# Patient Record
Sex: FEMALE | Race: WHITE | NOT HISPANIC OR LATINO | Employment: OTHER | URBAN - NONMETROPOLITAN AREA
[De-identification: names, ages, dates, MRNs, and addresses within clinical notes are randomized per-mention and may not be internally consistent; named-entity substitution may affect disease eponyms.]

---

## 2017-01-03 RX ORDER — OXYBUTYNIN CHLORIDE 5 MG/1
5 TABLET ORAL DAILY
Qty: 30 TABLET | Refills: 3 | Status: SHIPPED | OUTPATIENT
Start: 2017-01-03 | End: 2017-05-03 | Stop reason: SDUPTHER

## 2017-01-19 ENCOUNTER — TELEPHONE (OUTPATIENT)
Dept: CARDIOLOGY | Facility: CLINIC | Age: 77
End: 2017-01-19

## 2017-01-19 NOTE — TELEPHONE ENCOUNTER
"----- Message from Jennifer Montano MD sent at 1/19/2017  9:27 AM EST -----   That's okay  ----- Message -----     From: Sanam Brady MA     Sent: 1/19/2017   9:02 AM       To: Jennifer Montano MD    Pt c/o cough  Tightness in chest  Cant cough any thing up  Feels like she \"has been run over by a truck\"  Chilling  No fever  Runny nose   Wants to know if she can come in    "

## 2017-01-20 ENCOUNTER — OFFICE VISIT (OUTPATIENT)
Dept: CARDIOLOGY | Facility: CLINIC | Age: 77
End: 2017-01-20

## 2017-01-20 VITALS
DIASTOLIC BLOOD PRESSURE: 68 MMHG | WEIGHT: 186.6 LBS | SYSTOLIC BLOOD PRESSURE: 124 MMHG | OXYGEN SATURATION: 93 % | HEIGHT: 59 IN | HEART RATE: 57 BPM | BODY MASS INDEX: 37.62 KG/M2

## 2017-01-20 DIAGNOSIS — J06.9 UPPER RESPIRATORY TRACT INFECTION, UNSPECIFIED TYPE: Primary | ICD-10-CM

## 2017-01-20 DIAGNOSIS — R05.9 COUGH: ICD-10-CM

## 2017-01-20 DIAGNOSIS — I10 ESSENTIAL HYPERTENSION: ICD-10-CM

## 2017-01-20 DIAGNOSIS — E78.2 MIXED HYPERLIPIDEMIA: ICD-10-CM

## 2017-01-20 LAB
FLUAV AG NPH QL: NEGATIVE
FLUBV AG NPH QL IA: NEGATIVE

## 2017-01-20 PROCEDURE — 99214 OFFICE O/P EST MOD 30 MIN: CPT | Performed by: INTERNAL MEDICINE

## 2017-01-20 PROCEDURE — 87804 INFLUENZA ASSAY W/OPTIC: CPT | Performed by: INTERNAL MEDICINE

## 2017-01-20 PROCEDURE — 96372 THER/PROPH/DIAG INJ SC/IM: CPT | Performed by: INTERNAL MEDICINE

## 2017-01-20 RX ORDER — CEFTRIAXONE 500 MG/1
500 INJECTION, POWDER, FOR SOLUTION INTRAMUSCULAR; INTRAVENOUS ONCE
Status: COMPLETED | OUTPATIENT
Start: 2017-01-20 | End: 2017-01-20

## 2017-01-20 RX ORDER — AZITHROMYCIN 1 G
1 PACKET (EA) ORAL ONCE
Qty: 1 PACKET | Refills: 0 | Status: CANCELLED | OUTPATIENT
Start: 2017-01-20 | End: 2017-01-20

## 2017-01-20 RX ORDER — TRIAMCINOLONE ACETONIDE 40 MG/ML
40 INJECTION, SUSPENSION INTRA-ARTICULAR; INTRAMUSCULAR ONCE
Status: COMPLETED | OUTPATIENT
Start: 2017-01-20 | End: 2017-01-20

## 2017-01-20 RX ORDER — LOSARTAN POTASSIUM 100 MG/1
100 TABLET ORAL DAILY
Qty: 90 TABLET | Refills: 0 | Status: SHIPPED | OUTPATIENT
Start: 2017-01-20 | End: 2017-04-18 | Stop reason: SDUPTHER

## 2017-01-20 RX ORDER — AZITHROMYCIN 250 MG/1
TABLET, FILM COATED ORAL
Qty: 6 TABLET | Refills: 0 | Status: SHIPPED | OUTPATIENT
Start: 2017-01-20 | End: 2017-02-15

## 2017-01-20 RX ADMIN — CEFTRIAXONE 500 MG: 500 INJECTION, POWDER, FOR SOLUTION INTRAMUSCULAR; INTRAVENOUS at 11:52

## 2017-01-20 RX ADMIN — TRIAMCINOLONE ACETONIDE 40 MG: 40 INJECTION, SUSPENSION INTRA-ARTICULAR; INTRAMUSCULAR at 11:56

## 2017-01-20 NOTE — PROGRESS NOTES
subjective     Chief Complaint   Patient presents with   • URI     History of Present Illness  Upper respiratory tract infection  Patient states that she has been coughing now for almost 6 weeks.  She has taken multiple antibiotics and is not getting any better.  Throat is very raw she is coughing all night cannot rest.  There is no expectoration is dry hacking cough.  She does admit to having nasal congestion and sinus drainage.  There is no fever or chills.    HYPERTENSION  Emma Ryan has long-standing history of essential hypertension.  she is taking medications regularly.  There are no medication side effects.  Blood pressure is very well controlled.  There has been no headache, nausea or chest pain.  There has been no syncopal or presyncopal episode.  she denies episodes of hypo-tension or accelerated hypertension.  GERD  Emma Ryan has long-standing history of gastroesophageal reflux disorder however she significantly better on medications.  There is no nausea or vomiting.  No hematemesis or melena.  No abdominal pain.  Mild epigastric heartburns are noted.  Appetite is good bowel movements are normal.  Patient Active Problem List   Diagnosis   • Complete tear of right rotator cuff   • Chronic kidney disease, stage I   • Cough   • Gastroesophageal reflux disease   • Mixed hyperlipidemia   • Shoulder pain   • Hypertension   • Acromioclavicular joint arthritis   • Impingement syndrome, shoulder   • Complete tear of left rotator cuff   • Urge incontinence   • Atopic rhinitis   • Upper respiratory tract infection       Social History   Substance Use Topics   • Smoking status: Former Smoker     Packs/day: 2.00     Years: 20.00     Types: Cigarettes     Quit date: 1976   • Smokeless tobacco: Never Used   • Alcohol use No      Comment: s/p 1976       Allergies   Allergen Reactions   • Codeine Other (See Comments)     hyperventilate   • Sulfa Antibiotics Rash         Current Outpatient Prescriptions:   •   aspirin 81 MG tablet, Take 81 mg by mouth daily., Disp: , Rfl:   •  atorvastatin (LIPITOR) 20 MG tablet, Take 1 tablet by mouth daily., Disp: 90 tablet, Rfl: 2  •  Cholecalciferol (VITAMIN D PO), Take 1,000 Units by mouth daily., Disp: , Rfl:   •  diltiazem XR (DILACOR XR) 120 MG 24 hr capsule, Take 1 capsule by mouth Daily., Disp: 30 capsule, Rfl: 11  •  Docusate Calcium (STOOL SOFTENER PO), Take  by mouth daily., Disp: , Rfl:   •  Fexofenadine HCl (ALLEGRA PO), Take 180 mg by mouth daily as needed., Disp: , Rfl:   •  gabapentin (NEURONTIN) 100 MG capsule, Take 1 capsule by mouth 2 (Two) Times a Day., Disp: 180 capsule, Rfl: 0  •  HYDROcodone-acetaminophen (NORCO) 7.5-325 MG per tablet, Take 1 tablet by mouth every 6 (six) hours as needed for moderate pain (4-6) for up to 30 doses., Disp: 30 tablet, Rfl: 0  •  omeprazole OTC (PriLOSEC OTC) 20 MG EC tablet, Take 20 mg by mouth daily., Disp: , Rfl:   •  oxybutynin (DITROPAN) 5 MG tablet, Take 1 tablet by mouth Daily., Disp: 30 tablet, Rfl: 3  •  Probiotic Product (PROBIOTIC COLON SUPPORT PO), Take  by mouth daily., Disp: , Rfl:   •  azithromycin (ZITHROMAX) 250 MG tablet, Take 2 tablets the first day, then 1 tablet daily for 4 days., Disp: 6 tablet, Rfl: 0  •  HYDROcod Polst-CPM Polst ER (TUSSIONEX PENNKINETIC) 10-8 MG/5ML ER suspension, Take 5 mL by mouth Every 12 (Twelve) Hours As Needed for cough., Disp: 70 mL, Rfl: 0  •  losartan (COZAAR) 100 MG tablet, Take 1 tablet by mouth Daily., Disp: 90 tablet, Rfl: 0  No current facility-administered medications for this visit.       The following portions of the patient's history were reviewed and updated as appropriate: allergies, current medications, past family history, past medical history, past social history, past surgical history and problem list.    Review of Systems   Constitution: Positive for weakness.   HENT: Positive for congestion and sore throat.    Eyes: Negative.    Cardiovascular: Negative.   "  Respiratory: Positive for cough.    Hematologic/Lymphatic: Negative.    Musculoskeletal: Negative.    Gastrointestinal: Negative.           Objective:     Visit Vitals   • /68 (BP Location: Left arm, Patient Position: Sitting)   • Pulse 57   • Ht 59\" (149.9 cm)   • Wt 186 lb 9.6 oz (84.6 kg)   • SpO2 93%   • BMI 37.69 kg/m2     Physical Exam   Constitutional: She appears well-developed and well-nourished.   HENT:   Head: Normocephalic and atraumatic.   Right Ear: External ear normal.   Left Ear: External ear normal.   Mouth/Throat: Oropharyngeal exudate present.   Throat is red and injected with postnasal drip.  No lymphadenopathy   Eyes: Conjunctivae and EOM are normal. Pupils are equal, round, and reactive to light. No scleral icterus.   Neck: Normal range of motion. Neck supple. No JVD present. No tracheal deviation present. No thyromegaly present.   Cardiovascular: Normal rate, regular rhythm, normal heart sounds and intact distal pulses.  Exam reveals no friction rub.    No murmur heard.  Pulmonary/Chest: Effort normal and breath sounds normal. No respiratory distress. She has no wheezes. She has no rales. She exhibits no tenderness.   Abdominal: Soft. Bowel sounds are normal. She exhibits no distension and no mass. There is no tenderness. There is no rebound and no guarding.   Musculoskeletal: Normal range of motion. She exhibits no edema, tenderness or deformity.   Lymphadenopathy:     She has no cervical adenopathy.   Neurological: She is alert. She has normal reflexes. No cranial nerve deficit. She exhibits normal muscle tone. Coordination normal.   Skin: Skin is warm and dry.   Psychiatric: She has a normal mood and affect. Her behavior is normal. Judgment and thought content normal.         Lab Review  Lab Results   Component Value Date     11/17/2016    K 5.0 11/17/2016     11/17/2016    BUN 23 (H) 11/17/2016    CREATININE 1.19 11/17/2016    GLUCOSE 105 11/17/2016    CALCIUM 10.4 (H) " 11/17/2016    ALT 18 11/17/2016    ALKPHOS 94 11/17/2016    LABIL2 1.7 11/17/2016     Lab Results   Component Value Date    CKTOTAL 127 11/17/2016     Lab Results   Component Value Date    WBC 6.81 11/17/2016    HGB 13.5 11/17/2016    HCT 43.6 11/17/2016     11/17/2016       Lab Results   Component Value Date    CHLPL 133 06/08/2016     Lab Results   Component Value Date    CHOL 145 11/17/2016    TRIG 159 (H) 11/17/2016    HDL 44 (L) 11/17/2016    LDLCALC 69 11/17/2016    VLDL 31.8 11/17/2016    LDLHDL 1.57 11/17/2016         Procedures     I personally viewed and interpreted the patient's LAB data         Assessment:     1. Upper respiratory tract infection, unspecified type    2. Essential hypertension    3. Mixed hyperlipidemia    4. Cough          Plan:      Patient has upper respiratory tract infection.  She was given Rocephin.  Followed by Karina.    She also has severe hacking cough Zestril was discontinued and was switched to Cozaar.  Patient was advised to check blood pressure closely because blood pressure might be at a controlled with Cozaar.    Because of cough patient was given Kenalog and cough syrup.  Patient will continue rest of medications.  Flu antigen was also obtained.    No Follow-up on file.

## 2017-01-20 NOTE — MR AVS SNAPSHOT
Emma Ryan   1/20/2017 10:15 AM   Office Visit    Dept Phone:  [phone]   Encounter #:  [id number]    Provider:  Jennifer Montano MD   Department:  [facility] CARDIOLOGY                Your Full Care Plan              Today's Medication Changes          These changes are accurate as of: 1/20/17 10:12 AM.  If you have any questions, ask your nurse or doctor.               Stop taking medication(s)listed here:     amoxicillin-clavulanate 875-125 MG per tablet   Commonly known as:  AUGMENTIN   Stopped by:  Jennifer Montano MD           lisinopril 40 MG tablet   Commonly known as:  PRINIVIL,ZESTRIL   Stopped by:  Jennifer Montano MD                      Your Updated Medication List          This list is accurate as of: 1/20/17 10:12 AM.  Always use your most recent med list.                ALLEGRA PO       aspirin 81 MG tablet       atorvastatin 20 MG tablet   Commonly known as:  LIPITOR   Take 1 tablet by mouth daily.       diltiazem  MG 24 hr capsule   Commonly known as:  DILACOR XR   Take 1 capsule by mouth Daily.       gabapentin 100 MG capsule   Commonly known as:  NEURONTIN   Take 1 capsule by mouth 2 (Two) Times a Day.       HYDROcodone-acetaminophen 7.5-325 MG per tablet   Commonly known as:  NORCO   Take 1 tablet by mouth every 6 (six) hours as needed for moderate pain (4-6) for up to 30 doses.       omeprazole OTC 20 MG EC tablet   Commonly known as:  PriLOSEC OTC       oxybutynin 5 MG tablet   Commonly known as:  DITROPAN   Take 1 tablet by mouth Daily.       PROBIOTIC COLON SUPPORT PO       STOOL SOFTENER PO       VITAMIN D PO               You Were Diagnosed With        Codes Comments    Upper respiratory tract infection, unspecified type    -  Primary ICD-10-CM: J06.9  ICD-9-CM: 465.9       Instructions     None    Patient Instructions History      Upcoming Appointments     Visit Type Date Time Department    SAME DAY 1/20/2017  "10:15 AM [facility]ARDIOLOGY [place]    FOLLOW UP 2/15/2017  9:00 AM [facility] CARDIOLOGY [place]      MyChart Signup     Our records indicate that you have declined [facility] sign[facility]. If you would like to sign up for MyChart, please email [email or web address] or call [phone] to obtain an activation code.             Other Info from Your Visit           Your Appointments     Jan 20, 2017 10:15 AM EST   Same Day with Jennifer Montano MD   [facility] CARDIOLOGY (--)    [address] [place] [address]   [phone]            Feb 15, 2017  9:00 AM EST   Follow Up with Jennifer Montano MD   [facility] CARDIOLOGY (--)    [address] [place] [address]   [phone]           Arrive 15 minutes prior to appointment.              Allergies     Codeine Allergy Other (See Comments)    hyperventilate    Sulfa Antibiotics Allergy Rash      Reason for Visit     URI           Vital Signs     Blood Pressure Pulse Height Weight Oxygen Saturation Body Mass Index    124/68 (BP Location: Left arm, Patient Position: Sitting) 57 59\" (149.9 cm) 186 lb 9.6 oz (84.6 kg) 93% 37.69 kg/m2    Smoking Status                   Former Smoker           Problems and Diagnoses Noted     Upper respiratory infection    -  Primary        "

## 2017-02-15 ENCOUNTER — OFFICE VISIT (OUTPATIENT)
Dept: CARDIOLOGY | Facility: CLINIC | Age: 77
End: 2017-02-15

## 2017-02-15 VITALS
HEART RATE: 59 BPM | DIASTOLIC BLOOD PRESSURE: 72 MMHG | SYSTOLIC BLOOD PRESSURE: 122 MMHG | BODY MASS INDEX: 37.74 KG/M2 | WEIGHT: 187.2 LBS | OXYGEN SATURATION: 97 % | HEIGHT: 59 IN

## 2017-02-15 DIAGNOSIS — E78.2 MIXED HYPERLIPIDEMIA: Primary | ICD-10-CM

## 2017-02-15 DIAGNOSIS — Z12.31 VISIT FOR SCREENING MAMMOGRAM: ICD-10-CM

## 2017-02-15 DIAGNOSIS — N18.1 CHRONIC KIDNEY DISEASE, STAGE I: ICD-10-CM

## 2017-02-15 DIAGNOSIS — K21.9 GASTROESOPHAGEAL REFLUX DISEASE WITHOUT ESOPHAGITIS: ICD-10-CM

## 2017-02-15 DIAGNOSIS — I10 ESSENTIAL HYPERTENSION: ICD-10-CM

## 2017-02-15 PROCEDURE — 99213 OFFICE O/P EST LOW 20 MIN: CPT | Performed by: INTERNAL MEDICINE

## 2017-02-15 NOTE — PROGRESS NOTES
subjective     Chief Complaint   Patient presents with   • Hypertension   • Hyperlipidemia   • Chronic Kidney Disease     History of Present Illness     HYPERTENSION  Emma Ryan has long-standing history of essential hypertension. she is taking medications regularly. There are no medication side effects. Blood pressure is very well controlled. There has been no headache, nausea or chest pain. There has been no syncopal or presyncopal episode. she denies episodes of hypo-tension or accelerated hypertension.    Hyperlipidemia  Emma Ryan has long-standing history of hyperlipidemia.  Has been trying to lose weight and trying to follow diet and activity recommandations.  Patient is tolerating medications very well.  There has been no side effects.  Latest lipid levels have been fluctuating.    GERD  Emma Ryan has long-standing history of gastroesophageal reflux disorder however she significantly better on medications. There is no nausea or vomiting. No hematemesis or melena. No abdominal pain. Mild epigastric heartburns are noted. Appetite is good bowel movements are normal.    Patient Active Problem List   Diagnosis   • Complete tear of right rotator cuff   • Chronic kidney disease, stage I   • Cough   • Gastroesophageal reflux disease   • Mixed hyperlipidemia   • Shoulder pain   • Hypertension   • Acromioclavicular joint arthritis   • Impingement syndrome, shoulder   • Complete tear of left rotator cuff   • Urge incontinence   • Atopic rhinitis   • Upper respiratory tract infection       Social History   Substance Use Topics   • Smoking status: Former Smoker     Packs/day: 2.00     Years: 20.00     Types: Cigarettes     Quit date: 1976   • Smokeless tobacco: Never Used   • Alcohol use No      Comment: s/p 1976       Allergies   Allergen Reactions   • Codeine Other (See Comments)     hyperventilate   • Sulfa Antibiotics Rash         Current Outpatient Prescriptions:   •  aspirin 81 MG tablet, Take 81  "mg by mouth daily., Disp: , Rfl:   •  atorvastatin (LIPITOR) 20 MG tablet, Take 1 tablet by mouth daily., Disp: 90 tablet, Rfl: 2  •  Cholecalciferol (VITAMIN D PO), Take 1,000 Units by mouth daily., Disp: , Rfl:   •  diltiazem XR (DILACOR XR) 120 MG 24 hr capsule, Take 1 capsule by mouth Daily., Disp: 30 capsule, Rfl: 11  •  Docusate Calcium (STOOL SOFTENER PO), Take  by mouth daily., Disp: , Rfl:   •  Fexofenadine HCl (ALLEGRA PO), Take 180 mg by mouth daily as needed., Disp: , Rfl:   •  gabapentin (NEURONTIN) 100 MG capsule, Take 1 capsule by mouth 2 (Two) Times a Day., Disp: 180 capsule, Rfl: 0  •  HYDROcodone-acetaminophen (NORCO) 7.5-325 MG per tablet, Take 1 tablet by mouth every 6 (six) hours as needed for moderate pain (4-6) for up to 30 doses., Disp: 30 tablet, Rfl: 0  •  losartan (COZAAR) 100 MG tablet, Take 1 tablet by mouth Daily., Disp: 90 tablet, Rfl: 0  •  omeprazole OTC (PriLOSEC OTC) 20 MG EC tablet, Take 20 mg by mouth daily., Disp: , Rfl:   •  oxybutynin (DITROPAN) 5 MG tablet, Take 1 tablet by mouth Daily., Disp: 30 tablet, Rfl: 3  •  Probiotic Product (PROBIOTIC COLON SUPPORT PO), Take  by mouth daily., Disp: , Rfl:       The following portions of the patient's history were reviewed and updated as appropriate: allergies, current medications, past family history, past medical history, past social history, past surgical history and problem list.    Review of Systems   Constitution: Negative.   HENT: Negative.    Eyes: Negative.    Cardiovascular: Negative.    Respiratory: Negative.    Hematologic/Lymphatic: Negative.    Musculoskeletal: Positive for arthritis and joint pain.   Gastrointestinal: Negative.    Neurological: Negative.           Objective:     Visit Vitals   • /72 (BP Location: Left arm, Patient Position: Sitting)   • Pulse 59   • Ht 59\" (149.9 cm)   • Wt 187 lb 3.2 oz (84.9 kg)   • SpO2 97%   • BMI 37.81 kg/m2     Physical Exam   Constitutional: She appears well-developed and " well-nourished.   HENT:   Head: Normocephalic and atraumatic.   Mouth/Throat: Oropharynx is clear and moist.   Eyes: Conjunctivae and EOM are normal. Pupils are equal, round, and reactive to light. No scleral icterus.   Neck: Normal range of motion. Neck supple. No JVD present. No tracheal deviation present. No thyromegaly present.   Cardiovascular: Normal rate, regular rhythm, normal heart sounds and intact distal pulses.  Exam reveals no friction rub.    No murmur heard.  Pulmonary/Chest: Effort normal and breath sounds normal. No respiratory distress. She has no wheezes. She has no rales. She exhibits no tenderness.   Abdominal: Soft. Bowel sounds are normal. She exhibits no distension and no mass. There is no tenderness. There is no rebound and no guarding.   Musculoskeletal: Normal range of motion. She exhibits no edema, tenderness or deformity.   Dropped foot wearing the brace  Painful shoulder   Lymphadenopathy:     She has no cervical adenopathy.   Neurological: She is alert. She has normal reflexes. No cranial nerve deficit. She exhibits normal muscle tone. Coordination normal.   Skin: Skin is warm and dry.   Psychiatric: She has a normal mood and affect. Her behavior is normal. Judgment and thought content normal.         Lab Review  Lab Results   Component Value Date     11/17/2016    K 5.0 11/17/2016     11/17/2016    BUN 23 (H) 11/17/2016    CREATININE 1.19 11/17/2016    GLUCOSE 105 11/17/2016    CALCIUM 10.4 (H) 11/17/2016    ALT 18 11/17/2016    ALKPHOS 94 11/17/2016    LABIL2 1.7 11/17/2016     Lab Results   Component Value Date    CKTOTAL 127 11/17/2016     Lab Results   Component Value Date    WBC 6.81 11/17/2016    HGB 13.5 11/17/2016    HCT 43.6 11/17/2016     11/17/2016     Lab Results   Component Value Date    INR 1.97 12/11/2014    INR 2.02 12/08/2014    INR 2.46 12/04/2014     No results found for: MG  Lab Results   Component Value Date    TSH 1.182 06/08/2016     No  results found for: BNP  Lab Results   Component Value Date    CHLPL 133 06/08/2016     Lab Results   Component Value Date    CHOL 145 11/17/2016    TRIG 159 (H) 11/17/2016    HDL 44 (L) 11/17/2016    LDLCALC 69 11/17/2016    VLDL 31.8 11/17/2016    LDLHDL 1.57 11/17/2016         Procedures     I personally viewed and interpreted the patient's LAB data         Assessment:     1. Mixed hyperlipidemia    2. Essential hypertension    3. Gastroesophageal reflux disease without esophagitis    4. Visit for screening mammogram    5. Chronic kidney disease, stage I          Plan:      Patient is doing better she will continue current medications  Lab work scheduled for next visit  Mammogram also scheduled.  Refills of medications given  Risk factor modification discussed.        Return in about 3 months (around 5/15/2017).

## 2017-02-22 ENCOUNTER — HOSPITAL ENCOUNTER (OUTPATIENT)
Dept: MAMMOGRAPHY | Facility: HOSPITAL | Age: 77
Discharge: HOME | End: 2017-02-22
Attending: INTERNAL MEDICINE | Admitting: INTERNAL MEDICINE

## 2017-02-22 DIAGNOSIS — Z12.31 VISIT FOR SCREENING MAMMOGRAM: ICD-10-CM

## 2017-02-22 PROCEDURE — 77063 BREAST TOMOSYNTHESIS BI: CPT | Performed by: RADIOLOGY

## 2017-02-22 PROCEDURE — G0202 SCR MAMMO BI INCL CAD: HCPCS | Performed by: RADIOLOGY

## 2017-02-22 PROCEDURE — 77063 BREAST TOMOSYNTHESIS BI: CPT

## 2017-02-22 PROCEDURE — G0202 SCR MAMMO BI INCL CAD: HCPCS

## 2017-03-03 RX ORDER — GABAPENTIN 100 MG/1
100 CAPSULE ORAL 2 TIMES DAILY
Qty: 180 CAPSULE | Refills: 0 | Status: SHIPPED | OUTPATIENT
Start: 2017-03-03 | End: 2017-05-31 | Stop reason: SDUPTHER

## 2017-03-14 RX ORDER — ATORVASTATIN CALCIUM 20 MG/1
20 TABLET, FILM COATED ORAL DAILY
Qty: 90 TABLET | Refills: 2 | Status: SHIPPED | OUTPATIENT
Start: 2017-03-14 | End: 2017-12-11 | Stop reason: SDUPTHER

## 2017-04-03 ENCOUNTER — TRANSCRIBE ORDERS (OUTPATIENT)
Dept: GENERAL RADIOLOGY | Facility: HOSPITAL | Age: 77
End: 2017-04-03

## 2017-04-03 ENCOUNTER — LAB (OUTPATIENT)
Dept: LAB | Facility: HOSPITAL | Age: 77
End: 2017-04-03
Attending: INTERNAL MEDICINE

## 2017-04-03 DIAGNOSIS — E83.52 HYPERCALCEMIA: ICD-10-CM

## 2017-04-03 DIAGNOSIS — N18.30 CHRONIC KIDNEY DISEASE, STAGE III (MODERATE) (HCC): Primary | ICD-10-CM

## 2017-04-03 DIAGNOSIS — N18.30 CHRONIC KIDNEY DISEASE, STAGE III (MODERATE) (HCC): ICD-10-CM

## 2017-04-03 LAB
25(OH)D3 SERPL-MCNC: 36 NG/ML
ALBUMIN SERPL-MCNC: 4.5 G/DL (ref 3.4–4.8)
ALBUMIN UR-MCNC: 11.5 MG/L
ALBUMIN/GLOB SERPL: 1.7 G/DL (ref 1.5–2.5)
ALP SERPL-CCNC: 98 U/L (ref 35–104)
ALT SERPL W P-5'-P-CCNC: 18 U/L (ref 10–36)
ANION GAP SERPL CALCULATED.3IONS-SCNC: 3.1 MMOL/L (ref 3.6–11.2)
AST SERPL-CCNC: 22 U/L (ref 10–30)
BILIRUB SERPL-MCNC: 0.4 MG/DL (ref 0.2–1.8)
BUN BLD-MCNC: 18 MG/DL (ref 7–21)
BUN/CREAT SERPL: 19.4 (ref 7–25)
CA-I BLD-MCNC: 5 MG/DL (ref 4.6–5.3)
CALCIUM SPEC-SCNC: 9.4 MG/DL (ref 7.7–10)
CHLORIDE SERPL-SCNC: 109 MMOL/L (ref 99–112)
CO2 SERPL-SCNC: 29.9 MMOL/L (ref 24.3–31.9)
CREAT BLD-MCNC: 0.93 MG/DL (ref 0.43–1.29)
CREAT UR-MCNC: 86.9 MG/DL
GFR SERPL CREATININE-BSD FRML MDRD: 59 ML/MIN/1.73
GLOBULIN UR ELPH-MCNC: 2.7 GM/DL
GLUCOSE BLD-MCNC: 102 MG/DL (ref 70–110)
MICROALBUMIN/CREAT UR: 13.2 MG/G
OSMOLALITY SERPL CALC.SUM OF ELEC: 285.2 MOSM/KG (ref 273–305)
POTASSIUM BLD-SCNC: 3.9 MMOL/L (ref 3.5–5.3)
PROT SERPL-MCNC: 7.2 G/DL (ref 6–8)
SODIUM BLD-SCNC: 142 MMOL/L (ref 135–153)

## 2017-04-03 PROCEDURE — 80053 COMPREHEN METABOLIC PANEL: CPT | Performed by: INTERNAL MEDICINE

## 2017-04-03 PROCEDURE — 82043 UR ALBUMIN QUANTITATIVE: CPT | Performed by: INTERNAL MEDICINE

## 2017-04-03 PROCEDURE — 82306 VITAMIN D 25 HYDROXY: CPT | Performed by: INTERNAL MEDICINE

## 2017-04-03 PROCEDURE — 36415 COLL VENOUS BLD VENIPUNCTURE: CPT

## 2017-04-03 PROCEDURE — 82330 ASSAY OF CALCIUM: CPT | Performed by: INTERNAL MEDICINE

## 2017-04-03 PROCEDURE — 82570 ASSAY OF URINE CREATININE: CPT | Performed by: INTERNAL MEDICINE

## 2017-04-18 RX ORDER — LOSARTAN POTASSIUM 100 MG/1
100 TABLET ORAL DAILY
Qty: 90 TABLET | Refills: 0 | Status: SHIPPED | OUTPATIENT
Start: 2017-04-18 | End: 2017-07-17 | Stop reason: SDUPTHER

## 2017-05-02 ENCOUNTER — LAB (OUTPATIENT)
Dept: CARDIOLOGY | Facility: CLINIC | Age: 77
End: 2017-05-02

## 2017-05-02 DIAGNOSIS — E78.2 MIXED HYPERLIPIDEMIA: ICD-10-CM

## 2017-05-02 LAB
ALBUMIN SERPL-MCNC: 4.6 G/DL (ref 3.4–4.8)
ALBUMIN/GLOB SERPL: 1.5 G/DL (ref 1.5–2.5)
ALP SERPL-CCNC: 99 U/L (ref 35–104)
ALT SERPL W P-5'-P-CCNC: 15 U/L (ref 10–36)
ANION GAP SERPL CALCULATED.3IONS-SCNC: 2.1 MMOL/L (ref 3.6–11.2)
AST SERPL-CCNC: 19 U/L (ref 10–30)
BASOPHILS # BLD AUTO: 0.02 10*3/MM3 (ref 0–0.3)
BASOPHILS NFR BLD AUTO: 0.2 % (ref 0–2)
BILIRUB SERPL-MCNC: 0.4 MG/DL (ref 0.2–1.8)
BUN BLD-MCNC: 21 MG/DL (ref 7–21)
BUN/CREAT SERPL: 20 (ref 7–25)
CALCIUM SPEC-SCNC: 10.4 MG/DL (ref 7.7–10)
CHLORIDE SERPL-SCNC: 110 MMOL/L (ref 99–112)
CHOLEST SERPL-MCNC: 142 MG/DL (ref 0–200)
CK SERPL-CCNC: 63 U/L (ref 24–173)
CO2 SERPL-SCNC: 32.9 MMOL/L (ref 24.3–31.9)
CREAT BLD-MCNC: 1.05 MG/DL (ref 0.43–1.29)
DEPRECATED RDW RBC AUTO: 48.4 FL (ref 37–54)
EOSINOPHIL # BLD AUTO: 0.11 10*3/MM3 (ref 0–0.7)
EOSINOPHIL NFR BLD AUTO: 1.3 % (ref 0–7)
ERYTHROCYTE [DISTWIDTH] IN BLOOD BY AUTOMATED COUNT: 14.5 % (ref 11.5–14.5)
GFR SERPL CREATININE-BSD FRML MDRD: 51 ML/MIN/1.73
GLOBULIN UR ELPH-MCNC: 3 GM/DL
GLUCOSE BLD-MCNC: 108 MG/DL (ref 70–110)
HCT VFR BLD AUTO: 42.5 % (ref 37–47)
HDLC SERPL-MCNC: 46 MG/DL (ref 60–100)
HGB BLD-MCNC: 13.2 G/DL (ref 12–16)
IMM GRANULOCYTES # BLD: 0.01 10*3/MM3 (ref 0–0.03)
IMM GRANULOCYTES NFR BLD: 0.1 % (ref 0–0.5)
LDLC SERPL CALC-MCNC: 68 MG/DL (ref 0–100)
LDLC/HDLC SERPL: 1.48 {RATIO}
LYMPHOCYTES # BLD AUTO: 2.33 10*3/MM3 (ref 1–3)
LYMPHOCYTES NFR BLD AUTO: 27 % (ref 16–46)
MCH RBC QN AUTO: 29.7 PG (ref 27–33)
MCHC RBC AUTO-ENTMCNC: 31.1 G/DL (ref 33–37)
MCV RBC AUTO: 95.5 FL (ref 80–94)
MONOCYTES # BLD AUTO: 0.76 10*3/MM3 (ref 0.1–0.9)
MONOCYTES NFR BLD AUTO: 8.8 % (ref 0–12)
NEUTROPHILS # BLD AUTO: 5.39 10*3/MM3 (ref 1.4–6.5)
NEUTROPHILS NFR BLD AUTO: 62.6 % (ref 40–75)
OSMOLALITY SERPL CALC.SUM OF ELEC: 292.2 MOSM/KG (ref 273–305)
PLATELET # BLD AUTO: 250 10*3/MM3 (ref 130–400)
PMV BLD AUTO: 11.2 FL (ref 6–10)
POTASSIUM BLD-SCNC: 4.8 MMOL/L (ref 3.5–5.3)
PROT SERPL-MCNC: 7.6 G/DL (ref 6–8)
RBC # BLD AUTO: 4.45 10*6/MM3 (ref 4.2–5.4)
SODIUM BLD-SCNC: 145 MMOL/L (ref 135–153)
TRIGL SERPL-MCNC: 140 MG/DL (ref 0–150)
VLDLC SERPL-MCNC: 28 MG/DL
WBC NRBC COR # BLD: 8.62 10*3/MM3 (ref 4.5–12.5)

## 2017-05-02 PROCEDURE — 82550 ASSAY OF CK (CPK): CPT | Performed by: INTERNAL MEDICINE

## 2017-05-02 PROCEDURE — 80053 COMPREHEN METABOLIC PANEL: CPT | Performed by: INTERNAL MEDICINE

## 2017-05-02 PROCEDURE — 80061 LIPID PANEL: CPT | Performed by: INTERNAL MEDICINE

## 2017-05-02 PROCEDURE — 85025 COMPLETE CBC W/AUTO DIFF WBC: CPT | Performed by: INTERNAL MEDICINE

## 2017-05-03 ENCOUNTER — OFFICE VISIT (OUTPATIENT)
Dept: CARDIOLOGY | Facility: CLINIC | Age: 77
End: 2017-05-03

## 2017-05-03 VITALS
SYSTOLIC BLOOD PRESSURE: 121 MMHG | OXYGEN SATURATION: 92 % | WEIGHT: 188.2 LBS | HEIGHT: 59 IN | DIASTOLIC BLOOD PRESSURE: 68 MMHG | BODY MASS INDEX: 37.94 KG/M2 | HEART RATE: 76 BPM

## 2017-05-03 DIAGNOSIS — K21.9 GASTROESOPHAGEAL REFLUX DISEASE WITHOUT ESOPHAGITIS: ICD-10-CM

## 2017-05-03 DIAGNOSIS — E78.2 MIXED HYPERLIPIDEMIA: ICD-10-CM

## 2017-05-03 DIAGNOSIS — I10 ESSENTIAL HYPERTENSION: Primary | ICD-10-CM

## 2017-05-03 DIAGNOSIS — M75.42 IMPINGEMENT SYNDROME, SHOULDER, LEFT: ICD-10-CM

## 2017-05-03 PROCEDURE — 99214 OFFICE O/P EST MOD 30 MIN: CPT | Performed by: INTERNAL MEDICINE

## 2017-05-03 RX ORDER — OXYBUTYNIN CHLORIDE 5 MG/1
5 TABLET ORAL DAILY
Qty: 30 TABLET | Refills: 3 | Status: SHIPPED | OUTPATIENT
Start: 2017-05-03 | End: 2018-02-13 | Stop reason: SDUPTHER

## 2017-05-31 RX ORDER — GABAPENTIN 100 MG/1
100 CAPSULE ORAL 2 TIMES DAILY
Qty: 60 CAPSULE | Refills: 0 | OUTPATIENT
Start: 2017-05-31 | End: 2017-06-30 | Stop reason: SDUPTHER

## 2017-06-30 RX ORDER — GABAPENTIN 100 MG/1
100 CAPSULE ORAL 2 TIMES DAILY
Qty: 60 CAPSULE | Refills: 0 | OUTPATIENT
Start: 2017-06-30 | End: 2017-07-31 | Stop reason: SDUPTHER

## 2017-07-17 RX ORDER — LOSARTAN POTASSIUM 100 MG/1
100 TABLET ORAL DAILY
Qty: 90 TABLET | Refills: 0 | Status: SHIPPED | OUTPATIENT
Start: 2017-07-17 | End: 2017-10-12 | Stop reason: SDUPTHER

## 2017-07-25 ENCOUNTER — OFFICE VISIT (OUTPATIENT)
Dept: CARDIOLOGY | Facility: CLINIC | Age: 77
End: 2017-07-25

## 2017-07-25 VITALS
WEIGHT: 191 LBS | RESPIRATION RATE: 18 BRPM | HEART RATE: 71 BPM | OXYGEN SATURATION: 97 % | BODY MASS INDEX: 38.51 KG/M2 | DIASTOLIC BLOOD PRESSURE: 80 MMHG | HEIGHT: 59 IN | SYSTOLIC BLOOD PRESSURE: 128 MMHG

## 2017-07-25 DIAGNOSIS — R60.0 BILATERAL EDEMA OF LOWER EXTREMITY: ICD-10-CM

## 2017-07-25 DIAGNOSIS — N39.41 URGE INCONTINENCE: ICD-10-CM

## 2017-07-25 DIAGNOSIS — I10 ESSENTIAL HYPERTENSION: Primary | ICD-10-CM

## 2017-07-25 DIAGNOSIS — E78.2 MIXED HYPERLIPIDEMIA: ICD-10-CM

## 2017-07-25 PROCEDURE — 99214 OFFICE O/P EST MOD 30 MIN: CPT | Performed by: INTERNAL MEDICINE

## 2017-07-25 RX ORDER — DILTIAZEM HYDROCHLORIDE 180 MG/1
CAPSULE, EXTENDED RELEASE ORAL
COMMUNITY
Start: 2017-07-14 | End: 2017-07-25

## 2017-07-25 RX ORDER — PREDNISOLONE ACETATE 10 MG/ML
SUSPENSION/ DROPS OPHTHALMIC
COMMUNITY
Start: 2017-04-19 | End: 2017-07-25

## 2017-07-25 RX ORDER — FUROSEMIDE 20 MG/1
20 TABLET ORAL DAILY
Qty: 90 TABLET | Refills: 3 | Status: SHIPPED | OUTPATIENT
Start: 2017-07-25 | End: 2018-01-23

## 2017-07-25 RX ORDER — DILTIAZEM HYDROCHLORIDE 120 MG/1
120 CAPSULE, EXTENDED RELEASE ORAL DAILY
Qty: 90 CAPSULE | Refills: 3 | Status: SHIPPED | OUTPATIENT
Start: 2017-07-25 | End: 2018-09-18 | Stop reason: ALTCHOICE

## 2017-07-25 NOTE — PROGRESS NOTES
subjective     Chief Complaint   Patient presents with   • Hyperlipidemia   • Hypertension   • Heartburn     History of Present Illness  Patient is 76 years old white female who is here for checkup and follow-up of multiple chronic medical problems.    Patient states that she bruises quite easily patient is taking aspirin daily and that probably is contributing.    Patient complains of swelling both lower extremities with some discoloration which appears to be stasis dermatitis and bilateral lower extremity edema.  There is no leg pain edema has been going on for a few months.  Patient is taking Dilacor xr 180 which probably is causing or at least contributing ankle edema.    HYPERTENSION  Emma Ryan has long-standing history of essential hypertension. she is taking medications regularly. There are no medication side effects. Blood pressure is very well controlled. There has been no headache, nausea or chest pain. There has been no syncopal or presyncopal episode. she denies episodes of hypo-tension or accelerated hypertension.      Hyperlipidemia  Emma Ryan has long-standing history of hyperlipidemia. Has been trying to lose weight and trying to follow diet and activity recommandations. Patient is tolerating medications very well. There has been no side effects. Latest lipid levels have been fluctuating.      GERD  Emma Ryan has long-standing history of gastroesophageal reflux disorder however she significantly better on medications. There is no nausea or vomiting. No hematemesis or melena. No abdominal pain. Mild epigastric heartburns are noted. Appetite is good bowel movements are normal.      Past Surgical History:   Procedure Laterality Date   • APPENDECTOMY     • BUNIONECTOMY Right    • CARDIOVASCULAR STRESS TEST  10/2012   • CATARACT EXTRACTION  2017   • CHOLECYSTECTOMY     • COLONOSCOPY  2011   • ECHO - CONVERTED  10/2012   • JOINT REPLACEMENT      bilateral knees    • KNEE ARTHROPLASTY  Left    • KNEE ARTHROSCOPY     • SHOULDER ARTHROSCOPY Left 7/28/2016    Procedure: LEFT SHOULDER ACROMIOPLASTY,MINI OPEN ROTATOR CUFF REPAIR;  Surgeon: Carlos Eduardo Smith MD;  Location: [facility];  Service:    • SHOULDER SURGERY  05/31/2016    OPEN ACROMICPLASTY RIGHT SHOULDER     Family History   Problem Relation Age of Onset   • Heart disease Other    • Stroke Sister    • Diabetes Mother    • Heart failure Mother    • Heart disease Mother    • Heart attack Mother    • Hypertension Father    • Emphysema Father    • Heart disease Father    • No Known Problems Brother    • Cancer Sister    • Kidney disease Sister    • Heart failure Sister    • Diabetes Brother    • Diabetes Brother    • Diabetes Brother    • Breast cancer Neg Hx      Past Medical History:   Diagnosis Date   • GERD (gastroesophageal reflux disease)    • Hyperlipidemia    • Hypertension    • Left shoulder pain    • Obesity    • Osteoarthritis of knees, bilateral    • Overactive bladder    • Right shoulder pain      Patient Active Problem List   Diagnosis   • Complete tear of right rotator cuff   • Chronic kidney disease, stage I   • Cough   • Gastroesophageal reflux disease   • Mixed hyperlipidemia   • Shoulder pain   • Hypertension   • Acromioclavicular joint arthritis   • Impingement syndrome, shoulder   • Complete tear of left rotator cuff   • Urge incontinence   • Atopic rhinitis   • Upper respiratory tract infection   • Leg edema       Social History   Substance Use Topics   • Smoking status: Former Smoker     Packs/day: 2.00     Years: 20.00     Types: Cigarettes     Quit date: 1976   • Smokeless tobacco: Never Used   • Alcohol use No      Comment: s/p 1976       Allergies   Allergen Reactions   • Codeine Other (See Comments)     hyperventilate   • Sulfa Antibiotics Rash       Current Outpatient Prescriptions on File Prior to Visit   Medication Sig   • aspirin 81 MG tablet Take 81 mg by mouth daily.   • atorvastatin (LIPITOR) 20 MG tablet  "Take 1 tablet by mouth Daily.   • Cholecalciferol (VITAMIN D PO) Take 1,000 Units by mouth daily.   • Docusate Calcium (STOOL SOFTENER PO) Take  by mouth daily.   • Fexofenadine HCl (ALLEGRA PO) Take 180 mg by mouth daily as needed.   • gabapentin (NEURONTIN) 100 MG capsule Take 1 capsule by mouth 2 (Two) Times a Day.   • HYDROcodone-acetaminophen (NORCO) 7.5-325 MG per tablet Take 1 tablet by mouth every 6 (six) hours as needed for moderate pain (4-6) for up to 30 doses.   • losartan (COZAAR) 100 MG tablet Take 1 tablet by mouth Daily.   • omeprazole OTC (PriLOSEC OTC) 20 MG EC tablet Take 20 mg by mouth daily.   • oxybutynin (DITROPAN) 5 MG tablet Take 1 tablet by mouth Daily.   • Probiotic Product (PROBIOTIC COLON SUPPORT PO) Take  by mouth daily.     No current facility-administered medications on file prior to visit.          The following portions of the patient's history were reviewed and updated as appropriate: allergies, current medications, past family history, past medical history, past social history, past surgical history and problem list.    Review of Systems   Constitution: Negative.   HENT: Positive for congestion. Negative for headaches.    Eyes: Negative.    Cardiovascular: Positive for leg swelling. Negative for chest pain, cyanosis, dyspnea on exertion, irregular heartbeat, near-syncope, orthopnea, palpitations, paroxysmal nocturnal dyspnea and syncope.   Respiratory: Negative.  Negative for shortness of breath.    Endocrine: Negative.    Hematologic/Lymphatic: Negative.    Skin: Positive for color change.        Easy bruising   Musculoskeletal: Positive for arthritis and joint pain.   Gastrointestinal: Positive for heartburn.   Genitourinary: Positive for urgency.   Neurological: Positive for numbness and paresthesias.   Psychiatric/Behavioral: Negative.           Objective:     /80 (BP Location: Left arm, Patient Position: Sitting, Cuff Size: Adult)  Pulse 71  Resp 18  Ht 59\" (149.9 " cm)  Wt 191 lb (86.6 kg)  SpO2 97%  BMI 38.58 kg/m2  Physical Exam   Constitutional: She appears well-developed and well-nourished.   HENT:   Head: Normocephalic and atraumatic.   Mouth/Throat: Oropharynx is clear and moist.   Eyes: Conjunctivae and EOM are normal. Pupils are equal, round, and reactive to light. No scleral icterus.   Neck: Normal range of motion. Neck supple. No JVD present. No tracheal deviation present. No thyromegaly present.   Cardiovascular: Normal rate, regular rhythm, normal heart sounds and intact distal pulses.  Exam reveals no friction rub.    No murmur heard.  Pulmonary/Chest: Effort normal and breath sounds normal. No respiratory distress. She has no wheezes. She has no rales. She exhibits no tenderness.   Abdominal: Soft. Bowel sounds are normal. She exhibits no distension and no mass. There is no tenderness. There is no rebound and no guarding.   Musculoskeletal: Normal range of motion. She exhibits edema. She exhibits no tenderness or deformity.   Lymphadenopathy:     She has no cervical adenopathy.   Neurological: She is alert. She has normal reflexes. No cranial nerve deficit. She exhibits normal muscle tone. Coordination normal.   Skin: Skin is warm and dry.   Bruising on both upper extremities noted.   Psychiatric: She has a normal mood and affect. Her behavior is normal. Judgment and thought content normal.         Lab Review  Lab Results   Component Value Date     05/02/2017    K 4.8 05/02/2017     05/02/2017    BUN 21 05/02/2017    CREATININE 1.05 05/02/2017    GLUCOSE 108 05/02/2017    CALCIUM 10.4 (H) 05/02/2017    ALT 15 05/02/2017    ALKPHOS 99 05/02/2017    LABIL2 1.5 05/02/2017     Lab Results   Component Value Date    CKTOTAL 63 05/02/2017     Lab Results   Component Value Date    WBC 8.62 05/02/2017    HGB 13.2 05/02/2017    HCT 42.5 05/02/2017     05/02/2017     Lab Results   Component Value Date    INR 1.97 12/11/2014    INR 2.02 12/08/2014     INR 2.46 12/04/2014     No results found for: MG  Lab Results   Component Value Date    TSH 1.182 06/08/2016     No results found for: BNP  Lab Results   Component Value Date    CHOL 142 05/02/2017    CHLPL 133 06/08/2016    TRIG 140 05/02/2017    HDL 46 (L) 05/02/2017    LDLCALC 68 05/02/2017    VLDL 28 05/02/2017    LDLHDL 1.48 05/02/2017         Procedures       I personally viewed and interpreted the patient's LAB data         Assessment:     1. Essential hypertension    2. Mixed hyperlipidemia    3. Urge incontinence    4. Bilateral edema of lower extremity          Plan:      Ankle edema probably is related Dilacor therapy.  Clinically there is no evidence of DVT or heart failure.  Dilacor dose was decreased from 180 down to 120.  Lasix 20 mg daily was also added.    Easy bruisability is related to aspirin patient was reassured.    Hyperlipidemia is very well controlled.  His cholesterol was 142 2 months ago Lipitor was continued.  Blood pressure is very well controlled with Cozaar.  Urinary urgency controlled with Ditropan.  GI symptoms are better with the omeprazole.  Refill of medications were given.  Follow-up scheduled        No Follow-up on file.

## 2017-07-26 PROBLEM — R60.0 LEG EDEMA: Status: ACTIVE | Noted: 2017-07-26

## 2017-07-31 RX ORDER — GABAPENTIN 100 MG/1
100 CAPSULE ORAL 2 TIMES DAILY
Qty: 60 CAPSULE | Refills: 0 | OUTPATIENT
Start: 2017-07-31 | End: 2017-08-31 | Stop reason: SDUPTHER

## 2017-08-31 RX ORDER — GABAPENTIN 100 MG/1
CAPSULE ORAL
Qty: 60 CAPSULE | Refills: 0 | OUTPATIENT
Start: 2017-08-31

## 2017-08-31 RX ORDER — GABAPENTIN 100 MG/1
100 CAPSULE ORAL 2 TIMES DAILY
Qty: 60 CAPSULE | Refills: 0 | OUTPATIENT
Start: 2017-08-31 | End: 2017-09-29 | Stop reason: SDUPTHER

## 2017-09-29 RX ORDER — GABAPENTIN 100 MG/1
100 CAPSULE ORAL 2 TIMES DAILY
Qty: 60 CAPSULE | Refills: 0 | OUTPATIENT
Start: 2017-09-29 | End: 2017-10-31 | Stop reason: SDUPTHER

## 2017-10-12 RX ORDER — LOSARTAN POTASSIUM 100 MG/1
100 TABLET ORAL DAILY
Qty: 90 TABLET | Refills: 0 | Status: SHIPPED | OUTPATIENT
Start: 2017-10-12 | End: 2018-01-09 | Stop reason: SDUPTHER

## 2017-10-19 ENCOUNTER — LAB (OUTPATIENT)
Dept: LAB | Facility: HOSPITAL | Age: 77
End: 2017-10-19
Attending: INTERNAL MEDICINE

## 2017-10-19 DIAGNOSIS — E78.2 MIXED HYPERLIPIDEMIA: ICD-10-CM

## 2017-10-19 LAB
ALBUMIN SERPL-MCNC: 4.8 G/DL (ref 3.4–4.8)
ALBUMIN/GLOB SERPL: 1.7 G/DL (ref 1.5–2.5)
ALP SERPL-CCNC: 95 U/L (ref 35–104)
ALT SERPL W P-5'-P-CCNC: 20 U/L (ref 10–36)
ANION GAP SERPL CALCULATED.3IONS-SCNC: 8.6 MMOL/L (ref 3.6–11.2)
AST SERPL-CCNC: 22 U/L (ref 10–30)
BASOPHILS # BLD AUTO: 0.03 10*3/MM3 (ref 0–0.3)
BASOPHILS NFR BLD AUTO: 0.4 % (ref 0–2)
BILIRUB SERPL-MCNC: 0.5 MG/DL (ref 0.2–1.8)
BUN BLD-MCNC: 17 MG/DL (ref 7–21)
BUN/CREAT SERPL: 17.3 (ref 7–25)
CALCIUM SPEC-SCNC: 10.3 MG/DL (ref 7.7–10)
CHLORIDE SERPL-SCNC: 106 MMOL/L (ref 99–112)
CHOLEST SERPL-MCNC: 147 MG/DL (ref 0–200)
CK SERPL-CCNC: 133 U/L (ref 24–173)
CO2 SERPL-SCNC: 28.4 MMOL/L (ref 24.3–31.9)
CREAT BLD-MCNC: 0.98 MG/DL (ref 0.43–1.29)
DEPRECATED RDW RBC AUTO: 49.9 FL (ref 37–54)
EOSINOPHIL # BLD AUTO: 0.08 10*3/MM3 (ref 0–0.7)
EOSINOPHIL NFR BLD AUTO: 1.1 % (ref 0–7)
ERYTHROCYTE [DISTWIDTH] IN BLOOD BY AUTOMATED COUNT: 15.4 % (ref 11.5–14.5)
GFR SERPL CREATININE-BSD FRML MDRD: 55 ML/MIN/1.73
GLOBULIN UR ELPH-MCNC: 2.9 GM/DL
GLUCOSE BLD-MCNC: 100 MG/DL (ref 70–110)
HCT VFR BLD AUTO: 42.6 % (ref 37–47)
HDLC SERPL-MCNC: 42 MG/DL (ref 60–100)
HGB BLD-MCNC: 13.4 G/DL (ref 12–16)
IMM GRANULOCYTES # BLD: 0.01 10*3/MM3 (ref 0–0.03)
IMM GRANULOCYTES NFR BLD: 0.1 % (ref 0–0.5)
LDLC SERPL CALC-MCNC: 70 MG/DL (ref 0–100)
LDLC/HDLC SERPL: 1.66 {RATIO}
LYMPHOCYTES # BLD AUTO: 2 10*3/MM3 (ref 1–3)
LYMPHOCYTES NFR BLD AUTO: 28.4 % (ref 16–46)
MCH RBC QN AUTO: 28.7 PG (ref 27–33)
MCHC RBC AUTO-ENTMCNC: 31.5 G/DL (ref 33–37)
MCV RBC AUTO: 91.2 FL (ref 80–94)
MONOCYTES # BLD AUTO: 0.61 10*3/MM3 (ref 0.1–0.9)
MONOCYTES NFR BLD AUTO: 8.7 % (ref 0–12)
NEUTROPHILS # BLD AUTO: 4.3 10*3/MM3 (ref 1.4–6.5)
NEUTROPHILS NFR BLD AUTO: 61.3 % (ref 40–75)
OSMOLALITY SERPL CALC.SUM OF ELEC: 286.6 MOSM/KG (ref 273–305)
PLATELET # BLD AUTO: 253 10*3/MM3 (ref 130–400)
PMV BLD AUTO: 11.5 FL (ref 6–10)
POTASSIUM BLD-SCNC: 3.9 MMOL/L (ref 3.5–5.3)
PROT SERPL-MCNC: 7.7 G/DL (ref 6–8)
RBC # BLD AUTO: 4.67 10*6/MM3 (ref 4.2–5.4)
SODIUM BLD-SCNC: 143 MMOL/L (ref 135–153)
TRIGL SERPL-MCNC: 176 MG/DL (ref 0–150)
VLDLC SERPL-MCNC: 35.2 MG/DL
WBC NRBC COR # BLD: 7.03 10*3/MM3 (ref 4.5–12.5)

## 2017-10-19 PROCEDURE — 80061 LIPID PANEL: CPT | Performed by: INTERNAL MEDICINE

## 2017-10-19 PROCEDURE — 82550 ASSAY OF CK (CPK): CPT | Performed by: INTERNAL MEDICINE

## 2017-10-19 PROCEDURE — 85025 COMPLETE CBC W/AUTO DIFF WBC: CPT | Performed by: INTERNAL MEDICINE

## 2017-10-19 PROCEDURE — 80053 COMPREHEN METABOLIC PANEL: CPT | Performed by: INTERNAL MEDICINE

## 2017-10-24 ENCOUNTER — OFFICE VISIT (OUTPATIENT)
Dept: CARDIOLOGY | Facility: CLINIC | Age: 77
End: 2017-10-24

## 2017-10-24 VITALS
HEIGHT: 59 IN | SYSTOLIC BLOOD PRESSURE: 140 MMHG | HEART RATE: 66 BPM | DIASTOLIC BLOOD PRESSURE: 70 MMHG | WEIGHT: 191.6 LBS | OXYGEN SATURATION: 95 % | BODY MASS INDEX: 38.63 KG/M2

## 2017-10-24 DIAGNOSIS — R60.0 LEG EDEMA: ICD-10-CM

## 2017-10-24 DIAGNOSIS — K21.9 GASTROESOPHAGEAL REFLUX DISEASE WITHOUT ESOPHAGITIS: ICD-10-CM

## 2017-10-24 DIAGNOSIS — I10 ESSENTIAL HYPERTENSION: ICD-10-CM

## 2017-10-24 DIAGNOSIS — E78.2 MIXED HYPERLIPIDEMIA: Primary | ICD-10-CM

## 2017-10-24 PROCEDURE — 99214 OFFICE O/P EST MOD 30 MIN: CPT | Performed by: INTERNAL MEDICINE

## 2017-10-24 NOTE — PROGRESS NOTES
subjective     Chief Complaint   Patient presents with   • Follow-up     LAB RESULTS   • Hypertension   • Hyperlipidemia   • Edema     History of Present Illness  Patient recently had lab work.  She is also here for follow-up of chronic medical problems.  She states that her ankle edema is significantly better.  Patient has hypertension which is very well controlled with current medications.  She denies any symptoms no drug side effects.  Hyperlipidemia is also controlled with medications.  She is taking Lipitor 20 mg daily and trying to diet.    She complains of urinary urgency but she is taking Ditropan and is feeling better.  GI symptoms are also better there is no burning in the epigastric area.  She is taking Prilosec over-the-counter.  Patient also has a shoulder pain.  Patient had complete tear of rotator cuff.  She still has lot of pain but is feeling better . she is seeing Dr. blackwell     Past Surgical History:   Procedure Laterality Date   • APPENDECTOMY     • BUNIONECTOMY Right    • CARDIOVASCULAR STRESS TEST  10/2012   • CATARACT EXTRACTION  2017   • CHOLECYSTECTOMY     • COLONOSCOPY  2011   • ECHO - CONVERTED  10/2012   • JOINT REPLACEMENT      bilateral knees    • KNEE ARTHROPLASTY Left    • KNEE ARTHROSCOPY     • SHOULDER ARTHROSCOPY Left 7/28/2016    Procedure: LEFT SHOULDER ACROMIOPLASTY,MINI OPEN ROTATOR CUFF REPAIR;  Surgeon: Carlos Eduardo Blackwell MD;  Location: [facility];  Service:    • SHOULDER SURGERY  05/31/2016    OPEN ACROMICPLASTY RIGHT SHOULDER     Family History   Problem Relation Age of Onset   • Heart disease Other    • Stroke Sister    • Diabetes Mother    • Heart failure Mother    • Heart disease Mother    • Heart attack Mother    • Hypertension Father    • Emphysema Father    • Heart disease Father    • No Known Problems Brother    • Cancer Sister    • Kidney disease Sister    • Heart failure Sister    • Diabetes Brother    • Diabetes Brother    • Diabetes Brother    • Breast  cancer Neg Hx      Past Medical History:   Diagnosis Date   • GERD (gastroesophageal reflux disease)    • Hyperlipidemia    • Hypertension    • Left shoulder pain    • Obesity    • Osteoarthritis of knees, bilateral    • Overactive bladder    • Right shoulder pain      Patient Active Problem List   Diagnosis   • Complete tear of right rotator cuff   • Chronic kidney disease, stage I   • Cough   • Gastroesophageal reflux disease   • Mixed hyperlipidemia   • Shoulder pain   • Hypertension   • Acromioclavicular joint arthritis   • Impingement syndrome, shoulder   • Complete tear of left rotator cuff   • Urge incontinence   • Atopic rhinitis   • Upper respiratory tract infection   • Leg edema       Social History   Substance Use Topics   • Smoking status: Former Smoker     Packs/day: 2.00     Years: 20.00     Types: Cigarettes     Quit date: 1976   • Smokeless tobacco: Never Used   • Alcohol use No      Comment: s/p 1976       Allergies   Allergen Reactions   • Codeine Other (See Comments)     hyperventilate   • Sulfa Antibiotics Rash       Current Outpatient Prescriptions on File Prior to Visit   Medication Sig   • aspirin 81 MG tablet Take 81 mg by mouth daily.   • atorvastatin (LIPITOR) 20 MG tablet Take 1 tablet by mouth Daily.   • Cholecalciferol (VITAMIN D PO) Take 1,000 Units by mouth daily.   • diltiazem XR (DILACOR XR) 120 MG 24 hr capsule Take 1 capsule by mouth Daily.   • Docusate Calcium (STOOL SOFTENER PO) Take  by mouth daily.   • Fexofenadine HCl (ALLEGRA PO) Take 180 mg by mouth daily as needed.   • gabapentin (NEURONTIN) 100 MG capsule Take 1 capsule by mouth 2 (Two) Times a Day.   • HYDROcodone-acetaminophen (NORCO) 7.5-325 MG per tablet Take 1 tablet by mouth every 6 (six) hours as needed for moderate pain (4-6) for up to 30 doses.   • losartan (COZAAR) 100 MG tablet Take 1 tablet by mouth Daily.   • omeprazole OTC (PriLOSEC OTC) 20 MG EC tablet Take 20 mg by mouth daily.   • oxybutynin (DITROPAN) 5  "MG tablet Take 1 tablet by mouth Daily.   • Probiotic Product (PROBIOTIC COLON SUPPORT PO) Take  by mouth daily.   • furosemide (LASIX) 20 MG tablet Take 1 tablet by mouth Daily.     No current facility-administered medications on file prior to visit.          The following portions of the patient's history were reviewed and updated as appropriate: allergies, current medications, past family history, past medical history, past social history, past surgical history and problem list.    Review of Systems   Constitution: Negative.   HENT: Negative.  Negative for congestion.    Eyes: Negative.    Cardiovascular: Negative.  Negative for chest pain, cyanosis, dyspnea on exertion, irregular heartbeat, leg swelling, near-syncope, orthopnea, palpitations, paroxysmal nocturnal dyspnea and syncope.   Respiratory: Negative.  Negative for shortness of breath.    Hematologic/Lymphatic: Negative.    Musculoskeletal: Positive for arthritis, joint pain and stiffness.   Gastrointestinal: Negative.    Neurological: Negative.  Negative for headaches.          Objective:     /70  Pulse 66  Ht 59\" (149.9 cm)  Wt 191 lb 9.6 oz (86.9 kg)  SpO2 95%  BMI 38.7 kg/m2  Physical Exam   Constitutional: She appears well-developed and well-nourished. No distress.   HENT:   Head: Normocephalic and atraumatic.   Mouth/Throat: Oropharynx is clear and moist. No oropharyngeal exudate.   Eyes: Conjunctivae and EOM are normal. Pupils are equal, round, and reactive to light. No scleral icterus.   Neck: Normal range of motion. Neck supple. No JVD present. No tracheal deviation present. No thyromegaly present.   Cardiovascular: Normal rate, regular rhythm, normal heart sounds and intact distal pulses.  PMI is not displaced.  Exam reveals no gallop, no friction rub and no decreased pulses.    No murmur heard.  Pulses:       Carotid pulses are 3+ on the right side, and 3+ on the left side.       Radial pulses are 3+ on the right side, and 3+ on the " left side.   Pulmonary/Chest: Effort normal and breath sounds normal. No respiratory distress. She has no wheezes. She has no rales. She exhibits no tenderness.   Abdominal: Soft. Bowel sounds are normal. She exhibits no distension, no abdominal bruit and no mass. There is no splenomegaly or hepatomegaly. There is no tenderness. There is no rebound and no guarding.   Musculoskeletal: She exhibits no edema, tenderness or deformity.   Left shoulder pain   Lymphadenopathy:     She has no cervical adenopathy.   Neurological: She is alert. She has normal reflexes. No cranial nerve deficit. She exhibits normal muscle tone. Coordination normal.   Skin: Skin is warm and dry. No rash noted. She is not diaphoretic. No erythema.   Psychiatric: She has a normal mood and affect. Her behavior is normal. Judgment and thought content normal.         Lab Review  Lab Results   Component Value Date     10/19/2017    K 3.9 10/19/2017     10/19/2017    BUN 17 10/19/2017    CREATININE 0.98 10/19/2017    GLUCOSE 100 10/19/2017    CALCIUM 10.3 (H) 10/19/2017    ALT 20 10/19/2017    ALKPHOS 95 10/19/2017    LABIL2 1.7 10/19/2017     Lab Results   Component Value Date    CKTOTAL 133 10/19/2017     Lab Results   Component Value Date    WBC 7.03 10/19/2017    HGB 13.4 10/19/2017    HCT 42.6 10/19/2017     10/19/2017     Lab Results   Component Value Date    INR 1.97 12/11/2014    INR 2.02 12/08/2014    INR 2.46 12/04/2014     No results found for: MG  Lab Results   Component Value Date    TSH 1.182 06/08/2016     No results found for: BNP  Lab Results   Component Value Date    CHLPL 133 06/08/2016    CHOL 147 10/19/2017    TRIG 176 (H) 10/19/2017    HDL 42 (L) 10/19/2017    LDLCALC 70 10/19/2017    VLDL 35.2 10/19/2017    LDLHDL 1.66 10/19/2017         Procedures       I personally viewed and interpreted the patient's LAB data         Assessment:     1. Mixed hyperlipidemia    2. Essential hypertension    3. Gastroesophageal  reflux disease without esophagitis    4. Leg edema          Plan:      Lab work reviewed and discussed with the patient patient has mild hypertriglyceridemia otherwise lab work is good.  Lipids are relatively well controlled.  Cholesterol is 147.  Patient was advised to continue taking Lipitor  Blood pressure is also well controlled patient is taking losartan 100 mg and that Cardizem cd 120 daily patient will continue both medications doing very well.  Renal functions are back to normal.  Bladder control is better she will continue taking Ditropan.  GI symptoms are better over-the-counter omeprazole will be continued.  Ankle edema is completely gone.  She will take Lasix 20 mg daily on when necessary basis only.  Because of renal function abnormality she will try to avoid NSAIDs and diuretic therapy if possible.        No Follow-up on file.

## 2017-10-31 RX ORDER — GABAPENTIN 100 MG/1
CAPSULE ORAL
Qty: 60 CAPSULE | Refills: 2 | OUTPATIENT
Start: 2017-10-31 | End: 2018-01-30 | Stop reason: SDUPTHER

## 2017-12-11 RX ORDER — ATORVASTATIN CALCIUM 20 MG/1
TABLET, FILM COATED ORAL
Qty: 90 TABLET | Refills: 2 | Status: SHIPPED | OUTPATIENT
Start: 2017-12-11 | End: 2019-03-08 | Stop reason: SDUPTHER

## 2018-01-09 RX ORDER — LOSARTAN POTASSIUM 100 MG/1
100 TABLET ORAL DAILY
Qty: 90 TABLET | Refills: 0 | Status: SHIPPED | OUTPATIENT
Start: 2018-01-09 | End: 2018-04-10 | Stop reason: SDUPTHER

## 2018-01-23 ENCOUNTER — OFFICE VISIT (OUTPATIENT)
Dept: CARDIOLOGY | Facility: CLINIC | Age: 78
End: 2018-01-23

## 2018-01-23 VITALS
HEIGHT: 59 IN | DIASTOLIC BLOOD PRESSURE: 62 MMHG | SYSTOLIC BLOOD PRESSURE: 124 MMHG | HEART RATE: 76 BPM | TEMPERATURE: 98.4 F | WEIGHT: 185 LBS | BODY MASS INDEX: 37.29 KG/M2 | OXYGEN SATURATION: 94 %

## 2018-01-23 DIAGNOSIS — M25.561 RIGHT KNEE PAIN, UNSPECIFIED CHRONICITY: ICD-10-CM

## 2018-01-23 DIAGNOSIS — N39.41 URGE INCONTINENCE: ICD-10-CM

## 2018-01-23 DIAGNOSIS — I10 ESSENTIAL HYPERTENSION: Primary | ICD-10-CM

## 2018-01-23 DIAGNOSIS — E78.2 MIXED HYPERLIPIDEMIA: ICD-10-CM

## 2018-01-23 PROBLEM — E83.52 HYPERCALCEMIA: Status: ACTIVE | Noted: 2018-01-23

## 2018-01-23 PROCEDURE — 99214 OFFICE O/P EST MOD 30 MIN: CPT | Performed by: INTERNAL MEDICINE

## 2018-01-23 NOTE — PROGRESS NOTES
subjective     Chief Complaint   Patient presents with   • Follow-up   • Hyperlipidemia   • Hypertension     History of Present Illness  Patient is 77 years old white female who is here for follow-up however she has new complaints.  Patient stated that she has lot of pain in her left knee.  She had surgery done around 5 years ago.  Lately she is getting lot worse and is hardly able to walk.  She has a knee brace and is using cane.  Patient requests to be seen by Dr. blackwell. Will make appointment.    Patient also has essential hypertension.  She is taking Cardizem cd Lasix and Cozaar.  With that her blood pressure is very well controlled.  There are no drug side effects.    Hyperlipidemia is controlled with Lipitor 20 mg daily she is also trying to diet.  She cannot exercise because of her severe arthritis.    Patient also has mild renal failure and urinary incontinence.  She used to take Ditropan but she stopped it also she stopped Lasix at that time because of renal dysfunction.  She was advised to restart Ditropan but stay off Lasix for now.    Gastroesophageal reflux symptoms are much better with Prilosec over-the-counter.      Past Surgical History:   Procedure Laterality Date   • APPENDECTOMY     • BUNIONECTOMY Right    • CARDIOVASCULAR STRESS TEST  10/2012   • CATARACT EXTRACTION  2017   • CHOLECYSTECTOMY     • COLONOSCOPY  2011   • ECHO - CONVERTED  10/2012   • JOINT REPLACEMENT      bilateral knees    • KNEE ARTHROPLASTY Left    • KNEE ARTHROSCOPY     • SHOULDER ARTHROSCOPY Left 7/28/2016    Procedure: LEFT SHOULDER ACROMIOPLASTY,MINI OPEN ROTATOR CUFF REPAIR;  Surgeon: Carlos Eduardo Blackwell MD;  Location: [facility];  Service:    • SHOULDER SURGERY  05/31/2016    OPEN ACROMICPLASTY RIGHT SHOULDER     Family History   Problem Relation Age of Onset   • Heart disease Other    • Stroke Sister    • Diabetes Mother    • Heart failure Mother    • Heart disease Mother    • Heart attack Mother    • Hypertension  Father    • Emphysema Father    • Heart disease Father    • No Known Problems Brother    • Cancer Sister    • Kidney disease Sister    • Heart failure Sister    • Diabetes Brother    • Diabetes Brother    • Diabetes Brother    • Breast cancer Neg Hx      Past Medical History:   Diagnosis Date   • GERD (gastroesophageal reflux disease)    • Hyperlipidemia    • Hypertension    • Left shoulder pain    • Obesity    • Osteoarthritis of knees, bilateral    • Overactive bladder    • Right shoulder pain      Patient Active Problem List   Diagnosis   • Complete tear of right rotator cuff   • Chronic kidney disease, stage I   • Cough   • Gastroesophageal reflux disease   • Mixed hyperlipidemia   • Shoulder pain   • Hypertension   • Acromioclavicular joint arthritis   • Impingement syndrome, shoulder   • Complete tear of left rotator cuff   • Urge incontinence   • Atopic rhinitis   • Upper respiratory tract infection   • Leg edema   • Hypercalcemia   • Right knee pain       Social History   Substance Use Topics   • Smoking status: Former Smoker     Packs/day: 2.00     Years: 20.00     Types: Cigarettes     Quit date: 1976   • Smokeless tobacco: Never Used   • Alcohol use No      Comment: s/p 1976       Allergies   Allergen Reactions   • Codeine Other (See Comments)     hyperventilate   • Sulfa Antibiotics Rash       Current Outpatient Prescriptions on File Prior to Visit   Medication Sig   • aspirin 81 MG tablet Take 81 mg by mouth daily.   • atorvastatin (LIPITOR) 20 MG tablet TAKE ONE TABLET BY MOUTH ONCE DAILY   • Cholecalciferol (VITAMIN D PO) Take 1,000 Units by mouth daily.   • diltiazem XR (DILACOR XR) 120 MG 24 hr capsule Take 1 capsule by mouth Daily.   • Docusate Calcium (STOOL SOFTENER PO) Take  by mouth daily.   • Fexofenadine HCl (ALLEGRA PO) Take 180 mg by mouth daily as needed.   • gabapentin (NEURONTIN) 100 MG capsule TAKE ONE CAPSULE BY MOUTH TWICE DAILY   • losartan (COZAAR) 100 MG tablet Take 1 tablet by  "mouth Daily.   • omeprazole OTC (PriLOSEC OTC) 20 MG EC tablet Take 20 mg by mouth daily.   • oxybutynin (DITROPAN) 5 MG tablet Take 1 tablet by mouth Daily.   • Probiotic Product (PROBIOTIC COLON SUPPORT PO) Take  by mouth daily.   • [DISCONTINUED] furosemide (LASIX) 20 MG tablet Take 1 tablet by mouth Daily.   • [DISCONTINUED] HYDROcodone-acetaminophen (NORCO) 7.5-325 MG per tablet Take 1 tablet by mouth every 6 (six) hours as needed for moderate pain (4-6) for up to 30 doses.     No current facility-administered medications on file prior to visit.          The following portions of the patient's history were reviewed and updated as appropriate: allergies, current medications, past family history, past medical history, past social history, past surgical history and problem list.    Review of Systems   Constitution: Negative.   HENT: Negative.  Negative for congestion.    Eyes: Negative.    Cardiovascular: Negative.  Negative for chest pain, cyanosis, dyspnea on exertion, irregular heartbeat, leg swelling, near-syncope, orthopnea, palpitations, paroxysmal nocturnal dyspnea and syncope.   Respiratory: Negative.  Negative for shortness of breath.    Endocrine: Negative.    Hematologic/Lymphatic: Negative.    Musculoskeletal: Positive for arthritis and joint pain.   Gastrointestinal: Positive for heartburn.   Genitourinary: Positive for bladder incontinence and urgency.   Neurological: Negative.  Negative for headaches.   Psychiatric/Behavioral: Negative.           Objective:     /62  Pulse 76  Temp 98.4 °F (36.9 °C) (Tympanic)   Ht 149.9 cm (59\")  Wt 83.9 kg (185 lb)  SpO2 94%  BMI 37.37 kg/m2  Physical Exam   Constitutional: She appears well-developed and well-nourished. No distress.   HENT:   Head: Normocephalic and atraumatic.   Mouth/Throat: Oropharynx is clear and moist. No oropharyngeal exudate.   Eyes: Conjunctivae and EOM are normal. Pupils are equal, round, and reactive to light. No scleral " icterus.   Neck: Normal range of motion. Neck supple. No JVD present. No tracheal deviation present. No thyromegaly present.   Cardiovascular: Normal rate, regular rhythm, normal heart sounds and intact distal pulses.  PMI is not displaced.  Exam reveals no gallop, no friction rub and no decreased pulses.    No murmur heard.  Pulses:       Carotid pulses are 3+ on the right side, and 3+ on the left side.       Radial pulses are 3+ on the right side, and 3+ on the left side.   Pulmonary/Chest: Effort normal and breath sounds normal. No respiratory distress. She has no wheezes. She has no rales. She exhibits no tenderness.   Abdominal: Soft. Bowel sounds are normal. She exhibits no distension, no abdominal bruit and no mass. There is no splenomegaly or hepatomegaly. There is no tenderness. There is no rebound and no guarding.   Musculoskeletal: Normal range of motion. She exhibits no edema, tenderness or deformity.   Lymphadenopathy:     She has no cervical adenopathy.   Neurological: She is alert. She has normal reflexes. No cranial nerve deficit. She exhibits normal muscle tone. Coordination normal.   Skin: Skin is warm and dry. No rash noted. She is not diaphoretic. No erythema.   Psychiatric: She has a normal mood and affect. Her behavior is normal. Judgment and thought content normal.         Lab Review  Lab Results   Component Value Date     10/19/2017    K 3.9 10/19/2017     10/19/2017    BUN 17 10/19/2017    CREATININE 0.98 10/19/2017    GLUCOSE 100 10/19/2017    CALCIUM 10.3 (H) 10/19/2017    ALT 20 10/19/2017    ALKPHOS 95 10/19/2017    LABIL2 1.7 10/19/2017     Lab Results   Component Value Date    CKTOTAL 133 10/19/2017     Lab Results   Component Value Date    WBC 7.03 10/19/2017    HGB 13.4 10/19/2017    HCT 42.6 10/19/2017     10/19/2017     Lab Results   Component Value Date    INR 1.97 12/11/2014    INR 2.02 12/08/2014    INR 2.46 12/04/2014     No results found for: MG  Lab Results    Component Value Date    TSH 1.182 06/08/2016     No results found for: BNP  Lab Results   Component Value Date    CHLPL 133 06/08/2016    CHOL 147 10/19/2017    TRIG 176 (H) 10/19/2017    HDL 42 (L) 10/19/2017    LDLCALC 70 10/19/2017    VLDL 35.2 10/19/2017    LDLHDL 1.66 10/19/2017         Procedures       I personally viewed and interpreted the patient's LAB data         Assessment:     1. Essential hypertension    2. Mixed hyperlipidemia    3. Urge incontinence    4. Right knee pain, unspecified chronicity          Plan:      Blood pressure is very well controlled.  Patient was advised to continue diltiazem and the losartan.    Lipids have been very well controlled.  Cholesterol and LDL is good.  LDL is 70.  Triglyceride is elevated.  Healthy lifestyle was again discussed.    For stress incontinence patient was advised to restart Ditropan.  She will stay off Lasix.    Health maintenance was discussed.  Patient refuses mammogram and colonoscopy.    Orthopedic consult with Dr. blackwell was made.  Follow-up scheduled        Return in about 3 months (around 4/23/2018).

## 2018-01-30 RX ORDER — GABAPENTIN 100 MG/1
100 CAPSULE ORAL 2 TIMES DAILY
Qty: 60 CAPSULE | Refills: 2 | OUTPATIENT
Start: 2018-01-30 | End: 2018-04-24 | Stop reason: ALTCHOICE

## 2018-02-06 DIAGNOSIS — M25.561 CHRONIC PAIN OF RIGHT KNEE: Primary | ICD-10-CM

## 2018-02-06 DIAGNOSIS — G89.29 CHRONIC PAIN OF RIGHT KNEE: Primary | ICD-10-CM

## 2018-02-07 ENCOUNTER — OFFICE VISIT (OUTPATIENT)
Dept: ORTHOPEDIC SURGERY | Facility: CLINIC | Age: 78
End: 2018-02-07

## 2018-02-07 ENCOUNTER — HOSPITAL ENCOUNTER (OUTPATIENT)
Dept: GENERAL RADIOLOGY | Facility: HOSPITAL | Age: 78
Discharge: HOME | End: 2018-02-07
Attending: ORTHOPAEDIC SURGERY | Admitting: ORTHOPAEDIC SURGERY

## 2018-02-07 VITALS
WEIGHT: 185 LBS | SYSTOLIC BLOOD PRESSURE: 134 MMHG | HEART RATE: 88 BPM | HEIGHT: 59 IN | BODY MASS INDEX: 37.29 KG/M2 | DIASTOLIC BLOOD PRESSURE: 78 MMHG

## 2018-02-07 DIAGNOSIS — M25.561 CHRONIC PAIN OF RIGHT KNEE: ICD-10-CM

## 2018-02-07 DIAGNOSIS — M25.561 ACUTE PAIN OF RIGHT KNEE: Primary | ICD-10-CM

## 2018-02-07 DIAGNOSIS — G89.29 CHRONIC PAIN OF RIGHT KNEE: ICD-10-CM

## 2018-02-07 PROCEDURE — 73560 X-RAY EXAM OF KNEE 1 OR 2: CPT

## 2018-02-07 PROCEDURE — 99213 OFFICE O/P EST LOW 20 MIN: CPT | Performed by: ORTHOPAEDIC SURGERY

## 2018-02-07 PROCEDURE — 73560 X-RAY EXAM OF KNEE 1 OR 2: CPT | Performed by: RADIOLOGY

## 2018-02-07 RX ORDER — METHYLPREDNISOLONE 4 MG/1
TABLET ORAL
Qty: 21 TABLET | Refills: 0 | Status: SHIPPED | OUTPATIENT
Start: 2018-02-07 | End: 2018-04-24 | Stop reason: ALTCHOICE

## 2018-02-07 NOTE — PROGRESS NOTES
"Emma Ryan   :1940    Date of encounter:2018        HPI:  Emma Ryan is a 77 y.o. Center today with complaints of right knee pain.  She is about 5 years status post right total knee arthroplasty.  She states she's done well up until 2 weeks ago.  She states she woke up one morning with pain and difficulty raising her leg.  She states when she tries to raise her leg such as getting in and out of a car earlier stepping up onto a step she has pain in her knee that radiates down her leg.  She states she also has pain with range of motion of her knee.  She denies any injury to the knee.  She states that initially this was some swelling and now she will have some intermittently but is not consistently.  She also complains of some mild warmth to the knee but denies any erythema, fever, chills or night sweats.    PMH:   Patient Active Problem List   Diagnosis   • Complete tear of right rotator cuff   • Chronic kidney disease, stage I   • Cough   • Gastroesophageal reflux disease   • Mixed hyperlipidemia   • Shoulder pain   • Hypertension   • Acromioclavicular joint arthritis   • Impingement syndrome, shoulder   • Complete tear of left rotator cuff   • Urge incontinence   • Atopic rhinitis   • Upper respiratory tract infection   • Leg edema   • Hypercalcemia   • Right knee pain       Exam:  General Appearance:    77 y.o. female  cooperative, in no acute distress.  Alert and oriented x 3,                   Vitals:    18 0926   BP: 134/78   Pulse: 88   Weight: 83.9 kg (185 lb)   Height: 149.9 cm (59\")          Body mass index is 37.37 kg/(m^2).   Examination of the right knee reveals no effusion.  There is no erythema to the knee.  There is mild warmth to the touch.  She has generalized tenderness along the anterior knee and into the patellar tendon and ligament.  She has full range of motion without any instability with varus or valgus stressing.  Her neurovascular status is " intact.    Radiology:   AP and lateral views of the right knee were reviewed revealing prosthesis in good alignment and position.  There is no obvious evidence of loosening of the hardware.    Assessment    ICD-10-CM ICD-9-CM   1. Acute pain of right knee M25.561 719.46       Plan:   A 77-year-old female 5 years status post right total knee arthroplasty with acute pain to the right knee without any new injury.  X-rays today were reviewed revealing prosthesis in good alignment and position.  She has no obvious signs of infection or loosening of the prosthesis.  It seems to be more inflammatory especially given the generalized tenderness.  We would like to start her on Voltaren gel to the right knee as she is unable to take oral NSAIDs secondary to her kidney function.  If she gets no improvement within a week of the Voltaren gel we have of given her prescription for Medrol Dosepak.  We will reevaluate her in 3 weeks.    Written by, Ramila BEARD, acting as a scribe for Dr. Smith              Patient's BMI is above normal parameters. Follow-up plan includes:  educational material.

## 2018-02-13 RX ORDER — OXYBUTYNIN CHLORIDE 5 MG/1
5 TABLET ORAL DAILY
Qty: 90 TABLET | Refills: 0 | Status: SHIPPED | OUTPATIENT
Start: 2018-02-13 | End: 2018-11-13 | Stop reason: SDUPTHER

## 2018-02-28 ENCOUNTER — OFFICE VISIT (OUTPATIENT)
Dept: ORTHOPEDIC SURGERY | Facility: CLINIC | Age: 78
End: 2018-02-28

## 2018-02-28 VITALS — WEIGHT: 185 LBS | HEIGHT: 59 IN | BODY MASS INDEX: 37.29 KG/M2

## 2018-02-28 DIAGNOSIS — M25.561 CHRONIC PAIN OF RIGHT KNEE: Primary | ICD-10-CM

## 2018-02-28 DIAGNOSIS — G89.29 CHRONIC PAIN OF RIGHT KNEE: Primary | ICD-10-CM

## 2018-02-28 PROCEDURE — 99212 OFFICE O/P EST SF 10 MIN: CPT | Performed by: ORTHOPAEDIC SURGERY

## 2018-04-09 ENCOUNTER — TRANSCRIBE ORDERS (OUTPATIENT)
Dept: LAB | Facility: HOSPITAL | Age: 78
End: 2018-04-09

## 2018-04-09 ENCOUNTER — LAB (OUTPATIENT)
Dept: LAB | Facility: HOSPITAL | Age: 78
End: 2018-04-09
Attending: INTERNAL MEDICINE

## 2018-04-09 ENCOUNTER — TELEPHONE (OUTPATIENT)
Dept: CARDIOLOGY | Facility: CLINIC | Age: 78
End: 2018-04-09

## 2018-04-09 DIAGNOSIS — N18.30 CHRONIC KIDNEY DISEASE, STAGE III (MODERATE) (HCC): Primary | ICD-10-CM

## 2018-04-09 DIAGNOSIS — E78.2 MIXED HYPERLIPIDEMIA: ICD-10-CM

## 2018-04-09 DIAGNOSIS — N18.30 CHRONIC KIDNEY DISEASE, STAGE III (MODERATE) (HCC): ICD-10-CM

## 2018-04-09 DIAGNOSIS — I10 ESSENTIAL HYPERTENSION: ICD-10-CM

## 2018-04-09 LAB
ALBUMIN SERPL-MCNC: 4.8 G/DL (ref 3.4–4.8)
ALBUMIN UR-MCNC: 9 MG/L
ALBUMIN/GLOB SERPL: 1.6 G/DL (ref 1.5–2.5)
ALP SERPL-CCNC: 90 U/L (ref 35–104)
ALT SERPL W P-5'-P-CCNC: 22 U/L (ref 10–36)
ANION GAP SERPL CALCULATED.3IONS-SCNC: 7.9 MMOL/L (ref 3.6–11.2)
AST SERPL-CCNC: 22 U/L (ref 10–30)
BASOPHILS # BLD AUTO: 0.03 10*3/MM3 (ref 0–0.3)
BASOPHILS NFR BLD AUTO: 0.3 % (ref 0–2)
BILIRUB SERPL-MCNC: 0.6 MG/DL (ref 0.2–1.8)
BUN BLD-MCNC: 20 MG/DL (ref 7–21)
BUN/CREAT SERPL: 18.5 (ref 7–25)
CALCIUM SPEC-SCNC: 10.2 MG/DL (ref 7.7–10)
CHLORIDE SERPL-SCNC: 107 MMOL/L (ref 99–112)
CHOLEST SERPL-MCNC: 149 MG/DL (ref 0–200)
CK SERPL-CCNC: 225 U/L (ref 24–173)
CO2 SERPL-SCNC: 29.1 MMOL/L (ref 24.3–31.9)
CREAT BLD-MCNC: 1.08 MG/DL (ref 0.43–1.29)
DEPRECATED RDW RBC AUTO: 48.9 FL (ref 37–54)
EOSINOPHIL # BLD AUTO: 0.12 10*3/MM3 (ref 0–0.7)
EOSINOPHIL NFR BLD AUTO: 1.4 % (ref 0–7)
ERYTHROCYTE [DISTWIDTH] IN BLOOD BY AUTOMATED COUNT: 15 % (ref 11.5–14.5)
GFR SERPL CREATININE-BSD FRML MDRD: 49 ML/MIN/1.73
GLOBULIN UR ELPH-MCNC: 3 GM/DL
GLUCOSE BLD-MCNC: 95 MG/DL (ref 70–110)
HCT VFR BLD AUTO: 44 % (ref 37–47)
HDLC SERPL-MCNC: 46 MG/DL (ref 60–100)
HGB BLD-MCNC: 14 G/DL (ref 12–16)
IMM GRANULOCYTES # BLD: 0.01 10*3/MM3 (ref 0–0.03)
IMM GRANULOCYTES NFR BLD: 0.1 % (ref 0–0.5)
LDLC SERPL CALC-MCNC: 74 MG/DL (ref 0–100)
LDLC/HDLC SERPL: 1.6 {RATIO}
LYMPHOCYTES # BLD AUTO: 2.92 10*3/MM3 (ref 1–3)
LYMPHOCYTES NFR BLD AUTO: 33.8 % (ref 16–46)
MCH RBC QN AUTO: 29.5 PG (ref 27–33)
MCHC RBC AUTO-ENTMCNC: 31.8 G/DL (ref 33–37)
MCV RBC AUTO: 92.6 FL (ref 80–94)
MONOCYTES # BLD AUTO: 0.68 10*3/MM3 (ref 0.1–0.9)
MONOCYTES NFR BLD AUTO: 7.9 % (ref 0–12)
NEUTROPHILS # BLD AUTO: 4.87 10*3/MM3 (ref 1.4–6.5)
NEUTROPHILS NFR BLD AUTO: 56.5 % (ref 40–75)
OSMOLALITY SERPL CALC.SUM OF ELEC: 289.3 MOSM/KG (ref 273–305)
PLATELET # BLD AUTO: 256 10*3/MM3 (ref 130–400)
PMV BLD AUTO: 11.6 FL (ref 6–10)
POTASSIUM BLD-SCNC: 4.7 MMOL/L (ref 3.5–5.3)
PROT SERPL-MCNC: 7.8 G/DL (ref 6–8)
PTH-INTACT SERPL-MCNC: 133.1 PG/ML (ref 14–72)
RBC # BLD AUTO: 4.75 10*6/MM3 (ref 4.2–5.4)
SODIUM BLD-SCNC: 144 MMOL/L (ref 135–153)
TRIGL SERPL-MCNC: 147 MG/DL (ref 0–150)
VLDLC SERPL-MCNC: 29.4 MG/DL
WBC NRBC COR # BLD: 8.63 10*3/MM3 (ref 4.5–12.5)

## 2018-04-09 PROCEDURE — 36415 COLL VENOUS BLD VENIPUNCTURE: CPT

## 2018-04-09 PROCEDURE — 82550 ASSAY OF CK (CPK): CPT

## 2018-04-09 PROCEDURE — 85025 COMPLETE CBC W/AUTO DIFF WBC: CPT

## 2018-04-09 PROCEDURE — 82043 UR ALBUMIN QUANTITATIVE: CPT

## 2018-04-09 PROCEDURE — 80061 LIPID PANEL: CPT

## 2018-04-09 PROCEDURE — 80053 COMPREHEN METABOLIC PANEL: CPT

## 2018-04-09 PROCEDURE — 83970 ASSAY OF PARATHORMONE: CPT

## 2018-04-09 NOTE — TELEPHONE ENCOUNTER
Pt has apt with Dr Barkley on 04/18/18    MD Beth Tanner MA             Appointment Dr. barkley hyperparathyroidism

## 2018-04-10 RX ORDER — LOSARTAN POTASSIUM 100 MG/1
100 TABLET ORAL DAILY
Qty: 90 TABLET | Refills: 0 | Status: SHIPPED | OUTPATIENT
Start: 2018-04-10 | End: 2018-07-12 | Stop reason: SDUPTHER

## 2018-04-20 ENCOUNTER — TRANSCRIBE ORDERS (OUTPATIENT)
Dept: ADMINISTRATIVE | Facility: HOSPITAL | Age: 78
End: 2018-04-20

## 2018-04-20 ENCOUNTER — LAB (OUTPATIENT)
Dept: LAB | Facility: HOSPITAL | Age: 78
End: 2018-04-20
Attending: INTERNAL MEDICINE

## 2018-04-20 DIAGNOSIS — E21.3 HYPERPARATHYROIDISM (HCC): Primary | ICD-10-CM

## 2018-04-20 DIAGNOSIS — E21.3 HYPERPARATHYROIDISM (HCC): ICD-10-CM

## 2018-04-20 LAB
25(OH)D3 SERPL-MCNC: 34 NG/ML
CA-I BLD-MCNC: 4.95 MG/DL (ref 4.6–5.3)
PHOSPHATE SERPL-MCNC: 3.3 MG/DL (ref 2.7–4.5)

## 2018-04-20 PROCEDURE — 82330 ASSAY OF CALCIUM: CPT

## 2018-04-20 PROCEDURE — 36415 COLL VENOUS BLD VENIPUNCTURE: CPT

## 2018-04-20 PROCEDURE — 82306 VITAMIN D 25 HYDROXY: CPT

## 2018-04-20 PROCEDURE — 84100 ASSAY OF PHOSPHORUS: CPT

## 2018-04-24 ENCOUNTER — OFFICE VISIT (OUTPATIENT)
Dept: CARDIOLOGY | Facility: CLINIC | Age: 78
End: 2018-04-24

## 2018-04-24 VITALS
OXYGEN SATURATION: 95 % | HEIGHT: 59 IN | WEIGHT: 198 LBS | SYSTOLIC BLOOD PRESSURE: 132 MMHG | HEART RATE: 79 BPM | DIASTOLIC BLOOD PRESSURE: 76 MMHG | BODY MASS INDEX: 39.92 KG/M2

## 2018-04-24 DIAGNOSIS — I10 ESSENTIAL HYPERTENSION: Primary | ICD-10-CM

## 2018-04-24 DIAGNOSIS — E78.2 MIXED HYPERLIPIDEMIA: ICD-10-CM

## 2018-04-24 DIAGNOSIS — N39.41 URGE INCONTINENCE: ICD-10-CM

## 2018-04-24 DIAGNOSIS — N39.3 STRESS INCONTINENCE: ICD-10-CM

## 2018-04-24 PROCEDURE — 99213 OFFICE O/P EST LOW 20 MIN: CPT | Performed by: INTERNAL MEDICINE

## 2018-04-24 NOTE — PROGRESS NOTES
subjective     Chief Complaint   Patient presents with   • Hyperlipidemia   • Hypertension   • Follow-up   • Results     History of Present Illness    Patient is 77 years old white female who is here for follow-up.  Patient has history of hypertension.  She is taking losartan 100 mg daily and that Dilacor  daily.  With that her blood pressure is fairly well controlled and heart rate is 79.  Last blood pressure was 130/76.  Patient denies any headaches chest pain or shortness of breath.    Patient also has hyperlipidemia.  She is taking Lipitor 20 mg daily.  Lab work was done which will be reviewed today.    Patient complains of bladder incontinence with urge incontinence.  She is taking Ditropan with that she should be doing very well.    She also has been following with orthopedic and nephrology service.  Overall she seems to be doing much better.  Past Surgical History:   Procedure Laterality Date   • APPENDECTOMY     • BUNIONECTOMY Right    • CARDIOVASCULAR STRESS TEST  10/2012   • CATARACT EXTRACTION  2017   • CHOLECYSTECTOMY     • COLONOSCOPY  2011   • ECHO - CONVERTED  10/2012   • JOINT REPLACEMENT      bilateral knees    • KNEE ARTHROPLASTY Left    • KNEE ARTHROSCOPY     • SHOULDER ARTHROSCOPY Left 7/28/2016    Procedure: LEFT SHOULDER ACROMIOPLASTY,MINI OPEN ROTATOR CUFF REPAIR;  Surgeon: Carlos Eduardo Smith MD;  Location: [facility];  Service:    • SHOULDER SURGERY  05/31/2016    OPEN ACROMICPLASTY RIGHT SHOULDER     Family History   Problem Relation Age of Onset   • Heart disease Other    • Stroke Sister    • Diabetes Mother    • Heart failure Mother    • Heart disease Mother    • Heart attack Mother    • Hypertension Father    • Emphysema Father    • Heart disease Father    • No Known Problems Brother    • Cancer Sister    • Kidney disease Sister    • Heart failure Sister    • Diabetes Brother    • Diabetes Brother    • Diabetes Brother    • Breast cancer Neg Hx      Past Medical History:    Diagnosis Date   • GERD (gastroesophageal reflux disease)    • Hyperlipidemia    • Hypertension    • Left shoulder pain    • Obesity    • Osteoarthritis of knees, bilateral    • Overactive bladder    • Right shoulder pain      Patient Active Problem List   Diagnosis   • Complete tear of right rotator cuff   • Chronic kidney disease, stage I   • Cough   • Gastroesophageal reflux disease   • Mixed hyperlipidemia   • Shoulder pain   • Hypertension   • Acromioclavicular joint arthritis   • Impingement syndrome, shoulder   • Complete tear of left rotator cuff   • Urge incontinence   • Atopic rhinitis   • Upper respiratory tract infection   • Leg edema   • Hypercalcemia   • Right knee pain       Social History   Substance Use Topics   • Smoking status: Former Smoker     Packs/day: 2.00     Years: 20.00     Types: Cigarettes     Quit date: 1976   • Smokeless tobacco: Never Used   • Alcohol use No      Comment: s/p 1976       Allergies   Allergen Reactions   • Codeine Other (See Comments)     hyperventilate   • Sulfa Antibiotics Rash       Current Outpatient Prescriptions on File Prior to Visit   Medication Sig   • aspirin 81 MG tablet Take 81 mg by mouth daily.   • atorvastatin (LIPITOR) 20 MG tablet TAKE ONE TABLET BY MOUTH ONCE DAILY   • Cholecalciferol (VITAMIN D PO) Take 1,000 Units by mouth daily.   • diclofenac (VOLTAREN) 1 % gel gel Apply 4 g topically 4 (Four) Times a Day As Needed (for pain).   • diltiazem XR (DILACOR XR) 120 MG 24 hr capsule Take 1 capsule by mouth Daily.   • Docusate Calcium (STOOL SOFTENER PO) Take  by mouth daily.   • Fexofenadine HCl (ALLEGRA PO) Take 180 mg by mouth daily as needed.   • losartan (COZAAR) 100 MG tablet Take 1 tablet by mouth Daily.   • omeprazole OTC (PriLOSEC OTC) 20 MG EC tablet Take 20 mg by mouth daily.   • oxybutynin (DITROPAN) 5 MG tablet Take 1 tablet by mouth Daily.   • Probiotic Product (PROBIOTIC COLON SUPPORT PO) Take  by mouth daily.     No current  "facility-administered medications on file prior to visit.          The following portions of the patient's history were reviewed and updated as appropriate: allergies, current medications, past family history, past medical history, past social history, past surgical history and problem list.    Review of Systems   Constitution: Negative.   HENT: Negative.  Negative for congestion.    Eyes: Negative.    Cardiovascular: Negative.  Negative for chest pain, cyanosis, dyspnea on exertion, irregular heartbeat, leg swelling, near-syncope, orthopnea, palpitations, paroxysmal nocturnal dyspnea and syncope.   Respiratory: Negative.  Negative for shortness of breath.    Hematologic/Lymphatic: Negative.    Musculoskeletal: Negative.    Gastrointestinal: Negative.    Neurological: Negative.  Negative for headaches.          Objective:     /76 (BP Location: Left arm, Patient Position: Sitting)   Pulse 79   Ht 149.9 cm (59\")   Wt 89.8 kg (198 lb)   SpO2 95%   BMI 39.99 kg/m²   Physical Exam   Constitutional: She appears well-developed and well-nourished. No distress.   HENT:   Head: Normocephalic and atraumatic.   Mouth/Throat: Oropharynx is clear and moist. No oropharyngeal exudate.   Eyes: Conjunctivae and EOM are normal. Pupils are equal, round, and reactive to light. No scleral icterus.   Neck: Normal range of motion. Neck supple. No JVD present. No tracheal deviation present. No thyromegaly present.   Cardiovascular: Normal rate, regular rhythm, normal heart sounds and intact distal pulses.  PMI is not displaced.  Exam reveals no gallop, no friction rub and no decreased pulses.    No murmur heard.  Pulses:       Carotid pulses are 3+ on the right side, and 3+ on the left side.       Radial pulses are 3+ on the right side, and 3+ on the left side.   Pulmonary/Chest: Effort normal and breath sounds normal. No respiratory distress. She has no wheezes. She has no rales. She exhibits no tenderness.   Abdominal: Soft. " Bowel sounds are normal. She exhibits no distension, no abdominal bruit and no mass. There is no splenomegaly or hepatomegaly. There is no tenderness. There is no rebound and no guarding.   Musculoskeletal: Normal range of motion. She exhibits no edema, tenderness or deformity.   Lymphadenopathy:     She has no cervical adenopathy.   Neurological: She is alert. She has normal reflexes. No cranial nerve deficit. She exhibits normal muscle tone. Coordination normal.   Skin: Skin is warm and dry. No rash noted. She is not diaphoretic. No erythema.   Psychiatric: She has a normal mood and affect. Her behavior is normal. Judgment and thought content normal.         Lab Review  Lab Results   Component Value Date     04/09/2018    K 4.7 04/09/2018     04/09/2018    BUN 20 04/09/2018    CREATININE 1.08 04/09/2018    GLUCOSE 95 04/09/2018    CALCIUM 10.2 (H) 04/09/2018    ALT 22 04/09/2018    ALKPHOS 90 04/09/2018    LABIL2 1.6 04/09/2018     Lab Results   Component Value Date    CKTOTAL 225 (H) 04/09/2018     Lab Results   Component Value Date    WBC 8.63 04/09/2018    HGB 14.0 04/09/2018    HCT 44.0 04/09/2018     04/09/2018     Lab Results   Component Value Date    INR 1.97 12/11/2014    INR 2.02 12/08/2014    INR 2.46 12/04/2014     No results found for: MG  Lab Results   Component Value Date    TSH 1.182 06/08/2016     No results found for: BNP  Lab Results   Component Value Date    CHLPL 133 06/08/2016    CHOL 149 04/09/2018    TRIG 147 04/09/2018    HDL 46 (L) 04/09/2018    VLDL 29.4 04/09/2018    LDLHDL 1.60 04/09/2018     Lab Results   Component Value Date    LDL 74 04/09/2018    LDL 70 10/19/2017       Procedures       I personally viewed and interpreted the patient's LAB data         Assessment:     1. Essential hypertension    2. Mixed hyperlipidemia    3. Urge incontinence          Plan:      Blood pressure is very well controlled.  Patient saw Dr. barkley seems to be doing very well she will  continue current medication.  No change in therapy.    Lab work reviewed and discussed.  CBC CMP and lipid panel is also normal.  Ditropan will also be continued.  Ditropan is not helping will get a urological consultation   Follow-up scheduled        Return in about 3 months (around 7/24/2018).

## 2018-04-26 ENCOUNTER — HOSPITAL ENCOUNTER (OUTPATIENT)
Dept: BONE DENSITY | Facility: HOSPITAL | Age: 78
Discharge: HOME | End: 2018-04-26
Admitting: INTERNAL MEDICINE

## 2018-04-26 DIAGNOSIS — M81.0 SENILE OSTEOPOROSIS: ICD-10-CM

## 2018-04-26 PROCEDURE — 77080 DXA BONE DENSITY AXIAL: CPT

## 2018-04-26 PROCEDURE — 77080 DXA BONE DENSITY AXIAL: CPT | Performed by: RADIOLOGY

## 2018-05-04 RX ORDER — GABAPENTIN 100 MG/1
100 CAPSULE ORAL 2 TIMES DAILY PRN
Qty: 160 CAPSULE | Refills: 0 | OUTPATIENT
Start: 2018-05-04 | End: 2018-07-24 | Stop reason: SDUPTHER

## 2018-05-04 NOTE — TELEPHONE ENCOUNTER
Pt was seen on 04/24/18, however she did not get a script for Neurontin 100 mg BID. She still had some left so she did not think to ask for some. Her next appointment is on 07/24/18. I told her to make sure the next time she came in to ask for it.  Pt is requesting RX to be called in. Hansel is in the computer. Is it ok?

## 2018-05-17 ENCOUNTER — OFFICE VISIT (OUTPATIENT)
Dept: UROLOGY | Facility: CLINIC | Age: 78
End: 2018-05-17

## 2018-05-17 VITALS — BODY MASS INDEX: 39.91 KG/M2 | HEIGHT: 59 IN | WEIGHT: 197.97 LBS

## 2018-05-17 DIAGNOSIS — N39.41 URGE INCONTINENCE: Primary | ICD-10-CM

## 2018-05-17 PROCEDURE — 99204 OFFICE O/P NEW MOD 45 MIN: CPT | Performed by: UROLOGY

## 2018-05-17 NOTE — PROGRESS NOTES
Chief Complaint:          Chief Complaint   Patient presents with   • STRESS INCONTIENCE       HPI:   77 y.o. female.  77-year-old white female referred for evaluation of urinary incontinence.  She uses a walker.  She uses 2 pads per day.  She went to the [facility] clinic and was placed on oxybutynin which helped the frequency but not the leakage she voids 15-20 times per day she does not get up at night but she wets into a pad.  She is  4 para 4.  She has no burning, fevers or chills, hematuria etc.    Past Medical History:        Past Medical History:   Diagnosis Date   • GERD (gastroesophageal reflux disease)    • Hyperlipidemia    • Hypertension    • Left shoulder pain    • Obesity    • Osteoarthritis of knees, bilateral    • Overactive bladder    • Right shoulder pain          Current Meds:     Current Outpatient Prescriptions   Medication Sig Dispense Refill   • aspirin 81 MG tablet Take 81 mg by mouth daily.     • atorvastatin (LIPITOR) 20 MG tablet TAKE ONE TABLET BY MOUTH ONCE DAILY 90 tablet 2   • Cholecalciferol (VITAMIN D PO) Take 1,000 Units by mouth daily.     • diclofenac (VOLTAREN) 1 % gel gel Apply 4 g topically 4 (Four) Times a Day As Needed (for pain). 1 tube 2   • diltiazem XR (DILACOR XR) 120 MG 24 hr capsule Take 1 capsule by mouth Daily. 90 capsule 3   • Docusate Calcium (STOOL SOFTENER PO) Take  by mouth daily.     • Fexofenadine HCl (ALLEGRA PO) Take 180 mg by mouth daily as needed.     • gabapentin (NEURONTIN) 100 MG capsule Take 1 capsule by mouth 2 (Two) Times a Day As Needed (PRN). 160 capsule 0   • losartan (COZAAR) 100 MG tablet Take 1 tablet by mouth Daily. 90 tablet 0   • omeprazole OTC (PriLOSEC OTC) 20 MG EC tablet Take 20 mg by mouth daily.     • oxybutynin (DITROPAN) 5 MG tablet Take 1 tablet by mouth Daily. 90 tablet 0   • Probiotic Product (PROBIOTIC COLON SUPPORT PO) Take  by mouth daily.       No current facility-administered medications for this visit.          Allergies:      Allergies   Allergen Reactions   • Codeine Other (See Comments)     hyperventilate   • Sulfa Antibiotics Rash        Past Surgical History:     Past Surgical History:   Procedure Laterality Date   • APPENDECTOMY     • BUNIONECTOMY Right    • CARDIOVASCULAR STRESS TEST  10/2012   • CATARACT EXTRACTION  2017   • CHOLECYSTECTOMY     • COLONOSCOPY  2011   • ECHO - CONVERTED  10/2012   • JOINT REPLACEMENT      bilateral knees    • KNEE ARTHROPLASTY Left    • KNEE ARTHROSCOPY     • SHOULDER ARTHROSCOPY Left 7/28/2016    Procedure: LEFT SHOULDER ACROMIOPLASTY,MINI OPEN ROTATOR CUFF REPAIR;  Surgeon: Carlos Eduardo Smith MD;  Location: [facility];  Service:    • SHOULDER SURGERY  05/31/2016    OPEN ACROMICPLASTY RIGHT SHOULDER         Social History:     Social History     Social History   • Marital status:      Spouse name: N/A   • Number of children: N/A   • Years of education: N/A     Occupational History   • Not on file.     Social History Main Topics   • Smoking status: Former Smoker     Packs/day: 2.00     Years: 20.00     Types: Cigarettes     Quit date: 1976   • Smokeless tobacco: Never Used   • Alcohol use No      Comment: s/p 1976   • Drug use: No   • Sexual activity: Defer     Other Topics Concern   • Not on file     Social History Narrative   • No narrative on file       Family History:     Family History   Problem Relation Age of Onset   • Heart disease Other    • Stroke Sister    • Diabetes Mother    • Heart failure Mother    • Heart disease Mother    • Heart attack Mother    • Hypertension Father    • Emphysema Father    • Heart disease Father    • No Known Problems Brother    • Cancer Sister    • Kidney disease Sister    • Heart failure Sister    • Diabetes Brother    • Diabetes Brother    • Diabetes Brother    • Breast cancer Neg Hx        Review of Systems:     Review of Systems   Constitutional: Negative.  Negative for activity change, appetite change, chills, diaphoresis,  fatigue and unexpected weight change.   HENT: Negative for congestion, dental problem, drooling, ear discharge, ear pain, facial swelling, hearing loss, mouth sores, nosebleeds, postnasal drip, rhinorrhea, sinus pressure, sneezing, sore throat, tinnitus, trouble swallowing and voice change.    Eyes: Negative.  Negative for photophobia, pain, discharge, redness, itching and visual disturbance.   Respiratory: Negative.  Negative for apnea, cough, choking, chest tightness, shortness of breath, wheezing and stridor.    Cardiovascular: Negative.  Negative for chest pain, palpitations and leg swelling.   Gastrointestinal: Negative.  Negative for abdominal distention, abdominal pain, anal bleeding, blood in stool, constipation, diarrhea, nausea, rectal pain and vomiting.   Endocrine: Negative.  Negative for cold intolerance, heat intolerance, polydipsia, polyphagia and polyuria.   Genitourinary: Positive for flank pain, frequency and urgency.   Musculoskeletal: Negative for arthralgias, back pain, gait problem, joint swelling, myalgias, neck pain and neck stiffness.   Skin: Negative.  Negative for color change, pallor, rash and wound.   Allergic/Immunologic: Negative.  Negative for environmental allergies, food allergies and immunocompromised state.   Neurological: Negative.  Negative for dizziness, tremors, seizures, syncope, facial asymmetry, speech difficulty, weakness, light-headedness, numbness and headaches.   Hematological: Negative.  Negative for adenopathy. Does not bruise/bleed easily.   Psychiatric/Behavioral: Negative for agitation, behavioral problems, confusion, decreased concentration, dysphoric mood, hallucinations, self-injury, sleep disturbance and suicidal ideas. The patient is not nervous/anxious and is not hyperactive.    All other systems reviewed and are negative.      Physical Exam:     Physical Exam   Constitutional: She appears well-developed and well-nourished.   HENT:   Head: Normocephalic and  atraumatic.   Right Ear: External ear normal.   Left Ear: External ear normal.   Mouth/Throat: Oropharynx is clear and moist.   Eyes: Conjunctivae are normal. Pupils are equal, round, and reactive to light.   Cardiovascular: Normal rate, regular rhythm, normal heart sounds and intact distal pulses.    Pulmonary/Chest: Effort normal and breath sounds normal.   Abdominal: Soft. Bowel sounds are normal. She exhibits no distension and no mass. There is no tenderness. There is no rebound and no guarding.   Genitourinary: No vaginal discharge found.   Genitourinary Comments: Soft, thin abdomen has no significant prolapse no significant stress leakage with Valsalva atrophic urethritis   Musculoskeletal: Normal range of motion.   Neurological: She is alert. She has normal reflexes.   Skin: Skin is warm and dry.   Psychiatric: She has a normal mood and affect. Her behavior is normal. Judgment and thought content normal.       I have reviewed the following portions of the patient's history: allergies, current medications, past family history, past medical history, past social history, past surgical history, problem list and ROS and confirm it's accurate.      Procedure:       Assessment/Plan:   Urinary incontinence:  Patient was diagnosed with urinary incontinence.  We discussed treatable and non-treatable causes of both stress and urge urinary incontinence.  With regards to stress urinary incontinence we discussed its relationship to childbirth and pelvic health.  We discussed the grading of stress incontinence with trying to quantitate the number of pads used.  We talked about leaking urine with laughing, lifting, coughing, and sexual intercourse.  Talked about the urge component and the concept of mixed incontinence where upon the stress treatable at the urge may exist and that 50% of the time the urge will resolve with treatment of the stress incontinence.  We talked with the diagnostic workup including a postvoid  residual urine, urine and even a simple cystometrogram.  I discussed the findings that may be neurologically related including commonly seen with multiple sclerosis, Parkinson's disease, and stroke.  I talked about the various therapeutic options including anticholinergics, beta 3 agonists, and alpha blockade if there is a component of obstruction.  I discussed the side effects of anti-cholinergic including dry mouth, double vision etc. this is likely stress incontinence I'm going to initiate an upper and lower tract investigation    Patient's Body mass index is 39.96 kg/m². BMI is above normal parameters. Recommendations include: no follow-up required.          This document has been electronically signed by AUTUMN JACOBS MD May 17, 2018 10:38 AM

## 2018-05-31 ENCOUNTER — TELEPHONE (OUTPATIENT)
Dept: UROLOGY | Facility: CLINIC | Age: 78
End: 2018-05-31

## 2018-05-31 ENCOUNTER — PROCEDURE VISIT (OUTPATIENT)
Dept: UROLOGY | Facility: CLINIC | Age: 78
End: 2018-05-31

## 2018-05-31 VITALS — WEIGHT: 197.97 LBS | HEIGHT: 59 IN | BODY MASS INDEX: 39.91 KG/M2

## 2018-05-31 DIAGNOSIS — N39.3 SUI (STRESS URINARY INCONTINENCE, FEMALE): ICD-10-CM

## 2018-05-31 DIAGNOSIS — N39.3 STRESS INCONTINENCE: Primary | ICD-10-CM

## 2018-05-31 PROCEDURE — 52000 CYSTOURETHROSCOPY: CPT | Performed by: UROLOGY

## 2018-05-31 PROCEDURE — 96372 THER/PROPH/DIAG INJ SC/IM: CPT | Performed by: UROLOGY

## 2018-05-31 PROCEDURE — 99213 OFFICE O/P EST LOW 20 MIN: CPT | Performed by: UROLOGY

## 2018-05-31 PROCEDURE — 51725 SIMPLE CYSTOMETROGRAM: CPT | Performed by: UROLOGY

## 2018-05-31 RX ORDER — GENTAMICIN SULFATE 40 MG/ML
80 INJECTION, SOLUTION INTRAMUSCULAR; INTRAVENOUS ONCE
Status: COMPLETED | OUTPATIENT
Start: 2018-05-31 | End: 2018-05-31

## 2018-05-31 RX ADMIN — GENTAMICIN SULFATE 80 MG: 40 INJECTION, SOLUTION INTRAMUSCULAR; INTRAVENOUS at 09:09

## 2018-06-04 PROBLEM — N39.3 SUI (STRESS URINARY INCONTINENCE, FEMALE): Status: ACTIVE | Noted: 2018-06-04

## 2018-06-05 ENCOUNTER — TELEPHONE (OUTPATIENT)
Dept: UROLOGY | Facility: CLINIC | Age: 78
End: 2018-06-05

## 2018-06-05 DIAGNOSIS — N39.498 OTHER URINARY INCONTINENCE: Primary | ICD-10-CM

## 2018-06-05 NOTE — TELEPHONE ENCOUNTER
The patient called and left a message several times and I was busy in clinic and didn't get to return her call. But I called her back when we had a break in patients and she said that she decided to go ahead with the procedure and referring her to a doctor in [place] to take care of her stage III urinary incontinence.  She said her only request is that it be within the [facility].      I talked with Bobbi to see if she knew any suggestions and we did some research and found Dr. Yimi Lewis.  So I entered the referrral for the patient to go see him.

## 2018-06-19 ENCOUNTER — TELEPHONE (OUTPATIENT)
Dept: UROLOGY | Facility: CLINIC | Age: 78
End: 2018-06-19

## 2018-06-19 NOTE — TELEPHONE ENCOUNTER
The patient called and asked about her referral.  I told her that it was entered and that sometimes it takes a while to reach the other clinic and we have to send office notes and what not to them as well. I told the patient that I would send a message to Ramila just to check the status of it.

## 2018-06-20 NOTE — TELEPHONE ENCOUNTER
This patients clinicals have been faxed to [facility] for Dr. Reyes to review and I am waiting on a return call with an appointment.

## 2018-07-10 ENCOUNTER — LAB (OUTPATIENT)
Dept: LAB | Facility: HOSPITAL | Age: 78
End: 2018-07-10
Attending: INTERNAL MEDICINE

## 2018-07-10 ENCOUNTER — TRANSCRIBE ORDERS (OUTPATIENT)
Dept: GENERAL RADIOLOGY | Facility: HOSPITAL | Age: 78
End: 2018-07-10

## 2018-07-10 DIAGNOSIS — N25.81 HYPERPARATHYROIDISM, SECONDARY RENAL (HCC): ICD-10-CM

## 2018-07-10 DIAGNOSIS — N18.30 CHRONIC KIDNEY DISEASE, STAGE III (MODERATE) (HCC): ICD-10-CM

## 2018-07-10 DIAGNOSIS — N18.30 CHRONIC KIDNEY DISEASE, STAGE III (MODERATE) (HCC): Primary | ICD-10-CM

## 2018-07-10 LAB
ALBUMIN UR-MCNC: 14.7 MG/L
ANION GAP SERPL CALCULATED.3IONS-SCNC: 11 MMOL/L (ref 3.6–11.2)
BUN BLD-MCNC: 20 MG/DL (ref 7–21)
BUN/CREAT SERPL: 19.2 (ref 7–25)
CALCIUM SPEC-SCNC: 9.8 MG/DL (ref 7.7–10)
CHLORIDE SERPL-SCNC: 109 MMOL/L (ref 99–112)
CO2 SERPL-SCNC: 24 MMOL/L (ref 24.3–31.9)
CREAT BLD-MCNC: 1.04 MG/DL (ref 0.43–1.29)
CREAT UR-MCNC: 75.3 MG/DL
GFR SERPL CREATININE-BSD FRML MDRD: 52 ML/MIN/1.73
GLUCOSE BLD-MCNC: 107 MG/DL (ref 70–110)
MICROALBUMIN/CREAT UR: 19.5 MG/G
OSMOLALITY SERPL CALC.SUM OF ELEC: 289.9 MOSM/KG (ref 273–305)
PHOSPHATE SERPL-MCNC: 3.2 MG/DL (ref 2.7–4.5)
POTASSIUM BLD-SCNC: 4.2 MMOL/L (ref 3.5–5.3)
PTH-INTACT SERPL-MCNC: 100 PG/ML (ref 14–72)
SODIUM BLD-SCNC: 144 MMOL/L (ref 135–153)

## 2018-07-10 PROCEDURE — 82043 UR ALBUMIN QUANTITATIVE: CPT

## 2018-07-10 PROCEDURE — 83970 ASSAY OF PARATHORMONE: CPT

## 2018-07-10 PROCEDURE — 80048 BASIC METABOLIC PNL TOTAL CA: CPT

## 2018-07-10 PROCEDURE — 84100 ASSAY OF PHOSPHORUS: CPT

## 2018-07-10 PROCEDURE — 82570 ASSAY OF URINE CREATININE: CPT

## 2018-07-10 PROCEDURE — 36415 COLL VENOUS BLD VENIPUNCTURE: CPT

## 2018-07-12 RX ORDER — LOSARTAN POTASSIUM 100 MG/1
100 TABLET ORAL DAILY
Qty: 90 TABLET | Refills: 3 | Status: SHIPPED | OUTPATIENT
Start: 2018-07-12 | End: 2019-07-15 | Stop reason: SDUPTHER

## 2018-07-24 ENCOUNTER — OFFICE VISIT (OUTPATIENT)
Dept: CARDIOLOGY | Facility: CLINIC | Age: 78
End: 2018-07-24

## 2018-07-24 VITALS
BODY MASS INDEX: 39.72 KG/M2 | DIASTOLIC BLOOD PRESSURE: 68 MMHG | RESPIRATION RATE: 18 BRPM | OXYGEN SATURATION: 92 % | WEIGHT: 197 LBS | SYSTOLIC BLOOD PRESSURE: 126 MMHG | HEART RATE: 64 BPM | HEIGHT: 59 IN

## 2018-07-24 DIAGNOSIS — I10 ESSENTIAL HYPERTENSION: ICD-10-CM

## 2018-07-24 DIAGNOSIS — E78.2 MIXED HYPERLIPIDEMIA: Primary | ICD-10-CM

## 2018-07-24 DIAGNOSIS — N39.41 URGE INCONTINENCE: ICD-10-CM

## 2018-07-24 DIAGNOSIS — R53.82 CHRONIC FATIGUE: ICD-10-CM

## 2018-07-24 DIAGNOSIS — K21.9 GASTROESOPHAGEAL REFLUX DISEASE WITHOUT ESOPHAGITIS: ICD-10-CM

## 2018-07-24 PROCEDURE — 99213 OFFICE O/P EST LOW 20 MIN: CPT | Performed by: INTERNAL MEDICINE

## 2018-07-24 RX ORDER — GABAPENTIN 100 MG/1
100 CAPSULE ORAL 2 TIMES DAILY PRN
Qty: 160 CAPSULE | Refills: 0 | Status: SHIPPED | OUTPATIENT
Start: 2018-07-24 | End: 2019-03-28 | Stop reason: SDUPTHER

## 2018-07-24 NOTE — PROGRESS NOTES
subjective     Chief Complaint   Patient presents with   • Essential hypertension   • Mixed hyperlipidemia   • Med Management     Provided list     History of Present Illness  Patient is 77 years old white female who is here for follow-up for multiple chronic medical problems.  Patient has essential hypertension which seems to be fairly well controlled.  Patient is taking Dilacor  and losartan 100 mg daily.  Patient stated that she saw Dr. barkley and he told her that after she is finished with Dilacor 240 she should increase to 300.  Patient has not done that yet.  No drug side effects noted.    Patient also has hyperlipidemia she is taking Lipitor 20 mg daily no drug side effects noted.  She is also trying to diet.    Patient has a urinary incontinence.  She was given Ditropan.  Ditropan did not help.  She was sent to urology service and then was referred to  urology.  Patient apparently was given Myrbetriq.  Patient could not afford it.  She has not even tried that.  She stated that she has been calling back again and again but no success.    Patient also has gastroesophageal reflux disorder she is taking over-the-counter omeprazole that seems to be helping.  She also complains of peripheral neuritis and the having difficulty sleeping and muscle spasm.  She is taking Neurontin that seems to be helping and needs refills.    She also is seeing nephrology service she saw Dr. barkley as mentioned to increase Dilacor.  She is seeing orthopedic service also because of shoulder issue with rotator cuff tear.    Past Surgical History:   Procedure Laterality Date   • APPENDECTOMY     • BUNIONECTOMY Right    • CARDIOVASCULAR STRESS TEST  10/2012   • CATARACT EXTRACTION  2017   • CHOLECYSTECTOMY     • COLONOSCOPY  2011   • ECHO - CONVERTED  10/2012   • JOINT REPLACEMENT      bilateral knees    • KNEE ARTHROPLASTY Left    • KNEE ARTHROSCOPY     • SHOULDER ARTHROSCOPY Left 7/28/2016    Procedure: LEFT SHOULDER  ACROMIOPLASTY,MINI OPEN ROTATOR CUFF REPAIR;  Surgeon: Carlos Eduardo Smith MD;  Location: [facility];  Service:    • SHOULDER SURGERY  05/31/2016    OPEN ACROMICPLASTY RIGHT SHOULDER     Family History   Problem Relation Age of Onset   • Heart disease Other    • Stroke Sister    • Diabetes Mother    • Heart failure Mother    • Heart disease Mother    • Heart attack Mother    • Hypertension Father    • Emphysema Father    • Heart disease Father    • No Known Problems Brother    • Cancer Sister    • Kidney disease Sister    • Heart failure Sister    • Diabetes Brother    • Diabetes Brother    • Diabetes Brother    • Breast cancer Neg Hx      Past Medical History:   Diagnosis Date   • GERD (gastroesophageal reflux disease)    • Hyperlipidemia    • Hypertension    • Left shoulder pain    • Obesity    • Osteoarthritis of knees, bilateral    • Overactive bladder    • Right shoulder pain      Patient Active Problem List   Diagnosis   • Complete tear of right rotator cuff   • Chronic kidney disease, stage I   • Cough   • Gastroesophageal reflux disease   • Mixed hyperlipidemia   • Shoulder pain   • Hypertension   • Acromioclavicular joint arthritis   • Impingement syndrome, shoulder   • Complete tear of left rotator cuff   • Urge incontinence   • Atopic rhinitis   • Upper respiratory tract infection   • Leg edema   • Hypercalcemia   • Right knee pain   • DADA (stress urinary incontinence, female)       Social History   Substance Use Topics   • Smoking status: Former Smoker     Packs/day: 2.00     Years: 20.00     Types: Cigarettes     Quit date: 1976   • Smokeless tobacco: Never Used   • Alcohol use No      Comment: s/p 1976       Allergies   Allergen Reactions   • Codeine Other (See Comments)     hyperventilate   • Sulfa Antibiotics Rash       Current Outpatient Prescriptions on File Prior to Visit   Medication Sig   • aspirin 81 MG tablet Take 81 mg by mouth daily.   • atorvastatin (LIPITOR) 20 MG tablet TAKE ONE TABLET  BY MOUTH ONCE DAILY   • Cholecalciferol (VITAMIN D PO) Take 1,000 Units by mouth daily.   • diclofenac (VOLTAREN) 1 % gel gel Apply 4 g topically 4 (Four) Times a Day As Needed (for pain).   • diltiazem XR (DILACOR XR) 120 MG 24 hr capsule Take 1 capsule by mouth Daily.   • Docusate Calcium (STOOL SOFTENER PO) Take  by mouth daily.   • Fexofenadine HCl (ALLEGRA PO) Take 180 mg by mouth daily as needed.   • losartan (COZAAR) 100 MG tablet Take 1 tablet by mouth Daily.   • omeprazole OTC (PriLOSEC OTC) 20 MG EC tablet Take 20 mg by mouth daily.   • oxybutynin (DITROPAN) 5 MG tablet Take 1 tablet by mouth Daily.   • Probiotic Product (PROBIOTIC COLON SUPPORT PO) Take  by mouth daily.   • [DISCONTINUED] gabapentin (NEURONTIN) 100 MG capsule Take 1 capsule by mouth 2 (Two) Times a Day As Needed (PRN).     No current facility-administered medications on file prior to visit.          The following portions of the patient's history were reviewed and updated as appropriate: allergies, current medications, past family history, past medical history, past social history, past surgical history and problem list.    Review of Systems   Constitution: Positive for malaise/fatigue.   HENT: Negative.  Negative for congestion.    Eyes: Negative.    Cardiovascular: Negative.  Negative for chest pain, cyanosis, dyspnea on exertion, irregular heartbeat, leg swelling, near-syncope, orthopnea, palpitations, paroxysmal nocturnal dyspnea and syncope.   Respiratory: Negative.  Negative for shortness of breath.    Endocrine: Negative.    Hematologic/Lymphatic: Negative.    Skin: Negative.    Musculoskeletal: Positive for arthritis, joint pain, myalgias and stiffness.   Gastrointestinal: Positive for heartburn.   Genitourinary: Positive for bladder incontinence.   Neurological: Positive for paresthesias. Negative for headaches.   Psychiatric/Behavioral: Negative.           Objective:     /68 (BP Location: Left arm, Patient Position:  "Sitting, Cuff Size: Adult)   Pulse 64   Resp 18   Ht 149.9 cm (59\")   Wt 89.4 kg (197 lb)   SpO2 92%   BMI 39.79 kg/m²   Physical Exam   Constitutional: She appears well-developed and well-nourished. No distress.   HENT:   Head: Normocephalic and atraumatic.   Mouth/Throat: Oropharynx is clear and moist. No oropharyngeal exudate.   Eyes: Pupils are equal, round, and reactive to light. Conjunctivae and EOM are normal. No scleral icterus.   Neck: Normal range of motion. Neck supple. No JVD present. No tracheal deviation present. No thyromegaly present.   Cardiovascular: Normal rate, regular rhythm, normal heart sounds and intact distal pulses.  PMI is not displaced.  Exam reveals no gallop, no friction rub and no decreased pulses.    No murmur heard.  Pulses:       Carotid pulses are 3+ on the right side, and 3+ on the left side.       Radial pulses are 3+ on the right side, and 3+ on the left side.   Pulmonary/Chest: Effort normal and breath sounds normal. No respiratory distress. She has no wheezes. She has no rales. She exhibits no tenderness.   Abdominal: Soft. Bowel sounds are normal. She exhibits no distension, no abdominal bruit and no mass. There is no splenomegaly or hepatomegaly. There is no tenderness. There is no rebound and no guarding.   Musculoskeletal: Normal range of motion. She exhibits no edema, tenderness or deformity.   Lymphadenopathy:     She has no cervical adenopathy.   Neurological: She is alert. She has normal reflexes. No cranial nerve deficit. She exhibits normal muscle tone. Coordination normal.   Skin: Skin is warm and dry. No rash noted. She is not diaphoretic. No erythema.   Psychiatric: She has a normal mood and affect. Her behavior is normal. Judgment and thought content normal.         Lab Review  Lab Results   Component Value Date     07/10/2018    K 4.2 07/10/2018     07/10/2018    BUN 20 07/10/2018    CREATININE 1.04 07/10/2018    GLUCOSE 107 07/10/2018    " CALCIUM 9.8 07/10/2018    ALT 22 04/09/2018    ALKPHOS 90 04/09/2018    LABIL2 1.6 06/08/2016     Lab Results   Component Value Date    CKTOTAL 225 (H) 04/09/2018     Lab Results   Component Value Date    WBC 8.63 04/09/2018    HGB 14.0 04/09/2018    HCT 44.0 04/09/2018     04/09/2018     Lab Results   Component Value Date    INR 1.97 12/11/2014    INR 2.02 12/08/2014    INR 2.46 12/04/2014     No results found for: MG  Lab Results   Component Value Date    TSH 1.182 06/08/2016     No results found for: BNP  Lab Results   Component Value Date    CHLPL 133 06/08/2016    CHOL 149 04/09/2018    TRIG 147 04/09/2018    HDL 46 (L) 04/09/2018    VLDL 29.4 04/09/2018    LDLHDL 1.60 04/09/2018     Lab Results   Component Value Date    LDL 74 04/09/2018    LDL 70 10/19/2017       Procedures       I personally viewed and interpreted the patient's LAB data         Assessment:     1. Mixed hyperlipidemia    2. Essential hypertension    3. Chronic fatigue    4. Gastroesophageal reflux disease without esophagitis    5. Urge incontinence          Plan:      Patient has hyperlipidemia and has been very well controlled with the Lipitor.  There has been no drug side effects.  Lab work scheduled for next visit she will continue Lipitor at this time.    Blood pressure is very well controlled.  Apparently Dr. barkley has increased Cardizem to 300.  She has not started that yet.    She is seeing [facility] for incontinence apparently medication was very expensive other option is surgery.  She will discuss that with [facility].    Is still having some problem with her shoulder but is much better.    Neurontin prescription was given  Lab work scheduled for next visit          No Follow-up on file.

## 2018-09-10 ENCOUNTER — TELEPHONE (OUTPATIENT)
Dept: CARDIOLOGY | Facility: CLINIC | Age: 78
End: 2018-09-10

## 2018-09-10 DIAGNOSIS — R07.9 CHEST PAIN, UNSPECIFIED TYPE: Primary | ICD-10-CM

## 2018-09-10 NOTE — TELEPHONE ENCOUNTER
Schedule echocardiogram and BNP.  I will see her after these 2 tests are done to help the swelling is probably coming from Dilacor

## 2018-09-10 NOTE — TELEPHONE ENCOUNTER
PATIENT CALLED AND  COMPLAINING OF HER LEGS AND FEET SWELLING FROM KNEES DOWN FOR THE PAST SEVERAL WEEKS. REQUESTED APPOINTMENT, NO OPENINGS FOR SEVERAL DAYS/WEEKS

## 2018-09-10 NOTE — TELEPHONE ENCOUNTER
Patient called stated that her legs and feet are swelling very back from her knees down. She stated that she has no shortness of breath with this. Wants to know if we can do something for her.

## 2018-09-11 NOTE — TELEPHONE ENCOUNTER
Called patient to let her know Dr. Montano is ordering an Echo and a lab test..  She V/U.    Orders are pending.

## 2018-09-14 ENCOUNTER — LAB (OUTPATIENT)
Dept: LAB | Facility: HOSPITAL | Age: 78
End: 2018-09-14
Attending: INTERNAL MEDICINE

## 2018-09-14 DIAGNOSIS — E78.2 MIXED HYPERLIPIDEMIA: ICD-10-CM

## 2018-09-14 DIAGNOSIS — R53.82 CHRONIC FATIGUE: ICD-10-CM

## 2018-09-14 LAB
ALBUMIN SERPL-MCNC: 5 G/DL (ref 3.4–4.8)
ALBUMIN/GLOB SERPL: 1.9 G/DL (ref 1.5–2.5)
ALP SERPL-CCNC: 98 U/L (ref 35–104)
ALT SERPL W P-5'-P-CCNC: 15 U/L (ref 10–36)
ANION GAP SERPL CALCULATED.3IONS-SCNC: 10.1 MMOL/L (ref 3.6–11.2)
AST SERPL-CCNC: 21 U/L (ref 10–30)
BASOPHILS # BLD AUTO: 0.02 10*3/MM3 (ref 0–0.3)
BASOPHILS NFR BLD AUTO: 0.3 % (ref 0–2)
BILIRUB SERPL-MCNC: 0.5 MG/DL (ref 0.2–1.8)
BUN BLD-MCNC: 14 MG/DL (ref 7–21)
BUN/CREAT SERPL: 11.8 (ref 7–25)
CALCIUM SPEC-SCNC: 10.1 MG/DL (ref 7.7–10)
CHLORIDE SERPL-SCNC: 105 MMOL/L (ref 99–112)
CHOLEST SERPL-MCNC: 143 MG/DL (ref 0–200)
CK SERPL-CCNC: 72 U/L (ref 24–173)
CO2 SERPL-SCNC: 28.9 MMOL/L (ref 24.3–31.9)
CREAT BLD-MCNC: 1.19 MG/DL (ref 0.43–1.29)
DEPRECATED RDW RBC AUTO: 49.3 FL (ref 37–54)
EOSINOPHIL # BLD AUTO: 0.17 10*3/MM3 (ref 0–0.7)
EOSINOPHIL NFR BLD AUTO: 2.2 % (ref 0–7)
ERYTHROCYTE [DISTWIDTH] IN BLOOD BY AUTOMATED COUNT: 15 % (ref 11.5–14.5)
GFR SERPL CREATININE-BSD FRML MDRD: 44 ML/MIN/1.73
GLOBULIN UR ELPH-MCNC: 2.6 GM/DL
GLUCOSE BLD-MCNC: 99 MG/DL (ref 70–110)
HCT VFR BLD AUTO: 44.1 % (ref 37–47)
HDLC SERPL-MCNC: 43 MG/DL (ref 60–100)
HGB BLD-MCNC: 14 G/DL (ref 12–16)
IMM GRANULOCYTES # BLD: 0.01 10*3/MM3 (ref 0–0.03)
IMM GRANULOCYTES NFR BLD: 0.1 % (ref 0–0.5)
LDLC SERPL CALC-MCNC: 68 MG/DL (ref 0–100)
LDLC/HDLC SERPL: 1.58 {RATIO}
LYMPHOCYTES # BLD AUTO: 2.58 10*3/MM3 (ref 1–3)
LYMPHOCYTES NFR BLD AUTO: 33 % (ref 16–46)
MCH RBC QN AUTO: 29.5 PG (ref 27–33)
MCHC RBC AUTO-ENTMCNC: 31.7 G/DL (ref 33–37)
MCV RBC AUTO: 92.8 FL (ref 80–94)
MONOCYTES # BLD AUTO: 0.72 10*3/MM3 (ref 0.1–0.9)
MONOCYTES NFR BLD AUTO: 9.2 % (ref 0–12)
NEUTROPHILS # BLD AUTO: 4.33 10*3/MM3 (ref 1.4–6.5)
NEUTROPHILS NFR BLD AUTO: 55.2 % (ref 40–75)
OSMOLALITY SERPL CALC.SUM OF ELEC: 287.3 MOSM/KG (ref 273–305)
PLATELET # BLD AUTO: 275 10*3/MM3 (ref 130–400)
PMV BLD AUTO: 11.3 FL (ref 6–10)
POTASSIUM BLD-SCNC: 4.3 MMOL/L (ref 3.5–5.3)
PROT SERPL-MCNC: 7.6 G/DL (ref 6–8)
RBC # BLD AUTO: 4.75 10*6/MM3 (ref 4.2–5.4)
SODIUM BLD-SCNC: 144 MMOL/L (ref 135–153)
T4 FREE SERPL-MCNC: 1.16 NG/DL (ref 0.89–1.76)
TRIGL SERPL-MCNC: 161 MG/DL (ref 0–150)
TSH SERPL DL<=0.05 MIU/L-ACNC: 1.95 MIU/ML (ref 0.55–4.78)
VLDLC SERPL-MCNC: 32.2 MG/DL
WBC NRBC COR # BLD: 7.83 10*3/MM3 (ref 4.5–12.5)

## 2018-09-14 PROCEDURE — 80061 LIPID PANEL: CPT

## 2018-09-14 PROCEDURE — 85025 COMPLETE CBC W/AUTO DIFF WBC: CPT

## 2018-09-14 PROCEDURE — 84443 ASSAY THYROID STIM HORMONE: CPT

## 2018-09-14 PROCEDURE — 84439 ASSAY OF FREE THYROXINE: CPT

## 2018-09-14 PROCEDURE — 82550 ASSAY OF CK (CPK): CPT

## 2018-09-14 PROCEDURE — 80053 COMPREHEN METABOLIC PANEL: CPT

## 2018-09-17 ENCOUNTER — HOSPITAL ENCOUNTER (OUTPATIENT)
Dept: CARDIOLOGY | Facility: HOSPITAL | Age: 78
Discharge: HOME | End: 2018-09-17
Attending: INTERNAL MEDICINE | Admitting: INTERNAL MEDICINE

## 2018-09-17 DIAGNOSIS — R07.9 CHEST PAIN, UNSPECIFIED TYPE: ICD-10-CM

## 2018-09-17 PROCEDURE — 93306 TTE W/DOPPLER COMPLETE: CPT | Performed by: INTERNAL MEDICINE

## 2018-09-17 PROCEDURE — 93306 TTE W/DOPPLER COMPLETE: CPT

## 2018-09-18 ENCOUNTER — TELEPHONE (OUTPATIENT)
Dept: CARDIOLOGY | Facility: CLINIC | Age: 78
End: 2018-09-18

## 2018-09-18 LAB
BH CV ECHO MEAS - ACS: 1.3 CM
BH CV ECHO MEAS - AO MAX PG (FULL): 2.8 MMHG
BH CV ECHO MEAS - AO MAX PG: 6 MMHG
BH CV ECHO MEAS - AO MEAN PG (FULL): 1.7 MMHG
BH CV ECHO MEAS - AO MEAN PG: 3.4 MMHG
BH CV ECHO MEAS - AO ROOT AREA (BSA CORRECTED): 1.5
BH CV ECHO MEAS - AO ROOT AREA: 6.1 CM^2
BH CV ECHO MEAS - AO ROOT DIAM: 2.8 CM
BH CV ECHO MEAS - AO V2 MAX: 122.2 CM/SEC
BH CV ECHO MEAS - AO V2 MEAN: 88.7 CM/SEC
BH CV ECHO MEAS - AO V2 VTI: 27.5 CM
BH CV ECHO MEAS - BSA(HAYCOCK): 2 M^2
BH CV ECHO MEAS - BSA: 1.8 M^2
BH CV ECHO MEAS - BZI_BMI: 39.8 KILOGRAMS/M^2
BH CV ECHO MEAS - BZI_METRIC_HEIGHT: 149.9 CM
BH CV ECHO MEAS - BZI_METRIC_WEIGHT: 89.4 KG
BH CV ECHO MEAS - EDV(CUBED): 108 ML
BH CV ECHO MEAS - EDV(MOD-SP2): 42 ML
BH CV ECHO MEAS - EDV(MOD-SP4): 39 ML
BH CV ECHO MEAS - EDV(TEICH): 105.6 ML
BH CV ECHO MEAS - EF(CUBED): 63.6 %
BH CV ECHO MEAS - EF(MOD-SP2): 59.5 %
BH CV ECHO MEAS - EF(MOD-SP4): 59 %
BH CV ECHO MEAS - EF(TEICH): 55 %
BH CV ECHO MEAS - ESV(CUBED): 39.3 ML
BH CV ECHO MEAS - ESV(MOD-SP2): 17 ML
BH CV ECHO MEAS - ESV(MOD-SP4): 16 ML
BH CV ECHO MEAS - ESV(TEICH): 47.5 ML
BH CV ECHO MEAS - FS: 28.6 %
BH CV ECHO MEAS - IVS/LVPW: 1
BH CV ECHO MEAS - IVSD: 0.82 CM
BH CV ECHO MEAS - LA DIMENSION: 4.1 CM
BH CV ECHO MEAS - LA/AO: 1.5
BH CV ECHO MEAS - LV DIASTOLIC VOL/BSA (35-75): 21.3 ML/M^2
BH CV ECHO MEAS - LV IVRT: 0.1 SEC
BH CV ECHO MEAS - LV MASS(C)D: 129.5 GRAMS
BH CV ECHO MEAS - LV MASS(C)DI: 70.7 GRAMS/M^2
BH CV ECHO MEAS - LV MAX PG: 3.2 MMHG
BH CV ECHO MEAS - LV MEAN PG: 1.6 MMHG
BH CV ECHO MEAS - LV SYSTOLIC VOL/BSA (12-30): 8.7 ML/M^2
BH CV ECHO MEAS - LV V1 MAX: 89.4 CM/SEC
BH CV ECHO MEAS - LV V1 MEAN: 59.6 CM/SEC
BH CV ECHO MEAS - LV V1 VTI: 19.7 CM
BH CV ECHO MEAS - LVIDD: 4.8 CM
BH CV ECHO MEAS - LVIDS: 3.4 CM
BH CV ECHO MEAS - LVLD AP2: 5.8 CM
BH CV ECHO MEAS - LVLD AP4: 5.3 CM
BH CV ECHO MEAS - LVLS AP2: 5.2 CM
BH CV ECHO MEAS - LVLS AP4: 4.4 CM
BH CV ECHO MEAS - LVPWD: 0.82 CM
BH CV ECHO MEAS - MR MAX PG: 54.5 MMHG
BH CV ECHO MEAS - MR MAX VEL: 369.2 CM/SEC
BH CV ECHO MEAS - MV A MAX VEL: 89.6 CM/SEC
BH CV ECHO MEAS - MV E MAX VEL: 54.5 CM/SEC
BH CV ECHO MEAS - MV E/A: 0.61
BH CV ECHO MEAS - PA ACC SLOPE: 494.3 CM/SEC^2
BH CV ECHO MEAS - PA ACC TIME: 0.13 SEC
BH CV ECHO MEAS - PA MAX PG: 5.7 MMHG
BH CV ECHO MEAS - PA MEAN PG: 2.7 MMHG
BH CV ECHO MEAS - PA PR(ACCEL): 18.8 MMHG
BH CV ECHO MEAS - PA V2 MAX: 119.4 CM/SEC
BH CV ECHO MEAS - PA V2 MEAN: 75.3 CM/SEC
BH CV ECHO MEAS - PA V2 VTI: 22.8 CM
BH CV ECHO MEAS - RAP SYSTOLE: 10 MMHG
BH CV ECHO MEAS - RVDD: 2.7 CM
BH CV ECHO MEAS - RVSP: 33.6 MMHG
BH CV ECHO MEAS - SI(AO): 91 ML/M^2
BH CV ECHO MEAS - SI(CUBED): 37.5 ML/M^2
BH CV ECHO MEAS - SI(MOD-SP2): 13.6 ML/M^2
BH CV ECHO MEAS - SI(MOD-SP4): 12.6 ML/M^2
BH CV ECHO MEAS - SI(TEICH): 31.7 ML/M^2
BH CV ECHO MEAS - SV(AO): 166.6 ML
BH CV ECHO MEAS - SV(CUBED): 68.7 ML
BH CV ECHO MEAS - SV(MOD-SP2): 25 ML
BH CV ECHO MEAS - SV(MOD-SP4): 23 ML
BH CV ECHO MEAS - SV(TEICH): 58.1 ML
BH CV ECHO MEAS - TR MAX VEL: 243 CM/SEC
MAXIMAL PREDICTED HEART RATE: 144 BPM
STRESS TARGET HR: 122 BPM

## 2018-09-18 RX ORDER — DILTIAZEM HYDROCHLORIDE 300 MG/1
CAPSULE, EXTENDED RELEASE ORAL
COMMUNITY
Start: 2018-07-18 | End: 2019-02-12 | Stop reason: ALTCHOICE

## 2018-09-18 NOTE — TELEPHONE ENCOUNTER
----- Message from Jennifer Montano MD sent at 9/18/2018 12:30 PM EDT -----  Echocardiogram is good.  Swelling is probably coming from Dilacor.  Keep appointment with me ,She need to see Dr. barkley to see if Dilacor could be changed or if she can be given water pill

## 2018-10-25 ENCOUNTER — CLINICAL SUPPORT (OUTPATIENT)
Dept: CARDIOLOGY | Facility: CLINIC | Age: 78
End: 2018-10-25

## 2018-10-25 DIAGNOSIS — Z23 NEED FOR IMMUNIZATION AGAINST INFLUENZA: ICD-10-CM

## 2018-10-25 PROCEDURE — G0008 ADMIN INFLUENZA VIRUS VAC: HCPCS | Performed by: INTERNAL MEDICINE

## 2018-10-25 PROCEDURE — 90662 IIV NO PRSV INCREASED AG IM: CPT | Performed by: INTERNAL MEDICINE

## 2018-11-13 ENCOUNTER — OFFICE VISIT (OUTPATIENT)
Dept: CARDIOLOGY | Facility: CLINIC | Age: 78
End: 2018-11-13

## 2018-11-13 VITALS
HEIGHT: 59 IN | HEART RATE: 72 BPM | OXYGEN SATURATION: 94 % | SYSTOLIC BLOOD PRESSURE: 122 MMHG | WEIGHT: 195 LBS | BODY MASS INDEX: 39.31 KG/M2 | DIASTOLIC BLOOD PRESSURE: 70 MMHG

## 2018-11-13 DIAGNOSIS — N39.41 URGE INCONTINENCE: ICD-10-CM

## 2018-11-13 DIAGNOSIS — I10 ESSENTIAL HYPERTENSION: ICD-10-CM

## 2018-11-13 DIAGNOSIS — E78.2 MIXED HYPERLIPIDEMIA: Primary | ICD-10-CM

## 2018-11-13 DIAGNOSIS — R60.0 LEG EDEMA: ICD-10-CM

## 2018-11-13 PROCEDURE — 99213 OFFICE O/P EST LOW 20 MIN: CPT | Performed by: INTERNAL MEDICINE

## 2018-11-13 RX ORDER — GABAPENTIN 100 MG/1
100 CAPSULE ORAL 2 TIMES DAILY PRN
Qty: 160 CAPSULE | Refills: 0 | Status: CANCELLED | OUTPATIENT
Start: 2018-11-13

## 2018-11-13 RX ORDER — FUROSEMIDE 20 MG/1
20 TABLET ORAL DAILY
Qty: 90 TABLET | Refills: 0 | Status: SHIPPED | OUTPATIENT
Start: 2018-11-13 | End: 2019-02-12 | Stop reason: ALTCHOICE

## 2018-11-13 RX ORDER — OXYBUTYNIN CHLORIDE 10 MG/1
10 TABLET, EXTENDED RELEASE ORAL DAILY
COMMUNITY
End: 2019-11-01 | Stop reason: SDUPTHER

## 2018-11-13 NOTE — PROGRESS NOTES
subjective     Chief Complaint   Patient presents with   • Hypertension   • Hyperlipidemia   • Results     Labs and ECHO   • Follow-up     History of Present Illness  Patient is 77 years old white female who has history of 4 hypertension which has been difficult to control.  Currently she is taking losartan 100 mg daily and diltiazem  mg daily.  Blood pressure is very well controlled but she complains of significant bilateral lower extremity edema.  She is quite uncomfortable.    This is probably related to Cardizem cd.    She also has hyperlipidemia.  She is taking Lipitor 20 mg daily.  There are no drug side effects.    Is simply her Ditropan was increased to 10 mg daily by different physicians      Past Surgical History:   Procedure Laterality Date   • APPENDECTOMY     • BUNIONECTOMY Right    • CARDIOVASCULAR STRESS TEST  10/2012   • CATARACT EXTRACTION  2017   • CHOLECYSTECTOMY     • COLONOSCOPY  2011   • ECHO - CONVERTED  10/2012   • JOINT REPLACEMENT      bilateral knees    • KNEE ARTHROPLASTY Left    • KNEE ARTHROSCOPY     • SHOULDER SURGERY  05/31/2016    OPEN ACROMICPLASTY RIGHT SHOULDER     Family History   Problem Relation Age of Onset   • Heart disease Other    • Stroke Sister    • Diabetes Mother    • Heart failure Mother    • Heart disease Mother    • Heart attack Mother    • Hypertension Father    • Emphysema Father    • Heart disease Father    • No Known Problems Brother    • Cancer Sister    • Kidney disease Sister    • Heart failure Sister    • Diabetes Brother    • Diabetes Brother    • Diabetes Brother      Past Medical History:   Diagnosis Date   • GERD (gastroesophageal reflux disease)    • Hyperlipidemia    • Hypertension    • Left shoulder pain    • Obesity    • Osteoarthritis of knees, bilateral    • Overactive bladder    • Right shoulder pain      Patient Active Problem List   Diagnosis   • Complete tear of right rotator cuff   • Chronic kidney disease, stage I   • Cough   •  Gastroesophageal reflux disease   • Mixed hyperlipidemia   • Shoulder pain   • Hypertension   • Acromioclavicular joint arthritis   • Impingement syndrome, shoulder   • Complete tear of left rotator cuff   • Urge incontinence   • Atopic rhinitis   • Upper respiratory tract infection   • Leg edema   • Hypercalcemia   • Right knee pain   • DADA (stress urinary incontinence, female)       Social History     Tobacco Use   • Smoking status: Former Smoker     Packs/day: 2.00     Years: 20.00     Pack years: 40.00     Types: Cigarettes     Last attempt to quit:      Years since quittin.8   • Smokeless tobacco: Never Used   Substance Use Topics   • Alcohol use: No     Comment: s/p    • Drug use: No       Allergies   Allergen Reactions   • Codeine Other (See Comments)     hyperventilate   • Sulfa Antibiotics Rash       Current Outpatient Medications on File Prior to Visit   Medication Sig   • aspirin 81 MG tablet Take 81 mg by mouth daily.   • atorvastatin (LIPITOR) 20 MG tablet TAKE ONE TABLET BY MOUTH ONCE DAILY   • Cholecalciferol (VITAMIN D PO) Take 1,000 Units by mouth daily.   • diclofenac (VOLTAREN) 1 % gel gel Apply 4 g topically 4 (Four) Times a Day As Needed (for pain).   • diltiaZEM (TIAZAC) 300 MG 24 hr capsule    • Docusate Calcium (STOOL SOFTENER PO) Take  by mouth daily.   • Fexofenadine HCl (ALLEGRA PO) Take 180 mg by mouth daily as needed.   • gabapentin (NEURONTIN) 100 MG capsule Take 1 capsule by mouth 2 (Two) Times a Day As Needed (PRN).   • losartan (COZAAR) 100 MG tablet Take 1 tablet by mouth Daily.   • omeprazole OTC (PriLOSEC OTC) 20 MG EC tablet Take 20 mg by mouth daily.   • oxybutynin (DITROPAN) 5 MG tablet Take 1 tablet by mouth Daily.   • Probiotic Product (PROBIOTIC COLON SUPPORT PO) Take  by mouth daily.     No current facility-administered medications on file prior to visit.          The following portions of the patient's history were reviewed and updated as appropriate:  "allergies, current medications, past family history, past medical history, past social history, past surgical history and problem list.    Review of Systems   Constitution: Negative.   HENT: Negative.  Negative for congestion.    Eyes: Negative.    Cardiovascular: Positive for leg swelling. Negative for chest pain, cyanosis, dyspnea on exertion, irregular heartbeat, near-syncope, orthopnea, palpitations, paroxysmal nocturnal dyspnea and syncope.   Respiratory: Negative.  Negative for shortness of breath.    Hematologic/Lymphatic: Negative.    Musculoskeletal: Negative.    Gastrointestinal: Negative.    Neurological: Negative.  Negative for headaches.          Objective:     /70 (BP Location: Left arm, Patient Position: Sitting)   Pulse 72   Ht 149.9 cm (59\")   Wt 88.5 kg (195 lb)   SpO2 94%   BMI 39.39 kg/m²   Physical Exam   Constitutional: She appears well-developed and well-nourished. No distress.   HENT:   Head: Normocephalic and atraumatic.   Mouth/Throat: Oropharynx is clear and moist. No oropharyngeal exudate.   Eyes: Conjunctivae and EOM are normal. Pupils are equal, round, and reactive to light. No scleral icterus.   Neck: Normal range of motion. Neck supple. No JVD present. No tracheal deviation present. No thyromegaly present.   Cardiovascular: Normal rate, regular rhythm, normal heart sounds and intact distal pulses. PMI is not displaced. Exam reveals no gallop, no friction rub and no decreased pulses.   No murmur heard.  Pulses:       Carotid pulses are 3+ on the right side, and 3+ on the left side.       Radial pulses are 3+ on the right side, and 3+ on the left side.   Pulmonary/Chest: Effort normal and breath sounds normal. No respiratory distress. She has no wheezes. She has no rales. She exhibits no tenderness.   Abdominal: Soft. Bowel sounds are normal. She exhibits no distension, no abdominal bruit and no mass. There is no splenomegaly or hepatomegaly. There is no tenderness. There is " no rebound and no guarding.   Musculoskeletal: Normal range of motion. She exhibits edema. She exhibits no tenderness or deformity.   Lymphadenopathy:     She has no cervical adenopathy.   Neurological: She is alert. She has normal reflexes. No cranial nerve deficit. She exhibits normal muscle tone. Coordination normal.   Skin: Skin is warm and dry. No rash noted. She is not diaphoretic. No erythema.   Psychiatric: She has a normal mood and affect. Her behavior is normal. Judgment and thought content normal.         Lab Review  Lab Results   Component Value Date     09/14/2018    K 4.3 09/14/2018     09/14/2018    BUN 14 09/14/2018    CREATININE 1.19 09/14/2018    GLUCOSE 99 09/14/2018    CALCIUM 10.1 (H) 09/14/2018    ALT 15 09/14/2018    ALKPHOS 98 09/14/2018    LABIL2 1.6 06/08/2016     Lab Results   Component Value Date    CKTOTAL 72 09/14/2018     Lab Results   Component Value Date    WBC 7.83 09/14/2018    HGB 14.0 09/14/2018    HCT 44.1 09/14/2018     09/14/2018     Lab Results   Component Value Date    INR 1.97 12/11/2014    INR 2.02 12/08/2014    INR 2.46 12/04/2014     No results found for: MG  Lab Results   Component Value Date    TSH 1.950 09/14/2018     No results found for: BNP  Lab Results   Component Value Date    CHLPL 133 06/08/2016    CHOL 143 09/14/2018    TRIG 161 (H) 09/14/2018    HDL 43 (L) 09/14/2018    VLDL 32.2 09/14/2018    LDLHDL 1.58 09/14/2018     Lab Results   Component Value Date    LDL 68 09/14/2018    LDL 74 04/09/2018       Procedures       I personally viewed and interpreted the patient's LAB data         Assessment:     1. Mixed hyperlipidemia    2. Essential hypertension    3. Urge incontinence    4. Leg edema          Plan:      Labs reviewed and discussed with the patient renal functions are normal.  Patient has bilateral lower extremity edema which may be related to Cardizem cd.  Patient was advised to take Lasix 20 mg daily however she will discuss with  Dr. barkley before starting the medication.    Lipids are also normal she will continue Lipitor 20 mg daily  Recently her Ditropan was increased to 10 mg daily        Return in about 3 months (around 2/13/2019).

## 2019-01-07 ENCOUNTER — TRANSCRIBE ORDERS (OUTPATIENT)
Dept: ADMINISTRATIVE | Facility: HOSPITAL | Age: 79
End: 2019-01-07

## 2019-01-07 ENCOUNTER — LAB (OUTPATIENT)
Dept: LAB | Facility: HOSPITAL | Age: 79
End: 2019-01-07
Attending: INTERNAL MEDICINE

## 2019-01-07 DIAGNOSIS — N25.81 SECONDARY HYPERPARATHYROIDISM OF RENAL ORIGIN (HCC): ICD-10-CM

## 2019-01-07 DIAGNOSIS — N18.30 CHRONIC KIDNEY DISEASE, STAGE III (MODERATE) (HCC): Primary | ICD-10-CM

## 2019-01-07 DIAGNOSIS — N18.30 CHRONIC KIDNEY DISEASE, STAGE III (MODERATE) (HCC): ICD-10-CM

## 2019-01-07 LAB
ALBUMIN UR-MCNC: 14.3 MG/L
ANION GAP SERPL CALCULATED.3IONS-SCNC: 8.3 MMOL/L (ref 3.6–11.2)
BUN BLD-MCNC: 18 MG/DL (ref 7–21)
BUN/CREAT SERPL: 15.4 (ref 7–25)
CALCIUM SPEC-SCNC: 10 MG/DL (ref 7.7–10)
CHLORIDE SERPL-SCNC: 108 MMOL/L (ref 99–112)
CO2 SERPL-SCNC: 27.7 MMOL/L (ref 24.3–31.9)
CREAT BLD-MCNC: 1.17 MG/DL (ref 0.43–1.29)
CREAT UR-MCNC: 81.1 MG/DL
GFR SERPL CREATININE-BSD FRML MDRD: 45 ML/MIN/1.73
GLUCOSE BLD-MCNC: 100 MG/DL (ref 70–110)
MICROALBUMIN/CREAT UR: 17.6 MG/G
OSMOLALITY SERPL CALC.SUM OF ELEC: 288.8 MOSM/KG (ref 273–305)
PHOSPHATE SERPL-MCNC: 3.5 MG/DL (ref 2.7–4.5)
POTASSIUM BLD-SCNC: 4.2 MMOL/L (ref 3.5–5.3)
PTH-INTACT SERPL-MCNC: 114.8 PG/ML (ref 14–72)
SODIUM BLD-SCNC: 144 MMOL/L (ref 135–153)

## 2019-01-07 PROCEDURE — 84100 ASSAY OF PHOSPHORUS: CPT

## 2019-01-07 PROCEDURE — 82570 ASSAY OF URINE CREATININE: CPT

## 2019-01-07 PROCEDURE — 83970 ASSAY OF PARATHORMONE: CPT

## 2019-01-07 PROCEDURE — 82043 UR ALBUMIN QUANTITATIVE: CPT

## 2019-01-07 PROCEDURE — 36415 COLL VENOUS BLD VENIPUNCTURE: CPT

## 2019-01-07 PROCEDURE — 80048 BASIC METABOLIC PNL TOTAL CA: CPT

## 2019-02-12 ENCOUNTER — OFFICE VISIT (OUTPATIENT)
Dept: CARDIOLOGY | Facility: CLINIC | Age: 79
End: 2019-02-12

## 2019-02-12 VITALS
HEIGHT: 59 IN | DIASTOLIC BLOOD PRESSURE: 74 MMHG | SYSTOLIC BLOOD PRESSURE: 128 MMHG | BODY MASS INDEX: 39.72 KG/M2 | HEART RATE: 65 BPM | WEIGHT: 197 LBS | OXYGEN SATURATION: 98 %

## 2019-02-12 DIAGNOSIS — I10 ESSENTIAL HYPERTENSION: ICD-10-CM

## 2019-02-12 DIAGNOSIS — N39.41 URGE INCONTINENCE: ICD-10-CM

## 2019-02-12 DIAGNOSIS — K21.9 GASTROESOPHAGEAL REFLUX DISEASE WITHOUT ESOPHAGITIS: ICD-10-CM

## 2019-02-12 DIAGNOSIS — R60.0 LEG EDEMA: ICD-10-CM

## 2019-02-12 DIAGNOSIS — E78.2 MIXED HYPERLIPIDEMIA: Primary | ICD-10-CM

## 2019-02-12 PROCEDURE — 99213 OFFICE O/P EST LOW 20 MIN: CPT | Performed by: INTERNAL MEDICINE

## 2019-02-12 RX ORDER — SPIRONOLACTONE AND HYDROCHLOROTHIAZIDE 25; 25 MG/1; MG/1
TABLET ORAL
COMMUNITY
Start: 2019-01-16 | End: 2020-05-01 | Stop reason: ALTCHOICE

## 2019-02-12 RX ORDER — CARVEDILOL 25 MG/1
25 TABLET ORAL 2 TIMES DAILY WITH MEALS
COMMUNITY
Start: 2019-01-25

## 2019-02-12 NOTE — PROGRESS NOTES
subjective     Chief Complaint   Patient presents with   • Hypertension   • Hyperlipidemia   • Follow-up     History of Present Illness  Patient is 78 years old white female who has history of hypertension, hyperlipidemia.  She states that she is doing very well on current medications.  Blood pressure is very well controlled  Shoulder is better she is getting Voltaren gel from Dr. blackwell    Past Surgical History:   Procedure Laterality Date   • APPENDECTOMY     • BUNIONECTOMY Right    • CARDIOVASCULAR STRESS TEST  10/2012   • CATARACT EXTRACTION  2017   • CHOLECYSTECTOMY     • COLONOSCOPY  2011   • ECHO - CONVERTED  10/2012   • JOINT REPLACEMENT      bilateral knees    • KNEE ARTHROPLASTY Left    • KNEE ARTHROSCOPY     • SHOULDER ARTHROSCOPY Left 7/28/2016    Procedure: LEFT SHOULDER ACROMIOPLASTY,MINI OPEN ROTATOR CUFF REPAIR;  Surgeon: Carlos Eduardo Blackwell MD;  Location: [facility];  Service:    • SHOULDER SURGERY  05/31/2016    OPEN ACROMICPLASTY RIGHT SHOULDER     Family History   Problem Relation Age of Onset   • Heart disease Other    • Stroke Sister    • Diabetes Mother    • Heart failure Mother    • Heart disease Mother    • Heart attack Mother    • Hypertension Father    • Emphysema Father    • Heart disease Father    • No Known Problems Brother    • Cancer Sister    • Kidney disease Sister    • Heart failure Sister    • Diabetes Brother    • Diabetes Brother    • Diabetes Brother      Past Medical History:   Diagnosis Date   • GERD (gastroesophageal reflux disease)    • Hyperlipidemia    • Hypertension    • Left shoulder pain    • Obesity    • Osteoarthritis of knees, bilateral    • Overactive bladder    • Right shoulder pain      Patient Active Problem List   Diagnosis   • Complete tear of right rotator cuff   • Chronic kidney disease, stage I   • Cough   • Gastroesophageal reflux disease   • Mixed hyperlipidemia   • Shoulder pain   • Hypertension   • Acromioclavicular joint arthritis   • Impingement  syndrome, shoulder   • Complete tear of left rotator cuff   • Urge incontinence   • Atopic rhinitis   • Upper respiratory tract infection   • Leg edema   • Hypercalcemia   • Right knee pain   • DADA (stress urinary incontinence, female)       Social History     Tobacco Use   • Smoking status: Former Smoker     Packs/day: 2.00     Years: 20.00     Pack years: 40.00     Types: Cigarettes     Last attempt to quit:      Years since quittin.1   • Smokeless tobacco: Never Used   Substance Use Topics   • Alcohol use: No     Comment: s/p    • Drug use: No       Allergies   Allergen Reactions   • Codeine Other (See Comments)     hyperventilate   • Sulfa Antibiotics Rash       Current Outpatient Medications on File Prior to Visit   Medication Sig   • aspirin 81 MG tablet Take 81 mg by mouth daily.   • atorvastatin (LIPITOR) 20 MG tablet TAKE ONE TABLET BY MOUTH ONCE DAILY   • carvedilol (COREG) 25 MG tablet    • Cholecalciferol (VITAMIN D PO) Take 1,000 Units by mouth daily.   • diclofenac (VOLTAREN) 1 % gel gel Apply 4 g topically 4 (Four) Times a Day As Needed (for pain).   • Docusate Calcium (STOOL SOFTENER PO) Take  by mouth daily.   • Fexofenadine HCl (ALLEGRA PO) Take 180 mg by mouth daily as needed.   • gabapentin (NEURONTIN) 100 MG capsule Take 1 capsule by mouth 2 (Two) Times a Day As Needed (PRN).   • losartan (COZAAR) 100 MG tablet Take 1 tablet by mouth Daily.   • omeprazole OTC (PriLOSEC OTC) 20 MG EC tablet Take 20 mg by mouth daily.   • oxybutynin XL (DITROPAN-XL) 10 MG 24 hr tablet Take 10 mg by mouth Daily.   • Probiotic Product (PROBIOTIC COLON SUPPORT PO) Take  by mouth daily.   • spironolactone-hydrochlorothiazide (ALDACTAZIDE) 25-25 MG tablet      No current facility-administered medications on file prior to visit.          The following portions of the patient's history were reviewed and updated as appropriate: allergies, current medications, past family history, past medical history, past  "social history, past surgical history and problem list.    Review of Systems   Constitution: Negative.   HENT: Negative.  Negative for congestion.    Eyes: Negative.    Cardiovascular: Negative.  Negative for chest pain, cyanosis, dyspnea on exertion, irregular heartbeat, leg swelling, near-syncope, orthopnea, palpitations, paroxysmal nocturnal dyspnea and syncope.   Respiratory: Negative.  Negative for shortness of breath.    Hematologic/Lymphatic: Negative.    Musculoskeletal: Negative.    Gastrointestinal: Negative.    Neurological: Negative.  Negative for headaches.          Objective:     /74 (BP Location: Left arm, Patient Position: Sitting)   Pulse 65   Ht 149.9 cm (59\")   Wt 89.4 kg (197 lb)   SpO2 98%   BMI 39.79 kg/m²   Physical Exam   Constitutional: She appears well-developed and well-nourished. No distress.   HENT:   Head: Normocephalic and atraumatic.   Mouth/Throat: Oropharynx is clear and moist. No oropharyngeal exudate.   Eyes: Conjunctivae and EOM are normal. Pupils are equal, round, and reactive to light. No scleral icterus.   Neck: Normal range of motion. Neck supple. No JVD present. No tracheal deviation present. No thyromegaly present.   Cardiovascular: Normal rate, regular rhythm, normal heart sounds and intact distal pulses. PMI is not displaced. Exam reveals no gallop, no friction rub and no decreased pulses.   No murmur heard.  Pulses:       Carotid pulses are 3+ on the right side, and 3+ on the left side.       Radial pulses are 3+ on the right side, and 3+ on the left side.   Pulmonary/Chest: Effort normal and breath sounds normal. No respiratory distress. She has no wheezes. She has no rales. She exhibits no tenderness.   Abdominal: Soft. Bowel sounds are normal. She exhibits no distension, no abdominal bruit and no mass. There is no splenomegaly or hepatomegaly. There is no tenderness. There is no rebound and no guarding.   Musculoskeletal: Normal range of motion. She " exhibits no edema, tenderness or deformity.   Lymphadenopathy:     She has no cervical adenopathy.   Neurological: She is alert. She has normal reflexes. No cranial nerve deficit. She exhibits normal muscle tone. Coordination normal.   Skin: Skin is warm and dry. No rash noted. She is not diaphoretic. No erythema.   Psychiatric: She has a normal mood and affect. Her behavior is normal. Judgment and thought content normal.         Lab Review  Lab Results   Component Value Date     01/07/2019    K 4.2 01/07/2019     01/07/2019    BUN 18 01/07/2019    CREATININE 1.17 01/07/2019    GLUCOSE 100 01/07/2019    CALCIUM 10.0 01/07/2019    ALT 15 09/14/2018    ALKPHOS 98 09/14/2018    LABIL2 1.6 06/08/2016     Lab Results   Component Value Date    CKTOTAL 72 09/14/2018     Lab Results   Component Value Date    WBC 7.83 09/14/2018    HGB 14.0 09/14/2018    HCT 44.1 09/14/2018     09/14/2018     Lab Results   Component Value Date    INR 1.97 12/11/2014    INR 2.02 12/08/2014    INR 2.46 12/04/2014     No results found for: MG  Lab Results   Component Value Date    TSH 1.950 09/14/2018     No results found for: BNP  Lab Results   Component Value Date    CHLPL 133 06/08/2016    CHOL 143 09/14/2018    TRIG 161 (H) 09/14/2018    HDL 43 (L) 09/14/2018    VLDL 32.2 09/14/2018    LDLHDL 1.58 09/14/2018     Lab Results   Component Value Date    LDL 68 09/14/2018    LDL 74 04/09/2018       Procedures       I personally viewed and interpreted the patient's LAB data         Assessment:     1. Mixed hyperlipidemia    2. Essential hypertension    3. Leg edema    4. Urge incontinence    5. Gastroesophageal reflux disease without esophagitis          Plan:      Labs reviewed and discussed with the patient  She needs to have a lipid panel done which was arranged.  Blood pressure is very well controlled no change in therapy.  Bladder control is better  Shoulder pain is better on Voltaren gel  No change in therapy was made  follow-up scheduled refills given        No Follow-up on file.

## 2019-02-18 ENCOUNTER — LAB (OUTPATIENT)
Dept: LAB | Facility: HOSPITAL | Age: 79
End: 2019-02-18

## 2019-02-18 ENCOUNTER — TRANSCRIBE ORDERS (OUTPATIENT)
Dept: GENERAL RADIOLOGY | Facility: HOSPITAL | Age: 79
End: 2019-02-18

## 2019-02-18 DIAGNOSIS — R60.0 EDEMA EXTREMITIES: ICD-10-CM

## 2019-02-18 DIAGNOSIS — R60.0 EDEMA EXTREMITIES: Primary | ICD-10-CM

## 2019-02-18 LAB
ANION GAP SERPL CALCULATED.3IONS-SCNC: 8 MMOL/L (ref 3.6–11.2)
BUN BLD-MCNC: 23 MG/DL (ref 7–21)
BUN/CREAT SERPL: 19 (ref 7–25)
CALCIUM SPEC-SCNC: 9.9 MG/DL (ref 7.7–10)
CHLORIDE SERPL-SCNC: 108 MMOL/L (ref 99–112)
CO2 SERPL-SCNC: 28 MMOL/L (ref 24.3–31.9)
CREAT BLD-MCNC: 1.21 MG/DL (ref 0.43–1.29)
GFR SERPL CREATININE-BSD FRML MDRD: 43 ML/MIN/1.73
GLUCOSE BLD-MCNC: 102 MG/DL (ref 70–110)
OSMOLALITY SERPL CALC.SUM OF ELEC: 290.7 MOSM/KG (ref 273–305)
POTASSIUM BLD-SCNC: 4.3 MMOL/L (ref 3.5–5.3)
SODIUM BLD-SCNC: 144 MMOL/L (ref 135–153)

## 2019-02-18 PROCEDURE — 36415 COLL VENOUS BLD VENIPUNCTURE: CPT

## 2019-02-18 PROCEDURE — 80048 BASIC METABOLIC PNL TOTAL CA: CPT

## 2019-03-08 RX ORDER — ATORVASTATIN CALCIUM 20 MG/1
TABLET, FILM COATED ORAL
Qty: 90 TABLET | Refills: 3 | Status: SHIPPED | OUTPATIENT
Start: 2019-03-08 | End: 2020-03-16

## 2019-03-27 RX ORDER — GABAPENTIN 100 MG/1
CAPSULE ORAL
Qty: 160 CAPSULE | Refills: 2 | OUTPATIENT
Start: 2019-03-27

## 2019-03-28 ENCOUNTER — OFFICE VISIT (OUTPATIENT)
Dept: CARDIOLOGY | Facility: CLINIC | Age: 79
End: 2019-03-28

## 2019-03-28 VITALS
WEIGHT: 197 LBS | DIASTOLIC BLOOD PRESSURE: 68 MMHG | SYSTOLIC BLOOD PRESSURE: 122 MMHG | HEART RATE: 56 BPM | BODY MASS INDEX: 39.72 KG/M2 | HEIGHT: 59 IN | OXYGEN SATURATION: 94 %

## 2019-03-28 DIAGNOSIS — E78.2 MIXED HYPERLIPIDEMIA: ICD-10-CM

## 2019-03-28 DIAGNOSIS — M75.42 IMPINGEMENT SYNDROME OF LEFT SHOULDER: ICD-10-CM

## 2019-03-28 DIAGNOSIS — M25.561 RIGHT KNEE PAIN, UNSPECIFIED CHRONICITY: ICD-10-CM

## 2019-03-28 DIAGNOSIS — I10 ESSENTIAL HYPERTENSION: Primary | ICD-10-CM

## 2019-03-28 PROCEDURE — 99213 OFFICE O/P EST LOW 20 MIN: CPT | Performed by: INTERNAL MEDICINE

## 2019-03-28 RX ORDER — GABAPENTIN 100 MG/1
100 CAPSULE ORAL 2 TIMES DAILY PRN
Qty: 86 CAPSULE | Refills: 0 | Status: SHIPPED | OUTPATIENT
Start: 2019-03-28 | End: 2019-05-10 | Stop reason: SDUPTHER

## 2019-03-28 NOTE — PROGRESS NOTES
subjective     Chief Complaint   Patient presents with   • Hyperlipidemia   • Follow-up     History of Present Illness  Patient is 78 years old white female who has arthritis shoulder impingement syndrome and myalgias.  She has been taking Neurontin and needs refills.    She also has hyperlipidemia on Lipitor 20 mg daily without any drug side effects.  Blood pressure is very well controlled she is taking losartan 100 mg daily and Coreg.  Overall she seems to be doing fairly well without any new symptoms.    Past Surgical History:   Procedure Laterality Date   • APPENDECTOMY     • BUNIONECTOMY Right    • CARDIOVASCULAR STRESS TEST  10/2012   • CATARACT EXTRACTION  2017   • CHOLECYSTECTOMY     • COLONOSCOPY  2011   • ECHO - CONVERTED  10/2012   • JOINT REPLACEMENT      bilateral knees    • KNEE ARTHROPLASTY Left    • KNEE ARTHROSCOPY     • SHOULDER ARTHROSCOPY Left 7/28/2016    Procedure: LEFT SHOULDER ACROMIOPLASTY,MINI OPEN ROTATOR CUFF REPAIR;  Surgeon: Carlos Eduardo Smith MD;  Location: [facility];  Service:    • SHOULDER SURGERY  05/31/2016    OPEN ACROMICPLASTY RIGHT SHOULDER     Family History   Problem Relation Age of Onset   • Heart disease Other    • Stroke Sister    • Diabetes Mother    • Heart failure Mother    • Heart disease Mother    • Heart attack Mother    • Hypertension Father    • Emphysema Father    • Heart disease Father    • No Known Problems Brother    • Cancer Sister    • Kidney disease Sister    • Heart failure Sister    • Diabetes Brother    • Diabetes Brother    • Diabetes Brother      Past Medical History:   Diagnosis Date   • GERD (gastroesophageal reflux disease)    • Hyperlipidemia    • Hypertension    • Left shoulder pain    • Obesity    • Osteoarthritis of knees, bilateral    • Overactive bladder    • Right shoulder pain      Patient Active Problem List   Diagnosis   • Complete tear of right rotator cuff   • Chronic kidney disease, stage I   • Cough   • Gastroesophageal reflux  disease   • Mixed hyperlipidemia   • Shoulder pain   • Hypertension   • Acromioclavicular joint arthritis   • Impingement syndrome, shoulder   • Complete tear of left rotator cuff   • Urge incontinence   • Atopic rhinitis   • Upper respiratory tract infection   • Leg edema   • Hypercalcemia   • Right knee pain   • DADA (stress urinary incontinence, female)       Social History     Tobacco Use   • Smoking status: Former Smoker     Packs/day: 2.00     Years: 20.00     Pack years: 40.00     Types: Cigarettes     Last attempt to quit:      Years since quittin.2   • Smokeless tobacco: Never Used   Substance Use Topics   • Alcohol use: No     Comment: s/p    • Drug use: No       Allergies   Allergen Reactions   • Codeine Other (See Comments)     hyperventilate   • Sulfa Antibiotics Rash       Current Outpatient Medications on File Prior to Visit   Medication Sig   • aspirin 81 MG tablet Take 81 mg by mouth daily.   • atorvastatin (LIPITOR) 20 MG tablet TAKE ONE TABLET BY MOUTH ONCE DAILY   • carvedilol (COREG) 25 MG tablet    • Cholecalciferol (VITAMIN D PO) Take 1,000 Units by mouth daily.   • diclofenac (VOLTAREN) 1 % gel gel Apply 4 g topically 4 (Four) Times a Day As Needed (for pain).   • Docusate Calcium (STOOL SOFTENER PO) Take  by mouth daily.   • Fexofenadine HCl (ALLEGRA PO) Take 180 mg by mouth daily as needed.   • losartan (COZAAR) 100 MG tablet Take 1 tablet by mouth Daily.   • omeprazole OTC (PriLOSEC OTC) 20 MG EC tablet Take 20 mg by mouth daily.   • oxybutynin XL (DITROPAN-XL) 10 MG 24 hr tablet Take 10 mg by mouth Daily.   • Probiotic Product (PROBIOTIC COLON SUPPORT PO) Take  by mouth daily.   • spironolactone-hydrochlorothiazide (ALDACTAZIDE) 25-25 MG tablet    • [DISCONTINUED] gabapentin (NEURONTIN) 100 MG capsule Take 1 capsule by mouth 2 (Two) Times a Day As Needed (PRN).     No current facility-administered medications on file prior to visit.          The following portions of the  "patient's history were reviewed and updated as appropriate: allergies, current medications, past family history, past medical history, past social history, past surgical history and problem list.    Review of Systems   Constitution: Negative.   HENT: Negative.  Negative for congestion.    Eyes: Negative.    Cardiovascular: Negative.  Negative for chest pain, cyanosis, dyspnea on exertion, irregular heartbeat, leg swelling, near-syncope, orthopnea, palpitations, paroxysmal nocturnal dyspnea and syncope.   Respiratory: Negative.  Negative for shortness of breath.    Hematologic/Lymphatic: Negative.    Musculoskeletal: Positive for arthritis, myalgias and stiffness.   Gastrointestinal: Negative.    Neurological: Negative.  Negative for headaches.          Objective:     /68 (BP Location: Left arm, Patient Position: Sitting)   Pulse 56   Ht 149.9 cm (59\")   Wt 89.4 kg (197 lb)   SpO2 94%   BMI 39.79 kg/m²   Physical Exam   Constitutional: She appears well-developed and well-nourished. No distress.   HENT:   Head: Normocephalic and atraumatic.   Mouth/Throat: Oropharynx is clear and moist. No oropharyngeal exudate.   Eyes: Conjunctivae and EOM are normal. Pupils are equal, round, and reactive to light. No scleral icterus.   Neck: Normal range of motion. Neck supple. No JVD present. No tracheal deviation present. No thyromegaly present.   Cardiovascular: Normal rate, regular rhythm, normal heart sounds and intact distal pulses. PMI is not displaced. Exam reveals no gallop, no friction rub and no decreased pulses.   No murmur heard.  Pulses:       Carotid pulses are 3+ on the right side, and 3+ on the left side.       Radial pulses are 3+ on the right side, and 3+ on the left side.   Pulmonary/Chest: Effort normal and breath sounds normal. No respiratory distress. She has no wheezes. She has no rales. She exhibits no tenderness.   Abdominal: Soft. Bowel sounds are normal. She exhibits no distension, no abdominal " bruit and no mass. There is no splenomegaly or hepatomegaly. There is no tenderness. There is no rebound and no guarding.   Musculoskeletal: Normal range of motion. She exhibits no edema, tenderness or deformity.   Lymphadenopathy:     She has no cervical adenopathy.   Neurological: She is alert. She has normal reflexes. No cranial nerve deficit. She exhibits normal muscle tone. Coordination normal.   Skin: Skin is warm and dry. No rash noted. She is not diaphoretic. No erythema.   Psychiatric: She has a normal mood and affect. Her behavior is normal. Judgment and thought content normal.         Lab Review  Lab Results   Component Value Date     02/18/2019    K 4.3 02/18/2019     02/18/2019    BUN 23 (H) 02/18/2019    CREATININE 1.21 02/18/2019    GLUCOSE 102 02/18/2019    CALCIUM 9.9 02/18/2019    ALT 15 09/14/2018    ALKPHOS 98 09/14/2018    LABIL2 1.6 06/08/2016     Lab Results   Component Value Date    CKTOTAL 72 09/14/2018     Lab Results   Component Value Date    WBC 7.83 09/14/2018    HGB 14.0 09/14/2018    HCT 44.1 09/14/2018     09/14/2018     Lab Results   Component Value Date    INR 1.97 12/11/2014    INR 2.02 12/08/2014    INR 2.46 12/04/2014     No results found for: MG  Lab Results   Component Value Date    TSH 1.950 09/14/2018     No results found for: BNP  Lab Results   Component Value Date    CHLPL 133 06/08/2016    CHOL 143 09/14/2018    TRIG 161 (H) 09/14/2018    HDL 43 (L) 09/14/2018    VLDL 32.2 09/14/2018    LDLHDL 1.58 09/14/2018     Lab Results   Component Value Date    LDL 68 09/14/2018    LDL 74 04/09/2018       Procedures       I personally viewed and interpreted the patient's LAB data         Assessment:     1. Essential hypertension    2. Mixed hyperlipidemia    3. Impingement syndrome of left shoulder    4. Right knee pain, unspecified chronicity          Plan:     Patient will continue current medications.  She has lab work scheduled for next visit.  Refills of  medication were given.  Healthy lifestyle emphasized.  No change in therapy this visit.        No Follow-up on file.

## 2019-04-16 ENCOUNTER — OFFICE VISIT (OUTPATIENT)
Dept: CARDIOLOGY | Facility: CLINIC | Age: 79
End: 2019-04-16

## 2019-04-16 VITALS
WEIGHT: 200 LBS | BODY MASS INDEX: 40.32 KG/M2 | SYSTOLIC BLOOD PRESSURE: 134 MMHG | DIASTOLIC BLOOD PRESSURE: 76 MMHG | OXYGEN SATURATION: 96 % | HEART RATE: 63 BPM | HEIGHT: 59 IN

## 2019-04-16 DIAGNOSIS — Z12.11 COLON CANCER SCREENING: Primary | ICD-10-CM

## 2019-04-16 DIAGNOSIS — N39.3 SUI (STRESS URINARY INCONTINENCE, FEMALE): ICD-10-CM

## 2019-04-16 DIAGNOSIS — E78.2 MIXED HYPERLIPIDEMIA: ICD-10-CM

## 2019-04-16 DIAGNOSIS — K21.9 GASTROESOPHAGEAL REFLUX DISEASE WITHOUT ESOPHAGITIS: ICD-10-CM

## 2019-04-16 DIAGNOSIS — I10 ESSENTIAL HYPERTENSION: ICD-10-CM

## 2019-04-16 DIAGNOSIS — Z00.00 ROUTINE GENERAL MEDICAL EXAMINATION AT HEALTH CARE FACILITY: ICD-10-CM

## 2019-04-16 PROCEDURE — G0439 PPPS, SUBSEQ VISIT: HCPCS | Performed by: INTERNAL MEDICINE

## 2019-04-16 NOTE — PATIENT INSTRUCTIONS
Breast Self-Awareness  Breast self-awareness means being familiar with how your breasts look and feel. It involves checking your breasts regularly and reporting any changes to your health care provider.  Practicing breast self-awareness is important. A change in your breasts can be a sign of a serious medical problem. Being familiar with how your breasts look and feel allows you to find any problems early, when treatment is more likely to be successful. All women should practice breast self-awareness, including women who have had breast implants.  How to do a breast self-exam  One way to learn what is normal for your breasts and whether your breasts are changing is to do a breast self-exam. To do a breast self-exam:  Look for Changes    1. Remove all the clothing above your waist.  2.  front of a mirror in a room with good lighting.  3. Put your hands on your hips.  4. Push your hands firmly downward.  5. Compare your breasts in the mirror. Look for differences between them (asymmetry), such as:  ? Differences in shape.  ? Differences in size.  ? Puckers, dips, and bumps in one breast and not the other.  6. Look at each breast for changes in your skin, such as:  ? Redness.  ? Scaly areas.  7. Look for changes in your nipples, such as:  ? Discharge.  ? Bleeding.  ? Dimpling.  ? Redness.  ? A change in position.  Feel for Changes    Carefully feel your breasts for lumps and changes. It is best to do this while lying on your back on the floor and again while sitting or standing in the shower or tub with soapy water on your skin. Feel each breast in the following way:  · Place the arm on the side of the breast you are examining above your head.  · Feel your breast with the other hand.  · Start in the nipple area and make ¾ inch (2 cm) overlapping circles to feel your breast. Use the pads of your three middle fingers to do this. Apply light pressure, then medium pressure, then firm pressure. The light  pressure will allow you to feel the tissue closest to the skin. The medium pressure will allow you to feel the tissue that is a little deeper. The firm pressure will allow you to feel the tissue close to the ribs.  · Continue the overlapping circles, moving downward over the breast until you feel your ribs below your breast.  · Move one finger-width toward the center of the body. Continue to use the ¾ inch (2 cm) overlapping circles to feel your breast as you move slowly up toward your collarbone.  · Continue the up and down exam using all three pressures until you reach your armpit.    Write Down What You Find    Write down what is normal for each breast and any changes that you find. Keep a written record with breast changes or normal findings for each breast. By writing this information down, you do not need to depend only on memory for size, tenderness, or location. Write down where you are in your menstrual cycle, if you are still menstruating.  If you are having trouble noticing differences in your breasts, do not get discouraged. With time you will become more familiar with the variations in your breasts and more comfortable with the exam.  How often should I examine my breasts?  Examine your breasts every month. If you are breastfeeding, the best time to examine your breasts is after a feeding or after using a breast pump. If you menstruate, the best time to examine your breasts is 5-7 days after your period is over. During your period, your breasts are lumpier, and it may be more difficult to notice changes.  When should I see my health care provider?  See your health care provider if you notice:  · A change in shape or size of your breasts or nipples.  · A change in the skin of your breast or nipples, such as a reddened or scaly area.  · Unusual discharge from your nipples.  · A lump or thick area that was not there before.  · Pain in your breasts.  · Anything that concerns you.    This information is not  intended to replace advice given to you by your health care provider. Make sure you discuss any questions you have with your health care provider.  Document Released: 12/18/2006 Document Revised: 05/25/2017 Document Reviewed: 11/06/2016  [employer] Interactive Patient Education © 2019 [employer]      Exercising to Lose Weight  Exercising can help you to lose weight. In order to lose weight through exercise, you need to do vigorous-intensity exercise. You can tell that you are exercising with vigorous intensity if you are breathing very hard and fast and cannot hold a conversation while exercising.  Moderate-intensity exercise helps to maintain your current weight. You can tell that you are exercising at a moderate level if you have a higher heart rate and faster breathing, but you are still able to hold a conversation.  How often should I exercise?  Choose an activity that you enjoy and set realistic goals. Your health care provider can help you to make an activity plan that works for you. Exercise regularly as directed by your health care provider. This may include:  · Doing resistance training twice each week, such as:  ? Push-ups.  ? Sit-ups.  ? Lifting weights.  ? Using resistance bands.  · Doing a given intensity of exercise for a given amount of time. Choose from these options:  ? 150 minutes of moderate-intensity exercise every week.  ? 75 minutes of vigorous-intensity exercise every week.  ? A mix of moderate-intensity and vigorous-intensity exercise every week.    Children, pregnant women, people who are out of shape, people who are overweight, and older adults may need to consult a health care provider for individual recommendations. If you have any sort of medical condition, be sure to consult your health care provider before starting a new exercise program.  What are some activities that can help me to lose weight?  · Walking at a rate of at least 4.5 miles an hour.  · Jogging or running at a rate of 5  miles per hour.  · Biking at a rate of at least 10 miles per hour.  · Lap swimming.  · Roller-skating or in-line skating.  · Cross-country skiing.  · Vigorous competitive sports, such as football, basketball, and soccer.  · Jumping rope.  · Aerobic dancing.  How can I be more active in my day-to-day activities?  · Use the stairs instead of the elevator.  · Take a walk during your lunch break.  · If you drive, park your car farther away from work or school.  · If you take public transportation, get off one stop early and walk the rest of the way.  · Make all of your phone calls while standing up and walking around.  · Get up, stretch, and walk around every 30 minutes throughout the day.  What guidelines should I follow while exercising?  · Do not exercise so much that you hurt yourself, feel dizzy, or get very short of breath.  · Consult your health care provider prior to starting a new exercise program.  · Wear comfortable clothes and shoes with good support.  · Drink plenty of water while you exercise to prevent dehydration or heat stroke. Body water is lost during exercise and must be replaced.  · Work out until you breathe faster and your heart beats faster.  This information is not intended to replace advice given to you by your health care provider. Make sure you discuss any questions you have with your health care provider.  Document Released: 01/20/2012 Document Revised: 05/25/2017 Document Reviewed: 05/21/2015  [employer] Interactive Patient Education © 2019 [employer]      Fall Prevention in the Home, Adult  Falls can cause injuries and can affect people from all age groups. There are many simple things that you can do to make your home safe and to help prevent falls. Ask for help when making these changes, if needed.  What actions can I take to prevent falls?  General instructions  · Use good lighting in all rooms. Replace any light bulbs that burn out.  · Turn on lights if it is dark. Use  night-lights.  · Place frequently used items in easy-to-reach places. Lower the shelves around your home if necessary.  · Set up furniture so that there are clear paths around it. Avoid moving your furniture around.  · Remove throw rugs and other tripping hazards from the floor.  · Avoid walking on wet floors.  · Fix any uneven floor surfaces.  · Add color or contrast paint or tape to grab bars and handrails in your home. Place contrasting color strips on the first and last steps of stairways.  · When you use a stepladder, make sure that it is completely opened and that the sides are firmly locked. Have someone hold the ladder while you are using it. Do not climb a closed stepladder.  · Be aware of any and all pets.  What can I do in the bathroom?  · Keep the floor dry. Immediately clean up any water that spills onto the floor.  · Remove soap buildup in the tub or shower on a regular basis.  · Use non-skid mats or decals on the floor of the tub or shower.  · Attach bath mats securely with double-sided, non-slip rug tape.  · If you need to sit down while you are in the shower, use a plastic, non-slip stool.  · Install grab bars by the toilet and in the tub and shower. Do not use towel bars as grab bars.  What can I do in the bedroom?  · Make sure that a bedside light is easy to reach.  · Do not use oversized bedding that drapes onto the floor.  · Have a firm chair that has side arms to use for getting dressed.  What can I do in the kitchen?  · Clean up any spills right away.  · If you need to reach for something above you, use a sturdy step stool that has a grab bar.  · Keep electrical cables out of the way.  · Do not use floor polish or wax that makes floors slippery. If you must use wax, make sure that it is non-skid floor wax.  What can I do in the stairways?  · Do not leave any items on the stairs.  · Make sure that you have a light switch at the top of the stairs and the bottom of the stairs. Have them installed  if you do not have them.  · Make sure that there are handrails on both sides of the stairs. Fix handrails that are broken or loose. Make sure that handrails are as long as the stairways.  · Install non-slip stair treads on all stairs in your home.  · Avoid having throw rugs at the top or bottom of stairways, or secure the rugs with carpet tape to prevent them from moving.  · Choose a carpet design that does not hide the edge of steps on the stairway.  · Check any carpeting to make sure that it is firmly attached to the stairs. Fix any carpet that is loose or worn.  What can I do on the outside of my home?  · Use bright outdoor lighting.  · Regularly repair the edges of walkways and driveways and fix any cracks.  · Remove high doorway thresholds.  · Trim any shrubbery on the main path into your home.  · Regularly check that handrails are securely fastened and in good repair. Both sides of any steps should have handrails.  · Install guardrails along the edges of any raised decks or porches.  · Clear walkways of debris and clutter, including tools and rocks.  · Have leaves, snow, and ice cleared regularly.  · Use sand or salt on walkways during winter months.  · In the garage, clean up any spills right away, including grease or oil spills.  What other actions can I take?  · Wear closed-toe shoes that fit well and support your feet. Wear shoes that have rubber soles or low heels.  · Use mobility aids as needed, such as canes, walkers, scooters, and crutches.  · Review your medicines with your health care provider. Some medicines can cause dizziness or changes in blood pressure, which increase your risk of falling.  Talk with your health care provider about other ways that you can decrease your risk of falls. This may include working with a physical therapist or  to improve your strength, balance, and endurance.  Where to find more information  · Centers for Disease Control and PreventionRAFAT:  https://www.cdc.gov  · National [place] [employer] Aging: https://bg9xuum.flory.nih.gov  Contact a health care provider if:  · You are afraid of falling at home.  · You feel weak, drowsy, or dizzy at home.  · You fall at home.  Summary  · There are many simple things that you can do to make your home safe and to help prevent falls.  · Ways to make your home safe include removing tripping hazards and installing grab bars in the bathroom.  · Ask for help when making these changes in your home.  This information is not intended to replace advice given to you by your health care provider. Make sure you discuss any questions you have with your health care provider.  Document Released: 12/08/2003 Document Revised: 08/02/2018 Document Reviewed: 08/02/2018  [employer] Patient Education © 2019 [employer]    Heart Disease Prevention  Heart disease is a leading cause of death. There are many things you can do to help prevent heart disease.  Be physically active  Physical activity is good for your heart. It helps control your blood pressure, cholesterol levels, and weight. Try to be physically active every day. Ask your health care provider what activities are best for you.  Be a healthy weight  Extra weight can strain your heart and affect your blood pressure and cholesterol levels. Lose weight with diet and exercise if recommended by your health care provider.  Eat heart-healthy foods  Follow a healthy eating plan as recommended by your health care provider or dietitian. Heart-healthy foods include:  · High-fiber foods. These include oat bran, oatmeal, and whole-grain breads and cereals.  · Fruits and vegetables.    Avoid:  · Alcohol.  · Fried foods.  · Foods high in saturated fat. These include meats, butter, whole dairy products, shortening, and coconut or palm oil.  · Salty foods. These include canned food, luncheon meat, salty snacks, and fast food.    Keep your cholesterol levels under control  Cholesterol is a  substance that is used for many important functions. When your cholesterol levels are high, cholesterol can stick to the insides of your blood vessels, making them narrow or clog. This can lead to chest pain (angina) and a heart attack.  Keep your cholesterol levels under control as recommended by your health care provider. Have your cholesterol checked at least once a year. Target cholesterol levels (in mg/dL) for most people are:  · Total cholesterol below 200.  · LDL cholesterol below 100.  · HDL cholesterol above 40 in men and above 50 in women.  · Triglycerides below 150.    Keep your blood pressure under control  Having high blood pressure (hypertension) puts you at risk for stroke and other forms of heart disease. Keep your blood pressure under control as recommended by your health care provider. Ask your health care provider if you need treatment to lower your blood pressure. If you are 18-39 years of age, have your blood pressure checked every 3-5 years. If you are 40 years of age or older, have your blood pressure checked every year.  Do not use tobacco products  Tobacco smoke can damage your heart and blood vessels. Do not use any tobacco products including cigarettes, chewing tobacco, or electronic cigarettes. If you need help quitting, ask your health care provider.  Take medicines as directed  Take medicines only as directed by your health care provider. Ask your health care provider whether you should take an aspirin every day. Taking aspirin can help reduce your risk of heart disease and stroke.  Where to find more information:  To find out more about heart disease, visit the American Heart Association's website at www.americanheart.org  This information is not intended to replace advice given to you by your health care provider. Make sure you discuss any questions you have with your health care provider.  Document Released: 08/01/2005 Document Revised: 05/17/2017 Document Reviewed:  02/11/2015  [employer] Interactive Patient Education © 2019 [employer]

## 2019-04-16 NOTE — PROGRESS NOTES
Subsequent Medicare Wellness Visit   The ABC's of the Annual Wellness Visit    Chief Complaint   Patient presents with   • Medicare Wellness-Initial Visit       HPI:  Emma Ryan, -1940, is a 78 y.o. female who presents for a Subsequent Medicare Wellness Visit.    Recent Hospitalizations:  No hospitalization(s) within the last year..    Current Medical Providers:  Patient Care Team:  Jennifer Montano MD as PCP - General  Jennifer Montano MD as PCP - Family Medicine  Jennifer Montano MD as PCP - Claims Attributed    Health Habits and Functional and Cognitive Screening and Depression Screening:  Functional & Cognitive Status 2019   Do you have difficulty preparing food and eating? No   Do you have difficulty bathing yourself, getting dressed or grooming yourself? No   Do you have difficulty using the toilet? No   Do you have difficulty moving around from place to place? No   Do you have trouble with steps or getting out of a bed or a chair? Yes   In the past year have you fallen or experienced a near fall? Yes   Current Diet Well Balanced Diet   Dental Exam Not up to date   Eye Exam Up to date   Exercise (times per week) 0 times per week   Current Exercise Activities Include None   Do you need help using the phone?  No   Are you deaf or do you have serious difficulty hearing?  No   Do you need help with transportation? No   Do you need help shopping? No   Do you need help preparing meals?  No   Do you need help with housework?  No   Do you need help with laundry? No   Do you need help taking your medications? No   Do you need help managing money? No   Do you ever drive or ride in a car without wearing a seat belt? No   Have you felt unusual stress, anger or loneliness in the last month? No   Who do you live with? Spouse   If you need help, do you have trouble finding someone available to you? No   Have you been bothered in the last four weeks by sexual problems? No   Do you have  difficulty concentrating, remembering or making decisions? No       Compared to one year ago, the patient feels her physical health is the same and her mental health is the same.    Depression Screen:  PHQ-2/PHQ-9 Depression Screening 4/16/2019   Little interest or pleasure in doing things 0   Feeling down, depressed, or hopeless 0   Trouble falling or staying asleep, or sleeping too much 0   Feeling tired or having little energy 0   Poor appetite or overeating 0   Feeling bad about yourself - or that you are a failure or have let yourself or your family down 0   Trouble concentrating on things, such as reading the newspaper or watching television 0   Moving or speaking so slowly that other people could have noticed. Or the opposite - being so fidgety or restless that you have been moving around a lot more than usual 0   Thoughts that you would be better off dead, or of hurting yourself in some way 0   Total Score 0   If you checked off any problems, how difficult have these problems made it for you to do your work, take care of things at home, or get along with other people? Not difficult at all         Past Medical/Family/Social History:  The following portions of the patient's history were reviewed and updated as appropriate: past family history, past medical history, past social history, past surgical history and problem list.    Allergies   Allergen Reactions   • Codeine Other (See Comments)     hyperventilate   • Sulfa Antibiotics Rash         Current Outpatient Medications:   •  aspirin 81 MG tablet, Take 81 mg by mouth daily., Disp: , Rfl:   •  atorvastatin (LIPITOR) 20 MG tablet, TAKE ONE TABLET BY MOUTH ONCE DAILY, Disp: 90 tablet, Rfl: 3  •  carvedilol (COREG) 25 MG tablet, , Disp: , Rfl:   •  Cholecalciferol (VITAMIN D PO), Take 1,000 Units by mouth daily., Disp: , Rfl:   •  diclofenac (VOLTAREN) 1 % gel gel, Apply 4 g topically 4 (Four) Times a Day As Needed (for pain)., Disp: 1 tube, Rfl: 2  •   Docusate Calcium (STOOL SOFTENER PO), Take  by mouth daily., Disp: , Rfl:   •  Fexofenadine HCl (ALLEGRA PO), Take 180 mg by mouth daily as needed., Disp: , Rfl:   •  gabapentin (NEURONTIN) 100 MG capsule, Take 1 capsule by mouth 2 (Two) Times a Day As Needed (PRN). This is a 43 day supply., Disp: 86 capsule, Rfl: 0  •  losartan (COZAAR) 100 MG tablet, Take 1 tablet by mouth Daily., Disp: 90 tablet, Rfl: 3  •  omeprazole OTC (PriLOSEC OTC) 20 MG EC tablet, Take 20 mg by mouth daily., Disp: , Rfl:   •  oxybutynin XL (DITROPAN-XL) 10 MG 24 hr tablet, Take 10 mg by mouth Daily., Disp: , Rfl:   •  Probiotic Product (PROBIOTIC COLON SUPPORT PO), Take  by mouth daily., Disp: , Rfl:   •  spironolactone-hydrochlorothiazide (ALDACTAZIDE) 25-25 MG tablet, , Disp: , Rfl:     Aspirin use counseling: Taking ASA appropriately as indicated    Current medication list contains no high risk medications.  No harmful drug interactions have been identified.     Family History   Problem Relation Age of Onset   • Heart disease Other    • Stroke Sister    • Diabetes Mother    • Heart failure Mother    • Heart disease Mother    • Heart attack Mother    • Hypertension Father    • Emphysema Father    • Heart disease Father    • No Known Problems Brother    • Cancer Sister    • Kidney disease Sister    • Heart failure Sister    • Diabetes Brother    • Diabetes Brother    • Diabetes Brother        Social History     Tobacco Use   • Smoking status: Former Smoker     Packs/day: 2.00     Years: 20.00     Pack years: 40.00     Types: Cigarettes     Last attempt to quit:      Years since quittin.3   • Smokeless tobacco: Never Used   Substance Use Topics   • Alcohol use: No     Comment: s/p        Past Surgical History:   Procedure Laterality Date   • APPENDECTOMY     • BUNIONECTOMY Right    • CARDIOVASCULAR STRESS TEST  10/2012   • CATARACT EXTRACTION     • CHOLECYSTECTOMY     • COLONOSCOPY     • ECHO - CONVERTED  10/2012   •  "JOINT REPLACEMENT      bilateral knees    • KNEE ARTHROPLASTY Left    • KNEE ARTHROSCOPY     • SHOULDER ARTHROSCOPY Left 7/28/2016    Procedure: LEFT SHOULDER ACROMIOPLASTY,MINI OPEN ROTATOR CUFF REPAIR;  Surgeon: Carlos Eduardo Smith MD;  Location: [facility];  Service:    • SHOULDER SURGERY  05/31/2016    OPEN ACROMICPLASTY RIGHT SHOULDER       Patient Active Problem List   Diagnosis   • Complete tear of right rotator cuff   • Chronic kidney disease, stage I   • Cough   • Gastroesophageal reflux disease   • Mixed hyperlipidemia   • Shoulder pain   • Hypertension   • Acromioclavicular joint arthritis   • Impingement syndrome, shoulder   • Complete tear of left rotator cuff   • Urge incontinence   • Atopic rhinitis   • Upper respiratory tract infection   • Leg edema   • Hypercalcemia   • Right knee pain   • DADA (stress urinary incontinence, female)       Review of Systems   Constitutional: Negative.  Negative for activity change, appetite change, chills, diaphoresis, fatigue, fever and unexpected weight change.   HENT: Negative.    Eyes: Negative.    Respiratory: Negative.    Cardiovascular: Negative.    Gastrointestinal: Negative.    Genitourinary: Positive for urgency.   Musculoskeletal: Positive for arthralgias and back pain.   Skin: Negative.    Allergic/Immunologic: Negative.    Neurological: Negative.    Hematological: Negative.    Psychiatric/Behavioral: Negative.        Objective     Vitals:    04/16/19 0941   BP: 134/76   BP Location: Left arm   Patient Position: Sitting   Pulse: 63   SpO2: 96%   Weight: 90.7 kg (200 lb)   Height: 149.9 cm (59\")       Patient's Body mass index is 40.4 kg/m². BMI is above normal parameters. Recommendations include: educational material and exercise counseling.      No exam data present    The patient has no evidence of cognitve impairment.     Physical Exam   Constitutional: She is oriented to person, place, and time. She appears well-developed and well-nourished. No " distress.   HENT:   Head: Normocephalic and atraumatic.   Right Ear: External ear normal.   Left Ear: External ear normal.   Nose: Nose normal.   Mouth/Throat: Oropharynx is clear and moist.   Eyes: Conjunctivae and EOM are normal. Pupils are equal, round, and reactive to light. Right eye exhibits no discharge. Left eye exhibits no discharge. No scleral icterus.   Neck: Normal range of motion. Neck supple. No JVD present. No tracheal deviation present. No thyromegaly present.   Cardiovascular: Normal rate, regular rhythm, normal heart sounds and intact distal pulses. Exam reveals no gallop and no friction rub.   No murmur heard.  Pulmonary/Chest: Effort normal and breath sounds normal. No stridor. No respiratory distress. She has no wheezes. She has no rales. She exhibits no tenderness.   Abdominal: Soft. Bowel sounds are normal. She exhibits no distension and no mass. There is no tenderness. There is no rebound and no guarding. No hernia.   Musculoskeletal: Normal range of motion. She exhibits no edema, tenderness or deformity.   Lymphadenopathy:     She has no cervical adenopathy.   Neurological: She is alert and oriented to person, place, and time. She displays normal reflexes. No cranial nerve deficit or sensory deficit. She exhibits normal muscle tone. Coordination normal.   Skin: Skin is warm and dry. Capillary refill takes less than 2 seconds. No rash noted. She is not diaphoretic. No erythema. No pallor.   Psychiatric: She has a normal mood and affect. Her behavior is normal. Judgment and thought content normal.       Recent Lab Results:     Lab Results   Component Value Date    CHOL 143 09/14/2018    TRIG 161 (H) 09/14/2018    HDL 43 (L) 09/14/2018    VLDL 32.2 09/14/2018    LDLHDL 1.58 09/14/2018       Assessment/Plan   Age-appropriate Screening Schedule:  Refer to the list below for future screening recommendations based on patient's age, sex and/or medical conditions.      Health Maintenance   Topic Date  Due   • PNEUMOCOCCAL VACCINES (65+ LOW/MEDIUM RISK) (2 of 2 - PPSV23) 10/06/2016   • COLONOSCOPY  04/18/2017   • INFLUENZA VACCINE  08/01/2019   • LIPID PANEL  09/14/2019   • TDAP/TD VACCINES  Discontinued   • ZOSTER VACCINE  Discontinued       Medicare Risks and Personalized Health Plan:  Osteoprorosis risk identified;  Use Calcium and Vitamin D as directed, avoid caffeine, participate in weight bearing exercises for better bone health and Bone Densiometry ordered  Cardiovascular risk;  Patient advised to start Aspirin 81 mg daily and Patient advised to follow heart healthy diet to help decrease cardiovascular risk   Colon Cancer screening is due;  Cologuard Ordered  Glaucoma screening or patient reported vision limitations;   Referral to Opthamology ordered  Obesity/Overweight condition;  Diet information included in the after visit summary (AVS) and Exercise prescription included in the after visit summary (AVS)  Inactivity/Poor Exercise Habits-patient reported; Inactivity; Follow Exercise Information (additional patient information in the After Visit Summary)      CMS-Preventive Services Quick Reference  Medicare Preventive Services Addressed:  Annual Wellness Visit (AWV)  Bone Density Measurements  Colorectal Cancer Screening, Cologuard Test   Glaucoma screening (for individuals with diabetes mellitus, family history of glaucoma, -Americans (> or =) age 50, -Americans (> or =) age 65)    Advance Care Planning:  Patient does not have an advance directive - information provided to the patient today   Patient did not wish to have tetanus shot R immunization against zoster or hepatitis.  Risks were explained.  Information was provided for weight loss, exercise program and cardiac risk reduction.    Diagnoses and all orders for this visit:    1. Colon cancer screening (Primary)  -     Cologuard - Stool, Per Rectum; Future    2. Routine general medical examination at health care facility    3. Mixed  hyperlipidemia    4. Essential hypertension    5. DADA (stress urinary incontinence, female)    6. Gastroesophageal reflux disease without esophagitis    Other orders  -     Cancel: Cologuard - Stool, Per Rectum; Future        An After Visit Summary and PPPS with all of these plans were given to the patient.      Follow Up:  Return in about 1 year (around 4/16/2020).

## 2019-04-30 ENCOUNTER — LAB (OUTPATIENT)
Dept: LAB | Facility: HOSPITAL | Age: 79
End: 2019-04-30

## 2019-04-30 DIAGNOSIS — E78.2 MIXED HYPERLIPIDEMIA: ICD-10-CM

## 2019-04-30 LAB
ALBUMIN SERPL-MCNC: 4.5 G/DL (ref 3.5–5.2)
ALBUMIN/GLOB SERPL: 1.5 G/DL
ALP SERPL-CCNC: 89 U/L (ref 39–117)
ALT SERPL W P-5'-P-CCNC: 12 U/L (ref 1–33)
ANION GAP SERPL CALCULATED.3IONS-SCNC: 14.6 MMOL/L
AST SERPL-CCNC: 14 U/L (ref 1–32)
BASOPHILS # BLD AUTO: 0.04 10*3/MM3 (ref 0–0.2)
BASOPHILS NFR BLD AUTO: 0.6 % (ref 0–1.5)
BILIRUB SERPL-MCNC: 0.5 MG/DL (ref 0.2–1.2)
BUN BLD-MCNC: 21 MG/DL (ref 8–23)
BUN/CREAT SERPL: 17.5 (ref 7–25)
CALCIUM SPEC-SCNC: 10.8 MG/DL (ref 8.6–10.5)
CHLORIDE SERPL-SCNC: 101 MMOL/L (ref 98–107)
CHOLEST SERPL-MCNC: 130 MG/DL (ref 0–200)
CK SERPL-CCNC: 100 U/L (ref 20–180)
CO2 SERPL-SCNC: 25.4 MMOL/L (ref 22–29)
CREAT BLD-MCNC: 1.2 MG/DL (ref 0.57–1)
DEPRECATED RDW RBC AUTO: 51.6 FL (ref 37–54)
EOSINOPHIL # BLD AUTO: 0.12 10*3/MM3 (ref 0–0.4)
EOSINOPHIL NFR BLD AUTO: 1.8 % (ref 0.3–6.2)
ERYTHROCYTE [DISTWIDTH] IN BLOOD BY AUTOMATED COUNT: 14.6 % (ref 12.3–15.4)
GFR SERPL CREATININE-BSD FRML MDRD: 44 ML/MIN/1.73
GLOBULIN UR ELPH-MCNC: 3 GM/DL
GLUCOSE BLD-MCNC: 86 MG/DL (ref 65–99)
HCT VFR BLD AUTO: 43.6 % (ref 34–46.6)
HDLC SERPL-MCNC: 37 MG/DL (ref 40–60)
HGB BLD-MCNC: 13.2 G/DL (ref 12–15.9)
IMM GRANULOCYTES # BLD AUTO: 0.02 10*3/MM3 (ref 0–0.05)
IMM GRANULOCYTES NFR BLD AUTO: 0.3 % (ref 0–0.5)
LDLC SERPL CALC-MCNC: 58 MG/DL (ref 0–100)
LDLC/HDLC SERPL: 1.56 {RATIO}
LYMPHOCYTES # BLD AUTO: 2.12 10*3/MM3 (ref 0.7–3.1)
LYMPHOCYTES NFR BLD AUTO: 31.9 % (ref 19.6–45.3)
MCH RBC QN AUTO: 29.2 PG (ref 26.6–33)
MCHC RBC AUTO-ENTMCNC: 30.3 G/DL (ref 31.5–35.7)
MCV RBC AUTO: 96.5 FL (ref 79–97)
MONOCYTES # BLD AUTO: 0.66 10*3/MM3 (ref 0.1–0.9)
MONOCYTES NFR BLD AUTO: 9.9 % (ref 5–12)
NEUTROPHILS # BLD AUTO: 3.68 10*3/MM3 (ref 1.7–7)
NEUTROPHILS NFR BLD AUTO: 55.5 % (ref 42.7–76)
NRBC BLD AUTO-RTO: 0 /100 WBC (ref 0–0.2)
PLATELET # BLD AUTO: 224 10*3/MM3 (ref 140–450)
PMV BLD AUTO: 12.7 FL (ref 6–12)
POTASSIUM BLD-SCNC: 4.1 MMOL/L (ref 3.5–5.2)
PROT SERPL-MCNC: 7.5 G/DL (ref 6–8.5)
RBC # BLD AUTO: 4.52 10*6/MM3 (ref 3.77–5.28)
SODIUM BLD-SCNC: 141 MMOL/L (ref 136–145)
TRIGL SERPL-MCNC: 177 MG/DL (ref 0–150)
VLDLC SERPL-MCNC: 35.4 MG/DL (ref 5–40)
WBC NRBC COR # BLD: 6.64 10*3/MM3 (ref 3.4–10.8)

## 2019-04-30 PROCEDURE — 80053 COMPREHEN METABOLIC PANEL: CPT

## 2019-04-30 PROCEDURE — 82550 ASSAY OF CK (CPK): CPT

## 2019-04-30 PROCEDURE — 36415 COLL VENOUS BLD VENIPUNCTURE: CPT

## 2019-04-30 PROCEDURE — 80061 LIPID PANEL: CPT

## 2019-04-30 PROCEDURE — 85025 COMPLETE CBC W/AUTO DIFF WBC: CPT

## 2019-05-10 ENCOUNTER — OFFICE VISIT (OUTPATIENT)
Dept: CARDIOLOGY | Facility: CLINIC | Age: 79
End: 2019-05-10

## 2019-05-10 VITALS
HEART RATE: 68 BPM | BODY MASS INDEX: 40.32 KG/M2 | WEIGHT: 200 LBS | SYSTOLIC BLOOD PRESSURE: 122 MMHG | OXYGEN SATURATION: 98 % | DIASTOLIC BLOOD PRESSURE: 64 MMHG | HEIGHT: 59 IN

## 2019-05-10 DIAGNOSIS — I10 ESSENTIAL HYPERTENSION: ICD-10-CM

## 2019-05-10 DIAGNOSIS — N18.1 CHRONIC KIDNEY DISEASE, STAGE I: ICD-10-CM

## 2019-05-10 DIAGNOSIS — E78.2 MIXED HYPERLIPIDEMIA: Primary | ICD-10-CM

## 2019-05-10 PROCEDURE — 99213 OFFICE O/P EST LOW 20 MIN: CPT | Performed by: INTERNAL MEDICINE

## 2019-05-10 RX ORDER — GABAPENTIN 100 MG/1
100 CAPSULE ORAL 2 TIMES DAILY PRN
Qty: 60 CAPSULE | Refills: 0 | Status: SHIPPED | OUTPATIENT
Start: 2019-05-10 | End: 2019-08-07 | Stop reason: SDUPTHER

## 2019-05-10 NOTE — PROGRESS NOTES
subjective     Chief Complaint   Patient presents with   • Hypertension   • Hyperlipidemia   • Follow-up     History of Present Illness    Patient is 78 years old white female who is here for follow-up of multiple chronic medical problems.  Patient has essential hypertension.  She is taking losartan, Aldactazide and Coreg.  Blood pressure seems very well controlled without any drug side effects.    Patient also has hyperlipidemia and is taking Lipitor 20 mg daily.  No drug side effects.  Patient has mild renal failure renal functions have been stable.  Past Surgical History:   Procedure Laterality Date   • APPENDECTOMY     • BUNIONECTOMY Right    • CARDIOVASCULAR STRESS TEST  10/2012   • CATARACT EXTRACTION  2017   • CHOLECYSTECTOMY     • COLONOSCOPY  2011   • ECHO - CONVERTED  10/2012   • JOINT REPLACEMENT      bilateral knees    • KNEE ARTHROPLASTY Left    • KNEE ARTHROSCOPY     • SHOULDER ARTHROSCOPY Left 7/28/2016    Procedure: LEFT SHOULDER ACROMIOPLASTY,MINI OPEN ROTATOR CUFF REPAIR;  Surgeon: Carlos Eduardo Smith MD;  Location: [facility];  Service:    • SHOULDER SURGERY  05/31/2016    OPEN ACROMICPLASTY RIGHT SHOULDER     Family History   Problem Relation Age of Onset   • Heart disease Other    • Stroke Sister    • Diabetes Mother    • Heart failure Mother    • Heart disease Mother    • Heart attack Mother    • Hypertension Father    • Emphysema Father    • Heart disease Father    • No Known Problems Brother    • Cancer Sister    • Kidney disease Sister    • Heart failure Sister    • Diabetes Brother    • Diabetes Brother    • Diabetes Brother      Past Medical History:   Diagnosis Date   • GERD (gastroesophageal reflux disease)    • Hyperlipidemia    • Hypertension    • Left shoulder pain    • Obesity    • Osteoarthritis of knees, bilateral    • Overactive bladder    • Right shoulder pain      Patient Active Problem List   Diagnosis   • Complete tear of right rotator cuff   • Chronic kidney disease, stage  I   • Cough   • Gastroesophageal reflux disease   • Mixed hyperlipidemia   • Shoulder pain   • Hypertension   • Acromioclavicular joint arthritis   • Impingement syndrome, shoulder   • Complete tear of left rotator cuff   • Urge incontinence   • Atopic rhinitis   • Upper respiratory tract infection   • Leg edema   • Hypercalcemia   • Right knee pain   • DADA (stress urinary incontinence, female)       Social History     Tobacco Use   • Smoking status: Former Smoker     Packs/day: 2.00     Years: 20.00     Pack years: 40.00     Types: Cigarettes     Last attempt to quit:      Years since quittin.3   • Smokeless tobacco: Never Used   Substance Use Topics   • Alcohol use: No     Comment: s/p    • Drug use: No       Allergies   Allergen Reactions   • Codeine Other (See Comments)     hyperventilate   • Sulfa Antibiotics Rash       Current Outpatient Medications on File Prior to Visit   Medication Sig   • aspirin 81 MG tablet Take 81 mg by mouth daily.   • atorvastatin (LIPITOR) 20 MG tablet TAKE ONE TABLET BY MOUTH ONCE DAILY   • carvedilol (COREG) 25 MG tablet    • Cholecalciferol (VITAMIN D PO) Take 1,000 Units by mouth daily.   • diclofenac (VOLTAREN) 1 % gel gel Apply 4 g topically 4 (Four) Times a Day As Needed (for pain).   • Docusate Calcium (STOOL SOFTENER PO) Take  by mouth daily.   • Fexofenadine HCl (ALLEGRA PO) Take 180 mg by mouth daily as needed.   • losartan (COZAAR) 100 MG tablet Take 1 tablet by mouth Daily.   • omeprazole OTC (PriLOSEC OTC) 20 MG EC tablet Take 20 mg by mouth daily.   • oxybutynin XL (DITROPAN-XL) 10 MG 24 hr tablet Take 10 mg by mouth Daily.   • Probiotic Product (PROBIOTIC COLON SUPPORT PO) Take  by mouth daily.   • spironolactone-hydrochlorothiazide (ALDACTAZIDE) 25-25 MG tablet    • [DISCONTINUED] gabapentin (NEURONTIN) 100 MG capsule Take 1 capsule by mouth 2 (Two) Times a Day As Needed (PRN). This is a 43 day supply.     No current facility-administered medications on  "file prior to visit.          The following portions of the patient's history were reviewed and updated as appropriate: allergies, current medications, past family history, past medical history, past social history, past surgical history and problem list.    Review of Systems   Constitution: Negative.   HENT: Negative.  Negative for congestion.    Eyes: Negative.    Cardiovascular: Negative.  Negative for chest pain, cyanosis, dyspnea on exertion, irregular heartbeat, leg swelling, near-syncope, orthopnea, palpitations, paroxysmal nocturnal dyspnea and syncope.   Respiratory: Negative.  Negative for shortness of breath.    Hematologic/Lymphatic: Negative.    Musculoskeletal: Negative.    Gastrointestinal: Negative.    Neurological: Negative.  Negative for headaches.          Objective:     /64 (BP Location: Left arm, Patient Position: Sitting)   Pulse 68   Ht 149.9 cm (59\")   Wt 90.7 kg (200 lb)   SpO2 98%   BMI 40.40 kg/m²   Physical Exam   Constitutional: She appears well-developed and well-nourished. No distress.   HENT:   Head: Normocephalic and atraumatic.   Mouth/Throat: Oropharynx is clear and moist. No oropharyngeal exudate.   Eyes: Conjunctivae and EOM are normal. Pupils are equal, round, and reactive to light. No scleral icterus.   Neck: Normal range of motion. Neck supple. No JVD present. No tracheal deviation present. No thyromegaly present.   Cardiovascular: Normal rate, regular rhythm, normal heart sounds and intact distal pulses. PMI is not displaced. Exam reveals no gallop, no friction rub and no decreased pulses.   No murmur heard.  Pulses:       Carotid pulses are 3+ on the right side, and 3+ on the left side.       Radial pulses are 3+ on the right side, and 3+ on the left side.   Pulmonary/Chest: Effort normal and breath sounds normal. No respiratory distress. She has no wheezes. She has no rales. She exhibits no tenderness.   Abdominal: Soft. Bowel sounds are normal. She exhibits no " distension, no abdominal bruit and no mass. There is no splenomegaly or hepatomegaly. There is no tenderness. There is no rebound and no guarding.   Musculoskeletal: Normal range of motion. She exhibits no edema, tenderness or deformity.   Lymphadenopathy:     She has no cervical adenopathy.   Neurological: She is alert. She has normal reflexes. No cranial nerve deficit. She exhibits normal muscle tone. Coordination normal.   Skin: Skin is warm and dry. No rash noted. She is not diaphoretic. No erythema.   Psychiatric: She has a normal mood and affect. Her behavior is normal. Judgment and thought content normal.         Lab Review  Lab Results   Component Value Date     04/30/2019    K 4.1 04/30/2019     04/30/2019    BUN 21 04/30/2019    CREATININE 1.20 (H) 04/30/2019    GLUCOSE 86 04/30/2019    CALCIUM 10.8 (H) 04/30/2019    ALT 12 04/30/2019    ALKPHOS 89 04/30/2019    LABIL2 1.6 06/08/2016     Lab Results   Component Value Date    CKTOTAL 100 04/30/2019     Lab Results   Component Value Date    WBC 6.64 04/30/2019    HGB 13.2 04/30/2019    HCT 43.6 04/30/2019     04/30/2019     Lab Results   Component Value Date    INR 1.97 12/11/2014    INR 2.02 12/08/2014    INR 2.46 12/04/2014     No results found for: MG  Lab Results   Component Value Date    TSH 1.950 09/14/2018     No results found for: BNP  Lab Results   Component Value Date    CHLPL 133 06/08/2016    CHOL 130 04/30/2019    TRIG 177 (H) 04/30/2019    HDL 37 (L) 04/30/2019    VLDL 35.4 04/30/2019    LDLHDL 1.56 04/30/2019     Lab Results   Component Value Date    LDL 58 04/30/2019    LDL 68 09/14/2018       Procedures       I personally viewed and interpreted the patient's LAB data         Assessment:     1. Mixed hyperlipidemia    2. Essential hypertension    3. Chronic kidney disease, stage I          Plan:     Lab tests were reviewed and discussed with the patient  Cholesterol is 130 with LDL of 58..  Patient was advised to continue  Lipitor.  Patient has essential hypertension no change in therapy.  Renal functions are stable.  Patient was advised to continue current medication.  Follow-up scheduled        No Follow-up on file.

## 2019-07-09 ENCOUNTER — TRANSCRIBE ORDERS (OUTPATIENT)
Dept: LAB | Facility: HOSPITAL | Age: 79
End: 2019-07-09

## 2019-07-09 ENCOUNTER — LAB (OUTPATIENT)
Dept: LAB | Facility: HOSPITAL | Age: 79
End: 2019-07-09

## 2019-07-09 DIAGNOSIS — N18.30 CHRONIC KIDNEY DISEASE, STAGE III (MODERATE) (HCC): Primary | ICD-10-CM

## 2019-07-09 DIAGNOSIS — N18.30 CHRONIC KIDNEY DISEASE, STAGE III (MODERATE) (HCC): ICD-10-CM

## 2019-07-09 LAB
ALBUMIN UR-MCNC: <1.2 MG/L
ANION GAP SERPL CALCULATED.3IONS-SCNC: 14.9 MMOL/L (ref 5–15)
BUN BLD-MCNC: 16 MG/DL (ref 8–23)
BUN/CREAT SERPL: 14.5 (ref 7–25)
CALCIUM SPEC-SCNC: 9.8 MG/DL (ref 8.6–10.5)
CHLORIDE SERPL-SCNC: 106 MMOL/L (ref 98–107)
CO2 SERPL-SCNC: 25.1 MMOL/L (ref 22–29)
CREAT BLD-MCNC: 1.1 MG/DL (ref 0.57–1)
CREAT UR-MCNC: 99.4 MG/DL
GFR SERPL CREATININE-BSD FRML MDRD: 48 ML/MIN/1.73
GLUCOSE BLD-MCNC: 98 MG/DL (ref 65–99)
MICROALBUMIN/CREAT UR: NORMAL MG/G
POTASSIUM BLD-SCNC: 4.3 MMOL/L (ref 3.5–5.2)
SODIUM BLD-SCNC: 146 MMOL/L (ref 136–145)

## 2019-07-09 PROCEDURE — 82570 ASSAY OF URINE CREATININE: CPT

## 2019-07-09 PROCEDURE — 80048 BASIC METABOLIC PNL TOTAL CA: CPT

## 2019-07-09 PROCEDURE — 82043 UR ALBUMIN QUANTITATIVE: CPT

## 2019-07-09 PROCEDURE — 36415 COLL VENOUS BLD VENIPUNCTURE: CPT

## 2019-07-15 RX ORDER — LOSARTAN POTASSIUM 100 MG/1
TABLET ORAL
Qty: 90 TABLET | Refills: 3 | Status: SHIPPED | OUTPATIENT
Start: 2019-07-15 | End: 2019-12-04

## 2019-08-07 ENCOUNTER — OFFICE VISIT (OUTPATIENT)
Dept: CARDIOLOGY | Facility: CLINIC | Age: 79
End: 2019-08-07

## 2019-08-07 VITALS
WEIGHT: 197 LBS | SYSTOLIC BLOOD PRESSURE: 132 MMHG | DIASTOLIC BLOOD PRESSURE: 74 MMHG | BODY MASS INDEX: 39.72 KG/M2 | OXYGEN SATURATION: 94 % | HEIGHT: 59 IN | HEART RATE: 60 BPM

## 2019-08-07 DIAGNOSIS — E66.9 OBESITY (BMI 35.0-39.9 WITHOUT COMORBIDITY): ICD-10-CM

## 2019-08-07 DIAGNOSIS — E78.2 MIXED HYPERLIPIDEMIA: ICD-10-CM

## 2019-08-07 DIAGNOSIS — I10 ESSENTIAL HYPERTENSION: Primary | ICD-10-CM

## 2019-08-07 PROCEDURE — 99213 OFFICE O/P EST LOW 20 MIN: CPT | Performed by: INTERNAL MEDICINE

## 2019-08-07 RX ORDER — GABAPENTIN 100 MG/1
100 CAPSULE ORAL 2 TIMES DAILY PRN
Qty: 60 CAPSULE | Refills: 0 | Status: SHIPPED | OUTPATIENT
Start: 2019-08-07 | End: 2019-09-06

## 2019-08-07 NOTE — PROGRESS NOTES
subjective     Chief Complaint   Patient presents with   • Hyperlipidemia   • Follow-up     History of Present Illness  Patient is 78 years old white female who is here for follow-up of multiple chronic medical problems  Blood pressure is very well controlled with current medications  Patient also has hyperlipidemia and is taking Lipitor 20 mg daily with no drug side effects.  Patient also has peripheral neuropathy and is taking Neurontin which will be continued.  Patient is overweight and is trying to lose weight.    Past Surgical History:   Procedure Laterality Date   • APPENDECTOMY     • BUNIONECTOMY Right    • CARDIOVASCULAR STRESS TEST  10/2012   • CATARACT EXTRACTION  2017   • CHOLECYSTECTOMY     • COLONOSCOPY  2011   • ECHO - CONVERTED  10/2012   • JOINT REPLACEMENT      bilateral knees    • KNEE ARTHROPLASTY Left    • KNEE ARTHROSCOPY     • SHOULDER ARTHROSCOPY Left 7/28/2016    Procedure: LEFT SHOULDER ACROMIOPLASTY,MINI OPEN ROTATOR CUFF REPAIR;  Surgeon: Carlos Eduardo Smith MD;  Location: [facility];  Service:    • SHOULDER SURGERY  05/31/2016    OPEN ACROMICPLASTY RIGHT SHOULDER     Family History   Problem Relation Age of Onset   • Heart disease Other    • Stroke Sister    • Diabetes Mother    • Heart failure Mother    • Heart disease Mother    • Heart attack Mother    • Hypertension Father    • Emphysema Father    • Heart disease Father    • No Known Problems Brother    • Cancer Sister    • Kidney disease Sister    • Heart failure Sister    • Diabetes Brother    • Diabetes Brother    • Diabetes Brother      Past Medical History:   Diagnosis Date   • GERD (gastroesophageal reflux disease)    • Hyperlipidemia    • Hypertension    • Left shoulder pain    • Obesity    • Osteoarthritis of knees, bilateral    • Overactive bladder    • Right shoulder pain      Patient Active Problem List   Diagnosis   • Complete tear of right rotator cuff   • Chronic kidney disease, stage I   • Cough   • Gastroesophageal  reflux disease   • Mixed hyperlipidemia   • Shoulder pain   • Hypertension   • Acromioclavicular joint arthritis   • Impingement syndrome, shoulder   • Complete tear of left rotator cuff   • Urge incontinence   • Atopic rhinitis   • Upper respiratory tract infection   • Leg edema   • Hypercalcemia   • Right knee pain   • DADA (stress urinary incontinence, female)   • Obesity (BMI 35.0-39.9 without comorbidity)       Social History     Tobacco Use   • Smoking status: Former Smoker     Packs/day: 2.00     Years: 20.00     Pack years: 40.00     Types: Cigarettes     Last attempt to quit:      Years since quittin.6   • Smokeless tobacco: Never Used   Substance Use Topics   • Alcohol use: No     Comment: s/p    • Drug use: No       Allergies   Allergen Reactions   • Codeine Other (See Comments)     hyperventilate   • Sulfa Antibiotics Rash       Current Outpatient Medications on File Prior to Visit   Medication Sig   • aspirin 81 MG tablet Take 81 mg by mouth daily.   • atorvastatin (LIPITOR) 20 MG tablet TAKE ONE TABLET BY MOUTH ONCE DAILY   • carvedilol (COREG) 25 MG tablet    • Cholecalciferol (VITAMIN D PO) Take 1,000 Units by mouth daily.   • diclofenac (VOLTAREN) 1 % gel gel Apply 4 g topically 4 (Four) Times a Day As Needed (for pain).   • Docusate Calcium (STOOL SOFTENER PO) Take  by mouth daily.   • Fexofenadine HCl (ALLEGRA PO) Take 180 mg by mouth daily as needed.   • losartan (COZAAR) 100 MG tablet TAKE 1 TABLET BY MOUTH ONCE DAILY   • omeprazole OTC (PriLOSEC OTC) 20 MG EC tablet Take 20 mg by mouth daily.   • oxybutynin XL (DITROPAN-XL) 10 MG 24 hr tablet Take 10 mg by mouth Daily.   • Probiotic Product (PROBIOTIC COLON SUPPORT PO) Take  by mouth daily.   • spironolactone-hydrochlorothiazide (ALDACTAZIDE) 25-25 MG tablet    • [DISCONTINUED] gabapentin (NEURONTIN) 100 MG capsule Take 1 capsule by mouth 2 (Two) Times a Day As Needed (PRN). This is a 43 day supply.     No current  "facility-administered medications on file prior to visit.          The following portions of the patient's history were reviewed and updated as appropriate: allergies, current medications, past family history, past medical history, past social history, past surgical history and problem list.    Review of Systems   Constitution: Negative.   HENT: Negative.  Negative for congestion.    Eyes: Negative.    Cardiovascular: Negative.  Negative for chest pain, cyanosis, dyspnea on exertion, irregular heartbeat, leg swelling, near-syncope, orthopnea, palpitations, paroxysmal nocturnal dyspnea and syncope.   Respiratory: Negative.  Negative for shortness of breath.    Hematologic/Lymphatic: Negative.    Gastrointestinal: Negative.    Neurological: Positive for numbness and paresthesias. Negative for headaches.          Objective:     /74 (BP Location: Left arm, Patient Position: Sitting)   Pulse 60   Ht 149.9 cm (59\")   Wt 89.4 kg (197 lb)   SpO2 94%   BMI 39.79 kg/m²   Physical Exam   Constitutional: She appears well-developed and well-nourished. No distress.   HENT:   Head: Normocephalic and atraumatic.   Mouth/Throat: Oropharynx is clear and moist. No oropharyngeal exudate.   Eyes: Conjunctivae and EOM are normal. Pupils are equal, round, and reactive to light. No scleral icterus.   Neck: Normal range of motion. Neck supple. No JVD present. No tracheal deviation present. No thyromegaly present.   Cardiovascular: Normal rate, regular rhythm, normal heart sounds and intact distal pulses. PMI is not displaced. Exam reveals no gallop, no friction rub and no decreased pulses.   No murmur heard.  Pulses:       Carotid pulses are 3+ on the right side, and 3+ on the left side.       Radial pulses are 3+ on the right side, and 3+ on the left side.   Pulmonary/Chest: Effort normal and breath sounds normal. No respiratory distress. She has no wheezes. She has no rales. She exhibits no tenderness.   Abdominal: Soft. Bowel " sounds are normal. She exhibits no distension, no abdominal bruit and no mass. There is no splenomegaly or hepatomegaly. There is no tenderness. There is no rebound and no guarding.   Musculoskeletal: Normal range of motion. She exhibits no edema, tenderness or deformity.   Lymphadenopathy:     She has no cervical adenopathy.   Neurological: She is alert. She has normal reflexes. No cranial nerve deficit. She exhibits normal muscle tone. Coordination normal.   Skin: Skin is warm and dry. No rash noted. She is not diaphoretic. No erythema.   Psychiatric: She has a normal mood and affect. Her behavior is normal. Judgment and thought content normal.         Lab Review  Lab Results   Component Value Date     (H) 07/09/2019    K 4.3 07/09/2019     07/09/2019    BUN 16 07/09/2019    CREATININE 1.10 (H) 07/09/2019    GLUCOSE 98 07/09/2019    CALCIUM 9.8 07/09/2019    ALT 12 04/30/2019    ALKPHOS 89 04/30/2019    LABIL2 1.6 06/08/2016     Lab Results   Component Value Date    CKTOTAL 100 04/30/2019     Lab Results   Component Value Date    WBC 6.64 04/30/2019    HGB 13.2 04/30/2019    HCT 43.6 04/30/2019     04/30/2019     Lab Results   Component Value Date    INR 1.97 12/11/2014    INR 2.02 12/08/2014    INR 2.46 12/04/2014     No results found for: MG  Lab Results   Component Value Date    TSH 1.950 09/14/2018     No results found for: BNP  Lab Results   Component Value Date    CHLPL 133 06/08/2016    CHOL 130 04/30/2019    TRIG 177 (H) 04/30/2019    HDL 37 (L) 04/30/2019    VLDL 35.4 04/30/2019    LDLHDL 1.56 04/30/2019     Lab Results   Component Value Date    LDL 58 04/30/2019    LDL 68 09/14/2018       Procedures       I personally viewed and interpreted the patient's LAB data         Assessment:     1. Essential hypertension    2. Mixed hyperlipidemia    3. Obesity (BMI 35.0-39.9 without comorbidity)          Plan:     Refills of medications were given.  Healthy lifestyle and weight loss was  emphasized.    Blood pressure is very well controlled she will continue current medications.  Lipitor was also continued.  Lab work scheduled with follow-up in 3 months        No Follow-up on file.

## 2019-08-26 ENCOUNTER — TELEPHONE (OUTPATIENT)
Dept: CARDIOLOGY | Facility: CLINIC | Age: 79
End: 2019-08-26

## 2019-08-26 ENCOUNTER — OFFICE VISIT (OUTPATIENT)
Dept: CARDIOLOGY | Facility: CLINIC | Age: 79
End: 2019-08-26

## 2019-08-26 VITALS
WEIGHT: 192 LBS | HEIGHT: 59 IN | HEART RATE: 67 BPM | DIASTOLIC BLOOD PRESSURE: 74 MMHG | RESPIRATION RATE: 16 BRPM | OXYGEN SATURATION: 97 % | BODY MASS INDEX: 38.71 KG/M2 | SYSTOLIC BLOOD PRESSURE: 126 MMHG

## 2019-08-26 DIAGNOSIS — S89.92XA INJURY OF LEFT KNEE, INITIAL ENCOUNTER: Primary | ICD-10-CM

## 2019-08-26 PROCEDURE — 99213 OFFICE O/P EST LOW 20 MIN: CPT | Performed by: INTERNAL MEDICINE

## 2019-08-26 NOTE — PROGRESS NOTES
subjective     Chief Complaint   Patient presents with   • Fall     History of Present Illness  Patient is 78 years old white female who states that she fell over the weekend at [employer].  She hit her left knee to the ground otherwise no injury.  Patient had no problems that day but next day just below the knee that he has started to bruise and painful.  The bruising went all the way down to the ankle a little bit.  She is walking around without any problem but just below the knee the bone hurts.  No calf tenderness  No pain in the ankle or the knee itself.  Yesterday patient went and bought a walker with a seat from [employer].    Past Surgical History:   Procedure Laterality Date   • APPENDECTOMY     • BUNIONECTOMY Right    • CARDIOVASCULAR STRESS TEST  10/2012   • CATARACT EXTRACTION  2017   • CHOLECYSTECTOMY     • COLONOSCOPY  2011   • ECHO - CONVERTED  10/2012   • JOINT REPLACEMENT      bilateral knees    • KNEE ARTHROPLASTY Left    • KNEE ARTHROSCOPY     • SHOULDER ARTHROSCOPY Left 7/28/2016    Procedure: LEFT SHOULDER ACROMIOPLASTY,MINI OPEN ROTATOR CUFF REPAIR;  Surgeon: Carlos Eduardo Smith MD;  Location: [facility];  Service:    • SHOULDER SURGERY  05/31/2016    OPEN ACROMICPLASTY RIGHT SHOULDER     Family History   Problem Relation Age of Onset   • Heart disease Other    • Stroke Sister    • Diabetes Mother    • Heart failure Mother    • Heart disease Mother    • Heart attack Mother    • Hypertension Father    • Emphysema Father    • Heart disease Father    • No Known Problems Brother    • Cancer Sister    • Kidney disease Sister    • Heart failure Sister    • Diabetes Brother    • Diabetes Brother    • Diabetes Brother      Past Medical History:   Diagnosis Date   • GERD (gastroesophageal reflux disease)    • Hyperlipidemia    • Hypertension    • Left shoulder pain    • Obesity    • Osteoarthritis of knees, bilateral    • Overactive bladder    • Right shoulder pain      Patient Active Problem List    Diagnosis   • Complete tear of right rotator cuff   • Chronic kidney disease, stage I   • Cough   • Gastroesophageal reflux disease   • Mixed hyperlipidemia   • Shoulder pain   • Hypertension   • Acromioclavicular joint arthritis   • Impingement syndrome, shoulder   • Complete tear of left rotator cuff   • Urge incontinence   • Atopic rhinitis   • Upper respiratory tract infection   • Leg edema   • Hypercalcemia   • Right knee pain   • DADA (stress urinary incontinence, female)   • Obesity (BMI 35.0-39.9 without comorbidity)   • Left knee injury       Social History     Tobacco Use   • Smoking status: Former Smoker     Packs/day: 2.00     Years: 20.00     Pack years: 40.00     Types: Cigarettes     Last attempt to quit:      Years since quittin.6   • Smokeless tobacco: Never Used   Substance Use Topics   • Alcohol use: No     Comment: s/p    • Drug use: No       Allergies   Allergen Reactions   • Codeine Other (See Comments)     hyperventilate   • Sulfa Antibiotics Rash       Current Outpatient Medications on File Prior to Visit   Medication Sig   • aspirin 81 MG tablet Take 81 mg by mouth daily.   • atorvastatin (LIPITOR) 20 MG tablet TAKE ONE TABLET BY MOUTH ONCE DAILY   • carvedilol (COREG) 25 MG tablet    • Cholecalciferol (VITAMIN D PO) Take 1,000 Units by mouth daily.   • diclofenac (VOLTAREN) 1 % gel gel Apply 4 g topically 4 (Four) Times a Day As Needed (for pain).   • Docusate Calcium (STOOL SOFTENER PO) Take  by mouth daily.   • Fexofenadine HCl (ALLEGRA PO) Take 180 mg by mouth daily as needed.   • gabapentin (NEURONTIN) 100 MG capsule Take 1 capsule by mouth 2 (Two) Times a Day As Needed (PRN) for up to 30 days.   • losartan (COZAAR) 100 MG tablet TAKE 1 TABLET BY MOUTH ONCE DAILY   • omeprazole OTC (PriLOSEC OTC) 20 MG EC tablet Take 20 mg by mouth daily.   • oxybutynin XL (DITROPAN-XL) 10 MG 24 hr tablet Take 10 mg by mouth Daily.   • Probiotic Product (PROBIOTIC COLON SUPPORT PO) Take   "by mouth daily.   • spironolactone-hydrochlorothiazide (ALDACTAZIDE) 25-25 MG tablet      No current facility-administered medications on file prior to visit.          The following portions of the patient's history were reviewed and updated as appropriate: allergies, current medications, past family history, past medical history, past social history, past surgical history and problem list.    Review of Systems   Constitution: Negative.   HENT: Negative.  Negative for congestion.    Eyes: Negative.    Cardiovascular: Negative.  Negative for chest pain, cyanosis, dyspnea on exertion, irregular heartbeat, leg swelling, near-syncope, orthopnea, palpitations, paroxysmal nocturnal dyspnea and syncope.   Respiratory: Negative.  Negative for shortness of breath.    Hematologic/Lymphatic: Negative.    Musculoskeletal: Negative.         Bruising and pain below the left knee after a fall.   Gastrointestinal: Negative.    Neurological: Negative.  Negative for headaches.          Objective:     /74 (BP Location: Left arm)   Pulse 67   Resp 16   Ht 149.9 cm (59.02\")   Wt 87.1 kg (192 lb)   SpO2 97%   BMI 38.76 kg/m²   Physical Exam   Constitutional: She appears well-developed and well-nourished. No distress.   HENT:   Head: Normocephalic and atraumatic.   Mouth/Throat: Oropharynx is clear and moist. No oropharyngeal exudate.   Eyes: Conjunctivae and EOM are normal. Pupils are equal, round, and reactive to light. No scleral icterus.   Neck: Normal range of motion. Neck supple. No JVD present. No tracheal deviation present. No thyromegaly present.   Cardiovascular: Normal rate, regular rhythm, normal heart sounds and intact distal pulses. PMI is not displaced. Exam reveals no gallop, no friction rub and no decreased pulses.   No murmur heard.  Pulses:       Carotid pulses are 3+ on the right side, and 3+ on the left side.       Radial pulses are 3+ on the right side, and 3+ on the left side.   Pulmonary/Chest: Effort " normal and breath sounds normal. No respiratory distress. She has no wheezes. She has no rales. She exhibits no tenderness.   Abdominal: Soft. Bowel sounds are normal. She exhibits no distension, no abdominal bruit and no mass. There is no splenomegaly or hepatomegaly. There is no tenderness. There is no rebound and no guarding.   Musculoskeletal: Normal range of motion. She exhibits no edema, tenderness or deformity.   Patient has tenderness and bruising just below the knee at the proximal tibia.  There is also bruising front of the leg.  With small bruising around the ankle.  No calf tenderness.  Homans sign is negative  Knee joint and ankle movement are normal and pain-free.  Skin is intact.  No sign of infection or blood clot.   Lymphadenopathy:     She has no cervical adenopathy.   Neurological: She is alert. She has normal reflexes. No cranial nerve deficit. She exhibits normal muscle tone. Coordination normal.   Skin: Skin is warm and dry. No rash noted. She is not diaphoretic. No erythema.   Psychiatric: She has a normal mood and affect. Her behavior is normal. Judgment and thought content normal.         Lab Review  Lab Results   Component Value Date     (H) 07/09/2019    K 4.3 07/09/2019     07/09/2019    BUN 16 07/09/2019    CREATININE 1.10 (H) 07/09/2019    GLUCOSE 98 07/09/2019    CALCIUM 9.8 07/09/2019    ALT 12 04/30/2019    ALKPHOS 89 04/30/2019    LABIL2 1.6 06/08/2016     Lab Results   Component Value Date    CKTOTAL 100 04/30/2019     Lab Results   Component Value Date    WBC 6.64 04/30/2019    HGB 13.2 04/30/2019    HCT 43.6 04/30/2019     04/30/2019     Lab Results   Component Value Date    INR 1.97 12/11/2014    INR 2.02 12/08/2014    INR 2.46 12/04/2014     No results found for: MG  Lab Results   Component Value Date    TSH 1.950 09/14/2018     No results found for: BNP  Lab Results   Component Value Date    CHLPL 133 06/08/2016    CHOL 130 04/30/2019    TRIG 177 (H)  04/30/2019    HDL 37 (L) 04/30/2019    VLDL 35.4 04/30/2019    LDLHDL 1.56 04/30/2019     Lab Results   Component Value Date    LDL 58 04/30/2019    LDL 68 09/14/2018       Procedures       I personally viewed and interpreted the patient's LAB data         Assessment:     1. Injury of left knee, initial encounter          Plan:     Patient fell and has bruised her left knee.  There is no sign of fracture.  Patient did not go to the emergency room and did not get any x-rays done.  She is walking without assistance and can put full weight on the leg.  No signs of infection, no sign of DVT.  Patient was advised to use ice on the bone below the knee and proximal tibia.  No new therapy was given.  If patient gets worse will return to the office.        No Follow-up on file.

## 2019-08-26 NOTE — TELEPHONE ENCOUNTER
Pt fell at [employer] about a week ago, she is now swollen and black and blue on her left leg wants to know if she should come here or go see Dr Smith

## 2019-10-04 ENCOUNTER — CLINICAL SUPPORT (OUTPATIENT)
Dept: CARDIOLOGY | Facility: CLINIC | Age: 79
End: 2019-10-04

## 2019-10-04 DIAGNOSIS — Z23 NEED FOR IMMUNIZATION AGAINST INFLUENZA: ICD-10-CM

## 2019-10-04 PROCEDURE — 90653 IIV ADJUVANT VACCINE IM: CPT | Performed by: INTERNAL MEDICINE

## 2019-10-04 PROCEDURE — G0008 ADMIN INFLUENZA VIRUS VAC: HCPCS | Performed by: INTERNAL MEDICINE

## 2019-10-29 ENCOUNTER — LAB (OUTPATIENT)
Dept: LAB | Facility: HOSPITAL | Age: 79
End: 2019-10-29

## 2019-10-29 DIAGNOSIS — E78.2 MIXED HYPERLIPIDEMIA: ICD-10-CM

## 2019-10-29 LAB
ALBUMIN SERPL-MCNC: 4.4 G/DL (ref 3.5–5.2)
ALBUMIN/GLOB SERPL: 1.6 G/DL
ALP SERPL-CCNC: 83 U/L (ref 39–117)
ALT SERPL W P-5'-P-CCNC: 14 U/L (ref 1–33)
ANION GAP SERPL CALCULATED.3IONS-SCNC: 12.9 MMOL/L (ref 5–15)
AST SERPL-CCNC: 19 U/L (ref 1–32)
BASOPHILS # BLD AUTO: 0.05 10*3/MM3 (ref 0–0.2)
BASOPHILS NFR BLD AUTO: 0.6 % (ref 0–1.5)
BILIRUB SERPL-MCNC: 0.4 MG/DL (ref 0.2–1.2)
BUN BLD-MCNC: 17 MG/DL (ref 8–23)
BUN/CREAT SERPL: 16.5 (ref 7–25)
CALCIUM SPEC-SCNC: 9.4 MG/DL (ref 8.6–10.5)
CHLORIDE SERPL-SCNC: 104 MMOL/L (ref 98–107)
CHOLEST SERPL-MCNC: 131 MG/DL (ref 0–200)
CK SERPL-CCNC: 79 U/L (ref 20–180)
CO2 SERPL-SCNC: 27.1 MMOL/L (ref 22–29)
CREAT BLD-MCNC: 1.03 MG/DL (ref 0.57–1)
DEPRECATED RDW RBC AUTO: 44.3 FL (ref 37–54)
EOSINOPHIL # BLD AUTO: 0.14 10*3/MM3 (ref 0–0.4)
EOSINOPHIL NFR BLD AUTO: 1.7 % (ref 0.3–6.2)
ERYTHROCYTE [DISTWIDTH] IN BLOOD BY AUTOMATED COUNT: 14.1 % (ref 12.3–15.4)
GFR SERPL CREATININE-BSD FRML MDRD: 52 ML/MIN/1.73
GLOBULIN UR ELPH-MCNC: 2.8 GM/DL
GLUCOSE BLD-MCNC: 91 MG/DL (ref 65–99)
HCT VFR BLD AUTO: 40 % (ref 34–46.6)
HDLC SERPL-MCNC: 37 MG/DL (ref 40–60)
HGB BLD-MCNC: 13 G/DL (ref 12–15.9)
IMM GRANULOCYTES # BLD AUTO: 0.02 10*3/MM3 (ref 0–0.05)
IMM GRANULOCYTES NFR BLD AUTO: 0.2 % (ref 0–0.5)
LDLC SERPL CALC-MCNC: 65 MG/DL (ref 0–100)
LDLC/HDLC SERPL: 1.77 {RATIO}
LYMPHOCYTES # BLD AUTO: 2.78 10*3/MM3 (ref 0.7–3.1)
LYMPHOCYTES NFR BLD AUTO: 34.3 % (ref 19.6–45.3)
MCH RBC QN AUTO: 28.4 PG (ref 26.6–33)
MCHC RBC AUTO-ENTMCNC: 32.5 G/DL (ref 31.5–35.7)
MCV RBC AUTO: 87.5 FL (ref 79–97)
MONOCYTES # BLD AUTO: 0.66 10*3/MM3 (ref 0.1–0.9)
MONOCYTES NFR BLD AUTO: 8.1 % (ref 5–12)
NEUTROPHILS # BLD AUTO: 4.45 10*3/MM3 (ref 1.7–7)
NEUTROPHILS NFR BLD AUTO: 55.1 % (ref 42.7–76)
NRBC BLD AUTO-RTO: 0 /100 WBC (ref 0–0.2)
PLATELET # BLD AUTO: 263 10*3/MM3 (ref 140–450)
PMV BLD AUTO: 11.4 FL (ref 6–12)
POTASSIUM BLD-SCNC: 3.8 MMOL/L (ref 3.5–5.2)
PROT SERPL-MCNC: 7.2 G/DL (ref 6–8.5)
RBC # BLD AUTO: 4.57 10*6/MM3 (ref 3.77–5.28)
SODIUM BLD-SCNC: 144 MMOL/L (ref 136–145)
TRIGL SERPL-MCNC: 143 MG/DL (ref 0–150)
VLDLC SERPL-MCNC: 28.6 MG/DL (ref 5–40)
WBC NRBC COR # BLD: 8.1 10*3/MM3 (ref 3.4–10.8)

## 2019-10-29 PROCEDURE — 80061 LIPID PANEL: CPT

## 2019-10-29 PROCEDURE — 82550 ASSAY OF CK (CPK): CPT

## 2019-10-29 PROCEDURE — 36415 COLL VENOUS BLD VENIPUNCTURE: CPT

## 2019-10-29 PROCEDURE — 80053 COMPREHEN METABOLIC PANEL: CPT

## 2019-10-29 PROCEDURE — 85025 COMPLETE CBC W/AUTO DIFF WBC: CPT

## 2019-11-01 ENCOUNTER — OFFICE VISIT (OUTPATIENT)
Dept: CARDIOLOGY | Facility: CLINIC | Age: 79
End: 2019-11-01

## 2019-11-01 VITALS
HEIGHT: 59 IN | DIASTOLIC BLOOD PRESSURE: 76 MMHG | HEART RATE: 51 BPM | WEIGHT: 197 LBS | OXYGEN SATURATION: 94 % | SYSTOLIC BLOOD PRESSURE: 132 MMHG | BODY MASS INDEX: 39.72 KG/M2

## 2019-11-01 DIAGNOSIS — E78.2 MIXED HYPERLIPIDEMIA: ICD-10-CM

## 2019-11-01 DIAGNOSIS — N39.41 URGE INCONTINENCE: ICD-10-CM

## 2019-11-01 DIAGNOSIS — I10 ESSENTIAL HYPERTENSION: Primary | ICD-10-CM

## 2019-11-01 PROCEDURE — 99213 OFFICE O/P EST LOW 20 MIN: CPT | Performed by: INTERNAL MEDICINE

## 2019-11-01 RX ORDER — GABAPENTIN 100 MG/1
100 CAPSULE ORAL 2 TIMES DAILY
Qty: 60 CAPSULE | Refills: 2 | Status: SHIPPED | OUTPATIENT
Start: 2019-11-01 | End: 2020-02-03 | Stop reason: SDUPTHER

## 2019-11-01 RX ORDER — OXYBUTYNIN CHLORIDE 10 MG/1
10 TABLET, EXTENDED RELEASE ORAL DAILY
Qty: 90 TABLET | Refills: 1 | Status: SHIPPED | OUTPATIENT
Start: 2019-11-01

## 2019-11-01 NOTE — PROGRESS NOTES
subjective     Chief Complaint   Patient presents with   • Knee Pain   • Hypertension     History of Present Illness  Patient is 78 years old white female who has history of hypertension.  She is taking losartan 100 mg daily and Coreg 25 twice daily.  She was supposed to be taking Aldactazide but she is not sure if she is still taking it.  She has seen Dr. barkley few months ago and has appointment this month.    Hyperlipidemia on Lipitor therapy  Patient also complains of urinary urgency and stress incontinence which is much better with Ditropan  She is also taking Neurontin for myalgia does not seem to be working good with it.    Past Surgical History:   Procedure Laterality Date   • APPENDECTOMY     • BUNIONECTOMY Right    • CARDIOVASCULAR STRESS TEST  10/2012   • CATARACT EXTRACTION  2017   • CHOLECYSTECTOMY     • COLONOSCOPY  2011   • ECHO - CONVERTED  10/2012   • JOINT REPLACEMENT      bilateral knees    • KNEE ARTHROPLASTY Left    • KNEE ARTHROSCOPY     • SHOULDER ARTHROSCOPY Left 7/28/2016    Procedure: LEFT SHOULDER ACROMIOPLASTY,MINI OPEN ROTATOR CUFF REPAIR;  Surgeon: Carlos Eduardo Smith MD;  Location: [facility];  Service:    • SHOULDER SURGERY  05/31/2016    OPEN ACROMICPLASTY RIGHT SHOULDER     Family History   Problem Relation Age of Onset   • Heart disease Other    • Stroke Sister    • Diabetes Mother    • Heart failure Mother    • Heart disease Mother    • Heart attack Mother    • Hypertension Father    • Emphysema Father    • Heart disease Father    • No Known Problems Brother    • Cancer Sister    • Kidney disease Sister    • Heart failure Sister    • Diabetes Brother    • Diabetes Brother    • Diabetes Brother      Past Medical History:   Diagnosis Date   • GERD (gastroesophageal reflux disease)    • Hyperlipidemia    • Hypertension    • Left shoulder pain    • Obesity    • Osteoarthritis of knees, bilateral    • Overactive bladder    • Right shoulder pain      Patient Active Problem List    Diagnosis   • Complete tear of right rotator cuff   • Chronic kidney disease, stage I   • Cough   • Gastroesophageal reflux disease   • Mixed hyperlipidemia   • Shoulder pain   • Hypertension   • Acromioclavicular joint arthritis   • Impingement syndrome, shoulder   • Complete tear of left rotator cuff   • Urge incontinence   • Atopic rhinitis   • Upper respiratory tract infection   • Leg edema   • Hypercalcemia   • Right knee pain   • DADA (stress urinary incontinence, female)   • Obesity (BMI 35.0-39.9 without comorbidity)   • Left knee injury       Social History     Tobacco Use   • Smoking status: Former Smoker     Packs/day: 2.00     Years: 20.00     Pack years: 40.00     Types: Cigarettes     Last attempt to quit:      Years since quittin.8   • Smokeless tobacco: Never Used   Substance Use Topics   • Alcohol use: No     Comment: s/p    • Drug use: No       Allergies   Allergen Reactions   • Codeine Other (See Comments)     hyperventilate   • Sulfa Antibiotics Rash       Current Outpatient Medications on File Prior to Visit   Medication Sig   • aspirin 81 MG tablet Take 81 mg by mouth daily.   • atorvastatin (LIPITOR) 20 MG tablet TAKE ONE TABLET BY MOUTH ONCE DAILY   • carvedilol (COREG) 25 MG tablet    • Cholecalciferol (VITAMIN D PO) Take 1,000 Units by mouth daily.   • diclofenac (VOLTAREN) 1 % gel gel Apply 4 g topically 4 (Four) Times a Day As Needed (for pain).   • Docusate Calcium (STOOL SOFTENER PO) Take  by mouth daily.   • Fexofenadine HCl (ALLEGRA PO) Take 180 mg by mouth daily as needed.   • losartan (COZAAR) 100 MG tablet TAKE 1 TABLET BY MOUTH ONCE DAILY   • omeprazole OTC (PriLOSEC OTC) 20 MG EC tablet Take 20 mg by mouth daily.   • Probiotic Product (PROBIOTIC COLON SUPPORT PO) Take  by mouth daily.   • spironolactone-hydrochlorothiazide (ALDACTAZIDE) 25-25 MG tablet      No current facility-administered medications on file prior to visit.          The following portions of the  "patient's history were reviewed and updated as appropriate: allergies, current medications, past family history, past medical history, past social history, past surgical history and problem list.    Review of Systems   Constitution: Negative.   HENT: Negative.  Negative for congestion.    Eyes: Negative.    Cardiovascular: Negative.  Negative for chest pain, cyanosis, dyspnea on exertion, irregular heartbeat, leg swelling, near-syncope, orthopnea, palpitations, paroxysmal nocturnal dyspnea and syncope.   Respiratory: Negative.  Negative for shortness of breath.    Hematologic/Lymphatic: Negative.    Musculoskeletal: Negative.    Gastrointestinal: Negative.    Neurological: Negative.  Negative for headaches.          Objective:     /76 (BP Location: Left arm, Patient Position: Sitting, Cuff Size: Adult)   Pulse 51   Ht 149.9 cm (59\")   Wt 89.4 kg (197 lb)   SpO2 94%   BMI 39.79 kg/m²   Physical Exam   Constitutional: She appears well-developed and well-nourished. No distress.   HENT:   Head: Normocephalic and atraumatic.   Mouth/Throat: Oropharynx is clear and moist. No oropharyngeal exudate.   Eyes: Conjunctivae and EOM are normal. Pupils are equal, round, and reactive to light. No scleral icterus.   Neck: Normal range of motion. Neck supple. No JVD present. No tracheal deviation present. No thyromegaly present.   Cardiovascular: Normal rate, regular rhythm, normal heart sounds and intact distal pulses. PMI is not displaced. Exam reveals no gallop, no friction rub and no decreased pulses.   No murmur heard.  Pulses:       Carotid pulses are 3+ on the right side, and 3+ on the left side.       Radial pulses are 3+ on the right side, and 3+ on the left side.   Pulmonary/Chest: Effort normal and breath sounds normal. No respiratory distress. She has no wheezes. She has no rales. She exhibits no tenderness.   Abdominal: Soft. Bowel sounds are normal. She exhibits no distension, no abdominal bruit and no mass. " There is no splenomegaly or hepatomegaly. There is no tenderness. There is no rebound and no guarding.   Musculoskeletal: Normal range of motion. She exhibits no edema, tenderness or deformity.   Lymphadenopathy:     She has no cervical adenopathy.   Neurological: She is alert. She has normal reflexes. No cranial nerve deficit. She exhibits normal muscle tone. Coordination normal.   Skin: Skin is warm and dry. No rash noted. She is not diaphoretic. No erythema.   Psychiatric: She has a normal mood and affect. Her behavior is normal. Judgment and thought content normal.         Lab Review  Lab Results   Component Value Date     10/29/2019    K 3.8 10/29/2019     10/29/2019    BUN 17 10/29/2019    CREATININE 1.03 (H) 10/29/2019    GLUCOSE 91 10/29/2019    CALCIUM 9.4 10/29/2019    ALT 14 10/29/2019    ALKPHOS 83 10/29/2019    LABIL2 1.6 06/08/2016     Lab Results   Component Value Date    CKTOTAL 79 10/29/2019     Lab Results   Component Value Date    WBC 8.10 10/29/2019    HGB 13.0 10/29/2019    HCT 40.0 10/29/2019     10/29/2019     No results found for: BNP  Lab Results   Component Value Date    CHLPL 133 06/08/2016    CHOL 131 10/29/2019    TRIG 143 10/29/2019    HDL 37 (L) 10/29/2019    VLDL 28.6 10/29/2019    LDLHDL 1.77 10/29/2019     Lab Results   Component Value Date    LDL 65 10/29/2019    LDL 58 04/30/2019       Procedures       I personally viewed and interpreted the patient's LAB data         Assessment:     1. Essential hypertension    2. Mixed hyperlipidemia    3. Urge incontinence          Plan:     Lab work reviewed and discussed with the patient  Lipid panel is normal patient will continue Lipitor therapy  Blood pressure is mildly elevated however patient's blood pressure has been good according to her will continue current therapy.  She is not sure if she is taking Aldactazide, patient will check at home and also will discuss with Dr. barkley next visit.  Ditropan was renewed  .  Neurontin was also renewed  and rest of the medications were continued  Follow-up in 3 months        No Follow-up on file.

## 2019-12-04 ENCOUNTER — OFFICE VISIT (OUTPATIENT)
Dept: CARDIOLOGY | Facility: CLINIC | Age: 79
End: 2019-12-04

## 2019-12-04 VITALS
HEART RATE: 62 BPM | DIASTOLIC BLOOD PRESSURE: 78 MMHG | SYSTOLIC BLOOD PRESSURE: 154 MMHG | BODY MASS INDEX: 38.51 KG/M2 | OXYGEN SATURATION: 98 % | HEIGHT: 59 IN | WEIGHT: 191 LBS

## 2019-12-04 DIAGNOSIS — E78.2 MIXED HYPERLIPIDEMIA: ICD-10-CM

## 2019-12-04 DIAGNOSIS — J06.9 ACUTE URI: Primary | ICD-10-CM

## 2019-12-04 DIAGNOSIS — J06.9 UPPER RESPIRATORY TRACT INFECTION, UNSPECIFIED TYPE: ICD-10-CM

## 2019-12-04 DIAGNOSIS — I10 ESSENTIAL HYPERTENSION: ICD-10-CM

## 2019-12-04 PROCEDURE — 99214 OFFICE O/P EST MOD 30 MIN: CPT | Performed by: INTERNAL MEDICINE

## 2019-12-04 PROCEDURE — 96372 THER/PROPH/DIAG INJ SC/IM: CPT | Performed by: INTERNAL MEDICINE

## 2019-12-04 RX ORDER — TRIAMCINOLONE ACETONIDE 40 MG/ML
40 INJECTION, SUSPENSION INTRA-ARTICULAR; INTRAMUSCULAR ONCE
Status: COMPLETED | OUTPATIENT
Start: 2019-12-04 | End: 2019-12-04

## 2019-12-04 RX ORDER — BENZONATATE 100 MG/1
100 CAPSULE ORAL 3 TIMES DAILY PRN
Qty: 90 CAPSULE | Refills: 0 | Status: SHIPPED | OUTPATIENT
Start: 2019-12-04 | End: 2020-01-31

## 2019-12-04 RX ORDER — CEFTRIAXONE 500 MG/1
500 INJECTION, POWDER, FOR SOLUTION INTRAMUSCULAR; INTRAVENOUS EVERY 24 HOURS
Status: COMPLETED | OUTPATIENT
Start: 2019-12-04 | End: 2019-12-04

## 2019-12-04 RX ORDER — VALSARTAN 160 MG/1
160 TABLET ORAL 2 TIMES DAILY
Qty: 180 TABLET | Refills: 3 | Status: SHIPPED | OUTPATIENT
Start: 2019-12-04 | End: 2020-01-31

## 2019-12-04 RX ORDER — CEFDINIR 300 MG/1
300 CAPSULE ORAL 2 TIMES DAILY
Qty: 14 CAPSULE | Refills: 0 | Status: SHIPPED | OUTPATIENT
Start: 2019-12-04 | End: 2020-01-31

## 2019-12-04 RX ADMIN — CEFTRIAXONE 500 MG: 500 INJECTION, POWDER, FOR SOLUTION INTRAMUSCULAR; INTRAVENOUS at 12:12

## 2019-12-04 RX ADMIN — TRIAMCINOLONE ACETONIDE 40 MG: 40 INJECTION, SUSPENSION INTRA-ARTICULAR; INTRAMUSCULAR at 12:13

## 2019-12-04 NOTE — PROGRESS NOTES
subjective     Chief Complaint   Patient presents with   • Cough     productive, worse at night, fever on & off x 3 weeks   • Fatigue     History of Present Illness  Patient is 78 years old white female who complains of cough productive of yellowish sputum for last few weeks.  She states that she has been having some fever also but no chills no body aches.  She is tired and fatigued.  Blood pressure is running slightly high.  She is taking losartan 100 mg daily along with Coreg.    She also has hyperlipidemia on Lipitor 20 mg daily      Past Surgical History:   Procedure Laterality Date   • APPENDECTOMY     • BUNIONECTOMY Right    • CARDIOVASCULAR STRESS TEST  10/2012   • CATARACT EXTRACTION  2017   • CHOLECYSTECTOMY     • COLONOSCOPY  2011   • ECHO - CONVERTED  10/2012   • JOINT REPLACEMENT      bilateral knees    • KNEE ARTHROPLASTY Left    • KNEE ARTHROSCOPY     • SHOULDER ARTHROSCOPY Left 7/28/2016    Procedure: LEFT SHOULDER ACROMIOPLASTY,MINI OPEN ROTATOR CUFF REPAIR;  Surgeon: Carlos Eduardo Smith MD;  Location: [facility];  Service:    • SHOULDER SURGERY  05/31/2016    OPEN ACROMICPLASTY RIGHT SHOULDER     Family History   Problem Relation Age of Onset   • Heart disease Other    • Stroke Sister    • Diabetes Mother    • Heart failure Mother    • Heart disease Mother    • Heart attack Mother    • Hypertension Father    • Emphysema Father    • Heart disease Father    • No Known Problems Brother    • Cancer Sister    • Kidney disease Sister    • Heart failure Sister    • Diabetes Brother    • Diabetes Brother    • Diabetes Brother      Past Medical History:   Diagnosis Date   • GERD (gastroesophageal reflux disease)    • Hyperlipidemia    • Hypertension    • Left shoulder pain    • Obesity    • Osteoarthritis of knees, bilateral    • Overactive bladder    • Right shoulder pain      Patient Active Problem List   Diagnosis   • Complete tear of right rotator cuff   • Chronic kidney disease, stage I   • Cough   •  Gastroesophageal reflux disease   • Mixed hyperlipidemia   • Shoulder pain   • Hypertension   • Acromioclavicular joint arthritis   • Impingement syndrome, shoulder   • Complete tear of left rotator cuff   • Urge incontinence   • Atopic rhinitis   • Upper respiratory tract infection   • Leg edema   • Hypercalcemia   • Right knee pain   • DADA (stress urinary incontinence, female)   • Obesity (BMI 35.0-39.9 without comorbidity)   • Left knee injury       Social History     Tobacco Use   • Smoking status: Former Smoker     Packs/day: 2.00     Years: 20.00     Pack years: 40.00     Types: Cigarettes     Last attempt to quit:      Years since quittin.9   • Smokeless tobacco: Never Used   Substance Use Topics   • Alcohol use: No     Comment: s/p    • Drug use: No       Allergies   Allergen Reactions   • Codeine Other (See Comments)     hyperventilate   • Sulfa Antibiotics Rash       Current Outpatient Medications on File Prior to Visit   Medication Sig   • aspirin 81 MG tablet Take 81 mg by mouth daily.   • atorvastatin (LIPITOR) 20 MG tablet TAKE ONE TABLET BY MOUTH ONCE DAILY   • carvedilol (COREG) 25 MG tablet    • Cholecalciferol (VITAMIN D PO) Take 1,000 Units by mouth daily.   • diclofenac (VOLTAREN) 1 % gel gel Apply 4 g topically 4 (Four) Times a Day As Needed (for pain).   • Docusate Calcium (STOOL SOFTENER PO) Take  by mouth daily.   • Fexofenadine HCl (ALLEGRA PO) Take 180 mg by mouth daily as needed.   • gabapentin (NEURONTIN) 100 MG capsule Take 1 capsule by mouth 2 (Two) Times a Day.   • omeprazole OTC (PriLOSEC OTC) 20 MG EC tablet Take 20 mg by mouth daily.   • oxybutynin XL (DITROPAN-XL) 10 MG 24 hr tablet Take 1 tablet by mouth Daily.   • Probiotic Product (PROBIOTIC COLON SUPPORT PO) Take  by mouth daily.   • spironolactone-hydrochlorothiazide (ALDACTAZIDE) 25-25 MG tablet    • [DISCONTINUED] losartan (COZAAR) 100 MG tablet TAKE 1 TABLET BY MOUTH ONCE DAILY     No current  "facility-administered medications on file prior to visit.          The following portions of the patient's history were reviewed and updated as appropriate: allergies, current medications, past family history, past medical history, past social history, past surgical history and problem list.    Review of Systems   Constitution: Positive for fever.   HENT: Negative.  Negative for congestion.    Eyes: Negative.    Cardiovascular: Negative.  Negative for chest pain, cyanosis, dyspnea on exertion, irregular heartbeat, leg swelling, near-syncope, orthopnea, palpitations, paroxysmal nocturnal dyspnea and syncope.   Respiratory: Positive for cough and sputum production. Negative for shortness of breath.    Hematologic/Lymphatic: Negative.    Musculoskeletal: Negative.    Gastrointestinal: Negative.    Neurological: Negative.  Negative for headaches.          Objective:     /78 (BP Location: Left arm, Patient Position: Sitting, Cuff Size: Adult)   Pulse 62   Ht 149.9 cm (59\")   Wt 86.6 kg (191 lb)   SpO2 98%   BMI 38.58 kg/m²   Physical Exam   Constitutional: She appears well-developed and well-nourished. No distress.   HENT:   Head: Normocephalic and atraumatic.   Mouth/Throat: Oropharynx is clear and moist. No oropharyngeal exudate.   Eyes: Conjunctivae and EOM are normal. Pupils are equal, round, and reactive to light. No scleral icterus.   Neck: Normal range of motion. Neck supple. No JVD present. No tracheal deviation present. No thyromegaly present.   Cardiovascular: Normal rate, regular rhythm, normal heart sounds and intact distal pulses. PMI is not displaced. Exam reveals no gallop, no friction rub and no decreased pulses.   No murmur heard.  Pulses:       Carotid pulses are 3+ on the right side, and 3+ on the left side.       Radial pulses are 3+ on the right side, and 3+ on the left side.   Pulmonary/Chest: Effort normal and breath sounds normal. No respiratory distress. She has no wheezes. She has no " rales. She exhibits no tenderness.   Abdominal: Soft. Bowel sounds are normal. She exhibits no distension, no abdominal bruit and no mass. There is no splenomegaly or hepatomegaly. There is no tenderness. There is no rebound and no guarding.   Musculoskeletal: Normal range of motion. She exhibits no edema, tenderness or deformity.   Lymphadenopathy:     She has no cervical adenopathy.   Neurological: She is alert. She has normal reflexes. No cranial nerve deficit. She exhibits normal muscle tone. Coordination normal.   Skin: Skin is warm and dry. No rash noted. She is not diaphoretic. No erythema.   Psychiatric: She has a normal mood and affect. Her behavior is normal. Judgment and thought content normal.         Lab Review  Lab Results   Component Value Date     10/29/2019    K 3.8 10/29/2019     10/29/2019    BUN 17 10/29/2019    CREATININE 1.03 (H) 10/29/2019    GLUCOSE 91 10/29/2019    CALCIUM 9.4 10/29/2019    ALT 14 10/29/2019    ALKPHOS 83 10/29/2019    LABIL2 1.6 06/08/2016     Lab Results   Component Value Date    CKTOTAL 79 10/29/2019     Lab Results   Component Value Date    WBC 8.10 10/29/2019    HGB 13.0 10/29/2019    HCT 40.0 10/29/2019     10/29/2019     Lab Results   Component Value Date    INR 1.97 12/11/2014    INR 2.02 12/08/2014    INR 2.46 12/04/2014     No results found for: MG  Lab Results   Component Value Date    TSH 1.950 09/14/2018     No results found for: BNP  Lab Results   Component Value Date    CHLPL 133 06/08/2016    CHOL 131 10/29/2019    TRIG 143 10/29/2019    HDL 37 (L) 10/29/2019    VLDL 28.6 10/29/2019    LDLHDL 1.77 10/29/2019     Lab Results   Component Value Date    LDL 65 10/29/2019    LDL 58 04/30/2019       Procedures       I personally viewed and interpreted the patient's LAB data         Assessment:     1. Acute URI    2. Essential hypertension    3. Mixed hyperlipidemia    4. Upper respiratory tract infection, unspecified type          Plan:   Patient  was given Rocephin injection along with Kenalog.  She was started on Omnicef 300 twice daily.  Tessalon Perles were given for cough.  Blood pressure is elevated she was advised to discontinue losartan and take valsartan 160 twice daily  LDL is 65.  She will continue Lipitor 20 mg daily.  Healthy lifestyle weight loss emphasized follow-up scheduled           No Follow-up on file.

## 2019-12-10 ENCOUNTER — TRANSCRIBE ORDERS (OUTPATIENT)
Dept: ADMINISTRATIVE | Facility: HOSPITAL | Age: 79
End: 2019-12-10

## 2019-12-10 ENCOUNTER — LAB (OUTPATIENT)
Dept: LAB | Facility: HOSPITAL | Age: 79
End: 2019-12-10

## 2019-12-10 DIAGNOSIS — N18.30 CHRONIC KIDNEY DISEASE, STAGE III (MODERATE) (HCC): ICD-10-CM

## 2019-12-10 DIAGNOSIS — N18.30 CHRONIC KIDNEY DISEASE, STAGE III (MODERATE) (HCC): Primary | ICD-10-CM

## 2019-12-10 LAB
ALBUMIN UR-MCNC: <1.2 MG/DL
ANION GAP SERPL CALCULATED.3IONS-SCNC: 11.8 MMOL/L (ref 5–15)
BUN BLD-MCNC: 16 MG/DL (ref 8–23)
BUN/CREAT SERPL: 16.8 (ref 7–25)
CALCIUM SPEC-SCNC: 10.1 MG/DL (ref 8.6–10.5)
CHLORIDE SERPL-SCNC: 104 MMOL/L (ref 98–107)
CO2 SERPL-SCNC: 28.2 MMOL/L (ref 22–29)
CREAT BLD-MCNC: 0.95 MG/DL (ref 0.57–1)
CREAT UR-MCNC: 74 MG/DL
GFR SERPL CREATININE-BSD FRML MDRD: 57 ML/MIN/1.73
GLUCOSE BLD-MCNC: 96 MG/DL (ref 65–99)
MICROALBUMIN/CREAT UR: NORMAL MG/G{CREAT}
POTASSIUM BLD-SCNC: 4 MMOL/L (ref 3.5–5.2)
SODIUM BLD-SCNC: 144 MMOL/L (ref 136–145)

## 2019-12-10 PROCEDURE — 82043 UR ALBUMIN QUANTITATIVE: CPT

## 2019-12-10 PROCEDURE — 80048 BASIC METABOLIC PNL TOTAL CA: CPT

## 2019-12-10 PROCEDURE — 82570 ASSAY OF URINE CREATININE: CPT

## 2019-12-10 PROCEDURE — 36415 COLL VENOUS BLD VENIPUNCTURE: CPT

## 2020-01-31 ENCOUNTER — OFFICE VISIT (OUTPATIENT)
Dept: CARDIOLOGY | Facility: CLINIC | Age: 80
End: 2020-01-31

## 2020-01-31 VITALS
HEIGHT: 59 IN | SYSTOLIC BLOOD PRESSURE: 136 MMHG | DIASTOLIC BLOOD PRESSURE: 71 MMHG | BODY MASS INDEX: 37.9 KG/M2 | OXYGEN SATURATION: 96 % | HEART RATE: 61 BPM | WEIGHT: 188 LBS

## 2020-01-31 DIAGNOSIS — E78.2 MIXED HYPERLIPIDEMIA: Primary | ICD-10-CM

## 2020-01-31 DIAGNOSIS — E66.9 OBESITY (BMI 35.0-39.9 WITHOUT COMORBIDITY): ICD-10-CM

## 2020-01-31 DIAGNOSIS — K21.9 GASTROESOPHAGEAL REFLUX DISEASE WITHOUT ESOPHAGITIS: ICD-10-CM

## 2020-01-31 DIAGNOSIS — I10 ESSENTIAL HYPERTENSION: ICD-10-CM

## 2020-01-31 DIAGNOSIS — N39.41 URGE INCONTINENCE: ICD-10-CM

## 2020-01-31 PROCEDURE — 99214 OFFICE O/P EST MOD 30 MIN: CPT | Performed by: INTERNAL MEDICINE

## 2020-01-31 RX ORDER — VALSARTAN 160 MG/1
160 TABLET ORAL DAILY
Qty: 180 TABLET | Refills: 3 | Status: SHIPPED | OUTPATIENT
Start: 2020-01-31 | End: 2020-05-01 | Stop reason: ALTCHOICE

## 2020-02-03 RX ORDER — GABAPENTIN 100 MG/1
100 CAPSULE ORAL 2 TIMES DAILY
Qty: 60 CAPSULE | Refills: 2 | OUTPATIENT
Start: 2020-02-03 | End: 2020-05-01 | Stop reason: SDUPTHER

## 2020-03-02 ENCOUNTER — LAB (OUTPATIENT)
Dept: LAB | Facility: HOSPITAL | Age: 80
End: 2020-03-02

## 2020-03-02 ENCOUNTER — TRANSCRIBE ORDERS (OUTPATIENT)
Dept: ADMINISTRATIVE | Facility: HOSPITAL | Age: 80
End: 2020-03-02

## 2020-03-02 DIAGNOSIS — N18.30 CHRONIC KIDNEY DISEASE, STAGE III (MODERATE) (HCC): ICD-10-CM

## 2020-03-02 DIAGNOSIS — N18.30 CHRONIC KIDNEY DISEASE, STAGE III (MODERATE) (HCC): Primary | ICD-10-CM

## 2020-03-02 LAB
ALBUMIN UR-MCNC: <1.2 MG/DL
ANION GAP SERPL CALCULATED.3IONS-SCNC: 13.2 MMOL/L (ref 5–15)
BUN BLD-MCNC: 21 MG/DL (ref 8–23)
BUN/CREAT SERPL: 18.9 (ref 7–25)
CALCIUM SPEC-SCNC: 9.6 MG/DL (ref 8.6–10.5)
CHLORIDE SERPL-SCNC: 105 MMOL/L (ref 98–107)
CO2 SERPL-SCNC: 25.8 MMOL/L (ref 22–29)
CREAT BLD-MCNC: 1.11 MG/DL (ref 0.57–1)
CREAT UR-MCNC: 148.7 MG/DL
GFR SERPL CREATININE-BSD FRML MDRD: 48 ML/MIN/1.73
GLUCOSE BLD-MCNC: 93 MG/DL (ref 65–99)
MICROALBUMIN/CREAT UR: NORMAL MG/G{CREAT}
POTASSIUM BLD-SCNC: 3.9 MMOL/L (ref 3.5–5.2)
SODIUM BLD-SCNC: 144 MMOL/L (ref 136–145)

## 2020-03-02 PROCEDURE — 82570 ASSAY OF URINE CREATININE: CPT

## 2020-03-02 PROCEDURE — 82043 UR ALBUMIN QUANTITATIVE: CPT

## 2020-03-02 PROCEDURE — 80048 BASIC METABOLIC PNL TOTAL CA: CPT

## 2020-03-02 PROCEDURE — 36415 COLL VENOUS BLD VENIPUNCTURE: CPT

## 2020-03-12 ENCOUNTER — TELEPHONE (OUTPATIENT)
Dept: CARDIOLOGY | Facility: CLINIC | Age: 80
End: 2020-03-12

## 2020-03-12 ENCOUNTER — LAB (OUTPATIENT)
Dept: LAB | Facility: HOSPITAL | Age: 80
End: 2020-03-12

## 2020-03-12 DIAGNOSIS — J06.9 ACUTE URI: ICD-10-CM

## 2020-03-12 DIAGNOSIS — J06.9 ACUTE URI: Primary | ICD-10-CM

## 2020-03-12 DIAGNOSIS — R52 BODY ACHES: ICD-10-CM

## 2020-03-12 DIAGNOSIS — R50.81 FEVER IN OTHER DISEASES: ICD-10-CM

## 2020-03-12 LAB
FLUAV AG NPH QL: POSITIVE
FLUBV AG NPH QL IA: NEGATIVE

## 2020-03-12 PROCEDURE — 87804 INFLUENZA ASSAY W/OPTIC: CPT

## 2020-03-12 RX ORDER — OSELTAMIVIR PHOSPHATE 30 MG/1
30 CAPSULE ORAL EVERY 12 HOURS SCHEDULED
Qty: 10 CAPSULE | Refills: 0 | Status: SHIPPED | OUTPATIENT
Start: 2020-03-12 | End: 2020-03-17

## 2020-03-12 NOTE — TELEPHONE ENCOUNTER
Patient c/o dry hacky cough, no temp, sneezing, runny nose, body aches and feels bad all over, thinks it is a cold or the flu, wants to come in or have something called in, or should we send her to a walk in clinic where she can be tested for the flu.

## 2020-03-16 RX ORDER — ATORVASTATIN CALCIUM 20 MG/1
TABLET, FILM COATED ORAL
Qty: 90 TABLET | Refills: 0 | Status: SHIPPED | OUTPATIENT
Start: 2020-03-16 | End: 2020-06-08

## 2020-05-01 ENCOUNTER — OFFICE VISIT (OUTPATIENT)
Dept: CARDIOLOGY | Facility: CLINIC | Age: 80
End: 2020-05-01

## 2020-05-01 VITALS
BODY MASS INDEX: 36.97 KG/M2 | SYSTOLIC BLOOD PRESSURE: 135 MMHG | HEART RATE: 74 BPM | HEIGHT: 59 IN | WEIGHT: 183.4 LBS | DIASTOLIC BLOOD PRESSURE: 81 MMHG

## 2020-05-01 DIAGNOSIS — I10 ESSENTIAL HYPERTENSION: ICD-10-CM

## 2020-05-01 DIAGNOSIS — E66.9 OBESITY (BMI 35.0-39.9 WITHOUT COMORBIDITY): ICD-10-CM

## 2020-05-01 DIAGNOSIS — E78.2 MIXED HYPERLIPIDEMIA: Primary | ICD-10-CM

## 2020-05-01 DIAGNOSIS — S89.92XA INJURY OF LEFT KNEE, INITIAL ENCOUNTER: ICD-10-CM

## 2020-05-01 PROCEDURE — 99442 PR PHYS/QHP TELEPHONE EVALUATION 11-20 MIN: CPT | Performed by: INTERNAL MEDICINE

## 2020-05-01 RX ORDER — GABAPENTIN 100 MG/1
100 CAPSULE ORAL 2 TIMES DAILY
Qty: 60 CAPSULE | Refills: 2 | Status: SHIPPED | OUTPATIENT
Start: 2020-05-01 | End: 2020-08-03 | Stop reason: SDUPTHER

## 2020-05-01 RX ORDER — LOSARTAN POTASSIUM 100 MG/1
100 TABLET ORAL DAILY
COMMUNITY
Start: 2020-03-11 | End: 2022-10-18 | Stop reason: ALTCHOICE

## 2020-05-01 NOTE — PROGRESS NOTES
subjective     Chief Complaint   Patient presents with   • Knee Pain     Follow up   • Hypertension     Follow up   • Med Refill     pending     History of Present Illness  You have chosen to receive care through a telephone visit. Do you consent to use a telephone visit for your medical care today? Yes    Patient is 79 years old white female who is being evaluated via telephone visit.  Patient states that blood pressure is doing very well she is checking it at home is around 135/81 with a heart rate about 74.  She is taking her medications regularly, she is taking Coreg and losartan.  No drug side effects.  Hyperlipidemia is being treated with Lipitor.  Lab work will be scheduled.  Patient has history of arthritis and muscle spasms.  She is taking Neurontin which seems to be helping.  GI symptoms are better with the Prilosec.  Overall she seems to be doing very well and needs Neurontin refill.  She is trying to lose weight also.    Past Surgical History:   Procedure Laterality Date   • APPENDECTOMY     • BUNIONECTOMY Right    • CARDIOVASCULAR STRESS TEST  10/2012   • CATARACT EXTRACTION  2017   • CHOLECYSTECTOMY     • COLONOSCOPY  2011   • ECHO - CONVERTED  10/2012   • JOINT REPLACEMENT      bilateral knees    • KNEE ARTHROPLASTY Left    • KNEE ARTHROSCOPY     • SHOULDER ARTHROSCOPY Left 7/28/2016    Procedure: LEFT SHOULDER ACROMIOPLASTY,MINI OPEN ROTATOR CUFF REPAIR;  Surgeon: Carlos Eduardo Smith MD;  Location: [facility];  Service:    • SHOULDER SURGERY  05/31/2016    OPEN ACROMICPLASTY RIGHT SHOULDER     Family History   Problem Relation Age of Onset   • Heart disease Other    • Stroke Sister    • Diabetes Mother    • Heart failure Mother    • Heart disease Mother    • Heart attack Mother    • Hypertension Father    • Emphysema Father    • Heart disease Father    • No Known Problems Brother    • Cancer Sister    • Kidney disease Sister    • Heart failure Sister    • Diabetes Brother    • Diabetes Brother    •  Diabetes Brother      Past Medical History:   Diagnosis Date   • GERD (gastroesophageal reflux disease)    • Hyperlipidemia    • Hypertension    • Left shoulder pain    • Obesity    • Osteoarthritis of knees, bilateral    • Overactive bladder    • Right shoulder pain      Patient Active Problem List   Diagnosis   • Complete tear of right rotator cuff   • Chronic kidney disease, stage I   • Cough   • Gastroesophageal reflux disease   • Mixed hyperlipidemia   • Shoulder pain   • Hypertension   • Acromioclavicular joint arthritis   • Impingement syndrome, shoulder   • Complete tear of left rotator cuff   • Urge incontinence   • Atopic rhinitis   • Upper respiratory tract infection   • Leg edema   • Hypercalcemia   • Right knee pain   • DADA (stress urinary incontinence, female)   • Obesity (BMI 35.0-39.9 without comorbidity)   • Left knee injury       Social History     Tobacco Use   • Smoking status: Former Smoker     Packs/day: 2.00     Years: 20.00     Pack years: 40.00     Types: Cigarettes     Last attempt to quit:      Years since quittin.3   • Smokeless tobacco: Never Used   Substance Use Topics   • Alcohol use: No     Comment: s/p    • Drug use: No       Allergies   Allergen Reactions   • Codeine Other (See Comments)     hyperventilate   • Sulfa Antibiotics Rash       Current Outpatient Medications on File Prior to Visit   Medication Sig   • atorvastatin (LIPITOR) 20 MG tablet Take 1 tablet by mouth once daily   • carvedilol (COREG) 25 MG tablet Take 25 mg by mouth 2 (Two) Times a Day With Meals.   • Cholecalciferol (VITAMIN D PO) Take 1,000 Units by mouth daily.   • Docusate Calcium (STOOL SOFTENER PO) Take  by mouth daily.   • Fexofenadine HCl (ALLEGRA PO) Take 180 mg by mouth daily as needed.   • losartan (COZAAR) 100 MG tablet Take 100 mg by mouth Daily.   • omeprazole OTC (PriLOSEC OTC) 20 MG EC tablet Take 20 mg by mouth daily.   • oxybutynin XL (DITROPAN-XL) 10 MG 24 hr tablet Take 1 tablet  "by mouth Daily.   • Probiotic Product (PROBIOTIC COLON SUPPORT PO) Take  by mouth daily.   • [DISCONTINUED] gabapentin (NEURONTIN) 100 MG capsule Take 1 capsule by mouth 2 (Two) Times a Day.   • [DISCONTINUED] spironolactone-hydrochlorothiazide (ALDACTAZIDE) 25-25 MG tablet    • [DISCONTINUED] valsartan (DIOVAN) 160 MG tablet Take 1 tablet by mouth Daily.     No current facility-administered medications on file prior to visit.          The following portions of the patient's history were reviewed and updated as appropriate: allergies, current medications, past family history, past medical history, past social history, past surgical history and problem list.    Review of Systems   Constitution: Negative.   HENT: Negative.  Negative for congestion.    Eyes: Negative.    Cardiovascular: Negative.  Negative for chest pain, cyanosis, dyspnea on exertion, irregular heartbeat, leg swelling, near-syncope, orthopnea, palpitations, paroxysmal nocturnal dyspnea and syncope.   Respiratory: Negative.  Negative for shortness of breath.    Hematologic/Lymphatic: Negative.    Musculoskeletal: Positive for arthritis and joint pain.   Gastrointestinal: Negative.    Neurological: Negative.  Negative for headaches.          Objective:     /81 Comment: verbal per patient  Pulse 74 Comment: verbal per patient  Ht 149.9 cm (59\")   Wt 83.2 kg (183 lb 6.4 oz) Comment: verbal per patient  BMI 37.04 kg/m²   Physical Exam   Constitutional:   Not done         Lab Review  Lab Results   Component Value Date     03/02/2020    K 3.9 03/02/2020     03/02/2020    BUN 21 03/02/2020    CREATININE 1.11 (H) 03/02/2020    GLUCOSE 93 03/02/2020    CALCIUM 9.6 03/02/2020    ALT 14 10/29/2019    ALKPHOS 83 10/29/2019    LABIL2 1.6 06/08/2016     Lab Results   Component Value Date    CKTOTAL 79 10/29/2019     Lab Results   Component Value Date    WBC 8.10 10/29/2019    HGB 13.0 10/29/2019    HCT 40.0 10/29/2019     10/29/2019 "     Lab Results   Component Value Date    INR 1.97 12/11/2014    INR 2.02 12/08/2014    INR 2.46 12/04/2014     No results found for: MG  Lab Results   Component Value Date    TSH 1.950 09/14/2018     No results found for: BNP  Lab Results   Component Value Date    CHLPL 133 06/08/2016    CHOL 131 10/29/2019    TRIG 143 10/29/2019    HDL 37 (L) 10/29/2019    VLDL 28.6 10/29/2019    LDLHDL 1.77 10/29/2019     Lab Results   Component Value Date    LDL 65 10/29/2019    LDL 58 04/30/2019       Procedures       I personally viewed and interpreted the patient's LAB data         Assessment:     1. Mixed hyperlipidemia    2. Essential hypertension    3. Obesity (BMI 35.0-39.9 without comorbidity)    4. Injury of left knee, initial encounter          Plan:        Patient was advised to continue current medications, blood pressure is very well controlled Coreg and losartan was continued.  Lipitor was also continued last LDL was 65.  There are no drug side effects.  Lab work scheduled for next visit.  Weight loss also specified.  Neurontin refill was given.  Coronavirus precautions discussed.  This visit has been rescheduled as a phone visit to comply with patient safety concerns in accordance with CDC recommendations. Total time of discussion was 15 minutes.        No follow-ups on file.

## 2020-06-08 RX ORDER — ATORVASTATIN CALCIUM 20 MG/1
TABLET, FILM COATED ORAL
Qty: 90 TABLET | Refills: 1 | Status: SHIPPED | OUTPATIENT
Start: 2020-06-08 | End: 2020-12-14

## 2020-06-10 ENCOUNTER — LAB (OUTPATIENT)
Dept: LAB | Facility: HOSPITAL | Age: 80
End: 2020-06-10

## 2020-06-10 ENCOUNTER — TRANSCRIBE ORDERS (OUTPATIENT)
Dept: ADMINISTRATIVE | Facility: HOSPITAL | Age: 80
End: 2020-06-10

## 2020-06-10 DIAGNOSIS — N18.30 CHRONIC KIDNEY DISEASE, STAGE III (MODERATE) (HCC): Primary | ICD-10-CM

## 2020-06-10 DIAGNOSIS — N18.30 CHRONIC KIDNEY DISEASE, STAGE III (MODERATE) (HCC): ICD-10-CM

## 2020-06-10 LAB
ALBUMIN UR-MCNC: 1.2 MG/DL
ANION GAP SERPL CALCULATED.3IONS-SCNC: 10.5 MMOL/L (ref 5–15)
BUN BLD-MCNC: 12 MG/DL (ref 8–23)
BUN/CREAT SERPL: 13.3 (ref 7–25)
CALCIUM SPEC-SCNC: 9.6 MG/DL (ref 8.6–10.5)
CHLORIDE SERPL-SCNC: 105 MMOL/L (ref 98–107)
CO2 SERPL-SCNC: 26.5 MMOL/L (ref 22–29)
CREAT BLD-MCNC: 0.9 MG/DL (ref 0.57–1)
CREAT UR-MCNC: 122.8 MG/DL
GFR SERPL CREATININE-BSD FRML MDRD: 61 ML/MIN/1.73
GLUCOSE BLD-MCNC: 92 MG/DL (ref 65–99)
MICROALBUMIN/CREAT UR: 9.8 MG/G
POTASSIUM BLD-SCNC: 3.8 MMOL/L (ref 3.5–5.2)
SODIUM BLD-SCNC: 142 MMOL/L (ref 136–145)

## 2020-06-10 PROCEDURE — 82570 ASSAY OF URINE CREATININE: CPT

## 2020-06-10 PROCEDURE — 82043 UR ALBUMIN QUANTITATIVE: CPT

## 2020-06-10 PROCEDURE — 80048 BASIC METABOLIC PNL TOTAL CA: CPT

## 2020-06-10 PROCEDURE — 36415 COLL VENOUS BLD VENIPUNCTURE: CPT

## 2020-08-03 ENCOUNTER — OFFICE VISIT (OUTPATIENT)
Dept: CARDIOLOGY | Facility: CLINIC | Age: 80
End: 2020-08-03

## 2020-08-03 VITALS
HEIGHT: 59 IN | TEMPERATURE: 98.1 F | WEIGHT: 184 LBS | HEART RATE: 71 BPM | SYSTOLIC BLOOD PRESSURE: 118 MMHG | OXYGEN SATURATION: 98 % | BODY MASS INDEX: 37.09 KG/M2 | DIASTOLIC BLOOD PRESSURE: 68 MMHG

## 2020-08-03 DIAGNOSIS — I10 ESSENTIAL HYPERTENSION: ICD-10-CM

## 2020-08-03 DIAGNOSIS — E78.2 MIXED HYPERLIPIDEMIA: Primary | ICD-10-CM

## 2020-08-03 DIAGNOSIS — N39.41 URGE INCONTINENCE: ICD-10-CM

## 2020-08-03 DIAGNOSIS — E66.9 OBESITY (BMI 35.0-39.9 WITHOUT COMORBIDITY): ICD-10-CM

## 2020-08-03 PROCEDURE — G0009 ADMIN PNEUMOCOCCAL VACCINE: HCPCS | Performed by: INTERNAL MEDICINE

## 2020-08-03 PROCEDURE — 99214 OFFICE O/P EST MOD 30 MIN: CPT | Performed by: INTERNAL MEDICINE

## 2020-08-03 PROCEDURE — 90732 PPSV23 VACC 2 YRS+ SUBQ/IM: CPT | Performed by: INTERNAL MEDICINE

## 2020-08-03 RX ORDER — GABAPENTIN 100 MG/1
100 CAPSULE ORAL 2 TIMES DAILY
Qty: 60 CAPSULE | Refills: 2 | Status: SHIPPED | OUTPATIENT
Start: 2020-08-03 | End: 2020-11-05

## 2020-08-03 RX ORDER — AMLODIPINE BESYLATE 10 MG/1
10 TABLET ORAL DAILY
COMMUNITY
End: 2020-12-01 | Stop reason: SINTOL

## 2020-08-03 NOTE — PROGRESS NOTES
subjective     Chief Complaint   Patient presents with   • Edema     bilateral legs, since March   • Hypertension     Follow up   • Med Refill     pending     History of Present Illness  Patient is 79 years old white female who is here for follow-up of multiple chronic medical problems.  Patient has bilateral lower extremity edema which is getting worse.  She used to take amlodipine 5 mg daily and now she is taking amlodipine 10.  Ankle edema has gotten slightly worse.  Blood pressure is well controlled, is around 118/68.  Patient also has hyperlipidemia and has been taking Lipitor 20 mg daily.,  No drug side effects.  Stress incontinence is better with Ditropan.  She needs refills.  Patient also needs Prevnar 23 shot.    Past Surgical History:   Procedure Laterality Date   • APPENDECTOMY     • BUNIONECTOMY Right    • CARDIOVASCULAR STRESS TEST  10/2012   • CATARACT EXTRACTION  2017   • CHOLECYSTECTOMY     • COLONOSCOPY  2011   • ECHO - CONVERTED  10/2012   • JOINT REPLACEMENT      bilateral knees    • KNEE ARTHROPLASTY Left    • KNEE ARTHROSCOPY     • SHOULDER ARTHROSCOPY Left 7/28/2016    Procedure: LEFT SHOULDER ACROMIOPLASTY,MINI OPEN ROTATOR CUFF REPAIR;  Surgeon: Carlos Eduardo Smith MD;  Location: [facility];  Service:    • SHOULDER SURGERY  05/31/2016    OPEN ACROMICPLASTY RIGHT SHOULDER     Family History   Problem Relation Age of Onset   • Heart disease Other    • Stroke Sister    • Diabetes Mother    • Heart failure Mother    • Heart disease Mother    • Heart attack Mother    • Hypertension Father    • Emphysema Father    • Heart disease Father    • No Known Problems Brother    • Cancer Sister    • Kidney disease Sister    • Heart failure Sister    • Diabetes Brother    • Diabetes Brother    • Diabetes Brother      Past Medical History:   Diagnosis Date   • GERD (gastroesophageal reflux disease)    • Hyperlipidemia    • Hypertension    • Left shoulder pain    • Obesity    • Osteoarthritis of knees,  bilateral    • Overactive bladder    • Right shoulder pain      Patient Active Problem List   Diagnosis   • Complete tear of right rotator cuff   • Chronic kidney disease, stage I   • Cough   • Gastroesophageal reflux disease   • Mixed hyperlipidemia   • Shoulder pain   • Hypertension   • Acromioclavicular joint arthritis   • Impingement syndrome, shoulder   • Complete tear of left rotator cuff   • Urge incontinence   • Atopic rhinitis   • Upper respiratory tract infection   • Leg edema   • Hypercalcemia   • Right knee pain   • DADA (stress urinary incontinence, female)   • Obesity (BMI 35.0-39.9 without comorbidity)   • Left knee injury       Social History     Tobacco Use   • Smoking status: Former Smoker     Packs/day: 2.00     Years: 20.00     Pack years: 40.00     Types: Cigarettes     Last attempt to quit:      Years since quittin.6   • Smokeless tobacco: Never Used   Substance Use Topics   • Alcohol use: No     Comment: s/p    • Drug use: No       Allergies   Allergen Reactions   • Codeine Other (See Comments)     hyperventilate   • Sulfa Antibiotics Rash       Current Outpatient Medications on File Prior to Visit   Medication Sig   • amLODIPine (NORVASC) 10 MG tablet Take 10 mg by mouth Daily.   • atorvastatin (LIPITOR) 20 MG tablet Take 1 tablet by mouth once daily   • carvedilol (COREG) 25 MG tablet Take 25 mg by mouth 2 (Two) Times a Day With Meals.   • Cholecalciferol (VITAMIN D PO) Take 1,000 Units by mouth daily.   • Docusate Calcium (STOOL SOFTENER PO) Take  by mouth daily.   • Fexofenadine HCl (ALLEGRA PO) Take 180 mg by mouth daily as needed.   • losartan (COZAAR) 100 MG tablet Take 100 mg by mouth Daily.   • omeprazole OTC (PriLOSEC OTC) 20 MG EC tablet Take 20 mg by mouth daily.   • oxybutynin XL (DITROPAN-XL) 10 MG 24 hr tablet Take 1 tablet by mouth Daily.   • Probiotic Product (PROBIOTIC COLON SUPPORT PO) Take  by mouth daily.   • [DISCONTINUED] gabapentin (NEURONTIN) 100 MG capsule  "Take 1 capsule by mouth 2 (Two) Times a Day.     No current facility-administered medications on file prior to visit.          The following portions of the patient's history were reviewed and updated as appropriate: allergies, current medications, past family history, past medical history, past social history, past surgical history and problem list.    Review of Systems   Constitution: Negative.   HENT: Negative.  Negative for congestion.    Eyes: Negative.    Cardiovascular: Positive for leg swelling. Negative for chest pain, cyanosis, dyspnea on exertion, irregular heartbeat, near-syncope, orthopnea, palpitations, paroxysmal nocturnal dyspnea and syncope.   Respiratory: Negative.  Negative for shortness of breath.    Hematologic/Lymphatic: Negative.    Musculoskeletal: Negative.    Gastrointestinal: Negative.    Neurological: Negative.  Negative for headaches.          Objective:     /68 (BP Location: Left arm, Patient Position: Sitting, Cuff Size: Adult)   Pulse 71   Temp 98.1 °F (36.7 °C)   Ht 149.9 cm (59\")   Wt 83.5 kg (184 lb)   SpO2 98%   BMI 37.16 kg/m²   Physical Exam   Constitutional: She appears well-developed and well-nourished. No distress.   HENT:   Head: Normocephalic and atraumatic.   Mouth/Throat: Oropharynx is clear and moist. No oropharyngeal exudate.   Eyes: Pupils are equal, round, and reactive to light. Conjunctivae and EOM are normal. No scleral icterus.   Neck: Normal range of motion. Neck supple. No JVD present. No tracheal deviation present. No thyromegaly present.   Cardiovascular: Normal rate, regular rhythm, normal heart sounds and intact distal pulses. PMI is not displaced. Exam reveals no gallop, no friction rub and no decreased pulses.   No murmur heard.  Pulses:       Carotid pulses are 3+ on the right side, and 3+ on the left side.       Radial pulses are 3+ on the right side, and 3+ on the left side.   Pulmonary/Chest: Effort normal and breath sounds normal. No " respiratory distress. She has no wheezes. She has no rales. She exhibits no tenderness.   Abdominal: Soft. Bowel sounds are normal. She exhibits no distension, no abdominal bruit and no mass. There is no splenomegaly or hepatomegaly. There is no tenderness. There is no rebound and no guarding.   Musculoskeletal: Normal range of motion. She exhibits edema. She exhibits no tenderness or deformity.   Lymphadenopathy:     She has no cervical adenopathy.   Neurological: She is alert. She has normal reflexes. No cranial nerve deficit. She exhibits normal muscle tone. Coordination normal.   Skin: Skin is warm and dry. No rash noted. She is not diaphoretic. No erythema.   Psychiatric: She has a normal mood and affect. Her behavior is normal. Judgment and thought content normal.         Lab Review  Lab Results   Component Value Date     06/10/2020    K 3.8 06/10/2020     06/10/2020    BUN 12 06/10/2020    CREATININE 0.90 06/10/2020    GLUCOSE 92 06/10/2020    CALCIUM 9.6 06/10/2020    ALT 14 10/29/2019    ALKPHOS 83 10/29/2019    LABIL2 1.6 06/08/2016     Lab Results   Component Value Date    CKTOTAL 79 10/29/2019     Lab Results   Component Value Date    WBC 8.10 10/29/2019    HGB 13.0 10/29/2019    HCT 40.0 10/29/2019     10/29/2019     Lab Results   Component Value Date    INR 1.97 12/11/2014    INR 2.02 12/08/2014    INR 2.46 12/04/2014     No results found for: MG  Lab Results   Component Value Date    TSH 1.950 09/14/2018     No results found for: BNP  Lab Results   Component Value Date    CHLPL 133 06/08/2016    CHOL 131 10/29/2019    TRIG 143 10/29/2019    HDL 37 (L) 10/29/2019    VLDL 28.6 10/29/2019    LDLHDL 1.77 10/29/2019     Lab Results   Component Value Date    LDL 65 10/29/2019    LDL 58 04/30/2019       Procedures       I personally viewed and interpreted the patient's LAB data         Assessment:     1. Mixed hyperlipidemia    2. Essential hypertension    3. Urge incontinence    4.  Obesity (BMI 35.0-39.9 without comorbidity)          Plan:     Patient has hyperlipidemia she was advised to continue Lipitor lab work ordered for next visit.  Last LDL was 65.  Blood pressure is very well controlled however increasing dose of Norvasc has caused more ankle edema.  Renal functions are better  Options were discussed either adding low-dose Lasix are decreasing Norvasc.  Patient was discussed that with Dr. barkley on her next visit  Ditropan was renewed.  Prevnar 23 injection was given.  Follow-up scheduled with lab work        No follow-ups on file.

## 2020-09-22 ENCOUNTER — CLINICAL SUPPORT (OUTPATIENT)
Dept: CARDIOLOGY | Facility: CLINIC | Age: 80
End: 2020-09-22

## 2020-09-22 DIAGNOSIS — Z23 NEED FOR IMMUNIZATION AGAINST INFLUENZA: Primary | ICD-10-CM

## 2020-09-22 PROCEDURE — 90694 VACC AIIV4 NO PRSRV 0.5ML IM: CPT | Performed by: INTERNAL MEDICINE

## 2020-09-22 PROCEDURE — G0008 ADMIN INFLUENZA VIRUS VAC: HCPCS | Performed by: INTERNAL MEDICINE

## 2020-10-09 ENCOUNTER — LAB (OUTPATIENT)
Dept: LAB | Facility: HOSPITAL | Age: 80
End: 2020-10-09

## 2020-10-09 ENCOUNTER — TRANSCRIBE ORDERS (OUTPATIENT)
Dept: ADMINISTRATIVE | Facility: HOSPITAL | Age: 80
End: 2020-10-09

## 2020-10-09 DIAGNOSIS — I13.10 MALIGNANT HYPERTENSIVE HEART AND CHRONIC KIDNEY DISEASE STAGE III (HCC): Primary | ICD-10-CM

## 2020-10-09 DIAGNOSIS — I13.10 MALIGNANT HYPERTENSIVE HEART AND CHRONIC KIDNEY DISEASE STAGE III (HCC): ICD-10-CM

## 2020-10-09 DIAGNOSIS — N18.30 MALIGNANT HYPERTENSIVE HEART AND CHRONIC KIDNEY DISEASE STAGE III (HCC): ICD-10-CM

## 2020-10-09 DIAGNOSIS — N18.30 MALIGNANT HYPERTENSIVE HEART AND CHRONIC KIDNEY DISEASE STAGE III (HCC): Primary | ICD-10-CM

## 2020-10-09 LAB
ALBUMIN UR-MCNC: 1.3 MG/DL
ANION GAP SERPL CALCULATED.3IONS-SCNC: 9.8 MMOL/L (ref 5–15)
BUN SERPL-MCNC: 16 MG/DL (ref 8–23)
BUN/CREAT SERPL: 15.5 (ref 7–25)
CALCIUM SPEC-SCNC: 9.8 MG/DL (ref 8.6–10.5)
CHLORIDE SERPL-SCNC: 106 MMOL/L (ref 98–107)
CO2 SERPL-SCNC: 28.2 MMOL/L (ref 22–29)
CREAT SERPL-MCNC: 1.03 MG/DL (ref 0.57–1)
CREAT UR-MCNC: 99.5 MG/DL
GFR SERPL CREATININE-BSD FRML MDRD: 52 ML/MIN/1.73
GLUCOSE SERPL-MCNC: 98 MG/DL (ref 65–99)
MICROALBUMIN/CREAT UR: 13.1 MG/G
POTASSIUM SERPL-SCNC: 3.9 MMOL/L (ref 3.5–5.2)
SODIUM SERPL-SCNC: 144 MMOL/L (ref 136–145)

## 2020-10-09 PROCEDURE — 36415 COLL VENOUS BLD VENIPUNCTURE: CPT

## 2020-10-09 PROCEDURE — 82570 ASSAY OF URINE CREATININE: CPT

## 2020-10-09 PROCEDURE — 80048 BASIC METABOLIC PNL TOTAL CA: CPT

## 2020-10-09 PROCEDURE — 82043 UR ALBUMIN QUANTITATIVE: CPT

## 2020-11-05 DIAGNOSIS — E78.2 MIXED HYPERLIPIDEMIA: ICD-10-CM

## 2020-11-05 RX ORDER — GABAPENTIN 100 MG/1
CAPSULE ORAL
Qty: 60 CAPSULE | Refills: 0 | Status: SHIPPED | OUTPATIENT
Start: 2020-11-05 | End: 2020-12-07

## 2020-11-24 ENCOUNTER — LAB (OUTPATIENT)
Dept: LAB | Facility: HOSPITAL | Age: 80
End: 2020-11-24

## 2020-11-24 DIAGNOSIS — E78.2 MIXED HYPERLIPIDEMIA: ICD-10-CM

## 2020-11-24 LAB
ALBUMIN SERPL-MCNC: 4.3 G/DL (ref 3.5–5.2)
ALBUMIN/GLOB SERPL: 1.7 G/DL
ALP SERPL-CCNC: 72 U/L (ref 39–117)
ALT SERPL W P-5'-P-CCNC: 13 U/L (ref 1–33)
ANION GAP SERPL CALCULATED.3IONS-SCNC: 12.7 MMOL/L (ref 5–15)
AST SERPL-CCNC: 20 U/L (ref 1–32)
BASOPHILS # BLD AUTO: 0.04 10*3/MM3 (ref 0–0.2)
BASOPHILS NFR BLD AUTO: 0.6 % (ref 0–1.5)
BILIRUB SERPL-MCNC: 0.3 MG/DL (ref 0–1.2)
BUN SERPL-MCNC: 16 MG/DL (ref 8–23)
BUN/CREAT SERPL: 16.3 (ref 7–25)
CALCIUM SPEC-SCNC: 9.9 MG/DL (ref 8.6–10.5)
CHLORIDE SERPL-SCNC: 106 MMOL/L (ref 98–107)
CHOLEST SERPL-MCNC: 136 MG/DL (ref 0–200)
CK SERPL-CCNC: 299 U/L (ref 20–180)
CO2 SERPL-SCNC: 25.3 MMOL/L (ref 22–29)
CREAT SERPL-MCNC: 0.98 MG/DL (ref 0.57–1)
DEPRECATED RDW RBC AUTO: 45.3 FL (ref 37–54)
EOSINOPHIL # BLD AUTO: 0.12 10*3/MM3 (ref 0–0.4)
EOSINOPHIL NFR BLD AUTO: 1.7 % (ref 0.3–6.2)
ERYTHROCYTE [DISTWIDTH] IN BLOOD BY AUTOMATED COUNT: 13.3 % (ref 12.3–15.4)
GFR SERPL CREATININE-BSD FRML MDRD: 55 ML/MIN/1.73
GLOBULIN UR ELPH-MCNC: 2.5 GM/DL
GLUCOSE SERPL-MCNC: 98 MG/DL (ref 65–99)
HCT VFR BLD AUTO: 40.3 % (ref 34–46.6)
HDLC SERPL-MCNC: 40 MG/DL (ref 40–60)
HGB BLD-MCNC: 13.2 G/DL (ref 12–15.9)
IMM GRANULOCYTES # BLD AUTO: 0.03 10*3/MM3 (ref 0–0.05)
IMM GRANULOCYTES NFR BLD AUTO: 0.4 % (ref 0–0.5)
LDLC SERPL CALC-MCNC: 65 MG/DL (ref 0–100)
LDLC/HDLC SERPL: 1.49 {RATIO}
LYMPHOCYTES # BLD AUTO: 1.92 10*3/MM3 (ref 0.7–3.1)
LYMPHOCYTES NFR BLD AUTO: 27.6 % (ref 19.6–45.3)
MCH RBC QN AUTO: 30 PG (ref 26.6–33)
MCHC RBC AUTO-ENTMCNC: 32.8 G/DL (ref 31.5–35.7)
MCV RBC AUTO: 91.6 FL (ref 79–97)
MONOCYTES # BLD AUTO: 0.67 10*3/MM3 (ref 0.1–0.9)
MONOCYTES NFR BLD AUTO: 9.6 % (ref 5–12)
NEUTROPHILS NFR BLD AUTO: 4.17 10*3/MM3 (ref 1.7–7)
NEUTROPHILS NFR BLD AUTO: 60.1 % (ref 42.7–76)
NRBC BLD AUTO-RTO: 0.1 /100 WBC (ref 0–0.2)
PLATELET # BLD AUTO: 217 10*3/MM3 (ref 140–450)
PMV BLD AUTO: 11.2 FL (ref 6–12)
POTASSIUM SERPL-SCNC: 4 MMOL/L (ref 3.5–5.2)
PROT SERPL-MCNC: 6.8 G/DL (ref 6–8.5)
RBC # BLD AUTO: 4.4 10*6/MM3 (ref 3.77–5.28)
SODIUM SERPL-SCNC: 144 MMOL/L (ref 136–145)
TRIGL SERPL-MCNC: 183 MG/DL (ref 0–150)
VLDLC SERPL-MCNC: 31 MG/DL (ref 5–40)
WBC # BLD AUTO: 6.95 10*3/MM3 (ref 3.4–10.8)

## 2020-11-24 PROCEDURE — 85025 COMPLETE CBC W/AUTO DIFF WBC: CPT

## 2020-11-24 PROCEDURE — 36415 COLL VENOUS BLD VENIPUNCTURE: CPT

## 2020-11-24 PROCEDURE — 80053 COMPREHEN METABOLIC PANEL: CPT

## 2020-11-24 PROCEDURE — 82550 ASSAY OF CK (CPK): CPT

## 2020-11-24 PROCEDURE — 80061 LIPID PANEL: CPT

## 2020-12-01 ENCOUNTER — OFFICE VISIT (OUTPATIENT)
Dept: CARDIOLOGY | Facility: CLINIC | Age: 80
End: 2020-12-01

## 2020-12-01 VITALS
DIASTOLIC BLOOD PRESSURE: 70 MMHG | SYSTOLIC BLOOD PRESSURE: 138 MMHG | HEART RATE: 67 BPM | HEIGHT: 59 IN | TEMPERATURE: 96.9 F | WEIGHT: 186 LBS | OXYGEN SATURATION: 95 % | BODY MASS INDEX: 37.5 KG/M2

## 2020-12-01 DIAGNOSIS — I10 ESSENTIAL HYPERTENSION: ICD-10-CM

## 2020-12-01 DIAGNOSIS — N39.41 URGE INCONTINENCE: ICD-10-CM

## 2020-12-01 DIAGNOSIS — E78.2 MIXED HYPERLIPIDEMIA: Primary | ICD-10-CM

## 2020-12-01 DIAGNOSIS — K21.9 GASTROESOPHAGEAL REFLUX DISEASE WITHOUT ESOPHAGITIS: ICD-10-CM

## 2020-12-01 PROCEDURE — 99214 OFFICE O/P EST MOD 30 MIN: CPT | Performed by: INTERNAL MEDICINE

## 2020-12-01 RX ORDER — HYDRALAZINE HYDROCHLORIDE 25 MG/1
25 TABLET, FILM COATED ORAL 2 TIMES DAILY
Status: ON HOLD | COMMUNITY
Start: 2020-11-18 | End: 2021-01-21 | Stop reason: SDUPTHER

## 2020-12-01 NOTE — PROGRESS NOTES
subjective     Chief Complaint   Patient presents with   • Edema     Follow up, resolved since stopping the amlodipine since last visit   • Hyperlipidemia     Follow up   • Results     labs   • Hypertension     Follow up     History of Present Illness  Patient is 79 years old white female who is here for follow-up of multiple chronic medical problems.  She was taking Norvasc and has marked bilateral lower extremity edema.  She states that her edema is completely gone after stopping Norvasc.  Blood pressure is very well controlled with current medications.  She is taking Coreg, losartan and hydralazine.  No drug side effects.  Patient also has hyperlipidemia and has been taking Lipitor 20 mg daily.  She recently had lab work done which will be reviewed.  She also has urge incontinence which is doing very well with Ditropan which she is taking regularly.  Peripheral neuritis is better with low-dose Neurontin.    Past Surgical History:   Procedure Laterality Date   • APPENDECTOMY     • BUNIONECTOMY Right    • CARDIOVASCULAR STRESS TEST  10/2012   • CATARACT EXTRACTION  2017   • CHOLECYSTECTOMY     • COLONOSCOPY  2011   • ECHO - CONVERTED  10/2012   • JOINT REPLACEMENT      bilateral knees    • KNEE ARTHROPLASTY Left    • KNEE ARTHROSCOPY     • SHOULDER ARTHROSCOPY Left 7/28/2016    Procedure: LEFT SHOULDER ACROMIOPLASTY,MINI OPEN ROTATOR CUFF REPAIR;  Surgeon: Carlos Eduardo Smith MD;  Location: [facility];  Service:    • SHOULDER SURGERY  05/31/2016    OPEN ACROMICPLASTY RIGHT SHOULDER     Family History   Problem Relation Age of Onset   • Heart disease Other    • Stroke Sister    • Diabetes Mother    • Heart failure Mother    • Heart disease Mother    • Heart attack Mother    • Hypertension Father    • Emphysema Father    • Heart disease Father    • No Known Problems Brother    • Cancer Sister    • Kidney disease Sister    • Heart failure Sister    • Diabetes Brother    • Diabetes Brother    • Diabetes Brother       Past Medical History:   Diagnosis Date   • GERD (gastroesophageal reflux disease)    • Hyperlipidemia    • Hypertension    • Left shoulder pain    • Obesity    • Osteoarthritis of knees, bilateral    • Overactive bladder    • Right shoulder pain      Patient Active Problem List   Diagnosis   • Complete tear of right rotator cuff   • Chronic kidney disease, stage I   • Cough   • Gastroesophageal reflux disease   • Mixed hyperlipidemia   • Shoulder pain   • Hypertension   • Acromioclavicular joint arthritis   • Impingement syndrome, shoulder   • Complete tear of left rotator cuff   • Urge incontinence   • Atopic rhinitis   • Upper respiratory tract infection   • Leg edema   • Hypercalcemia   • Right knee pain   • DADA (stress urinary incontinence, female)   • Obesity (BMI 35.0-39.9 without comorbidity)   • Left knee injury       Social History     Tobacco Use   • Smoking status: Former Smoker     Packs/day: 2.00     Years: 20.00     Pack years: 40.00     Types: Cigarettes     Quit date:      Years since quittin.9   • Smokeless tobacco: Never Used   Substance Use Topics   • Alcohol use: No     Comment: s/p    • Drug use: No       Allergies   Allergen Reactions   • Codeine Other (See Comments)     hyperventilate   • Sulfa Antibiotics Rash       Current Outpatient Medications on File Prior to Visit   Medication Sig   • atorvastatin (LIPITOR) 20 MG tablet Take 1 tablet by mouth once daily   • carvedilol (COREG) 25 MG tablet Take 25 mg by mouth 2 (Two) Times a Day With Meals.   • Cholecalciferol (VITAMIN D PO) Take 1,000 Units by mouth daily.   • Docusate Calcium (STOOL SOFTENER PO) Take  by mouth daily.   • Fexofenadine HCl (ALLEGRA PO) Take 180 mg by mouth daily as needed.   • gabapentin (NEURONTIN) 100 MG capsule Take 1 capsule by mouth twice daily   • hydrALAZINE (APRESOLINE) 25 MG tablet Take 25 mg by mouth 2 (two) times a day.   • losartan (COZAAR) 100 MG tablet Take 100 mg by mouth Daily.   •  "omeprazole OTC (PriLOSEC OTC) 20 MG EC tablet Take 20 mg by mouth daily.   • oxybutynin XL (DITROPAN-XL) 10 MG 24 hr tablet Take 1 tablet by mouth Daily.   • Probiotic Product (PROBIOTIC COLON SUPPORT PO) Take  by mouth daily.   • [DISCONTINUED] amLODIPine (NORVASC) 10 MG tablet Take 10 mg by mouth Daily.     No current facility-administered medications on file prior to visit.          The following portions of the patient's history were reviewed and updated as appropriate: allergies, current medications, past family history, past medical history, past social history, past surgical history and problem list.    Review of Systems   Constitution: Negative.   HENT: Negative.  Negative for congestion.    Eyes: Negative.    Cardiovascular: Negative.  Negative for chest pain, cyanosis, dyspnea on exertion, irregular heartbeat, leg swelling, near-syncope, orthopnea, palpitations, paroxysmal nocturnal dyspnea and syncope.   Respiratory: Negative.  Negative for shortness of breath.    Hematologic/Lymphatic: Negative.    Musculoskeletal: Negative.    Gastrointestinal: Negative.    Neurological: Negative.  Negative for headaches.          Objective:     /70 (BP Location: Left arm, Patient Position: Sitting, Cuff Size: Adult)   Pulse 67   Temp 96.9 °F (36.1 °C)   Ht 149.9 cm (59\")   Wt 84.4 kg (186 lb)   SpO2 95%   BMI 37.57 kg/m²   Vitals signs and nursing note reviewed.   Constitutional:       General: Not in acute distress.     Appearance: Healthy appearance. Well-developed and not in distress. Not diaphoretic.   Eyes:      General: No scleral icterus.     Conjunctiva/sclera: Conjunctivae normal.      Pupils: Pupils are equal, round, and reactive to light.   HENT:      Head: Normocephalic and atraumatic.   Neck:      Musculoskeletal: Normal range of motion and neck supple.      Thyroid: Thyroid normal. No thyromegaly.      Vascular: No JVD. JVD normal.      Trachea: No tracheal deviation.      Lymphadenopathy: No " cervical adenopathy.   Pulmonary:      Effort: Pulmonary effort is normal. No respiratory distress.      Breath sounds: Normal breath sounds and air entry.   Chest:      Chest wall: Not tender to palpatation.   Cardiovascular:      PMI at left midclavicular line. Normal rate. Regular rhythm.      No gallop.   Pulses:     Intact distal pulses. No decreased pulses.   Abdominal:      General: Bowel sounds are normal. There is no distension or abdominal bruit.      Palpations: Abdomen is soft. There is no hepatomegaly, splenomegaly or abdominal mass.      Tenderness: There is no abdominal tenderness. There is no guarding or rebound.   Skin:     General: Skin is warm and dry. There is no cyanosis.      Coloration: Skin is not jaundiced or pale.      Findings: No erythema or rash.   Neurological:      Mental Status: Alert, oriented to person, place, and time and oriented to person, place and time.   Psychiatric:         Attention and Perception: Attention normal.         Mood and Affect: Mood and affect normal.         Speech: Speech normal.         Behavior: Behavior is cooperative.           Lab Review  Lab Results   Component Value Date     11/24/2020    K 4.0 11/24/2020     11/24/2020    BUN 16 11/24/2020    CREATININE 0.98 11/24/2020    GLUCOSE 98 11/24/2020    CALCIUM 9.9 11/24/2020    ALT 13 11/24/2020    ALKPHOS 72 11/24/2020    LABIL2 1.6 06/08/2016     Lab Results   Component Value Date    CKTOTAL 299 (H) 11/24/2020     Lab Results   Component Value Date    WBC 6.95 11/24/2020    HGB 13.2 11/24/2020    HCT 40.3 11/24/2020     11/24/2020     Lab Results   Component Value Date    INR 1.97 12/11/2014    INR 2.02 12/08/2014    INR 2.46 12/04/2014     No results found for: MG  Lab Results   Component Value Date    TSH 1.950 09/14/2018     No results found for: BNP  Lab Results   Component Value Date    CHLPL 133 06/08/2016    CHOL 136 11/24/2020    TRIG 183 (H) 11/24/2020    HDL 40 11/24/2020     VLDL 31 11/24/2020    LDLHDL 1.49 11/24/2020     Lab Results   Component Value Date    LDL 65 11/24/2020    LDL 65 10/29/2019       Procedures       I personally viewed and interpreted the patient's LAB data         Assessment:     1. Mixed hyperlipidemia    2. Essential hypertension    3. Gastroesophageal reflux disease without esophagitis    4. Urge incontinence          Plan:     Patient is doing very well, ankle edema is completely gone after stopping Norvasc.  Blood pressure is very well controlled she will continue losartan Coreg and hydralazine.  Lipid panel is normal with LDL of 65.  She will continue Lipitor  CPK is mildly elevated at 299 which will be monitored.  GERD symptoms are better with omeprazole which was be continued  Ditropan was also continued.  Overall she is doing very well.  COVID-19 precautions discussed.  Follow-up scheduled        No follow-ups on file.

## 2020-12-07 DIAGNOSIS — E78.2 MIXED HYPERLIPIDEMIA: ICD-10-CM

## 2020-12-07 RX ORDER — GABAPENTIN 100 MG/1
CAPSULE ORAL
Qty: 60 CAPSULE | Refills: 0 | Status: SHIPPED | OUTPATIENT
Start: 2020-12-07 | End: 2021-01-06

## 2020-12-14 RX ORDER — ATORVASTATIN CALCIUM 20 MG/1
TABLET, FILM COATED ORAL
Qty: 90 TABLET | Refills: 0 | Status: SHIPPED | OUTPATIENT
Start: 2020-12-14 | End: 2021-03-18

## 2020-12-30 DIAGNOSIS — Z20.822 EXPOSURE TO COVID-19 VIRUS: Primary | ICD-10-CM

## 2020-12-31 ENCOUNTER — APPOINTMENT (OUTPATIENT)
Dept: GENERAL RADIOLOGY | Facility: HOSPITAL | Age: 80
End: 2020-12-31

## 2020-12-31 ENCOUNTER — HOSPITAL ENCOUNTER (EMERGENCY)
Facility: HOSPITAL | Age: 80
Discharge: HOME | End: 2020-12-31
Attending: EMERGENCY MEDICINE | Admitting: EMERGENCY MEDICINE

## 2020-12-31 VITALS
DIASTOLIC BLOOD PRESSURE: 96 MMHG | HEART RATE: 62 BPM | BODY MASS INDEX: 37.29 KG/M2 | WEIGHT: 185 LBS | HEIGHT: 59 IN | TEMPERATURE: 97.4 F | OXYGEN SATURATION: 93 % | SYSTOLIC BLOOD PRESSURE: 123 MMHG | RESPIRATION RATE: 18 BRPM

## 2020-12-31 DIAGNOSIS — U07.1 COVID-19 VIRUS INFECTION: Primary | ICD-10-CM

## 2020-12-31 LAB
ALBUMIN SERPL-MCNC: 4.43 G/DL (ref 3.5–5.2)
ALBUMIN/GLOB SERPL: 1.9 G/DL
ALP SERPL-CCNC: 88 U/L (ref 39–117)
ALT SERPL W P-5'-P-CCNC: 18 U/L (ref 1–33)
ANION GAP SERPL CALCULATED.3IONS-SCNC: 12 MMOL/L (ref 5–15)
AST SERPL-CCNC: 24 U/L (ref 1–32)
BASOPHILS # BLD AUTO: 0.02 10*3/MM3 (ref 0–0.2)
BASOPHILS NFR BLD AUTO: 0.4 % (ref 0–1.5)
BILIRUB SERPL-MCNC: 0.4 MG/DL (ref 0–1.2)
BUN SERPL-MCNC: 18 MG/DL (ref 8–23)
BUN/CREAT SERPL: 15.3 (ref 7–25)
CALCIUM SPEC-SCNC: 9.4 MG/DL (ref 8.6–10.5)
CHLORIDE SERPL-SCNC: 104 MMOL/L (ref 98–107)
CO2 SERPL-SCNC: 25 MMOL/L (ref 22–29)
CREAT SERPL-MCNC: 1.18 MG/DL (ref 0.57–1)
CRP SERPL-MCNC: 0.57 MG/DL (ref 0–0.5)
DEPRECATED RDW RBC AUTO: 49.2 FL (ref 37–54)
EOSINOPHIL # BLD AUTO: 0.02 10*3/MM3 (ref 0–0.4)
EOSINOPHIL NFR BLD AUTO: 0.4 % (ref 0.3–6.2)
ERYTHROCYTE [DISTWIDTH] IN BLOOD BY AUTOMATED COUNT: 14.4 % (ref 12.3–15.4)
FERRITIN SERPL-MCNC: 85.71 NG/ML (ref 13–150)
FLUAV RNA RESP QL NAA+PROBE: NOT DETECTED
FLUBV RNA RESP QL NAA+PROBE: NOT DETECTED
GFR SERPL CREATININE-BSD FRML MDRD: 44 ML/MIN/1.73
GLOBULIN UR ELPH-MCNC: 2.4 GM/DL
GLUCOSE SERPL-MCNC: 91 MG/DL (ref 65–99)
HCT VFR BLD AUTO: 41.4 % (ref 34–46.6)
HGB BLD-MCNC: 13.1 G/DL (ref 12–15.9)
HOLD SPECIMEN: NORMAL
HOLD SPECIMEN: NORMAL
IMM GRANULOCYTES # BLD AUTO: 0.03 10*3/MM3 (ref 0–0.05)
IMM GRANULOCYTES NFR BLD AUTO: 0.6 % (ref 0–0.5)
LDH SERPL-CCNC: 199 U/L (ref 135–214)
LYMPHOCYTES # BLD AUTO: 1.71 10*3/MM3 (ref 0.7–3.1)
LYMPHOCYTES NFR BLD AUTO: 33 % (ref 19.6–45.3)
MAGNESIUM SERPL-MCNC: 1.8 MG/DL (ref 1.6–2.4)
MCH RBC QN AUTO: 29.4 PG (ref 26.6–33)
MCHC RBC AUTO-ENTMCNC: 31.6 G/DL (ref 31.5–35.7)
MCV RBC AUTO: 93 FL (ref 79–97)
MONOCYTES # BLD AUTO: 0.94 10*3/MM3 (ref 0.1–0.9)
MONOCYTES NFR BLD AUTO: 18.1 % (ref 5–12)
NEUTROPHILS NFR BLD AUTO: 2.46 10*3/MM3 (ref 1.7–7)
NEUTROPHILS NFR BLD AUTO: 47.5 % (ref 42.7–76)
NRBC BLD AUTO-RTO: 0 /100 WBC (ref 0–0.2)
PLATELET # BLD AUTO: 194 10*3/MM3 (ref 140–450)
PMV BLD AUTO: 11.1 FL (ref 6–12)
POTASSIUM SERPL-SCNC: 3.7 MMOL/L (ref 3.5–5.2)
PROCALCITONIN SERPL-MCNC: 0.09 NG/ML (ref 0–0.25)
PROT SERPL-MCNC: 6.8 G/DL (ref 6–8.5)
RBC # BLD AUTO: 4.45 10*6/MM3 (ref 3.77–5.28)
SARS-COV-2 RNA RESP QL NAA+PROBE: DETECTED
SODIUM SERPL-SCNC: 141 MMOL/L (ref 136–145)
WBC # BLD AUTO: 5.18 10*3/MM3 (ref 3.4–10.8)
WHOLE BLOOD HOLD SPECIMEN: NORMAL
WHOLE BLOOD HOLD SPECIMEN: NORMAL

## 2020-12-31 PROCEDURE — 71045 X-RAY EXAM CHEST 1 VIEW: CPT | Performed by: RADIOLOGY

## 2020-12-31 PROCEDURE — 85025 COMPLETE CBC W/AUTO DIFF WBC: CPT | Performed by: PHYSICIAN ASSISTANT

## 2020-12-31 PROCEDURE — 80053 COMPREHEN METABOLIC PANEL: CPT | Performed by: PHYSICIAN ASSISTANT

## 2020-12-31 PROCEDURE — 87636 SARSCOV2 & INF A&B AMP PRB: CPT | Performed by: FAMILY MEDICINE

## 2020-12-31 PROCEDURE — 83615 LACTATE (LD) (LDH) ENZYME: CPT | Performed by: PHYSICIAN ASSISTANT

## 2020-12-31 PROCEDURE — 83735 ASSAY OF MAGNESIUM: CPT | Performed by: PHYSICIAN ASSISTANT

## 2020-12-31 PROCEDURE — 86140 C-REACTIVE PROTEIN: CPT | Performed by: PHYSICIAN ASSISTANT

## 2020-12-31 PROCEDURE — 82728 ASSAY OF FERRITIN: CPT | Performed by: PHYSICIAN ASSISTANT

## 2020-12-31 PROCEDURE — 71045 X-RAY EXAM CHEST 1 VIEW: CPT

## 2020-12-31 PROCEDURE — 99284 EMERGENCY DEPT VISIT MOD MDM: CPT

## 2020-12-31 PROCEDURE — 84145 PROCALCITONIN (PCT): CPT | Performed by: PHYSICIAN ASSISTANT

## 2020-12-31 RX ORDER — SODIUM CHLORIDE 0.9 % (FLUSH) 0.9 %
10 SYRINGE (ML) INJECTION AS NEEDED
Status: DISCONTINUED | OUTPATIENT
Start: 2020-12-31 | End: 2020-12-31 | Stop reason: HOSPADM

## 2021-01-06 DIAGNOSIS — E78.2 MIXED HYPERLIPIDEMIA: ICD-10-CM

## 2021-01-06 DIAGNOSIS — R11.0 NAUSEA: Primary | ICD-10-CM

## 2021-01-06 RX ORDER — ONDANSETRON 4 MG/1
4 TABLET, ORALLY DISINTEGRATING ORAL EVERY 8 HOURS PRN
Status: CANCELLED | OUTPATIENT
Start: 2021-01-06

## 2021-01-06 RX ORDER — ONDANSETRON 4 MG/1
4 TABLET, FILM COATED ORAL EVERY 8 HOURS PRN
Qty: 30 TABLET | Refills: 0 | Status: CANCELLED | OUTPATIENT
Start: 2021-01-06

## 2021-01-06 RX ORDER — ONDANSETRON 4 MG/1
4 TABLET, ORALLY DISINTEGRATING ORAL EVERY 8 HOURS PRN
Qty: 30 TABLET | Refills: 0 | Status: ON HOLD | OUTPATIENT
Start: 2021-01-06 | End: 2021-01-09

## 2021-01-06 RX ORDER — ONDANSETRON 4 MG/1
4 TABLET, ORALLY DISINTEGRATING ORAL EVERY 6 HOURS PRN
Status: CANCELLED | OUTPATIENT
Start: 2021-01-06

## 2021-01-06 RX ORDER — GABAPENTIN 100 MG/1
CAPSULE ORAL
Qty: 60 CAPSULE | Refills: 0 | Status: SHIPPED | OUTPATIENT
Start: 2021-01-06 | End: 2021-03-01 | Stop reason: SDUPTHER

## 2021-01-06 NOTE — TELEPHONE ENCOUNTER
Patient called and states that she and her  has been sick to her stomach and has been having dry heaves and even water makes her sick.  Advised her to have her neighberget some propel a drink with electrolytes, or gatorade, or flat gingerale and to nibble on some saltine crackers and try these things till I talk to Dr Montano.  Please advise if we can get her any medication for this problem?  [phone]

## 2021-01-08 ENCOUNTER — HOSPITAL ENCOUNTER (INPATIENT)
Facility: HOSPITAL | Age: 81
LOS: 13 days | Discharge: HOME | End: 2021-01-21
Attending: FAMILY MEDICINE | Admitting: HOSPITALIST

## 2021-01-08 ENCOUNTER — APPOINTMENT (OUTPATIENT)
Dept: GENERAL RADIOLOGY | Facility: HOSPITAL | Age: 81
End: 2021-01-08

## 2021-01-08 ENCOUNTER — APPOINTMENT (OUTPATIENT)
Dept: CT IMAGING | Facility: HOSPITAL | Age: 81
End: 2021-01-08

## 2021-01-08 DIAGNOSIS — N39.0 ACUTE UTI: Primary | ICD-10-CM

## 2021-01-08 DIAGNOSIS — Z86.16 HISTORY OF COVID-19: ICD-10-CM

## 2021-01-08 LAB
A-A DO2: 38.8 MMHG (ref 0–300)
ALBUMIN SERPL-MCNC: 3.52 G/DL (ref 3.5–5.2)
ALBUMIN/GLOB SERPL: 1.1 G/DL
ALP SERPL-CCNC: 78 U/L (ref 39–117)
ALT SERPL W P-5'-P-CCNC: 25 U/L (ref 1–33)
ANION GAP SERPL CALCULATED.3IONS-SCNC: 11 MMOL/L (ref 5–15)
ARTERIAL PATENCY WRIST A: ABNORMAL
AST SERPL-CCNC: 41 U/L (ref 1–32)
ATMOSPHERIC PRESS: 727 MMHG
BACTERIA UR QL AUTO: ABNORMAL /HPF
BASE EXCESS BLDA CALC-SCNC: 1.9 MMOL/L (ref 0–2)
BASOPHILS # BLD AUTO: 0.01 10*3/MM3 (ref 0–0.2)
BASOPHILS NFR BLD AUTO: 0.1 % (ref 0–1.5)
BDY SITE: ABNORMAL
BILIRUB SERPL-MCNC: 0.4 MG/DL (ref 0–1.2)
BILIRUB UR QL STRIP: NEGATIVE
BODY TEMPERATURE: 0 C
BUN SERPL-MCNC: 17 MG/DL (ref 8–23)
BUN/CREAT SERPL: 15.6 (ref 7–25)
CALCIUM SPEC-SCNC: 8.7 MG/DL (ref 8.6–10.5)
CHLORIDE SERPL-SCNC: 103 MMOL/L (ref 98–107)
CLARITY UR: ABNORMAL
CO2 BLDA-SCNC: 26.7 MMOL/L (ref 22–33)
CO2 SERPL-SCNC: 24 MMOL/L (ref 22–29)
COHGB MFR BLD: 1 % (ref 0–5)
COLOR UR: YELLOW
CREAT SERPL-MCNC: 1.09 MG/DL (ref 0.57–1)
CRP SERPL-MCNC: 6.26 MG/DL (ref 0–0.5)
D DIMER PPP FEU-MCNC: 0.49 MCGFEU/ML (ref 0–0.5)
D-LACTATE SERPL-SCNC: 1.1 MMOL/L (ref 0.5–2)
DEPRECATED RDW RBC AUTO: 48.1 FL (ref 37–54)
EOSINOPHIL # BLD AUTO: 0 10*3/MM3 (ref 0–0.4)
EOSINOPHIL NFR BLD AUTO: 0 % (ref 0.3–6.2)
ERYTHROCYTE [DISTWIDTH] IN BLOOD BY AUTOMATED COUNT: 14.1 % (ref 12.3–15.4)
FERRITIN SERPL-MCNC: 374.8 NG/ML (ref 13–150)
GFR SERPL CREATININE-BSD FRML MDRD: 48 ML/MIN/1.73
GLOBULIN UR ELPH-MCNC: 3.3 GM/DL
GLUCOSE SERPL-MCNC: 90 MG/DL (ref 65–99)
GLUCOSE UR STRIP-MCNC: NEGATIVE MG/DL
HCO3 BLDA-SCNC: 25.6 MMOL/L (ref 20–26)
HCT VFR BLD AUTO: 40.9 % (ref 34–46.6)
HCT VFR BLD CALC: 39.2 % (ref 38–51)
HGB BLD-MCNC: 12.9 G/DL (ref 12–15.9)
HGB BLDA-MCNC: 12.8 G/DL (ref 13.5–17.5)
HGB UR QL STRIP.AUTO: NEGATIVE
HYALINE CASTS UR QL AUTO: ABNORMAL /LPF
IMM GRANULOCYTES # BLD AUTO: 0.03 10*3/MM3 (ref 0–0.05)
IMM GRANULOCYTES NFR BLD AUTO: 0.4 % (ref 0–0.5)
INHALED O2 CONCENTRATION: 21 %
KETONES UR QL STRIP: ABNORMAL
LDH SERPL-CCNC: 274 U/L (ref 135–214)
LEUKOCYTE ESTERASE UR QL STRIP.AUTO: ABNORMAL
LYMPHOCYTES # BLD AUTO: 1.2 10*3/MM3 (ref 0.7–3.1)
LYMPHOCYTES NFR BLD AUTO: 16.3 % (ref 19.6–45.3)
Lab: ABNORMAL
MCH RBC QN AUTO: 29.5 PG (ref 26.6–33)
MCHC RBC AUTO-ENTMCNC: 31.5 G/DL (ref 31.5–35.7)
MCV RBC AUTO: 93.6 FL (ref 79–97)
METHGB BLD QL: 0.2 % (ref 0–3)
MODALITY: ABNORMAL
MONOCYTES # BLD AUTO: 0.5 10*3/MM3 (ref 0.1–0.9)
MONOCYTES NFR BLD AUTO: 6.8 % (ref 5–12)
NEUTROPHILS NFR BLD AUTO: 5.62 10*3/MM3 (ref 1.7–7)
NEUTROPHILS NFR BLD AUTO: 76.4 % (ref 42.7–76)
NITRITE UR QL STRIP: POSITIVE
NOTE: ABNORMAL
NRBC BLD AUTO-RTO: 0 /100 WBC (ref 0–0.2)
OXYHGB MFR BLDV: 92.7 % (ref 94–99)
PCO2 BLDA: 36 MM HG (ref 35–45)
PCO2 TEMP ADJ BLD: ABNORMAL MM[HG]
PH BLDA: 7.46 PH UNITS (ref 7.35–7.45)
PH UR STRIP.AUTO: <=5 [PH] (ref 5–8)
PH, TEMP CORRECTED: ABNORMAL
PLATELET # BLD AUTO: 202 10*3/MM3 (ref 140–450)
PMV BLD AUTO: 10.3 FL (ref 6–12)
PO2 BLDA: 63.4 MM HG (ref 83–108)
PO2 TEMP ADJ BLD: ABNORMAL MM[HG]
POTASSIUM SERPL-SCNC: 4.1 MMOL/L (ref 3.5–5.2)
PROT SERPL-MCNC: 6.8 G/DL (ref 6–8.5)
PROT UR QL STRIP: ABNORMAL
RBC # BLD AUTO: 4.37 10*6/MM3 (ref 3.77–5.28)
RBC # UR: ABNORMAL /HPF
REF LAB TEST METHOD: ABNORMAL
SAO2 % BLDCOA: 93.8 % (ref 94–99)
SODIUM SERPL-SCNC: 138 MMOL/L (ref 136–145)
SP GR UR STRIP: 1.02 (ref 1–1.03)
SQUAMOUS #/AREA URNS HPF: ABNORMAL /HPF
UROBILINOGEN UR QL STRIP: ABNORMAL
VENTILATOR MODE: ABNORMAL
WBC # BLD AUTO: 7.36 10*3/MM3 (ref 3.4–10.8)
WBC UR QL AUTO: ABNORMAL /HPF

## 2021-01-08 PROCEDURE — 83615 LACTATE (LD) (LDH) ENZYME: CPT | Performed by: PHYSICIAN ASSISTANT

## 2021-01-08 PROCEDURE — 25010000002 ONDANSETRON PER 1 MG: Performed by: FAMILY MEDICINE

## 2021-01-08 PROCEDURE — 82728 ASSAY OF FERRITIN: CPT | Performed by: PHYSICIAN ASSISTANT

## 2021-01-08 PROCEDURE — 25010000002 KETOROLAC TROMETHAMINE PER 15 MG: Performed by: PHYSICIAN ASSISTANT

## 2021-01-08 PROCEDURE — 85379 FIBRIN DEGRADATION QUANT: CPT | Performed by: PHYSICIAN ASSISTANT

## 2021-01-08 PROCEDURE — 87186 SC STD MICRODIL/AGAR DIL: CPT | Performed by: PHYSICIAN ASSISTANT

## 2021-01-08 PROCEDURE — 87088 URINE BACTERIA CULTURE: CPT | Performed by: PHYSICIAN ASSISTANT

## 2021-01-08 PROCEDURE — 83605 ASSAY OF LACTIC ACID: CPT | Performed by: PHYSICIAN ASSISTANT

## 2021-01-08 PROCEDURE — 93010 ELECTROCARDIOGRAM REPORT: CPT | Performed by: INTERNAL MEDICINE

## 2021-01-08 PROCEDURE — 83050 HGB METHEMOGLOBIN QUAN: CPT

## 2021-01-08 PROCEDURE — 3E0333Z INTRODUCTION OF ANTI-INFLAMMATORY INTO PERIPHERAL VEIN, PERCUTANEOUS APPROACH: ICD-10-PCS | Performed by: HOSPITALIST

## 2021-01-08 PROCEDURE — 71045 X-RAY EXAM CHEST 1 VIEW: CPT

## 2021-01-08 PROCEDURE — 87040 BLOOD CULTURE FOR BACTERIA: CPT | Performed by: PHYSICIAN ASSISTANT

## 2021-01-08 PROCEDURE — 71045 X-RAY EXAM CHEST 1 VIEW: CPT | Performed by: RADIOLOGY

## 2021-01-08 PROCEDURE — 86140 C-REACTIVE PROTEIN: CPT | Performed by: PHYSICIAN ASSISTANT

## 2021-01-08 PROCEDURE — 25010000002 CEFTRIAXONE PER 250 MG: Performed by: PHYSICIAN ASSISTANT

## 2021-01-08 PROCEDURE — 36600 WITHDRAWAL OF ARTERIAL BLOOD: CPT

## 2021-01-08 PROCEDURE — 93005 ELECTROCARDIOGRAM TRACING: CPT | Performed by: PHYSICIAN ASSISTANT

## 2021-01-08 PROCEDURE — 99285 EMERGENCY DEPT VISIT HI MDM: CPT

## 2021-01-08 PROCEDURE — 82375 ASSAY CARBOXYHB QUANT: CPT

## 2021-01-08 PROCEDURE — 81001 URINALYSIS AUTO W/SCOPE: CPT | Performed by: PHYSICIAN ASSISTANT

## 2021-01-08 PROCEDURE — 80053 COMPREHEN METABOLIC PANEL: CPT | Performed by: PHYSICIAN ASSISTANT

## 2021-01-08 PROCEDURE — 85025 COMPLETE CBC W/AUTO DIFF WBC: CPT | Performed by: PHYSICIAN ASSISTANT

## 2021-01-08 PROCEDURE — 71250 CT THORAX DX C-: CPT

## 2021-01-08 PROCEDURE — 82805 BLOOD GASES W/O2 SATURATION: CPT

## 2021-01-08 PROCEDURE — 87086 URINE CULTURE/COLONY COUNT: CPT | Performed by: PHYSICIAN ASSISTANT

## 2021-01-08 RX ORDER — ACETAMINOPHEN 500 MG
1000 TABLET ORAL ONCE
Status: COMPLETED | OUTPATIENT
Start: 2021-01-08 | End: 2021-01-08

## 2021-01-08 RX ORDER — DEXAMETHASONE 6 MG/1
6 TABLET ORAL
Qty: 7 TABLET | Refills: 0 | Status: SHIPPED | OUTPATIENT
Start: 2021-01-08 | End: 2021-01-21 | Stop reason: HOSPADM

## 2021-01-08 RX ORDER — CEFDINIR 300 MG/1
300 CAPSULE ORAL 2 TIMES DAILY
Qty: 20 CAPSULE | Refills: 0 | Status: SHIPPED | OUTPATIENT
Start: 2021-01-08 | End: 2021-01-21 | Stop reason: HOSPADM

## 2021-01-08 RX ORDER — ONDANSETRON 2 MG/ML
4 INJECTION INTRAMUSCULAR; INTRAVENOUS ONCE
Status: COMPLETED | OUTPATIENT
Start: 2021-01-08 | End: 2021-01-08

## 2021-01-08 RX ORDER — DEXAMETHASONE SODIUM PHOSPHATE 10 MG/ML
10 INJECTION INTRAMUSCULAR; INTRAVENOUS ONCE
Status: COMPLETED | OUTPATIENT
Start: 2021-01-08 | End: 2021-01-09

## 2021-01-08 RX ORDER — KETOROLAC TROMETHAMINE 30 MG/ML
10 INJECTION, SOLUTION INTRAMUSCULAR; INTRAVENOUS ONCE
Status: COMPLETED | OUTPATIENT
Start: 2021-01-08 | End: 2021-01-08

## 2021-01-08 RX ADMIN — CEFTRIAXONE 2 G: 2 INJECTION, POWDER, FOR SOLUTION INTRAMUSCULAR; INTRAVENOUS at 20:32

## 2021-01-08 RX ADMIN — SODIUM CHLORIDE 1000 ML: 9 INJECTION, SOLUTION INTRAVENOUS at 18:12

## 2021-01-08 RX ADMIN — ACETAMINOPHEN 1000 MG: 500 TABLET ORAL at 18:59

## 2021-01-08 RX ADMIN — KETOROLAC TROMETHAMINE 10 MG: 30 INJECTION, SOLUTION INTRAMUSCULAR; INTRAVENOUS at 21:17

## 2021-01-08 RX ADMIN — ONDANSETRON 4 MG: 2 INJECTION INTRAMUSCULAR; INTRAVENOUS at 18:12

## 2021-01-09 ENCOUNTER — APPOINTMENT (OUTPATIENT)
Dept: CT IMAGING | Facility: HOSPITAL | Age: 81
End: 2021-01-09

## 2021-01-09 LAB
ALBUMIN SERPL-MCNC: 3.3 G/DL (ref 3.5–5.2)
ALBUMIN SERPL-MCNC: 3.42 G/DL (ref 3.5–5.2)
ALBUMIN/GLOB SERPL: 1.1 G/DL
ALP SERPL-CCNC: 75 U/L (ref 39–117)
ALP SERPL-CCNC: 76 U/L (ref 39–117)
ALT SERPL W P-5'-P-CCNC: 23 U/L (ref 1–33)
ALT SERPL W P-5'-P-CCNC: 23 U/L (ref 1–33)
ANION GAP SERPL CALCULATED.3IONS-SCNC: 13.1 MMOL/L (ref 5–15)
AST SERPL-CCNC: 34 U/L (ref 1–32)
AST SERPL-CCNC: 37 U/L (ref 1–32)
BASOPHILS # BLD AUTO: 0.01 10*3/MM3 (ref 0–0.2)
BASOPHILS NFR BLD AUTO: 0.1 % (ref 0–1.5)
BILIRUB CONJ SERPL-MCNC: <0.2 MG/DL (ref 0–0.3)
BILIRUB INDIRECT SERPL-MCNC: ABNORMAL MG/DL
BILIRUB SERPL-MCNC: 0.3 MG/DL (ref 0–1.2)
BILIRUB SERPL-MCNC: 0.3 MG/DL (ref 0–1.2)
BUN SERPL-MCNC: 17 MG/DL (ref 8–23)
BUN/CREAT SERPL: 14.7 (ref 7–25)
CALCIUM SPEC-SCNC: 8.4 MG/DL (ref 8.6–10.5)
CHLORIDE SERPL-SCNC: 103 MMOL/L (ref 98–107)
CHOLEST SERPL-MCNC: 69 MG/DL (ref 0–200)
CK SERPL-CCNC: 122 U/L (ref 20–180)
CO2 SERPL-SCNC: 20.9 MMOL/L (ref 22–29)
CREAT SERPL-MCNC: 1.05 MG/DL (ref 0.57–1)
CREAT SERPL-MCNC: 1.16 MG/DL (ref 0.57–1)
CRP SERPL-MCNC: 8.48 MG/DL (ref 0–0.5)
D DIMER PPP FEU-MCNC: 0.6 MCGFEU/ML (ref 0–0.5)
DEPRECATED RDW RBC AUTO: 49 FL (ref 37–54)
EOSINOPHIL # BLD AUTO: 0 10*3/MM3 (ref 0–0.4)
EOSINOPHIL NFR BLD AUTO: 0 % (ref 0.3–6.2)
ERYTHROCYTE [DISTWIDTH] IN BLOOD BY AUTOMATED COUNT: 14 % (ref 12.3–15.4)
FERRITIN SERPL-MCNC: 393.1 NG/ML (ref 13–150)
FIBRINOGEN PPP-MCNC: 428 MG/DL (ref 173–524)
GFR SERPL CREATININE-BSD FRML MDRD: 45 ML/MIN/1.73
GFR SERPL CREATININE-BSD FRML MDRD: 51 ML/MIN/1.73
GLOBULIN UR ELPH-MCNC: 3.1 GM/DL
GLUCOSE SERPL-MCNC: 102 MG/DL (ref 65–99)
HAV IGM SERPL QL IA: NORMAL
HBV CORE IGM SERPL QL IA: NORMAL
HBV SURFACE AG SERPL QL IA: NORMAL
HCT VFR BLD AUTO: 40.9 % (ref 34–46.6)
HCV AB SER DONR QL: NORMAL
HDLC SERPL-MCNC: 34 MG/DL (ref 40–60)
HGB BLD-MCNC: 12.4 G/DL (ref 12–15.9)
IMM GRANULOCYTES # BLD AUTO: 0.03 10*3/MM3 (ref 0–0.05)
IMM GRANULOCYTES NFR BLD AUTO: 0.4 % (ref 0–0.5)
LDH SERPL-CCNC: 322 U/L (ref 135–214)
LDLC SERPL CALC-MCNC: 17 MG/DL (ref 0–100)
LDLC/HDLC SERPL: 0.52 {RATIO}
LYMPHOCYTES # BLD AUTO: 0.68 10*3/MM3 (ref 0.7–3.1)
LYMPHOCYTES NFR BLD AUTO: 9.4 % (ref 19.6–45.3)
MCH RBC QN AUTO: 28.7 PG (ref 26.6–33)
MCHC RBC AUTO-ENTMCNC: 30.3 G/DL (ref 31.5–35.7)
MCV RBC AUTO: 94.7 FL (ref 79–97)
MONOCYTES # BLD AUTO: 0.23 10*3/MM3 (ref 0.1–0.9)
MONOCYTES NFR BLD AUTO: 3.2 % (ref 5–12)
NEUTROPHILS NFR BLD AUTO: 6.3 10*3/MM3 (ref 1.7–7)
NEUTROPHILS NFR BLD AUTO: 86.9 % (ref 42.7–76)
NRBC BLD AUTO-RTO: 0 /100 WBC (ref 0–0.2)
PLATELET # BLD AUTO: 205 10*3/MM3 (ref 140–450)
PMV BLD AUTO: 10.5 FL (ref 6–12)
POTASSIUM SERPL-SCNC: 3.9 MMOL/L (ref 3.5–5.2)
PROT SERPL-MCNC: 6.5 G/DL (ref 6–8.5)
PROT SERPL-MCNC: 6.5 G/DL (ref 6–8.5)
RBC # BLD AUTO: 4.32 10*6/MM3 (ref 3.77–5.28)
SODIUM SERPL-SCNC: 137 MMOL/L (ref 136–145)
TRIGL SERPL-MCNC: 86 MG/DL (ref 0–150)
VLDLC SERPL-MCNC: 18 MG/DL (ref 5–40)
WBC # BLD AUTO: 7.25 10*3/MM3 (ref 3.4–10.8)

## 2021-01-09 PROCEDURE — 80061 LIPID PANEL: CPT | Performed by: PHYSICIAN ASSISTANT

## 2021-01-09 PROCEDURE — XW033E5 INTRODUCTION OF REMDESIVIR ANTI-INFECTIVE INTO PERIPHERAL VEIN, PERCUTANEOUS APPROACH, NEW TECHNOLOGY GROUP 5: ICD-10-PCS | Performed by: INTERNAL MEDICINE

## 2021-01-09 PROCEDURE — 80076 HEPATIC FUNCTION PANEL: CPT | Performed by: NURSE PRACTITIONER

## 2021-01-09 PROCEDURE — 94799 UNLISTED PULMONARY SVC/PX: CPT

## 2021-01-09 PROCEDURE — 25010000002 ENOXAPARIN PER 10 MG: Performed by: INTERNAL MEDICINE

## 2021-01-09 PROCEDURE — 80074 ACUTE HEPATITIS PANEL: CPT | Performed by: PHYSICIAN ASSISTANT

## 2021-01-09 PROCEDURE — 82728 ASSAY OF FERRITIN: CPT | Performed by: HOSPITALIST

## 2021-01-09 PROCEDURE — 85379 FIBRIN DEGRADATION QUANT: CPT | Performed by: HOSPITALIST

## 2021-01-09 PROCEDURE — 25010000002 CEFTRIAXONE PER 250 MG: Performed by: NURSE PRACTITIONER

## 2021-01-09 PROCEDURE — 80053 COMPREHEN METABOLIC PANEL: CPT | Performed by: HOSPITALIST

## 2021-01-09 PROCEDURE — 85384 FIBRINOGEN ACTIVITY: CPT | Performed by: HOSPITALIST

## 2021-01-09 PROCEDURE — 83615 LACTATE (LD) (LDH) ENZYME: CPT | Performed by: HOSPITALIST

## 2021-01-09 PROCEDURE — 82550 ASSAY OF CK (CPK): CPT | Performed by: HOSPITALIST

## 2021-01-09 PROCEDURE — 85025 COMPLETE CBC W/AUTO DIFF WBC: CPT | Performed by: HOSPITALIST

## 2021-01-09 PROCEDURE — 99223 1ST HOSP IP/OBS HIGH 75: CPT | Performed by: PHYSICIAN ASSISTANT

## 2021-01-09 PROCEDURE — 25010000002 ENOXAPARIN PER 10 MG: Performed by: HOSPITALIST

## 2021-01-09 PROCEDURE — 25010000002 DEXAMETHASONE PER 1 MG: Performed by: EMERGENCY MEDICINE

## 2021-01-09 PROCEDURE — 25010000002 DEXAMETHASONE PER 1 MG: Performed by: HOSPITALIST

## 2021-01-09 PROCEDURE — 86140 C-REACTIVE PROTEIN: CPT | Performed by: HOSPITALIST

## 2021-01-09 PROCEDURE — 82565 ASSAY OF CREATININE: CPT | Performed by: NURSE PRACTITIONER

## 2021-01-09 PROCEDURE — 74176 CT ABD & PELVIS W/O CONTRAST: CPT

## 2021-01-09 RX ORDER — OMEGA-3S/DHA/EPA/FISH OIL/D3 300MG-1000
1000 CAPSULE ORAL DAILY
Status: DISCONTINUED | OUTPATIENT
Start: 2021-01-09 | End: 2021-01-21 | Stop reason: HOSPADM

## 2021-01-09 RX ORDER — ATORVASTATIN CALCIUM 20 MG/1
20 TABLET, FILM COATED ORAL DAILY
Status: CANCELLED | OUTPATIENT
Start: 2021-01-09

## 2021-01-09 RX ORDER — SODIUM CHLORIDE 9 MG/ML
50 INJECTION, SOLUTION INTRAVENOUS CONTINUOUS
Status: DISCONTINUED | OUTPATIENT
Start: 2021-01-09 | End: 2021-01-10

## 2021-01-09 RX ORDER — SODIUM CHLORIDE 0.9 % (FLUSH) 0.9 %
10 SYRINGE (ML) INJECTION AS NEEDED
Status: DISCONTINUED | OUTPATIENT
Start: 2021-01-09 | End: 2021-01-21 | Stop reason: HOSPADM

## 2021-01-09 RX ORDER — DEXAMETHASONE SODIUM PHOSPHATE 4 MG/ML
6 INJECTION, SOLUTION INTRA-ARTICULAR; INTRALESIONAL; INTRAMUSCULAR; INTRAVENOUS; SOFT TISSUE DAILY
Status: DISCONTINUED | OUTPATIENT
Start: 2021-01-09 | End: 2021-01-19

## 2021-01-09 RX ORDER — LOSARTAN POTASSIUM 50 MG/1
100 TABLET ORAL DAILY
Status: CANCELLED | OUTPATIENT
Start: 2021-01-09

## 2021-01-09 RX ORDER — CARVEDILOL 25 MG/1
25 TABLET ORAL 2 TIMES DAILY WITH MEALS
Status: DISCONTINUED | OUTPATIENT
Start: 2021-01-09 | End: 2021-01-21 | Stop reason: HOSPADM

## 2021-01-09 RX ORDER — DOCUSATE SODIUM 100 MG/1
100 CAPSULE, LIQUID FILLED ORAL DAILY
Status: CANCELLED | OUTPATIENT
Start: 2021-01-09

## 2021-01-09 RX ORDER — ASCORBIC ACID 500 MG/ML
1500 INJECTION, SOLUTION INTRAMUSCULAR; INTRAVENOUS; SUBCUTANEOUS 3 TIMES DAILY
Status: DISCONTINUED | OUTPATIENT
Start: 2021-01-09 | End: 2021-01-09

## 2021-01-09 RX ORDER — GABAPENTIN 100 MG/1
100 CAPSULE ORAL 2 TIMES DAILY
Status: DISCONTINUED | OUTPATIENT
Start: 2021-01-09 | End: 2021-01-21 | Stop reason: HOSPADM

## 2021-01-09 RX ORDER — L.ACID,PARA/B.BIFIDUM/S.THERM 8B CELL
1 CAPSULE ORAL DAILY
Status: DISCONTINUED | OUTPATIENT
Start: 2021-01-09 | End: 2021-01-09

## 2021-01-09 RX ORDER — OXYBUTYNIN CHLORIDE 5 MG/1
10 TABLET, EXTENDED RELEASE ORAL DAILY
Status: DISCONTINUED | OUTPATIENT
Start: 2021-01-09 | End: 2021-01-21 | Stop reason: HOSPADM

## 2021-01-09 RX ORDER — HYDRALAZINE HYDROCHLORIDE 25 MG/1
25 TABLET, FILM COATED ORAL 2 TIMES DAILY
Status: DISCONTINUED | OUTPATIENT
Start: 2021-01-09 | End: 2021-01-09

## 2021-01-09 RX ORDER — PANTOPRAZOLE SODIUM 40 MG/1
40 TABLET, DELAYED RELEASE ORAL EVERY MORNING
Status: DISCONTINUED | OUTPATIENT
Start: 2021-01-09 | End: 2021-01-21 | Stop reason: HOSPADM

## 2021-01-09 RX ORDER — SODIUM CHLORIDE 0.9 % (FLUSH) 0.9 %
10 SYRINGE (ML) INJECTION EVERY 12 HOURS SCHEDULED
Status: DISCONTINUED | OUTPATIENT
Start: 2021-01-09 | End: 2021-01-21 | Stop reason: HOSPADM

## 2021-01-09 RX ORDER — CETIRIZINE HYDROCHLORIDE 10 MG/1
5 TABLET ORAL DAILY
Status: DISCONTINUED | OUTPATIENT
Start: 2021-01-09 | End: 2021-01-21 | Stop reason: HOSPADM

## 2021-01-09 RX ADMIN — SODIUM CHLORIDE 100 ML/HR: 9 INJECTION, SOLUTION INTRAVENOUS at 11:08

## 2021-01-09 RX ADMIN — CHOLECALCIFEROL TAB 10 MCG (400 UNIT) 1000 UNITS: 10 TAB at 08:46

## 2021-01-09 RX ADMIN — OXYBUTYNIN CHLORIDE 10 MG: 5 TABLET, EXTENDED RELEASE ORAL at 08:46

## 2021-01-09 RX ADMIN — CARVEDILOL 25 MG: 25 TABLET, FILM COATED ORAL at 08:45

## 2021-01-09 RX ADMIN — DOXYCYCLINE 100 MG: 100 INJECTION, POWDER, LYOPHILIZED, FOR SOLUTION INTRAVENOUS at 00:03

## 2021-01-09 RX ADMIN — PANTOPRAZOLE SODIUM 40 MG: 40 TABLET, DELAYED RELEASE ORAL at 08:46

## 2021-01-09 RX ADMIN — GABAPENTIN 100 MG: 100 CAPSULE ORAL at 20:25

## 2021-01-09 RX ADMIN — SODIUM CHLORIDE, PRESERVATIVE FREE 10 ML: 5 INJECTION INTRAVENOUS at 20:25

## 2021-01-09 RX ADMIN — ENOXAPARIN SODIUM 40 MG: 40 INJECTION SUBCUTANEOUS at 02:14

## 2021-01-09 RX ADMIN — HYDRALAZINE HYDROCHLORIDE 25 MG: 25 TABLET, FILM COATED ORAL at 08:47

## 2021-01-09 RX ADMIN — Medication 1 CAPSULE: at 08:46

## 2021-01-09 RX ADMIN — CARVEDILOL 25 MG: 25 TABLET, FILM COATED ORAL at 17:38

## 2021-01-09 RX ADMIN — DOXYCYCLINE 100 MG: 100 INJECTION, POWDER, LYOPHILIZED, FOR SOLUTION INTRAVENOUS at 11:08

## 2021-01-09 RX ADMIN — CETIRIZINE HYDROCHLORIDE 5 MG: 10 TABLET, FILM COATED ORAL at 08:47

## 2021-01-09 RX ADMIN — ENOXAPARIN SODIUM 40 MG: 40 INJECTION SUBCUTANEOUS at 15:21

## 2021-01-09 RX ADMIN — ASCORBIC ACID 1500 MG: 500 INJECTION, SOLUTION INTRAMUSCULAR; INTRAVENOUS; SUBCUTANEOUS at 15:21

## 2021-01-09 RX ADMIN — SODIUM CHLORIDE, PRESERVATIVE FREE 10 ML: 5 INJECTION INTRAVENOUS at 08:45

## 2021-01-09 RX ADMIN — REMDESIVIR 200 MG: 100 INJECTION, POWDER, LYOPHILIZED, FOR SOLUTION INTRAVENOUS at 17:38

## 2021-01-09 RX ADMIN — DEXAMETHASONE SODIUM PHOSPHATE 6 MG: 4 INJECTION, SOLUTION INTRA-ARTICULAR; INTRALESIONAL; INTRAMUSCULAR; INTRAVENOUS; SOFT TISSUE at 08:45

## 2021-01-09 RX ADMIN — CEFTRIAXONE 2 G: 2 INJECTION, POWDER, FOR SOLUTION INTRAMUSCULAR; INTRAVENOUS at 20:25

## 2021-01-09 RX ADMIN — GABAPENTIN 100 MG: 100 CAPSULE ORAL at 08:46

## 2021-01-09 RX ADMIN — SODIUM CHLORIDE 75 ML/HR: 9 INJECTION, SOLUTION INTRAVENOUS at 01:59

## 2021-01-09 RX ADMIN — DEXAMETHASONE SODIUM PHOSPHATE 10 MG: 10 INJECTION INTRAMUSCULAR; INTRAVENOUS at 00:03

## 2021-01-09 RX ADMIN — SODIUM CHLORIDE, PRESERVATIVE FREE 10 ML: 5 INJECTION INTRAVENOUS at 02:00

## 2021-01-09 NOTE — ED NOTES
Spoke with Buck at [facility]. States he will be here in approx 1 hour to provide patient with oxygen supplies for home.      Ida Arguello RN  01/08/21 [id number]

## 2021-01-09 NOTE — ED NOTES
Called [facility] and put in request for O2 set up for discharge and home.      Yuridia Connors  01/08/21 [id number]

## 2021-01-09 NOTE — H&P
[facility] Medicine Services  History & Physical    Patient Identification:  Name:  Emma Ryan  Age:  80 y.o.  Sex:  female  :  1940  MRN:  [id number]   Visit Number:  [id number]  Primary Care Physician:  Jennifer Montano MD    Subjective     2021   Chief complaint: Nausea, vomiting, fever    History of presenting illness:      Emma Ryan is a 80 y.o. female with past medical history significant for CKD stage IIIa, GERD, hyperlipidemia, essential hypertension.      Patient presents to [facility] Emergency Department with complaints of nausea, vomiting, and fevers. Patient reports that she was diagnosed with COVID-19 on the  in the ER at this facility. and started showing symptoms the week before [place]. Her  also tested positive on . She reports that she has experienced symptoms of congestion, chills, fever, nausea, vomiting, diarrhea, decreased appetitie, dizziness, and hurting across her back between her shoulder blades. She states that her symptoms have stayed the same. She denies feeling short of breath, but patient is requiring oxygen 2 LNC when she does not require oxygen at home. She has a Tmax of 101.2 degrees F while in the ED that has resolved with Tylenol. She denies abdominal pain, but reports her stomach feeling sore. She denies cough, chest pain, dysuria, urinary frequency, syncope.     Upon arrival to the ED, vital signs were T-max of 101.2 °F, pulse 94 bpm, respirations 20, blood pressure 117/63, SPO2 88% on room air and was placed on 2 L nasal cannula with improvement of SPO2 to 92 to 95%.  ABG shows pH 7.461, PCO2 36, PO2 63.4, HCO3 25.6 with oxygen saturation of 93.8 on room air.  .  CMP consists of creatinine 1.09, EGFR 48, AST 41, otherwise within normal limits.  Ferritin increased at 374.  Elevated CRP.  D-dimer and lactate within normal limits.  CBC largely unremarkable.  UA shows  trace ketones, positive nitrites, 2+ leukocytes, trace protein, 31-50 white blood cells, 4+ bacteria.  Pending urine culture.  Pending blood cultures x2.    Known Emergency Department medications received prior to my evaluation included Tylenol 1000 mg, Rocephin 2 g, Decadron 10 mg, doxycycline, Toradol 10 mg, Zofran 4 mg, sodium chloride bolus 1 L. Room location at the time of my evaluation was.     ---------------------------------------------------------------------------------------------------------------------   Review of Systems   Constitutional: Positive for chills and fever (Tmax 101.2 degrees F). Negative for activity change and fatigue.   HENT: Positive for congestion. Negative for rhinorrhea, sneezing and sore throat.    Eyes: Negative for discharge and redness.   Respiratory: Negative for apnea, cough, shortness of breath (Denies shortness of breath; requiring 2 L NC. ) and wheezing.    Cardiovascular: Negative for chest pain and palpitations.   Gastrointestinal: Positive for diarrhea, nausea and vomiting. Negative for abdominal distention and abdominal pain (Admits to abdominal soreness).   Endocrine: Negative for polydipsia, polyphagia and polyuria.   Genitourinary: Negative for difficulty urinating, dysuria, flank pain, frequency and urgency.   Musculoskeletal: Positive for back pain (between shoulder blades). Negative for arthralgias and myalgias.   Skin: Negative for color change, pallor and rash.   Neurological: Positive for dizziness and headaches. Negative for syncope and weakness.   Psychiatric/Behavioral: Negative for agitation, behavioral problems and confusion.      ---------------------------------------------------------------------------------------------------------------------   Past Medical History:   Diagnosis Date   • GERD (gastroesophageal reflux disease)    • Hyperlipidemia    • Hypertension    • Left shoulder pain    • Obesity    • Osteoarthritis of knees, bilateral    •  Overactive bladder    • Right shoulder pain      Past Surgical History:   Procedure Laterality Date   • APPENDECTOMY     • BUNIONECTOMY Right    • CARDIOVASCULAR STRESS TEST  10/2012   • CATARACT EXTRACTION     • CHOLECYSTECTOMY     • COLONOSCOPY     • ECHO - CONVERTED  10/2012   • JOINT REPLACEMENT      bilateral knees    • KNEE ARTHROPLASTY Left    • KNEE ARTHROSCOPY     • SHOULDER ARTHROSCOPY Left 2016    Procedure: LEFT SHOULDER ACROMIOPLASTY,MINI OPEN ROTATOR CUFF REPAIR;  Surgeon: Carlos Eduardo Smith MD;  Location: [facility];  Service:    • SHOULDER SURGERY  2016    OPEN ACROMICPLASTY RIGHT SHOULDER     Family History   Problem Relation Age of Onset   • Heart disease Other    • Stroke Sister    • Diabetes Mother    • Heart failure Mother    • Heart disease Mother    • Heart attack Mother    • Hypertension Father    • Emphysema Father    • Heart disease Father    • No Known Problems Brother    • Cancer Sister    • Kidney disease Sister    • Heart failure Sister    • Diabetes Brother    • Diabetes Brother    • Diabetes Brother      Social History     Socioeconomic History   • Marital status:      Spouse name: Not on file   • Number of children: Not on file   • Years of education: Not on file   • Highest education level: Not on file   Tobacco Use   • Smoking status: Former Smoker     Packs/day: 2.00     Years: 20.00     Pack years: 40.00     Types: Cigarettes     Quit date:      Years since quittin.0   • Smokeless tobacco: Never Used   Substance and Sexual Activity   • Alcohol use: No     Comment: s/p    • Drug use: No   • Sexual activity: Defer     ---------------------------------------------------------------------------------------------------------------------   Allergies:  Codeine and Sulfa antibiotics  ---------------------------------------------------------------------------------------------------------------------   Home medications:    Medications below are  reported home medications pulling from within the system; at this time, these medications have not been reconciled unless otherwise specified and are in the verification process for further verifcation as current home medications.  Medications Prior to Admission   Medication Sig Dispense Refill Last Dose   • atorvastatin (LIPITOR) 20 MG tablet Take 1 tablet by mouth once daily 90 tablet 0    • carvedilol (COREG) 25 MG tablet Take 25 mg by mouth 2 (Two) Times a Day With Meals.      • Cholecalciferol (VITAMIN D PO) Take 1,000 Units by mouth daily.      • Docusate Calcium (STOOL SOFTENER PO) Take  by mouth daily.      • Fexofenadine HCl (ALLEGRA PO) Take 180 mg by mouth daily as needed.      • gabapentin (NEURONTIN) 100 MG capsule Take 1 capsule by mouth twice daily 60 capsule 0    • hydrALAZINE (APRESOLINE) 25 MG tablet Take 25 mg by mouth 2 (two) times a day.      • losartan (COZAAR) 100 MG tablet Take 100 mg by mouth Daily.      • omeprazole OTC (PriLOSEC OTC) 20 MG EC tablet Take 20 mg by mouth daily.      • ondansetron ODT (Zofran ODT) 4 MG disintegrating tablet Place 1 tablet on the tongue Every 8 (Eight) Hours As Needed for Nausea or Vomiting. 30 tablet 0    • oxybutynin XL (DITROPAN-XL) 10 MG 24 hr tablet Take 1 tablet by mouth Daily. 90 tablet 1    • Probiotic Product (PROBIOTIC COLON SUPPORT PO) Take  by mouth daily.          Hospital Scheduled Meds:  cefTRIAXone, 1 g, Intravenous, Q24H  dexamethasone, 6 mg, Intravenous, Daily  doxycycline, 100 mg, Intravenous, Q12H  enoxaparin, 40 mg, Subcutaneous, Q24H  sodium chloride, 10 mL, Intravenous, Q12H      sodium chloride, 75 mL/hr, Last Rate: 75 mL/hr (01/09/21 0159)        Current listed hospital scheduled medications may not yet reflect those currently placed in orders that are signed and held awaiting patient's arrival to floor.   ---------------------------------------------------------------------------------------------------------------------     Objective      Vital Signs:  Temp:  [97.3 °F (36.3 °C)-101.2 °F (38.4 °C)] 98.6 °F (37 °C)  Heart Rate:  [73-96] 82  Resp:  [18-20] 18  BP: (103-149)/(58-91) 144/71      01/08/21  1646 01/09/21  0130   Weight: 83.9 kg (185 lb) 81.7 kg (180 lb 3.2 oz)     Body mass index is 36.4 kg/m².  ---------------------------------------------------------------------------------------------------------------------       Physical Exam  Constitutional:       Appearance: Normal appearance. She is normal weight.      Interventions: Nasal cannula in place.   HENT:      Head: Normocephalic and atraumatic.      Nose: Nose normal.      Mouth/Throat:      Mouth: Mucous membranes are moist.      Pharynx: Oropharynx is clear.   Eyes:      Extraocular Movements: Extraocular movements intact.      Conjunctiva/sclera: Conjunctivae normal.      Pupils: Pupils are equal, round, and reactive to light.   Neck:      Musculoskeletal: Normal range of motion and neck supple.   Cardiovascular:      Rate and Rhythm: Normal rate and regular rhythm.      Pulses: Normal pulses.      Heart sounds: Normal heart sounds. No murmur.   Pulmonary:      Effort: Pulmonary effort is normal.      Breath sounds: Rales (bibasilar rales) present. No wheezing or rhonchi.   Abdominal:      General: Abdomen is flat. Bowel sounds are normal.      Palpations: Abdomen is soft.      Tenderness: There is no abdominal tenderness.   Musculoskeletal: Normal range of motion.      Right lower leg: No edema.      Left lower leg: No edema.   Skin:     General: Skin is dry.      Findings: No erythema or rash.   Neurological:      General: No focal deficit present.      Mental Status: She is alert and oriented to person, place, and time. Mental status is at baseline.   Psychiatric:         Mood and Affect: Mood normal.         Behavior: Behavior normal.         Thought Content: Thought content normal.        ---------------------------------------------------------------------------------------------------------------------  EKG:    No EKG for review.    Telemetry:    Patient appears to be in NSR with HR 74 bpm and SpO2 94% on 2 LNC.    I have personally looked at both the EKG and the telemetry strips.  ---------------------------------------------------------------------------------------------------------------------   Results from last 7 days   Lab Units 01/08/21  1804   CRP mg/dL 6.26*   LACTATE mmol/L 1.1   WBC 10*3/mm3 7.36   HEMOGLOBIN g/dL 12.9   HEMATOCRIT % 40.9   MCV fL 93.6   MCHC g/dL 31.5   PLATELETS 10*3/mm3 202     Results from last 7 days   Lab Units 01/08/21  1707   PH, ARTERIAL pH units 7.461*   PO2 ART mm Hg 63.4*   PCO2, ARTERIAL mm Hg 36.0   HCO3 ART mmol/L 25.6     Results from last 7 days   Lab Units 01/08/21  1804   SODIUM mmol/L 138   POTASSIUM mmol/L 4.1   CHLORIDE mmol/L 103   CO2 mmol/L 24.0   BUN mg/dL 17   CREATININE mg/dL 1.09*   EGFR IF NONAFRICN AM mL/min/1.73 48*   CALCIUM mg/dL 8.7   GLUCOSE mg/dL 90   ALBUMIN g/dL 3.52   BILIRUBIN mg/dL 0.4   ALK PHOS U/L 78   AST (SGOT) U/L 41*   ALT (SGPT) U/L 25   Estimated Creatinine Clearance: 41 mL/min (A) (by C-G formula based on SCr of 1.09 mg/dL (H)).  No results found for: AMMONIA          No results found for: HGBA1C  Lab Results   Component Value Date    TSH 1.950 09/14/2018    FREET4 1.16 09/14/2018     No results found for: PREGTESTUR, PREGSERUM, HCG, HCGQUANT  Pain Management Panel     Pain Management Panel Latest Ref Rng & Units 10/9/2020 6/10/2020    CREATININE UR mg/dL 99.5 122.8    AMPHETAMINES SCREEN, URINE Negative - -    BARBITURATES SCREEN Negative - -    BENZODIAZEPINE SCREEN, URINE Negative - -    COCAINE SCREEN, URINE Negative - -    METHADONE SCREEN, URINE Negative - -            ---------------------------------------------------------------------------------------------------------------------  Imaging Results (Last  7 Days)     Procedure Component Value Units Date/Time    CT CHEST WITHOUT CONTRAST DIAGNOSTIC [[id number]] Collected: 01/08/21 2205     Updated: 01/08/21 2207    Narrative:      CT Chest WO    INDICATION:   Shortness of air. COVID positive.    TECHNIQUE:   CT of the thorax without IV contrast. Coronal and sagittal reconstructions were obtained.  Radiation dose reduction techniques included automated exposure control or exposure modulation based on body size. Count of known CT and cardiac nuc med studies  performed in previous 12 months: 0.     COMPARISON:   None available.    FINDINGS:  There is atherosclerotic disease and coronary artery disease. No pleural or pericardial effusion is identified. There is no adenopathy. Small hiatal hernia is present. There is some thickening of the lower esophageal wall which may reflect esophagitis.  Images through the upper abdomen show changes of cholecystectomy and fatty atrophy of the pancreas.    Lung windows demonstrate multifocal predominantly groundglass infiltrates with some associated septal thickening. Commonly reported imaging features of COVID-19 pneumonia are present. Other processes such as influenza pneumonia and organizing pneumonia  can cause a similar imaging pattern. Postoperative changes are noted in the left shoulder.      Impression:      1. Multifocal pneumonia typical of COVID 19.  2. Atherosclerotic disease and coronary artery disease.    Signer Name: Juliano Flower MD   Signed: 1/8/2021 10:05 PM   Workstation Name: [email or web address]    [facility]    XR Chest AP [[id number]] Collected: 01/08/21 1744     Updated: 01/08/21 1802    Narrative:      EXAM:    XR Chest, 1 View     EXAM DATE:    1/8/2021 4:59 PM     CLINICAL HISTORY:    COVID Evaluation, Cough, Fever     TECHNIQUE:    Frontal view of the chest.     COMPARISON:    12/31/2020     FINDINGS:    Lungs:  Development of bilateral perihilar opacities which can be seen  with COVID  associated pneumonia.    Pleural space:  Unremarkable.  No pneumothorax.    Heart:  Unremarkable.  No cardiomegaly.    Mediastinum:  Unremarkable.    Bones/joints:  Unremarkable.       Impression:        Development of bilateral airspace disease which can be seen with COVID  associated pneumonia.     This report was finalized on 1/8/2021 5:45 PM by Dr. Alan Velez MD.           Last echocardiogram:  Results for orders placed during the hospital encounter of 09/17/18   Adult Transthoracic Echo Complete W/ Cont if Necessary Per Protocol    Narrative · Normal left ventricular cavity size and wall thickness noted.  · Left ventricular systolic function is normal. Estimated EF appears to be   in the range of 56 - 60%.  · Left ventricular diastolic dysfunction (grade I) consistent with   impaired relaxation.  · Trace mitral valve regurgitation is present.  · Trace tricuspid valve regurgitation is present. No evidence of pulmonary   hypertension is present.  · No significant valvular heart disease  · There is no evidence of pericardial effusion.        I have personally reviewed the above radiology images and read the final radiology report on 01/09/21  ---------------------------------------------------------------------------------------------------------------------  Assessment / Plan     Active Hospital Problems    Diagnosis POA   • **Acute UTI [N39.0] Yes       ASSESSMENT/PLAN:  Simple sepsis present on admission with HR 94 bpm, RR 20, Tmax 101.2 °F, CRP 6.26 secondary to UTI and COVID-19 pneumonia  - CXR shows bilateral airspace disease.   - CT chest shows multifocal pneumonia typical of COVID-19; atherosclerotic disease and CAD.   - ABG shows pH 7.46, pCO2 36, pO2 63.4, HCO3 25.6 with oxygen saturation of 93.8 on room air.   - Supplemental oxygen ordered. Currently on 2 L NC. Continue to titrate to maintain SpO2 90-95%.   - UA shows trace ketones, positive nitrites, 2+ leukocytes, trace protein, 31-50 white blood  cell, 4+ bacteria; pending urine cultures.  - Continue to trend with COVID progression labs.   - Trend C-RP on admission.   - Pending blood cultures x 2.   - Rocephin 2 g received in ED.  Continue on admission for treatment of UTI.  - Doxycycline and IV decadron ordered for treatment of pneumonia.  - Infectious disease consulted.  Input and assistance much appreciated.  - Continue to monitor vital signs per hospital protocol.  - Continue to monitor with daily labs.    Elevated AST  -AST 41  -Hepatitis panel ordered.  -Avoid hepatotoxic agents as much as possible.    Chronic Kidney Disease, stage III a  - Creatinine on admission 1.09.  Baseline creatinine appears to be 0.9-1.  - EGFR 48.  - Continue to monitor with a.m. CMP.  - Avoid nephrotoxic agents.    GERD  - PPI    Hyperlipidemia  -A.m. fasting lipid panel.  -Continue home Lipitor.    Essential Hypertension  -Blood pressure stable  -Continue to monitor per hospital protocol.  -Resume home antihypertensives admission.  ----------  -DVT prophylaxis: Lovenox  -Activity: Up with assistance  -Expected length of stay: INPATIENT status due to the need for care which can only be reasonably provided in an hospital setting such as aggressive/expedited ancillary services and/or consultation services, the necessity for IV medications, close physician monitoring and/or the possible need for procedures.  In such, I feel patient's risk for adverse outcomes and need for care warrant INPATIENT evaluation and predict the patient's care encounter to likely last beyond 2 midnights.    High risk secondary to simple sepsis secondary to UTI and Covid 19 pneumonia, chronic kidney disease, essential hypertension.    Daina Bass PA-C  01/09/21  02:25 EST    ---------------------------------------------------------------------------------------------------------------------

## 2021-01-09 NOTE — PLAN OF CARE
Goal Outcome Evaluation:  Plan of Care Reviewed With: patient  Progress: no change  Outcome Summary: pt admitted this shift and requiring 2L via nc. will monitor

## 2021-01-09 NOTE — PROGRESS NOTES
-pt seen and examiend, was admitted earlier for COVID-19  -rales at bases, no added sounds  -stable vitals, puslxo 93% on 2 L/min    --cont remdesivir/dexamethasone/add vitamin C 1500 mg 3 times daily/zinc to 20 twice a day  --CMP and D-dimer monitoring  --Increase Lovenox to 40 mg subcu twice a day, D-dimer monitoring  --DC antibacterials

## 2021-01-09 NOTE — CONSULTS
INFECTIOUS DISEASE CONSULTATION REPORT        Patient Identification:  Name:  Emma Ryan  Age:  80 y.o.  Sex:  female  :  1940  MRN:  [id number]   Visit Number:  [id number]  Primary Care Physician:  Jennifer Montano MD       LOS: 1 day        Subjective       Subjective     History of present illness:      Thank you Dr. Golden for allowing us to participate in the care of your patient.  As you well know, Ms. Emma Ryan is a 80 y.o. female with past medical history significant for CKD, GERD, hyperlipidemia, hypertension, who presented to [facility] Emergency Department on 2021 for nausea, vomiting, fever.  Patient tested positive for COVID-19 on 2020.  WBC normal.  CRP elevated 8.48.  Fever 101.2 on admission.  Currently on 2 L nasal cannula with no apparent distress.  Urinalysis positive with culture in progress.  Chest x-ray reports development of bilateral airspace disease.  CT of the chest reports multifocal pneumonia.  CT of the abdomen and pelvis reports patchy infiltrates in the lung bases.       Infectious Disease consultation was requested for antimicrobial management.      ---------------------------------------------------------------------------------------------------------------------     Review Of Systems:    Constitutional: Positive fever, chills. No night sweats. No appetite change or unexpected weight change. No fatigue.  Eyes: no eye drainage, itching or redness.  HEENT: no mouth sores, dysphagia or nose bleed.  Respiratory: Positive shortness of breath, cough. No production of sputum.  Cardiovascular: no chest pain, no palpitations, no orthopnea.  Gastrointestinal: no nausea, vomiting or diarrhea. No abdominal pain, hematemesis or rectal bleeding.  Genitourinary: no dysuria or polyuria.  Hematologic/lymphatic: no lymph node abnormalities, no easy bruising or easy bleeding.  Musculoskeletal: no muscle or joint pain.  Skin: No rash and no  itching.  Neurological: no loss of consciousness, no seizure, no headache.  Behavioral/Psych: no depression or suicidal ideation.  Endocrine: no hot flashes.  Immunologic: negative.    ---------------------------------------------------------------------------------------------------------------------     Past Medical History    Past Medical History:   Diagnosis Date   • GERD (gastroesophageal reflux disease)    • Hyperlipidemia    • Hypertension    • Left shoulder pain    • Obesity    • Osteoarthritis of knees, bilateral    • Overactive bladder    • Right shoulder pain        Past Surgical History    Past Surgical History:   Procedure Laterality Date   • APPENDECTOMY     • BUNIONECTOMY Right    • CARDIOVASCULAR STRESS TEST  10/2012   • CATARACT EXTRACTION     • CHOLECYSTECTOMY     • COLONOSCOPY     • ECHO - CONVERTED  10/2012   • JOINT REPLACEMENT      bilateral knees    • KNEE ARTHROPLASTY Left    • KNEE ARTHROSCOPY     • SHOULDER ARTHROSCOPY Left 2016    Procedure: LEFT SHOULDER ACROMIOPLASTY,MINI OPEN ROTATOR CUFF REPAIR;  Surgeon: Carlos Eduardo Smith MD;  Location: [facility];  Service:    • SHOULDER SURGERY  2016    OPEN ACROMICPLASTY RIGHT SHOULDER       Family History    Family History   Problem Relation Age of Onset   • Heart disease Other    • Stroke Sister    • Diabetes Mother    • Heart failure Mother    • Heart disease Mother    • Heart attack Mother    • Hypertension Father    • Emphysema Father    • Heart disease Father    • No Known Problems Brother    • Cancer Sister    • Kidney disease Sister    • Heart failure Sister    • Diabetes Brother    • Diabetes Brother    • Diabetes Brother        Social History    Social History     Tobacco Use   • Smoking status: Former Smoker     Packs/day: 2.00     Years: 20.00     Pack years: 40.00     Types: Cigarettes     Quit date:      Years since quittin.0   • Smokeless tobacco: Never Used   Substance Use Topics   • Alcohol use: No      Comment: s/p 1976   • Drug use: No       Allergies    Codeine and Sulfa antibiotics  ---------------------------------------------------------------------------------------------------------------------     Home Medications:    Prior to Admission Medications     Prescriptions Last Dose Informant Patient Reported? Taking?    atorvastatin (LIPITOR) 20 MG tablet Past Week Self No Yes    Take 1 tablet by mouth once daily    carvedilol (COREG) 25 MG tablet Past Week Self Yes Yes    Take 25 mg by mouth 2 (Two) Times a Day With Meals.    Cholecalciferol (VITAMIN D PO) Past Week Self Yes Yes    Take 1,000 Units by mouth daily.    Docusate Calcium (STOOL SOFTENER PO) Past Week Self Yes Yes    Take 1 tablet by mouth Daily.    Fexofenadine HCl (ALLEGRA PO) Past Week Self Yes Yes    Take 180 mg by mouth daily as needed.    gabapentin (NEURONTIN) 100 MG capsule Past Week Self No Yes    Take 1 capsule by mouth twice daily    hydrALAZINE (APRESOLINE) 25 MG tablet Past Week Self Yes Yes    Take 25 mg by mouth 2 (two) times a day.    losartan (COZAAR) 100 MG tablet Past Week Self Yes Yes    Take 100 mg by mouth Daily.    omeprazole OTC (PriLOSEC OTC) 20 MG EC tablet Past Week Self Yes Yes    Take 20 mg by mouth daily.    oxybutynin XL (DITROPAN-XL) 10 MG 24 hr tablet Past Week Self No Yes    Take 1 tablet by mouth Daily.    Probiotic Product (PROBIOTIC COLON SUPPORT PO) Past Week Self Yes Yes    Take  by mouth daily.        ---------------------------------------------------------------------------------------------------------------------    Objective       Objective     Hospital Scheduled Meds:  ascorbic acid, 1,500 mg, Intravenous, TID  carvedilol, 25 mg, Oral, BID With Meals  cetirizine, 5 mg, Oral, Daily  cholecalciferol, 1,000 Units, Oral, Daily  dexamethasone, 6 mg, Intravenous, Daily  enoxaparin, 40 mg, Subcutaneous, Q12H  gabapentin, 100 mg, Oral, BID  oxybutynin XL, 10 mg, Oral, Daily  pantoprazole, 40 mg, Oral,  QAM  sodium chloride, 10 mL, Intravenous, Q12H      sodium chloride, 50 mL/hr, Last Rate: 100 mL/hr (01/09/21 1108)      ---------------------------------------------------------------------------------------------------------------------   Vital Signs:  Temp:  [97.3 °F (36.3 °C)-101.2 °F (38.4 °C)] 97.7 °F (36.5 °C)  Heart Rate:  [7-96] 75  Resp:  [18-20] 18  BP: (103-149)/(58-91) 126/66  No data found.  SpO2 Percentage    01/09/21 0130 01/09/21 0639 01/09/21 0930   SpO2: 94% 93% 93%     SpO2:  [88 %-95 %] 93 %  on  Flow (L/min):  [2] 2;   Device (Oxygen Therapy): nasal cannula    Body mass index is 36.44 kg/m².  Wt Readings from Last 3 Encounters:   01/09/21 81.8 kg (180 lb 6.4 oz)   12/31/20 83.9 kg (185 lb)   12/01/20 84.4 kg (186 lb)     ---------------------------------------------------------------------------------------------------------------------     Physical Exam:    Deferred due to COVID-19 isolation.    ---------------------------------------------------------------------------------------------------------------------    Results from last 7 days   Lab Units 01/09/21  0332   CK TOTAL U/L 122       Results from last 7 days   Lab Units 01/09/21  0332   CHOLESTEROL mg/dL 69   TRIGLYCERIDES mg/dL 86   HDL CHOL mg/dL 34*   LDL CHOL mg/dL 17     Results from last 7 days   Lab Units 01/08/21  1707   PH, ARTERIAL pH units 7.461*   PO2 ART mm Hg 63.4*   PCO2, ARTERIAL mm Hg 36.0   HCO3 ART mmol/L 25.6     Results from last 7 days   Lab Units 01/09/21  0332 01/08/21  1804   CRP mg/dL 8.48* 6.26*   LACTATE mmol/L  --  1.1   WBC 10*3/mm3 7.25 7.36   HEMOGLOBIN g/dL 12.4 12.9   HEMATOCRIT % 40.9 40.9   MCV fL 94.7 93.6   MCHC g/dL 30.3* 31.5   PLATELETS 10*3/mm3 205 202     Results from last 7 days   Lab Units 01/09/21  0332 01/08/21  1804   SODIUM mmol/L 137 138   POTASSIUM mmol/L 3.9 4.1   CHLORIDE mmol/L 103 103   CO2 mmol/L 20.9* 24.0   BUN mg/dL 17 17   CREATININE mg/dL 1.16* 1.09*   EGFR IF NONAFRICN AM  mL/min/1.73 45* 48*   CALCIUM mg/dL 8.4* 8.7   GLUCOSE mg/dL 102* 90   ALBUMIN g/dL 3.42* 3.52   BILIRUBIN mg/dL 0.3 0.4   ALK PHOS U/L 76 78   AST (SGOT) U/L 37* 41*   ALT (SGPT) U/L 23 25   Estimated Creatinine Clearance: 38.5 mL/min (A) (by C-G formula based on SCr of 1.16 mg/dL (H)).  No results found for: AMMONIA    No results found for: HGBA1C, POCGLU  No results found for: HGBA1C  Lab Results   Component Value Date    TSH 1.950 09/14/2018    FREET4 1.16 09/14/2018       No results found for: BLOODCX  No results found for: URINECX  No results found for: WOUNDCX  No results found for: STOOLCX  No results found for: RESPCX  Pain Management Panel     Pain Management Panel Latest Ref Rng & Units 10/9/2020 6/10/2020    CREATININE UR mg/dL 99.5 122.8    AMPHETAMINES SCREEN, URINE Negative - -    BARBITURATES SCREEN Negative - -    BENZODIAZEPINE SCREEN, URINE Negative - -    COCAINE SCREEN, URINE Negative - -    METHADONE SCREEN, URINE Negative - -        I have personally reviewed the above laboratory results.   ---------------------------------------------------------------------------------------------------------------------  Imaging Results (Last 7 Days)     Procedure Component Value Units Date/Time    CT Abdomen Pelvis Without Contrast [[id number]] Collected: 01/09/21 0851     Updated: 01/09/21 0853    Narrative:      CT Abdomen Pelvis WO    INDICATION:   Urinary tract infection with pain across back this morning. Rule out pyelonephritis    TECHNIQUE:   CT of the abdomen and pelvis without IV contrast. Coronal and sagittal reconstructions were obtained.  Radiation dose reduction techniques included automated exposure control or exposure modulation based on body size. Count of known CT and cardiac nuc  med studies performed in previous 12 months: 0.     COMPARISON:   None available.    FINDINGS:  Abdomen: Lung bases show patchy infiltrates. Please refer to CT chest from last night liver, spleen, pancreas,  adrenal glands and kidneys are normal in appearance. The gallbladder is not visible. The aorta is normal in size. There is no adenopathy. Bowel  is normal. There is a small fatty umbilical hernia.    Pelvis: The uterus and adnexal regions and bladder are normal. There are degenerative changes in the lumbar spine.      Impression:      No CT evidence of polynephritis on this unenhanced study.    The gallbladder is not visible.    Degenerative changes lumbar spine    Patchy infiltrates in the lung bases. Please see CT chest from last night          Signer Name: Migel Dixon MD   Signed: 1/9/2021 8:51 AM   Workstation Name: [id number]    [facility]    CT CHEST WITHOUT CONTRAST DIAGNOSTIC [[id number]] Collected: 01/08/21 2205     Updated: 01/08/21 2207    Narrative:      CT Chest WO    INDICATION:   Shortness of air. COVID positive.    TECHNIQUE:   CT of the thorax without IV contrast. Coronal and sagittal reconstructions were obtained.  Radiation dose reduction techniques included automated exposure control or exposure modulation based on body size. Count of known CT and cardiac nuc med studies  performed in previous 12 months: 0.     COMPARISON:   None available.    FINDINGS:  There is atherosclerotic disease and coronary artery disease. No pleural or pericardial effusion is identified. There is no adenopathy. Small hiatal hernia is present. There is some thickening of the lower esophageal wall which may reflect esophagitis.  Images through the upper abdomen show changes of cholecystectomy and fatty atrophy of the pancreas.    Lung windows demonstrate multifocal predominantly groundglass infiltrates with some associated septal thickening. Commonly reported imaging features of COVID-19 pneumonia are present. Other processes such as influenza pneumonia and organizing pneumonia  can cause a similar imaging pattern. Postoperative changes are noted in the left shoulder.      Impression:      1.  Multifocal pneumonia typical of COVID 19.  2. Atherosclerotic disease and coronary artery disease.    Signer Name: Juliano Flower MD   Signed: 1/8/2021 10:05 PM   Workstation Name: [email or web address]    [facility]    XR Chest AP [[id number]] Collected: 01/08/21 1744     Updated: 01/08/21 1802    Narrative:      EXAM:    XR Chest, 1 View     EXAM DATE:    1/8/2021 4:59 PM     CLINICAL HISTORY:    COVID Evaluation, Cough, Fever     TECHNIQUE:    Frontal view of the chest.     COMPARISON:    12/31/2020     FINDINGS:    Lungs:  Development of bilateral perihilar opacities which can be seen  with COVID associated pneumonia.    Pleural space:  Unremarkable.  No pneumothorax.    Heart:  Unremarkable.  No cardiomegaly.    Mediastinum:  Unremarkable.    Bones/joints:  Unremarkable.       Impression:        Development of bilateral airspace disease which can be seen with COVID  associated pneumonia.     This report was finalized on 1/8/2021 5:45 PM by Dr. Alan Velez MD.           I have personally reviewed the above radiology results.   ---------------------------------------------------------------------------------------------------------------------      Assessment & Plan        Assessment/Plan       ASSESSMENT:    1.  Sepsis  2.  COVID-19 pneumonia  3.  UTI    PLAN:    Patient presents with nausea, vomiting, fever.  Patient tested positive for COVID-19 on 12/31/2020.  WBC normal.  CRP elevated 8.48.  Fever 101.2 on admission.  Currently on 2 L nasal cannula with no apparent distress.  Urinalysis positive with culture in progress.  Chest x-ray reports development of bilateral airspace disease.  CT of the chest reports multifocal pneumonia.  CT of the abdomen and pelvis reports patchy infiltrates in the lung bases.     Recommend initiation of remdesivir and Decadron.    In the setting of positive urinalysis recommend to continue Rocephin 2 g IV every 24 hours pending finalization of culture results.  We  will continue to follow closely and adjust antibiotic therapy as needed.    Again, thank you Dr. Golden for allowing us to participate in the care of your patient and please feel free to call for any questions you may have.        Code Status:   Code Status and Medical Interventions:   Ordered at: 01/08/21 7174     Level Of Support Discussed With:    Patient     Code Status:    CPR     Medical Interventions (Level of Support Prior to Arrest):    Full           AZIZA Enrique  01/09/21  13:22 EST

## 2021-01-09 NOTE — PLAN OF CARE
Goal Outcome Evaluation:  Plan of Care Reviewed With: patient  Progress: no change  Outcome Summary: Pt has been resting in bed throughout shift. Pt wearing 2L NC. No acute changes. No complaints of pain or discomfort. Will continue to monitor.

## 2021-01-09 NOTE — ED PROVIDER NOTES
Subjective   80-year-old female presents to the ER with complaints of Covid.  Patient symptoms began a week before [place].  Patient was seen in the ER and diagnosed with Covid.  Patient states she has had nausea and vomiting.  Patient has been febrile.          Review of Systems   Constitutional: Positive for fever.   HENT: Negative.    Respiratory: Negative for cough.    Cardiovascular: Negative.  Negative for chest pain.   Gastrointestinal: Positive for nausea and vomiting. Negative for abdominal pain.   Endocrine: Negative.    Genitourinary: Negative.  Negative for dysuria.   Skin: Negative.    Neurological: Negative.    Psychiatric/Behavioral: Negative.    All other systems reviewed and are negative.      Past Medical History:   Diagnosis Date   • GERD (gastroesophageal reflux disease)    • Hyperlipidemia    • Hypertension    • Left shoulder pain    • Obesity    • Osteoarthritis of knees, bilateral    • Overactive bladder    • Right shoulder pain        Allergies   Allergen Reactions   • Codeine Other (See Comments)     hyperventilate   • Sulfa Antibiotics Rash       Past Surgical History:   Procedure Laterality Date   • APPENDECTOMY     • BUNIONECTOMY Right    • CARDIOVASCULAR STRESS TEST  10/2012   • CATARACT EXTRACTION  2017   • CHOLECYSTECTOMY     • COLONOSCOPY  2011   • ECHO - CONVERTED  10/2012   • JOINT REPLACEMENT      bilateral knees    • KNEE ARTHROPLASTY Left    • KNEE ARTHROSCOPY     • SHOULDER ARTHROSCOPY Left 7/28/2016    Procedure: LEFT SHOULDER ACROMIOPLASTY,MINI OPEN ROTATOR CUFF REPAIR;  Surgeon: Carlos Eduardo Smith MD;  Location[facility];  Service:    • SHOULDER SURGERY  05/31/2016    OPEN ACROMICPLASTY RIGHT SHOULDER       Family History   Problem Relation Age of Onset   • Heart disease Other    • Stroke Sister    • Diabetes Mother    • Heart failure Mother    • Heart disease Mother    • Heart attack Mother    • Hypertension Father    • Emphysema Father    • Heart disease Father    •  No Known Problems Brother    • Cancer Sister    • Kidney disease Sister    • Heart failure Sister    • Diabetes Brother    • Diabetes Brother    • Diabetes Brother        Social History     Socioeconomic History   • Marital status:      Spouse name: Not on file   • Number of children: Not on file   • Years of education: Not on file   • Highest education level: Not on file   Tobacco Use   • Smoking status: Former Smoker     Packs/day: 2.00     Years: 20.00     Pack years: 40.00     Types: Cigarettes     Quit date:      Years since quittin.0   • Smokeless tobacco: Never Used   Substance and Sexual Activity   • Alcohol use: No     Comment: s/p    • Drug use: No   • Sexual activity: Defer           Objective   Physical Exam  Vitals signs and nursing note reviewed.   Constitutional:       General: She is not in acute distress.     Appearance: She is well-developed. She is not diaphoretic.   HENT:      Head: Normocephalic and atraumatic.      Right Ear: External ear normal.      Left Ear: External ear normal.      Nose: Nose normal.   Eyes:      Conjunctiva/sclera: Conjunctivae normal.   Neck:      Musculoskeletal: Normal range of motion and neck supple.      Vascular: No JVD.      Trachea: No tracheal deviation.   Cardiovascular:      Rate and Rhythm: Normal rate and regular rhythm.      Heart sounds: No murmur.   Pulmonary:      Effort: Pulmonary effort is normal. No respiratory distress.      Breath sounds: No wheezing.   Abdominal:      Palpations: Abdomen is soft.      Tenderness: There is abdominal tenderness.   Musculoskeletal: Normal range of motion.         General: No deformity.   Skin:     General: Skin is warm and dry.      Coloration: Skin is not pale.      Findings: No erythema or rash.   Neurological:      Mental Status: She is alert and oriented to person, place, and time.      Cranial Nerves: No cranial nerve deficit.   Psychiatric:         Behavior: Behavior normal.         Thought  Content: Thought content normal.         Procedures           ED Course  ED Course as of Jan 08 2332 Fri Jan 08, 2021   1815 XR rad interpreted:  Development of bilateral airspace disease which can be seen with COVID  associated pneumonia.    [RB]   2016 EKG interpretation, ventricular rate 91, QRS 74, QTc 418.  There is no acute ST or T wave changes.  Significant artifact on this EKG.  And unable to identify P waves this could be an accelerated junctional rhythm.    [JM]   2210 CT chest rad interpreted:  1. Multifocal pneumonia typical of COVID 19.  2. Atherosclerotic disease and coronary artery disease.    [RB]   2210 Patient's ER stay has been unremarkable.  Patient is outside the window for being able to receive bamlanivimab.  Patient does have a urinary tract infection.  This could be the source of her fever.  Patient will be treated with antibiotics and encouraged to follow-up in the ER should symptoms worsen.  Patient will be set up for home oxygen.    [RB]   2309 IMPRESSION:  1. Multifocal pneumonia typical of COVID 19.  2. Atherosclerotic disease and coronary artery disease.   CT CHEST WITHOUT CONTRAST DIAGNOSTIC [ES]   2332 Discussed case with on-call hospitalist Dr. Golden whom agreed to accept the patient for further evaluation and treatment.    [ES]      ED Course User Index  [ES] Dada Conner MD  [JM] Sid Prescott DO  [RB] Armin Barbosa II, PA                                           MDM  Number of Diagnoses or Management Options  Acute UTI: new and requires workup  History of COVID-19: established and worsening     Amount and/or Complexity of Data Reviewed  Clinical lab tests: ordered and reviewed  Tests in the radiology section of CPT®: ordered and reviewed  Tests in the medicine section of CPT®: reviewed  Decide to obtain previous medical records or to obtain history from someone other than the patient: yes  Discuss the patient with other providers: yes    Risk of  Complications, Morbidity, and/or Mortality  Presenting problems: moderate  Diagnostic procedures: moderate  Management options: low    Patient Progress  Patient progress: stable      Final diagnoses:   Acute UTI   History of COVID-19            Armin Barbosa II, PA  01/08/21 2223       Dada Conner MD  01/08/21 1662

## 2021-01-10 LAB
A-A DO2: 139.7 MMHG (ref 0–300)
A-A DO2: 149 MMHG (ref 0–300)
ALBUMIN SERPL-MCNC: 3.08 G/DL (ref 3.5–5.2)
ALBUMIN/GLOB SERPL: 1 G/DL
ALP SERPL-CCNC: 68 U/L (ref 39–117)
ALT SERPL W P-5'-P-CCNC: 20 U/L (ref 1–33)
ANION GAP SERPL CALCULATED.3IONS-SCNC: 13 MMOL/L (ref 5–15)
ARTERIAL PATENCY WRIST A: ABNORMAL
ARTERIAL PATENCY WRIST A: POSITIVE
AST SERPL-CCNC: 28 U/L (ref 1–32)
ATMOSPHERIC PRESS: 733 MMHG
ATMOSPHERIC PRESS: 733 MMHG
BACTERIA SPEC AEROBE CULT: ABNORMAL
BASE EXCESS BLDA CALC-SCNC: -1.3 MMOL/L (ref 0–2)
BASE EXCESS BLDA CALC-SCNC: -1.9 MMOL/L (ref 0–2)
BASOPHILS # BLD AUTO: 0.01 10*3/MM3 (ref 0–0.2)
BASOPHILS NFR BLD AUTO: 0.1 % (ref 0–1.5)
BDY SITE: ABNORMAL
BDY SITE: ABNORMAL
BILIRUB CONJ SERPL-MCNC: <0.2 MG/DL (ref 0–0.3)
BILIRUB SERPL-MCNC: 0.2 MG/DL (ref 0–1.2)
BODY TEMPERATURE: 0 C
BODY TEMPERATURE: 0 C
BUN SERPL-MCNC: 20 MG/DL (ref 8–23)
BUN/CREAT SERPL: 21.1 (ref 7–25)
CALCIUM SPEC-SCNC: 8.2 MG/DL (ref 8.6–10.5)
CHLORIDE SERPL-SCNC: 110 MMOL/L (ref 98–107)
CK SERPL-CCNC: 87 U/L (ref 20–180)
CO2 BLDA-SCNC: 23.8 MMOL/L (ref 22–33)
CO2 BLDA-SCNC: 24.4 MMOL/L (ref 22–33)
CO2 SERPL-SCNC: 20 MMOL/L (ref 22–29)
COHGB MFR BLD: <1 % (ref 0–5)
COHGB MFR BLD: <1 % (ref 0–5)
CREAT SERPL-MCNC: 0.95 MG/DL (ref 0.57–1)
CRP SERPL-MCNC: 6.17 MG/DL (ref 0–0.5)
DEPRECATED RDW RBC AUTO: 49.1 FL (ref 37–54)
DEPRECATED RDW RBC AUTO: 51.1 FL (ref 37–54)
EOSINOPHIL # BLD AUTO: 0 10*3/MM3 (ref 0–0.4)
EOSINOPHIL NFR BLD AUTO: 0 % (ref 0.3–6.2)
ERYTHROCYTE [DISTWIDTH] IN BLOOD BY AUTOMATED COUNT: 14.3 % (ref 12.3–15.4)
ERYTHROCYTE [DISTWIDTH] IN BLOOD BY AUTOMATED COUNT: 14.4 % (ref 12.3–15.4)
FERRITIN SERPL-MCNC: 358.7 NG/ML (ref 13–150)
GAS FLOW AIRWAY: 4 LPM
GFR SERPL CREATININE-BSD FRML MDRD: 57 ML/MIN/1.73
GLOBULIN UR ELPH-MCNC: 3 GM/DL
GLUCOSE SERPL-MCNC: 125 MG/DL (ref 65–99)
HCO3 BLDA-SCNC: 22.7 MMOL/L (ref 20–26)
HCO3 BLDA-SCNC: 23.2 MMOL/L (ref 20–26)
HCT VFR BLD AUTO: 37.9 % (ref 34–46.6)
HCT VFR BLD AUTO: 40.2 % (ref 34–46.6)
HCT VFR BLD CALC: 37.6 % (ref 38–51)
HCT VFR BLD CALC: 37.9 % (ref 38–51)
HGB BLD-MCNC: 12 G/DL (ref 12–15.9)
HGB BLD-MCNC: 12 G/DL (ref 12–15.9)
HGB BLDA-MCNC: 12.3 G/DL (ref 13.5–17.5)
HGB BLDA-MCNC: 12.4 G/DL (ref 13.5–17.5)
IMM GRANULOCYTES # BLD AUTO: 0.06 10*3/MM3 (ref 0–0.05)
IMM GRANULOCYTES NFR BLD AUTO: 0.6 % (ref 0–0.5)
INHALED O2 CONCENTRATION: 36 %
INHALED O2 CONCENTRATION: 36 %
LYMPHOCYTES # BLD AUTO: 0.88 10*3/MM3 (ref 0.7–3.1)
LYMPHOCYTES NFR BLD AUTO: 9.4 % (ref 19.6–45.3)
Lab: ABNORMAL
Lab: ABNORMAL
MCH RBC QN AUTO: 28.8 PG (ref 26.6–33)
MCH RBC QN AUTO: 29.4 PG (ref 26.6–33)
MCHC RBC AUTO-ENTMCNC: 29.9 G/DL (ref 31.5–35.7)
MCHC RBC AUTO-ENTMCNC: 31.7 G/DL (ref 31.5–35.7)
MCV RBC AUTO: 92.9 FL (ref 79–97)
MCV RBC AUTO: 96.6 FL (ref 79–97)
METHGB BLD QL: <1 % (ref 0–3)
METHGB BLD QL: <1 % (ref 0–3)
MODALITY: ABNORMAL
MODALITY: ABNORMAL
MONOCYTES # BLD AUTO: 0.53 10*3/MM3 (ref 0.1–0.9)
MONOCYTES NFR BLD AUTO: 5.6 % (ref 5–12)
NEUTROPHILS NFR BLD AUTO: 7.92 10*3/MM3 (ref 1.7–7)
NEUTROPHILS NFR BLD AUTO: 84.3 % (ref 42.7–76)
NOTE: ABNORMAL
NOTE: ABNORMAL
NOTIFIED BY: ABNORMAL
NOTIFIED WHO: ABNORMAL
NRBC BLD AUTO-RTO: 0 /100 WBC (ref 0–0.2)
OXYHGB MFR BLDV: 89.4 % (ref 94–99)
OXYHGB MFR BLDV: 93 % (ref 94–99)
PCO2 BLDA: 37.2 MM HG (ref 35–45)
PCO2 BLDA: 37.6 MM HG (ref 35–45)
PCO2 TEMP ADJ BLD: ABNORMAL MM[HG]
PCO2 TEMP ADJ BLD: ABNORMAL MM[HG]
PH BLDA: 7.39 PH UNITS (ref 7.35–7.45)
PH BLDA: 7.4 PH UNITS (ref 7.35–7.45)
PH, TEMP CORRECTED: ABNORMAL
PH, TEMP CORRECTED: ABNORMAL
PLATELET # BLD AUTO: 248 10*3/MM3 (ref 140–450)
PLATELET # BLD AUTO: 263 10*3/MM3 (ref 140–450)
PMV BLD AUTO: 10.2 FL (ref 6–12)
PMV BLD AUTO: 10.5 FL (ref 6–12)
PO2 BLDA: 56.6 MM HG (ref 83–108)
PO2 BLDA: 66 MM HG (ref 83–108)
PO2 TEMP ADJ BLD: ABNORMAL MM[HG]
PO2 TEMP ADJ BLD: ABNORMAL MM[HG]
POTASSIUM SERPL-SCNC: 4 MMOL/L (ref 3.5–5.2)
PROT SERPL-MCNC: 6.1 G/DL (ref 6–8.5)
QT INTERVAL: 340 MS
QTC INTERVAL: 418 MS
RBC # BLD AUTO: 4.08 10*6/MM3 (ref 3.77–5.28)
RBC # BLD AUTO: 4.16 10*6/MM3 (ref 3.77–5.28)
SAO2 % BLDCOA: 90.5 % (ref 94–99)
SAO2 % BLDCOA: 93.9 % (ref 94–99)
SODIUM SERPL-SCNC: 143 MMOL/L (ref 136–145)
VENTILATOR MODE: ABNORMAL
VENTILATOR MODE: ABNORMAL
WBC # BLD AUTO: 9.4 10*3/MM3 (ref 3.4–10.8)
WBC # BLD AUTO: 9.76 10*3/MM3 (ref 3.4–10.8)

## 2021-01-10 PROCEDURE — 82728 ASSAY OF FERRITIN: CPT | Performed by: HOSPITALIST

## 2021-01-10 PROCEDURE — 99232 SBSQ HOSP IP/OBS MODERATE 35: CPT | Performed by: INTERNAL MEDICINE

## 2021-01-10 PROCEDURE — 82248 BILIRUBIN DIRECT: CPT | Performed by: NURSE PRACTITIONER

## 2021-01-10 PROCEDURE — 25010000002 DEXAMETHASONE PER 1 MG: Performed by: HOSPITALIST

## 2021-01-10 PROCEDURE — 86140 C-REACTIVE PROTEIN: CPT | Performed by: HOSPITALIST

## 2021-01-10 PROCEDURE — 82550 ASSAY OF CK (CPK): CPT | Performed by: HOSPITALIST

## 2021-01-10 PROCEDURE — 83050 HGB METHEMOGLOBIN QUAN: CPT

## 2021-01-10 PROCEDURE — 94799 UNLISTED PULMONARY SVC/PX: CPT

## 2021-01-10 PROCEDURE — 85025 COMPLETE CBC W/AUTO DIFF WBC: CPT | Performed by: HOSPITALIST

## 2021-01-10 PROCEDURE — 36600 WITHDRAWAL OF ARTERIAL BLOOD: CPT

## 2021-01-10 PROCEDURE — 25010000002 CEFTRIAXONE PER 250 MG: Performed by: NURSE PRACTITIONER

## 2021-01-10 PROCEDURE — 82805 BLOOD GASES W/O2 SATURATION: CPT

## 2021-01-10 PROCEDURE — 25010000002 ENOXAPARIN PER 10 MG: Performed by: INTERNAL MEDICINE

## 2021-01-10 PROCEDURE — 82375 ASSAY CARBOXYHB QUANT: CPT

## 2021-01-10 PROCEDURE — 80053 COMPREHEN METABOLIC PANEL: CPT | Performed by: HOSPITALIST

## 2021-01-10 PROCEDURE — 85027 COMPLETE CBC AUTOMATED: CPT | Performed by: INTERNAL MEDICINE

## 2021-01-10 RX ADMIN — CETIRIZINE HYDROCHLORIDE 5 MG: 10 TABLET, FILM COATED ORAL at 08:16

## 2021-01-10 RX ADMIN — CHOLECALCIFEROL TAB 10 MCG (400 UNIT) 1000 UNITS: 10 TAB at 08:16

## 2021-01-10 RX ADMIN — ASCORBIC ACID 1500 MG: 500 INJECTION, SOLUTION INTRAMUSCULAR; INTRAVENOUS; SUBCUTANEOUS at 00:13

## 2021-01-10 RX ADMIN — PANTOPRAZOLE SODIUM 40 MG: 40 TABLET, DELAYED RELEASE ORAL at 05:56

## 2021-01-10 RX ADMIN — DEXAMETHASONE SODIUM PHOSPHATE 6 MG: 4 INJECTION, SOLUTION INTRA-ARTICULAR; INTRALESIONAL; INTRAMUSCULAR; INTRAVENOUS; SOFT TISSUE at 08:19

## 2021-01-10 RX ADMIN — CARVEDILOL 25 MG: 25 TABLET, FILM COATED ORAL at 08:18

## 2021-01-10 RX ADMIN — ENOXAPARIN SODIUM 40 MG: 40 INJECTION SUBCUTANEOUS at 05:56

## 2021-01-10 RX ADMIN — CEFTRIAXONE 2 G: 2 INJECTION, POWDER, FOR SOLUTION INTRAMUSCULAR; INTRAVENOUS at 20:09

## 2021-01-10 RX ADMIN — CARVEDILOL 25 MG: 25 TABLET, FILM COATED ORAL at 17:06

## 2021-01-10 RX ADMIN — ASCORBIC ACID 1500 MG: 500 INJECTION, SOLUTION INTRAMUSCULAR; INTRAVENOUS; SUBCUTANEOUS at 23:08

## 2021-01-10 RX ADMIN — SODIUM CHLORIDE, PRESERVATIVE FREE 10 ML: 5 INJECTION INTRAVENOUS at 20:09

## 2021-01-10 RX ADMIN — ENOXAPARIN SODIUM 40 MG: 40 INJECTION SUBCUTANEOUS at 17:06

## 2021-01-10 RX ADMIN — ASCORBIC ACID 1500 MG: 500 INJECTION, SOLUTION INTRAMUSCULAR; INTRAVENOUS; SUBCUTANEOUS at 05:57

## 2021-01-10 RX ADMIN — REMDESIVIR 100 MG: 100 INJECTION, POWDER, LYOPHILIZED, FOR SOLUTION INTRAVENOUS at 14:13

## 2021-01-10 RX ADMIN — GABAPENTIN 100 MG: 100 CAPSULE ORAL at 20:10

## 2021-01-10 RX ADMIN — GABAPENTIN 100 MG: 100 CAPSULE ORAL at 08:18

## 2021-01-10 RX ADMIN — OXYBUTYNIN CHLORIDE 10 MG: 5 TABLET, EXTENDED RELEASE ORAL at 08:18

## 2021-01-10 RX ADMIN — SODIUM CHLORIDE, PRESERVATIVE FREE 10 ML: 5 INJECTION INTRAVENOUS at 08:19

## 2021-01-10 RX ADMIN — ASCORBIC ACID 1500 MG: 500 INJECTION, SOLUTION INTRAMUSCULAR; INTRAVENOUS; SUBCUTANEOUS at 14:13

## 2021-01-10 NOTE — PROGRESS NOTES
[facility] HOSPITALIST PROGRESS NOTE     Patient Identification:  Name:  Emma Ryan  Age:  80 y.o.  Sex:  female  :  1940  MRN:  [id number]  Visit Number:  [id number]  ROOM: [address] [place] [address]     Primary Care Provider:  Jennifer Montano MD    Length of stay:  2    Subjective     Chief Complaint   Patient presents with   • Exposure To Known Illness   • Nausea     1/10 -patient seen and examined reviewed her condition she is feeling herself a bit better compared to yesterday, denies any active cardiopulmonary complaints, currently on 4 L of oxygen.        Objective     Current Hospital Meds:ascorbic acid in  mL IVPB, 1,500 mg, Intravenous, Q8H  carvedilol, 25 mg, Oral, BID With Meals  cefTRIAXone, 2 g, Intravenous, Q24H  cetirizine, 5 mg, Oral, Daily  cholecalciferol, 1,000 Units, Oral, Daily  dexamethasone, 6 mg, Intravenous, Daily  enoxaparin, 40 mg, Subcutaneous, Q12H  gabapentin, 100 mg, Oral, BID  oxybutynin XL, 10 mg, Oral, Daily  pantoprazole, 40 mg, Oral, QAM  remdesivir, 100 mg, Intravenous, Q24H  sodium chloride, 10 mL, Intravenous, Q12H    Pharmacy Consult - Remdesivir,       ----------------------------------------------------------------------------------------------------------------------  Vital Signs:  Temp:  [97.6 °F (36.4 °C)-98.4 °F (36.9 °C)] 98.4 °F (36.9 °C)  Heart Rate:  [58-98] 77  Resp:  [16-20] 16  BP: (120-147)/(51-75) 130/65  SpO2:  [91 %-94 %] 94 %  on  Flow (L/min):  [2-4] 4;   Device (Oxygen Therapy): nasal cannula  Body mass index is 36.51 kg/m².    Wt Readings from Last 3 Encounters:   01/10/21 82 kg (180 lb 12.4 oz)   20 83.9 kg (185 lb)   20 84.4 kg (186 lb)       Intake/Output Summary (Last 24 hours) at 1/10/2021 1243  Last data filed at 1/10/2021 1017  Gross per 24 hour   Intake 2065 ml   Output 875 ml   Net 1190 ml     Diet Regular;  Cardiac  ----------------------------------------------------------------------------------------------------------------------  Physical exam:  Constitutional:  Well-developed and well-nourished.  No new acute respiratory distress.      HENT:  Head:  Normocephalic and atraumatic.  Mouth:  Moist mucous membranes.    Eyes:  Conjunctivae and EOM are normal.  Pupils are equal, round, and reactive to light.  No scleral icterus.    Neck:  Neck supple.  No JVD present.    Cardiovascular:  Normal rate, regular rhythm and normal heart sounds with no murmur.  Pulmonary/Chest:  B/l basal rales, no crackles, no wheezes  Abdominal:  Soft.  Bowel sounds are normal.  No distension and no tenderness.   Musculoskeletal:  No edema, no tenderness, and no deformity.  No red or swollen joints anywhere.    Neurological:  No new lateralization   Skin:  Skin is warm and dry. No rash noted. No pallor.   Peripheral vascular:  Strong pulses in all 4 extremities with no clubbing, no cyanosis, no edema.    ----------------------------------------------------------------------------------------------------------------------  Results from last 7 days   Lab Units 01/10/21  0220 01/09/21  0332 01/08/21  1804   CRP mg/dL 6.17* 8.48* 6.26*   LACTATE mmol/L  --   --  1.1   WBC 10*3/mm3 9.40 7.25 7.36   HEMOGLOBIN g/dL 12.0 12.4 12.9   HEMATOCRIT % 40.2 40.9 40.9   MCV fL 96.6 94.7 93.6   MCHC g/dL 29.9* 30.3* 31.5   PLATELETS 10*3/mm3 248 205 202     Results from last 7 days   Lab Units 01/10/21  0220 01/09/21  1342 01/09/21  0332 01/08/21  1804   SODIUM mmol/L 143  --  137 138   POTASSIUM mmol/L 4.0  --  3.9 4.1   CHLORIDE mmol/L 110*  --  103 103   CO2 mmol/L 20.0*  --  20.9* 24.0   BUN mg/dL 20  --  17 17   CREATININE mg/dL 0.95 1.05* 1.16* 1.09*   EGFR IF NONAFRICN AM mL/min/1.73 57* 51* 45* 48*   CALCIUM mg/dL 8.2*  --  8.4* 8.7   GLUCOSE mg/dL 125*  --  102* 90   ALBUMIN g/dL 3.08* 3.30* 3.42* 3.52   BILIRUBIN mg/dL 0.2 0.3 0.3 0.4   ALK PHOS  U/L 68 75 76 78   AST (SGOT) U/L 28 34* 37* 41*   ALT (SGPT) U/L 20 23 23 25   Estimated Creatinine Clearance: 47.1 mL/min (by C-G formula based on SCr of 0.95 mg/dL).  Results from last 7 days   Lab Units 01/10/21  0220 01/09/21  0332   CK TOTAL U/L 87 122     No results found for: HGBA1C, POCGLU  No results found for: AMMONIA  Results from last 7 days   Lab Units 01/09/21  0332   CHOLESTEROL mg/dL 69   TRIGLYCERIDES mg/dL 86   HDL CHOL mg/dL 34*   LDL CHOL mg/dL 17     Results from last 7 days   Lab Units 01/08/21 2026   NITRITE UA  Positive*   WBC UA /HPF 31-50*   BACTERIA UA /HPF 4+*   SQUAM EPITHEL UA /HPF 0-2   URINECX  >100,000 CFU/mL Escherichia coli*     Blood Culture   Date Value Ref Range Status   01/08/2021 No growth at 24 hours  Preliminary   01/08/2021 No growth at 24 hours  Preliminary   No results found for: RESPCX  Urine Culture   Date Value Ref Range Status   01/08/2021 >100,000 CFU/mL Escherichia coli (A)  Final   No results found for: WOUNDCXNo results found for: BODYFLDCXNo results found for: STOOLCX  I have personally looked at the labs and they are summarized above.  ----------------------------------------------------------------------------------------------------------------------  Imaging Results (Last 24 Hours)     ** No results found for the last 24 hours. **        I have personally reviewed the radiology images and read the final radiology report.        Assessment & Plan      *Sepsis due to E.coli UTI + COVID-19, stable sepsis features  *acute hypoxemic resp failure secondary to above, stable on 4 L/min O2  *Mild transaminitis due to above, stable on remdesivir  *CKD-IIIa  *HL  *Ess HTN  *GERD    --Continue remdesivir/dexamethasone, day 2  --Ascorbic acid 1500 mg IV every 6 hours  --Zinc 220 mg p.o. twice a day  --Continue carvedilol at current dose  --CMP, D-dimer, LDH, ferritin and CRP monitoring  --Continue ceftriaxone 1 g IV every 24 hours, sensitive E. coli         Maxine Florentino  MD [facility]  01/10/21  12:43 EST

## 2021-01-10 NOTE — NURSING NOTE
The patients O2 demand has increased throughout the night. She is requiring 4L O2 to keep her O2 90%.   Spoke with  in person on the unit. He ordered stat blood gas. Will continue to monitor.

## 2021-01-10 NOTE — PLAN OF CARE
Goal Outcome Evaluation:  Plan of Care Reviewed With: patient  Progress: no change    The patient has been increased to 4L nasal cannula throughout the night. Dr. Golden aware. ABG's ordered. She has desated multiple times when up to the bedside commode overnight. No other complaints or events. Will continue to monitor.

## 2021-01-10 NOTE — PROGRESS NOTES
PROGRESS NOTE         Patient Identification:  Name:  Emma Ryan  Age:  80 y.o.  Sex:  female  :  1940  MRN:  [id number]  Visit Number:  [id number]  Primary Care Provider:  Jennifer Montano MD         LOS: 2 days       ----------------------------------------------------------------------------------------------------------------------  Subjective       Chief Complaints:    Exposure To Known Illness and Nausea        Interval History:      Case discussed with primary RN.  Patient on increased oxygen requirement of 4 L nasal cannula today.  WBC normal.  CRP improving at 6.17.  Urine culture from 2021 finalized as greater than 100,000 colonies of E. coli.    Review of Systems:    Constitutional: no fever, chills and night sweats.  Generalized fatigue.  Eyes: no eye drainage, itching or redness.  HEENT: no mouth sores, dysphagia or nose bleed.  Respiratory: Positive shortness of breath, cough. No production of sputum.  Cardiovascular: no chest pain, no palpitations, no orthopnea.  Gastrointestinal: no nausea, vomiting or diarrhea. No abdominal pain, hematemesis or rectal bleeding.  Genitourinary: no dysuria or polyuria.  Hematologic/lymphatic: no lymph node abnormalities, no easy bruising or easy bleeding.  Musculoskeletal: no muscle or joint pain.  Skin: No rash and no itching.  Neurological: no loss of consciousness, no seizure, no headache.  Behavioral/Psych: no depression or suicidal ideation.  Endocrine: no hot flashes.  Immunologic: negative.    ----------------------------------------------------------------------------------------------------------------------      Objective       Current [facility] Meds:  ascorbic acid in  mL IVPB, 1,500 mg, Intravenous, Q8H  carvedilol, 25 mg, Oral, BID With Meals  cefTRIAXone, 2 g, Intravenous, Q24H  cetirizine, 5 mg, Oral, Daily  cholecalciferol, 1,000 Units, Oral, Daily  dexamethasone, 6 mg, Intravenous, Daily  enoxaparin, 40 mg,  Subcutaneous, Q12H  gabapentin, 100 mg, Oral, BID  oxybutynin XL, 10 mg, Oral, Daily  pantoprazole, 40 mg, Oral, QAM  remdesivir, 100 mg, Intravenous, Q24H  sodium chloride, 10 mL, Intravenous, Q12H      Pharmacy Consult - Remdesivir,       ----------------------------------------------------------------------------------------------------------------------    Vital Signs:  Temp:  [97.6 °F (36.4 °C)-98.4 °F (36.9 °C)] 98.4 °F (36.9 °C)  Heart Rate:  [58-98] 77  Resp:  [16-20] 16  BP: (120-147)/(51-75) 130/65  No data found.  SpO2 Percentage    01/10/21 0700 01/10/21 0930 01/10/21 1017   SpO2: 94% 93% 94%     SpO2:  [91 %-94 %] 94 %  on  Flow (L/min):  [2-4] 4;   Device (Oxygen Therapy): nasal cannula    Body mass index is 36.51 kg/m².  Wt Readings from Last 3 Encounters:   01/10/21 82 kg (180 lb 12.4 oz)   12/31/20 83.9 kg (185 lb)   12/01/20 84.4 kg (186 lb)        Intake/Output Summary (Last 24 hours) at 1/10/2021 1236  Last data filed at 1/10/2021 1017  Gross per 24 hour   Intake 2065 ml   Output 875 ml   Net 1190 ml     Diet Regular; Cardiac  ----------------------------------------------------------------------------------------------------------------------      Physical Exam:       Deferred due to COVID-19 isolation.  ----------------------------------------------------------------------------------------------------------------------  Results from last 7 days   Lab Units 01/10/21  0220 01/09/21  0332   CK TOTAL U/L 87 122       Results from last 7 days   Lab Units 01/09/21  0332   CHOLESTEROL mg/dL 69   TRIGLYCERIDES mg/dL 86   HDL CHOL mg/dL 34*   LDL CHOL mg/dL 17     Results from last 7 days   Lab Units 01/10/21  0721   PH, ARTERIAL pH units 7.399   PO2 ART mm Hg 56.6*   PCO2, ARTERIAL mm Hg 37.6   HCO3 ART mmol/L 23.2     Results from last 7 days   Lab Units 01/10/21  0220 01/09/21  0332 01/08/21  1804   CRP mg/dL 6.17* 8.48* 6.26*   LACTATE mmol/L  --   --  1.1   WBC 10*3/mm3 9.40 7.25 7.36   HEMOGLOBIN  g/dL 12.0 12.4 12.9   HEMATOCRIT % 40.2 40.9 40.9   MCV fL 96.6 94.7 93.6   MCHC g/dL 29.9* 30.3* 31.5   PLATELETS 10*3/mm3 248 205 202     Results from last 7 days   Lab Units 01/10/21  0220 01/09/21  1342 01/09/21  0332 01/08/21  1804   SODIUM mmol/L 143  --  137 138   POTASSIUM mmol/L 4.0  --  3.9 4.1   CHLORIDE mmol/L 110*  --  103 103   CO2 mmol/L 20.0*  --  20.9* 24.0   BUN mg/dL 20  --  17 17   CREATININE mg/dL 0.95 1.05* 1.16* 1.09*   EGFR IF NONAFRICN AM mL/min/1.73 57* 51* 45* 48*   CALCIUM mg/dL 8.2*  --  8.4* 8.7   GLUCOSE mg/dL 125*  --  102* 90   ALBUMIN g/dL 3.08* 3.30* 3.42* 3.52   BILIRUBIN mg/dL 0.2 0.3 0.3 0.4   ALK PHOS U/L 68 75 76 78   AST (SGOT) U/L 28 34* 37* 41*   ALT (SGPT) U/L 20 23 23 25   Estimated Creatinine Clearance: 47.1 mL/min (by C-G formula based on SCr of 0.95 mg/dL).  No results found for: AMMONIA    No results found for: HGBA1C, POCGLU  No results found for: HGBA1C  Lab Results   Component Value Date    TSH 1.950 09/14/2018    FREET4 1.16 09/14/2018       Blood Culture   Date Value Ref Range Status   01/08/2021 No growth at 24 hours  Preliminary   01/08/2021 No growth at 24 hours  Preliminary     Urine Culture   Date Value Ref Range Status   01/08/2021 >100,000 CFU/mL Escherichia coli (A)  Final     No results found for: WOUNDCX  No results found for: STOOLCX  No results found for: RESPCX  Pain Management Panel     Pain Management Panel Latest Ref Rng & Units 10/9/2020 6/10/2020    CREATININE UR mg/dL 99.5 122.8    AMPHETAMINES SCREEN, URINE Negative - -    BARBITURATES SCREEN Negative - -    BENZODIAZEPINE SCREEN, URINE Negative - -    COCAINE SCREEN, URINE Negative - -    METHADONE SCREEN, URINE Negative - -            ----------------------------------------------------------------------------------------------------------------------  Imaging Results (Last 24 Hours)     ** No results found for the last 24 hours. **           ----------------------------------------------------------------------------------------------------------------------    Assessment/Plan       Assessment/Plan     ASSESSMENT:    1.  Sepsis  2.  COVID-19 pneumonia  3.  UTI    PLAN:    Case discussed with primary RN.  Patient on increased oxygen requirement of 4 L nasal cannula today.  WBC normal.  CRP improving at 6.17.  Urine culture from 1/8/2021 finalized as greater than 100,000 colonies of E. Coli.    Chest x-ray reports development of bilateral airspace disease.  CT of the chest reports multifocal pneumonia.  CT of the abdomen and pelvis reports patchy infiltrates in the lung bases.      Recommend initiation of remdesivir and Decadron.    For now recommend to continue Rocephin monotherapy.  We will continue to follow closely and adjust antibiotic therapy as needed.      Code Status:   Code Status and Medical Interventions:   Ordered at: 01/08/21 3531     Level Of Support Discussed With:    Patient     Code Status:    CPR     Medical Interventions (Level of Support Prior to Arrest):    Full       Tracy Jones, AZIZA  01/10/21  12:36 EST

## 2021-01-11 LAB
ALBUMIN SERPL-MCNC: 3.02 G/DL (ref 3.5–5.2)
ALBUMIN/GLOB SERPL: 1 G/DL
ALP SERPL-CCNC: 78 U/L (ref 39–117)
ALT SERPL W P-5'-P-CCNC: 23 U/L (ref 1–33)
ANION GAP SERPL CALCULATED.3IONS-SCNC: 9.3 MMOL/L (ref 5–15)
AST SERPL-CCNC: 30 U/L (ref 1–32)
BILIRUB SERPL-MCNC: 0.3 MG/DL (ref 0–1.2)
BUN SERPL-MCNC: 26 MG/DL (ref 8–23)
BUN/CREAT SERPL: 26 (ref 7–25)
CALCIUM SPEC-SCNC: 8.8 MG/DL (ref 8.6–10.5)
CHLORIDE SERPL-SCNC: 110 MMOL/L (ref 98–107)
CO2 SERPL-SCNC: 22.7 MMOL/L (ref 22–29)
CREAT SERPL-MCNC: 1 MG/DL (ref 0.57–1)
CRP SERPL-MCNC: 2.97 MG/DL (ref 0–0.5)
D DIMER PPP FEU-MCNC: <0.27 MCGFEU/ML (ref 0–0.5)
DEPRECATED RDW RBC AUTO: 51.3 FL (ref 37–54)
ERYTHROCYTE [DISTWIDTH] IN BLOOD BY AUTOMATED COUNT: 14.6 % (ref 12.3–15.4)
FERRITIN SERPL-MCNC: 314.4 NG/ML (ref 13–150)
FIBRINOGEN PPP-MCNC: 353 MG/DL (ref 173–524)
GFR SERPL CREATININE-BSD FRML MDRD: 53 ML/MIN/1.73
GLOBULIN UR ELPH-MCNC: 3 GM/DL
GLUCOSE SERPL-MCNC: 119 MG/DL (ref 65–99)
HCT VFR BLD AUTO: 40.3 % (ref 34–46.6)
HGB BLD-MCNC: 12.4 G/DL (ref 12–15.9)
LDH SERPL-CCNC: 349 U/L (ref 135–214)
MCH RBC QN AUTO: 29.2 PG (ref 26.6–33)
MCHC RBC AUTO-ENTMCNC: 30.8 G/DL (ref 31.5–35.7)
MCV RBC AUTO: 95 FL (ref 79–97)
PLATELET # BLD AUTO: 298 10*3/MM3 (ref 140–450)
PMV BLD AUTO: 10.2 FL (ref 6–12)
POTASSIUM SERPL-SCNC: 3.9 MMOL/L (ref 3.5–5.2)
PROT SERPL-MCNC: 6 G/DL (ref 6–8.5)
RBC # BLD AUTO: 4.24 10*6/MM3 (ref 3.77–5.28)
SODIUM SERPL-SCNC: 142 MMOL/L (ref 136–145)
WBC # BLD AUTO: 8.39 10*3/MM3 (ref 3.4–10.8)

## 2021-01-11 PROCEDURE — 25010000002 CEFTRIAXONE PER 250 MG: Performed by: NURSE PRACTITIONER

## 2021-01-11 PROCEDURE — 25010000002 DEXAMETHASONE PER 1 MG: Performed by: HOSPITALIST

## 2021-01-11 PROCEDURE — 80053 COMPREHEN METABOLIC PANEL: CPT | Performed by: HOSPITALIST

## 2021-01-11 PROCEDURE — 86140 C-REACTIVE PROTEIN: CPT | Performed by: HOSPITALIST

## 2021-01-11 PROCEDURE — 94799 UNLISTED PULMONARY SVC/PX: CPT

## 2021-01-11 PROCEDURE — 25010000002 ENOXAPARIN PER 10 MG: Performed by: INTERNAL MEDICINE

## 2021-01-11 PROCEDURE — 85379 FIBRIN DEGRADATION QUANT: CPT | Performed by: INTERNAL MEDICINE

## 2021-01-11 PROCEDURE — 99232 SBSQ HOSP IP/OBS MODERATE 35: CPT | Performed by: STUDENT IN AN ORGANIZED HEALTH CARE EDUCATION/TRAINING PROGRAM

## 2021-01-11 PROCEDURE — 85027 COMPLETE CBC AUTOMATED: CPT | Performed by: INTERNAL MEDICINE

## 2021-01-11 PROCEDURE — 85384 FIBRINOGEN ACTIVITY: CPT | Performed by: HOSPITALIST

## 2021-01-11 PROCEDURE — 82728 ASSAY OF FERRITIN: CPT | Performed by: INTERNAL MEDICINE

## 2021-01-11 PROCEDURE — 83615 LACTATE (LD) (LDH) ENZYME: CPT | Performed by: INTERNAL MEDICINE

## 2021-01-11 RX ADMIN — ASCORBIC ACID 1500 MG: 500 INJECTION, SOLUTION INTRAMUSCULAR; INTRAVENOUS; SUBCUTANEOUS at 13:50

## 2021-01-11 RX ADMIN — GABAPENTIN 100 MG: 100 CAPSULE ORAL at 20:14

## 2021-01-11 RX ADMIN — PANTOPRAZOLE SODIUM 40 MG: 40 TABLET, DELAYED RELEASE ORAL at 06:37

## 2021-01-11 RX ADMIN — ASCORBIC ACID 1500 MG: 500 INJECTION, SOLUTION INTRAMUSCULAR; INTRAVENOUS; SUBCUTANEOUS at 21:39

## 2021-01-11 RX ADMIN — ENOXAPARIN SODIUM 40 MG: 40 INJECTION SUBCUTANEOUS at 06:37

## 2021-01-11 RX ADMIN — CARVEDILOL 25 MG: 25 TABLET, FILM COATED ORAL at 08:54

## 2021-01-11 RX ADMIN — OXYBUTYNIN CHLORIDE 10 MG: 5 TABLET, EXTENDED RELEASE ORAL at 08:54

## 2021-01-11 RX ADMIN — REMDESIVIR 100 MG: 100 INJECTION, POWDER, LYOPHILIZED, FOR SOLUTION INTRAVENOUS at 13:50

## 2021-01-11 RX ADMIN — GABAPENTIN 100 MG: 100 CAPSULE ORAL at 08:54

## 2021-01-11 RX ADMIN — CHOLECALCIFEROL TAB 10 MCG (400 UNIT) 1000 UNITS: 10 TAB at 08:54

## 2021-01-11 RX ADMIN — CETIRIZINE HYDROCHLORIDE 5 MG: 10 TABLET, FILM COATED ORAL at 08:54

## 2021-01-11 RX ADMIN — SODIUM CHLORIDE, PRESERVATIVE FREE 10 ML: 5 INJECTION INTRAVENOUS at 20:14

## 2021-01-11 RX ADMIN — ENOXAPARIN SODIUM 40 MG: 40 INJECTION SUBCUTANEOUS at 17:07

## 2021-01-11 RX ADMIN — SODIUM CHLORIDE, PRESERVATIVE FREE 10 ML: 5 INJECTION INTRAVENOUS at 08:55

## 2021-01-11 RX ADMIN — CEFTRIAXONE 2 G: 2 INJECTION, POWDER, FOR SOLUTION INTRAMUSCULAR; INTRAVENOUS at 20:14

## 2021-01-11 RX ADMIN — DEXAMETHASONE SODIUM PHOSPHATE 6 MG: 4 INJECTION, SOLUTION INTRA-ARTICULAR; INTRALESIONAL; INTRAMUSCULAR; INTRAVENOUS; SOFT TISSUE at 08:53

## 2021-01-11 RX ADMIN — ASCORBIC ACID 1500 MG: 500 INJECTION, SOLUTION INTRAMUSCULAR; INTRAVENOUS; SUBCUTANEOUS at 06:37

## 2021-01-11 NOTE — PLAN OF CARE
Goal Outcome Evaluation:  Plan of Care Reviewed With: patient  Progress: no change  Outcome Summary: Patient has done well this shift. Continues on O2 at 4L/nc. Sats in the low 90s.  VSS. Will continue to monitor closely. No distress noted.

## 2021-01-11 NOTE — PROGRESS NOTES
Discharge Planning Assessment   Prasanna     Patient Name: Emma Ryan  MRN: [id number]  Today's Date: 1/11/2021    Admit Date: 1/8/2021    Discharge Needs Assessment     Row Name 01/11/21 1318       Living Environment    Lives With  spouse    Current Living Arrangements  home/apartment/condo    Primary Care Provided by  self    Provides Primary Care For  no one    Family Caregiver if Needed  spouse    Quality of Family Relationships  unable to assess    Able to Return to Prior Arrangements  yes       Transition Planning    Patient/Family Anticipates Transition to  home with family    Transportation Anticipated  family or friend will provide Spouse to provide transportation at discharge.       Discharge Needs Assessment    Equipment Currently Used at Home  rollator;wheelchair Pt has a wheelchair and rollator from unknown provider.    Discharge Facility/Level of Care Needs  -- Pt does not utilize home health services.        Discharge Plan     Row Name 01/11/21 7310       Plan    Plan Pt lives with spouse, Bob and plans to return home at discharge. Pt has a rollator and wheelchair from unknown provider. Pt does not utilize home health services. PCP is Dr. Montano. Pt does not have issues affording medication copays. Pt does not have a POA or living will. SS to follow and assist as needed with discharge planning.    Patient/Family in Agreement with Plan  yes          Demographic Summary     Row Name 01/11/21 1318       General Information    Referral Source  nursing    Reason for Consult  -- SS received consult d/c planning; DME/COVID pneumonia requiring oxygen. SS spoke to pt on this date.        ALISON Ford

## 2021-01-11 NOTE — PROGRESS NOTES
PROGRESS NOTE         Patient Identification:  Name:  Emma Ryan  Age:  80 y.o.  Sex:  female  :  1940  MRN:  [id number]  Visit Number:  [id number]  Primary Care Provider:  Jennifer Montano MD         LOS: 3 days       ----------------------------------------------------------------------------------------------------------------------  Subjective       Chief Complaints:    Exposure To Known Illness and Nausea        Interval History:      Case discussed with primary RN.  Patient continues on 4 L nasal cannula with no apparent distress.  Episodes of bradycardia overnight.  WBC normal.  CRP improving at 2.97.    Review of Systems:    Constitutional: no fever, chills and night sweats.  Generalized fatigue.  Eyes: no eye drainage, itching or redness.  HEENT: no mouth sores, dysphagia or nose bleed.  Respiratory: Positive shortness of breath, cough. No production of sputum.  Cardiovascular: no chest pain, no palpitations, no orthopnea.  Gastrointestinal: no nausea, vomiting or diarrhea. No abdominal pain, hematemesis or rectal bleeding.  Genitourinary: no dysuria or polyuria.  Hematologic/lymphatic: no lymph node abnormalities, no easy bruising or easy bleeding.  Musculoskeletal: no muscle or joint pain.  Skin: No rash and no itching.  Neurological: no loss of consciousness, no seizure, no headache.  Behavioral/Psych: no depression or suicidal ideation.  Endocrine: no hot flashes.  Immunologic: negative.    ----------------------------------------------------------------------------------------------------------------------      Objective       Current [facility] Meds:  ascorbic acid in  mL IVPB, 1,500 mg, Intravenous, Q8H  carvedilol, 25 mg, Oral, BID With Meals  cefTRIAXone, 2 g, Intravenous, Q24H  cetirizine, 5 mg, Oral, Daily  cholecalciferol, 1,000 Units, Oral, Daily  dexamethasone, 6 mg, Intravenous, Daily  enoxaparin, 40 mg, Subcutaneous, Q12H  gabapentin, 100 mg, Oral,  BID  oxybutynin XL, 10 mg, Oral, Daily  pantoprazole, 40 mg, Oral, QAM  remdesivir, 100 mg, Intravenous, Q24H  sodium chloride, 10 mL, Intravenous, Q12H      Pharmacy Consult - Remdesivir,       ----------------------------------------------------------------------------------------------------------------------    Vital Signs:  Temp:  [97.9 °F (36.6 °C)-98.6 °F (37 °C)] 98.2 °F (36.8 °C)  Heart Rate:  [60-91] 67  Resp:  [16-20] 16  BP: (124-164)/(68-78) 141/78  No data found.  SpO2 Percentage    01/10/21 1503 01/11/21 0613 01/11/21 1045   SpO2: 94% 94% 92%     SpO2:  [92 %-94 %] 92 %  on  Flow (L/min):  [4] 4;   Device (Oxygen Therapy): nasal cannula    Body mass index is 37.89 kg/m².  Wt Readings from Last 3 Encounters:   01/11/21 85.1 kg (187 lb 9.8 oz)   12/31/20 83.9 kg (185 lb)   12/01/20 84.4 kg (186 lb)        Intake/Output Summary (Last 24 hours) at 1/11/2021 1234  Last data filed at 1/11/2021 0800  Gross per 24 hour   Intake 2060 ml   Output 350 ml   Net 1710 ml     Diet Regular; Cardiac  ----------------------------------------------------------------------------------------------------------------------      Physical Exam:       Deferred due to COVID-19 isolation.  ----------------------------------------------------------------------------------------------------------------------  Results from last 7 days   Lab Units 01/10/21  0220 01/09/21  0332   CK TOTAL U/L 87 122       Results from last 7 days   Lab Units 01/09/21  0332   CHOLESTEROL mg/dL 69   TRIGLYCERIDES mg/dL 86   HDL CHOL mg/dL 34*   LDL CHOL mg/dL 17     Results from last 7 days   Lab Units 01/10/21  0721   PH, ARTERIAL pH units 7.399   PO2 ART mm Hg 56.6*   PCO2, ARTERIAL mm Hg 37.6   HCO3 ART mmol/L 23.2     Results from last 7 days   Lab Units 01/11/21  0545 01/10/21  1352 01/10/21  0220 01/09/21  0332 01/08/21  1804   CRP mg/dL 2.97*  --  6.17* 8.48* 6.26*   LACTATE mmol/L  --   --   --   --  1.1   WBC 10*3/mm3 8.39 9.76 9.40 7.25 7.36    HEMOGLOBIN g/dL 12.4 12.0 12.0 12.4 12.9   HEMATOCRIT % 40.3 37.9 40.2 40.9 40.9   MCV fL 95.0 92.9 96.6 94.7 93.6   MCHC g/dL 30.8* 31.7 29.9* 30.3* 31.5   PLATELETS 10*3/mm3 298 263 248 205 202     Results from last 7 days   Lab Units 01/11/21  0545 01/10/21  0220 01/09/21  1342 01/09/21  0332   SODIUM mmol/L 142 143  --  137   POTASSIUM mmol/L 3.9 4.0  --  3.9   CHLORIDE mmol/L 110* 110*  --  103   CO2 mmol/L 22.7 20.0*  --  20.9*   BUN mg/dL 26* 20  --  17   CREATININE mg/dL 1.00 0.95 1.05* 1.16*   EGFR IF NONAFRICN AM mL/min/1.73 53* 57* 51* 45*   CALCIUM mg/dL 8.8 8.2*  --  8.4*   GLUCOSE mg/dL 119* 125*  --  102*   ALBUMIN g/dL 3.02* 3.08* 3.30* 3.42*   BILIRUBIN mg/dL 0.3 0.2 0.3 0.3   ALK PHOS U/L 78 68 75 76   AST (SGOT) U/L 30 28 34* 37*   ALT (SGPT) U/L 23 20 23 23   Estimated Creatinine Clearance: 45.6 mL/min (by C-G formula based on SCr of 1 mg/dL).  No results found for: AMMONIA    No results found for: HGBA1C, POCGLU  No results found for: HGBA1C  Lab Results   Component Value Date    TSH 1.950 09/14/2018    FREET4 1.16 09/14/2018       Blood Culture   Date Value Ref Range Status   01/08/2021 No growth at 24 hours  Preliminary   01/08/2021 No growth at 24 hours  Preliminary     Urine Culture   Date Value Ref Range Status   01/08/2021 >100,000 CFU/mL Escherichia coli (A)  Final     No results found for: WOUNDCX  No results found for: STOOLCX  No results found for: RESPCX  Pain Management Panel     Pain Management Panel Latest Ref Rng & Units 10/9/2020 6/10/2020    CREATININE UR mg/dL 99.5 122.8    AMPHETAMINES SCREEN, URINE Negative - -    BARBITURATES SCREEN Negative - -    BENZODIAZEPINE SCREEN, URINE Negative - -    COCAINE SCREEN, URINE Negative - -    METHADONE SCREEN, URINE Negative - -            ----------------------------------------------------------------------------------------------------------------------  Imaging Results (Last 24 Hours)     ** No results found for the last 24 hours.  **          ----------------------------------------------------------------------------------------------------------------------    Assessment/Plan       Assessment/Plan     ASSESSMENT:    1.  Sepsis  2.  COVID-19 pneumonia  3.  UTI    PLAN:    Case discussed with primary RN.  Patient continues on 4 L nasal cannula with no apparent distress.  Episodes of bradycardia overnight.  WBC normal.  CRP improving at 2.97.    Urine culture from 1/8/2021 finalized as greater than 100,000 colonies of E. Coli.    Chest x-ray reports development of bilateral airspace disease.  CT of the chest reports multifocal pneumonia.  CT of the abdomen and pelvis reports patchy infiltrates in the lung bases.      Recommend initiation of remdesivir and Decadron.    For now recommend to continue Rocephin monotherapy.  Upon discharge patient can be further deescalated to Omnicef 300 mg p.o. twice daily to continue through 1/16/2021.  We will continue to follow closely and adjust antibiotic therapy as needed.      Code Status:   Code Status and Medical Interventions:   Ordered at: 01/08/21 4176     Level Of Support Discussed With:    Patient     Code Status:    CPR     Medical Interventions (Level of Support Prior to Arrest):    Full       Scribed for Gianni Felton MD.    Tracy Jones, APRN  01/11/21  12:34 EST     Physician Attestation:    The documentation recorded by the scribe accurately reflects the service I personally performed and the decisions made by me.    Gianni Felton MD  Infectious Diseases  01/12/21  10:52 EST

## 2021-01-11 NOTE — PLAN OF CARE
Goal Outcome Evaluation:  Plan of Care Reviewed With: patient  Progress: no change       The patient has rested overnight. She remains on 4L nasal cannula, no distress noted. She has had episodes of BRADYCARDIA overnight, reported to Daina Shelia. Stated to continue to monitor. No other events or complaints.

## 2021-01-12 LAB
ALBUMIN SERPL-MCNC: 3.07 G/DL (ref 3.5–5.2)
ALBUMIN/GLOB SERPL: 1 G/DL
ALP SERPL-CCNC: 73 U/L (ref 39–117)
ALT SERPL W P-5'-P-CCNC: 23 U/L (ref 1–33)
ANION GAP SERPL CALCULATED.3IONS-SCNC: 11.5 MMOL/L (ref 5–15)
AST SERPL-CCNC: 28 U/L (ref 1–32)
BILIRUB SERPL-MCNC: 0.3 MG/DL (ref 0–1.2)
BUN SERPL-MCNC: 27 MG/DL (ref 8–23)
BUN/CREAT SERPL: 30.3 (ref 7–25)
CALCIUM SPEC-SCNC: 8.7 MG/DL (ref 8.6–10.5)
CHLORIDE SERPL-SCNC: 111 MMOL/L (ref 98–107)
CO2 SERPL-SCNC: 23.5 MMOL/L (ref 22–29)
CREAT SERPL-MCNC: 0.89 MG/DL (ref 0.57–1)
CRP SERPL-MCNC: 1.93 MG/DL (ref 0–0.5)
D DIMER PPP FEU-MCNC: 0.46 MCGFEU/ML (ref 0–0.5)
DEPRECATED RDW RBC AUTO: 49 FL (ref 37–54)
ERYTHROCYTE [DISTWIDTH] IN BLOOD BY AUTOMATED COUNT: 14.2 % (ref 12.3–15.4)
FERRITIN SERPL-MCNC: 335.3 NG/ML (ref 13–150)
GFR SERPL CREATININE-BSD FRML MDRD: 61 ML/MIN/1.73
GLOBULIN UR ELPH-MCNC: 2.9 GM/DL
GLUCOSE SERPL-MCNC: 124 MG/DL (ref 65–99)
HCT VFR BLD AUTO: 39.3 % (ref 34–46.6)
HGB BLD-MCNC: 12.5 G/DL (ref 12–15.9)
LDH SERPL-CCNC: 381 U/L (ref 135–214)
MCH RBC QN AUTO: 29.5 PG (ref 26.6–33)
MCHC RBC AUTO-ENTMCNC: 31.8 G/DL (ref 31.5–35.7)
MCV RBC AUTO: 92.7 FL (ref 79–97)
PLATELET # BLD AUTO: 318 10*3/MM3 (ref 140–450)
PMV BLD AUTO: 10.3 FL (ref 6–12)
POTASSIUM SERPL-SCNC: 3.8 MMOL/L (ref 3.5–5.2)
PROT SERPL-MCNC: 6 G/DL (ref 6–8.5)
RBC # BLD AUTO: 4.24 10*6/MM3 (ref 3.77–5.28)
SODIUM SERPL-SCNC: 146 MMOL/L (ref 136–145)
WBC # BLD AUTO: 8.13 10*3/MM3 (ref 3.4–10.8)

## 2021-01-12 PROCEDURE — 85379 FIBRIN DEGRADATION QUANT: CPT | Performed by: INTERNAL MEDICINE

## 2021-01-12 PROCEDURE — 25010000002 ENOXAPARIN PER 10 MG: Performed by: INTERNAL MEDICINE

## 2021-01-12 PROCEDURE — 86140 C-REACTIVE PROTEIN: CPT | Performed by: HOSPITALIST

## 2021-01-12 PROCEDURE — 25010000002 DEXAMETHASONE PER 1 MG: Performed by: HOSPITALIST

## 2021-01-12 PROCEDURE — 99233 SBSQ HOSP IP/OBS HIGH 50: CPT | Performed by: STUDENT IN AN ORGANIZED HEALTH CARE EDUCATION/TRAINING PROGRAM

## 2021-01-12 PROCEDURE — 80053 COMPREHEN METABOLIC PANEL: CPT | Performed by: HOSPITALIST

## 2021-01-12 PROCEDURE — 85027 COMPLETE CBC AUTOMATED: CPT | Performed by: INTERNAL MEDICINE

## 2021-01-12 PROCEDURE — 94799 UNLISTED PULMONARY SVC/PX: CPT

## 2021-01-12 PROCEDURE — 83615 LACTATE (LD) (LDH) ENZYME: CPT | Performed by: INTERNAL MEDICINE

## 2021-01-12 PROCEDURE — 25010000002 CEFTRIAXONE PER 250 MG: Performed by: NURSE PRACTITIONER

## 2021-01-12 PROCEDURE — 82728 ASSAY OF FERRITIN: CPT | Performed by: INTERNAL MEDICINE

## 2021-01-12 RX ADMIN — ENOXAPARIN SODIUM 40 MG: 40 INJECTION SUBCUTANEOUS at 18:09

## 2021-01-12 RX ADMIN — ASCORBIC ACID 1500 MG: 500 INJECTION, SOLUTION INTRAMUSCULAR; INTRAVENOUS; SUBCUTANEOUS at 06:31

## 2021-01-12 RX ADMIN — PANTOPRAZOLE SODIUM 40 MG: 40 TABLET, DELAYED RELEASE ORAL at 06:30

## 2021-01-12 RX ADMIN — DEXAMETHASONE SODIUM PHOSPHATE 6 MG: 4 INJECTION, SOLUTION INTRA-ARTICULAR; INTRALESIONAL; INTRAMUSCULAR; INTRAVENOUS; SOFT TISSUE at 08:13

## 2021-01-12 RX ADMIN — GABAPENTIN 100 MG: 100 CAPSULE ORAL at 20:06

## 2021-01-12 RX ADMIN — GABAPENTIN 100 MG: 100 CAPSULE ORAL at 08:14

## 2021-01-12 RX ADMIN — CARVEDILOL 25 MG: 25 TABLET, FILM COATED ORAL at 08:14

## 2021-01-12 RX ADMIN — CHOLECALCIFEROL TAB 10 MCG (400 UNIT) 1000 UNITS: 10 TAB at 08:14

## 2021-01-12 RX ADMIN — OXYBUTYNIN CHLORIDE 10 MG: 5 TABLET, EXTENDED RELEASE ORAL at 08:14

## 2021-01-12 RX ADMIN — SODIUM CHLORIDE, PRESERVATIVE FREE 10 ML: 5 INJECTION INTRAVENOUS at 08:14

## 2021-01-12 RX ADMIN — CETIRIZINE HYDROCHLORIDE 5 MG: 10 TABLET, FILM COATED ORAL at 08:14

## 2021-01-12 RX ADMIN — CEFTRIAXONE 2 G: 2 INJECTION, POWDER, FOR SOLUTION INTRAMUSCULAR; INTRAVENOUS at 20:06

## 2021-01-12 RX ADMIN — ASCORBIC ACID 1500 MG: 500 INJECTION, SOLUTION INTRAMUSCULAR; INTRAVENOUS; SUBCUTANEOUS at 23:44

## 2021-01-12 RX ADMIN — REMDESIVIR 100 MG: 100 INJECTION, POWDER, LYOPHILIZED, FOR SOLUTION INTRAVENOUS at 16:04

## 2021-01-12 RX ADMIN — ASCORBIC ACID 1500 MG: 500 INJECTION, SOLUTION INTRAMUSCULAR; INTRAVENOUS; SUBCUTANEOUS at 16:04

## 2021-01-12 RX ADMIN — ENOXAPARIN SODIUM 40 MG: 40 INJECTION SUBCUTANEOUS at 06:30

## 2021-01-12 NOTE — PROGRESS NOTES
Antimicrobial Length of Therapy:    Day 5 of 8 Rocephin  Day 4 of 5 remdesivir    Thank you,    Nohemi Robles, PharmD  Pharmacy Resident  1/12/2021  15:03 EST

## 2021-01-12 NOTE — PLAN OF CARE
Goal Outcome Evaluation:  Plan of Care Reviewed With: patient  Progress: no change   Patient has been getting OOB to BSC independently. She had a medium BM today. She states her breathing has improved. She is down to 3 LPM/NC. No other issues at this time.

## 2021-01-12 NOTE — PROGRESS NOTES
[facility] HOSPITALIST PROGRESS NOTE     Patient Identification:  Name:  Emma Ryan  Age:  80 y.o.  Sex:  female  :  1940  MRN:  [id number]  Visit Number:  [id number]  ROOM: [address] [place] [address]     Primary Care Provider:  Jennifer Montano MD    Length of stay in inpatient status:  3    Subjective     Chief Compliant:    Chief Complaint   Patient presents with   • Exposure To Known Illness   • Nausea       History of Presenting Illness: Patient seen and examined in follow-up for sepsis secondary to E. coli UTI and COVID-19.  Patient this morning remains on 4 L oxygen.  Patient without any acute complaints.    Objective     Current Hospital Meds:ascorbic acid in  mL IVPB, 1,500 mg, Intravenous, Q8H  carvedilol, 25 mg, Oral, BID With Meals  cefTRIAXone, 2 g, Intravenous, Q24H  cetirizine, 5 mg, Oral, Daily  cholecalciferol, 1,000 Units, Oral, Daily  dexamethasone, 6 mg, Intravenous, Daily  enoxaparin, 40 mg, Subcutaneous, Q12H  gabapentin, 100 mg, Oral, BID  oxybutynin XL, 10 mg, Oral, Daily  pantoprazole, 40 mg, Oral, QAM  remdesivir, 100 mg, Intravenous, Q24H  sodium chloride, 10 mL, Intravenous, Q12H    Pharmacy Consult - Remdesivir,         Current Antimicrobial Therapy:  Anti-Infectives (From admission, onward)    Ordered     Dose/Rate Route Frequency Start Stop    21 1332  remdesivir 100 mg in sodium chloride 0.9 % 250 mL IVPB (powder vial)     Ordering Provider: Tracy Jones APRN    100 mg  over 60 Minutes Intravenous Every 24 Hours 01/10/21 1500 21 1459    21 1332  cefTRIAXone (ROCEPHIN) 2 g/100 mL 0.9% NS IVPB (MBP)     Tracy Jones APRN reviewed the order on 01/10/21 1240.   Ordering Provider: Tracy Jones APRN    2 g  over 30 Minutes Intravenous Every 24 Hours 21 1959    21 1332  remdesivir 200 mg in sodium chloride 0.9 % 250 mL IVPB (powder vial)     Ordering Provider: Tracy Jones APRN    200 mg  over 60 Minutes  Intravenous Every 24 Hours 01/09/21 1500 01/09/21 1838    01/08/21 2323  doxycycline (VIBRAMYCIN) 100 mg/100 mL 0.9% NS MBP     Ordering Provider: Dada Conner MD    100 mg  over 60 Minutes Intravenous Once 01/08/21 2325 01/09/21 0103 01/08/21 2022  cefTRIAXone (ROCEPHIN) 2 g/100 mL 0.9% NS IVPB (MBP)     Ordering Provider: Armin Barbosa II, PA    2 g  over 30 Minutes Intravenous Once 01/08/21 2024 01/08/21 2102 01/08/21 2224  cefdinir (OMNICEF) 300 MG capsule     Ordering Provider: Armin Barbosa II, PA    300 mg Oral 2 Times Daily 01/08/21 0000          Current Diuretic Therapy:  Diuretics (From admission, onward)    None        ----------------------------------------------------------------------------------------------------------------------  Vital Signs:  Temp:  [98 °F (36.7 °C)-98.6 °F (37 °C)] 98 °F (36.7 °C)  Heart Rate:  [52-91] 63  Resp:  [16-20] 20  BP: (141-164)/(65-78) 150/65  SpO2:  [90 %-94 %] 90 %  on  Flow (L/min):  [4] 4;   Device (Oxygen Therapy): nasal cannula  Body mass index is 37.89 kg/m².    Wt Readings from Last 3 Encounters:   01/11/21 85.1 kg (187 lb 9.8 oz)   12/31/20 83.9 kg (185 lb)   12/01/20 84.4 kg (186 lb)     Intake & Output (last 3 days)       01/09 0701 - 01/10 0700 01/10 0701 - 01/11 0700 01/11 0701 - 01/12 0700    P.O. 925 630 764    I.V. (mL/kg) 1140 (13.9) 1300 (15.3) 682 (8)    IV Piggyback       Total Intake(mL/kg) 2065 (25.2) 1930 (22.7) 1312 (15.4)    Urine (mL/kg/hr) 725 (0.4) 400 (0.2) 325 (0.3)    Total Output 725 400 325    Net +1340 +1530 +987           Urine Unmeasured Occurrence 6 x 6 x 5 x        Diet Regular; Cardiac  ----------------------------------------------------------------------------------------------------------------------  Physical exam:  This physical exam has been personally performed remotely in the unit aided by real-time audio/visual communication tools. Nursing present at bedside during this exam and assisted during exam.  The use of a video visit has been reviewed with the patient and verbal informed consent has been obtained.     Physical Exam:  General: Patient appears awake, alert, and in no acute distress.  Head: Normocephalic, atraumatic  Eyes: EOMI. Conjunctivae and sclerae normal.  Ears: Ears appear intact with no abnormalities noted.   Neck: Trachea midline. No obvious JVD.  Heart: Tele reveals sinus rhythm  Lungs: Respirations appear to be regular, even and unlabored with no signs of respiratory distress. No audible wheezing.  Abdomen: No obvious abdominal distension.  MS: Muscle tone appears normal. No gross deformities.  Extremities: No clubbing, cyanosis or edema noted.  Skin: No visible bleeding, bruising, or rash.  Neurologic: Alert and oriented x3. No gross focal deficits.   ----------------------------------------------------------------------------------------------------------------------  Tele:    ----------------------------------------------------------------------------------------------------------------------  Results from last 7 days   Lab Units 01/11/21  0545 01/10/21  1352 01/10/21  0220 01/09/21  0332 01/08/21  1804   CRP mg/dL 2.97*  --  6.17* 8.48* 6.26*   LACTATE mmol/L  --   --   --   --  1.1   WBC 10*3/mm3 8.39 9.76 9.40 7.25 7.36   HEMOGLOBIN g/dL 12.4 12.0 12.0 12.4 12.9   HEMATOCRIT % 40.3 37.9 40.2 40.9 40.9   MCV fL 95.0 92.9 96.6 94.7 93.6   MCHC g/dL 30.8* 31.7 29.9* 30.3* 31.5   PLATELETS 10*3/mm3 298 263 248 205 202     Results from last 7 days   Lab Units 01/10/21  0721   PH, ARTERIAL pH units 7.399   PO2 ART mm Hg 56.6*   PCO2, ARTERIAL mm Hg 37.6   HCO3 ART mmol/L 23.2     Results from last 7 days   Lab Units 01/11/21  0545 01/10/21  0220 01/09/21  1342 01/09/21  0332   SODIUM mmol/L 142 143  --  137   POTASSIUM mmol/L 3.9 4.0  --  3.9   CHLORIDE mmol/L 110* 110*  --  103   CO2 mmol/L 22.7 20.0*  --  20.9*   BUN mg/dL 26* 20  --  17   CREATININE mg/dL 1.00 0.95 1.05* 1.16*   EGFR IF  NONAFRICN AM mL/min/1.73 53* 57* 51* 45*   CALCIUM mg/dL 8.8 8.2*  --  8.4*   GLUCOSE mg/dL 119* 125*  --  102*   ALBUMIN g/dL 3.02* 3.08* 3.30* 3.42*   BILIRUBIN mg/dL 0.3 0.2 0.3 0.3   ALK PHOS U/L 78 68 75 76   AST (SGOT) U/L 30 28 34* 37*   ALT (SGPT) U/L 23 20 23 23   Estimated Creatinine Clearance: 45.6 mL/min (by C-G formula based on SCr of 1 mg/dL).  No results found for: AMMONIA  Results from last 7 days   Lab Units 01/10/21  0220 01/09/21  0332   CK TOTAL U/L 87 122         Results from last 7 days   Lab Units 01/09/21  0332   CHOLESTEROL mg/dL 69   TRIGLYCERIDES mg/dL 86   HDL CHOL mg/dL 34*   LDL CHOL mg/dL 17     No results found for: HGBA1C, POCGLU  Lab Results   Component Value Date    TSH 1.950 09/14/2018    FREET4 1.16 09/14/2018     No results found for: PREGTESTUR, PREGSERUM, HCG, HCGQUANT  Pain Management Panel     Pain Management Panel Latest Ref Rng & Units 10/9/2020 6/10/2020    CREATININE UR mg/dL 99.5 122.8    AMPHETAMINES SCREEN, URINE Negative - -    BARBITURATES SCREEN Negative - -    BENZODIAZEPINE SCREEN, URINE Negative - -    COCAINE SCREEN, URINE Negative - -    METHADONE SCREEN, URINE Negative - -        Brief Urine Lab Results  (Last result in the past 365 days)      Color   Clarity   Blood   Leuk Est   Nitrite   Protein   CREAT   Urine HCG        01/08/21 2026 Yellow Cloudy Negative Moderate (2+) Positive Trace             Blood Culture   Date Value Ref Range Status   01/08/2021 No growth at 2 days  Preliminary   01/08/2021 No growth at 2 days  Preliminary     Urine Culture   Date Value Ref Range Status   01/08/2021 >100,000 CFU/mL Escherichia coli (A)  Final     No results found for: WOUNDCX  No results found for: STOOLCX  No results found for: RESPCX  No results found for: AFBCX  Results from last 7 days   Lab Units 01/11/21  0545 01/10/21  0220 01/09/21  0332 01/08/21  1804   LACTATE mmol/L  --   --   --  1.1   CRP mg/dL 2.97* 6.17* 8.48* 6.26*       I have personally looked at  the labs and they are summarized above.  ----------------------------------------------------------------------------------------------------------------------  Detailed radiology reports for the last 24 hours:    Imaging Results (Last 24 Hours)     ** No results found for the last 24 hours. **        Assessment & Plan      Sepsis  COVID-19 pneumonia  E. coli UTI  Acute hypoxemic respiratory failure   -Remains on 4 L oxygen, stable, will titrate as tolerated   -Continue remdesivir and dexamethasone   -Continue ceftriaxone    CKD stage IIIa  Hyperlipidemia  Essential hypertension  GERD      VTE Prophylaxis:   Mechanical Order History:     None      Pharmalogical Order History:      Ordered     Dose Route Frequency Stop    01/09/21 1320  enoxaparin (LOVENOX) syringe 40 mg      40 mg SC Every 12 Hours Scheduled --    01/09/21 0127  enoxaparin (LOVENOX) syringe 40 mg  Status:  Discontinued      40 mg SC Every 24 Hours 01/09/21 1320                Ruy Tenorio DO  [facility]  01/11/21  20:31 EST

## 2021-01-12 NOTE — PROGRESS NOTES
PROGRESS NOTE         Patient Identification:  Name:  Emma Ryan  Age:  80 y.o.  Sex:  female  :  1940  MRN:  [id number]  Visit Number:  [id number]  Primary Care Provider:  Jennifer Montano MD         LOS: 4 days       ----------------------------------------------------------------------------------------------------------------------  Subjective       Chief Complaints:    Exposure To Known Illness and Nausea        Interval History:      Case discussed with primary RN.  Patient on 3 L nasal cannula with no apparent distress today.  WBC normal.  CRP improving at 1.93.  Afebrile, no diarrhea.    Review of Systems:    Constitutional: no fever, chills and night sweats.  Generalized fatigue.  Eyes: no eye drainage, itching or redness.  HEENT: no mouth sores, dysphagia or nose bleed.  Respiratory: Positive shortness of breath, cough. No production of sputum.  Cardiovascular: no chest pain, no palpitations, no orthopnea.  Gastrointestinal: no nausea, vomiting or diarrhea. No abdominal pain, hematemesis or rectal bleeding.  Genitourinary: no dysuria or polyuria.  Hematologic/lymphatic: no lymph node abnormalities, no easy bruising or easy bleeding.  Musculoskeletal: no muscle or joint pain.  Skin: No rash and no itching.  Neurological: no loss of consciousness, no seizure, no headache.  Behavioral/Psych: no depression or suicidal ideation.  Endocrine: no hot flashes.  Immunologic: negative.    ----------------------------------------------------------------------------------------------------------------------      Objective       Current Hospital Meds:  ascorbic acid in  mL IVPB, 1,500 mg, Intravenous, Q8H  carvedilol, 25 mg, Oral, BID With Meals  cefTRIAXone, 2 g, Intravenous, Q24H  cetirizine, 5 mg, Oral, Daily  cholecalciferol, 1,000 Units, Oral, Daily  dexamethasone, 6 mg, Intravenous, Daily  enoxaparin, 40 mg, Subcutaneous, Q12H  gabapentin, 100 mg, Oral, BID  oxybutynin XL,  10 mg, Oral, Daily  pantoprazole, 40 mg, Oral, QAM  remdesivir, 100 mg, Intravenous, Q24H  sodium chloride, 10 mL, Intravenous, Q12H      Pharmacy Consult - Remdesivir,       ----------------------------------------------------------------------------------------------------------------------    Vital Signs:  Temp:  [97.9 °F (36.6 °C)-98.1 °F (36.7 °C)] 97.9 °F (36.6 °C)  Heart Rate:  [52-71] 55  Resp:  [20] 20  BP: (150-165)/(65-84) 159/84  No data found.  SpO2 Percentage    01/12/21 0300 01/12/21 0633 01/12/21 1048   SpO2: 91% 94% 91%     SpO2:  [90 %-94 %] 91 %  on  Flow (L/min):  [3.5-4.5] 3.5;   Device (Oxygen Therapy): nasal cannula    Body mass index is 38.12 kg/m².  Wt Readings from Last 3 Encounters:   01/12/21 85.6 kg (188 lb 11.4 oz)   12/31/20 83.9 kg (185 lb)   12/01/20 84.4 kg (186 lb)        Intake/Output Summary (Last 24 hours) at 1/12/2021 1321  Last data filed at 1/12/2021 1300  Gross per 24 hour   Intake 2262 ml   Output 800 ml   Net 1462 ml     Diet Regular; Cardiac  ----------------------------------------------------------------------------------------------------------------------      Physical Exam:       Deferred due to COVID-19 isolation.  ----------------------------------------------------------------------------------------------------------------------  Results from last 7 days   Lab Units 01/10/21  0220 01/09/21  0332   CK TOTAL U/L 87 122       Results from last 7 days   Lab Units 01/09/21  0332   CHOLESTEROL mg/dL 69   TRIGLYCERIDES mg/dL 86   HDL CHOL mg/dL 34*   LDL CHOL mg/dL 17     Results from last 7 days   Lab Units 01/10/21  0721   PH, ARTERIAL pH units 7.399   PO2 ART mm Hg 56.6*   PCO2, ARTERIAL mm Hg 37.6   HCO3 ART mmol/L 23.2     Results from last 7 days   Lab Units 01/12/21  0324 01/11/21  0545 01/10/21  1352 01/10/21  0220  01/08/21  1804   CRP mg/dL 1.93* 2.97*  --  6.17*   < > 6.26*   LACTATE mmol/L  --   --   --   --   --  1.1   WBC 10*3/mm3 8.13 8.39 9.76 9.40   < >  7.36   HEMOGLOBIN g/dL 12.5 12.4 12.0 12.0   < > 12.9   HEMATOCRIT % 39.3 40.3 37.9 40.2   < > 40.9   MCV fL 92.7 95.0 92.9 96.6   < > 93.6   MCHC g/dL 31.8 30.8* 31.7 29.9*   < > 31.5   PLATELETS 10*3/mm3 318 298 263 248   < > 202    < > = values in this interval not displayed.     Results from last 7 days   Lab Units 01/12/21  0324 01/11/21  0545 01/10/21  0220   SODIUM mmol/L 146* 142 143   POTASSIUM mmol/L 3.8 3.9 4.0   CHLORIDE mmol/L 111* 110* 110*   CO2 mmol/L 23.5 22.7 20.0*   BUN mg/dL 27* 26* 20   CREATININE mg/dL 0.89 1.00 0.95   EGFR IF NONAFRICN AM mL/min/1.73 61 53* 57*   CALCIUM mg/dL 8.7 8.8 8.2*   GLUCOSE mg/dL 124* 119* 125*   ALBUMIN g/dL 3.07* 3.02* 3.08*   BILIRUBIN mg/dL 0.3 0.3 0.2   ALK PHOS U/L 73 78 68   AST (SGOT) U/L 28 30 28   ALT (SGPT) U/L 23 23 20   Estimated Creatinine Clearance: 51.4 mL/min (by C-G formula based on SCr of 0.89 mg/dL).  No results found for: AMMONIA    No results found for: HGBA1C, POCGLU  No results found for: HGBA1C  Lab Results   Component Value Date    TSH 1.950 09/14/2018    FREET4 1.16 09/14/2018       Blood Culture   Date Value Ref Range Status   01/08/2021 No growth at 24 hours  Preliminary   01/08/2021 No growth at 24 hours  Preliminary     Urine Culture   Date Value Ref Range Status   01/08/2021 >100,000 CFU/mL Escherichia coli (A)  Final     No results found for: WOUNDCX  No results found for: STOOLCX  No results found for: RESPCX  Pain Management Panel     Pain Management Panel Latest Ref Rng & Units 10/9/2020 6/10/2020    CREATININE UR mg/dL 99.5 122.8    AMPHETAMINES SCREEN, URINE Negative - -    BARBITURATES SCREEN Negative - -    BENZODIAZEPINE SCREEN, URINE Negative - -    COCAINE SCREEN, URINE Negative - -    METHADONE SCREEN, URINE Negative - -            ----------------------------------------------------------------------------------------------------------------------  Imaging Results (Last 24 Hours)     ** No results found for the last 24  hours. **          ----------------------------------------------------------------------------------------------------------------------    Assessment/Plan       Assessment/Plan     ASSESSMENT:    1.  Sepsis  2.  COVID-19 pneumonia  3.  UTI    PLAN:    Case discussed with primary RN.  Patient on 3 L nasal cannula with no apparent distress today.  WBC normal.  CRP improving at 1.93.  Afebrile, no diarrhea.    Urine culture from 1/8/2021 finalized as greater than 100,000 colonies of E. Coli.    Chest x-ray reports development of bilateral airspace disease.  CT of the chest reports multifocal pneumonia.  CT of the abdomen and pelvis reports patchy infiltrates in the lung bases.      Recommend initiation of remdesivir and Decadron.    For now recommend to continue Rocephin monotherapy.  Upon discharge patient can be further deescalated to Omnicef 300 mg p.o. twice daily to continue through 1/16/2021.  We will continue to follow closely and adjust antibiotic therapy as needed.      Code Status:   Code Status and Medical Interventions:   Ordered at: 01/08/21 1180     Level Of Support Discussed With:    Patient     Code Status:    CPR     Medical Interventions (Level of Support Prior to Arrest):    Full           Tracy Jones, APRN  01/12/21  13:21 EST

## 2021-01-12 NOTE — PLAN OF CARE
Goal Outcome Evaluation:  Plan of Care Reviewed With: patient  Progress: no change     Per the patient she has not slept much overnight. She remains on 4L nasal cannula. She does desat and have shortness of breath with exertion. Humidification added tonight to improve comfort. She has still continue to have episodes of bradycardia, asymptomatic overnight. She states strong desire to get back home. No other complaints overnight. Will continue to monitor.

## 2021-01-13 LAB
ALBUMIN SERPL-MCNC: 3.02 G/DL (ref 3.5–5.2)
ALBUMIN/GLOB SERPL: 1 G/DL
ALP SERPL-CCNC: 78 U/L (ref 39–117)
ALT SERPL W P-5'-P-CCNC: 30 U/L (ref 1–33)
ANION GAP SERPL CALCULATED.3IONS-SCNC: 9.6 MMOL/L (ref 5–15)
AST SERPL-CCNC: 32 U/L (ref 1–32)
BACTERIA SPEC AEROBE CULT: NORMAL
BACTERIA SPEC AEROBE CULT: NORMAL
BILIRUB SERPL-MCNC: 0.3 MG/DL (ref 0–1.2)
BUN SERPL-MCNC: 23 MG/DL (ref 8–23)
BUN/CREAT SERPL: 26.7 (ref 7–25)
CALCIUM SPEC-SCNC: 9.1 MG/DL (ref 8.6–10.5)
CHLORIDE SERPL-SCNC: 109 MMOL/L (ref 98–107)
CO2 SERPL-SCNC: 22.4 MMOL/L (ref 22–29)
CREAT SERPL-MCNC: 0.86 MG/DL (ref 0.57–1)
CRP SERPL-MCNC: 1.85 MG/DL (ref 0–0.5)
D DIMER PPP FEU-MCNC: 0.59 MCGFEU/ML (ref 0–0.5)
DEPRECATED RDW RBC AUTO: 48.4 FL (ref 37–54)
ERYTHROCYTE [DISTWIDTH] IN BLOOD BY AUTOMATED COUNT: 14.1 % (ref 12.3–15.4)
FERRITIN SERPL-MCNC: 303.7 NG/ML (ref 13–150)
FIBRINOGEN PPP-MCNC: 345 MG/DL (ref 173–524)
GFR SERPL CREATININE-BSD FRML MDRD: 64 ML/MIN/1.73
GLOBULIN UR ELPH-MCNC: 2.9 GM/DL
GLUCOSE SERPL-MCNC: 129 MG/DL (ref 65–99)
HCT VFR BLD AUTO: 40.4 % (ref 34–46.6)
HGB BLD-MCNC: 12.8 G/DL (ref 12–15.9)
LDH SERPL-CCNC: 412 U/L (ref 135–214)
MCH RBC QN AUTO: 29.2 PG (ref 26.6–33)
MCHC RBC AUTO-ENTMCNC: 31.7 G/DL (ref 31.5–35.7)
MCV RBC AUTO: 92.2 FL (ref 79–97)
PLATELET # BLD AUTO: 311 10*3/MM3 (ref 140–450)
PMV BLD AUTO: 10.7 FL (ref 6–12)
POTASSIUM SERPL-SCNC: 3.8 MMOL/L (ref 3.5–5.2)
PROT SERPL-MCNC: 5.9 G/DL (ref 6–8.5)
RBC # BLD AUTO: 4.38 10*6/MM3 (ref 3.77–5.28)
SODIUM SERPL-SCNC: 141 MMOL/L (ref 136–145)
WBC # BLD AUTO: 8.62 10*3/MM3 (ref 3.4–10.8)

## 2021-01-13 PROCEDURE — 85384 FIBRINOGEN ACTIVITY: CPT | Performed by: HOSPITALIST

## 2021-01-13 PROCEDURE — 80053 COMPREHEN METABOLIC PANEL: CPT | Performed by: HOSPITALIST

## 2021-01-13 PROCEDURE — 86140 C-REACTIVE PROTEIN: CPT | Performed by: HOSPITALIST

## 2021-01-13 PROCEDURE — 83615 LACTATE (LD) (LDH) ENZYME: CPT | Performed by: INTERNAL MEDICINE

## 2021-01-13 PROCEDURE — 25010000002 ENOXAPARIN PER 10 MG: Performed by: INTERNAL MEDICINE

## 2021-01-13 PROCEDURE — 99233 SBSQ HOSP IP/OBS HIGH 50: CPT | Performed by: STUDENT IN AN ORGANIZED HEALTH CARE EDUCATION/TRAINING PROGRAM

## 2021-01-13 PROCEDURE — 82728 ASSAY OF FERRITIN: CPT | Performed by: INTERNAL MEDICINE

## 2021-01-13 PROCEDURE — 94799 UNLISTED PULMONARY SVC/PX: CPT

## 2021-01-13 PROCEDURE — 25010000002 HYDRALAZINE PER 20 MG: Performed by: PHYSICIAN ASSISTANT

## 2021-01-13 PROCEDURE — 25010000002 CEFTRIAXONE PER 250 MG: Performed by: NURSE PRACTITIONER

## 2021-01-13 PROCEDURE — 25010000002 DEXAMETHASONE PER 1 MG: Performed by: HOSPITALIST

## 2021-01-13 PROCEDURE — 85027 COMPLETE CBC AUTOMATED: CPT | Performed by: INTERNAL MEDICINE

## 2021-01-13 PROCEDURE — 85379 FIBRIN DEGRADATION QUANT: CPT | Performed by: INTERNAL MEDICINE

## 2021-01-13 RX ORDER — ACETAMINOPHEN 325 MG/1
650 TABLET ORAL EVERY 6 HOURS PRN
Status: DISCONTINUED | OUTPATIENT
Start: 2021-01-13 | End: 2021-01-21 | Stop reason: HOSPADM

## 2021-01-13 RX ORDER — HYDRALAZINE HYDROCHLORIDE 20 MG/ML
10 INJECTION INTRAMUSCULAR; INTRAVENOUS ONCE
Status: COMPLETED | OUTPATIENT
Start: 2021-01-13 | End: 2021-01-13

## 2021-01-13 RX ADMIN — GABAPENTIN 100 MG: 100 CAPSULE ORAL at 21:24

## 2021-01-13 RX ADMIN — CARVEDILOL 25 MG: 25 TABLET, FILM COATED ORAL at 08:36

## 2021-01-13 RX ADMIN — ASCORBIC ACID 1500 MG: 500 INJECTION, SOLUTION INTRAMUSCULAR; INTRAVENOUS; SUBCUTANEOUS at 05:35

## 2021-01-13 RX ADMIN — ASCORBIC ACID 1500 MG: 500 INJECTION, SOLUTION INTRAMUSCULAR; INTRAVENOUS; SUBCUTANEOUS at 22:09

## 2021-01-13 RX ADMIN — REMDESIVIR 100 MG: 100 INJECTION, POWDER, LYOPHILIZED, FOR SOLUTION INTRAVENOUS at 14:34

## 2021-01-13 RX ADMIN — PANTOPRAZOLE SODIUM 40 MG: 40 TABLET, DELAYED RELEASE ORAL at 05:36

## 2021-01-13 RX ADMIN — CEFTRIAXONE 2 G: 2 INJECTION, POWDER, FOR SOLUTION INTRAMUSCULAR; INTRAVENOUS at 21:00

## 2021-01-13 RX ADMIN — ASCORBIC ACID 1500 MG: 500 INJECTION, SOLUTION INTRAMUSCULAR; INTRAVENOUS; SUBCUTANEOUS at 15:35

## 2021-01-13 RX ADMIN — HYDRALAZINE HYDROCHLORIDE 10 MG: 20 INJECTION INTRAMUSCULAR; INTRAVENOUS at 04:41

## 2021-01-13 RX ADMIN — OXYBUTYNIN CHLORIDE 10 MG: 5 TABLET, EXTENDED RELEASE ORAL at 08:36

## 2021-01-13 RX ADMIN — DEXAMETHASONE SODIUM PHOSPHATE 6 MG: 4 INJECTION, SOLUTION INTRA-ARTICULAR; INTRALESIONAL; INTRAMUSCULAR; INTRAVENOUS; SOFT TISSUE at 08:36

## 2021-01-13 RX ADMIN — CHOLECALCIFEROL TAB 10 MCG (400 UNIT) 1000 UNITS: 10 TAB at 08:36

## 2021-01-13 RX ADMIN — ENOXAPARIN SODIUM 40 MG: 40 INJECTION SUBCUTANEOUS at 05:36

## 2021-01-13 RX ADMIN — ENOXAPARIN SODIUM 40 MG: 40 INJECTION SUBCUTANEOUS at 16:35

## 2021-01-13 RX ADMIN — CETIRIZINE HYDROCHLORIDE 5 MG: 10 TABLET, FILM COATED ORAL at 08:36

## 2021-01-13 RX ADMIN — GABAPENTIN 100 MG: 100 CAPSULE ORAL at 08:36

## 2021-01-13 RX ADMIN — ACETAMINOPHEN 650 MG: 325 TABLET ORAL at 16:35

## 2021-01-13 RX ADMIN — SODIUM CHLORIDE, PRESERVATIVE FREE 10 ML: 5 INJECTION INTRAVENOUS at 08:36

## 2021-01-13 NOTE — PROGRESS NOTES
PROGRESS NOTE         Patient Identification:  Name:  Emma Ryan  Age:  80 y.o.  Sex:  female  :  1940  MRN:  [id number]  Visit Number:  [id number]  Primary Care Provider:  Jennifer Montano MD         LOS: 5 days       ----------------------------------------------------------------------------------------------------------------------  Subjective       Chief Complaints:    Exposure To Known Illness and Nausea        Interval History:      Case discussed with primary RN.  Patient on increased oxygen requirement of 5 L but will nasal cannula today.  Patient desats easily with any exertion.  WBC normal.  CRP improving at 1.85.  Afebrile, no diarrhea.  Primary RN reports lung sounds improved overall from previous day.    Review of Systems:    Constitutional: no fever, chills and night sweats.  Generalized fatigue.  Eyes: no eye drainage, itching or redness.  HEENT: no mouth sores, dysphagia or nose bleed.  Respiratory: Positive shortness of breath, cough. No production of sputum.  Cardiovascular: no chest pain, no palpitations, no orthopnea.  Gastrointestinal: no nausea, vomiting or diarrhea. No abdominal pain, hematemesis or rectal bleeding.  Genitourinary: no dysuria or polyuria.  Hematologic/lymphatic: no lymph node abnormalities, no easy bruising or easy bleeding.  Musculoskeletal: no muscle or joint pain.  Skin: No rash and no itching.  Neurological: no loss of consciousness, no seizure, no headache.  Behavioral/Psych: no depression or suicidal ideation.  Endocrine: no hot flashes.  Immunologic: negative.    ----------------------------------------------------------------------------------------------------------------------      Objective       [facility] Meds:  ascorbic acid in  mL IVPB, 1,500 mg, Intravenous, Q8H  carvedilol, 25 mg, Oral, BID With Meals  cefTRIAXone, 2 g, Intravenous, Q24H  cetirizine, 5 mg, Oral, Daily  cholecalciferol, 1,000 Units, Oral,  Daily  dexamethasone, 6 mg, Intravenous, Daily  enoxaparin, 40 mg, Subcutaneous, Q12H  gabapentin, 100 mg, Oral, BID  oxybutynin XL, 10 mg, Oral, Daily  pantoprazole, 40 mg, Oral, QAM  remdesivir, 100 mg, Intravenous, Q24H  sodium chloride, 10 mL, Intravenous, Q12H      Pharmacy Consult - Remdesivir,       ----------------------------------------------------------------------------------------------------------------------    Vital Signs:  Temp:  [97.5 °F (36.4 °C)-98.1 °F (36.7 °C)] 97.7 °F (36.5 °C)  Heart Rate:  [55-68] 68  Resp:  [20-24] 24  BP: (130-184)/(75-93) 160/75  No data found.  SpO2 Percentage    01/12/21 2010 01/13/21 0322 01/13/21 0651   SpO2: 92% 92% 90%     SpO2:  [90 %-92 %] 90 %  on  Flow (L/min):  [3.5-5] 5;   Device (Oxygen Therapy): humidified;nasal cannula    Body mass index is 38.42 kg/m².  Wt Readings from Last 3 Encounters:   01/13/21 86.3 kg (190 lb 3.2 oz)   12/31/20 83.9 kg (185 lb)   12/01/20 84.4 kg (186 lb)        Intake/Output Summary (Last 24 hours) at 1/13/2021 1137  Last data filed at 1/13/2021 0900  Gross per 24 hour   Intake 560 ml   Output 25 ml   Net 535 ml     Diet Regular; Cardiac  ----------------------------------------------------------------------------------------------------------------------      Physical Exam:       Deferred due to COVID-19 isolation.  ----------------------------------------------------------------------------------------------------------------------  Results from last 7 days   Lab Units 01/10/21  0220 01/09/21  0332   CK TOTAL U/L 87 122       Results from last 7 days   Lab Units 01/09/21  0332   CHOLESTEROL mg/dL 69   TRIGLYCERIDES mg/dL 86   HDL CHOL mg/dL 34*   LDL CHOL mg/dL 17     Results from last 7 days   Lab Units 01/10/21  0721   PH, ARTERIAL pH units 7.399   PO2 ART mm Hg 56.6*   PCO2, ARTERIAL mm Hg 37.6   HCO3 ART mmol/L 23.2     Results from last 7 days   Lab Units 01/13/21  0329 01/12/21  0324 01/11/21  0545  01/08/21  1804   CRP  mg/dL 1.85* 1.93* 2.97*   < > 6.26*   LACTATE mmol/L  --   --   --   --  1.1   WBC 10*3/mm3 8.62 8.13 8.39   < > 7.36   HEMOGLOBIN g/dL 12.8 12.5 12.4   < > 12.9   HEMATOCRIT % 40.4 39.3 40.3   < > 40.9   MCV fL 92.2 92.7 95.0   < > 93.6   MCHC g/dL 31.7 31.8 30.8*   < > 31.5   PLATELETS 10*3/mm3 311 318 298   < > 202    < > = values in this interval not displayed.     Results from last 7 days   Lab Units 01/13/21  0329 01/12/21  0324 01/11/21  0545   SODIUM mmol/L 141 146* 142   POTASSIUM mmol/L 3.8 3.8 3.9   CHLORIDE mmol/L 109* 111* 110*   CO2 mmol/L 22.4 23.5 22.7   BUN mg/dL 23 27* 26*   CREATININE mg/dL 0.86 0.89 1.00   EGFR IF NONAFRICN AM mL/min/1.73 64 61 53*   CALCIUM mg/dL 9.1 8.7 8.8   GLUCOSE mg/dL 129* 124* 119*   ALBUMIN g/dL 3.02* 3.07* 3.02*   BILIRUBIN mg/dL 0.3 0.3 0.3   ALK PHOS U/L 78 73 78   AST (SGOT) U/L 32 28 30   ALT (SGPT) U/L 30 23 23   Estimated Creatinine Clearance: 53.4 mL/min (by C-G formula based on SCr of 0.86 mg/dL).  No results found for: AMMONIA    No results found for: HGBA1C, POCGLU  No results found for: HGBA1C  Lab Results   Component Value Date    TSH 1.950 09/14/2018    FREET4 1.16 09/14/2018       Blood Culture   Date Value Ref Range Status   01/08/2021 No growth at 24 hours  Preliminary   01/08/2021 No growth at 24 hours  Preliminary     Urine Culture   Date Value Ref Range Status   01/08/2021 >100,000 CFU/mL Escherichia coli (A)  Final     No results found for: WOUNDCX  No results found for: STOOLCX  No results found for: RESPCX  Pain Management Panel     Pain Management Panel Latest Ref Rng & Units 10/9/2020 6/10/2020    CREATININE UR mg/dL 99.5 122.8    AMPHETAMINES SCREEN, URINE Negative - -    BARBITURATES SCREEN Negative - -    BENZODIAZEPINE SCREEN, URINE Negative - -    COCAINE SCREEN, URINE Negative - -    METHADONE SCREEN, URINE Negative - -             ----------------------------------------------------------------------------------------------------------------------  Imaging Results (Last 24 Hours)     ** No results found for the last 24 hours. **          ----------------------------------------------------------------------------------------------------------------------    Assessment/Plan       Assessment/Plan     ASSESSMENT:    1.  Sepsis  2.  COVID-19 pneumonia  3.  UTI    PLAN:    Case discussed with primary RN.  Patient on increased oxygen requirement of 5 L but will nasal cannula today.  Patient desats easily with any exertion.  WBC normal.  CRP improving at 1.85.  Afebrile, no diarrhea.  Primary RN reports lung sounds improved overall from previous day.    Urine culture from 1/8/2021 finalized as greater than 100,000 colonies of E. Coli.    Chest x-ray reports development of bilateral airspace disease.  CT of the chest reports multifocal pneumonia.  CT of the abdomen and pelvis reports patchy infiltrates in the lung bases.      Recommend initiation of remdesivir and Decadron.    For now recommend to continue Rocephin monotherapy.  Upon discharge patient can be further deescalated to Omnicef 300 mg p.o. twice daily to continue through 1/16/2021.  We will continue to follow closely and adjust antibiotic therapy as needed.      Code Status:   Code Status and Medical Interventions:   Ordered at: 01/08/21 2412     Level Of Support Discussed With:    Patient     Code Status:    CPR     Medical Interventions (Level of Support Prior to Arrest):    Full       Scribed for Gianni Felton MD.    Tracy Robert, APRN  01/13/21  11:37 EST     Physician Attestation:    The documentation recorded by the scribe accurately reflects the service I personally performed and the decisions made by me.    Gianni Felton MD  Infectious Diseases  01/13/21  11:37 EST

## 2021-01-13 NOTE — PROGRESS NOTES
[facility] HOSPITALIST PROGRESS NOTE     Patient Identification:  Name:  Emma Ryan  Age:  80 y.o.  Sex:  female  :  1940  MRN:  [id number]  Visit Number:  [id number]  ROOM: [address] [place] [address]     Primary Care Provider:  Jennifer Montano MD    Length of stay in inpatient status:  4    Subjective     Chief Compliant:    Chief Complaint   Patient presents with   • Exposure To Known Illness   • Nausea       History of Presenting Illness: Patient seen and examined in follow-up for sepsis secondary to E. coli UTI and COVID-19.  Patient today able to be titrated down to 3 L nasal cannula.  Patient remains without any acute complaints other than to be able to get out of bed without help or bed alarms.    Objective     Current Hospital Meds:ascorbic acid in  mL IVPB, 1,500 mg, Intravenous, Q8H  carvedilol, 25 mg, Oral, BID With Meals  cefTRIAXone, 2 g, Intravenous, Q24H  cetirizine, 5 mg, Oral, Daily  cholecalciferol, 1,000 Units, Oral, Daily  dexamethasone, 6 mg, Intravenous, Daily  enoxaparin, 40 mg, Subcutaneous, Q12H  gabapentin, 100 mg, Oral, BID  oxybutynin XL, 10 mg, Oral, Daily  pantoprazole, 40 mg, Oral, QAM  remdesivir, 100 mg, Intravenous, Q24H  sodium chloride, 10 mL, Intravenous, Q12H    Pharmacy Consult - Remdesivir,         Current Antimicrobial Therapy:  Anti-Infectives (From admission, onward)    Ordered     Dose/Rate Route Frequency Start Stop    21 1332  remdesivir 100 mg in sodium chloride 0.9 % 250 mL IVPB (powder vial)     Tracy Jones APRN let the order  on 21 1322.   Ordering Provider: Tracy Jones APRN    100 mg  over 60 Minutes Intravenous Every 24 Hours 01/10/21 1500 21 1459    21 1332  cefTRIAXone (ROCEPHIN) 2 g/100 mL 0.9% NS IVPB (MBP)     Tracy Jones APRN reviewed the order on 01/10/21 1240.   Ordering Provider: Tracy Jones APRN    2 g  over 30 Minutes Intravenous Every 24 Hours 21 2000 21 1959    21  1332  remdesivir 200 mg in sodium chloride 0.9 % 250 mL IVPB (powder vial)     Ordering Provider: Tracy Jones APRN    200 mg  over 60 Minutes Intravenous Every 24 Hours 01/09/21 1500 01/09/21 1838 01/08/21 2323  doxycycline (VIBRAMYCIN) 100 mg/100 mL 0.9% NS MBP     Ordering Provider: Dada Conner MD    100 mg  over 60 Minutes Intravenous Once 01/08/21 2325 01/09/21 0103 01/08/21 2022  cefTRIAXone (ROCEPHIN) 2 g/100 mL 0.9% NS IVPB (MBP)     Ordering Provider: Armin Barbosa II, PA    2 g  over 30 Minutes Intravenous Once 01/08/21 2024 01/08/21 2102 01/08/21 2224  cefdinir (OMNICEF) 300 MG capsule     Ordering Provider: Armin Barbosa II, PA    300 mg Oral 2 Times Daily 01/08/21 0000          Current Diuretic Therapy:  Diuretics (From admission, onward)    None        ----------------------------------------------------------------------------------------------------------------------  Vital Signs:  Temp:  [97.5 °F (36.4 °C)-98 °F (36.7 °C)] 98 °F (36.7 °C)  Heart Rate:  [55-71] 57  Resp:  [20] 20  BP: (130-179)/(81-93) 130/93  SpO2:  [90 %-94 %] 91 %  on  Flow (L/min):  [3.5-4.5] 3.5;   Device (Oxygen Therapy): nasal cannula  Body mass index is 38.12 kg/m².    Wt Readings from Last 3 Encounters:   01/12/21 85.6 kg (188 lb 11.4 oz)   12/31/20 83.9 kg (185 lb)   12/01/20 84.4 kg (186 lb)     Intake & Output (last 3 days)       01/10 0701 - 01/11 0700 01/11 0701 - 01/12 0700 01/12 0701 - 01/13 0700    P.O. 630 1350 560    I.V. (mL/kg) 1300 (15.3) 682 (8)     Total Intake(mL/kg) 1930 (22.7) 2032 (23.7) 560 (6.5)    Urine (mL/kg/hr) 400 (0.2) 775 (0.4) 175 (0.2)    Stool   0    Total Output 400 775 175    Net +1530 +1257 +385           Urine Unmeasured Occurrence 6 x 11 x 4 x    Stool Unmeasured Occurrence   1 x        Diet Regular; Cardiac  ----------------------------------------------------------------------------------------------------------------------  Physical exam:  This physical exam  has been personally performed remotely in the unit aided by real-time audio/visual communication tools. Nursing present at bedside during this exam and assisted during exam. The use of a video visit has been reviewed with the patient and verbal informed consent has been obtained.     Physical Exam:  General: Patient appears awake, alert, and in no acute distress.  Head: Normocephalic, atraumatic  Eyes: EOMI. Conjunctivae and sclerae normal.  Ears: Ears appear intact with no abnormalities noted.   Neck: Trachea midline. No obvious JVD.  Heart: Tele reveals sinus rhythm  Lungs: Respirations appear to be regular, even and unlabored with no signs of respiratory distress. No audible wheezing.  Abdomen: No obvious abdominal distension.  MS: Muscle tone appears normal. No gross deformities.  Extremities: No clubbing, cyanosis or edema noted.  Skin: No visible bleeding, bruising, or rash.  Neurologic: Alert and oriented x3. No gross focal deficits.   ----------------------------------------------------------------------------------------------------------------------  Tele:    ----------------------------------------------------------------------------------------------------------------------  Results from last 7 days   Lab Units 01/12/21  0324 01/11/21  0545 01/10/21  1352 01/10/21  0220  01/08/21  1804   CRP mg/dL 1.93* 2.97*  --  6.17*   < > 6.26*   LACTATE mmol/L  --   --   --   --   --  1.1   WBC 10*3/mm3 8.13 8.39 9.76 9.40   < > 7.36   HEMOGLOBIN g/dL 12.5 12.4 12.0 12.0   < > 12.9   HEMATOCRIT % 39.3 40.3 37.9 40.2   < > 40.9   MCV fL 92.7 95.0 92.9 96.6   < > 93.6   MCHC g/dL 31.8 30.8* 31.7 29.9*   < > 31.5   PLATELETS 10*3/mm3 318 298 263 248   < > 202    < > = values in this interval not displayed.     Results from last 7 days   Lab Units 01/10/21  0721   PH, ARTERIAL pH units 7.399   PO2 ART mm Hg 56.6*   PCO2, ARTERIAL mm Hg 37.6   HCO3 ART mmol/L 23.2     Results from last 7 days   Lab Units 01/12/21  0324  01/11/21  0545 01/10/21  0220   SODIUM mmol/L 146* 142 143   POTASSIUM mmol/L 3.8 3.9 4.0   CHLORIDE mmol/L 111* 110* 110*   CO2 mmol/L 23.5 22.7 20.0*   BUN mg/dL 27* 26* 20   CREATININE mg/dL 0.89 1.00 0.95   EGFR IF NONAFRICN AM mL/min/1.73 61 53* 57*   CALCIUM mg/dL 8.7 8.8 8.2*   GLUCOSE mg/dL 124* 119* 125*   ALBUMIN g/dL 3.07* 3.02* 3.08*   BILIRUBIN mg/dL 0.3 0.3 0.2   ALK PHOS U/L 73 78 68   AST (SGOT) U/L 28 30 28   ALT (SGPT) U/L 23 23 20   Estimated Creatinine Clearance: 51.4 mL/min (by C-G formula based on SCr of 0.89 mg/dL).  No results found for: AMMONIA  Results from last 7 days   Lab Units 01/10/21  0220 01/09/21  0332   CK TOTAL U/L 87 122         Results from last 7 days   Lab Units 01/09/21  0332   CHOLESTEROL mg/dL 69   TRIGLYCERIDES mg/dL 86   HDL CHOL mg/dL 34*   LDL CHOL mg/dL 17     No results found for: HGBA1C, POCGLU  Lab Results   Component Value Date    TSH 1.950 09/14/2018    FREET4 1.16 09/14/2018     No results found for: PREGTESTUR, PREGSERUM, HCG, HCGQUANT  Pain Management Panel     Pain Management Panel Latest Ref Rng & Units 10/9/2020 6/10/2020    CREATININE UR mg/dL 99.5 122.8    AMPHETAMINES SCREEN, URINE Negative - -    BARBITURATES SCREEN Negative - -    BENZODIAZEPINE SCREEN, URINE Negative - -    COCAINE SCREEN, URINE Negative - -    METHADONE SCREEN, URINE Negative - -        Brief Urine Lab Results  (Last result in the past 365 days)      Color   Clarity   Blood   Leuk Est   Nitrite   Protein   CREAT   Urine HCG        01/08/21 2026 Yellow Cloudy Negative Moderate (2+) Positive Trace             Blood Culture   Date Value Ref Range Status   01/08/2021 No growth at 2 days  Preliminary   01/08/2021 No growth at 2 days  Preliminary     Urine Culture   Date Value Ref Range Status   01/08/2021 >100,000 CFU/mL Escherichia coli (A)  Final     No results found for: WOUNDCX  No results found for: STOOLCX  No results found for: RESPCX  No results found for: AFBCX  Results from last  7 days   Lab Units 01/12/21  0324 01/11/21  0545 01/10/21  0220 01/09/21  0332 01/08/21  1804   LACTATE mmol/L  --   --   --   --  1.1   CRP mg/dL 1.93* 2.97* 6.17* 8.48* 6.26*       I have personally looked at the labs and they are summarized above.  ----------------------------------------------------------------------------------------------------------------------  Detailed radiology reports for the last 24 hours:    Imaging Results (Last 24 Hours)     ** No results found for the last 24 hours. **        Assessment & Plan      Sepsis  COVID-19 pneumonia  E. coli UTI  Acute hypoxemic respiratory failure   -O2 requirements improved to 3 L, will titrate as tolerated   -Continue remdesivir and dexamethasone   -Continue ceftriaxone   -Infectious disease consulted and following along    CKD stage IIIa  Hyperlipidemia  Essential hypertension  GERD      VTE Prophylaxis:   Mechanical Order History:     None      Pharmalogical Order History:      Ordered     Dose Route Frequency Stop    01/09/21 1320  enoxaparin (LOVENOX) syringe 40 mg      40 mg SC Every 12 Hours Scheduled --    01/09/21 0127  enoxaparin (LOVENOX) syringe 40 mg  Status:  Discontinued      40 mg SC Every 24 Hours 01/09/21 1320                Ruy Tenorio DO  [facility]  01/12/21  19:28 EST

## 2021-01-13 NOTE — PLAN OF CARE
Problem: Fall Injury Risk  Goal: Absence of Fall and Fall-Related Injury  Outcome: Ongoing, Progressing  Intervention: Promote Injury-Free Environment  Recent Flowsheet Documentation  Taken 1/13/2021 0500 by Millie Moore RN  Safety Promotion/Fall Prevention: safety round/check completed  Taken 1/13/2021 0300 by Millie Mooer RN  Safety Promotion/Fall Prevention: safety round/check completed  Taken 1/13/2021 0100 by Millie Moore RN  Safety Promotion/Fall Prevention: safety round/check completed  Taken 1/12/2021 2300 by Millie Moore RN  Safety Promotion/Fall Prevention: safety round/check completed  Taken 1/12/2021 2100 by Millie Moore RN  Safety Promotion/Fall Prevention: safety round/check completed  Taken 1/12/2021 2000 by Millie Moore RN  Safety Promotion/Fall Prevention: safety round/check completed  Taken 1/12/2021 1900 by Millie Moore RN  Safety Promotion/Fall Prevention: safety round/check completed   Goal Outcome Evaluation:  Plan of Care Reviewed With: patient  Progress: no change  Outcome Summary: Patient O2 dropped 70s when ambulating to the bathroom. Increased to 4.5 L. BP elevated. 1x dose hydralasine administered. Will continue to monitor.

## 2021-01-13 NOTE — PLAN OF CARE
Goal Outcome Evaluation:  Plan of Care Reviewed With: patient  Progress: no change   Patient states she is feeling better today even though she is requiring 4.5 LPM/NC. She has been getting OOB to the BSC. When she gets up to use the BSC she desats to lower 80's. She has complained of a HA today and requested Tylenol. She is concerned about her  at home that she says is also positive for Covid. No other issues at this time.

## 2021-01-13 NOTE — PROGRESS NOTES
Discharge Planning Assessment   Prasanna     Patient Name: Emma Ryan  MRN: [id number]  Today's Date: 1/13/2021    Admit Date: 1/8/2021      Discharge Plan     Row Name 01/13/21 1759       Plan    Plan SS spoke to pt on this date. Pt lives with spouse, Bob and she plans to return home at discharge. Pt has a rollator and wheelchair from unknown provider. Pt does not utilize home health services. SS to follow.        ALISON Ford

## 2021-01-14 ENCOUNTER — APPOINTMENT (OUTPATIENT)
Dept: CT IMAGING | Facility: HOSPITAL | Age: 81
End: 2021-01-14

## 2021-01-14 ENCOUNTER — APPOINTMENT (OUTPATIENT)
Dept: GENERAL RADIOLOGY | Facility: HOSPITAL | Age: 81
End: 2021-01-14

## 2021-01-14 LAB
ALBUMIN SERPL-MCNC: 2.99 G/DL (ref 3.5–5.2)
ALBUMIN/GLOB SERPL: 1.1 G/DL
ALP SERPL-CCNC: 79 U/L (ref 39–117)
ALT SERPL W P-5'-P-CCNC: 27 U/L (ref 1–33)
ANION GAP SERPL CALCULATED.3IONS-SCNC: 10.5 MMOL/L (ref 5–15)
AST SERPL-CCNC: 24 U/L (ref 1–32)
BILIRUB SERPL-MCNC: 0.4 MG/DL (ref 0–1.2)
BUN SERPL-MCNC: 22 MG/DL (ref 8–23)
BUN/CREAT SERPL: 23.4 (ref 7–25)
CALCIUM SPEC-SCNC: 9.2 MG/DL (ref 8.6–10.5)
CHLORIDE SERPL-SCNC: 105 MMOL/L (ref 98–107)
CO2 SERPL-SCNC: 25.5 MMOL/L (ref 22–29)
CREAT SERPL-MCNC: 0.94 MG/DL (ref 0.57–1)
CRP SERPL-MCNC: 3.08 MG/DL (ref 0–0.5)
D DIMER PPP FEU-MCNC: 0.41 MCGFEU/ML (ref 0–0.5)
DEPRECATED RDW RBC AUTO: 47.3 FL (ref 37–54)
ERYTHROCYTE [DISTWIDTH] IN BLOOD BY AUTOMATED COUNT: 14.1 % (ref 12.3–15.4)
FERRITIN SERPL-MCNC: 311.9 NG/ML (ref 13–150)
GFR SERPL CREATININE-BSD FRML MDRD: 57 ML/MIN/1.73
GLOBULIN UR ELPH-MCNC: 2.7 GM/DL
GLUCOSE SERPL-MCNC: 134 MG/DL (ref 65–99)
HCT VFR BLD AUTO: 40.9 % (ref 34–46.6)
HGB BLD-MCNC: 13 G/DL (ref 12–15.9)
LDH SERPL-CCNC: 297 U/L (ref 135–214)
MCH RBC QN AUTO: 28.9 PG (ref 26.6–33)
MCHC RBC AUTO-ENTMCNC: 31.8 G/DL (ref 31.5–35.7)
MCV RBC AUTO: 90.9 FL (ref 79–97)
PLATELET # BLD AUTO: 331 10*3/MM3 (ref 140–450)
PMV BLD AUTO: 10.8 FL (ref 6–12)
POTASSIUM SERPL-SCNC: 3.8 MMOL/L (ref 3.5–5.2)
PROT SERPL-MCNC: 5.7 G/DL (ref 6–8.5)
RBC # BLD AUTO: 4.5 10*6/MM3 (ref 3.77–5.28)
SODIUM SERPL-SCNC: 141 MMOL/L (ref 136–145)
WBC # BLD AUTO: 7.73 10*3/MM3 (ref 3.4–10.8)

## 2021-01-14 PROCEDURE — 86140 C-REACTIVE PROTEIN: CPT | Performed by: HOSPITALIST

## 2021-01-14 PROCEDURE — 82728 ASSAY OF FERRITIN: CPT | Performed by: INTERNAL MEDICINE

## 2021-01-14 PROCEDURE — 25010000002 ENOXAPARIN PER 10 MG: Performed by: INTERNAL MEDICINE

## 2021-01-14 PROCEDURE — 99233 SBSQ HOSP IP/OBS HIGH 50: CPT | Performed by: STUDENT IN AN ORGANIZED HEALTH CARE EDUCATION/TRAINING PROGRAM

## 2021-01-14 PROCEDURE — 83615 LACTATE (LD) (LDH) ENZYME: CPT | Performed by: INTERNAL MEDICINE

## 2021-01-14 PROCEDURE — 85027 COMPLETE CBC AUTOMATED: CPT | Performed by: INTERNAL MEDICINE

## 2021-01-14 PROCEDURE — 94799 UNLISTED PULMONARY SVC/PX: CPT

## 2021-01-14 PROCEDURE — 71250 CT THORAX DX C-: CPT | Performed by: RADIOLOGY

## 2021-01-14 PROCEDURE — 71045 X-RAY EXAM CHEST 1 VIEW: CPT | Performed by: RADIOLOGY

## 2021-01-14 PROCEDURE — 80053 COMPREHEN METABOLIC PANEL: CPT | Performed by: HOSPITALIST

## 2021-01-14 PROCEDURE — 25010000002 DEXAMETHASONE PER 1 MG: Performed by: HOSPITALIST

## 2021-01-14 PROCEDURE — 71045 X-RAY EXAM CHEST 1 VIEW: CPT

## 2021-01-14 PROCEDURE — 71250 CT THORAX DX C-: CPT

## 2021-01-14 PROCEDURE — 85379 FIBRIN DEGRADATION QUANT: CPT | Performed by: INTERNAL MEDICINE

## 2021-01-14 PROCEDURE — 25010000002 PIPERACILLIN SOD-TAZOBACTAM PER 1 G: Performed by: STUDENT IN AN ORGANIZED HEALTH CARE EDUCATION/TRAINING PROGRAM

## 2021-01-14 RX ORDER — ALBUTEROL SULFATE 90 UG/1
2 AEROSOL, METERED RESPIRATORY (INHALATION)
Status: DISCONTINUED | OUTPATIENT
Start: 2021-01-14 | End: 2021-01-21 | Stop reason: HOSPADM

## 2021-01-14 RX ORDER — L.ACID,PARA/B.BIFIDUM/S.THERM 8B CELL
1 CAPSULE ORAL DAILY
Status: DISCONTINUED | OUTPATIENT
Start: 2021-01-14 | End: 2021-01-21 | Stop reason: HOSPADM

## 2021-01-14 RX ADMIN — PIPERACILLIN AND TAZOBACTAM 4.5 G: 4; .5 INJECTION, POWDER, LYOPHILIZED, FOR SOLUTION INTRAVENOUS; PARENTERAL at 12:59

## 2021-01-14 RX ADMIN — GABAPENTIN 100 MG: 100 CAPSULE ORAL at 21:47

## 2021-01-14 RX ADMIN — ALBUTEROL SULFATE 2 PUFF: 90 AEROSOL, METERED RESPIRATORY (INHALATION) at 16:07

## 2021-01-14 RX ADMIN — CARVEDILOL 25 MG: 25 TABLET, FILM COATED ORAL at 08:33

## 2021-01-14 RX ADMIN — DEXAMETHASONE SODIUM PHOSPHATE 6 MG: 4 INJECTION, SOLUTION INTRA-ARTICULAR; INTRALESIONAL; INTRAMUSCULAR; INTRAVENOUS; SOFT TISSUE at 08:32

## 2021-01-14 RX ADMIN — CHOLECALCIFEROL TAB 10 MCG (400 UNIT) 1000 UNITS: 10 TAB at 08:33

## 2021-01-14 RX ADMIN — SODIUM CHLORIDE, PRESERVATIVE FREE 10 ML: 5 INJECTION INTRAVENOUS at 08:33

## 2021-01-14 RX ADMIN — ALBUTEROL SULFATE 2 PUFF: 90 AEROSOL, METERED RESPIRATORY (INHALATION) at 12:58

## 2021-01-14 RX ADMIN — ASCORBIC ACID 1500 MG: 500 INJECTION, SOLUTION INTRAMUSCULAR; INTRAVENOUS; SUBCUTANEOUS at 16:55

## 2021-01-14 RX ADMIN — SODIUM CHLORIDE, PRESERVATIVE FREE 10 ML: 5 INJECTION INTRAVENOUS at 21:48

## 2021-01-14 RX ADMIN — OXYBUTYNIN CHLORIDE 10 MG: 5 TABLET, EXTENDED RELEASE ORAL at 08:33

## 2021-01-14 RX ADMIN — CETIRIZINE HYDROCHLORIDE 5 MG: 10 TABLET, FILM COATED ORAL at 08:33

## 2021-01-14 RX ADMIN — ASCORBIC ACID 1500 MG: 500 INJECTION, SOLUTION INTRAMUSCULAR; INTRAVENOUS; SUBCUTANEOUS at 05:56

## 2021-01-14 RX ADMIN — ENOXAPARIN SODIUM 40 MG: 40 INJECTION SUBCUTANEOUS at 05:57

## 2021-01-14 RX ADMIN — ALBUTEROL SULFATE 2 PUFF: 90 AEROSOL, METERED RESPIRATORY (INHALATION) at 08:32

## 2021-01-14 RX ADMIN — Medication 1 CAPSULE: at 21:47

## 2021-01-14 RX ADMIN — ALBUTEROL SULFATE 2 PUFF: 90 AEROSOL, METERED RESPIRATORY (INHALATION) at 21:48

## 2021-01-14 RX ADMIN — GABAPENTIN 100 MG: 100 CAPSULE ORAL at 08:33

## 2021-01-14 RX ADMIN — ENOXAPARIN SODIUM 40 MG: 40 INJECTION SUBCUTANEOUS at 16:55

## 2021-01-14 RX ADMIN — PANTOPRAZOLE SODIUM 40 MG: 40 TABLET, DELAYED RELEASE ORAL at 05:57

## 2021-01-14 RX ADMIN — PIPERACILLIN SODIUM AND TAZOBACTAM SODIUM 4.5 G: 4; .5 INJECTION, POWDER, LYOPHILIZED, FOR SOLUTION INTRAVENOUS at 21:47

## 2021-01-14 NOTE — PLAN OF CARE
Problem: Fall Injury Risk  Goal: Absence of Fall and Fall-Related Injury  Outcome: Ongoing, Progressing  Intervention: Promote Injury-Free Environment  Recent Flowsheet Documentation  Taken 1/14/2021 0300 by Millie Moore RN  Safety Promotion/Fall Prevention: safety round/check completed  Taken 1/14/2021 0100 by Millie Moore RN  Safety Promotion/Fall Prevention: safety round/check completed  Taken 1/13/2021 2300 by Millie Moore RN  Safety Promotion/Fall Prevention: safety round/check completed  Taken 1/13/2021 2100 by Millie Moore RN  Safety Promotion/Fall Prevention: safety round/check completed  Taken 1/13/2021 1900 by Millie Moore RN  Safety Promotion/Fall Prevention: safety round/check completed     Problem: Adult Inpatient Plan of Care  Goal: Plan of Care Review  Outcome: Ongoing, Progressing  Flowsheets  Taken 1/14/2021 0305 by Millie Moore RN  Progress: no change  Outcome Summary: Patients O2 dropped to 70s when ambulating to the bathroom. Increased to 6L NC. WIll continue to monitor.  Taken 1/9/2021 0549 by Qiana Godinez RN  Plan of Care Reviewed With: patient  Goal: Patient-Specific Goal (Individualized)  Outcome: Ongoing, Progressing  Goal: Absence of Hospital-Acquired Illness or Injury  Outcome: Ongoing, Progressing  Intervention: Identify and Manage Fall Risk  Recent Flowsheet Documentation  Taken 1/14/2021 0300 by Millie Moore RN  Safety Promotion/Fall Prevention: safety round/check completed  Taken 1/14/2021 0100 by Millie Moore RN  Safety Promotion/Fall Prevention: safety round/check completed  Taken 1/13/2021 2300 by Millie Moore RN  Safety Promotion/Fall Prevention: safety round/check completed  Taken 1/13/2021 2100 by Millie Moore RN  Safety Promotion/Fall Prevention: safety round/check completed  Taken 1/13/2021 1900 by Millie Moore RN  Safety Promotion/Fall Prevention: safety  round/check completed  Goal: Optimal Comfort and Wellbeing  Outcome: Ongoing, Progressing  Intervention: Provide Person-Centered Care  Recent Flowsheet Documentation  Taken 1/13/2021 2100 by Millie Moore, RN  Trust Relationship/Rapport:   care explained   choices provided   emotional support provided   questions answered   empathic listening provided   reassurance provided   questions encouraged   thoughts/feelings acknowledged  Goal: Readiness for Transition of Care  Outcome: Ongoing, Progressing     Problem: UTI (Urinary Tract Infection)  Goal: Improved Infection Symptoms  Outcome: Ongoing, Progressing   Goal Outcome Evaluation:  Plan of Care Reviewed With: patient  Progress: no change  Outcome Summary: Patients O2 dropped to 70s when ambulating to the bathroom. Increased to 6L NC. WIll continue to monitor.

## 2021-01-14 NOTE — PROGRESS NOTES
[facility] Medicine Services  CROSS COVER NOTE    Contacted per RN, patient previously on 4.5 LPM, desaturated to 70%s with ambulation to restroom. RN titrated up to 6 LPM NC, now O2 saturation improved to low 90%s. Continue 6 LPM for now, titrate as tolerated. Further care pending clinical course, continue orders per chart.       Angela Chase PA-C  Hospitalist Service -- [facility]   Pager: [phone]    01/14/21  01:17 EST

## 2021-01-14 NOTE — PROGRESS NOTES
[facility] HOSPITALIST PROGRESS NOTE     Patient Identification:  Name:  Emma Ryan  Age:  80 y.o.  Sex:  female  :  1940  MRN:  [id number]  Visit Number:  [id number]  ROOM: [address] [place] [address]     Primary Care Provider:  Jennifer Montano MD    Length of stay in inpatient status:  5    Subjective     Chief Compliant:    Chief Complaint   Patient presents with   • Exposure To Known Illness   • Nausea       History of Presenting Illness: Patient seen and examined in follow-up for sepsis secondary to E. coli UTI and COVID-19.  Patient today with increased oxygen requirements from yesterday requiring titration up to 5 L.  Still does not overall feel well but still going to be able to return home as soon as possible.    Objective     Current Hospital Meds:ascorbic acid in  mL IVPB, 1,500 mg, Intravenous, Q8H  carvedilol, 25 mg, Oral, BID With Meals  cefTRIAXone, 2 g, Intravenous, Q24H  cetirizine, 5 mg, Oral, Daily  cholecalciferol, 1,000 Units, Oral, Daily  dexamethasone, 6 mg, Intravenous, Daily  enoxaparin, 40 mg, Subcutaneous, Q12H  gabapentin, 100 mg, Oral, BID  oxybutynin XL, 10 mg, Oral, Daily  pantoprazole, 40 mg, Oral, QAM  sodium chloride, 10 mL, Intravenous, Q12H    Pharmacy Consult - Remdesivir,         Current Antimicrobial Therapy:  Anti-Infectives (From admission, onward)    Ordered     Dose/Rate Route Frequency Start Stop    21 1332  remdesivir 100 mg in sodium chloride 0.9 % 250 mL IVPB (powder vial)     Tracy Jones APRN reviewed the order on 21 1322.   Ordering Provider: Tracy Jones APRN    100 mg  over 60 Minutes Intravenous Every 24 Hours 01/10/21 1500 21 1534    21 1332  cefTRIAXone (ROCEPHIN) 2 g/100 mL 0.9% NS IVPB (MBP)     Tracy Joens APRN reviewed the order on 01/10/21 1240.   Ordering Provider: Tracy Jones APRN    2 g  over 30 Minutes Intravenous Every 24 Hours 21 2000 21 1959    21 1332  remdesivir 200 mg  in sodium chloride 0.9 % 250 mL IVPB (powder vial)     Ordering Provider: Tracy Jones APRN    200 mg  over 60 Minutes Intravenous Every 24 Hours 01/09/21 1500 01/09/21 1838 01/08/21 2323  doxycycline (VIBRAMYCIN) 100 mg/100 mL 0.9% NS MBP     Ordering Provider: Dada Conner MD    100 mg  over 60 Minutes Intravenous Once 01/08/21 2325 01/09/21 0103 01/08/21 2022  cefTRIAXone (ROCEPHIN) 2 g/100 mL 0.9% NS IVPB (MBP)     Ordering Provider: Armin Barbosa II, PA    2 g  over 30 Minutes Intravenous Once 01/08/21 2024 01/08/21 2102 01/08/21 2224  cefdinir (OMNICEF) 300 MG capsule     Ordering Provider: Armin Barbosa II, PA    300 mg Oral 2 Times Daily 01/08/21 0000          Current Diuretic Therapy:  Diuretics (From admission, onward)    None        ----------------------------------------------------------------------------------------------------------------------  Vital Signs:  Temp:  [97.7 °F (36.5 °C)-98.2 °F (36.8 °C)] 98.2 °F (36.8 °C)  Heart Rate:  [55-72] 70  Resp:  [18-24] 18  BP: (127-184)/(67-80) 127/67  SpO2:  [90 %-92 %] 92 %  on  Flow (L/min):  [3.5-5] 5;   Device (Oxygen Therapy): humidified;nasal cannula  Body mass index is 38.42 kg/m².    Wt Readings from Last 3 Encounters:   01/13/21 86.3 kg (190 lb 3.2 oz)   12/31/20 83.9 kg (185 lb)   12/01/20 84.4 kg (186 lb)     Intake & Output (last 3 days)       01/11 0701 - 01/12 0700 01/12 0701 - 01/13 0700 01/13 0701 - 01/14 0700    P.O. 1350 560 720    I.V. (mL/kg) 682 (8)      Total Intake(mL/kg) 2032 (23.7) 560 (6.5) 720 (8.3)    Urine (mL/kg/hr) 775 (0.4) 175 (0.1)     Stool  0     Total Output 775 175     Net +1257 +385 +720           Urine Unmeasured Occurrence 11 x 4 x 4 x    Stool Unmeasured Occurrence  1 x         Diet Regular; Cardiac  ----------------------------------------------------------------------------------------------------------------------  Physical exam:  This physical exam has been personally performed remotely  in the unit aided by real-time audio/visual communication tools. Nursing present at bedside during this exam and assisted during exam. The use of a video visit has been reviewed with the patient and verbal informed consent has been obtained.     Physical Exam:  General: Patient appears awake, alert, and in no acute distress.  Head: Normocephalic, atraumatic  Eyes: EOMI. Conjunctivae and sclerae normal.  Ears: Ears appear intact with no abnormalities noted.   Neck: Trachea midline. No obvious JVD.  Heart: Tele reveals sinus rhythm  Lungs: Respirations appear to be regular, even and unlabored with no signs of respiratory distress. No audible wheezing.  Abdomen: No obvious abdominal distension.  MS: Muscle tone appears normal. No gross deformities.  Extremities: No clubbing, cyanosis or edema noted.  Skin: No visible bleeding, bruising, or rash.  Neurologic: Alert and oriented x3. No gross focal deficits.   ----------------------------------------------------------------------------------------------------------------------  Tele:    ----------------------------------------------------------------------------------------------------------------------  Results from last 7 days   Lab Units 01/13/21  0329 01/12/21  0324 01/11/21  0545  01/08/21  1804   CRP mg/dL 1.85* 1.93* 2.97*   < > 6.26*   LACTATE mmol/L  --   --   --   --  1.1   WBC 10*3/mm3 8.62 8.13 8.39   < > 7.36   HEMOGLOBIN g/dL 12.8 12.5 12.4   < > 12.9   HEMATOCRIT % 40.4 39.3 40.3   < > 40.9   MCV fL 92.2 92.7 95.0   < > 93.6   MCHC g/dL 31.7 31.8 30.8*   < > 31.5   PLATELETS 10*3/mm3 311 318 298   < > 202    < > = values in this interval not displayed.     Results from last 7 days   Lab Units 01/10/21  0721   PH, ARTERIAL pH units 7.399   PO2 ART mm Hg 56.6*   PCO2, ARTERIAL mm Hg 37.6   HCO3 ART mmol/L 23.2     Results from last 7 days   Lab Units 01/13/21  0329 01/12/21  0324 01/11/21  0545   SODIUM mmol/L 141 146* 142   POTASSIUM mmol/L 3.8 3.8 3.9    CHLORIDE mmol/L 109* 111* 110*   CO2 mmol/L 22.4 23.5 22.7   BUN mg/dL 23 27* 26*   CREATININE mg/dL 0.86 0.89 1.00   EGFR IF NONAFRICN AM mL/min/1.73 64 61 53*   CALCIUM mg/dL 9.1 8.7 8.8   GLUCOSE mg/dL 129* 124* 119*   ALBUMIN g/dL 3.02* 3.07* 3.02*   BILIRUBIN mg/dL 0.3 0.3 0.3   ALK PHOS U/L 78 73 78   AST (SGOT) U/L 32 28 30   ALT (SGPT) U/L 30 23 23   Estimated Creatinine Clearance: 53.4 mL/min (by C-G formula based on SCr of 0.86 mg/dL).  No results found for: AMMONIA  Results from last 7 days   Lab Units 01/10/21  0220 01/09/21  0332   CK TOTAL U/L 87 122         Results from last 7 days   Lab Units 01/09/21  0332   CHOLESTEROL mg/dL 69   TRIGLYCERIDES mg/dL 86   HDL CHOL mg/dL 34*   LDL CHOL mg/dL 17     No results found for: HGBA1C, POCGLU  Lab Results   Component Value Date    TSH 1.950 09/14/2018    FREET4 1.16 09/14/2018     No results found for: PREGTESTUR, PREGSERUM, HCG, HCGQUANT  Pain Management Panel     Pain Management Panel Latest Ref Rng & Units 10/9/2020 6/10/2020    CREATININE UR mg/dL 99.5 122.8    AMPHETAMINES SCREEN, URINE Negative - -    BARBITURATES SCREEN Negative - -    BENZODIAZEPINE SCREEN, URINE Negative - -    COCAINE SCREEN, URINE Negative - -    METHADONE SCREEN, URINE Negative - -        Brief Urine Lab Results  (Last result in the past 365 days)      Color   Clarity   Blood   Leuk Est   Nitrite   Protein   CREAT   Urine HCG        01/08/21 2026 Yellow Cloudy Negative Moderate (2+) Positive Trace             Blood Culture   Date Value Ref Range Status   01/08/2021 No growth at 2 days  Preliminary   01/08/2021 No growth at 2 days  Preliminary     Urine Culture   Date Value Ref Range Status   01/08/2021 >100,000 CFU/mL Escherichia coli (A)  Final     No results found for: WOUNDCX  No results found for: STOOLCX  No results found for: RESPCX  No results found for: AFBCX  Results from last 7 days   Lab Units 01/13/21  0329 01/12/21  0324 01/11/21  0545 01/10/21  0220 01/09/21  0332  01/08/21  1804   LACTATE mmol/L  --   --   --   --   --  1.1   CRP mg/dL 1.85* 1.93* 2.97* 6.17* 8.48* 6.26*       I have personally looked at the labs and they are summarized above.  ----------------------------------------------------------------------------------------------------------------------  Detailed radiology reports for the last 24 hours:    Imaging Results (Last 24 Hours)     ** No results found for the last 24 hours. **        Assessment & Plan      Sepsis  COVID-19 pneumonia  E. coli UTI  Acute hypoxemic respiratory failure   -O2 requirements increased to 5 L, will titrate as tolerated   -Continue remdesivir and dexamethasone   -Continue ceftriaxone   -Infectious disease consulted and following along    CKD stage IIIa  Hyperlipidemia  Essential hypertension  GERD      VTE Prophylaxis:   Mechanical Order History:     None      Pharmalogical Order History:      Ordered     Dose Route Frequency Stop    01/09/21 1320  enoxaparin (LOVENOX) syringe 40 mg      40 mg SC Every 12 Hours Scheduled --    01/09/21 0127  enoxaparin (LOVENOX) syringe 40 mg  Status:  Discontinued      40 mg SC Every 24 Hours 01/09/21 1320                Ruy Tenorio DO  [place] [facility]  01/13/21  19:52 EST

## 2021-01-14 NOTE — PLAN OF CARE
Goal Outcome Evaluation:  Plan of Care Reviewed With: patient  Progress: no change   Patient is currently on 12 liters bubble high flow. Xray completed and CT scan completed. Family updated on patients condition. No other issues at this time

## 2021-01-14 NOTE — PROGRESS NOTES
PROGRESS NOTE         Patient Identification:  Name:  Emma Ryan  Age:  80 y.o.  Sex:  female  :  1940  MRN:  [id number]  Visit Number:  [id number]  Primary Care Provider:  Jennifer Montano MD         LOS: 6 days       ----------------------------------------------------------------------------------------------------------------------  Subjective       Chief Complaints:    Exposure To Known Illness and Nausea        Interval History:      Case discussed with primary RN.  Patient is on increased oxygen requirements of 9 L but will high flow nasal cannula today.  Cough has become productive.  Afebrile, no diarrhea.  CRP is slightly worsened at 3.08.  WBC remains normal.    Review of Systems:    Constitutional: no fever, chills and night sweats.  Generalized fatigue.  Eyes: no eye drainage, itching or redness.  HEENT: no mouth sores, dysphagia or nose bleed.  Respiratory: Positive shortness of breath and productive cough.  Cardiovascular: no chest pain, no palpitations, no orthopnea.  Gastrointestinal: no nausea, vomiting or diarrhea. No abdominal pain, hematemesis or rectal bleeding.  Genitourinary: no dysuria or polyuria.  Hematologic/lymphatic: no lymph node abnormalities, no easy bruising or easy bleeding.  Musculoskeletal: no muscle or joint pain.  Skin: No rash and no itching.  Neurological: no loss of consciousness, no seizure, no headache.  Behavioral/Psych: no depression or suicidal ideation.  Endocrine: no hot flashes.  Immunologic: negative.    ----------------------------------------------------------------------------------------------------------------------      Objective       Current Hospital Meds:  albuterol sulfate HFA, 2 puff, Inhalation, 4x Daily - RT  ascorbic acid in  mL IVPB, 1,500 mg, Intravenous, Q8H  carvedilol, 25 mg, Oral, BID With Meals  cetirizine, 5 mg, Oral, Daily  cholecalciferol, 1,000 Units, Oral, Daily  dexamethasone, 6 mg, Intravenous,  Daily  enoxaparin, 40 mg, Subcutaneous, Q12H  gabapentin, 100 mg, Oral, BID  oxybutynin XL, 10 mg, Oral, Daily  pantoprazole, 40 mg, Oral, QAM  piperacillin-tazobactam, 4.5 g, Intravenous, Once  piperacillin-tazobactam, 4.5 g, Intravenous, Q8H  sodium chloride, 10 mL, Intravenous, Q12H      Pharmacy Consult - Remdesivir,       ----------------------------------------------------------------------------------------------------------------------    Vital Signs:  Temp:  [98.2 °F (36.8 °C)-98.4 °F (36.9 °C)] 98.4 °F (36.9 °C)  Heart Rate:  [58-72] 71  Resp:  [18-22] 22  BP: (127-176)/(67-85) 176/85  Mean Arterial Pressure (Non-Invasive) for the past 24 hrs (Last 3 readings):   Noninvasive MAP (mmHg)   01/14/21 0640 107   01/13/21 1848 85   01/13/21 1518 121     SpO2 Percentage    01/14/21 0655 01/14/21 0700 01/14/21 0929   SpO2: (!) 81% 91% (!) 88%     SpO2:  [75 %-94 %] 88 %  on  Flow (L/min):  [4.5-9] 9;   Device (Oxygen Therapy): bubble;high-flow nasal cannula    Body mass index is 39.18 kg/m².  Wt Readings from Last 3 Encounters:   01/14/21 88 kg (194 lb)   12/31/20 83.9 kg (185 lb)   12/01/20 84.4 kg (186 lb)        Intake/Output Summary (Last 24 hours) at 1/14/2021 1142  Last data filed at 1/14/2021 0929  Gross per 24 hour   Intake 1080 ml   Output --   Net 1080 ml     Diet Regular; Cardiac  ----------------------------------------------------------------------------------------------------------------------      Physical Exam:       Deferred due to COVID-19 isolation.  ----------------------------------------------------------------------------------------------------------------------  Results from last 7 days   Lab Units 01/10/21  0220 01/09/21  0332   CK TOTAL U/L 87 122       Results from last 7 days   Lab Units 01/09/21  0332   CHOLESTEROL mg/dL 69   TRIGLYCERIDES mg/dL 86   HDL CHOL mg/dL 34*   LDL CHOL mg/dL 17     Results from last 7 days   Lab Units 01/10/21  0721   PH, ARTERIAL pH units 7.399   PO2 ART  mm Hg 56.6*   PCO2, ARTERIAL mm Hg 37.6   HCO3 ART mmol/L 23.2     Results from last 7 days   Lab Units 01/14/21  0129 01/13/21 0329 01/12/21  0324  01/08/21  1804   CRP mg/dL 3.08* 1.85* 1.93*   < > 6.26*   LACTATE mmol/L  --   --   --   --  1.1   WBC 10*3/mm3 7.73 8.62 8.13   < > 7.36   HEMOGLOBIN g/dL 13.0 12.8 12.5   < > 12.9   HEMATOCRIT % 40.9 40.4 39.3   < > 40.9   MCV fL 90.9 92.2 92.7   < > 93.6   MCHC g/dL 31.8 31.7 31.8   < > 31.5   PLATELETS 10*3/mm3 331 311 318   < > 202    < > = values in this interval not displayed.     Results from last 7 days   Lab Units 01/14/21  0129 01/13/21 0329 01/12/21  0324   SODIUM mmol/L 141 141 146*   POTASSIUM mmol/L 3.8 3.8 3.8   CHLORIDE mmol/L 105 109* 111*   CO2 mmol/L 25.5 22.4 23.5   BUN mg/dL 22 23 27*   CREATININE mg/dL 0.94 0.86 0.89   EGFR IF NONAFRICN AM mL/min/1.73 57* 64 61   CALCIUM mg/dL 9.2 9.1 8.7   GLUCOSE mg/dL 134* 129* 124*   ALBUMIN g/dL 2.99* 3.02* 3.07*   BILIRUBIN mg/dL 0.4 0.3 0.3   ALK PHOS U/L 79 78 73   AST (SGOT) U/L 24 32 28   ALT (SGPT) U/L 27 30 23   Estimated Creatinine Clearance: 49.4 mL/min (by C-G formula based on SCr of 0.94 mg/dL).  No results found for: AMMONIA    No results found for: HGBA1C, POCGLU  No results found for: HGBA1C  Lab Results   Component Value Date    TSH 1.950 09/14/2018    FREET4 1.16 09/14/2018       Blood Culture   Date Value Ref Range Status   01/08/2021 No growth at 24 hours  Preliminary   01/08/2021 No growth at 24 hours  Preliminary     Urine Culture   Date Value Ref Range Status   01/08/2021 >100,000 CFU/mL Escherichia coli (A)  Final     No results found for: WOUNDCX  No results found for: STOOLCX  No results found for: RESPCX  Pain Management Panel     Pain Management Panel Latest Ref Rng & Units 10/9/2020 6/10/2020    CREATININE UR mg/dL 99.5 122.8    AMPHETAMINES SCREEN, URINE Negative - -    BARBITURATES SCREEN Negative - -    BENZODIAZEPINE SCREEN, URINE Negative - -    COCAINE SCREEN, URINE  Negative - -    METHADONE SCREEN, URINE Negative - -            ----------------------------------------------------------------------------------------------------------------------  Imaging Results (Last 24 Hours)     Procedure Component Value Units Date/Time    XR Chest 1 View [[id number]] Collected: 01/14/21 0927     Updated: 01/14/21 0930    Narrative:      EXAM:    XR Chest, 1 View     EXAM DATE:    1/14/2021 8:22 AM     CLINICAL HISTORY:    increasing hypoxia in COVID; N39.0-Urinary tract infection, site not  specified; Z86.16-Personal history of covid-19     TECHNIQUE:    Frontal view of the chest.     COMPARISON:    01/08/2021     FINDINGS:    LUNGS:  Worsened bilateral patchy airspace disease.    PLEURAL SPACE:  Unremarkable.  No pneumothorax.    HEART:  Unremarkable.  No cardiomegaly.    MEDIASTINUM:  Unremarkable.    BONES/JOINTS:  Unremarkable.       Impression:        Worsened bilateral patchy airspace disease.     This report was finalized on 1/14/2021 9:28 AM by Dr. Saravanan Prasad MD.             ----------------------------------------------------------------------------------------------------------------------    Assessment/Plan       Assessment/Plan     ASSESSMENT:    1.  Sepsis  2.  COVID-19 pneumonia  3.  UTI    PLAN:    Case discussed with primary RN.  Patient is on increased oxygen requirements of 9 L but will high flow nasal cannula today.  Cough has become productive.  Afebrile, no diarrhea.  CRP is slightly worsened at 3.08.  WBC remains normal.    Urine culture from 1/8/2021 finalized as greater than 100,000 colonies of E. Coli.    Chest x-ray reports development of bilateral airspace disease.  CT of the chest reports multifocal pneumonia.  CT of the abdomen and pelvis reports patchy infiltrates in the lung bases.      Recommend initiation of remdesivir and Decadron.    Based on worsening respiratory status and CRP, patient was escalated to Zosyn today.  We will continue to follow closely  and adjust antibiotic therapy as appropriate.    Code Status:   Code Status and Medical Interventions:   Ordered at: 01/08/21 7520     Level Of Support Discussed With:    Patient     Code Status:    CPR     Medical Interventions (Level of Support Prior to Arrest):    Full     CHIRAG [place]arrama  01/14/21  11:42 EST

## 2021-01-15 LAB
ALBUMIN SERPL-MCNC: 2.78 G/DL (ref 3.5–5.2)
ALBUMIN/GLOB SERPL: 1 G/DL
ALP SERPL-CCNC: 71 U/L (ref 39–117)
ALT SERPL W P-5'-P-CCNC: 23 U/L (ref 1–33)
ANION GAP SERPL CALCULATED.3IONS-SCNC: 8.6 MMOL/L (ref 5–15)
AST SERPL-CCNC: 19 U/L (ref 1–32)
BILIRUB SERPL-MCNC: 0.5 MG/DL (ref 0–1.2)
BUN SERPL-MCNC: 22 MG/DL (ref 8–23)
BUN/CREAT SERPL: 23.2 (ref 7–25)
CALCIUM SPEC-SCNC: 9.1 MG/DL (ref 8.6–10.5)
CHLORIDE SERPL-SCNC: 103 MMOL/L (ref 98–107)
CO2 SERPL-SCNC: 27.4 MMOL/L (ref 22–29)
CREAT SERPL-MCNC: 0.95 MG/DL (ref 0.57–1)
CRP SERPL-MCNC: 2.88 MG/DL (ref 0–0.5)
D DIMER PPP FEU-MCNC: 0.58 MCGFEU/ML (ref 0–0.5)
DEPRECATED RDW RBC AUTO: 49.7 FL (ref 37–54)
ERYTHROCYTE [DISTWIDTH] IN BLOOD BY AUTOMATED COUNT: 14.5 % (ref 12.3–15.4)
FERRITIN SERPL-MCNC: 271.8 NG/ML (ref 13–150)
FIBRINOGEN PPP-MCNC: 312 MG/DL (ref 173–524)
GFR SERPL CREATININE-BSD FRML MDRD: 57 ML/MIN/1.73
GLOBULIN UR ELPH-MCNC: 2.8 GM/DL
GLUCOSE SERPL-MCNC: 134 MG/DL (ref 65–99)
HCT VFR BLD AUTO: 41.6 % (ref 34–46.6)
HGB BLD-MCNC: 12.9 G/DL (ref 12–15.9)
LDH SERPL-CCNC: 292 U/L (ref 135–214)
MCH RBC QN AUTO: 29 PG (ref 26.6–33)
MCHC RBC AUTO-ENTMCNC: 31 G/DL (ref 31.5–35.7)
MCV RBC AUTO: 93.5 FL (ref 79–97)
PLATELET # BLD AUTO: 298 10*3/MM3 (ref 140–450)
PMV BLD AUTO: 10.7 FL (ref 6–12)
POTASSIUM SERPL-SCNC: 4.1 MMOL/L (ref 3.5–5.2)
PROT SERPL-MCNC: 5.6 G/DL (ref 6–8.5)
RBC # BLD AUTO: 4.45 10*6/MM3 (ref 3.77–5.28)
SODIUM SERPL-SCNC: 139 MMOL/L (ref 136–145)
WBC # BLD AUTO: 8.22 10*3/MM3 (ref 3.4–10.8)

## 2021-01-15 PROCEDURE — 94799 UNLISTED PULMONARY SVC/PX: CPT

## 2021-01-15 PROCEDURE — 80053 COMPREHEN METABOLIC PANEL: CPT | Performed by: HOSPITALIST

## 2021-01-15 PROCEDURE — 85027 COMPLETE CBC AUTOMATED: CPT | Performed by: INTERNAL MEDICINE

## 2021-01-15 PROCEDURE — 99233 SBSQ HOSP IP/OBS HIGH 50: CPT | Performed by: STUDENT IN AN ORGANIZED HEALTH CARE EDUCATION/TRAINING PROGRAM

## 2021-01-15 PROCEDURE — 83615 LACTATE (LD) (LDH) ENZYME: CPT | Performed by: INTERNAL MEDICINE

## 2021-01-15 PROCEDURE — 25010000002 DEXAMETHASONE PER 1 MG: Performed by: HOSPITALIST

## 2021-01-15 PROCEDURE — 25010000002 PIPERACILLIN SOD-TAZOBACTAM PER 1 G: Performed by: STUDENT IN AN ORGANIZED HEALTH CARE EDUCATION/TRAINING PROGRAM

## 2021-01-15 PROCEDURE — 85379 FIBRIN DEGRADATION QUANT: CPT | Performed by: INTERNAL MEDICINE

## 2021-01-15 PROCEDURE — 97116 GAIT TRAINING THERAPY: CPT

## 2021-01-15 PROCEDURE — 97163 PT EVAL HIGH COMPLEX 45 MIN: CPT

## 2021-01-15 PROCEDURE — 82728 ASSAY OF FERRITIN: CPT | Performed by: INTERNAL MEDICINE

## 2021-01-15 PROCEDURE — 25010000002 ENOXAPARIN PER 10 MG: Performed by: INTERNAL MEDICINE

## 2021-01-15 PROCEDURE — 25010000002 HYDRALAZINE PER 20 MG: Performed by: INTERNAL MEDICINE

## 2021-01-15 PROCEDURE — 85384 FIBRINOGEN ACTIVITY: CPT | Performed by: HOSPITALIST

## 2021-01-15 PROCEDURE — 86140 C-REACTIVE PROTEIN: CPT | Performed by: HOSPITALIST

## 2021-01-15 RX ORDER — LOSARTAN POTASSIUM 50 MG/1
100 TABLET ORAL DAILY
Status: DISCONTINUED | OUTPATIENT
Start: 2021-01-15 | End: 2021-01-21 | Stop reason: HOSPADM

## 2021-01-15 RX ORDER — HYDRALAZINE HYDROCHLORIDE 20 MG/ML
10 INJECTION INTRAMUSCULAR; INTRAVENOUS ONCE
Status: COMPLETED | OUTPATIENT
Start: 2021-01-15 | End: 2021-01-15

## 2021-01-15 RX ADMIN — CETIRIZINE HYDROCHLORIDE 5 MG: 10 TABLET, FILM COATED ORAL at 08:12

## 2021-01-15 RX ADMIN — HYDRALAZINE HYDROCHLORIDE 10 MG: 20 INJECTION INTRAMUSCULAR; INTRAVENOUS at 07:30

## 2021-01-15 RX ADMIN — GABAPENTIN 100 MG: 100 CAPSULE ORAL at 08:13

## 2021-01-15 RX ADMIN — ASCORBIC ACID 1500 MG: 500 INJECTION, SOLUTION INTRAMUSCULAR; INTRAVENOUS; SUBCUTANEOUS at 07:30

## 2021-01-15 RX ADMIN — Medication 1 CAPSULE: at 08:11

## 2021-01-15 RX ADMIN — LOSARTAN POTASSIUM 100 MG: 50 TABLET, FILM COATED ORAL at 08:11

## 2021-01-15 RX ADMIN — PIPERACILLIN SODIUM AND TAZOBACTAM SODIUM 4.5 G: 4; .5 INJECTION, POWDER, LYOPHILIZED, FOR SOLUTION INTRAVENOUS at 05:05

## 2021-01-15 RX ADMIN — CARVEDILOL 25 MG: 25 TABLET, FILM COATED ORAL at 08:11

## 2021-01-15 RX ADMIN — ALBUTEROL SULFATE 2 PUFF: 90 AEROSOL, METERED RESPIRATORY (INHALATION) at 08:43

## 2021-01-15 RX ADMIN — SODIUM CHLORIDE, PRESERVATIVE FREE 10 ML: 5 INJECTION INTRAVENOUS at 08:13

## 2021-01-15 RX ADMIN — GABAPENTIN 100 MG: 100 CAPSULE ORAL at 20:23

## 2021-01-15 RX ADMIN — ASCORBIC ACID 1500 MG: 500 INJECTION, SOLUTION INTRAMUSCULAR; INTRAVENOUS; SUBCUTANEOUS at 00:20

## 2021-01-15 RX ADMIN — SODIUM CHLORIDE, PRESERVATIVE FREE 10 ML: 5 INJECTION INTRAVENOUS at 20:24

## 2021-01-15 RX ADMIN — ALBUTEROL SULFATE 2 PUFF: 90 AEROSOL, METERED RESPIRATORY (INHALATION) at 20:24

## 2021-01-15 RX ADMIN — ENOXAPARIN SODIUM 40 MG: 40 INJECTION SUBCUTANEOUS at 17:27

## 2021-01-15 RX ADMIN — CHOLECALCIFEROL TAB 10 MCG (400 UNIT) 1000 UNITS: 10 TAB at 08:11

## 2021-01-15 RX ADMIN — DEXAMETHASONE SODIUM PHOSPHATE 6 MG: 4 INJECTION, SOLUTION INTRA-ARTICULAR; INTRALESIONAL; INTRAMUSCULAR; INTRAVENOUS; SOFT TISSUE at 08:12

## 2021-01-15 RX ADMIN — PANTOPRAZOLE SODIUM 40 MG: 40 TABLET, DELAYED RELEASE ORAL at 05:04

## 2021-01-15 RX ADMIN — ASCORBIC ACID 1500 MG: 500 INJECTION, SOLUTION INTRAMUSCULAR; INTRAVENOUS; SUBCUTANEOUS at 14:01

## 2021-01-15 RX ADMIN — PIPERACILLIN SODIUM AND TAZOBACTAM SODIUM 4.5 G: 4; .5 INJECTION, POWDER, LYOPHILIZED, FOR SOLUTION INTRAVENOUS at 13:45

## 2021-01-15 RX ADMIN — OXYBUTYNIN CHLORIDE 10 MG: 5 TABLET, EXTENDED RELEASE ORAL at 08:11

## 2021-01-15 RX ADMIN — PIPERACILLIN SODIUM AND TAZOBACTAM SODIUM 4.5 G: 4; .5 INJECTION, POWDER, LYOPHILIZED, FOR SOLUTION INTRAVENOUS at 20:23

## 2021-01-15 RX ADMIN — ENOXAPARIN SODIUM 40 MG: 40 INJECTION SUBCUTANEOUS at 05:04

## 2021-01-15 RX ADMIN — ALBUTEROL SULFATE 2 PUFF: 90 AEROSOL, METERED RESPIRATORY (INHALATION) at 13:45

## 2021-01-15 RX ADMIN — ALBUTEROL SULFATE 2 PUFF: 90 AEROSOL, METERED RESPIRATORY (INHALATION) at 17:28

## 2021-01-15 NOTE — PLAN OF CARE
Goal Outcome Evaluation:  Plan of Care Reviewed With: patient  Progress: no change  Outcome Summary: Pt still on 12L High flow. Pt has been up to BSC. Pt does report that she feels better today. BP better then this morning. Will continue to monitor.

## 2021-01-15 NOTE — THERAPY EVALUATION
Acute Care - Physical Therapy Initial Evaluation   [facility]     Patient Name: Emma Ryan  : 1940  MRN: [id number]  Today's Date: 1/15/2021   Onset of Illness/Injury or Date of Surgery: 21       PT Assessment (last 12 hours)      PT Evaluation and Treatment     Row Name 01/15/21 1000          Physical Therapy Time and Intention    Subjective Information  no complaints  -BC     Document Type  evaluation  -BC     Mode of Treatment  physical therapy  -BC     Patient Effort  adequate  -BC     Row Name 01/15/21 1000          General Information    Patient Profile Reviewed  yes  -BC     Onset of Illness/Injury or Date of Surgery  21  -BC     Referring Physician  Dr. Tenorio  -BC     Risks Reviewed  patient:;LOB;nausea/vomiting;dizziness;increased discomfort;change in vital signs;increased drainage;lines disloged  -BC     Benefits Reviewed  patient:;improve function;increase independence;increase strength;increase balance;decrease pain;decrease risk of DVT;improve skin integrity;increase knowledge  -BC     Row Name 01/15/21 1000          Living Environment    Current Living Arrangements  home/apartment/condo  -BC     Lives With  spouse  -BC     Row Name 01/15/21 1000          Cognition    Affect/Mental Status (Cognitive)  WFL  -BC     Orientation Status (Cognition)  oriented x 3  -BC     Row Name 01/15/21 1000          Mobility    Extremity Weight-bearing Status  left lower extremity;right lower extremity  -BC     Left Lower Extremity (Weight-bearing Status)  full weight-bearing (FWB)  -BC     Right Lower Extremity (Weight-bearing Status)  full weight-bearing (FWB)  -BC     Row Name 01/15/21 1000          Bed Mobility    Bed Mobility  bed mobility (all) activities  -BC     All Activities, [place] (Bed Mobility)  minimum assist (75% patient effort)  -BC     Assistive Device (Bed Mobility)  bed rails  -BC     Row Name 01/15/21 1000          Transfers    Transfers  sit-stand transfer;stand-sit  transfer  -BC     Maintains Weight-bearing Status (Transfers)  able to maintain  -BC     Sit-Stand [place] (Transfers)  minimum assist (75% patient effort)  -BC     Stand-Sit [place] (Transfers)  minimum assist (75% patient effort)  -BC     Row Name 01/15/21 1000          Sit-Stand Transfer    Assistive Device (Sit-Stand Transfers)  walker, front-wheeled  -BC     Row Name 01/15/21 1000          Stand-Sit Transfer    Assistive Device (Stand-Sit Transfers)  walker, front-wheeled  -BC     Row Name 01/15/21 1000          Gait/Stairs (Locomotion)    Gait/Stairs Locomotion  gait/ambulation independence  -BC     [place] Level (Gait)  minimum assist (75% patient effort)  -BC     Assistive Device (Gait)  walker, front-wheeled  -BC     Distance in Feet (Gait)  30 ft  -BC     Row Name 01/15/21 1000          Coping    Observed Emotional State  calm;cooperative;pleasant  -BC     Verbalized Emotional State  acceptance  -BC     Involvement in Care  not present at bedside  -BC     Diversional Activities  television  -BC     Row Name 01/15/21 1000          Plan of Care Review    Plan of Care Reviewed With  patient  -BC     Row Name 01/15/21 1000          Physical Therapy Goals    Bed Mobility Goal Selection (PT)  bed mobility, PT goal 1  -BC     Transfer Goal Selection (PT)  transfer, PT goal 1  -BC     Gait Training Goal Selection (PT)  gait training, PT goal 1  -BC     Row Name 01/15/21 1000          Bed Mobility Goal 1 (PT)    Activity/Assistive Device (Bed Mobility Goal 1, PT)  bed mobility activities, all  -BC     [place] Level/Cues Needed (Bed Mobility Goal 1, PT)  minimum assist (75% or more patient effort)  -BC     Time Frame (Bed Mobility Goal 1, PT)  by discharge  -BC     Row Name 01/15/21 1000          Transfer Goal 1 (PT)    Activity/Assistive Device (Transfer Goal 1, PT)  transfers, all  -BC     [place] Level/Cues Needed (Transfer Goal 1, PT)  minimum assist (75% or more patient effort)  -BC      Time Frame (Transfer Goal 1, PT)  by discharge  -BC     Row Name 01/15/21 1000          Gait Training Goal 1 (PT)    Activity/Assistive Device (Gait Training Goal 1, PT)  gait (walking locomotion)  -BC     [place] Level (Gait Training Goal 1, PT)  minimum assist (75% or more patient effort)  -BC     Distance (Gait Training Goal 1, PT)  60 ft  -BC     Time Frame (Gait Training Goal 1, PT)  by discharge  -BC     Row Name 01/15/21 1000          Positioning and Restraints    Pre-Treatment Position  in bed  -BC     Post Treatment Position  bed  -BC     In Bed  notified nsg;supine;call light within reach;encouraged to call for assist;side rails up x3  -BC       User Key  (r) = Recorded By, (t) = Taken By, (c) = Cosigned By    Initials Name Provider Type    BC Hailee Rogers PT Physical Therapist        Physical Therapy Education                 Title: PT OT SLP Therapies (Done)     Topic: Physical Therapy (Done)     Point: Mobility training (Done)     Learning Progress Summary           Patient Acceptance, E, VU by BC at 1/15/2021 1047                   Point: Home exercise program (Done)     Learning Progress Summary           Patient Acceptance, E, VU by BC at 1/15/2021 1047                   Point: Body mechanics (Done)     Learning Progress Summary           Patient Acceptance, E, VU by BC at 1/15/2021 1047                   Point: Precautions (Done)     Learning Progress Summary           Patient Acceptance, E, VU by BC at 1/15/2021 1047                               User Key     Initials Effective Dates Name Provider Type Discipline    BC 03/14/16 -  Hailee Rogers PT Physical Therapist PT              PT Recommendation and Plan     Plan of Care Reviewed With: patient       Time Calculation:   PT Charges     Row Name 01/15/21 1048 01/15/21 1037          Time Calculation    PT Received On  01/15/21  -BC  01/15/21  -BC        Time Calculation- PT    Total Timed Code Minutes- PT  60 minute(s)  -BC  60  minute(s)  -BC        Timed Charges    62207 - Gait Training Minutes   15  -BC  --       User Key  (r) = Recorded By, (t) = Taken By, (c) = Cosigned By    Initials Name Provider Type    BC Hailee Rogers, PT Physical Therapist        Therapy Charges for Today     Code Description Service Date Service Provider Modifiers Qty    [id number]  PT EVAL HIGH COMPLEXITY 4 1/15/2021 Hailee Rogers PT GP 1               Hailee Rogers PT  1/15/2021

## 2021-01-15 NOTE — PROGRESS NOTES
Antimicrobial Length of Therapy:    Day 2 of 8 Zosyn    Thank you,    Nohemi Robles, PharmD  Pharmacy Resident  1/15/2021  14:25 EST

## 2021-01-15 NOTE — PROGRESS NOTES
[facility] HOSPITALIST PROGRESS NOTE     Patient Identification:  Name:  Emma Ryan  Age:  80 y.o.  Sex:  female  :  1940  MRN:  [id number]  Visit Number:  [id number]  ROOM: [address] [place] [address]     Primary Care Provider:  Jennifer Montano MD    Length of stay in inpatient status:  6    Subjective     Chief Compliant:    Chief Complaint   Patient presents with   • Exposure To Known Illness   • Nausea       History of Presenting Illness: Patient seen and examined in follow-up for sepsis secondary to E. coli UTI and COVID-19.  Patient today with still increasing oxygen requirements up to 12L now.  Patient feels fine resting in bed but desaturating with getting out of bed.  Obtaining CT chest and discussed antibiotic escalation with ID.    Objective     Current Hospital Meds:albuterol sulfate HFA, 2 puff, Inhalation, 4x Daily - RT  ascorbic acid in  mL IVPB, 1,500 mg, Intravenous, Q8H  carvedilol, 25 mg, Oral, BID With Meals  cetirizine, 5 mg, Oral, Daily  cholecalciferol, 1,000 Units, Oral, Daily  dexamethasone, 6 mg, Intravenous, Daily  enoxaparin, 40 mg, Subcutaneous, Q12H  gabapentin, 100 mg, Oral, BID  lactobacillus acidophilus, 1 capsule, Oral, Daily  oxybutynin XL, 10 mg, Oral, Daily  pantoprazole, 40 mg, Oral, QAM  piperacillin-tazobactam, 4.5 g, Intravenous, Q8H  sodium chloride, 10 mL, Intravenous, Q12H         Current Antimicrobial Therapy:  Anti-Infectives (From admission, onward)    Ordered     Dose/Rate Route Frequency Start Stop    21 1041  piperacillin-tazobactam (ZOSYN) 4.5 g/100 mL 0.9% NS IVPB (mbp)     Ordering Provider: Ruy Tenorio DO    4.5 g  over 4 Hours Intravenous Every 8 Hours 21 2100 21 1041  piperacillin-tazobactam (ZOSYN) 4.5 g/100 mL 0.9% NS IVPB (mbp)     Ordering Provider: Ruy Tenorio DO    4.5 g  over 30 Minutes Intravenous Once 21 1300 21 1329    21 1332  remdesivir 100 mg in sodium chloride  0.9 % 250 mL IVPB (powder vial)     Tracy Jones APRN reviewed the order on 01/12/21 1322.   Ordering Provider: Tracy Jones APRN    100 mg  over 60 Minutes Intravenous Every 24 Hours 01/10/21 1500 01/13/21 1534    01/09/21 1332  remdesivir 200 mg in sodium chloride 0.9 % 250 mL IVPB (powder vial)     Ordering Provider: Tracy Jones APRN    200 mg  over 60 Minutes Intravenous Every 24 Hours 01/09/21 1500 01/09/21 1838    01/08/21 2323  doxycycline (VIBRAMYCIN) 100 mg/100 mL 0.9% NS MBP     Ordering Provider: Dada Conner MD    100 mg  over 60 Minutes Intravenous Once 01/08/21 2325 01/09/21 0103    01/08/21 2022  cefTRIAXone (ROCEPHIN) 2 g/100 mL 0.9% NS IVPB (MBP)     Ordering Provider: Armin Barbosa II, PA    2 g  over 30 Minutes Intravenous Once 01/08/21 2024 01/08/21 2102 01/08/21 2224  cefdinir (OMNICEF) 300 MG capsule     Ordering Provider: Armin Barbosa II, PA    300 mg Oral 2 Times Daily 01/08/21 0000          Current Diuretic Therapy:  Diuretics (From admission, onward)    None        ----------------------------------------------------------------------------------------------------------------------  Vital Signs:  Temp:  [98.4 °F (36.9 °C)] 98.4 °F (36.9 °C)  Heart Rate:  [58-71] 58  Resp:  [20-22] 22  BP: (176)/(85) 176/85  SpO2:  [75 %-94 %] 94 %  on  Flow (L/min):  [4.5-12] 12;   Device (Oxygen Therapy): high-flow nasal cannula  Body mass index is 39.18 kg/m².    Wt Readings from Last 3 Encounters:   01/14/21 88 kg (194 lb)   12/31/20 83.9 kg (185 lb)   12/01/20 84.4 kg (186 lb)     Intake & Output (last 3 days)       01/12 0701 - 01/13 0700 01/13 0701 - 01/14 0700 01/14 0701 - 01/15 0700    P.O. 560 1080 480    I.V. (mL/kg)       Total Intake(mL/kg) 560 (6.5) 1080 (12.3) 480 (5.5)    Urine (mL/kg/hr) 175 (0.1)      Stool 0      Total Output 175      Net +385 +1080 +480           Urine Unmeasured Occurrence 4 x 6 x 5 x    Stool Unmeasured Occurrence 1 x          Diet  Regular  ----------------------------------------------------------------------------------------------------------------------  Physical exam:  This physical exam has been personally performed remotely in the unit aided by real-time audio/visual communication tools. Nursing present at bedside during this exam and assisted during exam. The use of a video visit has been reviewed with the patient and verbal informed consent has been obtained.     Physical Exam:  General: Patient appears awake, alert, and in no acute distress.  Head: Normocephalic, atraumatic  Eyes: EOMI. Conjunctivae and sclerae normal.  Ears: Ears appear intact with no abnormalities noted.   Neck: Trachea midline. No obvious JVD.  Heart: Tele reveals sinus rhythm  Lungs: Respirations appear to be regular, even and unlabored with no signs of respiratory distress. No audible wheezing.  Abdomen: No obvious abdominal distension.  MS: Muscle tone appears normal. No gross deformities.  Extremities: No clubbing, cyanosis or edema noted.  Skin: No visible bleeding, bruising, or rash.  Neurologic: Alert and oriented x3. No gross focal deficits.   ----------------------------------------------------------------------------------------------------------------------  Tele:    ----------------------------------------------------------------------------------------------------------------------  Results from last 7 days   Lab Units 01/14/21  0129 01/13/21  0329 01/12/21  0324  01/08/21  1804   CRP mg/dL 3.08* 1.85* 1.93*   < > 6.26*   LACTATE mmol/L  --   --   --   --  1.1   WBC 10*3/mm3 7.73 8.62 8.13   < > 7.36   HEMOGLOBIN g/dL 13.0 12.8 12.5   < > 12.9   HEMATOCRIT % 40.9 40.4 39.3   < > 40.9   MCV fL 90.9 92.2 92.7   < > 93.6   MCHC g/dL 31.8 31.7 31.8   < > 31.5   PLATELETS 10*3/mm3 331 311 318   < > 202    < > = values in this interval not displayed.     Results from last 7 days   Lab Units 01/10/21  0721   PH, ARTERIAL pH units 7.399   PO2 ART mm Hg 56.6*    PCO2, ARTERIAL mm Hg 37.6   HCO3 ART mmol/L 23.2     Results from last 7 days   Lab Units 01/14/21  0129 01/13/21  0329 01/12/21  0324   SODIUM mmol/L 141 141 146*   POTASSIUM mmol/L 3.8 3.8 3.8   CHLORIDE mmol/L 105 109* 111*   CO2 mmol/L 25.5 22.4 23.5   BUN mg/dL 22 23 27*   CREATININE mg/dL 0.94 0.86 0.89   EGFR IF NONAFRICN AM mL/min/1.73 57* 64 61   CALCIUM mg/dL 9.2 9.1 8.7   GLUCOSE mg/dL 134* 129* 124*   ALBUMIN g/dL 2.99* 3.02* 3.07*   BILIRUBIN mg/dL 0.4 0.3 0.3   ALK PHOS U/L 79 78 73   AST (SGOT) U/L 24 32 28   ALT (SGPT) U/L 27 30 23   Estimated Creatinine Clearance: 49.4 mL/min (by C-G formula based on SCr of 0.94 mg/dL).  No results found for: AMMONIA  Results from last 7 days   Lab Units 01/10/21  0220 01/09/21  0332   CK TOTAL U/L 87 122         Results from last 7 days   Lab Units 01/09/21  0332   CHOLESTEROL mg/dL 69   TRIGLYCERIDES mg/dL 86   HDL CHOL mg/dL 34*   LDL CHOL mg/dL 17     No results found for: HGBA1C, POCGLU  Lab Results   Component Value Date    TSH 1.950 09/14/2018    FREET4 1.16 09/14/2018     No results found for: PREGTESTUR, PREGSERUM, HCG, HCGQUANT  Pain Management Panel     Pain Management Panel Latest Ref Rng & Units 10/9/2020 6/10/2020    CREATININE UR mg/dL 99.5 122.8    AMPHETAMINES SCREEN, URINE Negative - -    BARBITURATES SCREEN Negative - -    BENZODIAZEPINE SCREEN, URINE Negative - -    COCAINE SCREEN, URINE Negative - -    METHADONE SCREEN, URINE Negative - -        Brief Urine Lab Results  (Last result in the past 365 days)      Color   Clarity   Blood   Leuk Est   Nitrite   Protein   CREAT   Urine HCG        01/08/21 2026 Yellow Cloudy Negative Moderate (2+) Positive Trace             Blood Culture   Date Value Ref Range Status   01/08/2021 No growth at 2 days  Preliminary   01/08/2021 No growth at 2 days  Preliminary     Urine Culture   Date Value Ref Range Status   01/08/2021 >100,000 CFU/mL Escherichia coli (A)  Final     No results found for: WOUNDCX  No  results found for: STOOLCX  No results found for: RESPCX  No results found for: AFBCX  Results from last 7 days   Lab Units 01/14/21  0129 01/13/21  0329 01/12/21  0324 01/11/21  0545 01/10/21  0220 01/09/21  0332 01/08/21  1804   LACTATE mmol/L  --   --   --   --   --   --  1.1   CRP mg/dL 3.08* 1.85* 1.93* 2.97* 6.17* 8.48* 6.26*       I have personally looked at the labs and they are summarized above.  ----------------------------------------------------------------------------------------------------------------------  Detailed radiology reports for the last 24 hours:    Imaging Results (Last 24 Hours)     Procedure Component Value Units Date/Time    CT CHEST WITHOUT CONTRAST DIAGNOSTIC [[id number]] Collected: 01/14/21 1246     Updated: 01/14/21 1253    Narrative:      EXAM:    CT Chest Without Intravenous Contrast     EXAM DATE:    1/14/2021 12:36 PM     CLINICAL HISTORY:    worsening covid pneumonia, concern for superimposed bacterial;  N39.0-Urinary tract infection, site not specified; Z86.16-Personal  history of covid-19     TECHNIQUE:    Axial computed tomography images of the chest without intravenous  contrast.  Sagittal and coronal reformatted images were created and  reviewed.  This CT exam was performed using one or more of the following  dose reduction techniques:  automated exposure control, adjustment of  the mA and/or kV according to patient size, and/or use of iterative  reconstruction technique.     COMPARISON:    01/08/2021     FINDINGS:    LUNGS:  Worsened patchy bilateral airspace disease suggestive of  COVID-19 pneumonia.    PLEURAL SPACE:  Unremarkable.  No pneumothorax.  No significant  effusion.    HEART:  Extensive coronary artery calcification noted.  No significant  pericardial effusion.    MEDIASTINUM:  Moderate-sized hiatal hernia.    BONES/JOINTS:  Unremarkable.  No acute fracture.  No dislocation.    SOFT TISSUES:  Unremarkable.    VASCULATURE:  Unremarkable.  No thoracic aortic  aneurysm.    LYMPH NODES:  Unremarkable.  No enlarged lymph nodes.       Impression:        Worsened patchy bilateral airspace disease suggestive of COVID-19  pneumonia.     This report was finalized on 1/14/2021 12:46 PM by Dr. Saravanan Prasad MD.       XR Chest 1 View [[id number]] Collected: 01/14/21 0927     Updated: 01/14/21 0930    Narrative:      EXAM:    XR Chest, 1 View     EXAM DATE:    1/14/2021 8:22 AM     CLINICAL HISTORY:    increasing hypoxia in COVID; N39.0-Urinary tract infection, site not  specified; Z86.16-Personal history of covid-19     TECHNIQUE:    Frontal view of the chest.     COMPARISON:    01/08/2021     FINDINGS:    LUNGS:  Worsened bilateral patchy airspace disease.    PLEURAL SPACE:  Unremarkable.  No pneumothorax.    HEART:  Unremarkable.  No cardiomegaly.    MEDIASTINUM:  Unremarkable.    BONES/JOINTS:  Unremarkable.       Impression:        Worsened bilateral patchy airspace disease.     This report was finalized on 1/14/2021 9:28 AM by Dr. Saravanan Prasad MD.           Assessment & Plan      Sepsis  COVID-19 pneumonia  E. coli UTI  Acute hypoxemic respiratory failure   -O2 requirements increased to 12 L   -CXR with worsening bilateral patchy airspace disease and CT chest with worsening patchy airspace disease consistent with COVID.   -Continue remdesivir and dexamethasone   -After discussion with ID, have escalated to Zosyn monotherapy.    -Infectious disease consulted and following along   -discussed if continued worsening respiratory status next steps would be BIPAP and then after that intubation if neccessary.     CKD stage IIIa  Hyperlipidemia  Essential hypertension  GERD      VTE Prophylaxis:   Mechanical Order History:     None      Pharmalogical Order History:      Ordered     Dose Route Frequency Stop    01/09/21 1320  enoxaparin (LOVENOX) syringe 40 mg      40 mg SC Every 12 Hours Scheduled --    01/09/21 0127  enoxaparin (LOVENOX) syringe 40 mg  Status:  Discontinued      40 mg  SC Every 24 Hours 01/09/21 1320                Ruy Tenorio DO  [facility] Hospitalist  01/14/21  19:15 EST

## 2021-01-15 NOTE — PROGRESS NOTES
Discharge Planning Assessment   Prasanna     Patient Name: Emma Ryan  MRN: [id number]  Today's Date: 1/15/2021    Admit Date: 1/8/2021    Discharge Plan     Row Name 01/15/21 1013       Plan    Plan Pt is currently on bubble high-flow oxygen @ 12 liters. Pt lives at home with her spouse, Bob and she plans to return home when medically stable. Pt has a rollator and a wheelchair from unknown provider. Pt does not utilize home health services. SS to follow.   ALISON Ford

## 2021-01-15 NOTE — PROGRESS NOTES
PROGRESS NOTE         Patient Identification:  Name:  Emma Ryan  Age:  80 y.o.  Sex:  female  :  1940  MRN:  [id number]  Visit Number:  [id number]  Primary Care Provider:  Jennifer Montano MD         LOS: 7 days       ----------------------------------------------------------------------------------------------------------------------  Subjective       Chief Complaints:    Exposure To Known Illness and Nausea        Interval History:      Case discussed with primary RN.  Patient is on increased oxygen requirements of 12 L/min bubble high flow nasal cannula. Afebrile, no diarrhea.  CRP is improved at 2.88.  WBC remains normal.  CT chest on 2021 showed worsened patchy bilateral airspace disease suggestive of COVID-19 pneumonia.    Review of Systems:    Constitutional: no fever, chills and night sweats.  Generalized fatigue.  Eyes: no eye drainage, itching or redness.  HEENT: no mouth sores, dysphagia or nose bleed.  Respiratory: Positive shortness of breath and productive cough.  Cardiovascular: no chest pain, no palpitations, no orthopnea.  Gastrointestinal: no nausea, vomiting or diarrhea. No abdominal pain, hematemesis or rectal bleeding.  Genitourinary: no dysuria or polyuria.  Hematologic/lymphatic: no lymph node abnormalities, no easy bruising or easy bleeding.  Musculoskeletal: no muscle or joint pain.  Skin: No rash and no itching.  Neurological: no loss of consciousness, no seizure, no headache.  Behavioral/Psych: no depression or suicidal ideation.  Endocrine: no hot flashes.  Immunologic: negative.    ----------------------------------------------------------------------------------------------------------------------      Objective       Current Hospital Meds:  albuterol sulfate HFA, 2 puff, Inhalation, 4x Daily - RT  ascorbic acid in  mL IVPB, 1,500 mg, Intravenous, Q8H  carvedilol, 25 mg, Oral, BID With Meals  cetirizine, 5 mg, Oral, Daily  cholecalciferol,  1,000 Units, Oral, Daily  dexamethasone, 6 mg, Intravenous, Daily  enoxaparin, 40 mg, Subcutaneous, Q12H  gabapentin, 100 mg, Oral, BID  lactobacillus acidophilus, 1 capsule, Oral, Daily  losartan, 100 mg, Oral, Daily  oxybutynin XL, 10 mg, Oral, Daily  pantoprazole, 40 mg, Oral, QAM  piperacillin-tazobactam, 4.5 g, Intravenous, Q8H  sodium chloride, 10 mL, Intravenous, Q12H         ----------------------------------------------------------------------------------------------------------------------    Vital Signs:  Temp:  [97.9 °F (36.6 °C)] 97.9 °F (36.6 °C)  Heart Rate:  [54-83] 83  Resp:  [20-22] 20  BP: (139-196)/(65-84) 147/79  Mean Arterial Pressure (Non-Invasive) for the past 24 hrs (Last 3 readings):   Noninvasive MAP (mmHg)   01/15/21 0648 137     SpO2 Percentage    01/14/21 2148 01/15/21 0648 01/15/21 0843   SpO2: 94% 93% 91%     SpO2:  [91 %-94 %] 91 %  on  Flow (L/min):  [12] 12;   Device (Oxygen Therapy): bubble;high-flow nasal cannula;humidified    Body mass index is 37.06 kg/m².  Wt Readings from Last 3 Encounters:   01/15/21 83.2 kg (183 lb 8 oz)   12/31/20 83.9 kg (185 lb)   12/01/20 84.4 kg (186 lb)        Intake/Output Summary (Last 24 hours) at 1/15/2021 1231  Last data filed at 1/15/2021 0900  Gross per 24 hour   Intake 600 ml   Output --   Net 600 ml     Diet Regular  ----------------------------------------------------------------------------------------------------------------------      Physical Exam:    Deferred due to COVID-19 isolation.  ----------------------------------------------------------------------------------------------------------------------  Results from last 7 days   Lab Units 01/10/21  0220 01/09/21  0332   CK TOTAL U/L 87 122       Results from last 7 days   Lab Units 01/09/21 0332   CHOLESTEROL mg/dL 69   TRIGLYCERIDES mg/dL 86   HDL CHOL mg/dL 34*   LDL CHOL mg/dL 17     Results from last 7 days   Lab Units 01/10/21  0721   PH, ARTERIAL pH units 7.399   PO2 ART mm Hg  56.6*   PCO2, ARTERIAL mm Hg 37.6   HCO3 ART mmol/L 23.2     Results from last 7 days   Lab Units 01/15/21  0519 01/14/21  0129 01/13/21  0329  01/08/21  1804   CRP mg/dL 2.88* 3.08* 1.85*   < > 6.26*   LACTATE mmol/L  --   --   --   --  1.1   WBC 10*3/mm3 8.22 7.73 8.62   < > 7.36   HEMOGLOBIN g/dL 12.9 13.0 12.8   < > 12.9   HEMATOCRIT % 41.6 40.9 40.4   < > 40.9   MCV fL 93.5 90.9 92.2   < > 93.6   MCHC g/dL 31.0* 31.8 31.7   < > 31.5   PLATELETS 10*3/mm3 298 331 311   < > 202    < > = values in this interval not displayed.     Results from last 7 days   Lab Units 01/15/21  0519 01/14/21  0129 01/13/21  0329   SODIUM mmol/L 139 141 141   POTASSIUM mmol/L 4.1 3.8 3.8   CHLORIDE mmol/L 103 105 109*   CO2 mmol/L 27.4 25.5 22.4   BUN mg/dL 22 22 23   CREATININE mg/dL 0.95 0.94 0.86   EGFR IF NONAFRICN AM mL/min/1.73 57* 57* 64   CALCIUM mg/dL 9.1 9.2 9.1   GLUCOSE mg/dL 134* 134* 129*   ALBUMIN g/dL 2.78* 2.99* 3.02*   BILIRUBIN mg/dL 0.5 0.4 0.3   ALK PHOS U/L 71 79 78   AST (SGOT) U/L 19 24 32   ALT (SGPT) U/L 23 27 30   Estimated Creatinine Clearance: 47.5 mL/min (by C-G formula based on SCr of 0.95 mg/dL).  No results found for: AMMONIA    No results found for: HGBA1C, POCGLU  No results found for: HGBA1C  Lab Results   Component Value Date    TSH 1.950 09/14/2018    FREET4 1.16 09/14/2018       Blood Culture   Date Value Ref Range Status   01/08/2021 No growth at 24 hours  Preliminary   01/08/2021 No growth at 24 hours  Preliminary     Urine Culture   Date Value Ref Range Status   01/08/2021 >100,000 CFU/mL Escherichia coli (A)  Final     No results found for: WOUNDCX  No results found for: STOOLCX  No results found for: RESPCX  Pain Management Panel     Pain Management Panel Latest Ref Rng & Units 10/9/2020 6/10/2020    CREATININE UR mg/dL 99.5 122.8    AMPHETAMINES SCREEN, URINE Negative - -    BARBITURATES SCREEN Negative - -    BENZODIAZEPINE SCREEN, URINE Negative - -    COCAINE SCREEN, URINE Negative - -     METHADONE SCREEN, URINE Negative - -            ----------------------------------------------------------------------------------------------------------------------  Imaging Results (Last 24 Hours)     Procedure Component Value Units Date/Time    CT CHEST WITHOUT CONTRAST DIAGNOSTIC [[id number]] Collected: 01/14/21 1246     Updated: 01/14/21 1253    Narrative:      EXAM:    CT Chest Without Intravenous Contrast     EXAM DATE:    1/14/2021 12:36 PM     CLINICAL HISTORY:    worsening covid pneumonia, concern for superimposed bacterial;  N39.0-Urinary tract infection, site not specified; Z86.16-Personal  history of covid-19     TECHNIQUE:    Axial computed tomography images of the chest without intravenous  contrast.  Sagittal and coronal reformatted images were created and  reviewed.  This CT exam was performed using one or more of the following  dose reduction techniques:  automated exposure control, adjustment of  the mA and/or kV according to patient size, and/or use of iterative  reconstruction technique.     COMPARISON:    01/08/2021     FINDINGS:    LUNGS:  Worsened patchy bilateral airspace disease suggestive of  COVID-19 pneumonia.    PLEURAL SPACE:  Unremarkable.  No pneumothorax.  No significant  effusion.    HEART:  Extensive coronary artery calcification noted.  No significant  pericardial effusion.    MEDIASTINUM:  Moderate-sized hiatal hernia.    BONES/JOINTS:  Unremarkable.  No acute fracture.  No dislocation.    SOFT TISSUES:  Unremarkable.    VASCULATURE:  Unremarkable.  No thoracic aortic aneurysm.    LYMPH NODES:  Unremarkable.  No enlarged lymph nodes.       Impression:        Worsened patchy bilateral airspace disease suggestive of COVID-19  pneumonia.     This report was finalized on 1/14/2021 12:46 PM by Dr. Saravanan Prasad MD.             ----------------------------------------------------------------------------------------------------------------------    Assessment/Plan        Assessment/Plan     ASSESSMENT:    1.  Sepsis  2.  COVID-19 pneumonia  3.  UTI    PLAN:    Case discussed with primary RN.  Patient is on increased oxygen requirements of 12 L/min bubble high flow nasal cannula. Afebrile, no diarrhea.  CRP is improved at 2.88.  WBC remains normal.  CT chest on 1/14/2021 showed worsened patchy bilateral airspace disease suggestive of COVID-19 pneumonia.    Urine culture from 1/8/2021 finalized as greater than 100,000 colonies of E. Coli.    Chest x-ray reports development of bilateral airspace disease.  CT of the chest reports multifocal pneumonia.  CT of the abdomen and pelvis reports patchy infiltrates in the lung bases.      We are agreeable to Decadron and recommend initiation of remdesivir.    Recommend to continue on Zosyn for concerns of superimposed aspiration pneumonia.  We will continue to follow closely and adjust antibiotic therapy as appropriate.    Code Status:   Code Status and Medical Interventions:   Ordered at: 01/08/21 5221     Level Of Support Discussed With:    Patient     Code Status:    CPR     Medical Interventions (Level of Support Prior to Arrest):    Full     CHIRAG Guadarrama  01/15/21  12:31 EST

## 2021-01-16 LAB
ALBUMIN SERPL-MCNC: 2.87 G/DL (ref 3.5–5.2)
ALBUMIN/GLOB SERPL: 1.1 G/DL
ALP SERPL-CCNC: 68 U/L (ref 39–117)
ALT SERPL W P-5'-P-CCNC: 22 U/L (ref 1–33)
ANION GAP SERPL CALCULATED.3IONS-SCNC: 10.7 MMOL/L (ref 5–15)
AST SERPL-CCNC: 22 U/L (ref 1–32)
BILIRUB SERPL-MCNC: 0.5 MG/DL (ref 0–1.2)
BUN SERPL-MCNC: 22 MG/DL (ref 8–23)
BUN/CREAT SERPL: 23.9 (ref 7–25)
CALCIUM SPEC-SCNC: 9.1 MG/DL (ref 8.6–10.5)
CHLORIDE SERPL-SCNC: 107 MMOL/L (ref 98–107)
CO2 SERPL-SCNC: 26.3 MMOL/L (ref 22–29)
CREAT SERPL-MCNC: 0.92 MG/DL (ref 0.57–1)
CRP SERPL-MCNC: 1.96 MG/DL (ref 0–0.5)
D DIMER PPP FEU-MCNC: 0.45 MCGFEU/ML (ref 0–0.5)
DEPRECATED RDW RBC AUTO: 48.8 FL (ref 37–54)
ERYTHROCYTE [DISTWIDTH] IN BLOOD BY AUTOMATED COUNT: 14.5 % (ref 12.3–15.4)
FERRITIN SERPL-MCNC: 241.2 NG/ML (ref 13–150)
GFR SERPL CREATININE-BSD FRML MDRD: 59 ML/MIN/1.73
GLOBULIN UR ELPH-MCNC: 2.7 GM/DL
GLUCOSE SERPL-MCNC: 126 MG/DL (ref 65–99)
HCT VFR BLD AUTO: 39.3 % (ref 34–46.6)
HGB BLD-MCNC: 12.5 G/DL (ref 12–15.9)
LDH SERPL-CCNC: 253 U/L (ref 135–214)
MCH RBC QN AUTO: 29.6 PG (ref 26.6–33)
MCHC RBC AUTO-ENTMCNC: 31.8 G/DL (ref 31.5–35.7)
MCV RBC AUTO: 92.9 FL (ref 79–97)
PLATELET # BLD AUTO: 265 10*3/MM3 (ref 140–450)
PMV BLD AUTO: 10.9 FL (ref 6–12)
POTASSIUM SERPL-SCNC: 4.1 MMOL/L (ref 3.5–5.2)
PROT SERPL-MCNC: 5.6 G/DL (ref 6–8.5)
RBC # BLD AUTO: 4.23 10*6/MM3 (ref 3.77–5.28)
SODIUM SERPL-SCNC: 144 MMOL/L (ref 136–145)
WBC # BLD AUTO: 8.5 10*3/MM3 (ref 3.4–10.8)

## 2021-01-16 PROCEDURE — 85379 FIBRIN DEGRADATION QUANT: CPT | Performed by: INTERNAL MEDICINE

## 2021-01-16 PROCEDURE — 99233 SBSQ HOSP IP/OBS HIGH 50: CPT | Performed by: STUDENT IN AN ORGANIZED HEALTH CARE EDUCATION/TRAINING PROGRAM

## 2021-01-16 PROCEDURE — 83615 LACTATE (LD) (LDH) ENZYME: CPT | Performed by: INTERNAL MEDICINE

## 2021-01-16 PROCEDURE — 25010000002 PIPERACILLIN SOD-TAZOBACTAM PER 1 G: Performed by: STUDENT IN AN ORGANIZED HEALTH CARE EDUCATION/TRAINING PROGRAM

## 2021-01-16 PROCEDURE — 80053 COMPREHEN METABOLIC PANEL: CPT | Performed by: HOSPITALIST

## 2021-01-16 PROCEDURE — 25010000002 ENOXAPARIN PER 10 MG: Performed by: INTERNAL MEDICINE

## 2021-01-16 PROCEDURE — 82728 ASSAY OF FERRITIN: CPT | Performed by: INTERNAL MEDICINE

## 2021-01-16 PROCEDURE — 85027 COMPLETE CBC AUTOMATED: CPT | Performed by: INTERNAL MEDICINE

## 2021-01-16 PROCEDURE — 94799 UNLISTED PULMONARY SVC/PX: CPT

## 2021-01-16 PROCEDURE — 25010000002 DEXAMETHASONE PER 1 MG: Performed by: HOSPITALIST

## 2021-01-16 PROCEDURE — 86140 C-REACTIVE PROTEIN: CPT | Performed by: HOSPITALIST

## 2021-01-16 RX ADMIN — ASCORBIC ACID 1500 MG: 500 INJECTION, SOLUTION INTRAMUSCULAR; INTRAVENOUS; SUBCUTANEOUS at 16:40

## 2021-01-16 RX ADMIN — ALBUTEROL SULFATE 2 PUFF: 90 AEROSOL, METERED RESPIRATORY (INHALATION) at 08:38

## 2021-01-16 RX ADMIN — CARVEDILOL 25 MG: 25 TABLET, FILM COATED ORAL at 08:38

## 2021-01-16 RX ADMIN — PIPERACILLIN SODIUM AND TAZOBACTAM SODIUM 4.5 G: 4; .5 INJECTION, POWDER, LYOPHILIZED, FOR SOLUTION INTRAVENOUS at 04:02

## 2021-01-16 RX ADMIN — ENOXAPARIN SODIUM 40 MG: 40 INJECTION SUBCUTANEOUS at 17:21

## 2021-01-16 RX ADMIN — SODIUM CHLORIDE, PRESERVATIVE FREE 10 ML: 5 INJECTION INTRAVENOUS at 08:39

## 2021-01-16 RX ADMIN — DEXAMETHASONE SODIUM PHOSPHATE 6 MG: 4 INJECTION, SOLUTION INTRA-ARTICULAR; INTRALESIONAL; INTRAMUSCULAR; INTRAVENOUS; SOFT TISSUE at 08:40

## 2021-01-16 RX ADMIN — ALBUTEROL SULFATE 2 PUFF: 90 AEROSOL, METERED RESPIRATORY (INHALATION) at 17:22

## 2021-01-16 RX ADMIN — PIPERACILLIN SODIUM AND TAZOBACTAM SODIUM 4.5 G: 4; .5 INJECTION, POWDER, LYOPHILIZED, FOR SOLUTION INTRAVENOUS at 20:01

## 2021-01-16 RX ADMIN — Medication 1 CAPSULE: at 08:37

## 2021-01-16 RX ADMIN — ENOXAPARIN SODIUM 40 MG: 40 INJECTION SUBCUTANEOUS at 05:26

## 2021-01-16 RX ADMIN — CARVEDILOL 25 MG: 25 TABLET, FILM COATED ORAL at 17:21

## 2021-01-16 RX ADMIN — LOSARTAN POTASSIUM 100 MG: 50 TABLET, FILM COATED ORAL at 08:37

## 2021-01-16 RX ADMIN — SODIUM CHLORIDE, PRESERVATIVE FREE 10 ML: 5 INJECTION INTRAVENOUS at 20:01

## 2021-01-16 RX ADMIN — GABAPENTIN 100 MG: 100 CAPSULE ORAL at 08:44

## 2021-01-16 RX ADMIN — PANTOPRAZOLE SODIUM 40 MG: 40 TABLET, DELAYED RELEASE ORAL at 05:26

## 2021-01-16 RX ADMIN — ASCORBIC ACID 1500 MG: 500 INJECTION, SOLUTION INTRAMUSCULAR; INTRAVENOUS; SUBCUTANEOUS at 23:31

## 2021-01-16 RX ADMIN — CHOLECALCIFEROL TAB 10 MCG (400 UNIT) 1000 UNITS: 10 TAB at 08:37

## 2021-01-16 RX ADMIN — OXYBUTYNIN CHLORIDE 10 MG: 5 TABLET, EXTENDED RELEASE ORAL at 08:37

## 2021-01-16 RX ADMIN — ALBUTEROL SULFATE 2 PUFF: 90 AEROSOL, METERED RESPIRATORY (INHALATION) at 12:02

## 2021-01-16 RX ADMIN — CETIRIZINE HYDROCHLORIDE 5 MG: 10 TABLET, FILM COATED ORAL at 08:38

## 2021-01-16 RX ADMIN — ASCORBIC ACID 1500 MG: 500 INJECTION, SOLUTION INTRAMUSCULAR; INTRAVENOUS; SUBCUTANEOUS at 08:38

## 2021-01-16 RX ADMIN — ASCORBIC ACID 1500 MG: 500 INJECTION, SOLUTION INTRAMUSCULAR; INTRAVENOUS; SUBCUTANEOUS at 01:02

## 2021-01-16 RX ADMIN — PIPERACILLIN SODIUM AND TAZOBACTAM SODIUM 4.5 G: 4; .5 INJECTION, POWDER, LYOPHILIZED, FOR SOLUTION INTRAVENOUS at 12:02

## 2021-01-16 RX ADMIN — ALBUTEROL SULFATE 2 PUFF: 90 AEROSOL, METERED RESPIRATORY (INHALATION) at 22:45

## 2021-01-16 RX ADMIN — GABAPENTIN 100 MG: 100 CAPSULE ORAL at 20:01

## 2021-01-16 NOTE — PROGRESS NOTES
[facility] HOSPITALIST PROGRESS NOTE     Patient Identification:  Name:  Emma Ryan  Age:  80 y.o.  Sex:  female  :  1940  MRN:  [id number]  Visit Number:  [id number]  ROOM: [address] [place] [address]     Primary Care Provider:  eJnnifer Montano MD    Length of stay in inpatient status:  7    Subjective     Chief Compliant:    Chief Complaint   Patient presents with   • Exposure To Known Illness   • Nausea       History of Presenting Illness: Patient seen and examined in follow-up for sepsis secondary to E. coli UTI and COVID-19.  Patient today stable O2 requirements currently on 12 L.  Patient states that she feels some better today overall.    Objective     Current Hospital Meds:albuterol sulfate HFA, 2 puff, Inhalation, 4x Daily - RT  ascorbic acid in  mL IVPB, 1,500 mg, Intravenous, Q8H  carvedilol, 25 mg, Oral, BID With Meals  cetirizine, 5 mg, Oral, Daily  cholecalciferol, 1,000 Units, Oral, Daily  dexamethasone, 6 mg, Intravenous, Daily  enoxaparin, 40 mg, Subcutaneous, Q12H  gabapentin, 100 mg, Oral, BID  lactobacillus acidophilus, 1 capsule, Oral, Daily  losartan, 100 mg, Oral, Daily  oxybutynin XL, 10 mg, Oral, Daily  pantoprazole, 40 mg, Oral, QAM  piperacillin-tazobactam, 4.5 g, Intravenous, Q8H  sodium chloride, 10 mL, Intravenous, Q12H         Current Antimicrobial Therapy:  Anti-Infectives (From admission, onward)    Ordered     Dose/Rate Route Frequency Start Stop    21 1041  piperacillin-tazobactam (ZOSYN) 4.5 g/100 mL 0.9% NS IVPB (mbp)     Ordering Provider: Ruy Tenorio DO    4.5 g  over 4 Hours Intravenous Every 8 Hours 21 2100 21 1041  piperacillin-tazobactam (ZOSYN) 4.5 g/100 mL 0.9% NS IVPB (mbp)     Ordering Provider: Ruy Tenorio DO    4.5 g  over 30 Minutes Intravenous Once 21 1300 21 1329    21 1332  remdesivir 100 mg in sodium chloride 0.9 % 250 mL IVPB (powder vial)     Tracy Jones APRN reviewed the  order on 01/12/21 1322.   Ordering Provider: Tracy Jones APRN    100 mg  over 60 Minutes Intravenous Every 24 Hours 01/10/21 1500 01/13/21 1534    01/09/21 1332  remdesivir 200 mg in sodium chloride 0.9 % 250 mL IVPB (powder vial)     Ordering Provider: Tracy Jones APRN    200 mg  over 60 Minutes Intravenous Every 24 Hours 01/09/21 1500 01/09/21 1838    01/08/21 2323  doxycycline (VIBRAMYCIN) 100 mg/100 mL 0.9% NS MBP     Ordering Provider: Dada Conner MD    100 mg  over 60 Minutes Intravenous Once 01/08/21 2325 01/09/21 0103    01/08/21 2022  cefTRIAXone (ROCEPHIN) 2 g/100 mL 0.9% NS IVPB (MBP)     Ordering Provider: Armin Barbosa II, PA    2 g  over 30 Minutes Intravenous Once 01/08/21 2024 01/08/21 2102 01/08/21 2224  cefdinir (OMNICEF) 300 MG capsule     Ordering Provider: Armin Barbosa II, PA    300 mg Oral 2 Times Daily 01/08/21 0000          Current Diuretic Therapy:  Diuretics (From admission, onward)    None        ----------------------------------------------------------------------------------------------------------------------  Vital Signs:  Temp:  [97.9 °F (36.6 °C)-98.1 °F (36.7 °C)] 98.1 °F (36.7 °C)  Heart Rate:  [54-83] 62  Resp:  [20] 20  BP: (144-196)/(63-84) 166/80  SpO2:  [91 %-94 %] 92 %  on  Flow (L/min):  [12] 12;   Device (Oxygen Therapy): humidified;bubble;high-flow nasal cannula  Body mass index is 37.06 kg/m².    Wt Readings from Last 3 Encounters:   01/15/21 83.2 kg (183 lb 8 oz)   12/31/20 83.9 kg (185 lb)   12/01/20 84.4 kg (186 lb)     Intake & Output (last 3 days)       01/13 0701 - 01/14 0700 01/14 0701 - 01/15 0700 01/15 0701 - 01/16 0700    P.O. 1080 600 680    I.V. (mL/kg)   2186.3 (26.3)    Total Intake(mL/kg) 1080 (12.3) 600 (7.2) 2866.3 (34.5)    Urine (mL/kg/hr)   1 (0)    Stool       Total Output   1    Net +1080 +600 +2865.3           Urine Unmeasured Occurrence 6 x 8 x 3 x        Diet  Regular  ----------------------------------------------------------------------------------------------------------------------  Physical exam:  This physical exam has been personally performed remotely in the unit aided by real-time audio/visual communication tools. Nursing present at bedside during this exam and assisted during exam. The use of a video visit has been reviewed with the patient and verbal informed consent has been obtained.     Physical Exam:  General: Patient appears awake, alert, and in no acute distress.  Head: Normocephalic, atraumatic  Eyes: EOMI. Conjunctivae and sclerae normal.  Ears: Ears appear intact with no abnormalities noted.   Neck: Trachea midline. No obvious JVD.  Heart: Tele reveals sinus rhythm  Lungs: Respirations appear to be regular, even and unlabored with no signs of respiratory distress. No audible wheezing.  Abdomen: No obvious abdominal distension.  MS: Muscle tone appears normal. No gross deformities.  Extremities: No clubbing, cyanosis or edema noted.  Skin: No visible bleeding, bruising, or rash.  Neurologic: Alert and oriented x3. No gross focal deficits.   ----------------------------------------------------------------------------------------------------------------------  Tele:    ----------------------------------------------------------------------------------------------------------------------  Results from last 7 days   Lab Units 01/15/21  0519 01/14/21 0129 01/13/21  0329   CRP mg/dL 2.88* 3.08* 1.85*   WBC 10*3/mm3 8.22 7.73 8.62   HEMOGLOBIN g/dL 12.9 13.0 12.8   HEMATOCRIT % 41.6 40.9 40.4   MCV fL 93.5 90.9 92.2   MCHC g/dL 31.0* 31.8 31.7   PLATELETS 10*3/mm3 298 331 311     Results from last 7 days   Lab Units 01/10/21  0721   PH, ARTERIAL pH units 7.399   PO2 ART mm Hg 56.6*   PCO2, ARTERIAL mm Hg 37.6   HCO3 ART mmol/L 23.2     Results from last 7 days   Lab Units 01/15/21  0519 01/14/21  0129 01/13/21  0329   SODIUM mmol/L 139 141 141   POTASSIUM  mmol/L 4.1 3.8 3.8   CHLORIDE mmol/L 103 105 109*   CO2 mmol/L 27.4 25.5 22.4   BUN mg/dL 22 22 23   CREATININE mg/dL 0.95 0.94 0.86   EGFR IF NONAFRICN AM mL/min/1.73 57* 57* 64   CALCIUM mg/dL 9.1 9.2 9.1   GLUCOSE mg/dL 134* 134* 129*   ALBUMIN g/dL 2.78* 2.99* 3.02*   BILIRUBIN mg/dL 0.5 0.4 0.3   ALK PHOS U/L 71 79 78   AST (SGOT) U/L 19 24 32   ALT (SGPT) U/L 23 27 30   Estimated Creatinine Clearance: 47.5 mL/min (by C-G formula based on SCr of 0.95 mg/dL).  No results found for: AMMONIA  Results from last 7 days   Lab Units 01/10/21  0220 01/09/21  0332   CK TOTAL U/L 87 122         Results from last 7 days   Lab Units 01/09/21  0332   CHOLESTEROL mg/dL 69   TRIGLYCERIDES mg/dL 86   HDL CHOL mg/dL 34*   LDL CHOL mg/dL 17     No results found for: HGBA1C, POCGLU  Lab Results   Component Value Date    TSH 1.950 09/14/2018    FREET4 1.16 09/14/2018     No results found for: PREGTESTUR, PREGSERUM, HCG, HCGQUANT  Pain Management Panel     Pain Management Panel Latest Ref Rng & Units 10/9/2020 6/10/2020    CREATININE UR mg/dL 99.5 122.8    AMPHETAMINES SCREEN, URINE Negative - -    BARBITURATES SCREEN Negative - -    BENZODIAZEPINE SCREEN, URINE Negative - -    COCAINE SCREEN, URINE Negative - -    METHADONE SCREEN, URINE Negative - -        Brief Urine Lab Results  (Last result in the past 365 days)      Color   Clarity   Blood   Leuk Est   Nitrite   Protein   CREAT   Urine HCG        01/08/21 2026 Yellow Cloudy Negative Moderate (2+) Positive Trace             Blood Culture   Date Value Ref Range Status   01/08/2021 No growth at 2 days  Preliminary   01/08/2021 No growth at 2 days  Preliminary     Urine Culture   Date Value Ref Range Status   01/08/2021 >100,000 CFU/mL Escherichia coli (A)  Final     No results found for: WOUNDCX  No results found for: STOOLCX  No results found for: RESPCX  No results found for: AFBCX  Results from last 7 days   Lab Units 01/15/21  0519 01/14/21  0129 01/13/21  0329  01/12/21  0324 01/11/21  0545 01/10/21  0220 01/09/21  0332   CRP mg/dL 2.88* 3.08* 1.85* 1.93* 2.97* 6.17* 8.48*       I have personally looked at the labs and they are summarized above.  ----------------------------------------------------------------------------------------------------------------------  Detailed radiology reports for the last 24 hours:    Imaging Results (Last 24 Hours)     ** No results found for the last 24 hours. **        Assessment & Plan      Sepsis  COVID-19 pneumonia  E. coli UTI  Acute hypoxemic respiratory failure   -O2 requirements remain at 12 L but no further increases.   -CXR with worsening bilateral patchy airspace disease and CT chest with worsening patchy airspace disease consistent with COVID.   -Continue remdesivir and dexamethasone   -After discussion with ID, have escalated to Zosyn monotherapy.    -Infectious disease consulted and following along   -discussed if continued worsening respiratory status next steps would be BIPAP and then after that intubation if neccessary.  However today patient feeling some better hopeful this will help in case.    CKD stage IIIa  Hyperlipidemia  Essential hypertension  GERD      VTE Prophylaxis:   Mechanical Order History:     None      Pharmalogical Order History:      Ordered     Dose Route Frequency Stop    01/09/21 1320  enoxaparin (LOVENOX) syringe 40 mg      40 mg SC Every 12 Hours Scheduled --    01/09/21 0127  enoxaparin (LOVENOX) syringe 40 mg  Status:  Discontinued      40 mg SC Every 24 Hours 01/09/21 1320                Ruy Tenorio DO  [facility] Hospitalist  01/15/21  20:36 EST

## 2021-01-16 NOTE — PLAN OF CARE
Goal Outcome Evaluation:  Plan of Care Reviewed With: patient  Progress: no change  Outcome Summary: Pt O2 sat low 90s on 12L high flow. no acute changes. will continue to monitor.

## 2021-01-16 NOTE — PLAN OF CARE
Goal Outcome Evaluation:  Plan of Care Reviewed With: patient  Progress: no change  Outcome Summary: Pt states she feels better today. Pt has rested. Continue to be on 12L high flow. Sats 95-96% in bed but desats when she gets up to bedside. Will continue to monitor.

## 2021-01-16 NOTE — PROGRESS NOTES
PROGRESS NOTE         Patient Identification:  Name:  Emma Ryan  Age:  80 y.o.  Sex:  female  :  1940  MRN:  [id number]  Visit Number:  [id number]  Primary Care Provider:  Jennifer Montano MD         LOS: 8 days       ----------------------------------------------------------------------------------------------------------------------  Subjective       Chief Complaints:    Exposure To Known Illness and Nausea        Interval History:      Case discussed with primary RN.  Patient remains on 12 L/min bubble high flow nasal cannula.  Feeling much better today, but continues to desaturate with movement.  Afebrile, no diarrhea.  CRP is improved at 1.96.  WBC remains normal.    Review of Systems:    Constitutional: no fever, chills and night sweats.  Generalized fatigue.  Eyes: no eye drainage, itching or redness.  HEENT: no mouth sores, dysphagia or nose bleed.  Respiratory: Positive shortness of breath and productive cough.  Cardiovascular: no chest pain, no palpitations, no orthopnea.  Gastrointestinal: no nausea, vomiting or diarrhea. No abdominal pain, hematemesis or rectal bleeding.  Genitourinary: no dysuria or polyuria.  Hematologic/lymphatic: no lymph node abnormalities, no easy bruising or easy bleeding.  Musculoskeletal: no muscle or joint pain.  Skin: No rash and no itching.  Neurological: no loss of consciousness, no seizure, no headache.  Behavioral/Psych: no depression or suicidal ideation.  Endocrine: no hot flashes.  Immunologic: negative.    ----------------------------------------------------------------------------------------------------------------------      Objective       Current [facility] Meds:  albuterol sulfate HFA, 2 puff, Inhalation, 4x Daily - RT  ascorbic acid in  mL IVPB, 1,500 mg, Intravenous, Q8H  carvedilol, 25 mg, Oral, BID With Meals  cetirizine, 5 mg, Oral, Daily  cholecalciferol, 1,000 Units, Oral, Daily  dexamethasone, 6 mg, Intravenous,  Daily  enoxaparin, 40 mg, Subcutaneous, Q12H  gabapentin, 100 mg, Oral, BID  lactobacillus acidophilus, 1 capsule, Oral, Daily  losartan, 100 mg, Oral, Daily  oxybutynin XL, 10 mg, Oral, Daily  pantoprazole, 40 mg, Oral, QAM  piperacillin-tazobactam, 4.5 g, Intravenous, Q8H  sodium chloride, 10 mL, Intravenous, Q12H         ----------------------------------------------------------------------------------------------------------------------    Vital Signs:  Temp:  [97.9 °F (36.6 °C)-98.3 °F (36.8 °C)] 98.3 °F (36.8 °C)  Heart Rate:  [59-65] 61  Resp:  [18-20] 18  BP: (144-166)/(63-80) 149/66  No data found.  SpO2 Percentage    01/15/21 1900 01/16/21 0658 01/16/21 0713   SpO2: 92% 90% 92%     SpO2:  [90 %-92 %] 92 %  on  Flow (L/min):  [12] 12;   Device (Oxygen Therapy): bubble;high-flow nasal cannula    Body mass index is 37.97 kg/m².  Wt Readings from Last 3 Encounters:   01/16/21 85.3 kg (188 lb)   12/31/20 83.9 kg (185 lb)   12/01/20 84.4 kg (186 lb)        Intake/Output Summary (Last 24 hours) at 1/16/2021 1208  Last data filed at 1/16/2021 0854  Gross per 24 hour   Intake 3338.92 ml   Output --   Net 3338.92 ml     Diet Regular  ----------------------------------------------------------------------------------------------------------------------      Physical Exam:    Deferred due to COVID-19 isolation.  ----------------------------------------------------------------------------------------------------------------------  Results from last 7 days   Lab Units 01/10/21  0220   CK TOTAL U/L 87           Results from last 7 days   Lab Units 01/10/21  0721   PH, ARTERIAL pH units 7.399   PO2 ART mm Hg 56.6*   PCO2, ARTERIAL mm Hg 37.6   HCO3 ART mmol/L 23.2     Results from last 7 days   Lab Units 01/16/21  0300 01/15/21  0519 01/14/21  0129   CRP mg/dL 1.96* 2.88* 3.08*   WBC 10*3/mm3 8.50 8.22 7.73   HEMOGLOBIN g/dL 12.5 12.9 13.0   HEMATOCRIT % 39.3 41.6 40.9   MCV fL 92.9 93.5 90.9   MCHC g/dL 31.8 31.0* 31.8    PLATELETS 10*3/mm3 265 298 331     Results from last 7 days   Lab Units 01/16/21  0300 01/15/21  0519 01/14/21  0129   SODIUM mmol/L 144 139 141   POTASSIUM mmol/L 4.1 4.1 3.8   CHLORIDE mmol/L 107 103 105   CO2 mmol/L 26.3 27.4 25.5   BUN mg/dL 22 22 22   CREATININE mg/dL 0.92 0.95 0.94   EGFR IF NONAFRICN AM mL/min/1.73 59* 57* 57*   CALCIUM mg/dL 9.1 9.1 9.2   GLUCOSE mg/dL 126* 134* 134*   ALBUMIN g/dL 2.87* 2.78* 2.99*   BILIRUBIN mg/dL 0.5 0.5 0.4   ALK PHOS U/L 68 71 79   AST (SGOT) U/L 22 19 24   ALT (SGPT) U/L 22 23 27   Estimated Creatinine Clearance: 49.6 mL/min (by C-G formula based on SCr of 0.92 mg/dL).  No results found for: AMMONIA    No results found for: HGBA1C, POCGLU  No results found for: HGBA1C  Lab Results   Component Value Date    TSH 1.950 09/14/2018    FREET4 1.16 09/14/2018       Blood Culture   Date Value Ref Range Status   01/08/2021 No growth at 24 hours  Preliminary   01/08/2021 No growth at 24 hours  Preliminary     Urine Culture   Date Value Ref Range Status   01/08/2021 >100,000 CFU/mL Escherichia coli (A)  Final     No results found for: WOUNDCX  No results found for: STOOLCX  No results found for: RESPCX  Pain Management Panel     Pain Management Panel Latest Ref Rng & Units 10/9/2020 6/10/2020    CREATININE UR mg/dL 99.5 122.8    AMPHETAMINES SCREEN, URINE Negative - -    BARBITURATES SCREEN Negative - -    BENZODIAZEPINE SCREEN, URINE Negative - -    COCAINE SCREEN, URINE Negative - -    METHADONE SCREEN, URINE Negative - -            ----------------------------------------------------------------------------------------------------------------------  Imaging Results (Last 24 Hours)     ** No results found for the last 24 hours. **          ----------------------------------------------------------------------------------------------------------------------    Assessment/Plan       Assessment/Plan     ASSESSMENT:    1.  Sepsis  2.  COVID-19 pneumonia  3.  UTI    PLAN:    Case  discussed with primary RN.  Patient remains on 12 L/min bubble high flow nasal cannula.  Feeling much better today, but continues to desaturate with movement.  Afebrile, no diarrhea.  CRP is improved at 1.96.  WBC remains normal.    CT chest on 1/14/2021 showed worsened patchy bilateral airspace disease suggestive of COVID-19 pneumonia.    Urine culture from 1/8/2021 finalized as greater than 100,000 colonies of E. Coli, which has been treated.    We are agreeable to Decadron and recommend initiation of remdesivir.    Recommend to continue on Zosyn for concerns of superimposed aspiration pneumonia.  We will continue to follow closely and adjust antibiotic therapy as appropriate.    Code Status:   Code Status and Medical Interventions:   Ordered at: 01/08/21 0861     Level Of Support Discussed With:    Patient     Code Status:    CPR     Medical Interventions (Level of Support Prior to Arrest):    Full     CHIRAG Guadarrama  01/16/21  12:08 EST

## 2021-01-17 LAB
ALBUMIN SERPL-MCNC: 2.91 G/DL (ref 3.5–5.2)
ALBUMIN/GLOB SERPL: 1.1 G/DL
ALP SERPL-CCNC: 80 U/L (ref 39–117)
ALT SERPL W P-5'-P-CCNC: 25 U/L (ref 1–33)
ANION GAP SERPL CALCULATED.3IONS-SCNC: 10.3 MMOL/L (ref 5–15)
AST SERPL-CCNC: 24 U/L (ref 1–32)
BILIRUB SERPL-MCNC: 0.4 MG/DL (ref 0–1.2)
BUN SERPL-MCNC: 23 MG/DL (ref 8–23)
BUN/CREAT SERPL: 25 (ref 7–25)
CALCIUM SPEC-SCNC: 8.8 MG/DL (ref 8.6–10.5)
CHLORIDE SERPL-SCNC: 107 MMOL/L (ref 98–107)
CO2 SERPL-SCNC: 26.7 MMOL/L (ref 22–29)
CREAT SERPL-MCNC: 0.92 MG/DL (ref 0.57–1)
CRP SERPL-MCNC: 1.19 MG/DL (ref 0–0.5)
D DIMER PPP FEU-MCNC: 0.45 MCGFEU/ML (ref 0–0.5)
DEPRECATED RDW RBC AUTO: 51 FL (ref 37–54)
ERYTHROCYTE [DISTWIDTH] IN BLOOD BY AUTOMATED COUNT: 14.7 % (ref 12.3–15.4)
FERRITIN SERPL-MCNC: 224.6 NG/ML (ref 13–150)
FIBRINOGEN PPP-MCNC: 288 MG/DL (ref 173–524)
GFR SERPL CREATININE-BSD FRML MDRD: 59 ML/MIN/1.73
GLOBULIN UR ELPH-MCNC: 2.7 GM/DL
GLUCOSE SERPL-MCNC: 173 MG/DL (ref 65–99)
HCT VFR BLD AUTO: 38.9 % (ref 34–46.6)
HGB BLD-MCNC: 12 G/DL (ref 12–15.9)
LDH SERPL-CCNC: 254 U/L (ref 135–214)
MCH RBC QN AUTO: 29.2 PG (ref 26.6–33)
MCHC RBC AUTO-ENTMCNC: 30.8 G/DL (ref 31.5–35.7)
MCV RBC AUTO: 94.6 FL (ref 79–97)
PLATELET # BLD AUTO: 245 10*3/MM3 (ref 140–450)
PMV BLD AUTO: 10.8 FL (ref 6–12)
POTASSIUM SERPL-SCNC: 4.2 MMOL/L (ref 3.5–5.2)
PROT SERPL-MCNC: 5.6 G/DL (ref 6–8.5)
RBC # BLD AUTO: 4.11 10*6/MM3 (ref 3.77–5.28)
SODIUM SERPL-SCNC: 144 MMOL/L (ref 136–145)
WBC # BLD AUTO: 6.4 10*3/MM3 (ref 3.4–10.8)

## 2021-01-17 PROCEDURE — 25010000002 PIPERACILLIN SOD-TAZOBACTAM PER 1 G: Performed by: STUDENT IN AN ORGANIZED HEALTH CARE EDUCATION/TRAINING PROGRAM

## 2021-01-17 PROCEDURE — 25010000002 HYDRALAZINE PER 20 MG

## 2021-01-17 PROCEDURE — 86140 C-REACTIVE PROTEIN: CPT | Performed by: HOSPITALIST

## 2021-01-17 PROCEDURE — 85027 COMPLETE CBC AUTOMATED: CPT | Performed by: INTERNAL MEDICINE

## 2021-01-17 PROCEDURE — 83615 LACTATE (LD) (LDH) ENZYME: CPT | Performed by: INTERNAL MEDICINE

## 2021-01-17 PROCEDURE — 82728 ASSAY OF FERRITIN: CPT | Performed by: INTERNAL MEDICINE

## 2021-01-17 PROCEDURE — 94799 UNLISTED PULMONARY SVC/PX: CPT

## 2021-01-17 PROCEDURE — 85379 FIBRIN DEGRADATION QUANT: CPT | Performed by: INTERNAL MEDICINE

## 2021-01-17 PROCEDURE — 25010000002 ENOXAPARIN PER 10 MG: Performed by: INTERNAL MEDICINE

## 2021-01-17 PROCEDURE — 80053 COMPREHEN METABOLIC PANEL: CPT | Performed by: HOSPITALIST

## 2021-01-17 PROCEDURE — 25010000002 DEXAMETHASONE PER 1 MG: Performed by: HOSPITALIST

## 2021-01-17 PROCEDURE — 99233 SBSQ HOSP IP/OBS HIGH 50: CPT | Performed by: STUDENT IN AN ORGANIZED HEALTH CARE EDUCATION/TRAINING PROGRAM

## 2021-01-17 PROCEDURE — 85384 FIBRINOGEN ACTIVITY: CPT | Performed by: HOSPITALIST

## 2021-01-17 PROCEDURE — 94640 AIRWAY INHALATION TREATMENT: CPT

## 2021-01-17 RX ORDER — HYDRALAZINE HYDROCHLORIDE 20 MG/ML
10 INJECTION INTRAMUSCULAR; INTRAVENOUS EVERY 6 HOURS PRN
Status: DISCONTINUED | OUTPATIENT
Start: 2021-01-17 | End: 2021-01-21 | Stop reason: HOSPADM

## 2021-01-17 RX ORDER — HYDRALAZINE HYDROCHLORIDE 25 MG/1
25 TABLET, FILM COATED ORAL EVERY 8 HOURS SCHEDULED
Status: DISCONTINUED | OUTPATIENT
Start: 2021-01-17 | End: 2021-01-21 | Stop reason: HOSPADM

## 2021-01-17 RX ORDER — HYDRALAZINE HYDROCHLORIDE 20 MG/ML
INJECTION INTRAMUSCULAR; INTRAVENOUS
Status: COMPLETED
Start: 2021-01-17 | End: 2021-01-17

## 2021-01-17 RX ADMIN — ALBUTEROL SULFATE 2 PUFF: 90 AEROSOL, METERED RESPIRATORY (INHALATION) at 19:46

## 2021-01-17 RX ADMIN — CARVEDILOL 25 MG: 25 TABLET, FILM COATED ORAL at 08:59

## 2021-01-17 RX ADMIN — OXYBUTYNIN CHLORIDE 10 MG: 5 TABLET, EXTENDED RELEASE ORAL at 08:59

## 2021-01-17 RX ADMIN — LOSARTAN POTASSIUM 100 MG: 50 TABLET, FILM COATED ORAL at 08:59

## 2021-01-17 RX ADMIN — PANTOPRAZOLE SODIUM 40 MG: 40 TABLET, DELAYED RELEASE ORAL at 06:10

## 2021-01-17 RX ADMIN — SODIUM CHLORIDE, PRESERVATIVE FREE 10 ML: 5 INJECTION INTRAVENOUS at 09:00

## 2021-01-17 RX ADMIN — HYDRALAZINE HYDROCHLORIDE 10 MG: 20 INJECTION INTRAMUSCULAR; INTRAVENOUS at 07:41

## 2021-01-17 RX ADMIN — CARVEDILOL 25 MG: 25 TABLET, FILM COATED ORAL at 17:38

## 2021-01-17 RX ADMIN — GABAPENTIN 100 MG: 100 CAPSULE ORAL at 09:00

## 2021-01-17 RX ADMIN — DEXAMETHASONE SODIUM PHOSPHATE 6 MG: 4 INJECTION, SOLUTION INTRA-ARTICULAR; INTRALESIONAL; INTRAMUSCULAR; INTRAVENOUS; SOFT TISSUE at 09:00

## 2021-01-17 RX ADMIN — HYDRALAZINE HYDROCHLORIDE 25 MG: 25 TABLET, FILM COATED ORAL at 14:15

## 2021-01-17 RX ADMIN — Medication 1 CAPSULE: at 08:59

## 2021-01-17 RX ADMIN — ALBUTEROL SULFATE 2 PUFF: 90 AEROSOL, METERED RESPIRATORY (INHALATION) at 09:00

## 2021-01-17 RX ADMIN — PIPERACILLIN SODIUM AND TAZOBACTAM SODIUM 4.5 G: 4; .5 INJECTION, POWDER, LYOPHILIZED, FOR SOLUTION INTRAVENOUS at 14:10

## 2021-01-17 RX ADMIN — CETIRIZINE HYDROCHLORIDE 5 MG: 10 TABLET, FILM COATED ORAL at 08:59

## 2021-01-17 RX ADMIN — HYDRALAZINE HYDROCHLORIDE 25 MG: 25 TABLET, FILM COATED ORAL at 08:59

## 2021-01-17 RX ADMIN — ASCORBIC ACID 1500 MG: 500 INJECTION, SOLUTION INTRAMUSCULAR; INTRAVENOUS; SUBCUTANEOUS at 09:03

## 2021-01-17 RX ADMIN — ENOXAPARIN SODIUM 40 MG: 40 INJECTION SUBCUTANEOUS at 06:11

## 2021-01-17 RX ADMIN — CHOLECALCIFEROL TAB 10 MCG (400 UNIT) 1000 UNITS: 10 TAB at 08:59

## 2021-01-17 RX ADMIN — ENOXAPARIN SODIUM 40 MG: 40 INJECTION SUBCUTANEOUS at 17:38

## 2021-01-17 RX ADMIN — GABAPENTIN 100 MG: 100 CAPSULE ORAL at 19:46

## 2021-01-17 RX ADMIN — PIPERACILLIN SODIUM AND TAZOBACTAM SODIUM 4.5 G: 4; .5 INJECTION, POWDER, LYOPHILIZED, FOR SOLUTION INTRAVENOUS at 06:11

## 2021-01-17 RX ADMIN — PIPERACILLIN SODIUM AND TAZOBACTAM SODIUM 4.5 G: 4; .5 INJECTION, POWDER, LYOPHILIZED, FOR SOLUTION INTRAVENOUS at 19:46

## 2021-01-17 RX ADMIN — HYDRALAZINE HYDROCHLORIDE 25 MG: 25 TABLET, FILM COATED ORAL at 19:45

## 2021-01-17 NOTE — PLAN OF CARE
Goal Outcome Evaluation:  Plan of Care Reviewed With: patient  Progress: no change   Patient resting, No distress noted,

## 2021-01-17 NOTE — PLAN OF CARE
Goal Outcome Evaluation:  Plan of Care Reviewed With: patient  Progress: no change  Outcome Summary: Pt resting in bed. Pt on 10L NC high flow, tolerating well. Pt states she hasn't had any shortness of breath on exertion. No other acute changes noted.

## 2021-01-17 NOTE — PROGRESS NOTES
PROGRESS NOTE         Patient Identification:  Name:  Emma Ryan  Age:  80 y.o.  Sex:  female  :  1940  MRN:  [id number]  Visit Number:  [id number]  Primary Care Provider:  Jennifer Montano MD         LOS: 9 days       ----------------------------------------------------------------------------------------------------------------------  Subjective       Chief Complaints:    Exposure To Known Illness and Nausea        Interval History:      Case discussed with primary RN.  Patient remains on 12 L/min bubble high flow nasal cannula.  Patient had elevated blood pressure this morning, which has now resolved.  Afebrile, no diarrhea.  CRP is improved at 1.19.  WBC remains normal.    Review of Systems:    Constitutional: no fever, chills and night sweats.  Generalized fatigue.  Eyes: no eye drainage, itching or redness.  HEENT: no mouth sores, dysphagia or nose bleed.  Respiratory: Positive shortness of breath and productive cough.  Cardiovascular: no chest pain, no palpitations, no orthopnea.  Gastrointestinal: no nausea, vomiting or diarrhea. No abdominal pain, hematemesis or rectal bleeding.  Genitourinary: no dysuria or polyuria.  Hematologic/lymphatic: no lymph node abnormalities, no easy bruising or easy bleeding.  Musculoskeletal: no muscle or joint pain.  Skin: No rash and no itching.  Neurological: no loss of consciousness, no seizure, no headache.  Behavioral/Psych: no depression or suicidal ideation.  Endocrine: no hot flashes.  Immunologic: negative.    ----------------------------------------------------------------------------------------------------------------------      Objective       Current [facility] Meds:  albuterol sulfate HFA, 2 puff, Inhalation, 4x Daily - RT  ascorbic acid in  mL IVPB, 1,500 mg, Intravenous, Q8H  carvedilol, 25 mg, Oral, BID With Meals  cetirizine, 5 mg, Oral, Daily  cholecalciferol, 1,000 Units, Oral, Daily  dexamethasone, 6 mg, Intravenous,  Daily  enoxaparin, 40 mg, Subcutaneous, Q12H  gabapentin, 100 mg, Oral, BID  hydrALAZINE, 25 mg, Oral, Q8H  lactobacillus acidophilus, 1 capsule, Oral, Daily  losartan, 100 mg, Oral, Daily  oxybutynin XL, 10 mg, Oral, Daily  pantoprazole, 40 mg, Oral, QAM  piperacillin-tazobactam, 4.5 g, Intravenous, Q8H  sodium chloride, 10 mL, Intravenous, Q12H         ----------------------------------------------------------------------------------------------------------------------    Vital Signs:  Temp:  [97.3 °F (36.3 °C)-98.4 °F (36.9 °C)] 97.3 °F (36.3 °C)  Heart Rate:  [51-82] 56  Resp:  [18-20] 18  BP: (130-218)/(66-92) 132/66  No data found.  SpO2 Percentage    01/16/21 1901 01/17/21 0300 01/17/21 0639   SpO2: 96% 94% 95%     SpO2:  [90 %-96 %] 95 %  on  Flow (L/min):  [12] 12;   Device (Oxygen Therapy): bubble;high-flow nasal cannula    Body mass index is 37.89 kg/m².  Wt Readings from Last 3 Encounters:   01/17/21 85.1 kg (187 lb 9.6 oz)   12/31/20 83.9 kg (185 lb)   12/01/20 84.4 kg (186 lb)        Intake/Output Summary (Last 24 hours) at 1/17/2021 0900  Last data filed at 1/17/2021 0817  Gross per 24 hour   Intake 1850.17 ml   Output 250 ml   Net 1600.17 ml     Diet Regular  ----------------------------------------------------------------------------------------------------------------------      Physical Exam:    Deferred due to COVID-19 isolation.  ----------------------------------------------------------------------------------------------------------------------                Results from last 7 days   Lab Units 01/17/21  0113 01/16/21  0300 01/15/21  0519   CRP mg/dL 1.19* 1.96* 2.88*   WBC 10*3/mm3 6.40 8.50 8.22   HEMOGLOBIN g/dL 12.0 12.5 12.9   HEMATOCRIT % 38.9 39.3 41.6   MCV fL 94.6 92.9 93.5   MCHC g/dL 30.8* 31.8 31.0*   PLATELETS 10*3/mm3 245 399 298     Results from last 7 days   Lab Units 01/17/21  0113 01/16/21  0300 01/15/21  0519   SODIUM mmol/L 144 144 139   POTASSIUM mmol/L 4.2 4.1 4.1    CHLORIDE mmol/L 107 107 103   CO2 mmol/L 26.7 26.3 27.4   BUN mg/dL 23 22 22   CREATININE mg/dL 0.92 0.92 0.95   EGFR IF NONAFRICN AM mL/min/1.73 59* 59* 57*   CALCIUM mg/dL 8.8 9.1 9.1   GLUCOSE mg/dL 173* 126* 134*   ALBUMIN g/dL 2.91* 2.87* 2.78*   BILIRUBIN mg/dL 0.4 0.5 0.5   ALK PHOS U/L 80 68 71   AST (SGOT) U/L 24 22 19   ALT (SGPT) U/L 25 22 23   Estimated Creatinine Clearance: 49.5 mL/min (by C-G formula based on SCr of 0.92 mg/dL).  No results found for: AMMONIA    No results found for: HGBA1C, POCGLU  No results found for: HGBA1C  Lab Results   Component Value Date    TSH 1.950 09/14/2018    FREET4 1.16 09/14/2018       Blood Culture   Date Value Ref Range Status   01/08/2021 No growth at 24 hours  Preliminary   01/08/2021 No growth at 24 hours  Preliminary     Urine Culture   Date Value Ref Range Status   01/08/2021 >100,000 CFU/mL Escherichia coli (A)  Final     No results found for: WOUNDCX  No results found for: STOOLCX  No results found for: RESPCX  Pain Management Panel     Pain Management Panel Latest Ref Rng & Units 10/9/2020 6/10/2020    CREATININE UR mg/dL 99.5 122.8    AMPHETAMINES SCREEN, URINE Negative - -    BARBITURATES SCREEN Negative - -    BENZODIAZEPINE SCREEN, URINE Negative - -    COCAINE SCREEN, URINE Negative - -    METHADONE SCREEN, URINE Negative - -            ----------------------------------------------------------------------------------------------------------------------  Imaging Results (Last 24 Hours)     ** No results found for the last 24 hours. **          ----------------------------------------------------------------------------------------------------------------------    Assessment/Plan       Assessment/Plan     ASSESSMENT:    1.  Sepsis  2.  COVID-19 pneumonia  3.  UTI    PLAN:    Case discussed with primary RN.  Patient remains on 12 L/min bubble high flow nasal cannula.  Patient had elevated blood pressure this morning, which has now resolved.  Afebrile, no  diarrhea.  CRP is improved at 1.19.  WBC remains normal.    CT chest on 1/14/2021 showed worsened patchy bilateral airspace disease suggestive of COVID-19 pneumonia.    Urine culture from 1/8/2021 finalized as greater than 100,000 colonies of E. Coli, which has been treated.    We are agreeable to Decadron and recommend initiation of remdesivir.    Recommend to continue on Zosyn for concerns of superimposed aspiration pneumonia.  We will continue to follow closely and adjust antibiotic therapy as appropriate.    Code Status:   Code Status and Medical Interventions:   Ordered at: 01/08/21 5437     Level Of Support Discussed With:    Patient     Code Status:    CPR     Medical Interventions (Level of Support Prior to Arrest):    Full     CHIRAG Guadarrama  01/17/21  09:58 EST

## 2021-01-17 NOTE — PROGRESS NOTES
[facility] HOSPITALIST PROGRESS NOTE     Patient Identification:  Name:  Emma Ryan  Age:  80 y.o.  Sex:  female  :  1940  MRN:  [id number]  Visit Number:  [id number]  ROOM: [address] [place] [address]     Primary Care Provider:  Jennifer Montano MD    Length of stay in inpatient status:  9    Subjective     Chief Compliant:    Chief Complaint   Patient presents with   • Exposure To Known Illness   • Nausea       History of Presenting Illness: Patient seen and examined in follow-up for sepsis secondary to E. coli UTI and COVID-19.  Patient today stable O2 requirements improved to 10 L.  Patient states that she continues to feel some better.    Objective     Current Hospital Meds:albuterol sulfate HFA, 2 puff, Inhalation, 4x Daily - RT  carvedilol, 25 mg, Oral, BID With Meals  cetirizine, 5 mg, Oral, Daily  cholecalciferol, 1,000 Units, Oral, Daily  dexamethasone, 6 mg, Intravenous, Daily  enoxaparin, 40 mg, Subcutaneous, Q12H  gabapentin, 100 mg, Oral, BID  hydrALAZINE, 25 mg, Oral, Q8H  lactobacillus acidophilus, 1 capsule, Oral, Daily  losartan, 100 mg, Oral, Daily  oxybutynin XL, 10 mg, Oral, Daily  pantoprazole, 40 mg, Oral, QAM  piperacillin-tazobactam, 4.5 g, Intravenous, Q8H  sodium chloride, 10 mL, Intravenous, Q12H         Current Antimicrobial Therapy:  Anti-Infectives (From admission, onward)    Ordered     Dose/Rate Route Frequency Start Stop    21 1041  piperacillin-tazobactam (ZOSYN) 4.5 g/100 mL 0.9% NS IVPB (mbp)     Ordering Provider: Ruy Tenorio DO    4.5 g  over 4 Hours Intravenous Every 8 Hours 21 2100 21 20521 1041  piperacillin-tazobactam (ZOSYN) 4.5 g/100 mL 0.9% NS IVPB (mbp)     Ordering Provider: Ruy Tenorio DO    4.5 g  over 30 Minutes Intravenous Once 21 1300 21 1329    21 1332  remdesivir 100 mg in sodium chloride 0.9 % 250 mL IVPB (powder vial)     Tracy Jones APRN reviewed the order on 21 8855.    Ordering Provider: Tracy Jones APRN    100 mg  over 60 Minutes Intravenous Every 24 Hours 01/10/21 1500 01/13/21 1534    01/09/21 1332  remdesivir 200 mg in sodium chloride 0.9 % 250 mL IVPB (powder vial)     Ordering Provider: Tracy Jones APRN    200 mg  over 60 Minutes Intravenous Every 24 Hours 01/09/21 1500 01/09/21 1838    01/08/21 2323  doxycycline (VIBRAMYCIN) 100 mg/100 mL 0.9% NS MBP     Ordering Provider: Dada Conner MD    100 mg  over 60 Minutes Intravenous Once 01/08/21 2325 01/09/21 0103    01/08/21 2022  cefTRIAXone (ROCEPHIN) 2 g/100 mL 0.9% NS IVPB (MBP)     Ordering Provider: Armin Barbosa II, PA    2 g  over 30 Minutes Intravenous Once 01/08/21 2024 01/08/21 2102 01/08/21 2224  cefdinir (OMNICEF) 300 MG capsule     Ordering Provider: Armin Barbosa II, PA    300 mg Oral 2 Times Daily 01/08/21 0000          Current Diuretic Therapy:  Diuretics (From admission, onward)    None        ----------------------------------------------------------------------------------------------------------------------  Vital Signs:  Temp:  [97.3 °F (36.3 °C)-97.8 °F (36.6 °C)] 97.3 °F (36.3 °C)  Heart Rate:  [56-65] 56  Resp:  [18-19] 18  BP: (128-218)/(66-92) 128/70  SpO2:  [91 %-96 %] 95 %  on  Flow (L/min):  [10-12] 10;   Device (Oxygen Therapy): bubble;high-flow nasal cannula  Body mass index is 37.89 kg/m².    Wt Readings from Last 3 Encounters:   01/17/21 85.1 kg (187 lb 9.6 oz)   12/31/20 83.9 kg (185 lb)   12/01/20 84.4 kg (186 lb)     Intake & Output (last 3 days)       01/14 0701 - 01/15 0700 01/15 0701 - 01/16 0700 01/16 0701 - 01/17 0700 01/17 0701 - 01/18 0700    P.O.  700    I.V. (mL/kg)  2418.9 (28.4) 270.2 (3.2)     IV Piggyback   200     Total Intake(mL/kg) 600 (7.2) 3098.9 (36.3) 1930.2 (22.7) 700 (8.2)    Urine (mL/kg/hr)   250 (0.1) 400 (0.4)    Total Output   250 400    Net +600 +3098.9 +1680.2 +300            Urine Unmeasured Occurrence 8 x 10 x 6 x          Diet Regular  ----------------------------------------------------------------------------------------------------------------------  Physical exam:  This physical exam has been personally performed remotely in the unit aided by real-time audio/visual communication tools. Nursing present at bedside during this exam and assisted during exam. The use of a video visit has been reviewed with the patient and verbal informed consent has been obtained.     Physical Exam:  General: Patient appears awake, alert, and in no acute distress.  Head: Normocephalic, atraumatic  Eyes: EOMI. Conjunctivae and sclerae normal.  Ears: Ears appear intact with no abnormalities noted.   Neck: Trachea midline. No obvious JVD.  Heart: Tele reveals sinus rhythm  Lungs: Respirations appear to be regular, even and unlabored with no signs of respiratory distress. No audible wheezing.  Abdomen: No obvious abdominal distension.  MS: Muscle tone appears normal. No gross deformities.  Extremities: No clubbing, cyanosis or edema noted.  Skin: No visible bleeding, bruising, or rash.  Neurologic: Alert and oriented x3. No gross focal deficits.   ----------------------------------------------------------------------------------------------------------------------  Tele:    ----------------------------------------------------------------------------------------------------------------------  Results from last 7 days   Lab Units 01/17/21  0113 01/16/21  0300 01/15/21  0519   CRP mg/dL 1.19* 1.96* 2.88*   WBC 10*3/mm3 6.40 8.50 8.22   HEMOGLOBIN g/dL 12.0 12.5 12.9   HEMATOCRIT % 38.9 39.3 41.6   MCV fL 94.6 92.9 93.5   MCHC g/dL 30.8* 31.8 31.0*   PLATELETS 10*3/mm3 245 265 298         Results from last 7 days   Lab Units 01/17/21  0113 01/16/21  0300 01/15/21  0519   SODIUM mmol/L 144 144 139   POTASSIUM mmol/L 4.2 4.1 4.1   CHLORIDE mmol/L 107 107 103   CO2 mmol/L 26.7 26.3 27.4   BUN mg/dL 23 22 22   CREATININE mg/dL 0.92 0.92 0.95   EGFR IF  NONAFRICN AM mL/min/1.73 59* 59* 57*   CALCIUM mg/dL 8.8 9.1 9.1   GLUCOSE mg/dL 173* 126* 134*   ALBUMIN g/dL 2.91* 2.87* 2.78*   BILIRUBIN mg/dL 0.4 0.5 0.5   ALK PHOS U/L 80 68 71   AST (SGOT) U/L 24 22 19   ALT (SGPT) U/L 25 22 23   Estimated Creatinine Clearance: 49.5 mL/min (by C-G formula based on SCr of 0.92 mg/dL).  No results found for: AMMONIA              No results found for: HGBA1C, POCGLU  Lab Results   Component Value Date    TSH 1.950 09/14/2018    FREET4 1.16 09/14/2018     No results found for: PREGTESTUR, PREGSERUM, HCG, HCGQUANT  Pain Management Panel     Pain Management Panel Latest Ref Rng & Units 10/9/2020 6/10/2020    CREATININE UR mg/dL 99.5 122.8    AMPHETAMINES SCREEN, URINE Negative - -    BARBITURATES SCREEN Negative - -    BENZODIAZEPINE SCREEN, URINE Negative - -    COCAINE SCREEN, URINE Negative - -    METHADONE SCREEN, URINE Negative - -        Brief Urine Lab Results  (Last result in the past 365 days)      Color   Clarity   Blood   Leuk Est   Nitrite   Protein   CREAT   Urine HCG        01/08/21 2026 Yellow Cloudy Negative Moderate (2+) Positive Trace             Blood Culture   Date Value Ref Range Status   01/08/2021 No growth at 2 days  Preliminary   01/08/2021 No growth at 2 days  Preliminary     Urine Culture   Date Value Ref Range Status   01/08/2021 >100,000 CFU/mL Escherichia coli (A)  Final     No results found for: WOUNDCX  No results found for: STOOLCX  No results found for: RESPCX  No results found for: AFBCX  Results from last 7 days   Lab Units 01/17/21  0113 01/16/21  0300 01/15/21  0519 01/14/21  0129 01/13/21  0329 01/12/21  0324 01/11/21  0545   CRP mg/dL 1.19* 1.96* 2.88* 3.08* 1.85* 1.93* 2.97*       I have personally looked at the labs and they are summarized above.  ----------------------------------------------------------------------------------------------------------------------  Detailed radiology reports for the last 24 hours:    Imaging Results (Last  24 Hours)     ** No results found for the last 24 hours. **        Assessment & Plan      Sepsis  COVID-19 pneumonia  E. coli UTI  Acute hypoxemic respiratory failure   -O2 requirements down to 10 L, will continue to titrate as able   -CXR with worsening bilateral patchy airspace disease and CT chest with worsening patchy airspace disease consistent with COVID.   -Continue dexamethasone, has completed remdesivir   -After discussion with ID, have escalated to Zosyn monotherapy.    -Infectious disease consulted and following along   -discussed if continued worsening respiratory status next steps would be BIPAP and then after that intubation if neccessary.     CKD stage IIIa  Hyperlipidemia  Essential hypertension  GERD      VTE Prophylaxis:   Mechanical Order History:     None      Pharmalogical Order History:      Ordered     Dose Route Frequency Stop    01/09/21 1320  enoxaparin (LOVENOX) syringe 40 mg      40 mg SC Every 12 Hours Scheduled --    01/09/21 0127  enoxaparin (LOVENOX) syringe 40 mg  Status:  Discontinued      40 mg SC Every 24 Hours 01/09/21 1320                Ruy Tenorio DO  [facility] Hospitalist  01/17/21  17:55 EST

## 2021-01-17 NOTE — PROGRESS NOTES
[facility] HOSPITALIST PROGRESS NOTE     Patient Identification:  Name:  Emma Ryan  Age:  80 y.o.  Sex:  female  :  1940  MRN:  [id number]  Visit Number:  [id number]  ROOM: [address] [place] [address]     Primary Care Provider:  Jennifer Montano MD    Length of stay in inpatient status:  8    Subjective     Chief Compliant:    Chief Complaint   Patient presents with   • Exposure To Known Illness   • Nausea       History of Presenting Illness: Patient seen and examined in follow-up for sepsis secondary to E. coli UTI and COVID-19.  Patient today stable O2 requirements remain at 12 L.  Patient states that she continues to feel some better.    Objective     Current Hospital Meds:albuterol sulfate HFA, 2 puff, Inhalation, 4x Daily - RT  ascorbic acid in  mL IVPB, 1,500 mg, Intravenous, Q8H  carvedilol, 25 mg, Oral, BID With Meals  cetirizine, 5 mg, Oral, Daily  cholecalciferol, 1,000 Units, Oral, Daily  dexamethasone, 6 mg, Intravenous, Daily  enoxaparin, 40 mg, Subcutaneous, Q12H  gabapentin, 100 mg, Oral, BID  lactobacillus acidophilus, 1 capsule, Oral, Daily  losartan, 100 mg, Oral, Daily  oxybutynin XL, 10 mg, Oral, Daily  pantoprazole, 40 mg, Oral, QAM  piperacillin-tazobactam, 4.5 g, Intravenous, Q8H  sodium chloride, 10 mL, Intravenous, Q12H         Current Antimicrobial Therapy:  Anti-Infectives (From admission, onward)    Ordered     Dose/Rate Route Frequency Start Stop    21 1041  piperacillin-tazobactam (ZOSYN) 4.5 g/100 mL 0.9% NS IVPB (mbp)     Ordering Provider: Ruy Tenorio DO    4.5 g  over 4 Hours Intravenous Every 8 Hours 21 2100 21 1041  piperacillin-tazobactam (ZOSYN) 4.5 g/100 mL 0.9% NS IVPB (mbp)     Ordering Provider: Ruy Tenorio DO    4.5 g  over 30 Minutes Intravenous Once 21 1300 21 1329    21 1332  remdesivir 100 mg in sodium chloride 0.9 % 250 mL IVPB (powder vial)     Tracy Jones APRN reviewed the order  on 01/12/21 1322.   Ordering Provider: Tracy Jones APRN    100 mg  over 60 Minutes Intravenous Every 24 Hours 01/10/21 1500 01/13/21 1534    01/09/21 1332  remdesivir 200 mg in sodium chloride 0.9 % 250 mL IVPB (powder vial)     Ordering Provider: Tracy Jones APRN    200 mg  over 60 Minutes Intravenous Every 24 Hours 01/09/21 1500 01/09/21 1838    01/08/21 2323  doxycycline (VIBRAMYCIN) 100 mg/100 mL 0.9% NS MBP     Ordering Provider: Dada Conner MD    100 mg  over 60 Minutes Intravenous Once 01/08/21 2325 01/09/21 0103    01/08/21 2022  cefTRIAXone (ROCEPHIN) 2 g/100 mL 0.9% NS IVPB (MBP)     Ordering Provider: Armin Barbosa II, PA    2 g  over 30 Minutes Intravenous Once 01/08/21 2024 01/08/21 2102 01/08/21 2224  cefdinir (OMNICEF) 300 MG capsule     Ordering Provider: Armin Barbosa II, PA    300 mg Oral 2 Times Daily 01/08/21 0000          Current Diuretic Therapy:  Diuretics (From admission, onward)    None        ----------------------------------------------------------------------------------------------------------------------  Vital Signs:  Temp:  [97.8 °F (36.6 °C)-98.4 °F (36.9 °C)] 97.8 °F (36.6 °C)  Heart Rate:  [51-82] 59  Resp:  [18-20] 19  BP: (130-174)/(66-76) 130/76  SpO2:  [90 %-92 %] 91 %  on  Flow (L/min):  [12] 12;   Device (Oxygen Therapy): bubble;high-flow nasal cannula  Body mass index is 37.97 kg/m².    Wt Readings from Last 3 Encounters:   01/16/21 85.3 kg (188 lb)   12/31/20 83.9 kg (185 lb)   12/01/20 84.4 kg (186 lb)     Intake & Output (last 3 days)       01/14 0701 - 01/15 0700 01/15 0701 - 01/16 0700 01/16 0701 - 01/17 0700    P.O.     I.V. (mL/kg)  2418.9 (28.4) 270.2 (3.2)    IV Piggyback   100    Total Intake(mL/kg) 600 (7.2) 3098.9 (36.3) 1830.2 (21.5)    Net +600 +3098.9 +1830.2           Urine Unmeasured Occurrence 8 x 10 x 3 x        Diet  Regular  ----------------------------------------------------------------------------------------------------------------------  Physical exam:  This physical exam has been personally performed remotely in the unit aided by real-time audio/visual communication tools. Nursing present at bedside during this exam and assisted during exam. The use of a video visit has been reviewed with the patient and verbal informed consent has been obtained.     Physical Exam:  General: Patient appears awake, alert, and in no acute distress.  Head: Normocephalic, atraumatic  Eyes: EOMI. Conjunctivae and sclerae normal.  Ears: Ears appear intact with no abnormalities noted.   Neck: Trachea midline. No obvious JVD.  Heart: Tele reveals sinus rhythm  Lungs: Respirations appear to be regular, even and unlabored with no signs of respiratory distress. No audible wheezing.  Abdomen: No obvious abdominal distension.  MS: Muscle tone appears normal. No gross deformities.  Extremities: No clubbing, cyanosis or edema noted.  Skin: No visible bleeding, bruising, or rash.  Neurologic: Alert and oriented x3. No gross focal deficits.   ----------------------------------------------------------------------------------------------------------------------  Tele:    ----------------------------------------------------------------------------------------------------------------------  Results from last 7 days   Lab Units 01/16/21  0300 01/15/21  0519 01/14/21  0129   CRP mg/dL 1.96* 2.88* 3.08*   WBC 10*3/mm3 8.50 8.22 7.73   HEMOGLOBIN g/dL 12.5 12.9 13.0   HEMATOCRIT % 39.3 41.6 40.9   MCV fL 92.9 93.5 90.9   MCHC g/dL 31.8 31.0* 31.8   PLATELETS 10*3/mm3 265 298 331     Results from last 7 days   Lab Units 01/10/21  0721   PH, ARTERIAL pH units 7.399   PO2 ART mm Hg 56.6*   PCO2, ARTERIAL mm Hg 37.6   HCO3 ART mmol/L 23.2     Results from last 7 days   Lab Units 01/16/21  0300 01/15/21  0519 01/14/21  0129   SODIUM mmol/L 144 139 141   POTASSIUM  mmol/L 4.1 4.1 3.8   CHLORIDE mmol/L 107 103 105   CO2 mmol/L 26.3 27.4 25.5   BUN mg/dL 22 22 22   CREATININE mg/dL 0.92 0.95 0.94   EGFR IF NONAFRICN AM mL/min/1.73 59* 57* 57*   CALCIUM mg/dL 9.1 9.1 9.2   GLUCOSE mg/dL 126* 134* 134*   ALBUMIN g/dL 2.87* 2.78* 2.99*   BILIRUBIN mg/dL 0.5 0.5 0.4   ALK PHOS U/L 68 71 79   AST (SGOT) U/L 22 19 24   ALT (SGPT) U/L 22 23 27   Estimated Creatinine Clearance: 49.6 mL/min (by C-G formula based on SCr of 0.92 mg/dL).  No results found for: AMMONIA  Results from last 7 days   Lab Units 01/10/21  0220   CK TOTAL U/L 87             No results found for: HGBA1C, POCGLU  Lab Results   Component Value Date    TSH 1.950 09/14/2018    FREET4 1.16 09/14/2018     No results found for: PREGTESTUR, PREGSERUM, HCG, HCGQUANT  Pain Management Panel     Pain Management Panel Latest Ref Rng & Units 10/9/2020 6/10/2020    CREATININE UR mg/dL 99.5 122.8    AMPHETAMINES SCREEN, URINE Negative - -    BARBITURATES SCREEN Negative - -    BENZODIAZEPINE SCREEN, URINE Negative - -    COCAINE SCREEN, URINE Negative - -    METHADONE SCREEN, URINE Negative - -        Brief Urine Lab Results  (Last result in the past 365 days)      Color   Clarity   Blood   Leuk Est   Nitrite   Protein   CREAT   Urine HCG        01/08/21 2026 Yellow Cloudy Negative Moderate (2+) Positive Trace             Blood Culture   Date Value Ref Range Status   01/08/2021 No growth at 2 days  Preliminary   01/08/2021 No growth at 2 days  Preliminary     Urine Culture   Date Value Ref Range Status   01/08/2021 >100,000 CFU/mL Escherichia coli (A)  Final     No results found for: WOUNDCX  No results found for: STOOLCX  No results found for: RESPCX  No results found for: AFBCX  Results from last 7 days   Lab Units 01/16/21  0300 01/15/21  0519 01/14/21  0129 01/13/21  0329 01/12/21  0324 01/11/21  0545 01/10/21  0220   CRP mg/dL 1.96* 2.88* 3.08* 1.85* 1.93* 2.97* 6.17*       I have personally looked at the labs and they are  summarized above.  ----------------------------------------------------------------------------------------------------------------------  Detailed radiology reports for the last 24 hours:    Imaging Results (Last 24 Hours)     ** No results found for the last 24 hours. **        Assessment & Plan      Sepsis  COVID-19 pneumonia  E. coli UTI  Acute hypoxemic respiratory failure   -O2 requirements remain at 12 L but no further increases.   -CXR with worsening bilateral patchy airspace disease and CT chest with worsening patchy airspace disease consistent with COVID.   -Continue dexamethasone, has completed remdesivir   -After discussion with ID, have escalated to Zosyn monotherapy.    -Infectious disease consulted and following along   -discussed if continued worsening respiratory status next steps would be BIPAP and then after that intubation if neccessary.     CKD stage IIIa  Hyperlipidemia  Essential hypertension  GERD      VTE Prophylaxis:   Mechanical Order History:     None      Pharmalogical Order History:      Ordered     Dose Route Frequency Stop    01/09/21 1320  enoxaparin (LOVENOX) syringe 40 mg      40 mg SC Every 12 Hours Scheduled --    01/09/21 0127  enoxaparin (LOVENOX) syringe 40 mg  Status:  Discontinued      40 mg SC Every 24 Hours 01/09/21 1320                Ruy Tenorio DO  [facility] ERist  01/16/21  20:09 EST

## 2021-01-18 ENCOUNTER — APPOINTMENT (OUTPATIENT)
Dept: CARDIOLOGY | Facility: HOSPITAL | Age: 81
End: 2021-01-18

## 2021-01-18 LAB
ALBUMIN SERPL-MCNC: 2.78 G/DL (ref 3.5–5.2)
ALBUMIN/GLOB SERPL: 1 G/DL
ALP SERPL-CCNC: 72 U/L (ref 39–117)
ALT SERPL W P-5'-P-CCNC: 33 U/L (ref 1–33)
ANION GAP SERPL CALCULATED.3IONS-SCNC: 7.1 MMOL/L (ref 5–15)
AST SERPL-CCNC: 26 U/L (ref 1–32)
BH CV ECHO MEAS - % IVS THICK: 40.6 %
BH CV ECHO MEAS - % LVPW THICK: 46.7 %
BH CV ECHO MEAS - ACS: 1.8 CM
BH CV ECHO MEAS - AO MAX PG: 10.8 MMHG
BH CV ECHO MEAS - AO MEAN PG: 5 MMHG
BH CV ECHO MEAS - AO ROOT AREA: 4.5 CM^2
BH CV ECHO MEAS - AO ROOT DIAM: 2.4 CM
BH CV ECHO MEAS - AO V2 MAX: 164 CM/SEC
BH CV ECHO MEAS - AO V2 MEAN: 107 CM/SEC
BH CV ECHO MEAS - AO V2 VTI: 37.8 CM
BH CV ECHO MEAS - BZI_METRIC_HEIGHT: 149.9 CM
BH CV ECHO MEAS - EDV(CUBED): 79.8 ML
BH CV ECHO MEAS - EDV(MOD-SP4): 53.4 ML
BH CV ECHO MEAS - EDV(TEICH): 83.3 ML
BH CV ECHO MEAS - EF(CUBED): 66 %
BH CV ECHO MEAS - EF(MOD-SP4): 61.4 %
BH CV ECHO MEAS - EF(TEICH): 57.8 %
BH CV ECHO MEAS - ESV(CUBED): 27.1 ML
BH CV ECHO MEAS - ESV(MOD-SP4): 20.6 ML
BH CV ECHO MEAS - ESV(TEICH): 35.1 ML
BH CV ECHO MEAS - FS: 30.2 %
BH CV ECHO MEAS - IVS/LVPW: 0.99
BH CV ECHO MEAS - IVSD: 0.9 CM
BH CV ECHO MEAS - IVSS: 1.3 CM
BH CV ECHO MEAS - LA DIMENSION: 2.6 CM
BH CV ECHO MEAS - LA/AO: 1.1
BH CV ECHO MEAS - LV MASS(C)D: 125.1 GRAMS
BH CV ECHO MEAS - LV MASS(C)S: 125.3 GRAMS
BH CV ECHO MEAS - LVIDD: 4.3 CM
BH CV ECHO MEAS - LVIDS: 3 CM
BH CV ECHO MEAS - LVLD AP4: 6.3 CM
BH CV ECHO MEAS - LVLS AP4: 6 CM
BH CV ECHO MEAS - LVOT AREA (M): 2.8 CM^2
BH CV ECHO MEAS - LVOT AREA: 2.8 CM^2
BH CV ECHO MEAS - LVOT DIAM: 1.9 CM
BH CV ECHO MEAS - LVPWD: 0.91 CM
BH CV ECHO MEAS - LVPWS: 1.3 CM
BH CV ECHO MEAS - MV A MAX VEL: 99 CM/SEC
BH CV ECHO MEAS - MV E MAX VEL: 73.8 CM/SEC
BH CV ECHO MEAS - MV E/A: 0.75
BH CV ECHO MEAS - PA ACC TIME: 0.13 SEC
BH CV ECHO MEAS - PA PR(ACCEL): 21.9 MMHG
BH CV ECHO MEAS - RAP SYSTOLE: 10 MMHG
BH CV ECHO MEAS - RVSP: 41.1 MMHG
BH CV ECHO MEAS - SV(AO): 171 ML
BH CV ECHO MEAS - SV(CUBED): 52.6 ML
BH CV ECHO MEAS - SV(MOD-SP4): 32.8 ML
BH CV ECHO MEAS - SV(TEICH): 48.2 ML
BH CV ECHO MEAS - TR MAX VEL: 279 CM/SEC
BILIRUB SERPL-MCNC: 0.4 MG/DL (ref 0–1.2)
BUN SERPL-MCNC: 24 MG/DL (ref 8–23)
BUN/CREAT SERPL: 25.5 (ref 7–25)
CALCIUM SPEC-SCNC: 9.3 MG/DL (ref 8.6–10.5)
CHLORIDE SERPL-SCNC: 107 MMOL/L (ref 98–107)
CO2 SERPL-SCNC: 28.9 MMOL/L (ref 22–29)
CREAT SERPL-MCNC: 0.94 MG/DL (ref 0.57–1)
CRP SERPL-MCNC: 0.72 MG/DL (ref 0–0.5)
D DIMER PPP FEU-MCNC: 0.45 MCGFEU/ML (ref 0–0.5)
DEPRECATED RDW RBC AUTO: 50.4 FL (ref 37–54)
ERYTHROCYTE [DISTWIDTH] IN BLOOD BY AUTOMATED COUNT: 14.8 % (ref 12.3–15.4)
FERRITIN SERPL-MCNC: 219.9 NG/ML (ref 13–150)
GFR SERPL CREATININE-BSD FRML MDRD: 57 ML/MIN/1.73
GLOBULIN UR ELPH-MCNC: 2.8 GM/DL
GLUCOSE SERPL-MCNC: 121 MG/DL (ref 65–99)
HCT VFR BLD AUTO: 38.4 % (ref 34–46.6)
HGB BLD-MCNC: 12 G/DL (ref 12–15.9)
LDH SERPL-CCNC: 280 U/L (ref 135–214)
MAXIMAL PREDICTED HEART RATE: 141 BPM
MCH RBC QN AUTO: 29.1 PG (ref 26.6–33)
MCHC RBC AUTO-ENTMCNC: 31.3 G/DL (ref 31.5–35.7)
MCV RBC AUTO: 93.2 FL (ref 79–97)
PLATELET # BLD AUTO: 238 10*3/MM3 (ref 140–450)
PMV BLD AUTO: 10.8 FL (ref 6–12)
POTASSIUM SERPL-SCNC: 4.4 MMOL/L (ref 3.5–5.2)
PROT SERPL-MCNC: 5.6 G/DL (ref 6–8.5)
RBC # BLD AUTO: 4.12 10*6/MM3 (ref 3.77–5.28)
SODIUM SERPL-SCNC: 143 MMOL/L (ref 136–145)
STRESS TARGET HR: 120 BPM
WBC # BLD AUTO: 7.61 10*3/MM3 (ref 3.4–10.8)

## 2021-01-18 PROCEDURE — 83615 LACTATE (LD) (LDH) ENZYME: CPT | Performed by: INTERNAL MEDICINE

## 2021-01-18 PROCEDURE — 85379 FIBRIN DEGRADATION QUANT: CPT | Performed by: INTERNAL MEDICINE

## 2021-01-18 PROCEDURE — 25010000002 PIPERACILLIN SOD-TAZOBACTAM PER 1 G: Performed by: STUDENT IN AN ORGANIZED HEALTH CARE EDUCATION/TRAINING PROGRAM

## 2021-01-18 PROCEDURE — 25010000002 DEXAMETHASONE PER 1 MG: Performed by: HOSPITALIST

## 2021-01-18 PROCEDURE — 94799 UNLISTED PULMONARY SVC/PX: CPT

## 2021-01-18 PROCEDURE — 80053 COMPREHEN METABOLIC PANEL: CPT | Performed by: HOSPITALIST

## 2021-01-18 PROCEDURE — 86140 C-REACTIVE PROTEIN: CPT | Performed by: HOSPITALIST

## 2021-01-18 PROCEDURE — 93306 TTE W/DOPPLER COMPLETE: CPT

## 2021-01-18 PROCEDURE — 25010000002 ENOXAPARIN PER 10 MG: Performed by: INTERNAL MEDICINE

## 2021-01-18 PROCEDURE — 85027 COMPLETE CBC AUTOMATED: CPT | Performed by: INTERNAL MEDICINE

## 2021-01-18 PROCEDURE — 82728 ASSAY OF FERRITIN: CPT | Performed by: INTERNAL MEDICINE

## 2021-01-18 PROCEDURE — 99233 SBSQ HOSP IP/OBS HIGH 50: CPT | Performed by: INTERNAL MEDICINE

## 2021-01-18 PROCEDURE — 93306 TTE W/DOPPLER COMPLETE: CPT | Performed by: INTERNAL MEDICINE

## 2021-01-18 RX ORDER — ATORVASTATIN CALCIUM 20 MG/1
20 TABLET, FILM COATED ORAL DAILY
Status: DISCONTINUED | OUTPATIENT
Start: 2021-01-18 | End: 2021-01-21 | Stop reason: HOSPADM

## 2021-01-18 RX ADMIN — DEXAMETHASONE SODIUM PHOSPHATE 6 MG: 4 INJECTION, SOLUTION INTRA-ARTICULAR; INTRALESIONAL; INTRAMUSCULAR; INTRAVENOUS; SOFT TISSUE at 08:36

## 2021-01-18 RX ADMIN — PIPERACILLIN SODIUM AND TAZOBACTAM SODIUM 4.5 G: 4; .5 INJECTION, POWDER, LYOPHILIZED, FOR SOLUTION INTRAVENOUS at 13:09

## 2021-01-18 RX ADMIN — LOSARTAN POTASSIUM 100 MG: 50 TABLET, FILM COATED ORAL at 08:36

## 2021-01-18 RX ADMIN — GABAPENTIN 100 MG: 100 CAPSULE ORAL at 08:36

## 2021-01-18 RX ADMIN — GABAPENTIN 100 MG: 100 CAPSULE ORAL at 19:55

## 2021-01-18 RX ADMIN — ALBUTEROL SULFATE 2 PUFF: 90 AEROSOL, METERED RESPIRATORY (INHALATION) at 13:08

## 2021-01-18 RX ADMIN — ENOXAPARIN SODIUM 40 MG: 40 INJECTION SUBCUTANEOUS at 05:07

## 2021-01-18 RX ADMIN — OXYBUTYNIN CHLORIDE 10 MG: 5 TABLET, EXTENDED RELEASE ORAL at 08:36

## 2021-01-18 RX ADMIN — CARVEDILOL 25 MG: 25 TABLET, FILM COATED ORAL at 17:09

## 2021-01-18 RX ADMIN — Medication 1 CAPSULE: at 08:36

## 2021-01-18 RX ADMIN — HYDRALAZINE HYDROCHLORIDE 25 MG: 25 TABLET, FILM COATED ORAL at 19:55

## 2021-01-18 RX ADMIN — CETIRIZINE HYDROCHLORIDE 5 MG: 10 TABLET, FILM COATED ORAL at 08:35

## 2021-01-18 RX ADMIN — CARVEDILOL 25 MG: 25 TABLET, FILM COATED ORAL at 08:35

## 2021-01-18 RX ADMIN — ENOXAPARIN SODIUM 40 MG: 40 INJECTION SUBCUTANEOUS at 17:09

## 2021-01-18 RX ADMIN — PANTOPRAZOLE SODIUM 40 MG: 40 TABLET, DELAYED RELEASE ORAL at 05:08

## 2021-01-18 RX ADMIN — SODIUM CHLORIDE, PRESERVATIVE FREE 10 ML: 5 INJECTION INTRAVENOUS at 08:36

## 2021-01-18 RX ADMIN — CHOLECALCIFEROL TAB 10 MCG (400 UNIT) 1000 UNITS: 10 TAB at 08:35

## 2021-01-18 RX ADMIN — PIPERACILLIN SODIUM AND TAZOBACTAM SODIUM 4.5 G: 4; .5 INJECTION, POWDER, LYOPHILIZED, FOR SOLUTION INTRAVENOUS at 05:08

## 2021-01-18 RX ADMIN — PIPERACILLIN SODIUM AND TAZOBACTAM SODIUM 4.5 G: 4; .5 INJECTION, POWDER, LYOPHILIZED, FOR SOLUTION INTRAVENOUS at 19:55

## 2021-01-18 RX ADMIN — HYDRALAZINE HYDROCHLORIDE 25 MG: 25 TABLET, FILM COATED ORAL at 05:08

## 2021-01-18 RX ADMIN — ALBUTEROL SULFATE 2 PUFF: 90 AEROSOL, METERED RESPIRATORY (INHALATION) at 19:55

## 2021-01-18 RX ADMIN — HYDRALAZINE HYDROCHLORIDE 25 MG: 25 TABLET, FILM COATED ORAL at 13:09

## 2021-01-18 RX ADMIN — ATORVASTATIN CALCIUM 20 MG: 20 TABLET, FILM COATED ORAL at 09:51

## 2021-01-18 NOTE — PLAN OF CARE
Goal Outcome Evaluation:  Plan of Care Reviewed With: patient  Progress: no change   Pt resting with no complaints at this time. Will continue to monitor

## 2021-01-18 NOTE — PROGRESS NOTES
PROGRESS NOTE         Patient Identification:  Name:  Emma Ryan  Age:  80 y.o.  Sex:  female  :  1940  MRN:  [id number]  Visit Number:  [id number]  Primary Care Provider:  Jennifer Montano MD         LOS: 10 days       ----------------------------------------------------------------------------------------------------------------------  Subjective       Chief Complaints:    Exposure To Known Illness and Nausea        Interval History:      Case discussed with primary RN.  Patient's oxygen requirements have been decreased to 10 L bubble high flow nasal cannula.  Patient is feeling much better today without any complaints.  Afebrile, no diarrhea.  CRP is improved at 0.72.  WBC remains normal.    Review of Systems:    Constitutional: no fever, chills and night sweats.  Generalized fatigue.  Eyes: no eye drainage, itching or redness.  HEENT: no mouth sores, dysphagia or nose bleed.  Respiratory: Positive shortness of breath and productive cough.  Cardiovascular: no chest pain, no palpitations, no orthopnea.  Gastrointestinal: no nausea, vomiting or diarrhea. No abdominal pain, hematemesis or rectal bleeding.  Genitourinary: no dysuria or polyuria.  Hematologic/lymphatic: no lymph node abnormalities, no easy bruising or easy bleeding.  Musculoskeletal: no muscle or joint pain.  Skin: No rash and no itching.  Neurological: no loss of consciousness, no seizure, no headache.  Behavioral/Psych: no depression or suicidal ideation.  Endocrine: no hot flashes.  Immunologic: negative.    ----------------------------------------------------------------------------------------------------------------------      Objective       Current [facility] Meds:  albuterol sulfate HFA, 2 puff, Inhalation, 4x Daily - RT  atorvastatin, 20 mg, Oral, Daily  carvedilol, 25 mg, Oral, BID With Meals  cetirizine, 5 mg, Oral, Daily  cholecalciferol, 1,000 Units, Oral, Daily  dexamethasone, 6 mg, Intravenous,  Daily  enoxaparin, 40 mg, Subcutaneous, Q12H  gabapentin, 100 mg, Oral, BID  hydrALAZINE, 25 mg, Oral, Q8H  lactobacillus acidophilus, 1 capsule, Oral, Daily  losartan, 100 mg, Oral, Daily  oxybutynin XL, 10 mg, Oral, Daily  pantoprazole, 40 mg, Oral, QAM  piperacillin-tazobactam, 4.5 g, Intravenous, Q8H  sodium chloride, 10 mL, Intravenous, Q12H         ----------------------------------------------------------------------------------------------------------------------    Vital Signs:  Temp:  [96 °F (35.6 °C)-96.1 °F (35.6 °C)] 96.1 °F (35.6 °C)  Heart Rate:  [64-84] 84  Resp:  [18-24] 24  BP: (128-152)/(66-80) 152/80  Mean Arterial Pressure (Non-Invasive) for the past 24 hrs (Last 3 readings):   Noninvasive MAP (mmHg)   01/17/21 1915 96     SpO2 Percentage    01/18/21 0645 01/18/21 0835 01/18/21 0951   SpO2: 95% 96% 96%     SpO2:  [94 %-96 %] 96 %  on  Flow (L/min):  [8-11] 8;   Device (Oxygen Therapy): high-flow nasal cannula;bubble    Body mass index is 38.38 kg/m².  Wt Readings from Last 3 Encounters:   01/18/21 86.2 kg (190 lb)   12/31/20 83.9 kg (185 lb)   12/01/20 84.4 kg (186 lb)        Intake/Output Summary (Last 24 hours) at 1/18/2021 1119  Last data filed at 1/18/2021 0923  Gross per 24 hour   Intake 1688 ml   Output 750 ml   Net 938 ml     Diet Regular  ----------------------------------------------------------------------------------------------------------------------      Physical Exam:    Deferred due to COVID-19 isolation.  ----------------------------------------------------------------------------------------------------------------------                Results from last 7 days   Lab Units 01/18/21  0456 01/17/21  0113 01/16/21  0300   CRP mg/dL 0.72* 1.19* 1.96*   WBC 10*3/mm3 7.61 6.40 8.50   HEMOGLOBIN g/dL 12.0 12.0 12.5   HEMATOCRIT % 38.4 38.9 39.3   MCV fL 93.2 94.6 92.9   MCHC g/dL 31.3* 30.8* 31.8   PLATELETS 10*3/mm3 100 225 034     Results from last 7 days   Lab Units 01/18/21  0456  01/17/21  0113 01/16/21  0300   SODIUM mmol/L 143 144 144   POTASSIUM mmol/L 4.4 4.2 4.1   CHLORIDE mmol/L 107 107 107   CO2 mmol/L 28.9 26.7 26.3   BUN mg/dL 24* 23 22   CREATININE mg/dL 0.94 0.92 0.92   EGFR IF NONAFRICN AM mL/min/1.73 57* 59* 59*   CALCIUM mg/dL 9.3 8.8 9.1   GLUCOSE mg/dL 121* 173* 126*   ALBUMIN g/dL 2.78* 2.91* 2.87*   BILIRUBIN mg/dL 0.4 0.4 0.5   ALK PHOS U/L 72 80 68   AST (SGOT) U/L 26 24 22   ALT (SGPT) U/L 33 25 22   Estimated Creatinine Clearance: 48.9 mL/min (by C-G formula based on SCr of 0.94 mg/dL).  No results found for: AMMONIA    No results found for: HGBA1C, POCGLU  No results found for: HGBA1C  Lab Results   Component Value Date    TSH 1.950 09/14/2018    FREET4 1.16 09/14/2018       Blood Culture   Date Value Ref Range Status   01/08/2021 No growth at 24 hours  Preliminary   01/08/2021 No growth at 24 hours  Preliminary     Urine Culture   Date Value Ref Range Status   01/08/2021 >100,000 CFU/mL Escherichia coli (A)  Final     No results found for: WOUNDCX  No results found for: STOOLCX  No results found for: RESPCX  Pain Management Panel     Pain Management Panel Latest Ref Rng & Units 10/9/2020 6/10/2020    CREATININE UR mg/dL 99.5 122.8    AMPHETAMINES SCREEN, URINE Negative - -    BARBITURATES SCREEN Negative - -    BENZODIAZEPINE SCREEN, URINE Negative - -    COCAINE SCREEN, URINE Negative - -    METHADONE SCREEN, URINE Negative - -            ----------------------------------------------------------------------------------------------------------------------  Imaging Results (Last 24 Hours)     ** No results found for the last 24 hours. **          ----------------------------------------------------------------------------------------------------------------------    Assessment/Plan       Assessment/Plan     ASSESSMENT:    1.  Sepsis  2.  COVID-19 pneumonia  3.  UTI    PLAN:    Case discussed with primary RN.  Patient's oxygen requirements have been decreased to 10 L  bubble high flow nasal cannula.  Patient is feeling much better today without any complaints.  Afebrile, no diarrhea.  CRP is improved at 0.72.  WBC remains normal.    CT chest on 1/14/2021 showed worsened patchy bilateral airspace disease suggestive of COVID-19 pneumonia.    Urine culture from 1/8/2021 finalized as greater than 100,000 colonies of E. Coli, which has been treated.    We are agreeable to Decadron and recommend initiation of remdesivir.    Recommend to continue on Zosyn for concerns of superimposed aspiration pneumonia.  We will continue to follow closely and adjust antibiotic therapy as appropriate.    Code Status:   Code Status and Medical Interventions:   Ordered at: 01/08/21 8380     Level Of Support Discussed With:    Patient     Code Status:    CPR     Medical Interventions (Level of Support Prior to Arrest):    Full     CHIRAG Guadarrama  01/18/21  11:19 EST

## 2021-01-18 NOTE — PROGRESS NOTES
Discharge Planning Assessment   Prasanna     Patient Name: Emma Ryan  MRN: [id number]  Today's Date: 1/18/2021    Admit Date: 1/8/2021    Discharge Plan     Row Name 01/18/21 1404       Plan    Plan Pt lives with her spouse, Bob and she plans to return home at discharge. Pt has a rollator and a wheelchair at home. Pt does not utilize home health services. SS to follow.     ALISON Ford

## 2021-01-18 NOTE — PROGRESS NOTES
[facility] HOSPITALIST PROGRESS NOTE     Patient Identification:  Name:  Emma Ryan  Age:  80 y.o.  Sex:  female  :  1940  MRN:  [id number]  Visit Number:  [id number]  ROOM: [address] [place] [address]     Primary Care Provider:  Jennifer Montano MD    Length of stay in inpatient status:  10    Subjective     Chief Compliant:    Chief Complaint   Patient presents with   • Exposure To Known Illness   • Nausea     History of Presenting Illness:    Patient remains ill but stable today, still requiring 10L bubble HFNC though CRP down to 0.72 and on Zosyn which was recently escalated, ID following, HR is stable, she remains afebrile, she does endorse shortness of breath but not significantly worse, her BP is stable, HR is stable, WBC count remains NML.  She denies any subjective fevers or chills or sputum production though does have slight cough. Have resumed home statin today.   Objective     Current Hospital Meds:albuterol sulfate HFA, 2 puff, Inhalation, 4x Daily - RT  atorvastatin, 20 mg, Oral, Daily  carvedilol, 25 mg, Oral, BID With Meals  cetirizine, 5 mg, Oral, Daily  cholecalciferol, 1,000 Units, Oral, Daily  dexamethasone, 6 mg, Intravenous, Daily  enoxaparin, 40 mg, Subcutaneous, Q12H  gabapentin, 100 mg, Oral, BID  hydrALAZINE, 25 mg, Oral, Q8H  lactobacillus acidophilus, 1 capsule, Oral, Daily  losartan, 100 mg, Oral, Daily  oxybutynin XL, 10 mg, Oral, Daily  pantoprazole, 40 mg, Oral, QAM  piperacillin-tazobactam, 4.5 g, Intravenous, Q8H  sodium chloride, 10 mL, Intravenous, Q12H         Current Antimicrobial Therapy:  Anti-Infectives (From admission, onward)    Ordered     Dose/Rate Route Frequency Start Stop    21 1041  piperacillin-tazobactam (ZOSYN) 4.5 g/100 mL 0.9% NS IVPB (mbp)     Ordering Provider: Ruy Tenorio DO    4.5 g  over 4 Hours Intravenous Every 8 Hours 21 2100 21 1041  piperacillin-tazobactam (ZOSYN) 4.5 g/100 mL 0.9% NS IVPB (mbp)      Ordering Provider: Ruy Tenorio DO    4.5 g  over 30 Minutes Intravenous Once 01/14/21 1300 01/14/21 1329    01/09/21 1332  remdesivir 100 mg in sodium chloride 0.9 % 250 mL IVPB (powder vial)     Tracy Jones APRN reviewed the order on 01/12/21 1322.   Ordering Provider: Tracy Jones APRN    100 mg  over 60 Minutes Intravenous Every 24 Hours 01/10/21 1500 01/13/21 1534    01/09/21 1332  remdesivir 200 mg in sodium chloride 0.9 % 250 mL IVPB (powder vial)     Ordering Provider: Tracy Jones APRN    200 mg  over 60 Minutes Intravenous Every 24 Hours 01/09/21 1500 01/09/21 1838    01/08/21 2323  doxycycline (VIBRAMYCIN) 100 mg/100 mL 0.9% NS MBP     Ordering Provider: Dada Conner MD    100 mg  over 60 Minutes Intravenous Once 01/08/21 2325 01/09/21 0103    01/08/21 2022  cefTRIAXone (ROCEPHIN) 2 g/100 mL 0.9% NS IVPB (MBP)     Ordering Provider: Armin Barbosa II, PA    2 g  over 30 Minutes Intravenous Once 01/08/21 2024 01/08/21 2102 01/08/21 2224  cefdinir (OMNICEF) 300 MG capsule     Ordering Provider: Armin Barbosa II, PA    300 mg Oral 2 Times Daily 01/08/21 0000          Current Diuretic Therapy:  Diuretics (From admission, onward)    None        ----------------------------------------------------------------------------------------------------------------------  Vital Signs:  Temp:  [96 °F (35.6 °C)-96.1 °F (35.6 °C)] 96.1 °F (35.6 °C)  Heart Rate:  [64-84] 84  Resp:  [18-24] 24  BP: (128-152)/(66-80) 152/80  SpO2:  [94 %-95 %] 95 %  on  Flow (L/min):  [10-11] 11;   Device (Oxygen Therapy): high-flow nasal cannula;bubble  Body mass index is 38.38 kg/m².    Wt Readings from Last 3 Encounters:   01/18/21 86.2 kg (190 lb)   12/31/20 83.9 kg (185 lb)   12/01/20 84.4 kg (186 lb)     Intake & Output (last 3 days)       01/15 0701 - 01/16 0700 01/16 0701 - 01/17 0700 01/17 0701 - 01/18 0700 01/18 0701 - 01/19 0700    P.O. 680 1460 1300 360    I.V. (mL/kg) 2418.9 (28.4) 270.2 (3.2) 428  (5)     IV Piggyback  200      Total Intake(mL/kg) 3098.9 (36.3) 1930.2 (22.7) 1728 (20) 360 (4.2)    Urine (mL/kg/hr)  250 (0.1) 750 (0.4) 100 (0.5)    Total Output  250 750 100    Net +3098.9 +1680.2 +978 +260            Urine Unmeasured Occurrence 10 x 6 x          Diet Regular  ----------------------------------------------------------------------------------------------------------------------  Physical exam:  This physical exam has been personally performed remotely in the unit aided by real-time audio/visual communication tools. RN present at bedside during this exam and assisted during exam. The use of a video visit has been reviewed with the patient and verbal informed consent has been obtained.     Physical Exam:  General: Patient appears awake, alert, and in no acute distress.  Head: Normocephalic, atraumatic  Eyes: EOMI. Conjunctivae and sclerae normal.  Ears: Ears appear intact with no abnormalities noted.   Neck: Trachea midline. No obvious JVD.  Heart: Tele reveals regular rate and rhythm  Lungs: Respirations appear to be slightly increased. No audible wheezing. On 10L bubble NC  Abdomen: No obvious abdominal distension.  MS: Muscle tone appears normal. No gross deformities.  Extremities: No clubbing, cyanosis or edema noted.  Skin: No visible bleeding, bruising, or rash.  Neurologic: Alert and oriented x3. No gross focal deficits.   ----------------------------------------------------------------------------------------------------------------------  Results from last 7 days   Lab Units 01/18/21 0456 01/17/21 0113 01/16/21  0300   CRP mg/dL 0.72* 1.19* 1.96*   WBC 10*3/mm3 7.61 6.40 8.50   HEMOGLOBIN g/dL 12.0 12.0 12.5   HEMATOCRIT % 38.4 38.9 39.3   MCV fL 93.2 94.6 92.9   MCHC g/dL 31.3* 30.8* 31.8   PLATELETS 10*3/mm3 238 125 265         Results from last 7 days   Lab Units 01/18/21 0456 01/17/21 0113 01/16/21  0300   SODIUM mmol/L 143 144 144   POTASSIUM mmol/L 4.4 4.2 4.1   CHLORIDE mmol/L  107 107 107   CO2 mmol/L 28.9 26.7 26.3   BUN mg/dL 24* 23 22   CREATININE mg/dL 0.94 0.92 0.92   EGFR IF NONAFRICN AM mL/min/1.73 57* 59* 59*   CALCIUM mg/dL 9.3 8.8 9.1   GLUCOSE mg/dL 121* 173* 126*   ALBUMIN g/dL 2.78* 2.91* 2.87*   BILIRUBIN mg/dL 0.4 0.4 0.5   ALK PHOS U/L 72 80 68   AST (SGOT) U/L 26 24 22   ALT (SGPT) U/L 33 25 22   Estimated Creatinine Clearance: 48.9 mL/min (by C-G formula based on SCr of 0.94 mg/dL).  No results found for: AMMONIA              No results found for: HGBA1C, POCGLU  Lab Results   Component Value Date    TSH 1.950 09/14/2018    FREET4 1.16 09/14/2018     No results found for: PREGTESTUR, PREGSERUM, HCG, HCGQUANT  Pain Management Panel     Pain Management Panel Latest Ref Rng & Units 10/9/2020 6/10/2020    CREATININE UR mg/dL 99.5 122.8    AMPHETAMINES SCREEN, URINE Negative - -    BARBITURATES SCREEN Negative - -    BENZODIAZEPINE SCREEN, URINE Negative - -    COCAINE SCREEN, URINE Negative - -    METHADONE SCREEN, URINE Negative - -        Brief Urine Lab Results  (Last result in the past 365 days)      Color   Clarity   Blood   Leuk Est   Nitrite   Protein   CREAT   Urine HCG        01/08/21 2026 Yellow Cloudy Negative Moderate (2+) Positive Trace             No results found for: BLOODCX  No results found for: URINECX  No results found for: WOUNDCX  No results found for: STOOLCX  No results found for: RESPCX  No results found for: AFBCX  Results from last 7 days   Lab Units 01/18/21  0456 01/17/21  0113 01/16/21  0300 01/15/21  0519 01/14/21  0129 01/13/21  0329 01/12/21  0324   CRP mg/dL 0.72* 1.19* 1.96* 2.88* 3.08* 1.85* 1.93*     I have personally looked at the labs and they are summarized above.  ----------------------------------------------------------------------------------------------------------------------  Detailed radiology reports for the last 24 hours:    Imaging Results (Last 24 Hours)     ** No results found for the last 24 hours. **        Assessment &  Plan    80F Obese PMH significant for CKD stage IIIa, GERD, hyperlipidemia, essential hypertension, presents to [facility] Emergency Department with complaints of nausea, vomiting, and fevers, was previously diagnosed with COVID-19 on the December 31st in the ER at this facility.    #Sepsis & Acute Hypoxic Respiratory Failure 2/2 PNA, Viral, treating for Covid 19 but also covering for PNA, Bacterial, treating for Aspiration  #Acute Uncomplicated Ecoli UTI   - Patient presents w/ nausea, vomiting, fevers following diagnosis of covid on 12/31, has had increased oxygen requirements since that time, T 101.2 on admission, HR 94, RR 20, satting 88% on room air.  - Admission labs showed WBC count NML, Rerritin 374, D-dimer and lactate NML, UA showed + nitrites and Le's, 31-50 WBC's, 4+ bacteria, urine culture grew Ecoli, Bcx's NGTD.  - CTPE showed multifocal b/l PNA typical for Covid 19  - ID consulted and Following, has completed Remdesivir therapy  - Escalated to Zosyn yesterday and will continue  - Continue 6mg IV Dexamethasone, may need extended taper at this point  - Continue pharmacy to dose Level I AC for Covid 19 thromboembolism PPx  - Continue APAP PRN for fevers  - Continue to monitor on tele, continue 02 but wean as able though lately has been on 10L bubble, continue albuterol inhaler     #HTN/HLD/Non-obstructive CAD  - CT showed atherosclerotic disease of coronary arteries  - EKG showed NSR, low voltage and poor baseline  - Echo 9/2018 showed LVEF 56-60%, diastolic dysfunction, trace MR, trace TR  - Resumed home statin today, may consider addition of ASA 81 prior to discharge  - Continue Coreg, Losartan, Hydralazine  - Repeating formal echo today  - Continue to monitor on tele, monitor I/O's, trend HR and BP    #CKD stage IIIa  - Patient presented w/ Cr 1.09 b/l around 0.9-1.0  - Trend Cr and UOP, avoid nephrotoxins, NSAIDs, dehydration and contrast as able.     #GERD  - Continue home  PPI    #Obesity  - BMI 38, complicates all aspects of care    F: Oral  E: Monitor & Replace PRN  N: Regular  Ppx: Plov (Level I AC)  Code: Full Code     Dispo: Pending workup and clinical improvement     *This patient is considered high risk due to sepsis, acute respiratory failure, covid 19, bacterial PNA.    VTE Prophylaxis:   Mechanical Order History:     None      Pharmalogical Order History:      Ordered     Dose Route Frequency Stop    01/09/21 1320  enoxaparin (LOVENOX) syringe 40 mg      40 mg SC Every 12 Hours Scheduled --    01/09/21 0127  enoxaparin (LOVENOX) syringe 40 mg  Status:  Discontinued      40 mg SC Every 24 Hours 01/09/21 1320              Yimi Alberto MD  [facility] Hospitalist  01/18/21  09:31 EST

## 2021-01-19 LAB
ALBUMIN SERPL-MCNC: 2.87 G/DL (ref 3.5–5.2)
ALBUMIN/GLOB SERPL: 1.1 G/DL
ALP SERPL-CCNC: 62 U/L (ref 39–117)
ALT SERPL W P-5'-P-CCNC: 38 U/L (ref 1–33)
ANION GAP SERPL CALCULATED.3IONS-SCNC: 6.9 MMOL/L (ref 5–15)
AST SERPL-CCNC: 29 U/L (ref 1–32)
BILIRUB SERPL-MCNC: 0.5 MG/DL (ref 0–1.2)
BUN SERPL-MCNC: 23 MG/DL (ref 8–23)
BUN/CREAT SERPL: 24.7 (ref 7–25)
CALCIUM SPEC-SCNC: 9.4 MG/DL (ref 8.6–10.5)
CHLORIDE SERPL-SCNC: 102 MMOL/L (ref 98–107)
CO2 SERPL-SCNC: 30.1 MMOL/L (ref 22–29)
CREAT SERPL-MCNC: 0.93 MG/DL (ref 0.57–1)
CRP SERPL-MCNC: 0.5 MG/DL (ref 0–0.5)
D DIMER PPP FEU-MCNC: 0.51 MCGFEU/ML (ref 0–0.5)
DEPRECATED RDW RBC AUTO: 50.5 FL (ref 37–54)
ERYTHROCYTE [DISTWIDTH] IN BLOOD BY AUTOMATED COUNT: 14.6 % (ref 12.3–15.4)
FERRITIN SERPL-MCNC: 208 NG/ML (ref 13–150)
FIBRINOGEN PPP-MCNC: 252 MG/DL (ref 173–524)
GFR SERPL CREATININE-BSD FRML MDRD: 58 ML/MIN/1.73
GLOBULIN UR ELPH-MCNC: 2.5 GM/DL
GLUCOSE SERPL-MCNC: 126 MG/DL (ref 65–99)
HCT VFR BLD AUTO: 38.2 % (ref 34–46.6)
HGB BLD-MCNC: 11.8 G/DL (ref 12–15.9)
LDH SERPL-CCNC: 225 U/L (ref 135–214)
MCH RBC QN AUTO: 29.2 PG (ref 26.6–33)
MCHC RBC AUTO-ENTMCNC: 30.9 G/DL (ref 31.5–35.7)
MCV RBC AUTO: 94.6 FL (ref 79–97)
PLATELET # BLD AUTO: 223 10*3/MM3 (ref 140–450)
PMV BLD AUTO: 11.1 FL (ref 6–12)
POTASSIUM SERPL-SCNC: 3.9 MMOL/L (ref 3.5–5.2)
PROT SERPL-MCNC: 5.4 G/DL (ref 6–8.5)
RBC # BLD AUTO: 4.04 10*6/MM3 (ref 3.77–5.28)
SODIUM SERPL-SCNC: 139 MMOL/L (ref 136–145)
WBC # BLD AUTO: 7.39 10*3/MM3 (ref 3.4–10.8)

## 2021-01-19 PROCEDURE — 25010000002 ENOXAPARIN PER 10 MG: Performed by: INTERNAL MEDICINE

## 2021-01-19 PROCEDURE — 83615 LACTATE (LD) (LDH) ENZYME: CPT | Performed by: INTERNAL MEDICINE

## 2021-01-19 PROCEDURE — 94799 UNLISTED PULMONARY SVC/PX: CPT

## 2021-01-19 PROCEDURE — 25010000002 DEXAMETHASONE PER 1 MG: Performed by: INTERNAL MEDICINE

## 2021-01-19 PROCEDURE — 85379 FIBRIN DEGRADATION QUANT: CPT | Performed by: INTERNAL MEDICINE

## 2021-01-19 PROCEDURE — 97530 THERAPEUTIC ACTIVITIES: CPT

## 2021-01-19 PROCEDURE — 25010000002 PIPERACILLIN SOD-TAZOBACTAM PER 1 G: Performed by: STUDENT IN AN ORGANIZED HEALTH CARE EDUCATION/TRAINING PROGRAM

## 2021-01-19 PROCEDURE — 86140 C-REACTIVE PROTEIN: CPT | Performed by: HOSPITALIST

## 2021-01-19 PROCEDURE — 99233 SBSQ HOSP IP/OBS HIGH 50: CPT | Performed by: INTERNAL MEDICINE

## 2021-01-19 PROCEDURE — 97110 THERAPEUTIC EXERCISES: CPT

## 2021-01-19 PROCEDURE — 85384 FIBRINOGEN ACTIVITY: CPT | Performed by: HOSPITALIST

## 2021-01-19 PROCEDURE — 80053 COMPREHEN METABOLIC PANEL: CPT | Performed by: HOSPITALIST

## 2021-01-19 PROCEDURE — 85027 COMPLETE CBC AUTOMATED: CPT | Performed by: INTERNAL MEDICINE

## 2021-01-19 PROCEDURE — 97116 GAIT TRAINING THERAPY: CPT

## 2021-01-19 PROCEDURE — 82728 ASSAY OF FERRITIN: CPT | Performed by: INTERNAL MEDICINE

## 2021-01-19 RX ORDER — DEXAMETHASONE SODIUM PHOSPHATE 4 MG/ML
4 INJECTION, SOLUTION INTRA-ARTICULAR; INTRALESIONAL; INTRAMUSCULAR; INTRAVENOUS; SOFT TISSUE DAILY
Status: DISCONTINUED | OUTPATIENT
Start: 2021-01-19 | End: 2021-01-21 | Stop reason: HOSPADM

## 2021-01-19 RX ADMIN — GABAPENTIN 100 MG: 100 CAPSULE ORAL at 20:46

## 2021-01-19 RX ADMIN — ALBUTEROL SULFATE 2 PUFF: 90 AEROSOL, METERED RESPIRATORY (INHALATION) at 18:12

## 2021-01-19 RX ADMIN — PIPERACILLIN SODIUM AND TAZOBACTAM SODIUM 4.5 G: 4; .5 INJECTION, POWDER, LYOPHILIZED, FOR SOLUTION INTRAVENOUS at 20:45

## 2021-01-19 RX ADMIN — OXYBUTYNIN CHLORIDE 10 MG: 5 TABLET, EXTENDED RELEASE ORAL at 08:26

## 2021-01-19 RX ADMIN — PANTOPRAZOLE SODIUM 40 MG: 40 TABLET, DELAYED RELEASE ORAL at 05:10

## 2021-01-19 RX ADMIN — SODIUM CHLORIDE, PRESERVATIVE FREE 10 ML: 5 INJECTION INTRAVENOUS at 20:46

## 2021-01-19 RX ADMIN — PIPERACILLIN SODIUM AND TAZOBACTAM SODIUM 4.5 G: 4; .5 INJECTION, POWDER, LYOPHILIZED, FOR SOLUTION INTRAVENOUS at 05:10

## 2021-01-19 RX ADMIN — HYDRALAZINE HYDROCHLORIDE 25 MG: 25 TABLET, FILM COATED ORAL at 13:49

## 2021-01-19 RX ADMIN — CARVEDILOL 25 MG: 25 TABLET, FILM COATED ORAL at 08:27

## 2021-01-19 RX ADMIN — LOSARTAN POTASSIUM 100 MG: 50 TABLET, FILM COATED ORAL at 08:27

## 2021-01-19 RX ADMIN — ENOXAPARIN SODIUM 40 MG: 40 INJECTION SUBCUTANEOUS at 18:11

## 2021-01-19 RX ADMIN — ALBUTEROL SULFATE 2 PUFF: 90 AEROSOL, METERED RESPIRATORY (INHALATION) at 14:47

## 2021-01-19 RX ADMIN — CHOLECALCIFEROL TAB 10 MCG (400 UNIT) 1000 UNITS: 10 TAB at 08:26

## 2021-01-19 RX ADMIN — DEXAMETHASONE SODIUM PHOSPHATE 4 MG: 4 INJECTION, SOLUTION INTRA-ARTICULAR; INTRALESIONAL; INTRAMUSCULAR; INTRAVENOUS; SOFT TISSUE at 08:26

## 2021-01-19 RX ADMIN — Medication 1 CAPSULE: at 08:27

## 2021-01-19 RX ADMIN — PIPERACILLIN SODIUM AND TAZOBACTAM SODIUM 4.5 G: 4; .5 INJECTION, POWDER, LYOPHILIZED, FOR SOLUTION INTRAVENOUS at 13:49

## 2021-01-19 RX ADMIN — ALBUTEROL SULFATE 2 PUFF: 90 AEROSOL, METERED RESPIRATORY (INHALATION) at 05:10

## 2021-01-19 RX ADMIN — GABAPENTIN 100 MG: 100 CAPSULE ORAL at 08:28

## 2021-01-19 RX ADMIN — HYDRALAZINE HYDROCHLORIDE 25 MG: 25 TABLET, FILM COATED ORAL at 05:10

## 2021-01-19 RX ADMIN — CARVEDILOL 25 MG: 25 TABLET, FILM COATED ORAL at 18:10

## 2021-01-19 RX ADMIN — SODIUM CHLORIDE, PRESERVATIVE FREE 10 ML: 5 INJECTION INTRAVENOUS at 08:28

## 2021-01-19 RX ADMIN — ENOXAPARIN SODIUM 40 MG: 40 INJECTION SUBCUTANEOUS at 05:10

## 2021-01-19 RX ADMIN — ATORVASTATIN CALCIUM 20 MG: 20 TABLET, FILM COATED ORAL at 08:26

## 2021-01-19 RX ADMIN — CETIRIZINE HYDROCHLORIDE 5 MG: 10 TABLET, FILM COATED ORAL at 08:27

## 2021-01-19 RX ADMIN — HYDRALAZINE HYDROCHLORIDE 25 MG: 25 TABLET, FILM COATED ORAL at 20:45

## 2021-01-19 NOTE — PROGRESS NOTES
[facility] HOSPITALIST PROGRESS NOTE     Patient Identification:  Name:  Emma Ryan  Age:  80 y.o.  Sex:  female  :  1940  MRN:  [id number]  Visit Number:  [id number]  ROOM: [address] [place] [address]     Primary Care Provider:  Jennifer Montano MD    Length of stay in inpatient status:  11    Subjective     Chief Compliant:    Chief Complaint   Patient presents with   • Exposure To Known Illness   • Nausea     History of Presenting Illness:    Patient remains ill but is improved today, have weaned oxygen down to 5LNC today, CRP down to 0.5, ID following and still on Zosyn, I have started to wean IV steroids today w/ extended taper, repeat formal echo w/ mild progression of valvular disease but overall no significant change in functionality, she denies any fevers or chills, she denies any cough or sputum production. Per SW note she plans to return home at discharge, she was seen by PT on 1/15 but not over the holiday weekend, awaiting formal recs on disposition but may need to discuss rehab if she isn't at her baseline.    Objective     Current Hospital Meds:albuterol sulfate HFA, 2 puff, Inhalation, 4x Daily - RT  atorvastatin, 20 mg, Oral, Daily  carvedilol, 25 mg, Oral, BID With Meals  cetirizine, 5 mg, Oral, Daily  cholecalciferol, 1,000 Units, Oral, Daily  dexamethasone, 4 mg, Intravenous, Daily  enoxaparin, 40 mg, Subcutaneous, Q12H  gabapentin, 100 mg, Oral, BID  hydrALAZINE, 25 mg, Oral, Q8H  lactobacillus acidophilus, 1 capsule, Oral, Daily  losartan, 100 mg, Oral, Daily  oxybutynin XL, 10 mg, Oral, Daily  pantoprazole, 40 mg, Oral, QAM  piperacillin-tazobactam, 4.5 g, Intravenous, Q8H  sodium chloride, 10 mL, Intravenous, Q12H         Current Antimicrobial Therapy:  Anti-Infectives (From admission, onward)    Ordered     Dose/Rate Route Frequency Start Stop    21 1041  piperacillin-tazobactam (ZOSYN) 4.5 g/100 mL 0.9% NS IVPB (mbp)     Ordering Provider: Ruy Tenorio DO    4.5  g  over 4 Hours Intravenous Every 8 Hours 01/14/21 2100 01/21/21 2059 01/14/21 1041  piperacillin-tazobactam (ZOSYN) 4.5 g/100 mL 0.9% NS IVPB (mbp)     Ordering Provider: Ruy Tenorio DO    4.5 g  over 30 Minutes Intravenous Once 01/14/21 1300 01/14/21 1329    01/09/21 1332  remdesivir 100 mg in sodium chloride 0.9 % 250 mL IVPB (powder vial)     Tracy Jones APRN reviewed the order on 01/12/21 1322.   Ordering Provider: Tracy Jones APRN    100 mg  over 60 Minutes Intravenous Every 24 Hours 01/10/21 1500 01/13/21 1534    01/09/21 1332  remdesivir 200 mg in sodium chloride 0.9 % 250 mL IVPB (powder vial)     Ordering Provider: Tracy Jones APRN    200 mg  over 60 Minutes Intravenous Every 24 Hours 01/09/21 1500 01/09/21 1838    01/08/21 2323  doxycycline (VIBRAMYCIN) 100 mg/100 mL 0.9% NS MBP     Ordering Provider: Dada Conner MD    100 mg  over 60 Minutes Intravenous Once 01/08/21 2325 01/09/21 0103    01/08/21 2022  cefTRIAXone (ROCEPHIN) 2 g/100 mL 0.9% NS IVPB (MBP)     Ordering Provider: Armin Barbosa II, PA    2 g  over 30 Minutes Intravenous Once 01/08/21 2024 01/08/21 2102 01/08/21 2224  cefdinir (OMNICEF) 300 MG capsule     Ordering Provider: Armin Barbosa II, PA    300 mg Oral 2 Times Daily 01/08/21 0000          Current Diuretic Therapy:  Diuretics (From admission, onward)    None        ----------------------------------------------------------------------------------------------------------------------  Vital Signs:  Temp:  [97.6 °F (36.4 °C)-97.9 °F (36.6 °C)] 97.8 °F (36.6 °C)  Heart Rate:  [52-68] 52  Resp:  [20-28] 22  BP: (146-185)/(65-83) 185/83  SpO2:  [94 %-96 %] 94 %  on  Flow (L/min):  [5-10] 5;   Device (Oxygen Therapy): nasal cannula  Body mass index is 38.37 kg/m².    Wt Readings from Last 3 Encounters:   01/19/21 86.2 kg (189 lb 15.9 oz)   12/31/20 83.9 kg (185 lb)   12/01/20 84.4 kg (186 lb)     Intake & Output (last 3 days)       01/16 0701 - 01/17 0700  01/17 0701 - 01/18 0700 01/18 0701 - 01/19 0700 01/19 0701 - 01/20 0700    P.O. 1460 1300 1520     I.V. (mL/kg) 270.2 (3.2) 428 (5) 298 (3.5)     IV Piggyback 200       Total Intake(mL/kg) 1930.2 (22.7) 1728 (20) 1818 (21.1)     Urine (mL/kg/hr) 250 (0.1) 750 (0.4) 600 (0.3)     Stool   0     Total Output 250 750 600     Net +1680.2 +978 +1218             Urine Unmeasured Occurrence 6 x       Stool Unmeasured Occurrence   0 x         Diet Regular  ----------------------------------------------------------------------------------------------------------------------  Physical exam:  This physical exam has been personally performed remotely in the unit aided by real-time audio/visual communication tools. RN present at bedside during this exam and assisted during exam. The use of a video visit has been reviewed with the patient and verbal informed consent has been obtained.      Physical Exam:  General: Patient appears awake, alert, and in no acute distress.  Head: Normocephalic, atraumatic  Eyes: EOMI. Conjunctivae and sclerae normal.  Ears: Ears appear intact with no abnormalities noted.   Neck: Trachea midline. No obvious JVD.  Heart: Tele reveals regular rate and rhythm  Lungs: Respirations improved. No audible wheezing. On 5LNC now  Abdomen: No obvious abdominal distension.  MS: Muscle tone appears normal. No gross deformities.  Extremities: No clubbing, cyanosis or edema noted.  Skin: No visible bleeding, bruising, or rash.  Neurologic: Alert and oriented x3. No gross focal deficits.   ----------------------------------------------------------------------------------------------------------------------  Results from last 7 days   Lab Units 01/19/21  0235 01/18/21  0456 01/17/21  0113   CRP mg/dL 0.50 0.72* 1.19*   WBC 10*3/mm3 7.39 7.61 6.40   HEMOGLOBIN g/dL 11.8* 12.0 12.0   HEMATOCRIT % 38.2 38.4 38.9   MCV fL 94.6 93.2 94.6   MCHC g/dL 30.9* 31.3* 30.8*   PLATELETS 10*3/mm3 462 012 832         Results from  last 7 days   Lab Units 01/19/21  0235 01/18/21  0456 01/17/21  0113   SODIUM mmol/L 139 143 144   POTASSIUM mmol/L 3.9 4.4 4.2   CHLORIDE mmol/L 102 107 107   CO2 mmol/L 30.1* 28.9 26.7   BUN mg/dL 23 24* 23   CREATININE mg/dL 0.93 0.94 0.92   EGFR IF NONAFRICN AM mL/min/1.73 58* 57* 59*   CALCIUM mg/dL 9.4 9.3 8.8   GLUCOSE mg/dL 126* 121* 173*   ALBUMIN g/dL 2.87* 2.78* 2.91*   BILIRUBIN mg/dL 0.5 0.4 0.4   ALK PHOS U/L 62 72 80   AST (SGOT) U/L 29 26 24   ALT (SGPT) U/L 38* 33 25   Estimated Creatinine Clearance: 49.4 mL/min (by C-G formula based on SCr of 0.93 mg/dL).  No results found for: AMMONIA              No results found for: HGBA1C, POCGLU  Lab Results   Component Value Date    TSH 1.950 09/14/2018    FREET4 1.16 09/14/2018     No results found for: PREGTESTUR, PREGSERUM, HCG, HCGQUANT  Pain Management Panel     Pain Management Panel Latest Ref Rng & Units 10/9/2020 6/10/2020    CREATININE UR mg/dL 99.5 122.8    AMPHETAMINES SCREEN, URINE Negative - -    BARBITURATES SCREEN Negative - -    BENZODIAZEPINE SCREEN, URINE Negative - -    COCAINE SCREEN, URINE Negative - -    METHADONE SCREEN, URINE Negative - -        Brief Urine Lab Results  (Last result in the past 365 days)      Color   Clarity   Blood   Leuk Est   Nitrite   Protein   CREAT   Urine HCG        01/08/21 2026 Yellow Cloudy Negative Moderate (2+) Positive Trace             No results found for: BLOODCX  No results found for: URINECX  No results found for: WOUNDCX  No results found for: STOOLCX  No results found for: RESPCX  No results found for: AFBCX  Results from last 7 days   Lab Units 01/19/21  0235 01/18/21  0456 01/17/21  0113 01/16/21  0300 01/15/21  0519 01/14/21  0129 01/13/21  0329   CRP mg/dL 0.50 0.72* 1.19* 1.96* 2.88* 3.08* 1.85*     I have personally looked at the labs and they are summarized above.  ----------------------------------------------------------------------------------------------------------------------  Detailed  radiology reports for the last 24 hours:    Imaging Results (Last 24 Hours)     ** No results found for the last 24 hours. **        Assessment & Plan    80F Obese PMH significant for CKD stage IIIa, GERD, hyperlipidemia, essential hypertension, presents to [facility] Emergency Department with complaints of nausea, vomiting, and fevers, was previously diagnosed with COVID-19 on the December 31st in the ER at this facility.    #Sepsis & Acute Hypoxic Respiratory Failure 2/2 PNA, Viral, treating for Covid 19 but also covering for PNA, Bacterial, treating for Aspiration  #Acute Uncomplicated Ecoli UTI   - Patient presents w/ nausea, vomiting, fevers following diagnosis of covid on 12/31, has had increased oxygen requirements since that time, T 101.2 on admission, HR 94, RR 20, satting 88% on room air.  - Admission labs showed WBC count NML, Rerritin 374, D-dimer and lactate NML, UA showed + nitrites and Le's, 31-50 WBC's, 4+ bacteria, urine culture grew Ecoli, Bcx's NGTD.  - CTPE showed multifocal b/l PNA typical for Covid 19  - ID consulted and Following, has completed Remdesivir therapy  - Continue Zosyn  - Continue IV Dexamethasone, have begun extended taper today and will start at 4mg x 3-5 days  - Continue pharmacy to dose Level I AC for Covid 19 thromboembolism PPx  - Continue APAP PRN for fevers  - Continue to monitor on tele, continue 02 and have weaned down to 5LNC, continue albuterol inhaler     #HTN/HLD/Non-obstructive CAD  - CT showed atherosclerotic disease of coronary arteries  - EKG showed NSR, low voltage and poor baseline  - Echo 9/2018 showed LVEF 56-60%, diastolic dysfunction, trace MR, trace TR; Repeat formal Echo showed LVEF 61-65%, diastolic dysfunction, mild AR, mild MR, mild TR, RVSP 35-45mmHg  - Continue home statin, may consider addition of ASA 81 prior to discharge  - Continue Coreg, Losartan, Hydralazine  - Continue to monitor on tele, monitor I/O's, trend HR and BP    #CKD stage  IIIa  - Patient presented w/ Cr 1.09 b/l around 0.9-1.0  - Trend Cr and UOP, avoid nephrotoxins, NSAIDs, dehydration and contrast as able.     #GERD  - Continue home PPI    #Obesity  - BMI 38, complicates all aspects of care    F: Oral  E: Monitor & Replace PRN  N: Regular  Ppx: Plov (Level I AC)  Code: Full Code     Dispo: Pending clinical improvement, patient plans to return home at discharge     *This patient is considered high risk due to sepsis, acute respiratory failure, covid 19, bacterial PNA.    VTE Prophylaxis:   Mechanical Order History:     None      Pharmalogical Order History:      Ordered     Dose Route Frequency Stop    01/09/21 1320  enoxaparin (LOVENOX) syringe 40 mg      40 mg SC Every 12 Hours Scheduled --    01/09/21 0127  enoxaparin (LOVENOX) syringe 40 mg  Status:  Discontinued      40 mg SC Every 24 Hours 01/09/21 1320              Yimi Alberto MD  [facility]  01/19/21  08:06 EST

## 2021-01-19 NOTE — THERAPY TREATMENT NOTE
Acute Care - Physical Therapy Treatment Note   [facility]     Patient Name: Emma Ryan  : 1940  MRN: [id number]  Today's Date: 2021   Onset of Illness/Injury or Date of Surgery: 21       PT Assessment (last 12 hours)      PT Evaluation and Treatment     Row Name 21 1500          Physical Therapy Time and Intention    Subjective Information  no complaints  -CW     Document Type  therapy note (daily note)  -CW     Mode of Treatment  physical therapy  -CW     Patient Effort  good  -CW     Symptoms Noted During/After Treatment  none  -CW     Comment  Patient reports she is doing well, and overall feels much better than when she first came in. She states that prior to this episode of care she was independent at home, and has a rollator that she uses occasionally if she needs to.  -CW     Row Name 21 1500          General Information    Patient Profile Reviewed  yes  -CW     Patient Observations  alert;cooperative;agree to therapy  -CW     Row Name 21 1500          Cognition    Affect/Mental Status (Cognitive)  WFL  -CW     Orientation Status (Cognition)  oriented x 3  -CW     Follows Commands (Cognition)  WFL  -CW     Row Name 21 1500          Pain Scale: Word Pre/Post-Treatment    Pre/Posttreatment Pain Comment  Patient reports no pain today.  -CW     Row Name 21 1500          Bed Mobility    All Activities, [place] (Bed Mobility)  modified independence  -CW     Bed Mobility, Safety Issues  decreased use of legs for bridging/pushing  -CW     Assistive Device (Bed Mobility)  bed rails;head of bed elevated  -CW     Row Name 21 1500          Transfers    Sit-Stand [place] (Transfers)  contact guard  -CW     Stand-Sit [place] (Transfers)  contact guard  -CW     Row Name 21 1500          Sit-Stand Transfer    Assistive Device (Sit-Stand Transfers)  walker, front-wheeled  -CW     Row Name 21 1500          Stand-Sit Transfer    Assistive  Device (Stand-Sit Transfers)  walker, front-wheeled  -CW     Row Name 01/19/21 1500          Gait/Stairs (Locomotion)    [place] Level (Gait)  contact guard  -CW     Assistive Device (Gait)  walker, front-wheeled  -CW     Distance in Feet (Gait)  40  -CW     Pattern (Gait)  step-to  -CW     Deviations/Abnormal Patterns (Gait)  base of support, narrow;robyn decreased;gait speed decreased;stride length decreased  -CW     Bilateral Gait Deviations  leans left  -CW     Comment (Gait/Stairs)  Patient exhibits drop foot on her RLE, which she reports has been present since an ankle surgery she received some years ago. she uses an AFO at home.  -CW     Row Name 01/19/21 1500          Motor Skills    Therapeutic Exercise  other (see comments) Instructed for LE strengthening exercises.  -CW     Row Name 01/19/21 1500          Coping    Observed Emotional State  calm;cooperative  -CW     Trust Relationship/Rapport  care explained;choices provided;questions answered;thoughts/feelings acknowledged  -CW     Row Name 01/19/21 1500          Vital Signs    Pre SpO2 (%)  94  -CW     O2 Delivery Pre Treatment  nasal cannula  -CW     Intra SpO2 (%)  90  -CW     O2 Delivery Intra Treatment  nasal cannula  -CW     Row Name 01/19/21 1500          Positioning and Restraints    Pre-Treatment Position  in bed  -CW     Post Treatment Position  chair  -CW     In Chair  sitting;call light within reach;encouraged to call for assist  -CW     Row Name 01/19/21 1500          Therapy Assessment/Plan (PT)    Comment, Therapy Assessment/Plan (PT)  Patient demonstrates good ability for functional mobility, but is limited in ambulation distance by isolation status and small room.  -CW       User Key  (r) = Recorded By, (t) = Taken By, (c) = Cosigned By    Initials Name Provider Type    CW Ricardo Dietrich, PT Physical Therapist        Physical Therapy Education                 Title: PT OT SLP Therapies (Done)     Topic: Physical Therapy (Done)      Point: Mobility training (Done)     Learning Progress Summary           Patient Acceptance, E, VU by CW at 1/19/2021 1556    Acceptance, E,TB, VU by CM at 1/15/2021 2038    Acceptance, E, VU by BC at 1/15/2021 1047                   Point: Home exercise program (Done)     Learning Progress Summary           Patient Acceptance, E, VU by CW at 1/19/2021 1556    Acceptance, E,TB, VU by CM at 1/15/2021 2038    Acceptance, E, VU by BC at 1/15/2021 1047                   Point: Body mechanics (Done)     Learning Progress Summary           Patient Acceptance, E, VU by CW at 1/19/2021 1556    Acceptance, E,TB, VU by CM at 1/15/2021 2038    Acceptance, E, VU by BC at 1/15/2021 1047                   Point: Precautions (Done)     Learning Progress Summary           Patient Acceptance, E, VU by CW at 1/19/2021 1556    Acceptance, E,TB, VU by CM at 1/15/2021 2038    Acceptance, E, VU by BC at 1/15/2021 1047                               User Key     Initials Effective Dates Name Provider Type Discipline    BC 03/14/16 -  Hailee Rogers PT Physical Therapist PT     06/20/16 -  Bri Bryan, RN Registered Nurse Nurse     09/11/20 -  Ricardo Dietrich PT Physical Therapist PT              PT Recommendation and Plan             Time Calculation:   PT Charges     Row Name 01/19/21 1556             Time Calculation    PT Received On  01/19/21  -CW      PT Goal Re-Cert Due Date  01/29/21  -CW         Time Calculation- PT    Total Timed Code Minutes- PT  39 minute(s)  -        User Key  (r) = Recorded By, (t) = Taken By, (c) = Cosigned By    Initials Name Provider Type     Ricardo Dietrich PT Physical Therapist        Therapy Charges for Today     Code Description Service Date Service Provider Modifiers Qty    25259016511 HC GAIT TRAINING EA 15 MIN 1/19/2021 Ricardo Dietrich, PT GP 1    80603789711 HC PT THERAPEUTIC ACT EA 15 MIN 1/19/2021 Ricardo Dietrich, PT GP 1    02454432843 HC PT THER PROC EA 15 MIN 1/19/2021  Ricardo Dietrich, PT GP 1               Ricardo Dietrich, PT  1/19/2021

## 2021-01-19 NOTE — PLAN OF CARE
"Goal Outcome Evaluation:  Plan of Care Reviewed With: patient  Progress: improving  Outcome Summary: Pt stated she has done \"better\" today. 5L NC with stable oxygen. Vitals stable. Will continue to monitor.  "

## 2021-01-19 NOTE — PROGRESS NOTES
PROGRESS NOTE         Patient Identification:  Name:  Emma Ryan  Age:  80 y.o.  Sex:  female  :  1940  MRN:  [id number]  Visit Number:  [id number]  Primary Care Provider:  Jennifer Montano MD         LOS: 11 days       ----------------------------------------------------------------------------------------------------------------------  Subjective       Chief Complaints:    Exposure To Known Illness and Nausea        Interval History:      Case discussed with primary RN.  Patient's oxygen requirements have been decreased to 5 L bubble high flow nasal cannula.  Patient is feeling well today.  Afebrile, no diarrhea.  CRP has normalized.  WBC remains normal.    Review of Systems:    Constitutional: no fever, chills and night sweats.  Generalized fatigue.  Eyes: no eye drainage, itching or redness.  HEENT: no mouth sores, dysphagia or nose bleed.  Respiratory: Positive shortness of breath and productive cough.  Cardiovascular: no chest pain, no palpitations, no orthopnea.  Gastrointestinal: no nausea, vomiting or diarrhea. No abdominal pain, hematemesis or rectal bleeding.  Genitourinary: no dysuria or polyuria.  Hematologic/lymphatic: no lymph node abnormalities, no easy bruising or easy bleeding.  Musculoskeletal: no muscle or joint pain.  Skin: No rash and no itching.  Neurological: no loss of consciousness, no seizure, no headache.  Behavioral/Psych: no depression or suicidal ideation.  Endocrine: no hot flashes.  Immunologic: negative.    ----------------------------------------------------------------------------------------------------------------------      Objective       Current [facility] Meds:  albuterol sulfate HFA, 2 puff, Inhalation, 4x Daily - RT  atorvastatin, 20 mg, Oral, Daily  carvedilol, 25 mg, Oral, BID With Meals  cetirizine, 5 mg, Oral, Daily  cholecalciferol, 1,000 Units, Oral, Daily  dexamethasone, 4 mg, Intravenous, Daily  enoxaparin, 40 mg, Subcutaneous,  Q12H  gabapentin, 100 mg, Oral, BID  hydrALAZINE, 25 mg, Oral, Q8H  lactobacillus acidophilus, 1 capsule, Oral, Daily  losartan, 100 mg, Oral, Daily  oxybutynin XL, 10 mg, Oral, Daily  pantoprazole, 40 mg, Oral, QAM  piperacillin-tazobactam, 4.5 g, Intravenous, Q8H  sodium chloride, 10 mL, Intravenous, Q12H         ----------------------------------------------------------------------------------------------------------------------    Vital Signs:  Temp:  [97.6 °F (36.4 °C)-97.9 °F (36.6 °C)] 97.8 °F (36.6 °C)  Heart Rate:  [52-68] 52  Resp:  [20-28] 22  BP: (146-185)/(65-83) 185/83  No data found.  SpO2 Percentage    01/18/21 1900 01/19/21 0112 01/19/21 0630   SpO2: 95% 94% 94%     SpO2:  [94 %-96 %] 94 %  on  Flow (L/min):  [5-6] 5;   Device (Oxygen Therapy): nasal cannula    Body mass index is 38.37 kg/m².  Wt Readings from Last 3 Encounters:   01/19/21 86.2 kg (189 lb 15.9 oz)   12/31/20 83.9 kg (185 lb)   12/01/20 84.4 kg (186 lb)        Intake/Output Summary (Last 24 hours) at 1/19/2021 1239  Last data filed at 1/19/2021 0837  Gross per 24 hour   Intake 1458 ml   Output 1075 ml   Net 383 ml     Diet Regular  ----------------------------------------------------------------------------------------------------------------------      Physical Exam:    Deferred due to COVID-19 isolation.  ----------------------------------------------------------------------------------------------------------------------                Results from last 7 days   Lab Units 01/19/21 0235 01/18/21 0456 01/17/21  0113   CRP mg/dL 0.50 0.72* 1.19*   WBC 10*3/mm3 7.39 7.61 6.40   HEMOGLOBIN g/dL 11.8* 12.0 12.0   HEMATOCRIT % 38.2 38.4 38.9   MCV fL 94.6 93.2 94.6   MCHC g/dL 30.9* 31.3* 30.8*   PLATELETS 10*3/mm3 223 238 245     Results from last 7 days   Lab Units 01/19/21 0235 01/18/21 0456 01/17/21 0113   SODIUM mmol/L 139 143 144   POTASSIUM mmol/L 3.9 4.4 4.2   CHLORIDE mmol/L 102 107 107   CO2 mmol/L 30.1* 28.9 26.7   BUN  mg/dL 23 24* 23   CREATININE mg/dL 0.93 0.94 0.92   EGFR IF NONAFRICN AM mL/min/1.73 58* 57* 59*   CALCIUM mg/dL 9.4 9.3 8.8   GLUCOSE mg/dL 126* 121* 173*   ALBUMIN g/dL 2.87* 2.78* 2.91*   BILIRUBIN mg/dL 0.5 0.4 0.4   ALK PHOS U/L 62 72 80   AST (SGOT) U/L 29 26 24   ALT (SGPT) U/L 38* 33 25   Estimated Creatinine Clearance: 49.4 mL/min (by C-G formula based on SCr of 0.93 mg/dL).  No results found for: AMMONIA    No results found for: HGBA1C, POCGLU  No results found for: HGBA1C  Lab Results   Component Value Date    TSH 1.950 09/14/2018    FREET4 1.16 09/14/2018       Blood Culture   Date Value Ref Range Status   01/08/2021 No growth at 24 hours  Preliminary   01/08/2021 No growth at 24 hours  Preliminary     Urine Culture   Date Value Ref Range Status   01/08/2021 >100,000 CFU/mL Escherichia coli (A)  Final     No results found for: WOUNDCX  No results found for: STOOLCX  No results found for: RESPCX  Pain Management Panel     Pain Management Panel Latest Ref Rng & Units 10/9/2020 6/10/2020    CREATININE UR mg/dL 99.5 122.8    AMPHETAMINES SCREEN, URINE Negative - -    BARBITURATES SCREEN Negative - -    BENZODIAZEPINE SCREEN, URINE Negative - -    COCAINE SCREEN, URINE Negative - -    METHADONE SCREEN, URINE Negative - -            ----------------------------------------------------------------------------------------------------------------------  Imaging Results (Last 24 Hours)     ** No results found for the last 24 hours. **          ----------------------------------------------------------------------------------------------------------------------    Assessment/Plan       Assessment/Plan     ASSESSMENT:    1.  Sepsis  2.  COVID-19 pneumonia  3.  UTI    PLAN:    Case discussed with primary RN.  Patient's oxygen requirements have been decreased to 5 L bubble high flow nasal cannula.  Patient is feeling well today.  Afebrile, no diarrhea.  CRP has normalized.  WBC remains normal.    CT chest on 1/14/2021  showed worsened patchy bilateral airspace disease suggestive of COVID-19 pneumonia.    Urine culture from 1/8/2021 finalized as greater than 100,000 colonies of E. Coli, which has been treated.    We are agreeable to Decadron.  Patient is status post remdesivir.    Recommend to continue on Zosyn for 7-day course.  As patient has shown clinical improvement with normal CRP and white count, ID will sign off now.  Please call back for any questions.    Code Status:   Code Status and Medical Interventions:   Ordered at: 01/08/21 3723     Level Of Support Discussed With:    Patient     Code Status:    CPR     Medical Interventions (Level of Support Prior to Arrest):    Full     Scribed for Gianni Felton MD by CHIRAG Guadarrama. 1/19/2021  12:42 EST  CHIRAG Guadarrama  01/19/21  12:39 EST     Physician Attestation:    The documentation recorded by the scribe accurately reflects the service I personally performed and the decisions made by me.    Gianni Felton MD  Infectious Diseases  01/19/21  13:39 EST

## 2021-01-19 NOTE — PROGRESS NOTES
Antimicrobial Length of Therapy:    Day 6 Thaliasyn    Thank you,    Araceli Cortés, PharmD  1/19/2021  15:45 EST

## 2021-01-20 LAB
ALBUMIN SERPL-MCNC: 2.8 G/DL (ref 3.5–5.2)
ALBUMIN/GLOB SERPL: 1.1 G/DL
ALP SERPL-CCNC: 59 U/L (ref 39–117)
ALT SERPL W P-5'-P-CCNC: 49 U/L (ref 1–33)
ANION GAP SERPL CALCULATED.3IONS-SCNC: 7.7 MMOL/L (ref 5–15)
AST SERPL-CCNC: 39 U/L (ref 1–32)
BILIRUB SERPL-MCNC: 0.5 MG/DL (ref 0–1.2)
BUN SERPL-MCNC: 22 MG/DL (ref 8–23)
BUN/CREAT SERPL: 22.7 (ref 7–25)
CALCIUM SPEC-SCNC: 9.3 MG/DL (ref 8.6–10.5)
CHLORIDE SERPL-SCNC: 103 MMOL/L (ref 98–107)
CO2 SERPL-SCNC: 27.3 MMOL/L (ref 22–29)
CREAT SERPL-MCNC: 0.97 MG/DL (ref 0.57–1)
CRP SERPL-MCNC: 0.31 MG/DL (ref 0–0.5)
D DIMER PPP FEU-MCNC: 0.58 MCGFEU/ML (ref 0–0.5)
DEPRECATED RDW RBC AUTO: 52.8 FL (ref 37–54)
ERYTHROCYTE [DISTWIDTH] IN BLOOD BY AUTOMATED COUNT: 14.6 % (ref 12.3–15.4)
FERRITIN SERPL-MCNC: 229.6 NG/ML (ref 13–150)
GFR SERPL CREATININE-BSD FRML MDRD: 55 ML/MIN/1.73
GLOBULIN UR ELPH-MCNC: 2.6 GM/DL
GLUCOSE SERPL-MCNC: 98 MG/DL (ref 65–99)
HCT VFR BLD AUTO: 41.6 % (ref 34–46.6)
HGB BLD-MCNC: 12.3 G/DL (ref 12–15.9)
LDH SERPL-CCNC: 248 U/L (ref 135–214)
MCH RBC QN AUTO: 29.6 PG (ref 26.6–33)
MCHC RBC AUTO-ENTMCNC: 29.6 G/DL (ref 31.5–35.7)
MCV RBC AUTO: 100 FL (ref 79–97)
PLATELET # BLD AUTO: 202 10*3/MM3 (ref 140–450)
PMV BLD AUTO: 10.8 FL (ref 6–12)
POTASSIUM SERPL-SCNC: 4.1 MMOL/L (ref 3.5–5.2)
PROT SERPL-MCNC: 5.4 G/DL (ref 6–8.5)
RBC # BLD AUTO: 4.16 10*6/MM3 (ref 3.77–5.28)
SODIUM SERPL-SCNC: 138 MMOL/L (ref 136–145)
WBC # BLD AUTO: 7.03 10*3/MM3 (ref 3.4–10.8)

## 2021-01-20 PROCEDURE — 80053 COMPREHEN METABOLIC PANEL: CPT | Performed by: HOSPITALIST

## 2021-01-20 PROCEDURE — 85027 COMPLETE CBC AUTOMATED: CPT | Performed by: INTERNAL MEDICINE

## 2021-01-20 PROCEDURE — 25010000002 HYDRALAZINE PER 20 MG: Performed by: STUDENT IN AN ORGANIZED HEALTH CARE EDUCATION/TRAINING PROGRAM

## 2021-01-20 PROCEDURE — 86140 C-REACTIVE PROTEIN: CPT | Performed by: HOSPITALIST

## 2021-01-20 PROCEDURE — 99233 SBSQ HOSP IP/OBS HIGH 50: CPT | Performed by: INTERNAL MEDICINE

## 2021-01-20 PROCEDURE — 82728 ASSAY OF FERRITIN: CPT | Performed by: INTERNAL MEDICINE

## 2021-01-20 PROCEDURE — 94799 UNLISTED PULMONARY SVC/PX: CPT

## 2021-01-20 PROCEDURE — 83615 LACTATE (LD) (LDH) ENZYME: CPT | Performed by: INTERNAL MEDICINE

## 2021-01-20 PROCEDURE — 25010000002 PIPERACILLIN SOD-TAZOBACTAM PER 1 G: Performed by: STUDENT IN AN ORGANIZED HEALTH CARE EDUCATION/TRAINING PROGRAM

## 2021-01-20 PROCEDURE — 85379 FIBRIN DEGRADATION QUANT: CPT | Performed by: INTERNAL MEDICINE

## 2021-01-20 PROCEDURE — 25010000002 ENOXAPARIN PER 10 MG: Performed by: INTERNAL MEDICINE

## 2021-01-20 PROCEDURE — 25010000002 DEXAMETHASONE PER 1 MG: Performed by: INTERNAL MEDICINE

## 2021-01-20 RX ADMIN — PIPERACILLIN SODIUM AND TAZOBACTAM SODIUM 4.5 G: 4; .5 INJECTION, POWDER, LYOPHILIZED, FOR SOLUTION INTRAVENOUS at 20:44

## 2021-01-20 RX ADMIN — ATORVASTATIN CALCIUM 20 MG: 20 TABLET, FILM COATED ORAL at 08:31

## 2021-01-20 RX ADMIN — HYDRALAZINE HYDROCHLORIDE 25 MG: 25 TABLET, FILM COATED ORAL at 05:12

## 2021-01-20 RX ADMIN — HYDRALAZINE HYDROCHLORIDE 25 MG: 25 TABLET, FILM COATED ORAL at 15:40

## 2021-01-20 RX ADMIN — ALBUTEROL SULFATE 2 PUFF: 90 AEROSOL, METERED RESPIRATORY (INHALATION) at 08:30

## 2021-01-20 RX ADMIN — LOSARTAN POTASSIUM 100 MG: 50 TABLET, FILM COATED ORAL at 08:30

## 2021-01-20 RX ADMIN — SODIUM CHLORIDE, PRESERVATIVE FREE 10 ML: 5 INJECTION INTRAVENOUS at 20:44

## 2021-01-20 RX ADMIN — ALBUTEROL SULFATE 2 PUFF: 90 AEROSOL, METERED RESPIRATORY (INHALATION) at 15:40

## 2021-01-20 RX ADMIN — ALBUTEROL SULFATE 2 PUFF: 90 AEROSOL, METERED RESPIRATORY (INHALATION) at 18:10

## 2021-01-20 RX ADMIN — CETIRIZINE HYDROCHLORIDE 5 MG: 10 TABLET, FILM COATED ORAL at 08:31

## 2021-01-20 RX ADMIN — PIPERACILLIN SODIUM AND TAZOBACTAM SODIUM 4.5 G: 4; .5 INJECTION, POWDER, LYOPHILIZED, FOR SOLUTION INTRAVENOUS at 15:40

## 2021-01-20 RX ADMIN — Medication 1 CAPSULE: at 08:30

## 2021-01-20 RX ADMIN — OXYBUTYNIN CHLORIDE 10 MG: 5 TABLET, EXTENDED RELEASE ORAL at 08:30

## 2021-01-20 RX ADMIN — PANTOPRAZOLE SODIUM 40 MG: 40 TABLET, DELAYED RELEASE ORAL at 05:12

## 2021-01-20 RX ADMIN — HYDRALAZINE HYDROCHLORIDE 10 MG: 20 INJECTION INTRAMUSCULAR; INTRAVENOUS at 20:52

## 2021-01-20 RX ADMIN — CARVEDILOL 25 MG: 25 TABLET, FILM COATED ORAL at 18:10

## 2021-01-20 RX ADMIN — CHOLECALCIFEROL TAB 10 MCG (400 UNIT) 1000 UNITS: 10 TAB at 08:30

## 2021-01-20 RX ADMIN — ENOXAPARIN SODIUM 40 MG: 40 INJECTION SUBCUTANEOUS at 18:10

## 2021-01-20 RX ADMIN — ENOXAPARIN SODIUM 40 MG: 40 INJECTION SUBCUTANEOUS at 05:12

## 2021-01-20 RX ADMIN — GABAPENTIN 100 MG: 100 CAPSULE ORAL at 20:44

## 2021-01-20 RX ADMIN — SODIUM CHLORIDE, PRESERVATIVE FREE 10 ML: 5 INJECTION INTRAVENOUS at 08:32

## 2021-01-20 RX ADMIN — GABAPENTIN 100 MG: 100 CAPSULE ORAL at 08:32

## 2021-01-20 RX ADMIN — DEXAMETHASONE SODIUM PHOSPHATE 4 MG: 4 INJECTION, SOLUTION INTRA-ARTICULAR; INTRALESIONAL; INTRAMUSCULAR; INTRAVENOUS; SOFT TISSUE at 08:30

## 2021-01-20 RX ADMIN — PIPERACILLIN SODIUM AND TAZOBACTAM SODIUM 4.5 G: 4; .5 INJECTION, POWDER, LYOPHILIZED, FOR SOLUTION INTRAVENOUS at 05:12

## 2021-01-20 NOTE — PLAN OF CARE
Goal Outcome Evaluation:  Plan of Care Reviewed With: patient  Progress: improving  Outcome Summary: Pt resting comfortably in bed during assessment. No complaints at this time. Vital signs stable, no acute changes at this time. Will continue to monitor.

## 2021-01-20 NOTE — PROGRESS NOTES
Discharge Planning Assessment   Prasanna     Patient Name: Emma Ryan  MRN: [id number]  Today's Date: 1/20/2021    Admit Date: 1/8/2021      Discharge Plan     Row Name 01/20/21 1455       Plan    Plan Pt lives with her spouse, Bob and she plans to return home at discharge. Pt has a rollator and a wheelchair at home. Pt does not utilize home health services. SS to follow.        ALISON Ford

## 2021-01-20 NOTE — PLAN OF CARE
Goal Outcome Evaluation:  Plan of Care Reviewed With: patient  Progress: improving  Outcome Summary: Pt has been resting in bed throughout shift. Getting up to the bed side commode with no difficulty. O2 weaned down from 5L to 3L NC. Pt tolerating well. Will continue to monitor.

## 2021-01-20 NOTE — PROGRESS NOTES
[facility] HOSPITALIST PROGRESS NOTE     Patient Identification:  Name:  Emma Ryan  Age:  80 y.o.  Sex:  female  :  1940  MRN:  [id number]  Visit Number:  [id number]  ROOM: [address] [place] [address]     Primary Care Provider:  Jennifer Montano MD    Length of stay in inpatient status:  12    Subjective     Chief Compliant:    Chief Complaint   Patient presents with   • Exposure To Known Illness   • Nausea     History of Presenting Illness:    Patient remains ill but doing well today, no acute events overnight, no new complaints, reportedly able to get up and walk some yesterday but did desat again, still on 5LNC resting in bed and attempting to wean further today while at rest, on Abx and ID rec'd 7 total days and signed off, she will complete tomorrow, CRP and WBC count further downtrending today, she denies any fevers or chills. Glc has been elevated but tapering steroids, on SSI.    Objective     Current Hospital Meds:albuterol sulfate HFA, 2 puff, Inhalation, 4x Daily - RT  atorvastatin, 20 mg, Oral, Daily  carvedilol, 25 mg, Oral, BID With Meals  cetirizine, 5 mg, Oral, Daily  cholecalciferol, 1,000 Units, Oral, Daily  dexamethasone, 4 mg, Intravenous, Daily  enoxaparin, 40 mg, Subcutaneous, Q12H  gabapentin, 100 mg, Oral, BID  hydrALAZINE, 25 mg, Oral, Q8H  lactobacillus acidophilus, 1 capsule, Oral, Daily  losartan, 100 mg, Oral, Daily  oxybutynin XL, 10 mg, Oral, Daily  pantoprazole, 40 mg, Oral, QAM  piperacillin-tazobactam, 4.5 g, Intravenous, Q8H  sodium chloride, 10 mL, Intravenous, Q12H         Current Antimicrobial Therapy:  Anti-Infectives (From admission, onward)    Ordered     Dose/Rate Route Frequency Start Stop    21 1041  piperacillin-tazobactam (ZOSYN) 4.5 g/100 mL 0.9% NS IVPB (mbp)     Ordering Provider: Ruy Tenorio DO    4.5 g  over 4 Hours Intravenous Every 8 Hours 21 1041  piperacillin-tazobactam (ZOSYN) 4.5 g/100 mL 0.9%  NS IVPB (mbp)     Ordering Provider: Ruy Tenorio DO    4.5 g  over 30 Minutes Intravenous Once 01/14/21 1300 01/14/21 1329    01/09/21 1332  remdesivir 100 mg in sodium chloride 0.9 % 250 mL IVPB (powder vial)     Tracy Jones APRN reviewed the order on 01/12/21 1322.   Ordering Provider: Tracy Jones APRN    100 mg  over 60 Minutes Intravenous Every 24 Hours 01/10/21 1500 01/13/21 1534    01/09/21 1332  remdesivir 200 mg in sodium chloride 0.9 % 250 mL IVPB (powder vial)     Ordering Provider: Tracy Jones APRN    200 mg  over 60 Minutes Intravenous Every 24 Hours 01/09/21 1500 01/09/21 1838    01/08/21 2323  doxycycline (VIBRAMYCIN) 100 mg/100 mL 0.9% NS MBP     Ordering Provider: Dada Conner MD    100 mg  over 60 Minutes Intravenous Once 01/08/21 2325 01/09/21 0103    01/08/21 2022  cefTRIAXone (ROCEPHIN) 2 g/100 mL 0.9% NS IVPB (MBP)     Ordering Provider: Armin Barbosa II, PA    2 g  over 30 Minutes Intravenous Once 01/08/21 2024 01/08/21 2102 01/08/21 2224  cefdinir (OMNICEF) 300 MG capsule     Ordering Provider: Armin Barbosa II, PA    300 mg Oral 2 Times Daily 01/08/21 0000          Current Diuretic Therapy:  Diuretics (From admission, onward)    None        ----------------------------------------------------------------------------------------------------------------------  Vital Signs:  Temp:  [97.5 °F (36.4 °C)-98.4 °F (36.9 °C)] 97.8 °F (36.6 °C)  Heart Rate:  [57-67] 57  Resp:  [18-22] 22  BP: (141-168)/(67-84) 141/67  SpO2:  [93 %-95 %] 95 %  on  Flow (L/min):  [5] 5;   Device (Oxygen Therapy): nasal cannula  Body mass index is 38.63 kg/m².    Wt Readings from Last 3 Encounters:   01/20/21 86.8 kg (191 lb 4 oz)   12/31/20 83.9 kg (185 lb)   12/01/20 84.4 kg (186 lb)     Intake & Output (last 3 days)       01/17 0701 - 01/18 0700 01/18 0701 - 01/19 0700 01/19 0701 - 01/20 0700 01/20 0701 - 01/21 0700    P.O. 1300 1520 990     I.V. (mL/kg) 428 (5) 298 (3.5) 174.4 (2)      IV Piggyback        Total Intake(mL/kg) 1728 (20) 1818 (21.1) 1164.4 (13.4)     Urine (mL/kg/hr) 750 (0.4) 600 (0.3) 1775 (0.9)     Stool  0 0     Total Output      Net +978 +1218 -610.6             Stool Unmeasured Occurrence  0 x 1 x         Diet Regular  ----------------------------------------------------------------------------------------------------------------------  Physical exam:  This physical exam has been personally performed remotely in the unit aided by real-time audio/visual communication tools. RN present at bedside during this exam and assisted during exam. The use of a video visit has been reviewed with the patient and verbal informed consent has been obtained.      Physical Exam:  General: Patient appears awake, alert, and in no acute distress.  Head: Normocephalic, atraumatic  Eyes: EOMI. Conjunctivae and sclerae normal.  Ears: Ears appear intact with no abnormalities noted.   Neck: Trachea midline. No obvious JVD.  Heart: Tele reveals regular rate and rhythm  Lungs: Respirations improved. No audible wheezing. On 5LNC still  Abdomen: No obvious abdominal distension.  MS: Muscle tone appears normal. No gross deformities.  Extremities: No clubbing, cyanosis or edema noted.  Skin: No visible bleeding, bruising, or rash.  Neurologic: Alert and oriented x3. No gross focal deficits.     *Exam unchanged overall today 1/20  ----------------------------------------------------------------------------------------------------------------------  Results from last 7 days   Lab Units 01/20/21 0446 01/19/21 0235 01/18/21 0456   CRP mg/dL 0.31 0.50 0.72*   WBC 10*3/mm3 7.03 7.39 7.61   HEMOGLOBIN g/dL 12.3 11.8* 12.0   HEMATOCRIT % 41.6 38.2 38.4   MCV fL 100.0* 94.6 93.2   MCHC g/dL 29.6* 30.9* 31.3*   PLATELETS 10*3/mm3 202 223 238         Results from last 7 days   Lab Units 01/20/21 0446 01/19/21 0235 01/18/21 0456   SODIUM mmol/L 138 139 143   POTASSIUM mmol/L 4.1 3.9 4.4   CHLORIDE  mmol/L 103 102 107   CO2 mmol/L 27.3 30.1* 28.9   BUN mg/dL 22 23 24*   CREATININE mg/dL 0.97 0.93 0.94   EGFR IF NONAFRICN AM mL/min/1.73 55* 58* 57*   CALCIUM mg/dL 9.3 9.4 9.3   GLUCOSE mg/dL 98 126* 121*   ALBUMIN g/dL 2.80* 2.87* 2.78*   BILIRUBIN mg/dL 0.5 0.5 0.4   ALK PHOS U/L 59 62 72   AST (SGOT) U/L 39* 29 26   ALT (SGPT) U/L 49* 38* 33   Estimated Creatinine Clearance: 47.5 mL/min (by C-G formula based on SCr of 0.97 mg/dL).  No results found for: AMMONIA              No results found for: HGBA1C, POCGLU  Lab Results   Component Value Date    TSH 1.950 09/14/2018    FREET4 1.16 09/14/2018     No results found for: PREGTESTUR, PREGSERUM, HCG, HCGQUANT  Pain Management Panel     Pain Management Panel Latest Ref Rng & Units 10/9/2020 6/10/2020    CREATININE UR mg/dL 99.5 122.8    AMPHETAMINES SCREEN, URINE Negative - -    BARBITURATES SCREEN Negative - -    BENZODIAZEPINE SCREEN, URINE Negative - -    COCAINE SCREEN, URINE Negative - -    METHADONE SCREEN, URINE Negative - -        Brief Urine Lab Results  (Last result in the past 365 days)      Color   Clarity   Blood   Leuk Est   Nitrite   Protein   CREAT   Urine HCG        01/08/21 2026 Yellow Cloudy Negative Moderate (2+) Positive Trace             No results found for: BLOODCX  No results found for: URINECX  No results found for: WOUNDCX  No results found for: STOOLCX  No results found for: RESPCX  No results found for: AFBCX  Results from last 7 days   Lab Units 01/20/21  0446 01/19/21  0235 01/18/21  0456 01/17/21  0113 01/16/21  0300 01/15/21  0519 01/14/21  0129   CRP mg/dL 0.31 0.50 0.72* 1.19* 1.96* 2.88* 3.08*     I have personally looked at the labs and they are summarized above.  ----------------------------------------------------------------------------------------------------------------------  Detailed radiology reports for the last 24 hours:    Imaging Results (Last 24 Hours)     ** No results found for the last 24 hours. **         Assessment & Plan    80F Obese PMH significant for CKD stage IIIa, GERD, hyperlipidemia, essential hypertension, presents to [facility] with complaints of nausea, vomiting, and fevers, was previously diagnosed with COVID-19 on the December 31st in the ER at this facility.    #Sepsis & Acute Hypoxic Respiratory Failure 2/2 PNA, Viral, treating for Covid 19 but also covering for PNA, Bacterial, treating for Aspiration  #Acute Uncomplicated Ecoli UTI   - Patient presents w/ nausea, vomiting, fevers following diagnosis of covid on 12/31, has had increased oxygen requirements since that time, T 101.2 on admission, HR 94, RR 20, satting 88% on room air.  - Admission labs showed WBC count NML, Rerritin 374, D-dimer and lactate NML, UA showed + nitrites and Le's, 31-50 WBC's, 4+ bacteria, urine culture grew Ecoli, Bcx's NGTD.  - CTPE showed multifocal b/l PNA typical for Covid 19  - ID consulted and Followed, has completed Remdesivir therapy, made final Abx rec's and signed off  - Continue Zosyn x 7 days total  - Continue IV Dexamethasone w/ extended taper, currently 4mg x 3-5 days, taper further thereafter  - Continue pharmacy to dose Level I AC for Covid 19 thromboembolism PPx  - Continue APAP PRN for fevers  - Continue to monitor on tele, continue 02 and stable at 5LNC, continue albuterol inhaler     #HTN/HLD/Non-obstructive CAD  - CT showed atherosclerotic disease of coronary arteries  - EKG showed NSR, low voltage and poor baseline  - Echo 9/2018 showed LVEF 56-60%, diastolic dysfunction, trace MR, trace TR; Repeat formal Echo showed LVEF 61-65%, diastolic dysfunction, mild AR, mild MR, mild TR, RVSP 35-45mmHg  - Continue home statin, may consider addition of ASA 81 prior to discharge  - Continue Coreg, Losartan, Hydralazine  - Continue to monitor on tele, monitor I/O's, trend HR and BP    #CKD stage IIIa  - Patient presented w/ Cr 1.09 b/l around 0.9-1.0  - Trend Cr and UOP, avoid  nephrotoxins, NSAIDs, dehydration and contrast as able.     #GERD  - Continue home PPI    #Obesity  - BMI 38, complicates all aspects of care    F: Oral  E: Monitor & Replace PRN  N: Regular  Ppx: Plov (Level I AC)  Code: Full Code     Dispo: Pending clinical improvement, patient plans to return home at discharge     *This patient is considered high risk due to sepsis, acute respiratory failure, covid 19, bacterial PNA.    VTE Prophylaxis:   Mechanical Order History:     None      Pharmalogical Order History:      Ordered     Dose Route Frequency Stop    01/09/21 1320  enoxaparin (LOVENOX) syringe 40 mg      40 mg SC Every 12 Hours Scheduled --    01/09/21 0127  enoxaparin (LOVENOX) syringe 40 mg  Status:  Discontinued      40 mg SC Every 24 Hours 01/09/21 1320              Yimi Alberto MD  [facility] Hospitalist  01/20/21  10:15 EST

## 2021-01-21 ENCOUNTER — NURSE TRIAGE (OUTPATIENT)
Dept: CALL CENTER | Facility: HOSPITAL | Age: 81
End: 2021-01-21

## 2021-01-21 ENCOUNTER — READMISSION MANAGEMENT (OUTPATIENT)
Dept: CALL CENTER | Facility: HOSPITAL | Age: 81
End: 2021-01-21

## 2021-01-21 VITALS
DIASTOLIC BLOOD PRESSURE: 62 MMHG | SYSTOLIC BLOOD PRESSURE: 148 MMHG | HEIGHT: 59 IN | HEART RATE: 63 BPM | OXYGEN SATURATION: 94 % | TEMPERATURE: 96.6 F | BODY MASS INDEX: 38.77 KG/M2 | RESPIRATION RATE: 18 BRPM | WEIGHT: 192.31 LBS

## 2021-01-21 LAB
ALBUMIN SERPL-MCNC: 3.15 G/DL (ref 3.5–5.2)
ALBUMIN/GLOB SERPL: 1.2 G/DL
ALP SERPL-CCNC: 68 U/L (ref 39–117)
ALT SERPL W P-5'-P-CCNC: 64 U/L (ref 1–33)
ANION GAP SERPL CALCULATED.3IONS-SCNC: 9.6 MMOL/L (ref 5–15)
AST SERPL-CCNC: 43 U/L (ref 1–32)
BILIRUB SERPL-MCNC: 0.5 MG/DL (ref 0–1.2)
BUN SERPL-MCNC: 24 MG/DL (ref 8–23)
BUN/CREAT SERPL: 20.9 (ref 7–25)
CALCIUM SPEC-SCNC: 9.6 MG/DL (ref 8.6–10.5)
CHLORIDE SERPL-SCNC: 105 MMOL/L (ref 98–107)
CO2 SERPL-SCNC: 27.4 MMOL/L (ref 22–29)
CREAT SERPL-MCNC: 1.15 MG/DL (ref 0.57–1)
CRP SERPL-MCNC: <0.3 MG/DL (ref 0–0.5)
D DIMER PPP FEU-MCNC: 0.56 MCGFEU/ML (ref 0–0.5)
DEPRECATED RDW RBC AUTO: 52.1 FL (ref 37–54)
ERYTHROCYTE [DISTWIDTH] IN BLOOD BY AUTOMATED COUNT: 14.8 % (ref 12.3–15.4)
FERRITIN SERPL-MCNC: 267.9 NG/ML (ref 13–150)
FIBRINOGEN PPP-MCNC: 230 MG/DL (ref 173–524)
GFR SERPL CREATININE-BSD FRML MDRD: 46 ML/MIN/1.73
GLOBULIN UR ELPH-MCNC: 2.7 GM/DL
GLUCOSE SERPL-MCNC: 116 MG/DL (ref 65–99)
HCT VFR BLD AUTO: 41.6 % (ref 34–46.6)
HGB BLD-MCNC: 12.5 G/DL (ref 12–15.9)
LDH SERPL-CCNC: 313 U/L (ref 135–214)
MCH RBC QN AUTO: 29.1 PG (ref 26.6–33)
MCHC RBC AUTO-ENTMCNC: 30 G/DL (ref 31.5–35.7)
MCV RBC AUTO: 97 FL (ref 79–97)
PLATELET # BLD AUTO: 184 10*3/MM3 (ref 140–450)
PMV BLD AUTO: 11.4 FL (ref 6–12)
POTASSIUM SERPL-SCNC: 4.4 MMOL/L (ref 3.5–5.2)
PROT SERPL-MCNC: 5.8 G/DL (ref 6–8.5)
RBC # BLD AUTO: 4.29 10*6/MM3 (ref 3.77–5.28)
SODIUM SERPL-SCNC: 142 MMOL/L (ref 136–145)
WBC # BLD AUTO: 7.7 10*3/MM3 (ref 3.4–10.8)

## 2021-01-21 PROCEDURE — 85027 COMPLETE CBC AUTOMATED: CPT | Performed by: INTERNAL MEDICINE

## 2021-01-21 PROCEDURE — 82728 ASSAY OF FERRITIN: CPT | Performed by: INTERNAL MEDICINE

## 2021-01-21 PROCEDURE — 99239 HOSP IP/OBS DSCHRG MGMT >30: CPT | Performed by: INTERNAL MEDICINE

## 2021-01-21 PROCEDURE — 86140 C-REACTIVE PROTEIN: CPT | Performed by: HOSPITALIST

## 2021-01-21 PROCEDURE — 25010000002 PIPERACILLIN SOD-TAZOBACTAM PER 1 G: Performed by: STUDENT IN AN ORGANIZED HEALTH CARE EDUCATION/TRAINING PROGRAM

## 2021-01-21 PROCEDURE — 25010000002 DEXAMETHASONE PER 1 MG: Performed by: INTERNAL MEDICINE

## 2021-01-21 PROCEDURE — 85384 FIBRINOGEN ACTIVITY: CPT | Performed by: HOSPITALIST

## 2021-01-21 PROCEDURE — 85379 FIBRIN DEGRADATION QUANT: CPT | Performed by: INTERNAL MEDICINE

## 2021-01-21 PROCEDURE — 25010000002 ENOXAPARIN PER 10 MG: Performed by: INTERNAL MEDICINE

## 2021-01-21 PROCEDURE — 83615 LACTATE (LD) (LDH) ENZYME: CPT | Performed by: INTERNAL MEDICINE

## 2021-01-21 PROCEDURE — 97116 GAIT TRAINING THERAPY: CPT

## 2021-01-21 PROCEDURE — 97110 THERAPEUTIC EXERCISES: CPT

## 2021-01-21 PROCEDURE — 94799 UNLISTED PULMONARY SVC/PX: CPT

## 2021-01-21 PROCEDURE — 97530 THERAPEUTIC ACTIVITIES: CPT

## 2021-01-21 PROCEDURE — 80053 COMPREHEN METABOLIC PANEL: CPT | Performed by: HOSPITALIST

## 2021-01-21 RX ORDER — DEXAMETHASONE 1 MG
0.5 TABLET ORAL
Qty: 2 TABLET | Refills: 0 | Status: SHIPPED | OUTPATIENT
Start: 2021-01-28 | End: 2021-01-31

## 2021-01-21 RX ORDER — DEXAMETHASONE 2 MG/1
2 TABLET ORAL
Qty: 3 TABLET | Refills: 0 | Status: SHIPPED | OUTPATIENT
Start: 2021-01-22 | End: 2021-01-25

## 2021-01-21 RX ORDER — HYDRALAZINE HYDROCHLORIDE 25 MG/1
25 TABLET, FILM COATED ORAL 3 TIMES DAILY
Start: 2021-01-21 | End: 2021-04-27 | Stop reason: SDUPTHER

## 2021-01-21 RX ORDER — DEXAMETHASONE 1 MG
1 TABLET ORAL
Qty: 3 TABLET | Refills: 0 | Status: SHIPPED | OUTPATIENT
Start: 2021-01-25 | End: 2021-01-28

## 2021-01-21 RX ADMIN — HYDRALAZINE HYDROCHLORIDE 25 MG: 25 TABLET, FILM COATED ORAL at 05:06

## 2021-01-21 RX ADMIN — CHOLECALCIFEROL TAB 10 MCG (400 UNIT) 1000 UNITS: 10 TAB at 08:29

## 2021-01-21 RX ADMIN — CETIRIZINE HYDROCHLORIDE 5 MG: 10 TABLET, FILM COATED ORAL at 08:30

## 2021-01-21 RX ADMIN — PIPERACILLIN SODIUM AND TAZOBACTAM SODIUM 4.5 G: 4; .5 INJECTION, POWDER, LYOPHILIZED, FOR SOLUTION INTRAVENOUS at 05:05

## 2021-01-21 RX ADMIN — ALBUTEROL SULFATE 2 PUFF: 90 AEROSOL, METERED RESPIRATORY (INHALATION) at 12:25

## 2021-01-21 RX ADMIN — CARVEDILOL 25 MG: 25 TABLET, FILM COATED ORAL at 08:30

## 2021-01-21 RX ADMIN — GABAPENTIN 100 MG: 100 CAPSULE ORAL at 08:35

## 2021-01-21 RX ADMIN — PIPERACILLIN SODIUM AND TAZOBACTAM SODIUM 4.5 G: 4; .5 INJECTION, POWDER, LYOPHILIZED, FOR SOLUTION INTRAVENOUS at 12:25

## 2021-01-21 RX ADMIN — ALBUTEROL SULFATE 2 PUFF: 90 AEROSOL, METERED RESPIRATORY (INHALATION) at 06:39

## 2021-01-21 RX ADMIN — ENOXAPARIN SODIUM 40 MG: 40 INJECTION SUBCUTANEOUS at 05:05

## 2021-01-21 RX ADMIN — ATORVASTATIN CALCIUM 20 MG: 20 TABLET, FILM COATED ORAL at 08:29

## 2021-01-21 RX ADMIN — OXYBUTYNIN CHLORIDE 10 MG: 5 TABLET, EXTENDED RELEASE ORAL at 08:30

## 2021-01-21 RX ADMIN — HYDRALAZINE HYDROCHLORIDE 25 MG: 25 TABLET, FILM COATED ORAL at 13:34

## 2021-01-21 RX ADMIN — Medication 1 CAPSULE: at 08:29

## 2021-01-21 RX ADMIN — DEXAMETHASONE SODIUM PHOSPHATE 4 MG: 4 INJECTION, SOLUTION INTRA-ARTICULAR; INTRALESIONAL; INTRAMUSCULAR; INTRAVENOUS; SOFT TISSUE at 08:29

## 2021-01-21 RX ADMIN — SODIUM CHLORIDE, PRESERVATIVE FREE 10 ML: 5 INJECTION INTRAVENOUS at 08:31

## 2021-01-21 RX ADMIN — LOSARTAN POTASSIUM 100 MG: 50 TABLET, FILM COATED ORAL at 08:30

## 2021-01-21 RX ADMIN — PANTOPRAZOLE SODIUM 40 MG: 40 TABLET, DELAYED RELEASE ORAL at 05:06

## 2021-01-21 NOTE — DISCHARGE PLACEMENT REQUEST
"Emma Krishnamurthy (80 y.o. Female)     Date of Birth Social Security Number Address Home Phone MRN    1940  [address] [facility] [address] [phone] [id number]    Yarsani Marital Status          Mormon        Admission Date Admission Type Admitting Provider Attending Provider Department, Room/Bed    1/8/21 Emergency Burt Golden MD Parks, Andrew, MD [address], 3339/1P    Discharge Date Discharge Disposition Discharge Destination         Home or Self Care              Attending Provider: Yimi Alberto MD    Allergies: Codeine, Sulfa Antibiotics    Isolation: Enh Drop/Con   Infection: COVID (confirmed) (12/31/20)   Code Status: CPR    Ht: 149.9 cm (59\")   Wt: 87.2 kg (192 lb 5 oz)    Admission Cmt: None   Principal Problem: Acute UTI [N39.0]                 Active Insurance as of 1/8/2021     Primary Coverage     Payor Plan Insurance Group Employer/Plan Group    MEDICARE MEDICARE A & B      Payor Plan Address Payor Plan Phone Number Payor Plan Fax Number Effective Dates    [address] [phone]  2/1/2001 - None Entered    [facility] [id number]       Subscriber Name Subscriber Birth Date Member ID       EMMA KRISHNAMURTHY 1940 [id number]           Secondary Coverage     Payor Plan Insurance Group Employer/Plan Group    GU[employer]TEE TRUST LIFE [employer]TE[employer] LIFE INSURANCE      Payor Plan Address Payor Plan Phone Number Payor Plan Fax Number Effective Dates    [address] [phone]  8/1/2017 - None Entered    [facility] [id number]       Subscriber Name Subscriber Birth Date Member ID       EMMA KRISHNAMURTHY 1940 [id number]                 Emergency Contacts      (Rel.) Home Phone Work Phone Mobile Phone    ShelbyLashawn lim (Relative) [phone] -- --    Bob Krishnamurthy (Spouse) [phone] -- --    Inez Maddox (Daughter) [phone] -- --        [address] [address]  Dept. Phone:  " "[phone]  Dept. Fax:  [phone] Date Ordered: 2021         Patient:  Emma Ryan MRN:  [id number]   [address] [place] [facility] [address] :  1940  SSN:    Phone: [phone] Sex:  F     Weight: 87.2 kg (192 lb 5 oz)         Ht Readings from Last 1 Encounters:   21 149.9 cm (59\")         Home Oxygen Therapy          (Order ID: [id number])    Diagnosis:  History of COVID-19 (Z86.16 [ICD-10-CM])   Quantity:  1     Delivery Modality: Nasal Cannula  Liters Per Minute: 3  Duration: Continuous  Equipment:  Oxygen Concentrator &  &  Portable Gaseous Oxygen System & Portable Oxygen Contents Gaseous &  Conserving Regulator  Length of Need (99 Months = Lifetime): 99 Months = Lifetime        Authorizing Provider's Phone: [phone]   Authorizing Provider:Yimi Alberto MD  Authorizing Provider's NPI: [id number]  Order Entered By: Yimi Alberto MD 2021 10:10 AM     Electronically signed by: Yimi Alberto MD 2021 10:10 AM               History & Physical      Daina Bass PA-C at 21 0100     Attestation signed by Burt Golden MD at 21 0504    I have seen and examined the patient independently from Daina Bass PA-C, and have reviewed Daina's findings, assessment and plan in the H&P below. Agree with treatment of UTI with rocephin and treatment of COVID-19 pneumonia with IV doxycycline and decadron. Since symptoms originally started before Gisele, she is outside of window for therapeutic benefit from remdesivir, so will not initiate unless Infectious Disease feels otherwise. She is mildly hypoxic but denies dyspnea. Only respiratory symptom is mild cough. For UTI, since patient is having pain radiating across her back, will obtain CT abdomen/pelvis to rule out pyelonephritis.                       [facility] Medicine Services  History & Physical    Patient Identification:  Name:  Emma ZUÑIGA" Shelby  Age:  80 y.o.  Sex:  female  :  1940  MRN:  [id number]   Visit Number:  [id number]  Primary Care Physician:  Jennifer Montano MD    Subjective     2021   Chief complaint: Nausea, vomiting, fever    History of presenting illness:      Emma Ryan is a 80 y.o. female with past medical history significant for CKD stage IIIa, GERD, hyperlipidemia, essential hypertension.      Patient presents to [facility] Emergency Department with complaints of nausea, vomiting, and fevers. Patient reports that she was diagnosed with COVID-19 on the  in the ER at this facility. and started showing symptoms the week before [place]. Her  also tested positive on . She reports that she has experienced symptoms of congestion, chills, fever, nausea, vomiting, diarrhea, decreased appetitie, dizziness, and hurting across her back between her shoulder blades. She states that her symptoms have stayed the same. She denies feeling short of breath, but patient is requiring oxygen 2 LNC when she does not require oxygen at home. She has a Tmax of 101.2 degrees F while in the ED that has resolved with Tylenol. She denies abdominal pain, but reports her stomach feeling sore. She denies cough, chest pain, dysuria, urinary frequency, syncope.     Upon arrival to the ED, vital signs were T-max of 101.2 °F, pulse 94 bpm, respirations 20, blood pressure 117/63, SPO2 88% on room air and was placed on 2 L nasal cannula with improvement of SPO2 to 92 to 95%.  ABG shows pH 7.461, PCO2 36, PO2 63.4, HCO3 25.6 with oxygen saturation of 93.8 on room air.  .  CMP consists of creatinine 1.09, EGFR 48, AST 41, otherwise within normal limits.  Ferritin increased at 374.  Elevated CRP.  D-dimer and lactate within normal limits.  CBC largely unremarkable.  UA shows trace ketones, positive nitrites, 2+ leukocytes, trace protein, 31-50 white blood cells, 4+ bacteria.  Pending urine  culture.  Pending blood cultures x2.    Known Emergency Department medications received prior to my evaluation included Tylenol 1000 mg, Rocephin 2 g, Decadron 10 mg, doxycycline, Toradol 10 mg, Zofran 4 mg, sodium chloride bolus 1 L. Room location at the time of my evaluation was.     ---------------------------------------------------------------------------------------------------------------------   Review of Systems   Constitutional: Positive for chills and fever (Tmax 101.2 degrees F). Negative for activity change and fatigue.   HENT: Positive for congestion. Negative for rhinorrhea, sneezing and sore throat.    Eyes: Negative for discharge and redness.   Respiratory: Negative for apnea, cough, shortness of breath (Denies shortness of breath; requiring 2 L NC. ) and wheezing.    Cardiovascular: Negative for chest pain and palpitations.   Gastrointestinal: Positive for diarrhea, nausea and vomiting. Negative for abdominal distention and abdominal pain (Admits to abdominal soreness).   Endocrine: Negative for polydipsia, polyphagia and polyuria.   Genitourinary: Negative for difficulty urinating, dysuria, flank pain, frequency and urgency.   Musculoskeletal: Positive for back pain (between shoulder blades). Negative for arthralgias and myalgias.   Skin: Negative for color change, pallor and rash.   Neurological: Positive for dizziness and headaches. Negative for syncope and weakness.   Psychiatric/Behavioral: Negative for agitation, behavioral problems and confusion.      ---------------------------------------------------------------------------------------------------------------------   Past Medical History:   Diagnosis Date   • GERD (gastroesophageal reflux disease)    • Hyperlipidemia    • Hypertension    • Left shoulder pain    • Obesity    • Osteoarthritis of knees, bilateral    • Overactive bladder    • Right shoulder pain      Past Surgical History:   Procedure Laterality Date   • APPENDECTOMY     •  BUNIONECTOMY Right    • CARDIOVASCULAR STRESS TEST  10/2012   • CATARACT EXTRACTION  2017   • CHOLECYSTECTOMY     • COLONOSCOPY  2011   • ECHO - CONVERTED  10/2012   • JOINT REPLACEMENT      bilateral knees    • KNEE ARTHROPLASTY Left    • KNEE ARTHROSCOPY     • SHOULDER ARTHROSCOPY Left 2016    Procedure: LEFT SHOULDER ACROMIOPLASTY,MINI OPEN ROTATOR CUFF REPAIR;  Surgeon: Carlos Eduardo Smith MD;  Location: [facility];  Service:    • SHOULDER SURGERY  2016    OPEN ACROMICPLASTY RIGHT SHOULDER     Family History   Problem Relation Age of Onset   • Heart disease Other    • Stroke Sister    • Diabetes Mother    • Heart failure Mother    • Heart disease Mother    • Heart attack Mother    • Hypertension Father    • Emphysema Father    • Heart disease Father    • No Known Problems Brother    • Cancer Sister    • Kidney disease Sister    • Heart failure Sister    • Diabetes Brother    • Diabetes Brother    • Diabetes Brother      Social History     Socioeconomic History   • Marital status:      Spouse name: Not on file   • Number of children: Not on file   • Years of education: Not on file   • Highest education level: Not on file   Tobacco Use   • Smoking status: Former Smoker     Packs/day: 2.00     Years: 20.00     Pack years: 40.00     Types: Cigarettes     Quit date:      Years since quittin.0   • Smokeless tobacco: Never Used   Substance and Sexual Activity   • Alcohol use: No     Comment: s/p    • Drug use: No   • Sexual activity: Defer     ---------------------------------------------------------------------------------------------------------------------   Allergies:  Codeine and Sulfa antibiotics  ---------------------------------------------------------------------------------------------------------------------   Home medications:    Medications below are reported home medications pulling from within the system; at this time, these medications have not been reconciled unless  otherwise specified and are in the verification process for further verifcation as current home medications.  Medications Prior to Admission   Medication Sig Dispense Refill Last Dose   • atorvastatin (LIPITOR) 20 MG tablet Take 1 tablet by mouth once daily 90 tablet 0    • carvedilol (COREG) 25 MG tablet Take 25 mg by mouth 2 (Two) Times a Day With Meals.      • Cholecalciferol (VITAMIN D PO) Take 1,000 Units by mouth daily.      • Docusate Calcium (STOOL SOFTENER PO) Take  by mouth daily.      • Fexofenadine HCl (ALLEGRA PO) Take 180 mg by mouth daily as needed.      • gabapentin (NEURONTIN) 100 MG capsule Take 1 capsule by mouth twice daily 60 capsule 0    • hydrALAZINE (APRESOLINE) 25 MG tablet Take 25 mg by mouth 2 (two) times a day.      • losartan (COZAAR) 100 MG tablet Take 100 mg by mouth Daily.      • omeprazole OTC (PriLOSEC OTC) 20 MG EC tablet Take 20 mg by mouth daily.      • ondansetron ODT (Zofran ODT) 4 MG disintegrating tablet Place 1 tablet on the tongue Every 8 (Eight) Hours As Needed for Nausea or Vomiting. 30 tablet 0    • oxybutynin XL (DITROPAN-XL) 10 MG 24 hr tablet Take 1 tablet by mouth Daily. 90 tablet 1    • Probiotic Product (PROBIOTIC COLON SUPPORT PO) Take  by mouth daily.          Hospital Scheduled Meds:  cefTRIAXone, 1 g, Intravenous, Q24H  dexamethasone, 6 mg, Intravenous, Daily  doxycycline, 100 mg, Intravenous, Q12H  enoxaparin, 40 mg, Subcutaneous, Q24H  sodium chloride, 10 mL, Intravenous, Q12H      sodium chloride, 75 mL/hr, Last Rate: 75 mL/hr (01/09/21 0159)        Current listed hospital scheduled medications may not yet reflect those currently placed in orders that are signed and held awaiting patient's arrival to floor.   ---------------------------------------------------------------------------------------------------------------------     Objective     Vital Signs:  Temp:  [97.3 °F (36.3 °C)-101.2 °F (38.4 °C)] 98.6 °F (37 °C)  Heart Rate:  [73-96] 82  Resp:  [18-20]  18  BP: (103-149)/(58-91) 144/71      01/08/21  1646 01/09/21  0130   Weight: 83.9 kg (185 lb) 81.7 kg (180 lb 3.2 oz)     Body mass index is 36.4 kg/m².  ---------------------------------------------------------------------------------------------------------------------       Physical Exam  Constitutional:       Appearance: Normal appearance. She is normal weight.      Interventions: Nasal cannula in place.   HENT:      Head: Normocephalic and atraumatic.      Nose: Nose normal.      Mouth/Throat:      Mouth: Mucous membranes are moist.      Pharynx: Oropharynx is clear.   Eyes:      Extraocular Movements: Extraocular movements intact.      Conjunctiva/sclera: Conjunctivae normal.      Pupils: Pupils are equal, round, and reactive to light.   Neck:      Musculoskeletal: Normal range of motion and neck supple.   Cardiovascular:      Rate and Rhythm: Normal rate and regular rhythm.      Pulses: Normal pulses.      Heart sounds: Normal heart sounds. No murmur.   Pulmonary:      Effort: Pulmonary effort is normal.      Breath sounds: Rales (bibasilar rales) present. No wheezing or rhonchi.   Abdominal:      General: Abdomen is flat. Bowel sounds are normal.      Palpations: Abdomen is soft.      Tenderness: There is no abdominal tenderness.   Musculoskeletal: Normal range of motion.      Right lower leg: No edema.      Left lower leg: No edema.   Skin:     General: Skin is dry.      Findings: No erythema or rash.   Neurological:      General: No focal deficit present.      Mental Status: She is alert and oriented to person, place, and time. Mental status is at baseline.   Psychiatric:         Mood and Affect: Mood normal.         Behavior: Behavior normal.         Thought Content: Thought content normal.       ---------------------------------------------------------------------------------------------------------------------  EKG:    No EKG for review.    Telemetry:    Patient appears to be in NSR with HR 74 bpm and  SpO2 94% on 2 LNC.    I have personally looked at both the EKG and the telemetry strips.  ---------------------------------------------------------------------------------------------------------------------   Results from last 7 days   Lab Units 01/08/21  1804   CRP mg/dL 6.26*   LACTATE mmol/L 1.1   WBC 10*3/mm3 7.36   HEMOGLOBIN g/dL 12.9   HEMATOCRIT % 40.9   MCV fL 93.6   MCHC g/dL 31.5   PLATELETS 10*3/mm3 202     Results from last 7 days   Lab Units 01/08/21  1707   PH, ARTERIAL pH units 7.461*   PO2 ART mm Hg 63.4*   PCO2, ARTERIAL mm Hg 36.0   HCO3 ART mmol/L 25.6     Results from last 7 days   Lab Units 01/08/21  1804   SODIUM mmol/L 138   POTASSIUM mmol/L 4.1   CHLORIDE mmol/L 103   CO2 mmol/L 24.0   BUN mg/dL 17   CREATININE mg/dL 1.09*   EGFR IF NONAFRICN AM mL/min/1.73 48*   CALCIUM mg/dL 8.7   GLUCOSE mg/dL 90   ALBUMIN g/dL 3.52   BILIRUBIN mg/dL 0.4   ALK PHOS U/L 78   AST (SGOT) U/L 41*   ALT (SGPT) U/L 25   Estimated Creatinine Clearance: 41 mL/min (A) (by C-G formula based on SCr of 1.09 mg/dL (H)).  No results found for: AMMONIA          No results found for: HGBA1C  Lab Results   Component Value Date    TSH 1.950 09/14/2018    FREET4 1.16 09/14/2018     No results found for: PREGTESTUR, PREGSERUM, HCG, HCGQUANT  Pain Management Panel     Pain Management Panel Latest Ref Rng & Units 10/9/2020 6/10/2020    CREATININE UR mg/dL 99.5 122.8    AMPHETAMINES SCREEN, URINE Negative - -    BARBITURATES SCREEN Negative - -    BENZODIAZEPINE SCREEN, URINE Negative - -    COCAINE SCREEN, URINE Negative - -    METHADONE SCREEN, URINE Negative - -            ---------------------------------------------------------------------------------------------------------------------  Imaging Results (Last 7 Days)     Procedure Component Value Units Date/Time    CT CHEST WITHOUT CONTRAST DIAGNOSTIC [[id number]] Collected: 01/08/21 2205     Updated: 01/08/21 2207    Narrative:      CT Chest WO    INDICATION:   Shortness  of air. COVID positive.    TECHNIQUE:   CT of the thorax without IV contrast. Coronal and sagittal reconstructions were obtained.  Radiation dose reduction techniques included automated exposure control or exposure modulation based on body size. Count of known CT and cardiac nuc med studies  performed in previous 12 months: 0.     COMPARISON:   None available.    FINDINGS:  There is atherosclerotic disease and coronary artery disease. No pleural or pericardial effusion is identified. There is no adenopathy. Small hiatal hernia is present. There is some thickening of the lower esophageal wall which may reflect esophagitis.  Images through the upper abdomen show changes of cholecystectomy and fatty atrophy of the pancreas.    Lung windows demonstrate multifocal predominantly groundglass infiltrates with some associated septal thickening. Commonly reported imaging features of COVID-19 pneumonia are present. Other processes such as influenza pneumonia and organizing pneumonia  can cause a similar imaging pattern. Postoperative changes are noted in the left shoulder.      Impression:      1. Multifocal pneumonia typical of COVID 19.  2. Atherosclerotic disease and coronary artery disease.    Signer Name: Juliano Flower MD   Signed: 1/8/2021 10:05 PM   Workstation Name: [email or web address]    [facility]    XR Chest AP [[id number]] Collected: 01/08/21 1744     Updated: 01/08/21 1802    Narrative:      EXAM:    XR Chest, 1 View     EXAM DATE:    1/8/2021 4:59 PM     CLINICAL HISTORY:    COVID Evaluation, Cough, Fever     TECHNIQUE:    Frontal view of the chest.     COMPARISON:    12/31/2020     FINDINGS:    Lungs:  Development of bilateral perihilar opacities which can be seen  with COVID associated pneumonia.    Pleural space:  Unremarkable.  No pneumothorax.    Heart:  Unremarkable.  No cardiomegaly.    Mediastinum:  Unremarkable.    Bones/joints:  Unremarkable.       Impression:        Development of  bilateral airspace disease which can be seen with COVID  associated pneumonia.     This report was finalized on 1/8/2021 5:45 PM by Dr. Alan Velez MD.           Last echocardiogram:  Results for orders placed during the hospital encounter of 09/17/18   Adult Transthoracic Echo Complete W/ Cont if Necessary Per Protocol    Narrative · Normal left ventricular cavity size and wall thickness noted.  · Left ventricular systolic function is normal. Estimated EF appears to be   in the range of 56 - 60%.  · Left ventricular diastolic dysfunction (grade I) consistent with   impaired relaxation.  · Trace mitral valve regurgitation is present.  · Trace tricuspid valve regurgitation is present. No evidence of pulmonary   hypertension is present.  · No significant valvular heart disease  · There is no evidence of pericardial effusion.        I have personally reviewed the above radiology images and read the final radiology report on 01/09/21  ---------------------------------------------------------------------------------------------------------------------  Assessment / Plan     Active Hospital Problems    Diagnosis POA   • **Acute UTI [N39.0] Yes       ASSESSMENT/PLAN:  Simple sepsis present on admission with HR 94 bpm, RR 20, Tmax 101.2 °F, CRP 6.26 secondary to UTI and COVID-19 pneumonia  - CXR shows bilateral airspace disease.   - CT chest shows multifocal pneumonia typical of COVID-19; atherosclerotic disease and CAD.   - ABG shows pH 7.46, pCO2 36, pO2 63.4, HCO3 25.6 with oxygen saturation of 93.8 on room air.   - Supplemental oxygen ordered. Currently on 2 L NC. Continue to titrate to maintain SpO2 90-95%.   - UA shows trace ketones, positive nitrites, 2+ leukocytes, trace protein, 31-50 white blood cell, 4+ bacteria; pending urine cultures.  - Continue to trend with COVID progression labs.   - Trend C-RP on admission.   - Pending blood cultures x 2.   - Rocephin 2 g received in ED.  Continue on admission for  treatment of UTI.  - Doxycycline and IV decadron ordered for treatment of pneumonia.  - Infectious disease consulted.  Input and assistance much appreciated.  - Continue to monitor vital signs per hospital protocol.  - Continue to monitor with daily labs.    Elevated AST  -AST 41  -Hepatitis panel ordered.  -Avoid hepatotoxic agents as much as possible.    Chronic Kidney Disease, stage III a  - Creatinine on admission 1.09.  Baseline creatinine appears to be 0.9-1.  - EGFR 48.  - Continue to monitor with a.m. CMP.  - Avoid nephrotoxic agents.    GERD  - PPI    Hyperlipidemia  -A.m. fasting lipid panel.  -Continue home Lipitor.    Essential Hypertension  -Blood pressure stable  -Continue to monitor per hospital protocol.  -Resume home antihypertensives admission.  ----------  -DVT prophylaxis: Lovenox  -Activity: Up with assistance  -Expected length of stay: INPATIENT status due to the need for care which can only be reasonably provided in an hospital setting such as aggressive/expedited ancillary services and/or consultation services, the necessity for IV medications, close physician monitoring and/or the possible need for procedures.  In such, I feel patient's risk for adverse outcomes and need for care warrant INPATIENT evaluation and predict the patient's care encounter to likely last beyond 2 midnights.    High risk secondary to simple sepsis secondary to UTI and Covid 19 pneumonia, chronic kidney disease, essential hypertension.    Daina Bass PA-C  01/09/21  02:25 EST    ---------------------------------------------------------------------------------------------------------------------           Electronically signed by Burt Golden MD at 01/09/21 0504       Vital Signs (last day)     Date/Time   Temp   Temp src   Pulse   Resp   BP   Patient Position   SpO2    01/21/21 0730   --   --   --   --   --   --   95    01/21/21 0608   97.9 (36.6)   Oral   58   20   152/76   Lying   93    01/21/21  0300   97.6 (36.4)   Oral   59   20   173/73   Lying   --    01/20/21 1900   97.8 (36.6)   Oral   72   18   148/78   Lying   95    01/20/21 1434   98.2 (36.8)   Oral   68   26   135/67   Lying   94    01/20/21 0706   --   --   --   --   --   --   95    01/20/21 0620   97.8 (36.6)   Oral   57   22   141/67   Lying   95    01/20/21 0300   98.4 (36.9)   Oral   67   18   168/84   Lying   94

## 2021-01-21 NOTE — NURSING NOTE
Patient O2 dropped to 85% upon ambulation on 3L NC. Oxygen increased to 91-93% once returning to bed.

## 2021-01-21 NOTE — THERAPY TREATMENT NOTE
Acute Care - Physical Therapy Treatment Note   [facility]     Patient Name: Emma Ryan  : 1940  MRN: [id number]  Today's Date: 2021   Onset of Illness/Injury or Date of Surgery: 21       PT Assessment (last 12 hours)      PT Evaluation and Treatment     Row Name 21 1127          Physical Therapy Time and Intention    Subjective Information  no complaints  -CW     Document Type  therapy note (daily note)  -CW     Mode of Treatment  physical therapy  -CW     Patient Effort  good  -CW     Symptoms Noted During/After Treatment  fatigue  -CW     Comment  Patient reports that she is going home today and is very pleased. She is agreeable to therapy today.  -CW     Row Name 21 1127          General Information    Patient Profile Reviewed  yes  -CW     Patient Observations  alert;cooperative;agree to therapy  -CW     Row Name 21 1127          Cognition    Affect/Mental Status (Cognitive)  WFL  -CW     Orientation Status (Cognition)  oriented x 3  -CW     Follows Commands (Cognition)  WFL  -CW     Row Name 21 1127          Pain Scale: Word Pre/Post-Treatment    Pre/Posttreatment Pain Comment  Patient reports no pain during physical therapy today.  -CW     Row Name 21 1127          Bed Mobility    All Activities, [place] (Bed Mobility)  modified independence  -CW     Bed Mobility, Safety Issues  decreased use of legs for bridging/pushing  -CW     Assistive Device (Bed Mobility)  bed rails;head of bed elevated  -CW     Row Name 21 1127          Transfers    Sit-Stand [place] (Transfers)  contact guard  -CW     Stand-Sit [place] (Transfers)  contact guard  -CW     Row Name 21 1127          Sit-Stand Transfer    Assistive Device (Sit-Stand Transfers)  walker, front-wheeled  -CW     Row Name 21 1127          Stand-Sit Transfer    Assistive Device (Stand-Sit Transfers)  walker, front-wheeled  -CW     Row Name 21 1127          Gait/Stairs  (Locomotion)    [place] Level (Gait)  contact guard  -CW     Assistive Device (Gait)  walker, front-wheeled  -CW     Distance in Feet (Gait)  24  -CW     Pattern (Gait)  step-to  -CW     Deviations/Abnormal Patterns (Gait)  base of support, narrow;robyn decreased;gait speed decreased;stride length decreased  -CW     Bilateral Gait Deviations  leans left  -CW     Row Name 01/21/21 1127          Safety Issues, Functional Mobility    Impairments Affecting Function (Mobility)  balance;endurance/activity tolerance;strength  -CW     Row Name 01/21/21 1127          Motor Skills    Therapeutic Exercise  other (see comments) LE strengthening in supine and seated  -CW     Row Name 01/21/21 1127          Coping    Observed Emotional State  calm;cooperative  -     Trust Relationship/Rapport  care explained;choices provided;questions encouraged;thoughts/feelings acknowledged  -CW     Row Name 01/21/21 1127          Plan of Care Review    Plan of Care Reviewed With  patient  -CW     Row Name 01/21/21 1127          Positioning and Restraints    Pre-Treatment Position  in bed  -CW     In Bed  notified nsg;fowlers;call light within reach  -CW     Row Name 01/21/21 1127          Therapy Assessment/Plan (PT)    Comment, Therapy Assessment/Plan (PT)  Patient continues to demonstrate good participation with physical therapy. Ambulation performed once today without nasal cannula, which caused her saturation to drop to 84%. It quickly rebounded to 91% with reapplication of nasal cannula. This indicates patient will likely need to wear her oxygen at home at all times initially.  -CW       User Key  (r) = Recorded By, (t) = Taken By, (c) = Cosigned By    Initials Name Provider Type    CW Ricardo Dietrich PT Physical Therapist        Physical Therapy Education                 Title: PT OT SLP Therapies (Done)     Topic: Physical Therapy (Done)     Point: Mobility training (Done)     Learning Progress Summary           Patient  Acceptance, E, VU by CW at 1/19/2021 1556    Acceptance, E,TB, VU by CM at 1/15/2021 2038    Acceptance, E, VU by BC at 1/15/2021 1047                   Point: Home exercise program (Done)     Learning Progress Summary           Patient Acceptance, E, VU by CW at 1/19/2021 1556    Acceptance, E,TB, VU by CM at 1/15/2021 2038    Acceptance, E, VU by BC at 1/15/2021 1047                   Point: Body mechanics (Done)     Learning Progress Summary           Patient Acceptance, E, VU by CW at 1/19/2021 1556    Acceptance, E,TB, VU by CM at 1/15/2021 2038    Acceptance, E, VU by BC at 1/15/2021 1047                   Point: Precautions (Done)     Learning Progress Summary           Patient Acceptance, E, VU by CW at 1/19/2021 1556    Acceptance, E,TB, VU by CM at 1/15/2021 2038    Acceptance, E, VU by BC at 1/15/2021 1047                               User Key     Initials Effective Dates Name Provider Type Discipline    BC 03/14/16 -  Hailee Rogers PT Physical Therapist PT     06/20/16 -  Bri Bryan, RN Registered Nurse Nurse     09/11/20 -  Ricardo Dietrich PT Physical Therapist PT              PT Recommendation and Plan     Plan of Care Reviewed With: patient       Time Calculation:   PT Charges     Row Name 01/21/21 1135             Time Calculation    PT Received On  01/21/21  -CW      PT Goal Re-Cert Due Date  01/29/21  -CW         Time Calculation- PT    Total Timed Code Minutes- PT  38 minute(s)  -        User Key  (r) = Recorded By, (t) = Taken By, (c) = Cosigned By    Initials Name Provider Type    CW Ricardo Dietrich PT Physical Therapist        Therapy Charges for Today     Code Description Service Date Service Provider Modifiers Qty    09364039478 HC GAIT TRAINING EA 15 MIN 1/21/2021 Ricardo Dietrich, PT GP 1    78988272041 HC PT THERAPEUTIC ACT EA 15 MIN 1/21/2021 Ricardo Dietrich, PT GP 1    13092580657 HC PT THER PROC EA 15 MIN 1/21/2021 Ricardo Dietrich, PT GP 1               Ricardo Dietrich  PT  1/21/2021

## 2021-01-21 NOTE — PLAN OF CARE
Goal Outcome Evaluation:  Plan of Care Reviewed With: patient  Progress: improving   Patient resting comfortably in bed; she sounds better. Will continue to monitor.

## 2021-01-21 NOTE — PROGRESS NOTES
Discharge Planning Assessment   [facility]     Patient Name: Emma Ryan  MRN: [id number]  Today's Date: 1/21/2021    Admit Date: 1/8/2021    Discharge Plan     Row Name 01/21/21 1108       Plan    Final Discharge Disposition Code  01 - home or self-care    Final Note Pt is being discharged home today. SS received order for oxygen @ 3 liters. SS spoke to pt who voices having no preference for DME company. SS contacted [facility] per April to arrange oxygen. [facility] to deliver portable O2 tank to hospital and home O2 concentrator to pt's home. SS faxed referral for oxygen to [facility]. SS made [facility] aware that pt has a history of Covid +. No other needs identified.        Continued Care and Services - Admitted Since 1/8/2021     Durable Medical Equipment Coordination complete    Service Provider Request Status Selected Services Address Phone Fax Patient Preferred    [facility]   Selected Durable Medical Equipment [id number] [place] [address] [phone] --              ALISON Ford

## 2021-01-22 ENCOUNTER — READMISSION MANAGEMENT (OUTPATIENT)
Dept: CALL CENTER | Facility: HOSPITAL | Age: 81
End: 2021-01-22

## 2021-01-22 NOTE — OUTREACH NOTE
Prep Survey      Responses   [facility]   Is LACE score < 7 ?  No   Emergency Room discharge w/ pulse ox?  No   Eligibility  Readm Mgmt   Discharge diagnosis  PNA, Viral, treated for Covid 19  Acute Uncomplicated Ecoli UTI  PNA, Bacterial, treated for Aspiration   Does the patient have one of the following disease processes/diagnoses(primary or secondary)?  COVID-19   Does the patient have Home health ordered?  No   Is there a DME ordered?  Yes   What DME was ordered?  oxygen to [facility]   Prep survey completed?  Yes          Sindhu Quijano RN

## 2021-01-22 NOTE — OUTREACH NOTE
COVID-19 Week 1 Survey      Responses   [facility] patient discharged from?  [facility]   Does the patient have one of the following disease processes/diagnoses(primary or secondary)?  COVID-19   COVID-19 underlying condition?  None   Call Number  Call 1   Week 1 Call successful?  Yes   Call start time  0825   Call end time  0835   Discharge diagnosis  PNA, Viral, treated for Covid 19  Acute Uncomplicated Ecoli UTI  PNA, Bacterial, treated for Aspiration   Meds reviewed with patient/caregiver?  Yes   Is the patient having any side effects they believe may be caused by any medication additions or changes?  No   Does the patient have all medications ordered at discharge?  Yes   Is the patient taking all medications as directed (includes completed medication regime)?  Yes   Does the patient have a primary care provider?   Yes   Comments regarding PCP  1/27/21 PCP FU   Does the patient have an appointment with their PCP or specialist within 7 days of discharge?  Yes   Has the patient kept scheduled appointments due by today?  N/A   What DME was ordered?  oxygen to [facility]   Has all DME been delivered?  Yes   Psychosocial issues?  No   Did the patient receive a copy of their discharge instructions?  Yes   Did the patient receive a copy of COVID-19 specific instructions?  Yes   Nursing interventions  Reviewed instructions with patient   What is the patient's perception of their health status since discharge?  Improving   Does the patient have any of the following symptoms?  Cough   Nursing Interventions  Nurse provided patient education   Pulse Ox monitoring  None   Is the patient/caregiver able to teach back steps to recovery at home?  Set small, achievable goals for return to baseline health, Rest and rebuild strength, gradually increase activity, Eat a well-balance diet   If the patient is a current smoker, are they able to teach back resources for cessation?  Not a smoker   Is the patient/caregiver able to teach  back the hierarchy of who to call/visit for symptoms/problems? PCP, Specialist, Home health nurse, Urgent Care, ED, 911  Yes   COVID-19 call completed?  Yes          Ilda Dukes RN

## 2021-01-22 NOTE — TELEPHONE ENCOUNTER
"Caller states grandmother having abdominal pain rates nine in left lower quadrant. Caller denies fever, Chest Pain or dyspnea. Caller denies vomiting or bloody stools. Caller recently in hospital. Caller given guideline and advice and states she already said she's not going back to the ER. Caller states grandmother with good urination and urine looks good. Caller again given guideline advice and advised to call back as needed.     Reason for Disposition  • [1] SEVERE pain (e.g., excruciating) AND [2] present > 1 hour    Additional Information  • Negative: Shock suspected (e.g., cold/pale/clammy skin, too weak to stand, low BP, rapid pulse)  • Negative: Difficult to awaken or acting confused (e.g., disoriented, slurred speech)  • Negative: Passed out (i.e., lost consciousness, collapsed and was not responding)  • Negative: Sounds like a life-threatening emergency to the triager  • Negative: Chest pain  • Negative: Pain is mainly in upper abdomen  (if needed ask: \"is it mainly above the belly button?\")  • Negative: Followed an abdomen (stomach) injury  • Negative: [1] Abdominal pain AND [2] pregnant < 20 weeks  • Negative: [1] Abdominal pain AND [2] pregnant > 20 weeks  • Negative: [1] Abdominal pain AND [2] postpartum (from 0 to 6 weeks after delivery)    Answer Assessment - Initial Assessment Questions  1. LOCATION: \"Where does it hurt?\"       Left lower side abdominal   2. RADIATION: \"Does the pain shoot anywhere else?\" (e.g., chest, back)       Just on in one area   3. ONSET: \"When did the pain begin?\" (e.g., minutes, hours or days ago)        Tonight   4. SUDDEN: \"Gradual or sudden onset?\"       When she stood up  5. PATTERN \"Does the pain come and go, or is it constant?\"     - If constant: \"Is it getting better, staying the same, or worsening?\"       (Note: Constant means the pain never goes away completely; most serious pain is constant and it progresses)      - If intermittent: \"How long does it last?\" \"Do you " "have pain now?\"      (Note: Intermittent means the pain goes away completely between bouts)      Will come and go   6. SEVERITY: \"How bad is the pain?\"  (e.g., Scale 1-10; mild, moderate, or severe)    - MILD (1-3): doesn't interfere with normal activities, abdomen soft and not tender to touch     - MODERATE (4-7): interferes with normal activities or awakens from sleep, tender to touch     - SEVERE (8-10): excruciating pain, doubled over, unable to do any normal activities       Nine   7. RECURRENT SYMPTOM: \"Have you ever had this type of abdominal pain before?\" If so, ask: \"When was the last time?\" and \"What happened that time?\"       Denies has had before   8. CAUSE: \"What do you think is causing the abdominal pain?\"      Doing to much   9. RELIEVING/AGGRAVATING FACTORS: \"What makes it better or worse?\" (e.g., movement, antacids, bowel movement)      Denies   10. OTHER SYMPTOMS: \"Has there been any vomiting, diarrhea, constipation, or urine problems?\"        Denies   11. PREGNANCY: \"Is there any chance you are pregnant?\" \"When was your last menstrual period?\"    Protocols used: ABDOMINAL PAIN - FEMALE-ADULT-AH      "

## 2021-01-23 ENCOUNTER — READMISSION MANAGEMENT (OUTPATIENT)
Dept: CALL CENTER | Facility: HOSPITAL | Age: 81
End: 2021-01-23

## 2021-01-23 NOTE — OUTREACH NOTE
COVID-19 Week 1 Survey      Responses   [facility] patient discharged from?  [facility]   Does the patient have one of the following disease processes/diagnoses(primary or secondary)?  COVID-19   COVID-19 underlying condition?  None   Call Number  Call 2   Week 1 Call successful?  Yes   Call start time  1044   Call end time  1049   Discharge diagnosis  PNA, Viral, treated for Covid 19  Acute Uncomplicated Ecoli UTI  PNA, Bacterial, treated for Aspiration   Is patient permission given to speak with other caregiver?  Yes   List who call center can speak with  Granddaughter   Meds reviewed with patient/caregiver?  Yes   Is the patient having any side effects they believe may be caused by any medication additions or changes?  No   Does the patient have all medications ordered at discharge?  Yes   Is the patient taking all medications as directed (includes completed medication regime)?  Yes   Does the patient have a primary care provider?   Yes   Comments regarding PCP  1/27/21 PCP FU   Does the patient have an appointment with their PCP or specialist within 7 days of discharge?  Yes   Has the patient kept scheduled appointments due by today?  N/A   What DME was ordered?  oxygen to [facility]   Has all DME been delivered?  Yes   Psychosocial issues?  No   Did the patient receive a copy of their discharge instructions?  Yes   Did the patient receive a copy of COVID-19 specific instructions?  Yes   Nursing interventions  Reviewed instructions with patient   What is the patient's perception of their health status since discharge?  Improving   Does the patient have any of the following symptoms?  Cough   Nursing Interventions  Nurse provided patient education   Pulse Ox monitoring  Intermittent   Pulse Ox device source  Patient   O2 Sat comments  States they have been 94-95% on oxygen.  She is unsure how much oxygen she is using.  States her granddaughter knows but she is not there at the moment   O2 Sat: education provided   Sat levels, Monitoring frequency, When to seek care   Is the patient/caregiver able to teach back steps to recovery at home?  Set small, achievable goals for return to baseline health, Rest and rebuild strength, gradually increase activity, Eat a well-balance diet, Make a list of questions for provider's appointment   If the patient is a current smoker, are they able to teach back resources for cessation?  Not a smoker   Is the patient/caregiver able to teach back the hierarchy of who to call/visit for symptoms/problems? PCP, Specialist, Home health nurse, Urgent Care, ED, 911  Yes   COVID-19 call completed?  Yes   Wrap up additional comments  States she is getting a little stronger.  Taking medications as prescribed.  Denies any questions or needs at this time          Chey Santillan LPN

## 2021-01-24 ENCOUNTER — READMISSION MANAGEMENT (OUTPATIENT)
Dept: CALL CENTER | Facility: HOSPITAL | Age: 81
End: 2021-01-24

## 2021-01-24 NOTE — OUTREACH NOTE
COVID-19 Week 1 Survey      Responses   [facility] [place] patient discharged from?  [facility]   Does the patient have one of the following disease processes/diagnoses(primary or secondary)?  COVID-19   COVID-19 underlying condition?  None   Call Number  Call 3   Week 1 Call successful?  Yes   Call start time  1057   Call end time  1101   Discharge diagnosis  PNA, Viral, treated for Covid 19  Acute Uncomplicated Ecoli UTI  PNA, Bacterial, treated for Aspiration   Is patient permission given to speak with other caregiver?  Yes   List who call center can speak with  Carly(Granddaughter)   Person spoke with today (if not patient) and relationship  Carly(Granddaughter)   Meds reviewed with patient/caregiver?  Yes   Is the patient having any side effects they believe may be caused by any medication additions or changes?  No   Does the patient have all medications ordered at discharge?  Yes   Is the patient taking all medications as directed (includes completed medication regime)?  Yes   Does the patient have a primary care provider?   Yes   Did the patient receive a copy of their discharge instructions?  Yes   Did the patient receive a copy of COVID-19 specific instructions?  Yes   Nursing interventions  Reviewed instructions with patient   What is the patient's perception of their health status since discharge?  Improving   Does the patient have any of the following symptoms?  None   Nursing Interventions  Nurse provided patient education   Pulse Ox monitoring  Intermittent   O2 Sat comments  93-95% on 3L O2   O2 Sat: education provided  Sat levels, When to seek care   O2 Sat education comments  Told granddaughter if pt. unable to maintain O2 level 90% or greater to return to ED.   Is the patient/caregiver able to teach back steps to recovery at home?  Eat a well-balance diet, Rest and rebuild strength, gradually increase activity   Is the patient/caregiver able to teach back the hierarchy of who to call/visit for  symptoms/problems? PCP, Specialist, Home health nurse, Urgent Care, ED, 911  Yes   COVID-19 call completed?  Yes          Cecilia Espinoza RN

## 2021-01-29 ENCOUNTER — NURSE TRIAGE (OUTPATIENT)
Dept: CALL CENTER | Facility: HOSPITAL | Age: 81
End: 2021-01-29

## 2021-01-29 NOTE — TELEPHONE ENCOUNTER
"Reviewed guideline and AVS with caller, advises they contact her PCP. Caller has contacted PCP and they would not give a recommendation on how to wean oxygen. Called and left  for CM to contact Carly about weaning her grandmother's oxygen. Advised she call [facility] and see if they have a protocol for weaning oxygen. Caller agrees to follow care advice.   Reason for Disposition  • [1] Caller has NON-URGENT medication question about med that PCP prescribed AND [2] triager unable to answer question    Additional Information  • Negative: Drug overdose and triager unable to answer question  • Negative: Caller requesting information unrelated to medicine  • Negative: Caller requesting a prescription for Strep throat and has a positive culture result  • Negative: Rash while taking a medication or within 3 days of stopping it  • Negative: Immunization reaction suspected  • Negative: [1] Asthma and [2] having symptoms of asthma (cough, wheezing, etc.)  • Negative: [1] Influenza symptoms AND [2] anti-viral med prescription request, such as Tamiflu  • Negative: [1] Symptom of illness (e.g., headache, abdominal pain, earache, vomiting) AND [2] more than mild  • Negative: MORE THAN A DOUBLE DOSE of a prescription or over-the-counter (OTC) drug  • Negative: [1] DOUBLE DOSE (an extra dose or lesser amount) of over-the-counter (OTC) drug AND [2] any symptoms (e.g., dizziness, nausea, pain, sleepiness)  • Negative: [1] DOUBLE DOSE (an extra dose or lesser amount) of prescription drug AND [2] any symptoms (e.g., dizziness, nausea, pain, sleepiness)  • Negative: Took another person's prescription drug  • Negative: [1] DOUBLE DOSE (an extra dose or lesser amount) of prescription drug AND [2] NO symptoms (Exception: a double dose of antibiotics)  • Negative: Diabetes drug error or overdose (e.g., took wrong type of insulin or took extra dose)  • Negative: [1] Request for URGENT new prescription or refill of \"essential\" medication " "(i.e., likelihood of harm to patient if not taken) AND [2] triager unable to fill per unit policy  • Negative: [1] Prescription not at pharmacy AND [2] was prescribed by PCP recently  • Negative: [1] Pharmacy calling with prescription questions AND [2] triager unable to answer question  • Negative: [1] Caller has URGENT medication question about med that PCP or specialist prescribed AND [2] triager unable to answer question    Answer Assessment - Initial Assessment Questions  1.   NAME of MEDICATION: \"What medicine are you calling about?\"      oxygen  2.   QUESTION: \"What is your question?\"      Asking how to wean her grandmother off of the oxygen  3.   PRESCRIBING HCP: \"Who prescribed it?\" Reason: if prescribed by specialist, call should be referred to that group.      hospitalist   4. SYMPTOMS: \"Do you have any symptoms?\"      no  5. SEVERITY: If symptoms are present, ask \"Are they mild, moderate or severe?\"      na  6.  PREGNANCY:  \"Is there any chance that you are pregnant?\" \"When was your last menstrual period?\"      na    Protocols used: MEDICATION QUESTION CALL-ADULT-AH      "

## 2021-01-31 ENCOUNTER — READMISSION MANAGEMENT (OUTPATIENT)
Dept: CALL CENTER | Facility: HOSPITAL | Age: 81
End: 2021-01-31

## 2021-01-31 NOTE — OUTREACH NOTE
COVID-19 Week 2 Survey      Responses   [facility] patient discharged from?  [facility]   Does the patient have one of the following disease processes/diagnoses(primary or secondary)?  COVID-19   COVID-19 underlying condition?  None   Call Number  Call 1   COVID-19 Week 2: Call 1 attempt successful?  Yes   Call start time  1128   Call end time  1140   Discharge diagnosis  PNA, Viral, treated for Covid 19  Acute Uncomplicated Ecoli UTI  PNA, Bacterial, treated for Aspiration   Meds reviewed with patient/caregiver?  Yes   Is the patient having any side effects they believe may be caused by any medication additions or changes?  No   Does the patient have all medications ordered at discharge?  Yes   Is the patient taking all medications as directed (includes completed medication regime)?  Yes   Does the patient have a primary care provider?   Yes   Comments regarding PCP  She went to the PCP for the appt and they wouldn't see her because of her covid status, made a new appt for 2/13   Does the patient have an appointment with their PCP or specialist within 7 days of discharge?  Yes   Has the patient kept scheduled appointments due by today?  N/A   Has home health visited the patient within 72 hours of discharge?  N/A   What DME was ordered?  oxygen to [facility]   Has all DME been delivered?  Yes   DME comments  She is only wearing O2 qhs, but her O2 sats are 89% on RA. I explained that she needs to put O2 back on and keep sats above 90%   Psychosocial issues?  No   Psychosocial comments  family supportive   Did the patient receive a copy of their discharge instructions?  Yes   Did the patient receive a copy of COVID-19 specific instructions?  Yes   Nursing interventions  Reviewed instructions with patient   What is the patient's perception of their health status since discharge?  Improving   Does the patient have any of the following symptoms?  None   Nursing Interventions  Nurse provided patient education   Pulse Ox  monitoring  Intermittent   Pulse Ox device source  Patient   O2 Sat comments  89% on RA   O2 Sat: education provided  Sat levels, Monitoring frequency, When to seek care   O2 Sat education comments  encourage her to put O2 on to keep her sats >90%   Is the patient/caregiver able to teach back steps to recovery at home?  Set small, achievable goals for return to baseline health, Rest and rebuild strength, gradually increase activity, Eat a well-balance diet   If the patient is a current smoker, are they able to teach back resources for cessation?  Not a smoker   Is the patient/caregiver able to teach back the hierarchy of who to call/visit for symptoms/problems? PCP, Specialist, Home health nurse, Urgent Care, ED, 911  Yes   COVID-19 call completed?  Yes   Wrap up additional comments  No complaints, improving          Nnia Franco RN

## 2021-02-04 ENCOUNTER — READMISSION MANAGEMENT (OUTPATIENT)
Dept: CALL CENTER | Facility: HOSPITAL | Age: 81
End: 2021-02-04

## 2021-02-04 NOTE — OUTREACH NOTE
COVID-19 Week 3 Survey      Responses   [facility] patient discharged from?  [facility]   Does the patient have one of the following disease processes/diagnoses(primary or secondary)?  COVID-19   COVID-19 underlying condition?  None   Call Number  Call 1   COVID-19 Week 3: Call 1 attempt successful?  Yes   Call start time  1506   Call end time  1512   Discharge diagnosis  PNA, Viral, treated for Covid 19  Acute Uncomplicated Ecoli UTI  PNA, Bacterial, treated for Aspiration   Meds reviewed with patient/caregiver?  Yes   Is the patient having any side effects they believe may be caused by any medication additions or changes?  No   Does the patient have all medications ordered at discharge?  Yes   Is the patient taking all medications as directed (includes completed medication regime)?  Yes   Does the patient have a primary care provider?   Yes   Does the patient have an appointment with their PCP or specialist within 7 days of discharge?  Yes   Has the patient kept scheduled appointments due by today?  N/A   Has home health visited the patient within 72 hours of discharge?  Yes   Home health interventions  Called Home Health agency   What DME was ordered?  oxygen to [facility]   Psychosocial issues?  No   Did the patient receive a copy of their discharge instructions?  Yes   Did the patient receive a copy of COVID-19 specific instructions?  Yes   Nursing interventions  Reviewed instructions with patient   What is the patient's perception of their health status since discharge?  Improving   Does the patient have any of the following symptoms?  None   Nursing Interventions  Nurse provided patient education   Pulse Ox monitoring  Intermittent   Pulse Ox device source  Patient   O2 Sat comments  93% RA   O2 Sat: education provided  Sat levels, Monitoring frequency, When to seek care   Is the patient/caregiver able to teach back steps to recovery at home?  Set small, achievable goals for return to baseline health, Rest  and rebuild strength, gradually increase activity, Eat a well-balance diet   If the patient is a current smoker, are they able to teach back resources for cessation?  Not a smoker   Is the patient/caregiver able to teach back the hierarchy of who to call/visit for symptoms/problems? PCP, Specialist, Home health nurse, Urgent Care, ED, 911  Yes   COVID-19 call completed?  Yes   Wrap up additional comments  No complaints, improving          Kady Martínez, RN

## 2021-02-08 ENCOUNTER — LAB (OUTPATIENT)
Dept: LAB | Facility: HOSPITAL | Age: 81
End: 2021-02-08

## 2021-02-08 ENCOUNTER — TRANSCRIBE ORDERS (OUTPATIENT)
Dept: ADMINISTRATIVE | Facility: HOSPITAL | Age: 81
End: 2021-02-08

## 2021-02-08 DIAGNOSIS — N18.30 MALIGNANT HYPERTENSIVE HEART AND CHRONIC KIDNEY DISEASE STAGE III (HCC): Primary | ICD-10-CM

## 2021-02-08 DIAGNOSIS — I13.10 MALIGNANT HYPERTENSIVE HEART AND CHRONIC KIDNEY DISEASE STAGE III (HCC): Primary | ICD-10-CM

## 2021-02-08 DIAGNOSIS — I13.10 MALIGNANT HYPERTENSIVE HEART AND CHRONIC KIDNEY DISEASE STAGE III (HCC): ICD-10-CM

## 2021-02-08 DIAGNOSIS — N18.30 MALIGNANT HYPERTENSIVE HEART AND CHRONIC KIDNEY DISEASE STAGE III (HCC): ICD-10-CM

## 2021-02-08 LAB
ALBUMIN UR-MCNC: <1.2 MG/DL
ANION GAP SERPL CALCULATED.3IONS-SCNC: 10.6 MMOL/L (ref 5–15)
BUN SERPL-MCNC: 16 MG/DL (ref 8–23)
BUN/CREAT SERPL: 16.5 (ref 7–25)
CALCIUM SPEC-SCNC: 9.5 MG/DL (ref 8.6–10.5)
CHLORIDE SERPL-SCNC: 110 MMOL/L (ref 98–107)
CO2 SERPL-SCNC: 22.4 MMOL/L (ref 22–29)
CREAT SERPL-MCNC: 0.97 MG/DL (ref 0.57–1)
CREAT UR-MCNC: 128.5 MG/DL
GFR SERPL CREATININE-BSD FRML MDRD: 55 ML/MIN/1.73
GLUCOSE SERPL-MCNC: 93 MG/DL (ref 65–99)
MICROALBUMIN/CREAT UR: NORMAL MG/G{CREAT}
POTASSIUM SERPL-SCNC: 4.1 MMOL/L (ref 3.5–5.2)
SODIUM SERPL-SCNC: 143 MMOL/L (ref 136–145)

## 2021-02-08 PROCEDURE — 82570 ASSAY OF URINE CREATININE: CPT

## 2021-02-08 PROCEDURE — 36415 COLL VENOUS BLD VENIPUNCTURE: CPT

## 2021-02-08 PROCEDURE — 80048 BASIC METABOLIC PNL TOTAL CA: CPT

## 2021-02-08 PROCEDURE — 82043 UR ALBUMIN QUANTITATIVE: CPT

## 2021-02-10 ENCOUNTER — READMISSION MANAGEMENT (OUTPATIENT)
Dept: CALL CENTER | Facility: HOSPITAL | Age: 81
End: 2021-02-10

## 2021-02-10 ENCOUNTER — IMMUNIZATION (OUTPATIENT)
Dept: VACCINE CLINIC | Facility: HOSPITAL | Age: 81
End: 2021-02-10

## 2021-02-10 PROCEDURE — 91300 HC SARSCOV02 VAC 30MCG/0.3ML IM: CPT | Performed by: INTERNAL MEDICINE

## 2021-02-10 PROCEDURE — 0001A: CPT | Performed by: INTERNAL MEDICINE

## 2021-02-10 NOTE — OUTREACH NOTE
COVID-19 Week 4 Survey      Responses   [facility] patient discharged from?  [facility]   Does the patient have one of the following disease processes/diagnoses(primary or secondary)?  COVID-19   COVID-19 underlying condition?  None   Call Number  Call 1   COVID-19 Week 4: Call 1 attempt successful?  Yes   Call start time  1315   Call end time  1321   Discharge diagnosis  PNA, Viral, treated for Covid 19  Acute Uncomplicated Ecoli UTI  PNA, Bacterial, treated for Aspiration   Meds reviewed with patient/caregiver?  Yes   Is the patient having any side effects they believe may be caused by any medication additions or changes?  No   Does the patient have all medications ordered at discharge?  Yes   Is the patient taking all medications as directed (includes completed medication regime)?  Yes   Has the patient kept scheduled appointments due by today?  Yes   Is the patient still receiving Home Health Services?  N/A   DME comments  Wearing 1L of O2 and sats have been up to 92%.   What is the patient's perception of their health status since discharge?  Improving   Does the patient have any of the following symptoms?  Shortness of breath [with exertion.]   Nursing Interventions  Nurse provided patient education   Pulse Ox monitoring  Intermittent   Pulse Ox device source  Patient   O2 Sat: education provided  Sat levels, Monitoring frequency, When to seek care   O2 Sat education comments  Keep sats above 90%   Is the patient/caregiver able to teach back steps to recovery at home?  Set small, achievable goals for return to baseline health, Rest and rebuild strength, gradually increase activity, Eat a well-balance diet, Make a list of questions for provider's appointment   If the patient is a current smoker, are they able to teach back resources for cessation?  Not a smoker   Is the patient/caregiver able to teach back the hierarchy of who to call/visit for symptoms/problems? PCP, Specialist, Home health nurse, Urgent  [facility], ED, 911  Yes   Is the patient interested in additional calls from an ambulatory ?  NOTE:  applies to high risk patients requiring additional follow-up.  No   Did the patient feel the follow up calls were helpful during their recovery period?  Yes   Was the number of calls appropriate?  Yes   Interested in COVID-19 Plasma Donation?  Yes   Wrap up additional comments  Using IS and encouraged deep breathing.          Mirta Seaman RN

## 2021-03-01 ENCOUNTER — OFFICE VISIT (OUTPATIENT)
Dept: CARDIOLOGY | Facility: CLINIC | Age: 81
End: 2021-03-01

## 2021-03-01 VITALS
DIASTOLIC BLOOD PRESSURE: 80 MMHG | TEMPERATURE: 97 F | HEART RATE: 80 BPM | HEIGHT: 59 IN | OXYGEN SATURATION: 94 % | BODY MASS INDEX: 36.89 KG/M2 | SYSTOLIC BLOOD PRESSURE: 136 MMHG | WEIGHT: 183 LBS

## 2021-03-01 DIAGNOSIS — M85.851 OSTEOPENIA OF NECKS OF BOTH FEMURS: ICD-10-CM

## 2021-03-01 DIAGNOSIS — Z13.820 ENCOUNTER FOR SCREENING FOR OSTEOPOROSIS: ICD-10-CM

## 2021-03-01 DIAGNOSIS — K21.9 GASTROESOPHAGEAL REFLUX DISEASE WITHOUT ESOPHAGITIS: Chronic | ICD-10-CM

## 2021-03-01 DIAGNOSIS — I10 ESSENTIAL HYPERTENSION: Chronic | ICD-10-CM

## 2021-03-01 DIAGNOSIS — E78.2 MIXED HYPERLIPIDEMIA: Primary | ICD-10-CM

## 2021-03-01 DIAGNOSIS — M85.852 OSTEOPENIA OF NECKS OF BOTH FEMURS: ICD-10-CM

## 2021-03-01 DIAGNOSIS — E66.9 OBESITY (BMI 35.0-39.9 WITHOUT COMORBIDITY): ICD-10-CM

## 2021-03-01 DIAGNOSIS — N39.41 URGE INCONTINENCE: ICD-10-CM

## 2021-03-01 PROBLEM — U07.1 COVID-19 VIRUS INFECTION: Status: ACTIVE | Noted: 2021-03-01

## 2021-03-01 PROCEDURE — 99214 OFFICE O/P EST MOD 30 MIN: CPT | Performed by: INTERNAL MEDICINE

## 2021-03-01 RX ORDER — GABAPENTIN 100 MG/1
100 CAPSULE ORAL 2 TIMES DAILY
Qty: 60 CAPSULE | Refills: 2 | Status: SHIPPED | OUTPATIENT
Start: 2021-03-01 | End: 2021-06-01

## 2021-03-01 NOTE — PROGRESS NOTES
subjective     Chief Complaint   Patient presents with   • Hypertension     Follow up, pt states her BP has been running high post positive Covid   • Med Refill     Follow up   • Arthritis     History of Present Illness  Patient is 80 years old white female who is here for follow-up.  She had Covid 19 pneumonia about 5 weeks ago.  She is doing very well she is afebrile denies any chest pain palpitations or shortness of breath.  Patient has not lost her taste or smell.    Patient has history of hypertension she states her blood pressure sometimes goes high.  She blames it on Covid.  Lately blood pressure is back to normal.  She is taking losartan 100 mg daily and hydralazine 25 mg 3 times a day Coreg 25 twice daily    Hyperlipidemia is controlled with Lipitor 20 mg daily.  There are no drug side effects.  GERD symptoms are better with Prilosec.  Patient has osteopenia she has been taking vitamin D we will check DEXA scan.    Patient has arthritis and muscle cramps.  She has been taking Neurontin which seems to be helping.  Stress incontinence somewhat better with Ditropan.  Past Surgical History:   Procedure Laterality Date   • APPENDECTOMY     • BUNIONECTOMY Right    • CARDIOVASCULAR STRESS TEST  10/2012   • CATARACT EXTRACTION  2017   • CHOLECYSTECTOMY     • COLONOSCOPY  2011   • ECHO - CONVERTED  10/2012   • JOINT REPLACEMENT      bilateral knees    • KNEE ARTHROPLASTY Left    • KNEE ARTHROSCOPY     • SHOULDER ARTHROSCOPY Left 7/28/2016    Procedure: LEFT SHOULDER ACROMIOPLASTY,MINI OPEN ROTATOR CUFF REPAIR;  Surgeon: Carlos Eduardo Smith MD;  [facility];  Service:    • SHOULDER SURGERY  05/31/2016    OPEN ACROMICPLASTY RIGHT SHOULDER     Family History   Problem Relation Age of Onset   • Heart disease Other    • Stroke Sister    • Diabetes Mother    • Heart failure Mother    • Heart disease Mother    • Heart attack Mother    • Hypertension Father    • Emphysema Father    • Heart disease Father    • No  Known Problems Brother    • Cancer Sister    • Kidney disease Sister    • Heart failure Sister    • Diabetes Brother    • Diabetes Brother    • Diabetes Brother      Past Medical History:   Diagnosis Date   • GERD (gastroesophageal reflux disease)    • Hyperlipidemia    • Hypertension    • Left shoulder pain    • Obesity    • Osteoarthritis of knees, bilateral    • Overactive bladder    • Right shoulder pain      Patient Active Problem List   Diagnosis   • Complete tear of right rotator cuff   • Stage 3a chronic kidney disease (CMS/HCC)   • Cough   • Gastroesophageal reflux disease   • Mixed hyperlipidemia   • Shoulder pain   • Hypertension   • Acromioclavicular joint arthritis   • Impingement syndrome, shoulder   • Complete tear of left rotator cuff   • Urge incontinence   • Atopic rhinitis   • Upper respiratory tract infection   • Leg edema   • Hypercalcemia   • Right knee pain   • DADA (stress urinary incontinence, female)   • Obesity (BMI 35.0-39.9 without comorbidity)   • Left knee injury   • Acute UTI   • COVID-19 virus infection       Social History     Tobacco Use   • Smoking status: Former Smoker     Packs/day: 2.00     Years: 20.00     Pack years: 40.00     Types: Cigarettes     Quit date:      Years since quittin.1   • Smokeless tobacco: Never Used   Substance Use Topics   • Alcohol use: No     Comment: s/p    • Drug use: No       Allergies   Allergen Reactions   • Codeine Other (See Comments)     hyperventilate   • Sulfa Antibiotics Rash       Current Outpatient Medications on File Prior to Visit   Medication Sig   • atorvastatin (LIPITOR) 20 MG tablet Take 1 tablet by mouth once daily   • carvedilol (COREG) 25 MG tablet Take 25 mg by mouth 2 (Two) Times a Day With Meals.   • Cholecalciferol (VITAMIN D PO) Take 1,000 Units by mouth daily.   • Docusate Calcium (STOOL SOFTENER PO) Take 1 tablet by mouth Daily.   • Fexofenadine HCl (ALLEGRA PO) Take 180 mg by mouth daily as needed.   •  "hydrALAZINE (APRESOLINE) 25 MG tablet Take 1 tablet by mouth 3 (Three) Times a Day.   • losartan (COZAAR) 100 MG tablet Take 100 mg by mouth Daily.   • omeprazole OTC (PriLOSEC OTC) 20 MG EC tablet Take 20 mg by mouth daily.   • oxybutynin XL (DITROPAN-XL) 10 MG 24 hr tablet Take 1 tablet by mouth Daily.   • Probiotic Product (PROBIOTIC COLON SUPPORT PO) Take  by mouth daily.   • [DISCONTINUED] gabapentin (NEURONTIN) 100 MG capsule Take 1 capsule by mouth twice daily     No current facility-administered medications on file prior to visit.          The following portions of the patient's history were reviewed and updated as appropriate: allergies, current medications, past family history, past medical history, past social history, past surgical history and problem list.    Review of Systems   Constitution: Negative.   HENT: Negative.  Negative for congestion.    Eyes: Negative.    Cardiovascular: Negative.  Negative for chest pain, cyanosis, dyspnea on exertion, irregular heartbeat, leg swelling, near-syncope, orthopnea, palpitations, paroxysmal nocturnal dyspnea and syncope.   Respiratory: Negative.  Negative for shortness of breath.    Hematologic/Lymphatic: Negative.    Musculoskeletal: Negative.    Gastrointestinal: Negative.    Neurological: Negative.  Negative for headaches.          Objective:     /80 (BP Location: Left arm, Patient Position: Sitting, Cuff Size: Adult)   Pulse 80   Temp 97 °F (36.1 °C)   Ht 149.9 cm (59\")   Wt 83 kg (183 lb)   SpO2 94%   BMI 36.96 kg/m²   Vitals signs and nursing note reviewed.   Constitutional:       General: Not in acute distress.     Appearance: Healthy appearance. Well-developed and not in distress. Not diaphoretic.   Eyes:      General: No scleral icterus.     Conjunctiva/sclera: Conjunctivae normal.      Pupils: Pupils are equal, round, and reactive to light.   HENT:      Head: Normocephalic and atraumatic.   Neck:      Musculoskeletal: Normal range of motion " and neck supple.      Thyroid: Thyroid normal. No thyromegaly.      Vascular: No JVD. JVD normal.      Trachea: No tracheal deviation.      Lymphadenopathy: No cervical adenopathy.   Pulmonary:      Effort: Pulmonary effort is normal. No respiratory distress.      Breath sounds: Normal breath sounds and air entry.   Chest:      Chest wall: Not tender to palpatation.   Cardiovascular:      PMI at left midclavicular line. Normal rate. Regular rhythm.      No gallop.   Pulses:     Intact distal pulses. No decreased pulses.   Abdominal:      General: Bowel sounds are normal. There is no distension or abdominal bruit.      Palpations: Abdomen is soft. There is no hepatomegaly, splenomegaly or abdominal mass.      Tenderness: There is no abdominal tenderness. There is no guarding or rebound.   Musculoskeletal:      Comments: Patient is walking with a walking stick.  She can walk from her car in the parking lot to the office without much problem(90 feet)   Skin:     General: Skin is warm and dry. There is no cyanosis.      Coloration: Skin is not jaundiced or pale.      Findings: No erythema or rash.   Neurological:      Mental Status: Alert, oriented to person, place, and time and oriented to person, place and time.   Psychiatric:         Attention and Perception: Attention normal.         Mood and Affect: Mood and affect normal.         Speech: Speech normal.         Behavior: Behavior is cooperative.           Lab Review  Lab Results   Component Value Date     02/08/2021    K 4.1 02/08/2021     (H) 02/08/2021    BUN 16 02/08/2021    CREATININE 0.97 02/08/2021    GLUCOSE 93 02/08/2021    CALCIUM 9.5 02/08/2021    ALT 64 (H) 01/21/2021    ALKPHOS 68 01/21/2021    LABIL2 1.6 06/08/2016     Lab Results   Component Value Date    CKTOTAL 87 01/10/2021     Lab Results   Component Value Date    WBC 7.70 01/21/2021    HGB 12.5 01/21/2021    HCT 41.6 01/21/2021     01/21/2021     Lab Results   Component Value  Date    INR 1.97 12/11/2014    INR 2.02 12/08/2014    INR 2.46 12/04/2014     Lab Results   Component Value Date    MG 1.8 12/31/2020     Lab Results   Component Value Date    TSH 1.950 09/14/2018     No results found for: BNP  Lab Results   Component Value Date    CHLPL 133 06/08/2016    CHOL 69 01/09/2021    TRIG 86 01/09/2021    HDL 34 (L) 01/09/2021    VLDL 18 01/09/2021    LDLHDL 0.52 01/09/2021     Lab Results   Component Value Date    LDL 17 01/09/2021    LDL 65 11/24/2020       Procedures       I personally viewed and interpreted the patient's LAB data         Assessment:     1. Mixed hyperlipidemia    2. Encounter for screening for osteoporosis    3. Osteopenia of necks of both femurs    4. Essential hypertension    5. Gastroesophageal reflux disease without esophagitis    6. Obesity (BMI 35.0-39.9 without comorbidity)    7. Urge incontinence          Plan:     Lab work reviewed and discussed with the patient  Lipid panel is normal she will continue Lipitor.    CBC and CMP is also normal with mild elevation of ALT  Blood pressure is very well controlled she will continue Coreg losartan and hydralazine.  DEXA scan was scheduled.  Weight loss was encouraged.  Urge incontinence is somewhat better Ditropan was continued.  Omeprazole was also continued GI symptoms are better.  She has first injection of Covid vaccine and plan to have second 1 soon.  Follow-up scheduled      No follow-ups on file.

## 2021-03-03 ENCOUNTER — IMMUNIZATION (OUTPATIENT)
Dept: VACCINE CLINIC | Facility: HOSPITAL | Age: 81
End: 2021-03-03

## 2021-03-03 PROCEDURE — 0002A: CPT | Performed by: INTERNAL MEDICINE

## 2021-03-03 PROCEDURE — 91300 HC SARSCOV02 VAC 30MCG/0.3ML IM: CPT | Performed by: INTERNAL MEDICINE

## 2021-03-18 RX ORDER — ATORVASTATIN CALCIUM 20 MG/1
TABLET, FILM COATED ORAL
Qty: 90 TABLET | Refills: 0 | Status: SHIPPED | OUTPATIENT
Start: 2021-03-18 | End: 2021-06-21

## 2021-04-16 ENCOUNTER — HOSPITAL ENCOUNTER (OUTPATIENT)
Dept: BONE DENSITY | Facility: HOSPITAL | Age: 81
Discharge: HOME | End: 2021-04-16
Admitting: INTERNAL MEDICINE

## 2021-04-16 DIAGNOSIS — M85.852 OSTEOPENIA OF NECKS OF BOTH FEMURS: ICD-10-CM

## 2021-04-16 DIAGNOSIS — Z13.820 ENCOUNTER FOR SCREENING FOR OSTEOPOROSIS: ICD-10-CM

## 2021-04-16 DIAGNOSIS — M85.851 OSTEOPENIA OF NECKS OF BOTH FEMURS: ICD-10-CM

## 2021-04-16 PROCEDURE — 77080 DXA BONE DENSITY AXIAL: CPT | Performed by: RADIOLOGY

## 2021-04-16 PROCEDURE — 77080 DXA BONE DENSITY AXIAL: CPT

## 2021-04-19 RX ORDER — ALENDRONATE SODIUM 70 MG/1
70 TABLET ORAL
Qty: 12 TABLET | Refills: 0 | Status: SHIPPED | OUTPATIENT
Start: 2021-04-19 | End: 2021-08-06

## 2021-04-19 NOTE — TELEPHONE ENCOUNTER
Called and left a detailed voicemail per Dr Montano.  Start on fosamax 75 mg 1 weekly. Sent rx to pharmacy.

## 2021-04-19 NOTE — TELEPHONE ENCOUNTER
----- Message from Jennifer Montano MD sent at 4/19/2021  1:08 PM EDT -----  Fosamax 70 mg weekly  12 pills

## 2021-04-27 ENCOUNTER — OFFICE VISIT (OUTPATIENT)
Dept: CARDIOLOGY | Facility: CLINIC | Age: 81
End: 2021-04-27

## 2021-04-27 VITALS
SYSTOLIC BLOOD PRESSURE: 170 MMHG | BODY MASS INDEX: 36.77 KG/M2 | HEIGHT: 59 IN | WEIGHT: 182.4 LBS | OXYGEN SATURATION: 96 % | HEART RATE: 82 BPM | TEMPERATURE: 98.4 F | DIASTOLIC BLOOD PRESSURE: 86 MMHG

## 2021-04-27 DIAGNOSIS — J06.9 UPPER RESPIRATORY TRACT INFECTION, UNSPECIFIED TYPE: Primary | ICD-10-CM

## 2021-04-27 DIAGNOSIS — I10 ESSENTIAL HYPERTENSION: Chronic | ICD-10-CM

## 2021-04-27 PROCEDURE — 99214 OFFICE O/P EST MOD 30 MIN: CPT | Performed by: INTERNAL MEDICINE

## 2021-04-27 RX ORDER — HYDRALAZINE HYDROCHLORIDE 25 MG/1
25 TABLET, FILM COATED ORAL 4 TIMES DAILY
Start: 2021-04-27 | End: 2021-06-23

## 2021-04-27 RX ORDER — CEFDINIR 300 MG/1
300 CAPSULE ORAL 2 TIMES DAILY
Qty: 14 CAPSULE | Refills: 0 | Status: SHIPPED | OUTPATIENT
Start: 2021-04-27 | End: 2021-09-29 | Stop reason: ALTCHOICE

## 2021-04-27 NOTE — PROGRESS NOTES
subjective     Chief Complaint   Patient presents with   • Cough     runny nose   • Nasal Congestion   • Sore Throat     History of Present Illness    Emma is 80 years old white female who is here because of nasal congestion, runny nose dry cough and sore throat.  She denies any fever or chills.  No loss of taste or smell.  She denies body aches.  She already had Covid few months ago.    Blood pressure is running high  She has been under a lot of stress because of her  being in the hospital and having coronary artery stent.    Past Surgical History:   Procedure Laterality Date   • APPENDECTOMY     • BUNIONECTOMY Right    • CARDIOVASCULAR STRESS TEST  10/2012   • CATARACT EXTRACTION  2017   • CHOLECYSTECTOMY     • COLONOSCOPY  2011   • ECHO - CONVERTED  10/2012   • JOINT REPLACEMENT      bilateral knees    • KNEE ARTHROPLASTY Left    • KNEE ARTHROSCOPY     • SHOULDER ARTHROSCOPY Left 7/28/2016    Procedure: LEFT SHOULDER ACROMIOPLASTY,MINI OPEN ROTATOR CUFF REPAIR;  Surgeon: Carlos Eduardo Smith MD;  Location: [facility];  Service:    • SHOULDER SURGERY  05/31/2016    OPEN ACROMICPLASTY RIGHT SHOULDER     Family History   Problem Relation Age of Onset   • Heart disease Other    • Stroke Sister    • Diabetes Mother    • Heart failure Mother    • Heart disease Mother    • Heart attack Mother    • Hypertension Father    • Emphysema Father    • Heart disease Father    • No Known Problems Brother    • Cancer Sister    • Kidney disease Sister    • Heart failure Sister    • Diabetes Brother    • Diabetes Brother    • Diabetes Brother      Past Medical History:   Diagnosis Date   • GERD (gastroesophageal reflux disease)    • Hyperlipidemia    • Hypertension    • Left shoulder pain    • Obesity    • Osteoarthritis of knees, bilateral    • Overactive bladder    • Right shoulder pain      Patient Active Problem List   Diagnosis   • Complete tear of right rotator cuff   • Stage 3a chronic kidney disease (CMS/HCC)   •  Cough   • Gastroesophageal reflux disease   • Mixed hyperlipidemia   • Shoulder pain   • Hypertension   • Acromioclavicular joint arthritis   • Impingement syndrome, shoulder   • Complete tear of left rotator cuff   • Urge incontinence   • Atopic rhinitis   • Upper respiratory tract infection   • Leg edema   • Hypercalcemia   • Right knee pain   • DADA (stress urinary incontinence, female)   • Obesity (BMI 35.0-39.9 without comorbidity)   • Left knee injury   • Acute UTI   • COVID-19 virus infection       Social History     Tobacco Use   • Smoking status: Former Smoker     Packs/day: 2.00     Years: 20.00     Pack years: 40.00     Types: Cigarettes     Quit date:      Years since quittin.3   • Smokeless tobacco: Never Used   Substance Use Topics   • Alcohol use: No     Comment: s/p    • Drug use: No       Allergies   Allergen Reactions   • Codeine Other (See Comments)     hyperventilate   • Sulfa Antibiotics Rash       Current Outpatient Medications on File Prior to Visit   Medication Sig   • alendronate (Fosamax) 70 MG tablet Take 1 tablet by mouth Every 7 (Seven) Days.   • atorvastatin (LIPITOR) 20 MG tablet Take 1 tablet by mouth once daily   • carvedilol (COREG) 25 MG tablet Take 25 mg by mouth 2 (Two) Times a Day With Meals.   • Cholecalciferol (VITAMIN D PO) Take 1,000 Units by mouth daily.   • Docusate Calcium (STOOL SOFTENER PO) Take 1 tablet by mouth Daily.   • Fexofenadine HCl (ALLEGRA PO) Take 180 mg by mouth daily as needed.   • gabapentin (NEURONTIN) 100 MG capsule Take 1 capsule by mouth 2 (Two) Times a Day.   • losartan (COZAAR) 100 MG tablet Take 100 mg by mouth Daily.   • omeprazole OTC (PriLOSEC OTC) 20 MG EC tablet Take 20 mg by mouth daily.   • oxybutynin XL (DITROPAN-XL) 10 MG 24 hr tablet Take 1 tablet by mouth Daily.   • Probiotic Product (PROBIOTIC COLON SUPPORT PO) Take  by mouth daily.   • [DISCONTINUED] hydrALAZINE (APRESOLINE) 25 MG tablet Take 1 tablet by mouth 3 (Three)  "Times a Day.     No current facility-administered medications on file prior to visit.         The following portions of the patient's history were reviewed and updated as appropriate: allergies, current medications, past family history, past medical history, past social history, past surgical history and problem list.    Review of Systems   Constitutional: Negative. Negative for fever.   HENT: Positive for congestion and sore throat.    Eyes: Negative.    Cardiovascular: Negative.  Negative for chest pain, cyanosis, dyspnea on exertion, irregular heartbeat, leg swelling, near-syncope, orthopnea, palpitations, paroxysmal nocturnal dyspnea and syncope.   Respiratory: Positive for cough. Negative for shortness of breath.    Hematologic/Lymphatic: Negative.    Musculoskeletal: Negative.    Gastrointestinal: Negative.    Neurological: Negative.  Negative for headaches.          Objective:     /86 (BP Location: Left arm, Patient Position: Sitting, Cuff Size: Adult)   Pulse 82   Temp 98.4 °F (36.9 °C) (Infrared)   Ht 149.9 cm (59\")   Wt 82.7 kg (182 lb 6.4 oz)   SpO2 96%   BMI 36.84 kg/m²   Vitals and nursing note reviewed.   HENT:      Nose: Nasal discharge present.    Mouth/Throat:      Pharynx: Oropharynx is clear.   Pulmonary:      Effort: Pulmonary effort is normal.      Breath sounds: Normal breath sounds. No stridor. No wheezing. No rhonchi. No rales.   Cardiovascular:      PMI at left midclavicular line. Normal rate. Regular rhythm. Normal S1. Normal S2.      Murmurs: There is no murmur.      No gallop. No click. No rub.   Pulses:     Intact distal pulses.   Edema:     Peripheral edema absent.           Lab Review  Lab Results   Component Value Date     02/08/2021    K 4.1 02/08/2021     (H) 02/08/2021    BUN 16 02/08/2021    CREATININE 0.97 02/08/2021    GLUCOSE 93 02/08/2021    CALCIUM 9.5 02/08/2021    ALT 64 (H) 01/21/2021    ALKPHOS 68 01/21/2021    LABIL2 1.6 06/08/2016     Lab Results "   Component Value Date    CKTOTAL 87 01/10/2021     Lab Results   Component Value Date    WBC 7.70 01/21/2021    HGB 12.5 01/21/2021    HCT 41.6 01/21/2021     01/21/2021     Lab Results   Component Value Date    INR 1.97 12/11/2014    INR 2.02 12/08/2014    INR 2.46 12/04/2014     Lab Results   Component Value Date    MG 1.8 12/31/2020     Lab Results   Component Value Date    TSH 1.950 09/14/2018     No results found for: BNP  Lab Results   Component Value Date    CHLPL 133 06/08/2016    CHOL 69 01/09/2021    TRIG 86 01/09/2021    HDL 34 (L) 01/09/2021    VLDL 18 01/09/2021    LDLHDL 0.52 01/09/2021     Lab Results   Component Value Date    LDL 17 01/09/2021    LDL 65 11/24/2020       Procedures       I personally viewed and interpreted the patient's LAB data         Assessment:     1. Upper respiratory tract infection, unspecified type    2. Essential hypertension          Plan:     Patient did not want to go to the hospital for x-rays.  She was given Omnicef 300 twice daily and she will continue Allegra.  She was advised to drink a lot of fluids    Blood pressure is elevated  She was advised to continue losartan 100 mg daily  Hydralazine dose was increased to 25 mg 4 times a day  Follow-up scheduled        No follow-ups on file.

## 2021-05-28 DIAGNOSIS — E78.2 MIXED HYPERLIPIDEMIA: ICD-10-CM

## 2021-06-01 RX ORDER — GABAPENTIN 100 MG/1
CAPSULE ORAL
Qty: 60 CAPSULE | Refills: 0 | Status: SHIPPED | OUTPATIENT
Start: 2021-06-01 | End: 2021-06-23 | Stop reason: SDUPTHER

## 2021-06-11 ENCOUNTER — TRANSCRIBE ORDERS (OUTPATIENT)
Dept: ADMINISTRATIVE | Facility: HOSPITAL | Age: 81
End: 2021-06-11

## 2021-06-11 ENCOUNTER — LAB (OUTPATIENT)
Dept: LAB | Facility: HOSPITAL | Age: 81
End: 2021-06-11

## 2021-06-11 DIAGNOSIS — I13.10 MALIGNANT HYPERTENSIVE HEART AND CHRONIC KIDNEY DISEASE STAGE III (HCC): ICD-10-CM

## 2021-06-11 DIAGNOSIS — I13.10 MALIGNANT HYPERTENSIVE HEART AND CHRONIC KIDNEY DISEASE STAGE III (HCC): Primary | ICD-10-CM

## 2021-06-11 DIAGNOSIS — N18.30 MALIGNANT HYPERTENSIVE HEART AND CHRONIC KIDNEY DISEASE STAGE III (HCC): Primary | ICD-10-CM

## 2021-06-11 DIAGNOSIS — N18.30 MALIGNANT HYPERTENSIVE HEART AND CHRONIC KIDNEY DISEASE STAGE III (HCC): ICD-10-CM

## 2021-06-11 LAB
ALBUMIN UR-MCNC: <1.2 MG/DL
ANION GAP SERPL CALCULATED.3IONS-SCNC: 13.4 MMOL/L (ref 5–15)
BUN SERPL-MCNC: 14 MG/DL (ref 8–23)
BUN/CREAT SERPL: 15.9 (ref 7–25)
CALCIUM SPEC-SCNC: 9.2 MG/DL (ref 8.6–10.5)
CHLORIDE SERPL-SCNC: 105 MMOL/L (ref 98–107)
CO2 SERPL-SCNC: 24.6 MMOL/L (ref 22–29)
CREAT SERPL-MCNC: 0.88 MG/DL (ref 0.57–1)
CREAT UR-MCNC: 64.3 MG/DL
GFR SERPL CREATININE-BSD FRML MDRD: 62 ML/MIN/1.73
GLUCOSE SERPL-MCNC: 89 MG/DL (ref 65–99)
MICROALBUMIN/CREAT UR: NORMAL MG/G{CREAT}
POTASSIUM SERPL-SCNC: 3.6 MMOL/L (ref 3.5–5.2)
SODIUM SERPL-SCNC: 143 MMOL/L (ref 136–145)

## 2021-06-11 PROCEDURE — 36415 COLL VENOUS BLD VENIPUNCTURE: CPT

## 2021-06-11 PROCEDURE — 82570 ASSAY OF URINE CREATININE: CPT

## 2021-06-11 PROCEDURE — 80048 BASIC METABOLIC PNL TOTAL CA: CPT

## 2021-06-11 PROCEDURE — 82043 UR ALBUMIN QUANTITATIVE: CPT

## 2021-06-21 RX ORDER — ATORVASTATIN CALCIUM 20 MG/1
TABLET, FILM COATED ORAL
Qty: 90 TABLET | Refills: 0 | Status: SHIPPED | OUTPATIENT
Start: 2021-06-21

## 2021-06-23 ENCOUNTER — OFFICE VISIT (OUTPATIENT)
Dept: CARDIOLOGY | Facility: CLINIC | Age: 81
End: 2021-06-23

## 2021-06-23 VITALS
OXYGEN SATURATION: 96 % | HEIGHT: 59 IN | TEMPERATURE: 97.5 F | HEART RATE: 73 BPM | DIASTOLIC BLOOD PRESSURE: 78 MMHG | WEIGHT: 176.2 LBS | BODY MASS INDEX: 35.52 KG/M2 | SYSTOLIC BLOOD PRESSURE: 140 MMHG

## 2021-06-23 DIAGNOSIS — I10 ESSENTIAL HYPERTENSION: Chronic | ICD-10-CM

## 2021-06-23 DIAGNOSIS — K21.9 GASTROESOPHAGEAL REFLUX DISEASE WITHOUT ESOPHAGITIS: Chronic | ICD-10-CM

## 2021-06-23 DIAGNOSIS — E78.2 MIXED HYPERLIPIDEMIA: Primary | ICD-10-CM

## 2021-06-23 PROCEDURE — 99214 OFFICE O/P EST MOD 30 MIN: CPT | Performed by: INTERNAL MEDICINE

## 2021-06-23 RX ORDER — SPIRONOLACTONE 25 MG/1
25 TABLET ORAL DAILY
COMMUNITY
Start: 2021-06-16

## 2021-06-23 RX ORDER — HYDRALAZINE HYDROCHLORIDE 50 MG/1
50 TABLET, FILM COATED ORAL 2 TIMES DAILY
Qty: 180 TABLET | Refills: 1 | Status: SHIPPED | OUTPATIENT
Start: 2021-06-23 | End: 2022-01-04 | Stop reason: ALTCHOICE

## 2021-06-23 RX ORDER — GABAPENTIN 100 MG/1
100 CAPSULE ORAL 2 TIMES DAILY
Qty: 60 CAPSULE | Refills: 0 | Status: SHIPPED | OUTPATIENT
Start: 2021-06-23 | End: 2021-08-06

## 2021-06-23 NOTE — PROGRESS NOTES
subjective     Chief Complaint   Patient presents with   • Hypertension     3 mon f/u     History of Present Illness  Emma is 80 years old white female who is here for follow-up.  Hypertension  Blood pressure is very well controlled.  He was given hydralazine 25 mg 4 times a day however she said that actually changed to 50 twice a day.  She is doing very well is easy for her to remember the dose twice a day versus 4 times a day.  Is also taking Aldactone 25 mg daily and losartan 100 mg daily with hydralazine.    Hyperlipidemia is well controlled with Lipitor 20 mg daily.  She is not having any drug side effects.    GERD symptoms are controlled with omeprazole.    She recently had DEXA scan and has been taking Fosamax 70 weekly.  Urgent continence is better with Ditropan 10 mg daily.    She denies any chest pain palpitations or shortness of breath.  She has renal failure and is following closely with Dr. Nash.    Past Surgical History:   Procedure Laterality Date   • APPENDECTOMY     • BUNIONECTOMY Right    • CARDIOVASCULAR STRESS TEST  10/2012   • CATARACT EXTRACTION  2017   • CHOLECYSTECTOMY     • COLONOSCOPY  2011   • ECHO - CONVERTED  10/2012   • JOINT REPLACEMENT      bilateral knees    • KNEE ARTHROPLASTY Left    • KNEE ARTHROSCOPY     • SHOULDER ARTHROSCOPY Left 7/28/2016    Procedure: LEFT SHOULDER ACROMIOPLASTY,MINI OPEN ROTATOR CUFF REPAIR;  Surgeon: Carlos Eduardo Smith MD;  Location: [facility];  Service:    • SHOULDER SURGERY  05/31/2016    OPEN ACROMICPLASTY RIGHT SHOULDER     Family History   Problem Relation Age of Onset   • Heart disease Other    • Stroke Sister    • Diabetes Mother    • Heart failure Mother    • Heart disease Mother    • Heart attack Mother    • Hypertension Father    • Emphysema Father    • Heart disease Father    • No Known Problems Brother    • Cancer Sister    • Kidney disease Sister    • Heart failure Sister    • Diabetes Brother    • Diabetes Brother    • Diabetes Brother       Past Medical History:   Diagnosis Date   • GERD (gastroesophageal reflux disease)    • Hyperlipidemia    • Hypertension    • Left shoulder pain    • Obesity    • Osteoarthritis of knees, bilateral    • Overactive bladder    • Right shoulder pain      Patient Active Problem List   Diagnosis   • Complete tear of right rotator cuff   • Stage 3a chronic kidney disease (CMS/HCC)   • Cough   • Gastroesophageal reflux disease   • Mixed hyperlipidemia   • Shoulder pain   • Hypertension   • Acromioclavicular joint arthritis   • Impingement syndrome, shoulder   • Complete tear of left rotator cuff   • Urge incontinence   • Atopic rhinitis   • Upper respiratory tract infection   • Leg edema   • Hypercalcemia   • Right knee pain   • DADA (stress urinary incontinence, female)   • Obesity (BMI 35.0-39.9 without comorbidity)   • Left knee injury   • Acute UTI   • COVID-19 virus infection       Social History     Tobacco Use   • Smoking status: Former Smoker     Packs/day: 2.00     Years: 20.00     Pack years: 40.00     Types: Cigarettes     Quit date:      Years since quittin.5   • Smokeless tobacco: Never Used   Substance Use Topics   • Alcohol use: No     Comment: s/p    • Drug use: No       Allergies   Allergen Reactions   • Codeine Other (See Comments)     hyperventilate   • Sulfa Antibiotics Rash       Current Outpatient Medications on File Prior to Visit   Medication Sig   • alendronate (Fosamax) 70 MG tablet Take 1 tablet by mouth Every 7 (Seven) Days.   • atorvastatin (LIPITOR) 20 MG tablet Take 1 tablet by mouth once daily   • carvedilol (COREG) 25 MG tablet Take 25 mg by mouth 2 (Two) Times a Day With Meals.   • cefdinir (OMNICEF) 300 MG capsule Take 1 capsule by mouth 2 (Two) Times a Day.   • Cholecalciferol (VITAMIN D PO) Take 1,000 Units by mouth daily.   • Docusate Calcium (STOOL SOFTENER PO) Take 1 tablet by mouth Daily.   • Fexofenadine HCl (ALLEGRA PO) Take 180 mg by mouth daily as needed.   •  "losartan (COZAAR) 100 MG tablet Take 100 mg by mouth Daily.   • omeprazole OTC (PriLOSEC OTC) 20 MG EC tablet Take 20 mg by mouth daily.   • oxybutynin XL (DITROPAN-XL) 10 MG 24 hr tablet Take 1 tablet by mouth Daily.   • Probiotic Product (PROBIOTIC COLON SUPPORT PO) Take  by mouth daily.   • spironolactone (ALDACTONE) 25 MG tablet Take 25 mg by mouth Daily.   • [DISCONTINUED] gabapentin (NEURONTIN) 100 MG capsule Take 1 capsule by mouth twice daily   • [DISCONTINUED] hydrALAZINE (APRESOLINE) 25 MG tablet Take 1 tablet by mouth 4 (Four) Times a Day.     No current facility-administered medications on file prior to visit.         The following portions of the patient's history were reviewed and updated as appropriate: allergies, current medications, past family history, past medical history, past social history, past surgical history and problem list.    Review of Systems   Constitutional: Negative.   HENT: Negative.  Negative for congestion.    Eyes: Negative.    Cardiovascular: Negative.  Negative for chest pain, cyanosis, dyspnea on exertion, irregular heartbeat, leg swelling, near-syncope, orthopnea, palpitations, paroxysmal nocturnal dyspnea and syncope.   Respiratory: Negative.  Negative for shortness of breath.    Hematologic/Lymphatic: Negative.    Musculoskeletal: Negative.    Gastrointestinal: Negative.    Neurological: Negative.  Negative for headaches.          Objective:     /78   Pulse 73   Temp 97.5 °F (36.4 °C) (Infrared)   Ht 149.9 cm (59\")   Wt 79.9 kg (176 lb 3.2 oz)   SpO2 96%   BMI 35.59 kg/m²   Pulmonary:      Effort: Pulmonary effort is normal.      Breath sounds: Normal breath sounds. No stridor. No wheezing. No rhonchi. No rales.   Cardiovascular:      PMI at left midclavicular line. Normal rate. Regular rhythm. Normal S1. Normal S2.      Murmurs: There is no murmur.      No gallop. No click. No rub.   Pulses:     Intact distal pulses.   Edema:     Peripheral edema absent. "           Lab Review  Lab Results   Component Value Date     06/11/2021    K 3.6 06/11/2021     06/11/2021    BUN 14 06/11/2021    CREATININE 0.88 06/11/2021    GLUCOSE 89 06/11/2021    CALCIUM 9.2 06/11/2021    ALT 64 (H) 01/21/2021    ALKPHOS 68 01/21/2021    LABIL2 1.6 06/08/2016     Lab Results   Component Value Date    CKTOTAL 87 01/10/2021     Lab Results   Component Value Date    WBC 7.70 01/21/2021    HGB 12.5 01/21/2021    HCT 41.6 01/21/2021     01/21/2021     Lab Results   Component Value Date    INR 1.97 12/11/2014    INR 2.02 12/08/2014    INR 2.46 12/04/2014     Lab Results   Component Value Date    MG 1.8 12/31/2020     Lab Results   Component Value Date    TSH 1.950 09/14/2018     No results found for: BNP  Lab Results   Component Value Date    CHLPL 133 06/08/2016    CHOL 69 01/09/2021    TRIG 86 01/09/2021    HDL 34 (L) 01/09/2021    VLDL 18 01/09/2021    LDLHDL 0.52 01/09/2021     Lab Results   Component Value Date    LDL 17 01/09/2021    LDL 65 11/24/2020       Procedures       I personally viewed and interpreted the patient's LAB data         Assessment:     1. Mixed hyperlipidemia    2. Essential hypertension    3. Gastroesophageal reflux disease without esophagitis          Plan:     Patient has hyperlipidemia she was advised to continue Lipitor last LDL was good but that has been done 5 months ago.  Lab work scheduled for follow-up including CBC CMP lipid panel  Hypertension is very well controlled she will continue Aldactone, hydralazine and losartan.  Ditropan was continued for urinary urgency.    Omeprazole was continued.  Patient also will continue Coreg 25 twice daily.  Healthy lifestyle emphasized follow-up scheduled  Lab work next visit including CBC CMP and lipid panel.  Neurontin prescription was given        No follow-ups on file.

## 2021-07-05 DIAGNOSIS — E78.2 MIXED HYPERLIPIDEMIA: ICD-10-CM

## 2021-07-06 RX ORDER — GABAPENTIN 100 MG/1
CAPSULE ORAL
Qty: 60 CAPSULE | Refills: 0 | OUTPATIENT
Start: 2021-07-06

## 2021-08-06 DIAGNOSIS — E78.2 MIXED HYPERLIPIDEMIA: ICD-10-CM

## 2021-08-06 RX ORDER — ALENDRONATE SODIUM 70 MG/1
TABLET ORAL
Qty: 12 TABLET | Refills: 0 | Status: SHIPPED | OUTPATIENT
Start: 2021-08-06 | End: 2021-09-29 | Stop reason: ALTCHOICE

## 2021-08-06 RX ORDER — GABAPENTIN 100 MG/1
CAPSULE ORAL
Qty: 60 CAPSULE | Refills: 1 | Status: SHIPPED | OUTPATIENT
Start: 2021-08-06 | End: 2021-10-11

## 2021-09-07 ENCOUNTER — LAB (OUTPATIENT)
Dept: LAB | Facility: HOSPITAL | Age: 81
End: 2021-09-07

## 2021-09-07 ENCOUNTER — TRANSCRIBE ORDERS (OUTPATIENT)
Dept: ADMINISTRATIVE | Facility: HOSPITAL | Age: 81
End: 2021-09-07

## 2021-09-07 DIAGNOSIS — N18.31 CKD STAGE G3A/A1, GFR 45-59 AND ALBUMIN CREATININE RATIO <30 MG/G (HCC): ICD-10-CM

## 2021-09-07 DIAGNOSIS — N18.31 CKD STAGE G3A/A1, GFR 45-59 AND ALBUMIN CREATININE RATIO <30 MG/G (HCC): Primary | ICD-10-CM

## 2021-09-07 LAB
ALBUMIN UR-MCNC: <1.2 MG/DL
ANION GAP SERPL CALCULATED.3IONS-SCNC: 12.1 MMOL/L (ref 5–15)
BUN SERPL-MCNC: 16 MG/DL (ref 8–23)
BUN/CREAT SERPL: 15.2 (ref 7–25)
CALCIUM SPEC-SCNC: 9.4 MG/DL (ref 8.6–10.5)
CHLORIDE SERPL-SCNC: 107 MMOL/L (ref 98–107)
CO2 SERPL-SCNC: 25.9 MMOL/L (ref 22–29)
CREAT SERPL-MCNC: 1.05 MG/DL (ref 0.57–1)
CREAT UR-MCNC: 76.4 MG/DL
GFR SERPL CREATININE-BSD FRML MDRD: 51 ML/MIN/1.73
GLUCOSE SERPL-MCNC: 89 MG/DL (ref 65–99)
MICROALBUMIN/CREAT UR: NORMAL MG/G{CREAT}
POTASSIUM SERPL-SCNC: 4.1 MMOL/L (ref 3.5–5.2)
SODIUM SERPL-SCNC: 145 MMOL/L (ref 136–145)

## 2021-09-07 PROCEDURE — 80048 BASIC METABOLIC PNL TOTAL CA: CPT

## 2021-09-07 PROCEDURE — 82043 UR ALBUMIN QUANTITATIVE: CPT

## 2021-09-07 PROCEDURE — 36415 COLL VENOUS BLD VENIPUNCTURE: CPT

## 2021-09-07 PROCEDURE — 82570 ASSAY OF URINE CREATININE: CPT

## 2021-09-24 ENCOUNTER — LAB (OUTPATIENT)
Dept: LAB | Facility: HOSPITAL | Age: 81
End: 2021-09-24

## 2021-09-24 DIAGNOSIS — E78.2 MIXED HYPERLIPIDEMIA: ICD-10-CM

## 2021-09-24 LAB
ALBUMIN SERPL-MCNC: 4.3 G/DL (ref 3.5–5.2)
ALBUMIN/GLOB SERPL: 2 G/DL
ALP SERPL-CCNC: 80 U/L (ref 39–117)
ALT SERPL W P-5'-P-CCNC: 11 U/L (ref 1–33)
ANION GAP SERPL CALCULATED.3IONS-SCNC: 11.9 MMOL/L (ref 5–15)
AST SERPL-CCNC: 19 U/L (ref 1–32)
BASOPHILS # BLD AUTO: 0.03 10*3/MM3 (ref 0–0.2)
BASOPHILS NFR BLD AUTO: 0.4 % (ref 0–1.5)
BILIRUB SERPL-MCNC: 0.5 MG/DL (ref 0–1.2)
BUN SERPL-MCNC: 16 MG/DL (ref 8–23)
BUN/CREAT SERPL: 26.7 (ref 7–25)
CALCIUM SPEC-SCNC: 9.8 MG/DL (ref 8.6–10.5)
CHLORIDE SERPL-SCNC: 107 MMOL/L (ref 98–107)
CHOLEST SERPL-MCNC: 116 MG/DL (ref 0–200)
CK SERPL-CCNC: 141 U/L (ref 20–180)
CO2 SERPL-SCNC: 25.1 MMOL/L (ref 22–29)
CREAT SERPL-MCNC: 0.6 MG/DL (ref 0.57–1)
DEPRECATED RDW RBC AUTO: 43.2 FL (ref 37–54)
EOSINOPHIL # BLD AUTO: 0.14 10*3/MM3 (ref 0–0.4)
EOSINOPHIL NFR BLD AUTO: 2 % (ref 0.3–6.2)
ERYTHROCYTE [DISTWIDTH] IN BLOOD BY AUTOMATED COUNT: 13.2 % (ref 12.3–15.4)
GFR SERPL CREATININE-BSD FRML MDRD: 96 ML/MIN/1.73
GLOBULIN UR ELPH-MCNC: 2.2 GM/DL
GLUCOSE SERPL-MCNC: 89 MG/DL (ref 65–99)
HCT VFR BLD AUTO: 37.7 % (ref 34–46.6)
HDLC SERPL-MCNC: 36 MG/DL (ref 40–60)
HGB BLD-MCNC: 12.4 G/DL (ref 12–15.9)
IMM GRANULOCYTES # BLD AUTO: 0.01 10*3/MM3 (ref 0–0.05)
IMM GRANULOCYTES NFR BLD AUTO: 0.1 % (ref 0–0.5)
LDLC SERPL CALC-MCNC: 56 MG/DL (ref 0–100)
LDLC/HDLC SERPL: 1.47 {RATIO}
LYMPHOCYTES # BLD AUTO: 1.99 10*3/MM3 (ref 0.7–3.1)
LYMPHOCYTES NFR BLD AUTO: 28.9 % (ref 19.6–45.3)
MCH RBC QN AUTO: 29.5 PG (ref 26.6–33)
MCHC RBC AUTO-ENTMCNC: 32.9 G/DL (ref 31.5–35.7)
MCV RBC AUTO: 89.8 FL (ref 79–97)
MONOCYTES # BLD AUTO: 0.66 10*3/MM3 (ref 0.1–0.9)
MONOCYTES NFR BLD AUTO: 9.6 % (ref 5–12)
NEUTROPHILS NFR BLD AUTO: 4.05 10*3/MM3 (ref 1.7–7)
NEUTROPHILS NFR BLD AUTO: 59 % (ref 42.7–76)
NRBC BLD AUTO-RTO: 0 /100 WBC (ref 0–0.2)
PLATELET # BLD AUTO: 267 10*3/MM3 (ref 140–450)
PMV BLD AUTO: 11 FL (ref 6–12)
POTASSIUM SERPL-SCNC: 3.8 MMOL/L (ref 3.5–5.2)
PROT SERPL-MCNC: 6.5 G/DL (ref 6–8.5)
RBC # BLD AUTO: 4.2 10*6/MM3 (ref 3.77–5.28)
SODIUM SERPL-SCNC: 144 MMOL/L (ref 136–145)
TRIGL SERPL-MCNC: 135 MG/DL (ref 0–150)
VLDLC SERPL-MCNC: 24 MG/DL (ref 5–40)
WBC # BLD AUTO: 6.88 10*3/MM3 (ref 3.4–10.8)

## 2021-09-24 PROCEDURE — 36415 COLL VENOUS BLD VENIPUNCTURE: CPT

## 2021-09-24 PROCEDURE — 82550 ASSAY OF CK (CPK): CPT

## 2021-09-24 PROCEDURE — 80053 COMPREHEN METABOLIC PANEL: CPT

## 2021-09-24 PROCEDURE — 85025 COMPLETE CBC W/AUTO DIFF WBC: CPT

## 2021-09-24 PROCEDURE — 80061 LIPID PANEL: CPT

## 2021-09-29 ENCOUNTER — OFFICE VISIT (OUTPATIENT)
Dept: CARDIOLOGY | Facility: CLINIC | Age: 81
End: 2021-09-29

## 2021-09-29 VITALS
HEIGHT: 59 IN | DIASTOLIC BLOOD PRESSURE: 83 MMHG | OXYGEN SATURATION: 95 % | WEIGHT: 171 LBS | SYSTOLIC BLOOD PRESSURE: 138 MMHG | RESPIRATION RATE: 18 BRPM | TEMPERATURE: 98.7 F | BODY MASS INDEX: 34.47 KG/M2 | HEART RATE: 83 BPM

## 2021-09-29 DIAGNOSIS — E78.2 MIXED HYPERLIPIDEMIA: ICD-10-CM

## 2021-09-29 DIAGNOSIS — L98.9 SKIN LESION OF SCALP: ICD-10-CM

## 2021-09-29 DIAGNOSIS — N39.41 URGE INCONTINENCE: ICD-10-CM

## 2021-09-29 DIAGNOSIS — K21.9 GASTROESOPHAGEAL REFLUX DISEASE WITHOUT ESOPHAGITIS: Chronic | ICD-10-CM

## 2021-09-29 DIAGNOSIS — I10 ESSENTIAL HYPERTENSION: Primary | Chronic | ICD-10-CM

## 2021-09-29 PROCEDURE — 99214 OFFICE O/P EST MOD 30 MIN: CPT | Performed by: INTERNAL MEDICINE

## 2021-09-29 NOTE — PROGRESS NOTES
subjective     Chief Complaint   Patient presents with   • Hyperlipidemia     follow up     History of Present Illness  Emma is 80 years old white female who states that since her Covid infection she has developed a scalp lesion and losing her hair.  Is a very dry scaly lesion in the top of the head.  Is itching.    Hypertension is very well controlled with losartan 100 mg daily, hydralazine 100 mg twice daily, Aldactone 25 daily and Coreg 25 twice daily she has no drug side effects.    Hyperlipidemia on Lipitor therapy.  GI complaints are better with Prilosec.  Stress incontinence and urgency is much better with Ditropan which she is taking regularly.    He is overall feeling better denies any chest pain palpitations or shortness of breath.      Past Surgical History:   Procedure Laterality Date   • APPENDECTOMY     • BUNIONECTOMY Right    • CARDIOVASCULAR STRESS TEST  10/2012   • CATARACT EXTRACTION  2017   • CHOLECYSTECTOMY     • COLONOSCOPY  2011   • ECHO - CONVERTED  10/2012   • JOINT REPLACEMENT      bilateral knees    • KNEE ARTHROPLASTY Left    • KNEE ARTHROSCOPY     • SHOULDER ARTHROSCOPY Left 7/28/2016    Procedure: LEFT SHOULDER ACROMIOPLASTY,MINI OPEN ROTATOR CUFF REPAIR;  Surgeon: Carlos Eduardo Smith MD;  Location: [facility];  Service:    • SHOULDER SURGERY  05/31/2016    OPEN ACROMICPLASTY RIGHT SHOULDER     Family History   Problem Relation Age of Onset   • Heart disease Other    • Stroke Sister    • Diabetes Mother    • Heart failure Mother    • Heart disease Mother    • Heart attack Mother    • Hypertension Father    • Emphysema Father    • Heart disease Father    • No Known Problems Brother    • Cancer Sister    • Kidney disease Sister    • Heart failure Sister    • Diabetes Brother    • Diabetes Brother    • Diabetes Brother      Past Medical History:   Diagnosis Date   • GERD (gastroesophageal reflux disease)    • Hyperlipidemia    • Hypertension    • Left shoulder pain    • Obesity    •  Osteoarthritis of knees, bilateral    • Overactive bladder    • Right shoulder pain      Patient Active Problem List   Diagnosis   • Complete tear of right rotator cuff   • Stage 3a chronic kidney disease (CMS/HCC)   • Cough   • Gastroesophageal reflux disease   • Mixed hyperlipidemia   • Shoulder pain   • Hypertension   • Acromioclavicular joint arthritis   • Impingement syndrome, shoulder   • Complete tear of left rotator cuff   • Urge incontinence   • Atopic rhinitis   • Upper respiratory tract infection   • Leg edema   • Hypercalcemia   • Right knee pain   • DADA (stress urinary incontinence, female)   • Obesity (BMI 35.0-39.9 without comorbidity)   • Left knee injury   • Acute UTI   • COVID-19 virus infection   • Skin lesion of scalp       Social History     Tobacco Use   • Smoking status: Former Smoker     Packs/day: 2.00     Years: 20.00     Pack years: 40.00     Types: Cigarettes     Quit date:      Years since quittin.7   • Smokeless tobacco: Never Used   Vaping Use   • Vaping Use: Never used   Substance Use Topics   • Alcohol use: No     Comment: s/p    • Drug use: No       Allergies   Allergen Reactions   • Codeine Other (See Comments)     hyperventilate   • Sulfa Antibiotics Rash       Current Outpatient Medications on File Prior to Visit   Medication Sig   • atorvastatin (LIPITOR) 20 MG tablet Take 1 tablet by mouth once daily   • carvedilol (COREG) 25 MG tablet Take 25 mg by mouth 2 (Two) Times a Day With Meals.   • Cholecalciferol (VITAMIN D PO) Take 1,000 Units by mouth daily.   • Docusate Calcium (STOOL SOFTENER PO) Take 1 tablet by mouth Daily.   • Fexofenadine HCl (ALLEGRA PO) Take 180 mg by mouth daily as needed.   • gabapentin (NEURONTIN) 100 MG capsule Take 1 capsule by mouth twice daily   • hydrALAZINE (APRESOLINE) 50 MG tablet Take 1 tablet by mouth 2 (two) times a day. (Patient taking differently: Take 100 mg by mouth 2 (two) times a day.)   • losartan (COZAAR) 100 MG tablet Take  "100 mg by mouth Daily.   • omeprazole OTC (PriLOSEC OTC) 20 MG EC tablet Take 20 mg by mouth daily.   • oxybutynin XL (DITROPAN-XL) 10 MG 24 hr tablet Take 1 tablet by mouth Daily.   • Probiotic Product (PROBIOTIC COLON SUPPORT PO) Take  by mouth daily.   • spironolactone (ALDACTONE) 25 MG tablet Take 25 mg by mouth Daily.   • [DISCONTINUED] alendronate (FOSAMAX) 70 MG tablet Take 1 tablet by mouth once a week   • [DISCONTINUED] cefdinir (OMNICEF) 300 MG capsule Take 1 capsule by mouth 2 (Two) Times a Day.     No current facility-administered medications on file prior to visit.         The following portions of the patient's history were reviewed and updated as appropriate: allergies, current medications, past family history, past medical history, past social history, past surgical history and problem list.    Review of Systems   Constitutional: Negative.   HENT: Negative.  Negative for congestion.    Eyes: Negative.    Cardiovascular: Negative.  Negative for chest pain, cyanosis, dyspnea on exertion, irregular heartbeat, leg swelling, near-syncope, orthopnea, palpitations, paroxysmal nocturnal dyspnea and syncope.   Respiratory: Negative.  Negative for shortness of breath.    Hematologic/Lymphatic: Negative.    Skin: Positive for itching.        Scalp lesion at the top of the head which is itching.  Also losing hair   Musculoskeletal: Negative.    Gastrointestinal: Negative.    Neurological: Negative.  Negative for headaches.          Objective:     /83 (BP Location: Right arm, Patient Position: Sitting, Cuff Size: Adult)   Pulse 83   Temp 98.7 °F (37.1 °C)   Resp 18   Ht 149.9 cm (59.02\")   Wt 77.6 kg (171 lb)   SpO2 95%   BMI 34.52 kg/m²   Pulmonary:      Effort: Pulmonary effort is normal.      Breath sounds: Normal breath sounds. No stridor. No wheezing. No rhonchi. No rales.   Cardiovascular:      PMI at left midclavicular line. Normal rate. Regular rhythm. Normal S1. Normal S2.      Murmurs: " There is no murmur.      No gallop. No click. No rub.   Pulses:     Intact distal pulses.   Edema:     Peripheral edema absent.   Skin:     General: Skin is warm and dry.      Comments: A pruritic scaly lesion at the top of the scalp           Lab Review  Lab Results   Component Value Date     09/24/2021    K 3.8 09/24/2021     09/24/2021    BUN 16 09/24/2021    CREATININE 0.60 09/24/2021    GLUCOSE 89 09/24/2021    CALCIUM 9.8 09/24/2021    ALT 11 09/24/2021    ALKPHOS 80 09/24/2021    LABIL2 1.6 06/08/2016     Lab Results   Component Value Date    CKTOTAL 141 09/24/2021     Lab Results   Component Value Date    WBC 6.88 09/24/2021    HGB 12.4 09/24/2021    HCT 37.7 09/24/2021     09/24/2021     Lab Results   Component Value Date    INR 1.97 12/11/2014    INR 2.02 12/08/2014    INR 2.46 12/04/2014     Lab Results   Component Value Date    MG 1.8 12/31/2020     Lab Results   Component Value Date    TSH 1.950 09/14/2018     No results found for: BNP  Lab Results   Component Value Date    CHLPL 133 06/08/2016    CHOL 116 09/24/2021    TRIG 135 09/24/2021    HDL 36 (L) 09/24/2021    VLDL 24 09/24/2021    LDLHDL 1.47 09/24/2021     Lab Results   Component Value Date    LDL 56 09/24/2021    LDL 17 01/09/2021       Procedures       I personally viewed and interpreted the patient's LAB data         Assessment:     1. Essential hypertension    2. Mixed hyperlipidemia    3. Skin lesion of scalp    4. Gastroesophageal reflux disease without esophagitis    5. Urge incontinence          Plan:     Blood pressure is very well controlled patient was advised to continue losartan, Aldactone, hydralazine and Coreg.  Hyperlipidemia is also very well controlled with LDL of 56.  She will continue Lipitor.  Patient has scalp lesion will get dermatology consult.  GERD symptoms are controlled with omeprazole which was continued.  Ditropan was continued for urge incontinence.  Follow-up scheduled        No follow-ups on  file.

## 2021-10-11 DIAGNOSIS — E78.2 MIXED HYPERLIPIDEMIA: ICD-10-CM

## 2021-10-12 RX ORDER — GABAPENTIN 100 MG/1
CAPSULE ORAL
Qty: 60 CAPSULE | Refills: 2 | Status: SHIPPED | OUTPATIENT
Start: 2021-10-12 | End: 2022-01-13

## 2021-11-02 RX ORDER — ALENDRONATE SODIUM 70 MG/1
TABLET ORAL
Qty: 12 TABLET | Refills: 0 | Status: SHIPPED | OUTPATIENT
Start: 2021-11-02 | End: 2022-01-24

## 2022-01-04 ENCOUNTER — TELEPHONE (OUTPATIENT)
Dept: CARDIOLOGY | Facility: CLINIC | Age: 82
End: 2022-01-04

## 2022-01-04 ENCOUNTER — HOSPITAL ENCOUNTER (OUTPATIENT)
Dept: GENERAL RADIOLOGY | Facility: HOSPITAL | Age: 82
Discharge: HOME | End: 2022-01-04
Admitting: INTERNAL MEDICINE

## 2022-01-04 ENCOUNTER — OFFICE VISIT (OUTPATIENT)
Dept: CARDIOLOGY | Facility: CLINIC | Age: 82
End: 2022-01-04

## 2022-01-04 VITALS
WEIGHT: 171 LBS | DIASTOLIC BLOOD PRESSURE: 64 MMHG | BODY MASS INDEX: 34.47 KG/M2 | TEMPERATURE: 97.1 F | SYSTOLIC BLOOD PRESSURE: 132 MMHG | HEART RATE: 74 BPM | OXYGEN SATURATION: 98 % | HEIGHT: 59 IN

## 2022-01-04 DIAGNOSIS — G89.29 CHRONIC LEFT SHOULDER PAIN: ICD-10-CM

## 2022-01-04 DIAGNOSIS — N18.31 STAGE 3A CHRONIC KIDNEY DISEASE: Chronic | ICD-10-CM

## 2022-01-04 DIAGNOSIS — E66.9 OBESITY (BMI 35.0-39.9 WITHOUT COMORBIDITY): ICD-10-CM

## 2022-01-04 DIAGNOSIS — M25.512 CHRONIC LEFT SHOULDER PAIN: ICD-10-CM

## 2022-01-04 DIAGNOSIS — E78.2 MIXED HYPERLIPIDEMIA: ICD-10-CM

## 2022-01-04 DIAGNOSIS — I10 PRIMARY HYPERTENSION: Primary | Chronic | ICD-10-CM

## 2022-01-04 PROCEDURE — 99214 OFFICE O/P EST MOD 30 MIN: CPT | Performed by: INTERNAL MEDICINE

## 2022-01-04 PROCEDURE — 73030 X-RAY EXAM OF SHOULDER: CPT | Performed by: RADIOLOGY

## 2022-01-04 PROCEDURE — 73030 X-RAY EXAM OF SHOULDER: CPT

## 2022-01-04 RX ORDER — HYDRALAZINE HYDROCHLORIDE 100 MG/1
100 TABLET, FILM COATED ORAL 3 TIMES DAILY
COMMUNITY
Start: 2021-12-12

## 2022-01-04 NOTE — TELEPHONE ENCOUNTER
----- Message from Jennifer Montano MD sent at 1/4/2022  4:41 PM EST -----  Shoulder x-ray shows bad osteoarthritis otherwise it is okay

## 2022-01-04 NOTE — PROGRESS NOTES
subjective     Chief Complaint   Patient presents with   • Shoulder Pain     pt fell 3 wks ago off of her rollator   • Hypertension     Follow up     History of Present Illness  Emma is 81 years old white female who states that she fell twice since last visit. She broke her fall with her hands and hurt her shoulder. He has prior history of complete tear of right rotator cuff and now complains of pain and restricted motion of the right shoulder.    Blood pressure is very well controlled with current medications. She is taking Coreg 25 twice daily losartan 100 mg daily, Aldactone 25 mg daily, hydralazine 100 mg twice daily.    Hyperlipidemia on Lipitor therapy she is tolerating it very well.  Urinary stress incontinence on Ditropan which seems to be helping..  She denies any chest pain palpitations or shortness of breath.    Past Surgical History:   Procedure Laterality Date   • APPENDECTOMY     • BUNIONECTOMY Right    • CARDIOVASCULAR STRESS TEST  10/2012   • CATARACT EXTRACTION  2017   • CHOLECYSTECTOMY     • COLONOSCOPY  2011   • ECHO - CONVERTED  10/2012   • JOINT REPLACEMENT      bilateral knees    • KNEE ARTHROPLASTY Left    • KNEE ARTHROSCOPY     • SHOULDER ARTHROSCOPY Left 7/28/2016    Procedure: LEFT SHOULDER ACROMIOPLASTY,MINI OPEN ROTATOR CUFF REPAIR;  Surgeon: Carlos Eduardo Smith MD;  Location: [facility];  Service:    • SHOULDER SURGERY  05/31/2016    OPEN ACROMICPLASTY RIGHT SHOULDER     Family History   Problem Relation Age of Onset   • Heart disease Other    • Stroke Sister    • Diabetes Mother    • Heart failure Mother    • Heart disease Mother    • Heart attack Mother    • Hypertension Father    • Emphysema Father    • Heart disease Father    • No Known Problems Brother    • Cancer Sister    • Kidney disease Sister    • Heart failure Sister    • Diabetes Brother    • Diabetes Brother    • Diabetes Brother      Past Medical History:   Diagnosis Date   • GERD (gastroesophageal reflux disease)    •  Hyperlipidemia    • Hypertension    • Left shoulder pain    • Obesity    • Osteoarthritis of knees, bilateral    • Overactive bladder    • Right shoulder pain      Patient Active Problem List   Diagnosis   • Complete tear of right rotator cuff   • Stage 3a chronic kidney disease (HCC)   • Cough   • Gastroesophageal reflux disease   • Mixed hyperlipidemia   • Shoulder pain   • Hypertension   • Acromioclavicular joint arthritis   • Impingement syndrome, shoulder   • Complete tear of left rotator cuff   • Urge incontinence   • Atopic rhinitis   • Upper respiratory tract infection   • Leg edema   • Hypercalcemia   • Right knee pain   • DADA (stress urinary incontinence, female)   • Obesity (BMI 35.0-39.9 without comorbidity)   • Left knee injury   • Acute UTI   • COVID-19 virus infection   • Skin lesion of scalp       Social History     Tobacco Use   • Smoking status: Former Smoker     Packs/day: 2.00     Years: 20.00     Pack years: 40.00     Types: Cigarettes     Quit date:      Years since quittin.0   • Smokeless tobacco: Never Used   Vaping Use   • Vaping Use: Never used   Substance Use Topics   • Alcohol use: No     Comment: s/p    • Drug use: No       Allergies   Allergen Reactions   • Codeine Other (See Comments)     hyperventilate   • Sulfa Antibiotics Rash       Current Outpatient Medications on File Prior to Visit   Medication Sig   • alendronate (FOSAMAX) 70 MG tablet Take 1 tablet by mouth once a week   • atorvastatin (LIPITOR) 20 MG tablet Take 1 tablet by mouth once daily   • carvedilol (COREG) 25 MG tablet Take 25 mg by mouth 2 (Two) Times a Day With Meals.   • Cholecalciferol (VITAMIN D PO) Take 1,000 Units by mouth daily.   • Docusate Calcium (STOOL SOFTENER PO) Take 1 tablet by mouth Daily.   • Fexofenadine HCl (ALLEGRA PO) Take 180 mg by mouth daily as needed.   • gabapentin (NEURONTIN) 100 MG capsule Take 1 capsule by mouth twice daily   • hydrALAZINE (APRESOLINE) 100 MG tablet Take 100  "mg by mouth 2 (Two) Times a Day.   • losartan (COZAAR) 100 MG tablet Take 100 mg by mouth Daily.   • omeprazole OTC (PriLOSEC OTC) 20 MG EC tablet Take 20 mg by mouth daily.   • oxybutynin XL (DITROPAN-XL) 10 MG 24 hr tablet Take 1 tablet by mouth Daily.   • Probiotic Product (PROBIOTIC COLON SUPPORT PO) Take  by mouth daily.   • spironolactone (ALDACTONE) 25 MG tablet Take 25 mg by mouth Daily.   • [DISCONTINUED] hydrALAZINE (APRESOLINE) 50 MG tablet Take 1 tablet by mouth 2 (two) times a day. (Patient taking differently: Take 100 mg by mouth 2 (two) times a day.)     No current facility-administered medications on file prior to visit.         The following portions of the patient's history were reviewed and updated as appropriate: allergies, current medications, past family history, past medical history, past social history, past surgical history and problem list.    Review of Systems   Constitutional: Negative.   HENT: Negative.  Negative for congestion.    Eyes: Negative.    Cardiovascular: Negative.  Negative for chest pain, cyanosis, dyspnea on exertion, irregular heartbeat, leg swelling, near-syncope, orthopnea, palpitations, paroxysmal nocturnal dyspnea and syncope.   Respiratory: Negative.  Negative for shortness of breath.    Hematologic/Lymphatic: Negative.    Musculoskeletal: Positive for joint pain.   Gastrointestinal: Negative.    Neurological: Negative.  Negative for headaches.          Objective:     /64 (BP Location: Left arm, Patient Position: Sitting, Cuff Size: Adult)   Pulse 74   Temp 97.1 °F (36.2 °C)   Ht 149.9 cm (59\")   Wt 77.6 kg (171 lb)   SpO2 98%   BMI 34.54 kg/m²   Pulmonary:      Effort: Pulmonary effort is normal.      Breath sounds: Normal breath sounds. No stridor. No wheezing. No rhonchi. No rales.   Cardiovascular:      PMI at left midclavicular line. Normal rate. Regular rhythm. Normal S1. Normal S2.      Murmurs: There is no murmur.      No gallop. No click. No rub. "   Pulses:     Intact distal pulses.   Edema:     Peripheral edema absent.           Lab Review  Lab Results   Component Value Date     09/24/2021    K 3.8 09/24/2021     09/24/2021    BUN 16 09/24/2021    CREATININE 0.60 09/24/2021    GLUCOSE 89 09/24/2021    CALCIUM 9.8 09/24/2021    ALT 11 09/24/2021    ALKPHOS 80 09/24/2021    LABIL2 1.6 06/08/2016     Lab Results   Component Value Date    CKTOTAL 141 09/24/2021     Lab Results   Component Value Date    WBC 6.88 09/24/2021    HGB 12.4 09/24/2021    HCT 37.7 09/24/2021     09/24/2021     Lab Results   Component Value Date    INR 1.97 12/11/2014    INR 2.02 12/08/2014    INR 2.46 12/04/2014     Lab Results   Component Value Date    MG 1.8 12/31/2020     Lab Results   Component Value Date    TSH 1.950 09/14/2018     No results found for: BNP  Lab Results   Component Value Date    CHLPL 133 06/08/2016    CHOL 116 09/24/2021    TRIG 135 09/24/2021    HDL 36 (L) 09/24/2021    VLDL 24 09/24/2021    LDLHDL 1.47 09/24/2021     Lab Results   Component Value Date    LDL 56 09/24/2021    LDL 17 01/09/2021       Procedures       I personally viewed and interpreted the patient's LAB data         Assessment:     1. Primary hypertension    2. Mixed hyperlipidemia    3. Stage 3a chronic kidney disease (HCC)    4. Obesity (BMI 35.0-39.9 without comorbidity)    5. Chronic left shoulder pain          Plan:     Blood pressure is very well controlled. She will continue Coreg, losartan, Aldactone and hydralazine.    Hyperlipidemia is very well controlled with LDL of 56 she will continue Lipitor  Healthy lifestyle and weight loss emphasized.  Chronic left shoulder pain which is worse after her fall will get an x-ray. Physical therapy was recommended. If she does not better get better will need to get MRI again.  Patient also has chronic renal failure she was doing more fluids.  Follow-up scheduled    No follow-ups on file.

## 2022-01-10 ENCOUNTER — TRANSCRIBE ORDERS (OUTPATIENT)
Dept: ADMINISTRATIVE | Facility: HOSPITAL | Age: 82
End: 2022-01-10

## 2022-01-10 ENCOUNTER — LAB (OUTPATIENT)
Dept: LAB | Facility: HOSPITAL | Age: 82
End: 2022-01-10

## 2022-01-10 DIAGNOSIS — N18.31 CHRONIC KIDNEY DISEASE (CKD) STAGE G3A/A1, MODERATELY DECREASED GLOMERULAR FILTRATION RATE (GFR) BETWEEN 45-59 ML/MIN/1.73 SQUARE METER AND ALBUMINURIA CREATININE RATIO LESS THAN 30 MG/G (CMS/H*: ICD-10-CM

## 2022-01-10 DIAGNOSIS — N18.31 CHRONIC KIDNEY DISEASE (CKD) STAGE G3A/A1, MODERATELY DECREASED GLOMERULAR FILTRATION RATE (GFR) BETWEEN 45-59 ML/MIN/1.73 SQUARE METER AND ALBUMINURIA CREATININE RATIO LESS THAN 30 MG/G (CMS/H*: Primary | ICD-10-CM

## 2022-01-10 LAB
ALBUMIN UR-MCNC: 1.7 MG/DL
ANION GAP SERPL CALCULATED.3IONS-SCNC: 10.3 MMOL/L (ref 5–15)
BUN SERPL-MCNC: 15 MG/DL (ref 8–23)
BUN/CREAT SERPL: 15.6 (ref 7–25)
CALCIUM SPEC-SCNC: 9.5 MG/DL (ref 8.6–10.5)
CHLORIDE SERPL-SCNC: 106 MMOL/L (ref 98–107)
CO2 SERPL-SCNC: 27.7 MMOL/L (ref 22–29)
CREAT SERPL-MCNC: 0.96 MG/DL (ref 0.57–1)
CREAT UR-MCNC: 73.8 MG/DL
GFR SERPL CREATININE-BSD FRML MDRD: 56 ML/MIN/1.73
GLUCOSE SERPL-MCNC: 88 MG/DL (ref 65–99)
MICROALBUMIN/CREAT UR: 23 MG/G
POTASSIUM SERPL-SCNC: 3.7 MMOL/L (ref 3.5–5.2)
SODIUM SERPL-SCNC: 144 MMOL/L (ref 136–145)

## 2022-01-10 PROCEDURE — 82043 UR ALBUMIN QUANTITATIVE: CPT

## 2022-01-10 PROCEDURE — 82570 ASSAY OF URINE CREATININE: CPT

## 2022-01-10 PROCEDURE — 36415 COLL VENOUS BLD VENIPUNCTURE: CPT

## 2022-01-10 PROCEDURE — 80048 BASIC METABOLIC PNL TOTAL CA: CPT

## 2022-01-13 DIAGNOSIS — E78.2 MIXED HYPERLIPIDEMIA: ICD-10-CM

## 2022-01-13 RX ORDER — GABAPENTIN 100 MG/1
CAPSULE ORAL
Qty: 60 CAPSULE | Refills: 0 | Status: SHIPPED | OUTPATIENT
Start: 2022-01-13 | End: 2022-02-15

## 2022-01-24 RX ORDER — ALENDRONATE SODIUM 70 MG/1
TABLET ORAL
Qty: 12 TABLET | Refills: 0 | Status: SHIPPED | OUTPATIENT
Start: 2022-01-24 | End: 2022-04-05 | Stop reason: SDUPTHER

## 2022-02-15 DIAGNOSIS — E78.2 MIXED HYPERLIPIDEMIA: ICD-10-CM

## 2022-02-15 RX ORDER — GABAPENTIN 100 MG/1
CAPSULE ORAL
Qty: 60 CAPSULE | Refills: 1 | Status: SHIPPED | OUTPATIENT
Start: 2022-02-15 | End: 2022-04-20

## 2022-02-18 ENCOUNTER — TELEPHONE (OUTPATIENT)
Dept: CARDIOLOGY | Facility: CLINIC | Age: 82
End: 2022-02-18

## 2022-02-18 NOTE — TELEPHONE ENCOUNTER
Left message on answering machine requesting patient return my call.  Patient is scheduled with Dr. Hinojosa [phone] on March 24, 2022 at 10:00a.m.

## 2022-04-04 ENCOUNTER — LAB (OUTPATIENT)
Dept: LAB | Facility: HOSPITAL | Age: 82
End: 2022-04-04

## 2022-04-04 DIAGNOSIS — E78.2 MIXED HYPERLIPIDEMIA: ICD-10-CM

## 2022-04-04 LAB
ALBUMIN SERPL-MCNC: 4.7 G/DL (ref 3.5–5.2)
ALBUMIN/GLOB SERPL: 2 G/DL
ALP SERPL-CCNC: 82 U/L (ref 39–117)
ALT SERPL W P-5'-P-CCNC: 13 U/L (ref 1–33)
ANION GAP SERPL CALCULATED.3IONS-SCNC: 13.9 MMOL/L (ref 5–15)
AST SERPL-CCNC: 20 U/L (ref 1–32)
BASOPHILS # BLD AUTO: 0.04 10*3/MM3 (ref 0–0.2)
BASOPHILS NFR BLD AUTO: 0.6 % (ref 0–1.5)
BILIRUB SERPL-MCNC: 0.4 MG/DL (ref 0–1.2)
BUN SERPL-MCNC: 21 MG/DL (ref 8–23)
BUN/CREAT SERPL: 18.6 (ref 7–25)
CALCIUM SPEC-SCNC: 9.9 MG/DL (ref 8.6–10.5)
CHLORIDE SERPL-SCNC: 104 MMOL/L (ref 98–107)
CHOLEST SERPL-MCNC: 138 MG/DL (ref 0–200)
CK SERPL-CCNC: 131 U/L (ref 20–180)
CO2 SERPL-SCNC: 26.1 MMOL/L (ref 22–29)
CREAT SERPL-MCNC: 1.13 MG/DL (ref 0.57–1)
DEPRECATED RDW RBC AUTO: 47.6 FL (ref 37–54)
EGFRCR SERPLBLD CKD-EPI 2021: 49.3 ML/MIN/1.73
EOSINOPHIL # BLD AUTO: 0.1 10*3/MM3 (ref 0–0.4)
EOSINOPHIL NFR BLD AUTO: 1.5 % (ref 0.3–6.2)
ERYTHROCYTE [DISTWIDTH] IN BLOOD BY AUTOMATED COUNT: 13.9 % (ref 12.3–15.4)
GLOBULIN UR ELPH-MCNC: 2.4 GM/DL
GLUCOSE SERPL-MCNC: 87 MG/DL (ref 65–99)
HCT VFR BLD AUTO: 40.1 % (ref 34–46.6)
HDLC SERPL-MCNC: 45 MG/DL (ref 40–60)
HGB BLD-MCNC: 12.8 G/DL (ref 12–15.9)
IMM GRANULOCYTES # BLD AUTO: 0.01 10*3/MM3 (ref 0–0.05)
IMM GRANULOCYTES NFR BLD AUTO: 0.1 % (ref 0–0.5)
LDLC SERPL CALC-MCNC: 75 MG/DL (ref 0–100)
LDLC/HDLC SERPL: 1.64 {RATIO}
LYMPHOCYTES # BLD AUTO: 2.15 10*3/MM3 (ref 0.7–3.1)
LYMPHOCYTES NFR BLD AUTO: 32 % (ref 19.6–45.3)
MCH RBC QN AUTO: 30 PG (ref 26.6–33)
MCHC RBC AUTO-ENTMCNC: 31.9 G/DL (ref 31.5–35.7)
MCV RBC AUTO: 93.9 FL (ref 79–97)
MONOCYTES # BLD AUTO: 0.65 10*3/MM3 (ref 0.1–0.9)
MONOCYTES NFR BLD AUTO: 9.7 % (ref 5–12)
NEUTROPHILS NFR BLD AUTO: 3.76 10*3/MM3 (ref 1.7–7)
NEUTROPHILS NFR BLD AUTO: 56.1 % (ref 42.7–76)
NRBC BLD AUTO-RTO: 0 /100 WBC (ref 0–0.2)
PLATELET # BLD AUTO: 246 10*3/MM3 (ref 140–450)
PMV BLD AUTO: 10.9 FL (ref 6–12)
POTASSIUM SERPL-SCNC: 4.1 MMOL/L (ref 3.5–5.2)
PROT SERPL-MCNC: 7.1 G/DL (ref 6–8.5)
RBC # BLD AUTO: 4.27 10*6/MM3 (ref 3.77–5.28)
SODIUM SERPL-SCNC: 144 MMOL/L (ref 136–145)
TRIGL SERPL-MCNC: 97 MG/DL (ref 0–150)
VLDLC SERPL-MCNC: 18 MG/DL (ref 5–40)
WBC NRBC COR # BLD: 6.71 10*3/MM3 (ref 3.4–10.8)

## 2022-04-04 PROCEDURE — 82550 ASSAY OF CK (CPK): CPT

## 2022-04-04 PROCEDURE — 85025 COMPLETE CBC W/AUTO DIFF WBC: CPT

## 2022-04-04 PROCEDURE — 80061 LIPID PANEL: CPT

## 2022-04-04 PROCEDURE — 80053 COMPREHEN METABOLIC PANEL: CPT

## 2022-04-04 PROCEDURE — 36415 COLL VENOUS BLD VENIPUNCTURE: CPT

## 2022-04-05 ENCOUNTER — OFFICE VISIT (OUTPATIENT)
Dept: CARDIOLOGY | Facility: CLINIC | Age: 82
End: 2022-04-05

## 2022-04-05 VITALS
TEMPERATURE: 97.3 F | HEIGHT: 59 IN | WEIGHT: 175 LBS | BODY MASS INDEX: 35.28 KG/M2 | SYSTOLIC BLOOD PRESSURE: 116 MMHG | OXYGEN SATURATION: 96 % | HEART RATE: 75 BPM | DIASTOLIC BLOOD PRESSURE: 60 MMHG

## 2022-04-05 DIAGNOSIS — I10 PRIMARY HYPERTENSION: Primary | Chronic | ICD-10-CM

## 2022-04-05 DIAGNOSIS — K21.9 GASTROESOPHAGEAL REFLUX DISEASE WITHOUT ESOPHAGITIS: Chronic | ICD-10-CM

## 2022-04-05 DIAGNOSIS — E66.9 OBESITY (BMI 35.0-39.9 WITHOUT COMORBIDITY): ICD-10-CM

## 2022-04-05 DIAGNOSIS — N18.31 STAGE 3A CHRONIC KIDNEY DISEASE: Chronic | ICD-10-CM

## 2022-04-05 DIAGNOSIS — E78.2 MIXED HYPERLIPIDEMIA: ICD-10-CM

## 2022-04-05 PROCEDURE — 99214 OFFICE O/P EST MOD 30 MIN: CPT | Performed by: INTERNAL MEDICINE

## 2022-04-05 RX ORDER — ALENDRONATE SODIUM 70 MG/1
70 TABLET ORAL WEEKLY
Qty: 12 TABLET | Refills: 1 | Status: SHIPPED | OUTPATIENT
Start: 2022-04-05 | End: 2022-09-19

## 2022-04-05 NOTE — PROGRESS NOTES
subjective     Chief Complaint   Patient presents with   • Results     Labs     • Hypertension     Follow up   • Hyperlipidemia     Follow up     History of Present Illness  Emma is 81 years old white female who is here for follow-up of multiple chronic medical problems.  She has history of hypertension hyperlipidemia renal failure, osteoporosis GERD.  She had lab work done which will be reviewed today.    Patient states that her blood pressure is doing very well however she had to stop her Norvasc because of heel bilateral lower extremity edema.  She still has ankle edema but her blood pressure without Norvasc is normal she is checking it daily.  She is taking losartan 100 mg daily Coreg 25 twice daily, hydralazine 100 mg twice daily and Aldactone 25 mg daily.    She also is taking Lipitor 20 mg daily for hyperlipidemia lipids are normal.    Patient has arthritis and is difficult for her to get around.  She is taking Fosamax for osteoporosis without any GI symptoms.  Urinary urgency is better controlled with Ditropan XL 10 mg daily.    Past Surgical History:   Procedure Laterality Date   • APPENDECTOMY     • BUNIONECTOMY Right    • CARDIOVASCULAR STRESS TEST  10/2012   • CATARACT EXTRACTION  2017   • CHOLECYSTECTOMY     • COLONOSCOPY  2011   • ECHO - CONVERTED  10/2012   • JOINT REPLACEMENT      bilateral knees    • KNEE ARTHROPLASTY Left    • KNEE ARTHROSCOPY     • SHOULDER ARTHROSCOPY Left 7/28/2016    Procedure: LEFT SHOULDER ACROMIOPLASTY,MINI OPEN ROTATOR CUFF REPAIR;  Surgeon: Carlos Eduardo Smith MD;  Location: [facility];  Service:    • SHOULDER SURGERY  05/31/2016    OPEN ACROMICPLASTY RIGHT SHOULDER     Family History   Problem Relation Age of Onset   • Heart disease Other    • Stroke Sister    • Diabetes Mother    • Heart failure Mother    • Heart disease Mother    • Heart attack Mother    • Hypertension Father    • Emphysema Father    • Heart disease Father    • No Known Problems Brother    • Cancer  Sister    • Kidney disease Sister    • Heart failure Sister    • Diabetes Brother    • Diabetes Brother    • Diabetes Brother      Past Medical History:   Diagnosis Date   • GERD (gastroesophageal reflux disease)    • Hyperlipidemia    • Hypertension    • Left shoulder pain    • Obesity    • Osteoarthritis of knees, bilateral    • Overactive bladder    • Right shoulder pain      Patient Active Problem List   Diagnosis   • Complete tear of right rotator cuff   • Stage 3a chronic kidney disease (HCC)   • Cough   • Gastroesophageal reflux disease   • Mixed hyperlipidemia   • Shoulder pain   • Hypertension   • Acromioclavicular joint arthritis   • Impingement syndrome, shoulder   • Complete tear of left rotator cuff   • Urge incontinence   • Atopic rhinitis   • Upper respiratory tract infection   • Leg edema   • Hypercalcemia   • Right knee pain   • DADA (stress urinary incontinence, female)   • Obesity (BMI 35.0-39.9 without comorbidity)   • Left knee injury   • Acute UTI   • COVID-19 virus infection   • Skin lesion of scalp       Social History     Tobacco Use   • Smoking status: Former Smoker     Packs/day: 2.00     Years: 20.00     Pack years: 40.00     Types: Cigarettes     Quit date:      Years since quittin.2   • Smokeless tobacco: Never Used   Vaping Use   • Vaping Use: Never used   Substance Use Topics   • Alcohol use: No     Comment: s/p    • Drug use: No       Allergies   Allergen Reactions   • Codeine Other (See Comments)     hyperventilate   • Sulfa Antibiotics Rash       Current Outpatient Medications on File Prior to Visit   Medication Sig   • atorvastatin (LIPITOR) 20 MG tablet Take 1 tablet by mouth once daily   • carvedilol (COREG) 25 MG tablet Take 25 mg by mouth 2 (Two) Times a Day With Meals.   • Cholecalciferol (VITAMIN D PO) Take 1,000 Units by mouth daily.   • Docusate Calcium (STOOL SOFTENER PO) Take 1 tablet by mouth Daily.   • Fexofenadine HCl (ALLEGRA PO) Take 180 mg by mouth  "daily as needed.   • gabapentin (NEURONTIN) 100 MG capsule Take 1 capsule by mouth twice daily   • hydrALAZINE (APRESOLINE) 100 MG tablet Take 100 mg by mouth 2 (Two) Times a Day.   • losartan (COZAAR) 100 MG tablet Take 100 mg by mouth Daily.   • omeprazole OTC (PriLOSEC OTC) 20 MG EC tablet Take 20 mg by mouth daily.   • oxybutynin XL (DITROPAN-XL) 10 MG 24 hr tablet Take 1 tablet by mouth Daily.   • Probiotic Product (PROBIOTIC COLON SUPPORT PO) Take  by mouth daily.   • spironolactone (ALDACTONE) 25 MG tablet Take 25 mg by mouth Daily.   • [DISCONTINUED] alendronate (FOSAMAX) 70 MG tablet Take 1 tablet by mouth once a week     No current facility-administered medications on file prior to visit.         The following portions of the patient's history were reviewed and updated as appropriate: allergies, current medications, past family history, past medical history, past social history, past surgical history and problem list.    Review of Systems   Constitutional: Negative.   HENT: Negative.  Negative for congestion.    Eyes: Negative.    Cardiovascular: Positive for leg swelling. Negative for chest pain, cyanosis, dyspnea on exertion, irregular heartbeat, near-syncope, orthopnea, palpitations, paroxysmal nocturnal dyspnea and syncope.   Respiratory: Negative.  Negative for shortness of breath.    Hematologic/Lymphatic: Negative.    Musculoskeletal: Positive for arthritis, back pain, joint pain and stiffness.   Gastrointestinal: Negative.    Neurological: Negative.  Negative for headaches.          Objective:     /60 (BP Location: Left arm, Patient Position: Sitting, Cuff Size: Adult)   Pulse 75   Temp 97.3 °F (36.3 °C)   Ht 149.9 cm (59\")   Wt 79.4 kg (175 lb)   SpO2 96%   BMI 35.35 kg/m²   Cardiovascular:      PMI at left midclavicular line. Normal rate. Regular rhythm. Normal S1. Normal S2.      Murmurs: There is no murmur.      No gallop. No click. No rub.   Pulses:     Intact distal pulses. "   Edema:     Peripheral edema present.     Pretibial: bilateral 2+ edema of the pretibial area.     Ankle: bilateral 2+ edema of the ankle.     Feet: bilateral 1+ edema of the feet.          Lab Review  Lab Results   Component Value Date     04/04/2022    K 4.1 04/04/2022     04/04/2022    BUN 21 04/04/2022    CREATININE 1.13 (H) 04/04/2022    GLUCOSE 87 04/04/2022    CALCIUM 9.9 04/04/2022    ALT 13 04/04/2022    ALKPHOS 82 04/04/2022    LABIL2 1.6 06/08/2016     Lab Results   Component Value Date    CKTOTAL 131 04/04/2022     Lab Results   Component Value Date    WBC 6.71 04/04/2022    HGB 12.8 04/04/2022    HCT 40.1 04/04/2022     04/04/2022     Lab Results   Component Value Date    INR 1.97 12/11/2014    INR 2.02 12/08/2014    INR 2.46 12/04/2014     Lab Results   Component Value Date    MG 1.8 12/31/2020     Lab Results   Component Value Date    TSH 1.950 09/14/2018     No results found for: BNP  Lab Results   Component Value Date    CHLPL 133 06/08/2016    CHOL 138 04/04/2022    TRIG 97 04/04/2022    HDL 45 04/04/2022    VLDL 18 04/04/2022    LDLHDL 1.64 04/04/2022     Lab Results   Component Value Date    LDL 75 04/04/2022    LDL 56 09/24/2021       Procedures       I personally viewed and interpreted the patient's LAB data         Assessment:     1. Primary hypertension    2. Mixed hyperlipidemia    3. Gastroesophageal reflux disease without esophagitis    4. Obesity (BMI 35.0-39.9 without comorbidity)    5. Stage 3a chronic kidney disease (HCC)          Plan:     Blood pressure is very well controlled without Norvasc.  She was advised to continue Coreg, losartan, hydralazine and Aldactone.    Hyperlipidemia  Lab work reviewed LDL is 75 total cholesterol 138 with triglyceride 97.  She will continue Lipitor    GERD symptoms are controlled with omeprazole.  She was advised to give herself breaks holiday from omeprazole because it could cause stomach cancer.    Weight loss was also  emphasized.  Renal functions are better.  She will also continue Fosamax.  Healthy lifestyle emphasized follow-up scheduled        No follow-ups on file.

## 2022-04-20 DIAGNOSIS — E78.2 MIXED HYPERLIPIDEMIA: ICD-10-CM

## 2022-04-20 RX ORDER — GABAPENTIN 100 MG/1
CAPSULE ORAL
Qty: 60 CAPSULE | Refills: 0 | Status: SHIPPED | OUTPATIENT
Start: 2022-04-20 | End: 2022-05-23 | Stop reason: SDUPTHER

## 2022-04-28 ENCOUNTER — TRANSCRIBE ORDERS (OUTPATIENT)
Dept: ADMINISTRATIVE | Facility: HOSPITAL | Age: 82
End: 2022-04-28

## 2022-04-28 ENCOUNTER — LAB (OUTPATIENT)
Dept: LAB | Facility: HOSPITAL | Age: 82
End: 2022-04-28

## 2022-04-28 DIAGNOSIS — N18.31 CKD STAGE G3A/A1, GFR 45-59 AND ALBUMIN CREATININE RATIO <30 MG/G: ICD-10-CM

## 2022-04-28 DIAGNOSIS — N18.31 CKD STAGE G3A/A1, GFR 45-59 AND ALBUMIN CREATININE RATIO <30 MG/G: Primary | ICD-10-CM

## 2022-04-28 LAB
ALBUMIN UR-MCNC: <1.2 MG/DL
ANION GAP SERPL CALCULATED.3IONS-SCNC: 13 MMOL/L (ref 5–15)
BUN SERPL-MCNC: 15 MG/DL (ref 8–23)
BUN/CREAT SERPL: 13.9 (ref 7–25)
CALCIUM SPEC-SCNC: 10 MG/DL (ref 8.6–10.5)
CHLORIDE SERPL-SCNC: 103 MMOL/L (ref 98–107)
CO2 SERPL-SCNC: 26 MMOL/L (ref 22–29)
CREAT SERPL-MCNC: 1.08 MG/DL (ref 0.57–1)
CREAT UR-MCNC: 56.8 MG/DL
EGFRCR SERPLBLD CKD-EPI 2021: 52 ML/MIN/1.73
GLUCOSE SERPL-MCNC: 88 MG/DL (ref 65–99)
MICROALBUMIN/CREAT UR: NORMAL MG/G{CREAT}
POTASSIUM SERPL-SCNC: 3.5 MMOL/L (ref 3.5–5.2)
SODIUM SERPL-SCNC: 142 MMOL/L (ref 136–145)

## 2022-04-28 PROCEDURE — 80048 BASIC METABOLIC PNL TOTAL CA: CPT

## 2022-04-28 PROCEDURE — 82043 UR ALBUMIN QUANTITATIVE: CPT

## 2022-04-28 PROCEDURE — 82570 ASSAY OF URINE CREATININE: CPT

## 2022-04-28 PROCEDURE — 36415 COLL VENOUS BLD VENIPUNCTURE: CPT

## 2022-05-23 ENCOUNTER — TELEPHONE (OUTPATIENT)
Dept: CARDIOLOGY | Facility: CLINIC | Age: 82
End: 2022-05-23

## 2022-05-23 DIAGNOSIS — E78.2 MIXED HYPERLIPIDEMIA: ICD-10-CM

## 2022-05-23 RX ORDER — GABAPENTIN 100 MG/1
100 CAPSULE ORAL 2 TIMES DAILY
Qty: 60 CAPSULE | Refills: 0 | Status: SHIPPED | OUTPATIENT
Start: 2022-05-23 | End: 2022-06-24

## 2022-05-23 RX ORDER — CEFDINIR 300 MG/1
300 CAPSULE ORAL 2 TIMES DAILY
Qty: 20 CAPSULE | Refills: 0 | Status: SHIPPED | OUTPATIENT
Start: 2022-05-23 | End: 2022-05-24

## 2022-05-23 NOTE — TELEPHONE ENCOUNTER
Pt called and stated she had a blister on her left leg that busted and is red and swollen.  Is wanting to be seen.  Can we work her in this week or referral?   Please advise.

## 2022-05-24 ENCOUNTER — OFFICE VISIT (OUTPATIENT)
Dept: CARDIOLOGY | Facility: CLINIC | Age: 82
End: 2022-05-24

## 2022-05-24 VITALS
WEIGHT: 174 LBS | TEMPERATURE: 97.4 F | OXYGEN SATURATION: 96 % | SYSTOLIC BLOOD PRESSURE: 150 MMHG | BODY MASS INDEX: 35.08 KG/M2 | HEIGHT: 59 IN | HEART RATE: 63 BPM | DIASTOLIC BLOOD PRESSURE: 84 MMHG

## 2022-05-24 DIAGNOSIS — I10 PRIMARY HYPERTENSION: Chronic | ICD-10-CM

## 2022-05-24 DIAGNOSIS — E66.9 OBESITY (BMI 35.0-39.9 WITHOUT COMORBIDITY): ICD-10-CM

## 2022-05-24 DIAGNOSIS — R60.0 BILATERAL LOWER EXTREMITY EDEMA: Primary | ICD-10-CM

## 2022-05-24 DIAGNOSIS — E78.2 MIXED HYPERLIPIDEMIA: ICD-10-CM

## 2022-05-24 PROCEDURE — 99214 OFFICE O/P EST MOD 30 MIN: CPT | Performed by: INTERNAL MEDICINE

## 2022-05-24 RX ORDER — FUROSEMIDE 20 MG/1
20 TABLET ORAL EVERY OTHER DAY
Qty: 90 TABLET | Refills: 0 | Status: SHIPPED | OUTPATIENT
Start: 2022-05-24 | End: 2022-10-18 | Stop reason: ALTCHOICE

## 2022-05-24 NOTE — PROGRESS NOTES
subjective     Chief Complaint   Patient presents with   • Leg Swelling     Blisters on bilat legs now  pt still taking antibiotics called in   • Hypertension     today     History of Present Illness  Emma is 81 years old white female who complains of bilateral lower extremity edema with small blisters which was oozing fluid yesterday.  There was no ulcer no evidence of inflammation no redness or swelling.  No calf tenderness.    Blood pressure has been good but today is elevated.  She has been taking hydralazine 100 mg twice a day, Aldactone 25 mg daily, losartan 100 mg daily and Coreg 25 twice daily    Patient denies any orthopnea or PND.  No chest tightness or pressure sensation.  She also has hyperlipidemia and has been taking Lipitor 20 mg daily.    Past Surgical History:   Procedure Laterality Date   • APPENDECTOMY     • BUNIONECTOMY Right    • CARDIOVASCULAR STRESS TEST  10/2012   • CATARACT EXTRACTION  2017   • CHOLECYSTECTOMY     • COLONOSCOPY  2011   • ECHO - CONVERTED  10/2012   • JOINT REPLACEMENT      bilateral knees    • KNEE ARTHROPLASTY Left    • KNEE ARTHROSCOPY     • SHOULDER ARTHROSCOPY Left 7/28/2016    Procedure: LEFT SHOULDER ACROMIOPLASTY,MINI OPEN ROTATOR CUFF REPAIR;  Surgeon: Carlos Eduardo Smith MD;  Location: [facility];  Service:    • SHOULDER SURGERY  05/31/2016    OPEN ACROMICPLASTY RIGHT SHOULDER     Family History   Problem Relation Age of Onset   • Heart disease Other    • Stroke Sister    • Diabetes Mother    • Heart failure Mother    • Heart disease Mother    • Heart attack Mother    • Hypertension Father    • Emphysema Father    • Heart disease Father    • No Known Problems Brother    • Cancer Sister    • Kidney disease Sister    • Heart failure Sister    • Diabetes Brother    • Diabetes Brother    • Diabetes Brother      Past Medical History:   Diagnosis Date   • GERD (gastroesophageal reflux disease)    • Hyperlipidemia    • Hypertension    • Left shoulder pain    • Obesity     • Osteoarthritis of knees, bilateral    • Overactive bladder    • Right shoulder pain      Patient Active Problem List   Diagnosis   • Complete tear of right rotator cuff   • Stage 3a chronic kidney disease (HCC)   • Cough   • Gastroesophageal reflux disease   • Mixed hyperlipidemia   • Shoulder pain   • Hypertension   • Acromioclavicular joint arthritis   • Impingement syndrome, shoulder   • Complete tear of left rotator cuff   • Urge incontinence   • Atopic rhinitis   • Upper respiratory tract infection   • Bilateral lower extremity edema   • Hypercalcemia   • Right knee pain   • DADA (stress urinary incontinence, female)   • Obesity (BMI 35.0-39.9 without comorbidity)   • Left knee injury   • Acute UTI   • COVID-19 virus infection   • Skin lesion of scalp       Social History     Tobacco Use   • Smoking status: Former Smoker     Packs/day: 2.00     Years: 20.00     Pack years: 40.00     Types: Cigarettes     Quit date:      Years since quittin.4   • Smokeless tobacco: Never Used   Vaping Use   • Vaping Use: Never used   Substance Use Topics   • Alcohol use: No     Comment: s/p    • Drug use: No       Allergies   Allergen Reactions   • Codeine Other (See Comments)     hyperventilate   • Sulfa Antibiotics Rash       Current Outpatient Medications on File Prior to Visit   Medication Sig   • alendronate (FOSAMAX) 70 MG tablet Take 1 tablet by mouth 1 (One) Time Per Week.   • atorvastatin (LIPITOR) 20 MG tablet Take 1 tablet by mouth once daily   • carvedilol (COREG) 25 MG tablet Take 25 mg by mouth 2 (Two) Times a Day With Meals.   • Cholecalciferol (VITAMIN D PO) Take 1,000 Units by mouth daily.   • Docusate Calcium (STOOL SOFTENER PO) Take 1 tablet by mouth Daily.   • Fexofenadine HCl (ALLEGRA PO) Take 180 mg by mouth daily as needed.   • gabapentin (NEURONTIN) 100 MG capsule Take 1 capsule by mouth 2 (Two) Times a Day.   • hydrALAZINE (APRESOLINE) 100 MG tablet Take 100 mg by mouth 2 (Two) Times a  "Day.   • losartan (COZAAR) 100 MG tablet Take 100 mg by mouth Daily.   • omeprazole OTC (PriLOSEC OTC) 20 MG EC tablet Take 20 mg by mouth daily.   • oxybutynin XL (DITROPAN-XL) 10 MG 24 hr tablet Take 1 tablet by mouth Daily.   • Probiotic Product (PROBIOTIC COLON SUPPORT PO) Take  by mouth daily.   • spironolactone (ALDACTONE) 25 MG tablet Take 25 mg by mouth Daily.     No current facility-administered medications on file prior to visit.         The following portions of the patient's history were reviewed and updated as appropriate: allergies, current medications, past family history, past medical history, past social history, past surgical history and problem list.    Review of Systems   Constitutional: Negative.   HENT: Negative.  Negative for congestion.    Eyes: Negative.    Cardiovascular: Positive for leg swelling. Negative for chest pain, cyanosis, dyspnea on exertion, irregular heartbeat, near-syncope, orthopnea, palpitations, paroxysmal nocturnal dyspnea and syncope.        Patient complains of fluid oozing from the lower extremity bilaterally because of lot of swelling no open wounds    Respiratory: Negative.  Negative for shortness of breath.    Hematologic/Lymphatic: Negative.    Musculoskeletal: Negative.    Gastrointestinal: Negative.    Neurological: Negative.  Negative for headaches.          Objective:     /84 (BP Location: Left arm, Patient Position: Sitting, Cuff Size: Adult)   Pulse 63   Temp 97.4 °F (36.3 °C)   Ht 149.9 cm (59\")   Wt 78.9 kg (174 lb)   SpO2 96%   BMI 35.14 kg/m²   Pulmonary:      Effort: Pulmonary effort is normal.      Breath sounds: Normal breath sounds. No stridor. No wheezing. No rhonchi. No rales.   Cardiovascular:      PMI at left midclavicular line. Normal rate. Regular rhythm. Normal S1. Normal S2.      Murmurs: There is no murmur.      No gallop. No click. No rub.   Pulses:     Intact distal pulses.   Edema:     Peripheral edema present.     Pretibial: " bilateral 2+ edema of the pretibial area.     Ankle: bilateral 2+ edema of the ankle.     Feet: bilateral 2+ edema of the feet.          Lab Review  Lab Results   Component Value Date     04/28/2022    K 3.5 04/28/2022     04/28/2022    BUN 15 04/28/2022    CREATININE 1.08 (H) 04/28/2022    GLUCOSE 88 04/28/2022    CALCIUM 10.0 04/28/2022    ALT 13 04/04/2022    ALKPHOS 82 04/04/2022    LABIL2 1.6 06/08/2016     Lab Results   Component Value Date    CKTOTAL 131 04/04/2022     Lab Results   Component Value Date    WBC 6.71 04/04/2022    HGB 12.8 04/04/2022    HCT 40.1 04/04/2022     04/04/2022     Lab Results   Component Value Date    INR 1.97 12/11/2014    INR 2.02 12/08/2014    INR 2.46 12/04/2014     Lab Results   Component Value Date    MG 1.8 12/31/2020     Lab Results   Component Value Date    TSH 1.950 09/14/2018     No results found for: BNP  Lab Results   Component Value Date    CHLPL 133 06/08/2016    CHOL 138 04/04/2022    TRIG 97 04/04/2022    HDL 45 04/04/2022    VLDL 18 04/04/2022    LDLHDL 1.64 04/04/2022     Lab Results   Component Value Date    LDL 75 04/04/2022    LDL 56 09/24/2021       Procedures       I personally viewed and interpreted the patient's LAB data         Assessment:     1. Bilateral lower extremity edema    2. Mixed hyperlipidemia    3. Primary hypertension    4. Obesity (BMI 35.0-39.9 without comorbidity)          Plan:     Emma is 81 years old white female who has marked bilateral lower extremity edema.  She is taking Aldactone 25 mg daily.  She was advised to add Lasix 20 mg every other day to start.  Will adjust the dose depending on her response and also depending on the renal functions.    Clinically there is no evidence of DVT.  Blood pressure is mildly elevated however patient states that blood pressure is good at home.  Advised to check her blood pressure twice a day and may need to increase hydralazine from 200 mg daily to 250 mg daily  Healthy lifestyle  emphasized.  Salt restriction discussed.  Follow-up scheduled        No follow-ups on file.

## 2022-06-24 DIAGNOSIS — E78.2 MIXED HYPERLIPIDEMIA: ICD-10-CM

## 2022-06-24 RX ORDER — GABAPENTIN 100 MG/1
CAPSULE ORAL
Qty: 60 CAPSULE | Refills: 0 | Status: SHIPPED | OUTPATIENT
Start: 2022-06-24 | End: 2022-07-27

## 2022-07-05 ENCOUNTER — OFFICE VISIT (OUTPATIENT)
Dept: CARDIOLOGY | Facility: CLINIC | Age: 82
End: 2022-07-05

## 2022-07-05 VITALS
SYSTOLIC BLOOD PRESSURE: 140 MMHG | DIASTOLIC BLOOD PRESSURE: 80 MMHG | BODY MASS INDEX: 35.63 KG/M2 | HEART RATE: 62 BPM | WEIGHT: 176.4 LBS | OXYGEN SATURATION: 96 %

## 2022-07-05 DIAGNOSIS — E66.9 OBESITY (BMI 35.0-39.9 WITHOUT COMORBIDITY): ICD-10-CM

## 2022-07-05 DIAGNOSIS — R42 DIZZINESS: ICD-10-CM

## 2022-07-05 DIAGNOSIS — E78.2 MIXED HYPERLIPIDEMIA: Primary | ICD-10-CM

## 2022-07-05 DIAGNOSIS — I10 PRIMARY HYPERTENSION: Chronic | ICD-10-CM

## 2022-07-05 DIAGNOSIS — R20.0 NUMBNESS IN LEFT LEG: ICD-10-CM

## 2022-07-05 PROCEDURE — 99214 OFFICE O/P EST MOD 30 MIN: CPT | Performed by: INTERNAL MEDICINE

## 2022-07-05 NOTE — PROGRESS NOTES
subjective     Chief Complaint   Patient presents with   • Numbness     Left leg.   • Dizziness     History of Present Illness  Emma is a 81 years old white female who is here for follow-up.  She states that she has been having some numbness in the left leg.  According to her she already has numbness in the right foot and now she is having in the left foot and that makes both of her feet tingling and numb.  She denies any back pain.  Patient loses balance and fell recently but no head injury.  He got slight bruise on her nose from her eyeglasses.    She denies any headache.  No weakness in the arms or legs no focal signs.    Hypertension is very well controlled she is taking Aldactone hydralazine losartan and Coreg    Hyperlipidemia on Lipitor.  She also has postmenopausal osteopenia and has been taking Fosamax along with calcium and D.    No chest pain palpitations or shortness of breath.    Past Surgical History:   Procedure Laterality Date   • APPENDECTOMY     • BUNIONECTOMY Right    • CARDIOVASCULAR STRESS TEST  10/2012   • CATARACT EXTRACTION  2017   • CHOLECYSTECTOMY     • COLONOSCOPY  2011   • ECHO - CONVERTED  10/2012   • JOINT REPLACEMENT      bilateral knees    • KNEE ARTHROPLASTY Left    • KNEE ARTHROSCOPY     • SHOULDER ARTHROSCOPY Left 7/28/2016    Procedure: LEFT SHOULDER ACROMIOPLASTY,MINI OPEN ROTATOR CUFF REPAIR;  Surgeon: Carlos Eduardo Smith MD;  Location: [facility];  Service:    • SHOULDER SURGERY  05/31/2016    OPEN ACROMICPLASTY RIGHT SHOULDER     Family History   Problem Relation Age of Onset   • Heart disease Other    • Stroke Sister    • Diabetes Mother    • Heart failure Mother    • Heart disease Mother    • Heart attack Mother    • Hypertension Father    • Emphysema Father    • Heart disease Father    • No Known Problems Brother    • Cancer Sister    • Kidney disease Sister    • Heart failure Sister    • Diabetes Brother    • Diabetes Brother    • Diabetes Brother      Past Medical  History:   Diagnosis Date   • GERD (gastroesophageal reflux disease)    • Hyperlipidemia    • Hypertension    • Left shoulder pain    • Obesity    • Osteoarthritis of knees, bilateral    • Overactive bladder    • Right shoulder pain      Patient Active Problem List   Diagnosis   • Complete tear of right rotator cuff   • Stage 3a chronic kidney disease (HCC)   • Cough   • Gastroesophageal reflux disease   • Mixed hyperlipidemia   • Shoulder pain   • Hypertension   • Acromioclavicular joint arthritis   • Impingement syndrome, shoulder   • Complete tear of left rotator cuff   • Urge incontinence   • Atopic rhinitis   • Upper respiratory tract infection   • Bilateral lower extremity edema   • Hypercalcemia   • Right knee pain   • DADA (stress urinary incontinence, female)   • Obesity (BMI 35.0-39.9 without comorbidity)   • Left knee injury   • Acute UTI   • COVID-19 virus infection   • Skin lesion of scalp   • Numbness in left leg       Social History     Tobacco Use   • Smoking status: Former Smoker     Packs/day: 2.00     Years: 20.00     Pack years: 40.00     Types: Cigarettes     Quit date:      Years since quittin.5   • Smokeless tobacco: Never Used   Vaping Use   • Vaping Use: Never used   Substance Use Topics   • Alcohol use: No     Comment: s/p    • Drug use: No       Allergies   Allergen Reactions   • Codeine Other (See Comments)     hyperventilate   • Sulfa Antibiotics Rash       Current Outpatient Medications on File Prior to Visit   Medication Sig   • alendronate (FOSAMAX) 70 MG tablet Take 1 tablet by mouth 1 (One) Time Per Week.   • atorvastatin (LIPITOR) 20 MG tablet Take 1 tablet by mouth once daily   • carvedilol (COREG) 25 MG tablet Take 25 mg by mouth 2 (Two) Times a Day With Meals.   • Cholecalciferol (VITAMIN D PO) Take 1,000 Units by mouth daily.   • Docusate Calcium (STOOL SOFTENER PO) Take 1 tablet by mouth Daily.   • Fexofenadine HCl (ALLEGRA PO) Take 180 mg by mouth daily as needed.    • furosemide (LASIX) 20 MG tablet Take 1 tablet by mouth Every Other Day.   • gabapentin (NEURONTIN) 100 MG capsule Take 1 capsule by mouth twice daily   • hydrALAZINE (APRESOLINE) 100 MG tablet Take 100 mg by mouth 2 (Two) Times a Day.   • losartan (COZAAR) 100 MG tablet Take 100 mg by mouth Daily.   • omeprazole OTC (PriLOSEC OTC) 20 MG EC tablet Take 20 mg by mouth daily.   • oxybutynin XL (DITROPAN-XL) 10 MG 24 hr tablet Take 1 tablet by mouth Daily.   • Probiotic Product (PROBIOTIC COLON SUPPORT PO) Take  by mouth daily.   • spironolactone (ALDACTONE) 25 MG tablet Take 25 mg by mouth Daily.     No current facility-administered medications on file prior to visit.         The following portions of the patient's history were reviewed and updated as appropriate: allergies, current medications, past family history, past medical history, past social history, past surgical history and problem list.    Review of Systems   Constitutional: Negative.   HENT: Negative.  Negative for congestion.    Eyes: Negative.    Cardiovascular: Negative.  Negative for chest pain, cyanosis, dyspnea on exertion, irregular heartbeat, leg swelling, near-syncope, orthopnea, palpitations, paroxysmal nocturnal dyspnea and syncope.   Respiratory: Negative.  Negative for shortness of breath.    Hematologic/Lymphatic: Negative.    Musculoskeletal: Negative.    Gastrointestinal: Negative.    Neurological: Positive for numbness and paresthesias. Negative for headaches.          Objective:     /80   Pulse 62   Wt 80 kg (176 lb 6.4 oz)   SpO2 96%   BMI 35.63 kg/m²   Pulmonary:      Effort: Pulmonary effort is normal.      Breath sounds: Normal breath sounds. No stridor. No wheezing. No rhonchi. No rales.   Cardiovascular:      PMI at left midclavicular line. Normal rate. Regular rhythm. Normal S1. Normal S2.      Murmurs: There is no murmur.      No gallop. No click. No rub.   Pulses:     Carotid: 3+ bilaterally.     Radial: 3+  bilaterally.     Femoral: 3+ bilaterally.     Popliteal: 2+ bilaterally.     Dorsalis pedis: 4+ bilaterally.     Posterior tibial: 4+ bilaterally.  Edema:     Peripheral edema absent.           Lab Review  Lab Results   Component Value Date     04/28/2022    K 3.5 04/28/2022     04/28/2022    BUN 15 04/28/2022    CREATININE 1.08 (H) 04/28/2022    GLUCOSE 88 04/28/2022    CALCIUM 10.0 04/28/2022    ALT 13 04/04/2022    ALKPHOS 82 04/04/2022    LABIL2 1.6 06/08/2016     Lab Results   Component Value Date    CKTOTAL 131 04/04/2022     Lab Results   Component Value Date    WBC 6.71 04/04/2022    HGB 12.8 04/04/2022    HCT 40.1 04/04/2022     04/04/2022     Lab Results   Component Value Date    INR 1.97 12/11/2014    INR 2.02 12/08/2014    INR 2.46 12/04/2014     Lab Results   Component Value Date    MG 1.8 12/31/2020     Lab Results   Component Value Date    TSH 1.950 09/14/2018     No results found for: BNP  Lab Results   Component Value Date    CHLPL 133 06/08/2016    CHOL 138 04/04/2022    TRIG 97 04/04/2022    HDL 45 04/04/2022    VLDL 18 04/04/2022    LDLHDL 1.64 04/04/2022     Lab Results   Component Value Date    LDL 75 04/04/2022    LDL 56 09/24/2021       Procedures       I personally viewed and interpreted the patient's LAB data         Assessment:     1. Mixed hyperlipidemia    2. Primary hypertension    3. Obesity (BMI 35.0-39.9 without comorbidity)    4. Numbness in left leg          Plan:     Hyperlipidemia  Lab work 3 months ago showed normal lipids with LDL of 75.  She was advised to continue Lipitor.  Lab work scheduled for next visit.  Healthy lifestyle and weight loss emphasized.    Hypertension  Blood pressure is very well controlled she will continue hydralazine Lasix, Aldactone, losartan and Coreg    Weight loss was emphasized.  Patient complains of numbness of the left lower extremity.  No neurological deficit found.  She was advised to continue Neurontin.  MRI of lumbar spine  and CT of the head without contrast was scheduled.  Peripheral pulses are normal    Follow-up scheduled        No follow-ups on file.

## 2022-07-27 DIAGNOSIS — E78.2 MIXED HYPERLIPIDEMIA: ICD-10-CM

## 2022-07-27 RX ORDER — GABAPENTIN 100 MG/1
CAPSULE ORAL
Qty: 60 CAPSULE | Refills: 0 | Status: SHIPPED | OUTPATIENT
Start: 2022-07-27 | End: 2022-08-29

## 2022-08-02 ENCOUNTER — HOSPITAL ENCOUNTER (OUTPATIENT)
Dept: MRI IMAGING | Facility: HOSPITAL | Age: 82
Discharge: HOME | End: 2022-08-02

## 2022-08-02 ENCOUNTER — HOSPITAL ENCOUNTER (OUTPATIENT)
Dept: CT IMAGING | Facility: HOSPITAL | Age: 82
Discharge: HOME | End: 2022-08-02

## 2022-08-02 DIAGNOSIS — R20.0 NUMBNESS IN LEFT LEG: ICD-10-CM

## 2022-08-02 DIAGNOSIS — R42 DIZZINESS: ICD-10-CM

## 2022-08-02 PROCEDURE — 70450 CT HEAD/BRAIN W/O DYE: CPT

## 2022-08-02 PROCEDURE — 72148 MRI LUMBAR SPINE W/O DYE: CPT | Performed by: RADIOLOGY

## 2022-08-02 PROCEDURE — 70450 CT HEAD/BRAIN W/O DYE: CPT | Performed by: RADIOLOGY

## 2022-08-02 PROCEDURE — 72148 MRI LUMBAR SPINE W/O DYE: CPT

## 2022-08-12 ENCOUNTER — TELEPHONE (OUTPATIENT)
Dept: CARDIOLOGY | Facility: CLINIC | Age: 82
End: 2022-08-12

## 2022-08-12 NOTE — TELEPHONE ENCOUNTER
Contacted patient with results. Patient verbalized understanding and stated that she did not wish to see a neurosurgeon at this time.

## 2022-08-12 NOTE — TELEPHONE ENCOUNTER
----- Message from Jennifer Montano MD sent at 8/6/2022  4:51 PM EDT -----  head scan shows age-related changes but no other abnormality    MRI of lumbar spine shows no disc herniation however degenerative spinal changes and spinal stenosis noted, recommend evaluation by neurosurgeon, jose Cates

## 2022-08-29 DIAGNOSIS — E78.2 MIXED HYPERLIPIDEMIA: ICD-10-CM

## 2022-08-29 RX ORDER — GABAPENTIN 100 MG/1
CAPSULE ORAL
Qty: 60 CAPSULE | Refills: 0 | Status: SHIPPED | OUTPATIENT
Start: 2022-08-29 | End: 2022-09-22

## 2022-09-19 ENCOUNTER — TRANSCRIBE ORDERS (OUTPATIENT)
Dept: ADMINISTRATIVE | Facility: HOSPITAL | Age: 82
End: 2022-09-19

## 2022-09-19 ENCOUNTER — LAB (OUTPATIENT)
Dept: LAB | Facility: HOSPITAL | Age: 82
End: 2022-09-19

## 2022-09-19 DIAGNOSIS — N18.31 CKD STAGE G3A/A1, GFR 45-59 AND ALBUMIN CREATININE RATIO <30 MG/G: ICD-10-CM

## 2022-09-19 DIAGNOSIS — N18.31 CKD STAGE G3A/A1, GFR 45-59 AND ALBUMIN CREATININE RATIO <30 MG/G: Primary | ICD-10-CM

## 2022-09-19 LAB
ALBUMIN UR-MCNC: 3.1 MG/DL
ANION GAP SERPL CALCULATED.3IONS-SCNC: 15.3 MMOL/L (ref 5–15)
BUN SERPL-MCNC: 25 MG/DL (ref 8–23)
BUN/CREAT SERPL: 14.8 (ref 7–25)
CALCIUM SPEC-SCNC: 9.6 MG/DL (ref 8.6–10.5)
CHLORIDE SERPL-SCNC: 102 MMOL/L (ref 98–107)
CO2 SERPL-SCNC: 24.7 MMOL/L (ref 22–29)
CREAT SERPL-MCNC: 1.69 MG/DL (ref 0.57–1)
CREAT UR-MCNC: 164.1 MG/DL
EGFRCR SERPLBLD CKD-EPI 2021: 30.4 ML/MIN/1.73
GLUCOSE SERPL-MCNC: 80 MG/DL (ref 65–99)
MICROALBUMIN/CREAT UR: 18.9 MG/G
POTASSIUM SERPL-SCNC: 3.8 MMOL/L (ref 3.5–5.2)
SODIUM SERPL-SCNC: 142 MMOL/L (ref 136–145)

## 2022-09-19 PROCEDURE — 80048 BASIC METABOLIC PNL TOTAL CA: CPT

## 2022-09-19 PROCEDURE — 82043 UR ALBUMIN QUANTITATIVE: CPT

## 2022-09-19 PROCEDURE — 82570 ASSAY OF URINE CREATININE: CPT

## 2022-09-19 PROCEDURE — 36415 COLL VENOUS BLD VENIPUNCTURE: CPT

## 2022-09-19 RX ORDER — ALENDRONATE SODIUM 70 MG/1
TABLET ORAL
Qty: 12 TABLET | Refills: 0 | Status: SHIPPED | OUTPATIENT
Start: 2022-09-19 | End: 2022-10-18 | Stop reason: ALTCHOICE

## 2022-09-22 ENCOUNTER — TELEPHONE (OUTPATIENT)
Dept: CARDIOLOGY | Facility: CLINIC | Age: 82
End: 2022-09-22

## 2022-09-22 ENCOUNTER — OFFICE VISIT (OUTPATIENT)
Dept: CARDIOLOGY | Facility: CLINIC | Age: 82
End: 2022-09-22

## 2022-09-22 VITALS
BODY MASS INDEX: 35.48 KG/M2 | OXYGEN SATURATION: 96 % | WEIGHT: 176 LBS | HEIGHT: 59 IN | HEART RATE: 65 BPM | SYSTOLIC BLOOD PRESSURE: 116 MMHG | DIASTOLIC BLOOD PRESSURE: 58 MMHG

## 2022-09-22 DIAGNOSIS — E78.2 MIXED HYPERLIPIDEMIA: ICD-10-CM

## 2022-09-22 DIAGNOSIS — T07.XXXA MULTIPLE BRUISES: Primary | ICD-10-CM

## 2022-09-22 DIAGNOSIS — R60.0 BILATERAL LOWER EXTREMITY EDEMA: ICD-10-CM

## 2022-09-22 DIAGNOSIS — I10 PRIMARY HYPERTENSION: Chronic | ICD-10-CM

## 2022-09-22 PROCEDURE — 99214 OFFICE O/P EST MOD 30 MIN: CPT | Performed by: INTERNAL MEDICINE

## 2022-09-22 RX ORDER — DILTIAZEM HYDROCHLORIDE 180 MG/1
180 CAPSULE, EXTENDED RELEASE ORAL DAILY
COMMUNITY
Start: 2022-08-25 | End: 2022-09-22

## 2022-09-22 RX ORDER — TRIAMCINOLONE ACETONIDE 1 MG/G
CREAM TOPICAL
COMMUNITY
Start: 2022-07-11 | End: 2022-10-18

## 2022-09-22 NOTE — PROGRESS NOTES
subjective     Chief Complaint   Patient presents with   • LE bruising     Bilateral bruising bilateral LE with blisters x 2 days     History of Present Illness  Emma is a 81 years old white female who is here complaining of multiple blisters on the lower extremity for last 2 days.  Blisters have busted and at this time there is just reddish edematous areas multiple bruising all over both legs.  Patient states that he started with the Cardizem.  According to her she had similar problem with the Cardizem before and with recent start of Cardizem she has developed more of these lesions.  Dr. Nash had sent her to dermatologist who felt it was just old age and not medication effect patient however feel there is a medicine causing it.  She has been taking Bactroban through dermatologist.  Other potential cause could be Neurontin.    Patient otherwise doing good blood pressure is well controlled.    Past Surgical History:   Procedure Laterality Date   • APPENDECTOMY     • BUNIONECTOMY Right    • CARDIOVASCULAR STRESS TEST  10/2012   • CATARACT EXTRACTION  2017   • CHOLECYSTECTOMY     • COLONOSCOPY  2011   • ECHO - CONVERTED  10/2012   • JOINT REPLACEMENT      bilateral knees    • KNEE ARTHROPLASTY Left    • KNEE ARTHROSCOPY     • SHOULDER ARTHROSCOPY Left 7/28/2016    Procedure: LEFT SHOULDER ACROMIOPLASTY,MINI OPEN ROTATOR CUFF REPAIR;  Surgeon: Carlos Eduardo Smith MD;  Location: [facility];  Service:    • SHOULDER SURGERY  05/31/2016    OPEN ACROMICPLASTY RIGHT SHOULDER     Family History   Problem Relation Age of Onset   • Heart disease Other    • Stroke Sister    • Diabetes Mother    • Heart failure Mother    • Heart disease Mother    • Heart attack Mother    • Hypertension Father    • Emphysema Father    • Heart disease Father    • No Known Problems Brother    • Cancer Sister    • Kidney disease Sister    • Heart failure Sister    • Diabetes Brother    • Diabetes Brother    • Diabetes Brother      Past Medical  History:   Diagnosis Date   • GERD (gastroesophageal reflux disease)    • Hyperlipidemia    • Hypertension    • Left shoulder pain    • Obesity    • Osteoarthritis of knees, bilateral    • Overactive bladder    • Right shoulder pain      Patient Active Problem List   Diagnosis   • Complete tear of right rotator cuff   • Stage 3a chronic kidney disease (HCC)   • Cough   • Gastroesophageal reflux disease   • Mixed hyperlipidemia   • Shoulder pain   • Hypertension   • Acromioclavicular joint arthritis   • Impingement syndrome, shoulder   • Complete tear of left rotator cuff   • Urge incontinence   • Atopic rhinitis   • Upper respiratory tract infection   • Bilateral lower extremity edema   • Hypercalcemia   • Right knee pain   • DADA (stress urinary incontinence, female)   • Obesity (BMI 35.0-39.9 without comorbidity)   • Left knee injury   • Acute UTI   • COVID-19 virus infection   • Skin lesion of scalp   • Numbness in left leg   • Multiple bruises       Social History     Tobacco Use   • Smoking status: Former Smoker     Packs/day: 2.00     Years: 20.00     Pack years: 40.00     Types: Cigarettes     Quit date:      Years since quittin.7   • Smokeless tobacco: Never Used   Vaping Use   • Vaping Use: Never used   Substance Use Topics   • Alcohol use: No     Comment: s/p    • Drug use: No       Allergies   Allergen Reactions   • Codeine Other (See Comments)     hyperventilate   • Sulfa Antibiotics Rash       Current Outpatient Medications on File Prior to Visit   Medication Sig   • alendronate (FOSAMAX) 70 MG tablet Take 1 tablet by mouth once a week   • atorvastatin (LIPITOR) 20 MG tablet Take 1 tablet by mouth once daily   • carvedilol (COREG) 25 MG tablet Take 25 mg by mouth 2 (Two) Times a Day With Meals.   • Cholecalciferol (VITAMIN D PO) Take 1,000 Units by mouth daily.   • Docusate Calcium (STOOL SOFTENER PO) Take 1 tablet by mouth Daily.   • Fexofenadine HCl (ALLEGRA PO) Take 180 mg by mouth daily  "as needed.   • furosemide (LASIX) 20 MG tablet Take 1 tablet by mouth Every Other Day.   • hydrALAZINE (APRESOLINE) 100 MG tablet Take 100 mg by mouth 2 (Two) Times a Day.   • losartan (COZAAR) 100 MG tablet Take 100 mg by mouth Daily.   • mupirocin (BACTROBAN) 2 % ointment APPLY TO SCALP AT BEDTIME   • omeprazole OTC (PriLOSEC OTC) 20 MG EC tablet Take 20 mg by mouth daily.   • oxybutynin XL (DITROPAN-XL) 10 MG 24 hr tablet Take 1 tablet by mouth Daily.   • Probiotic Product (PROBIOTIC COLON SUPPORT PO) Take  by mouth daily.   • spironolactone (ALDACTONE) 25 MG tablet Take 25 mg by mouth Daily.   • triamcinolone (KENALOG) 0.1 % cream MIX WITH 16 OZ. CETAPHIL AND APPLY NECK TO FEET TWICE DAILY   • [DISCONTINUED] Dilt- MG 24 hr capsule Take 180 mg by mouth Daily.   • [DISCONTINUED] gabapentin (NEURONTIN) 100 MG capsule Take 1 capsule by mouth twice daily     No current facility-administered medications on file prior to visit.         The following portions of the patient's history were reviewed and updated as appropriate: allergies, current medications, past family history, past medical history, past social history, past surgical history and problem list.    Review of Systems   Constitutional: Negative.   HENT: Negative.  Negative for congestion.    Eyes: Negative.    Cardiovascular: Negative.  Negative for chest pain, cyanosis, dyspnea on exertion, irregular heartbeat, leg swelling, near-syncope, orthopnea, palpitations, paroxysmal nocturnal dyspnea and syncope.   Respiratory: Negative.  Negative for shortness of breath.    Hematologic/Lymphatic: Negative.    Skin:        Blisters and bruises lower extremity   Musculoskeletal: Negative.    Gastrointestinal: Negative.    Neurological: Negative.  Negative for headaches.          Objective:     /58 (BP Location: Left arm, Patient Position: Sitting, Cuff Size: Adult)   Pulse 65   Ht 149.9 cm (59\")   Wt 79.8 kg (176 lb)   SpO2 96%   BMI 35.55 kg/m² "   Pulmonary:      Effort: Pulmonary effort is normal.      Breath sounds: Normal breath sounds. No stridor. No wheezing. No rhonchi. No rales.   Cardiovascular:      PMI at left midclavicular line. Normal rate. Regular rhythm. Normal S1. Normal S2.      Murmurs: There is no murmur.      No gallop. No click. No rub.   Pulses:     Intact distal pulses.   Edema:     Peripheral edema absent.   Skin:     General: Skin is warm and dry.      Comments: Multiple bruises and collapsed blisters .           Lab Review  Lab Results   Component Value Date     09/19/2022    K 3.8 09/19/2022     09/19/2022    BUN 25 (H) 09/19/2022    CREATININE 1.69 (H) 09/19/2022    GLUCOSE 80 09/19/2022    CALCIUM 9.6 09/19/2022    ALT 13 04/04/2022    ALKPHOS 82 04/04/2022    LABIL2 1.6 06/08/2016     Lab Results   Component Value Date    CKTOTAL 131 04/04/2022     Lab Results   Component Value Date    WBC 6.71 04/04/2022    HGB 12.8 04/04/2022    HCT 40.1 04/04/2022     04/04/2022     Lab Results   Component Value Date    INR 1.97 12/11/2014    INR 2.02 12/08/2014    INR 2.46 12/04/2014     Lab Results   Component Value Date    MG 1.8 12/31/2020     Lab Results   Component Value Date    TSH 1.950 09/14/2018     No results found for: BNP  Lab Results   Component Value Date    CHLPL 133 06/08/2016    CHOL 138 04/04/2022    TRIG 97 04/04/2022    HDL 45 04/04/2022    VLDL 18 04/04/2022    LDLHDL 1.64 04/04/2022     Lab Results   Component Value Date    LDL 75 04/04/2022    LDL 56 09/24/2021       Procedures       I personally viewed and interpreted the patient's LAB data         Assessment:     1. Multiple bruises    2. Primary hypertension    3. Mixed hyperlipidemia    4. Bilateral lower extremity edema          Plan:     Patient has multiple blisters bruises on both lower extremities.  According to her it were blisters which busted and used fluids.  According to her started with diltiazem.    Patient is taking Neurontin and  that could be causing blisters like erythema multiforme or Mares-Russ syndrome however these are very localized and lower extremities and probably unrelated to just lower extremity edema.  She was advised discontinue Neurontin and discontinue Cardizem.  Bactroban could cause blisters if she is allergic to sulfa drugs  She was advised to hold Bactroban application on lower extremities.    Blood pressure goes high she will increase hydralazine 3 times a day.  she will call back and update with her status.  Follow-up scheduled        No follow-ups on file.

## 2022-09-22 NOTE — TELEPHONE ENCOUNTER
Patient c/o blisters on left leg and purple places on right leg but not blisters yet, wants to come in.

## 2022-09-29 ENCOUNTER — TRANSCRIBE ORDERS (OUTPATIENT)
Dept: ADMINISTRATIVE | Facility: HOSPITAL | Age: 82
End: 2022-09-29

## 2022-09-29 DIAGNOSIS — N17.9 ACUTE RENAL FAILURE, UNSPECIFIED ACUTE RENAL FAILURE TYPE: ICD-10-CM

## 2022-09-29 DIAGNOSIS — N13.9 ACUTE UNILATERAL OBSTRUCTIVE UROPATHY: Primary | ICD-10-CM

## 2022-10-10 ENCOUNTER — TRANSCRIBE ORDERS (OUTPATIENT)
Dept: ADMINISTRATIVE | Facility: HOSPITAL | Age: 82
End: 2022-10-10

## 2022-10-10 ENCOUNTER — LAB (OUTPATIENT)
Dept: LAB | Facility: HOSPITAL | Age: 82
End: 2022-10-10

## 2022-10-10 DIAGNOSIS — N17.9 ACUTE RENAL FAILURE, UNSPECIFIED ACUTE RENAL FAILURE TYPE: Primary | ICD-10-CM

## 2022-10-10 DIAGNOSIS — N17.9 ACUTE RENAL FAILURE, UNSPECIFIED ACUTE RENAL FAILURE TYPE: ICD-10-CM

## 2022-10-10 LAB
ANION GAP SERPL CALCULATED.3IONS-SCNC: 9.6 MMOL/L (ref 5–15)
BACTERIA UR QL AUTO: ABNORMAL /HPF
BILIRUB UR QL STRIP: NEGATIVE
BUN SERPL-MCNC: 18 MG/DL (ref 8–23)
BUN/CREAT SERPL: 17.1 (ref 7–25)
CALCIUM SPEC-SCNC: 9.6 MG/DL (ref 8.6–10.5)
CHLORIDE SERPL-SCNC: 103 MMOL/L (ref 98–107)
CLARITY UR: CLEAR
CO2 SERPL-SCNC: 26.4 MMOL/L (ref 22–29)
COLOR UR: YELLOW
CREAT SERPL-MCNC: 1.05 MG/DL (ref 0.57–1)
EGFRCR SERPLBLD CKD-EPI 2021: 53.8 ML/MIN/1.73
GLUCOSE SERPL-MCNC: 94 MG/DL (ref 65–99)
GLUCOSE UR STRIP-MCNC: NEGATIVE MG/DL
HGB UR QL STRIP.AUTO: NEGATIVE
HYALINE CASTS UR QL AUTO: ABNORMAL /LPF
KETONES UR QL STRIP: NEGATIVE
LEUKOCYTE ESTERASE UR QL STRIP.AUTO: ABNORMAL
NITRITE UR QL STRIP: POSITIVE
PH UR STRIP.AUTO: 6 [PH] (ref 5–8)
POTASSIUM SERPL-SCNC: 3.4 MMOL/L (ref 3.5–5.2)
PROT UR QL STRIP: NEGATIVE
RBC # UR STRIP: ABNORMAL /HPF
REF LAB TEST METHOD: ABNORMAL
SODIUM SERPL-SCNC: 139 MMOL/L (ref 136–145)
SP GR UR STRIP: 1.02 (ref 1–1.03)
SQUAMOUS #/AREA URNS HPF: ABNORMAL /HPF
UROBILINOGEN UR QL STRIP: ABNORMAL
WBC # UR STRIP: ABNORMAL /HPF

## 2022-10-10 PROCEDURE — 81001 URINALYSIS AUTO W/SCOPE: CPT

## 2022-10-10 PROCEDURE — 80048 BASIC METABOLIC PNL TOTAL CA: CPT

## 2022-10-10 PROCEDURE — 36415 COLL VENOUS BLD VENIPUNCTURE: CPT

## 2022-10-18 ENCOUNTER — OFFICE VISIT (OUTPATIENT)
Dept: CARDIOLOGY | Facility: CLINIC | Age: 82
End: 2022-10-18

## 2022-10-18 VITALS
OXYGEN SATURATION: 97 % | SYSTOLIC BLOOD PRESSURE: 172 MMHG | HEIGHT: 59 IN | HEART RATE: 69 BPM | DIASTOLIC BLOOD PRESSURE: 72 MMHG | WEIGHT: 175 LBS | BODY MASS INDEX: 35.28 KG/M2

## 2022-10-18 DIAGNOSIS — E55.9 VITAMIN D DEFICIENCY: ICD-10-CM

## 2022-10-18 DIAGNOSIS — I10 PRIMARY HYPERTENSION: Primary | Chronic | ICD-10-CM

## 2022-10-18 DIAGNOSIS — E78.2 MIXED HYPERLIPIDEMIA: ICD-10-CM

## 2022-10-18 DIAGNOSIS — S81.811A LACERATION OF RIGHT LOWER LEG, INITIAL ENCOUNTER: ICD-10-CM

## 2022-10-18 PROCEDURE — 99214 OFFICE O/P EST MOD 30 MIN: CPT | Performed by: INTERNAL MEDICINE

## 2022-10-24 ENCOUNTER — HOSPITAL ENCOUNTER (OUTPATIENT)
Dept: ULTRASOUND IMAGING | Facility: HOSPITAL | Age: 82
Discharge: HOME | End: 2022-10-24

## 2022-10-24 DIAGNOSIS — N13.9 ACUTE UNILATERAL OBSTRUCTIVE UROPATHY: ICD-10-CM

## 2022-10-24 DIAGNOSIS — N17.9 ACUTE RENAL FAILURE, UNSPECIFIED ACUTE RENAL FAILURE TYPE: ICD-10-CM

## 2022-10-24 PROBLEM — S81.811A LACERATION OF RIGHT LOWER LEG: Status: ACTIVE | Noted: 2022-10-24

## 2022-10-24 PROBLEM — E55.9 VITAMIN D DEFICIENCY: Status: ACTIVE | Noted: 2022-10-24

## 2022-10-24 PROCEDURE — 76775 US EXAM ABDO BACK WALL LIM: CPT

## 2022-10-24 PROCEDURE — 76857 US EXAM PELVIC LIMITED: CPT | Performed by: RADIOLOGY

## 2022-10-24 PROCEDURE — 76775 US EXAM ABDO BACK WALL LIM: CPT | Performed by: RADIOLOGY

## 2022-10-24 PROCEDURE — 76857 US EXAM PELVIC LIMITED: CPT

## 2022-10-24 RX ORDER — CEFDINIR 300 MG/1
300 CAPSULE ORAL 2 TIMES DAILY
Qty: 14 CAPSULE | Refills: 0 | OUTPATIENT
Start: 2022-10-24 | End: 2022-10-25 | Stop reason: SDUPTHER

## 2022-10-24 NOTE — PROGRESS NOTES
subjective     Chief Complaint   Patient presents with   • Hypertension     Follow up    • Fall     Right leg injury      History of Present Illness    Emma is 81 years old white female who is here for follow-up.  She states that she tripped on her 's shoes and fell she had the right shin and had 2 different areas of laceration on the shin.    Blood pressure is running slightly high but she states that she did not take any medications.  She is supposed be taking Aldactone 25 mg daily, hydralazine 100 mg twice daily and Coreg 25 twice daily    She also has vitamin D deficiency and is taking vitamin D 1000 daily.  Past Surgical History:   Procedure Laterality Date   • APPENDECTOMY     • BUNIONECTOMY Right    • CARDIOVASCULAR STRESS TEST  10/2012   • CATARACT EXTRACTION  2017   • CHOLECYSTECTOMY     • COLONOSCOPY  2011   • ECHO - CONVERTED  10/2012   • JOINT REPLACEMENT      bilateral knees    • KNEE ARTHROPLASTY Left    • KNEE ARTHROSCOPY     • SHOULDER ARTHROSCOPY Left 7/28/2016    Procedure: LEFT SHOULDER ACROMIOPLASTY,MINI OPEN ROTATOR CUFF REPAIR;  Surgeon: Carlos Eduardo Smith MD;  Location: [facility];  Service:    • SHOULDER SURGERY  05/31/2016    OPEN ACROMICPLASTY RIGHT SHOULDER     Family History   Problem Relation Age of Onset   • Heart disease Other    • Stroke Sister    • Diabetes Mother    • Heart failure Mother    • Heart disease Mother    • Heart attack Mother    • Hypertension Father    • Emphysema Father    • Heart disease Father    • No Known Problems Brother    • Cancer Sister    • Kidney disease Sister    • Heart failure Sister    • Diabetes Brother    • Diabetes Brother    • Diabetes Brother      Past Medical History:   Diagnosis Date   • GERD (gastroesophageal reflux disease)    • Hyperlipidemia    • Hypertension    • Left shoulder pain    • Obesity    • Osteoarthritis of knees, bilateral    • Overactive bladder    • Right shoulder pain      Patient Active Problem List   Diagnosis   •  Complete tear of right rotator cuff   • Stage 3a chronic kidney disease (HCC)   • Cough   • Gastroesophageal reflux disease   • Mixed hyperlipidemia   • Shoulder pain   • Hypertension   • Acromioclavicular joint arthritis   • Impingement syndrome, shoulder   • Complete tear of left rotator cuff   • Urge incontinence   • Atopic rhinitis   • Upper respiratory tract infection   • Bilateral lower extremity edema   • Hypercalcemia   • Right knee pain   • DADA (stress urinary incontinence, female)   • Obesity (BMI 35.0-39.9 without comorbidity)   • Left knee injury   • Acute UTI   • COVID-19 virus infection   • Skin lesion of scalp   • Numbness in left leg   • Multiple bruises   • Vitamin D deficiency   • Laceration of right lower leg       Social History     Tobacco Use   • Smoking status: Former     Packs/day: 2.00     Years: 20.00     Pack years: 40.00     Types: Cigarettes     Quit date:      Years since quittin.8   • Smokeless tobacco: Never   Vaping Use   • Vaping Use: Never used   Substance Use Topics   • Alcohol use: No     Comment: s/p    • Drug use: No       Allergies   Allergen Reactions   • Codeine Other (See Comments)     hyperventilate   • Sulfa Antibiotics Rash       Current Outpatient Medications on File Prior to Visit   Medication Sig   • atorvastatin (LIPITOR) 20 MG tablet Take 1 tablet by mouth once daily   • carvedilol (COREG) 25 MG tablet Take 25 mg by mouth 2 (Two) Times a Day With Meals.   • Cholecalciferol (VITAMIN D PO) Take 1,000 Units by mouth daily.   • Docusate Calcium (STOOL SOFTENER PO) Take 1 tablet by mouth Daily.   • Fexofenadine HCl (ALLEGRA PO) Take 180 mg by mouth daily as needed.   • hydrALAZINE (APRESOLINE) 100 MG tablet Take 100 mg by mouth 2 (Two) Times a Day.   • omeprazole OTC (PriLOSEC OTC) 20 MG EC tablet Take 20 mg by mouth daily.   • oxybutynin XL (DITROPAN-XL) 10 MG 24 hr tablet Take 1 tablet by mouth Daily.   • Probiotic Product (PROBIOTIC COLON SUPPORT PO) Take  " by mouth daily.   • spironolactone (ALDACTONE) 25 MG tablet Take 25 mg by mouth Daily.     No current facility-administered medications on file prior to visit.         The following portions of the patient's history were reviewed and updated as appropriate: allergies, current medications, past family history, past medical history, past social history, past surgical history and problem list.    Review of Systems   Constitutional: Negative.   HENT: Negative.  Negative for congestion.    Eyes: Negative.    Cardiovascular: Negative.  Negative for chest pain, cyanosis, dyspnea on exertion, irregular heartbeat, leg swelling, near-syncope, orthopnea, palpitations, paroxysmal nocturnal dyspnea and syncope.   Respiratory: Negative.  Negative for shortness of breath.    Hematologic/Lymphatic: Negative.    Skin:        Laceration right lower leg   Musculoskeletal: Negative.    Gastrointestinal: Negative.    Neurological: Negative.  Negative for headaches.          Objective:     /72 (BP Location: Left arm, Patient Position: Sitting, Cuff Size: Adult)   Pulse 69   Ht 149.9 cm (59\")   Wt 79.4 kg (175 lb)   SpO2 97%   BMI 35.35 kg/m²   Cardiovascular:      PMI at left midclavicular line. Normal rate. Regular rhythm. Normal S1. Normal S2.      Murmurs: There is no murmur.      No gallop. No click. No rub.   Pulses:     Intact distal pulses.   Edema:     Peripheral edema absent.   Skin:     Comments: There are 2 areas of laceration in the shin area right lower extremity, one of them skin is slightly peeled off also.  No sign of infection it looks very clean  No foreign body           Lab Review  Lab Results   Component Value Date     10/10/2022    K 3.4 (L) 10/10/2022     10/10/2022    BUN 18 10/10/2022    CREATININE 1.05 (H) 10/10/2022    GLUCOSE 94 10/10/2022    CALCIUM 9.6 10/10/2022    ALT 13 04/04/2022    ALKPHOS 82 04/04/2022    LABIL2 1.6 06/08/2016     Lab Results   Component Value Date    CKTOTAL " 131 04/04/2022     Lab Results   Component Value Date    WBC 6.71 04/04/2022    HGB 12.8 04/04/2022    HCT 40.1 04/04/2022     04/04/2022     Lab Results   Component Value Date    INR 1.97 12/11/2014    INR 2.02 12/08/2014    INR 2.46 12/04/2014     Lab Results   Component Value Date    MG 1.8 12/31/2020     Lab Results   Component Value Date    TSH 1.950 09/14/2018     No results found for: BNP  Lab Results   Component Value Date    CHLPL 133 06/08/2016    CHOL 138 04/04/2022    TRIG 97 04/04/2022    HDL 45 04/04/2022    VLDL 18 04/04/2022    LDLHDL 1.64 04/04/2022     Lab Results   Component Value Date    LDL 75 04/04/2022    LDL 56 09/24/2021       Procedures       I personally viewed and interpreted the patient's LAB data         Assessment:     1. Primary hypertension    2. Mixed hyperlipidemia    3. Vitamin D deficiency    4. Laceration of right lower leg, initial encounter          Plan:     Blood pressure is elevated  Patient was advised to take medications regularly  She will keep a record of her blood pressure and may take extra 50 mg in the middle of the day if needed    Hyperlipidemia is very well controlled.    Vitamin D was continued lab work scheduled including vitamin D level CBC CMP and lipid panel    2 lacerations in the right lower leg.  Wound looks very clean no foreign body identified no sign of infection.  Wound was dressed with the triple antibiotic cream.  Patient was started on oral antibiotics also will be reevaluated in 1 week.        No follow-ups on file.

## 2022-10-25 ENCOUNTER — OFFICE VISIT (OUTPATIENT)
Dept: CARDIOLOGY | Facility: CLINIC | Age: 82
End: 2022-10-25

## 2022-10-25 VITALS
DIASTOLIC BLOOD PRESSURE: 70 MMHG | RESPIRATION RATE: 16 BRPM | HEART RATE: 72 BPM | OXYGEN SATURATION: 97 % | SYSTOLIC BLOOD PRESSURE: 144 MMHG | TEMPERATURE: 98.7 F

## 2022-10-25 DIAGNOSIS — S81.811D LACERATION OF RIGHT LOWER EXTREMITY, SUBSEQUENT ENCOUNTER: Primary | ICD-10-CM

## 2022-10-25 PROCEDURE — 99213 OFFICE O/P EST LOW 20 MIN: CPT | Performed by: NURSE PRACTITIONER

## 2022-10-25 RX ORDER — CEFDINIR 300 MG/1
300 CAPSULE ORAL 2 TIMES DAILY
Qty: 14 CAPSULE | Refills: 0 | Status: SHIPPED | OUTPATIENT
Start: 2022-10-25 | End: 2022-11-03

## 2022-10-26 NOTE — PROGRESS NOTES
Subjective   Emma Ryan is a 81 y.o. female.   No chief complaint on file.     History of Present Illness   Emma Ryan is an 81-year-old female who presents to clinic today for follow-up.  She is accompanied by her  Bob.    She received a laceration to her right lower extremity after falling over her 's shoes about a week ago.  She was seen and evaluated in clinic by Dr. Montano.  Dressing was placed with appropriate wound care instructions and patient was started prophylactically on Omnicef which she reports compliance with and taken her last day of antibiotics today.  She denies fever.  She has noticed a little discoloration around her ankle.  She has had mild swelling in her lower extremity.  She has been trying to rest and elevate her leg. She has not been changing the bandage on her wound.      The following portions of the patient's history were reviewed and updated as appropriate: allergies, current medications, past family history, past medical history, past social history, past surgical history and problem list.    Current Outpatient Medications:   •  cefdinir (OMNICEF) 300 MG capsule, Take 1 capsule by mouth 2 (Two) Times a Day., Disp: 14 capsule, Rfl: 0  •  atorvastatin (LIPITOR) 20 MG tablet, Take 1 tablet by mouth once daily, Disp: 90 tablet, Rfl: 0  •  carvedilol (COREG) 25 MG tablet, Take 25 mg by mouth 2 (Two) Times a Day With Meals., Disp: , Rfl:   •  Cholecalciferol (VITAMIN D PO), Take 1,000 Units by mouth daily., Disp: , Rfl:   •  Docusate Calcium (STOOL SOFTENER PO), Take 1 tablet by mouth Daily., Disp: , Rfl:   •  Fexofenadine HCl (ALLEGRA PO), Take 180 mg by mouth daily as needed., Disp: , Rfl:   •  hydrALAZINE (APRESOLINE) 100 MG tablet, Take 100 mg by mouth 2 (Two) Times a Day., Disp: , Rfl:   •  omeprazole OTC (PriLOSEC OTC) 20 MG EC tablet, Take 20 mg by mouth daily., Disp: , Rfl:   •  oxybutynin XL (DITROPAN-XL) 10 MG 24 hr tablet, Take 1 tablet by mouth Daily.,  Disp: 90 tablet, Rfl: 1  •  Probiotic Product (PROBIOTIC COLON SUPPORT PO), Take  by mouth daily., Disp: , Rfl:   •  spironolactone (ALDACTONE) 25 MG tablet, Take 25 mg by mouth Daily., Disp: , Rfl:     Review of Systems   Constitutional: Negative for activity change, appetite change, chills, diaphoresis, fatigue and fever.   HENT: Negative for congestion, drooling, ear discharge, ear pain, mouth sores, nosebleeds, postnasal drip, rhinorrhea, sinus pressure, sneezing and sore throat.    Eyes: Negative for pain, discharge and visual disturbance.   Respiratory: Negative for cough, chest tightness, shortness of breath and wheezing.    Cardiovascular: Negative for chest pain, palpitations and leg swelling.   Gastrointestinal: Negative for abdominal pain, constipation, diarrhea, nausea and vomiting.   Endocrine: Negative for cold intolerance, heat intolerance, polydipsia, polyphagia and polyuria.   Musculoskeletal: Negative for arthralgias, myalgias and neck pain.   Skin: Positive for color change and wound. Negative for rash.   Neurological: Negative for syncope, speech difficulty, weakness, light-headedness and headaches.   Hematological: Negative for adenopathy. Does not bruise/bleed easily.   Psychiatric/Behavioral: Negative for confusion, dysphoric mood and sleep disturbance. The patient is not nervous/anxious.    All other systems reviewed and are negative.    Patient Active Problem List   Diagnosis   • Complete tear of right rotator cuff   • Stage 3a chronic kidney disease (HCC)   • Cough   • Gastroesophageal reflux disease   • Mixed hyperlipidemia   • Shoulder pain   • Hypertension   • Acromioclavicular joint arthritis   • Impingement syndrome, shoulder   • Complete tear of left rotator cuff   • Urge incontinence   • Atopic rhinitis   • Upper respiratory tract infection   • Bilateral lower extremity edema   • Hypercalcemia   • Right knee pain   • DADA (stress urinary incontinence, female)   • Obesity (BMI  35.0-39.9 without comorbidity)   • Left knee injury   • Acute UTI   • COVID-19 virus infection   • Skin lesion of scalp   • Numbness in left leg   • Multiple bruises   • Vitamin D deficiency   • Laceration of right lower leg     Past Medical History:   Diagnosis Date   • GERD (gastroesophageal reflux disease)    • Hyperlipidemia    • Hypertension    • Left shoulder pain    • Obesity    • Osteoarthritis of knees, bilateral    • Overactive bladder    • Right shoulder pain      Past Surgical History:   Procedure Laterality Date   • APPENDECTOMY     • BUNIONECTOMY Right    • CARDIOVASCULAR STRESS TEST  10/2012   • CATARACT EXTRACTION  2017   • CHOLECYSTECTOMY     • COLONOSCOPY  2011   • ECHO - CONVERTED  10/2012   • JOINT REPLACEMENT      bilateral knees    • KNEE ARTHROPLASTY Left    • KNEE ARTHROSCOPY     • SHOULDER ARTHROSCOPY Left 7/28/2016    Procedure: LEFT SHOULDER ACROMIOPLASTY,MINI OPEN ROTATOR CUFF REPAIR;  Surgeon: Carlos Eduardo Smith MD;  Location: [facility];  Service:    • SHOULDER SURGERY  05/31/2016    OPEN ACROMICPLASTY RIGHT SHOULDER     /70 (BP Location: Left arm, Patient Position: Sitting, Cuff Size: Large Adult)   Pulse 72   Temp 98.7 °F (37.1 °C) (Temporal)   Resp 16   SpO2 97% Comment: room air    Objective   Allergies   Allergen Reactions   • Codeine Other (See Comments)     hyperventilate   • Sulfa Antibiotics Rash       Physical Exam  Vitals reviewed.   Constitutional:       Appearance: Normal appearance. She is well-developed.   HENT:      Head: Normocephalic.   Eyes:      Conjunctiva/sclera: Conjunctivae normal.   Cardiovascular:      Rate and Rhythm: Normal rate and regular rhythm.   Pulmonary:      Effort: Pulmonary effort is normal.      Breath sounds: Normal breath sounds.   Musculoskeletal:      Right lower leg: No edema.      Left lower leg: No edema.   Skin:            Comments: She has 2 lacerations on her right lower extremity shin.  There is no necrotic skin noted.  The  upper laceration appears to be healing well.  The lower laceration has approximately a golf ball sized area of irritation/redness noted without exudate.  She has distal to the lacerations some very diffuse pinkish discoloration of her skin.  The area is not warm to touch.  She does have very mild swelling below the bandage and above her shoe.  The area of discoloration was outlined.  The area of discoloration is not circumferential.   Neurological:      Mental Status: She is alert and oriented to person, place, and time.   Psychiatric:         Attention and Perception: Attention normal.         Mood and Affect: Mood normal.         Speech: Speech normal.         Behavior: Behavior normal. Behavior is cooperative.         Cognition and Memory: Cognition normal.         Assessment & Plan   Diagnoses and all orders for this visit:    1. Laceration of right lower extremity, subsequent encounter (Primary)  -     Ambulatory Referral to Wound Clinic  Bandage was removed.  New dressing was placed.  Referral to wound clinic was initiated.  We will continue an additional 7 days of Omnicef.  She will monitor for signs and symptoms of infection.  We will monitor the area of discoloration for worsening.  If she has fever or other symptoms she is asked to contact the clinic or be urgently evaluated.  She expresses understanding.     Other orders  -     cefdinir (OMNICEF) 300 MG capsule; Take 1 capsule by mouth 2 (Two) Times a Day.  Dispense: 14 capsule; Refill: 0      Follow-up in 1 week with Dr. Montano, sooner if needed.

## 2022-10-27 ENCOUNTER — HOSPITAL ENCOUNTER (OUTPATIENT)
Dept: WOUND CARE | Facility: HOSPITAL | Age: 82
Discharge: HOME | End: 2022-10-27
Admitting: NURSE PRACTITIONER

## 2022-10-27 VITALS
HEART RATE: 73 BPM | RESPIRATION RATE: 18 BRPM | DIASTOLIC BLOOD PRESSURE: 84 MMHG | SYSTOLIC BLOOD PRESSURE: 152 MMHG | TEMPERATURE: 98.3 F

## 2022-10-27 DIAGNOSIS — S81.811D LACERATION OF RIGHT LOWER LEG, SUBSEQUENT ENCOUNTER: Primary | ICD-10-CM

## 2022-10-27 PROCEDURE — 87205 SMEAR GRAM STAIN: CPT | Performed by: SURGERY

## 2022-10-27 PROCEDURE — 97602 WOUND(S) CARE NON-SELECTIVE: CPT

## 2022-10-27 PROCEDURE — 87070 CULTURE OTHR SPECIMN AEROBIC: CPT | Performed by: SURGERY

## 2022-10-27 PROCEDURE — G0463 HOSPITAL OUTPT CLINIC VISIT: HCPCS

## 2022-10-27 NOTE — PROGRESS NOTES
Patient Identification:  Name:  Emma Ryan  Age:  81 y.o.  Sex:  female  :  1940  MRN:  [id number]   Visit Number:  [id number]  Primary Care Physician:  Jennifer Montano MD     Subjective     Chief complaint:   Traumatic wound right lower leg    History of presenting illness:   Patient is a 81 y.o. female who presents for evaluation for wounds to the LOCATION: right shin. CONTEXT: Trauma. Reports that she fell at home around ONSET: 1 week ago. Tripped over a pair of shoes while using her walker. ASSOCIATED SIGNS AND SYMPTOMS: none new reported. Went to her primary care and is on cefdinir for the redness and swelling in the area. She reports this redness is slowly improving but still present. She reports no imaging was done. There is no reported history of DM. She denies smoking. No other new acute issues are reported.  ---------------------------------------------------------------------------------------------------------------------   Review of Systems   Constitutional: Negative.    HENT: Negative.    Eyes: Negative.    Respiratory: Negative.    Cardiovascular: Negative.    Gastrointestinal: Negative.    Endocrine: Negative.    Genitourinary: Negative.    Musculoskeletal: Positive for gait problem.   Skin: Positive for wound. Negative for color change.   Allergic/Immunologic: Negative.    Hematological: Negative.    Psychiatric/Behavioral: Negative.       ---------------------------------------------------------------------------------------------------------------------   Past Medical History:   Diagnosis Date   • GERD (gastroesophageal reflux disease)    • Hyperlipidemia    • Hypertension    • Left shoulder pain    • Obesity    • Osteoarthritis of knees, bilateral    • Overactive bladder    • Right shoulder pain      Past Surgical History:   Procedure Laterality Date   • APPENDECTOMY     • BUNIONECTOMY Right    • CARDIOVASCULAR STRESS TEST  10/2012   • CATARACT EXTRACTION     •  CHOLECYSTECTOMY     • COLONOSCOPY     • ECHO - CONVERTED  10/2012   • JOINT REPLACEMENT      bilateral knees    • KNEE ARTHROPLASTY Left    • KNEE ARTHROSCOPY     • SHOULDER ARTHROSCOPY Left 2016    Procedure: LEFT SHOULDER ACROMIOPLASTY,MINI OPEN ROTATOR CUFF REPAIR;  Surgeon: Carlos Eduardo Smith MD;  Location: [facility];  Service:    • SHOULDER SURGERY  2016    OPEN ACROMICPLASTY RIGHT SHOULDER     Family History   Problem Relation Age of Onset   • Heart disease Other    • Stroke Sister    • Diabetes Mother    • Heart failure Mother    • Heart disease Mother    • Heart attack Mother    • Hypertension Father    • Emphysema Father    • Heart disease Father    • No Known Problems Brother    • Cancer Sister    • Kidney disease Sister    • Heart failure Sister    • Diabetes Brother    • Diabetes Brother    • Diabetes Brother      Social History     Socioeconomic History   • Marital status:    Tobacco Use   • Smoking status: Former     Packs/day: 2.00     Years: 20.00     Pack years: 40.00     Types: Cigarettes     Quit date:      Years since quittin.8   • Smokeless tobacco: Never   Vaping Use   • Vaping Use: Never used   Substance and Sexual Activity   • Alcohol use: No     Comment: s/p    • Drug use: No   • Sexual activity: Defer     ---------------------------------------------------------------------------------------------------------------------   Allergies:  Codeine and Sulfa antibiotics  ---------------------------------------------------------------------------------------------------------------------   Medications below are reported home medications pulling from within the system   Cannot display prior to admission medications because the patient has not been admitted in this contact.        ---------------------------------------------------------------------------------------------------------------------    Objective      ---------------------------------------------------------------------------------------------------------------------   Vital Signs:  /84   Pulse 73   Resp 18   Temp 98.3 °F (36.8 °C)   Temp src Infrared         There is no height or weight on file to calculate BMI.  Wt Readings from Last 3 Encounters:   10/18/22 79.4 kg (175 lb)   09/22/22 79.8 kg (176 lb)   07/05/22 80 kg (176 lb 6.4 oz)     ---------------------------------------------------------------------------------------------------------------------   Physical Exam:  Physical Exam  Constitutional:       General: She is not in acute distress.     Appearance: Normal appearance. She is not toxic-appearing.   HENT:      Head: Normocephalic and atraumatic.      Right Ear: External ear normal.      Left Ear: External ear normal.      Nose: Nose normal.      Mouth/Throat:      Mouth: Mucous membranes are moist.      Pharynx: Oropharynx is clear.   Eyes:      Extraocular Movements: Extraocular movements intact.      Pupils: Pupils are equal, round, and reactive to light.   Cardiovascular:      Rate and Rhythm: Normal rate and regular rhythm.   Pulmonary:      Effort: Pulmonary effort is normal. No respiratory distress.   Abdominal:      General: Abdomen is flat. There is no distension.   Musculoskeletal:         General: Tenderness present.      Cervical back: Normal range of motion and neck supple.   Skin:     General: Skin is warm and dry.      Comments: Right anterior lower leg. Shin.Two traumatic appearing wound. At this time, appears to be skin tears. There is some redness surrounding the areas. Mild tenderness to palpation of the areas. No purulence or malodor noted.   Neurological:      General: No focal deficit present.      Mental Status: She is alert and oriented to person, place, and time.   Psychiatric:         Mood and Affect: Mood normal.         Behavior: Behavior normal.       Lab Results   Component Value Date    TSH 1.950 09/14/2018     FREET4 1.16 09/14/2018         Pain Management Panel     Pain Management Panel Latest Ref Rng & Units 9/19/2022 4/28/2022    CREATININE UR mg/dL 164.1 56.8    AMPHETAMINES SCREEN, URINE Negative - -    BARBITURATES SCREEN Negative - -    BENZODIAZEPINE SCREEN, URINE Negative - -    COCAINE SCREEN, URINE Negative - -    METHADONE SCREEN, URINE Negative - -        Assessment & Plan      Assessment /Plan:     Recommend adequate hydration, along with protein and vitamin intake. Open wounds can serve as a nidus for local and systemic infection and places patient at higher risk for need for amputation    Multiple skin tears, RLE  -recommend for now a silvasorb dressing daily. Will donate moisture and provide a level of local antibacterial coverage  -no previous imaging has been ordered per report. Will get Xray Tib/fib,ankle, knee on right  -culture obtained by RN    Mild cellulitis, RLE  -Continue the cefdinir ordered by PC for now. Adjust ABX as appropriate with sensitivity results    RTC: 1 week for re-evaluation. To ER prior if acute issues arise.    CHIRAG Morris   [facility]    10/27/22  13:53 EDT

## 2022-10-30 LAB
BACTERIA SPEC AEROBE CULT: NORMAL
GRAM STN SPEC: NORMAL
GRAM STN SPEC: NORMAL

## 2022-11-03 ENCOUNTER — OFFICE VISIT (OUTPATIENT)
Dept: CARDIOLOGY | Facility: CLINIC | Age: 82
End: 2022-11-03

## 2022-11-03 ENCOUNTER — HOSPITAL ENCOUNTER (OUTPATIENT)
Dept: WOUND CARE | Facility: HOSPITAL | Age: 82
Discharge: HOME | End: 2022-11-03
Admitting: NURSE PRACTITIONER

## 2022-11-03 VITALS
SYSTOLIC BLOOD PRESSURE: 136 MMHG | OXYGEN SATURATION: 98 % | DIASTOLIC BLOOD PRESSURE: 58 MMHG | HEART RATE: 80 BPM | WEIGHT: 175 LBS | BODY MASS INDEX: 35.28 KG/M2 | HEIGHT: 59 IN

## 2022-11-03 VITALS
HEART RATE: 80 BPM | SYSTOLIC BLOOD PRESSURE: 173 MMHG | TEMPERATURE: 98.3 F | DIASTOLIC BLOOD PRESSURE: 89 MMHG | RESPIRATION RATE: 18 BRPM

## 2022-11-03 DIAGNOSIS — S81.811D LACERATION OF RIGHT LOWER LEG, SUBSEQUENT ENCOUNTER: ICD-10-CM

## 2022-11-03 DIAGNOSIS — I10 PRIMARY HYPERTENSION: Chronic | ICD-10-CM

## 2022-11-03 DIAGNOSIS — S81.811D LACERATION OF RIGHT LOWER LEG, SUBSEQUENT ENCOUNTER: Primary | ICD-10-CM

## 2022-11-03 PROCEDURE — 99213 OFFICE O/P EST LOW 20 MIN: CPT | Performed by: INTERNAL MEDICINE

## 2022-11-03 PROCEDURE — 97602 WOUND(S) CARE NON-SELECTIVE: CPT

## 2022-11-03 NOTE — PROGRESS NOTES
subjective     Chief Complaint   Patient presents with   • Right Leg Wound     Follow up     History of Present Illness    Patient is 81 years old white female with multiple chronic medical problems including hypertension and hyperlipidemia.  Recently she tripped on her 's shoes and had laceration of left shin.  She has been going to wound care with the local antibiotics at this time.  She finished oral antibiotics.    Wound is healing slowly overall patient is encouraged.  Wound is clean, patient is afebrile    Other problems as mentioned blood pressure is very well controlled with hydralazine Aldactone Coreg  Hyperlipidemia on Lipitor therapy.  Patient also has urinary incontinence and Ditropan.  Overall she is doing well    Past Surgical History:   Procedure Laterality Date   • APPENDECTOMY     • BUNIONECTOMY Right    • CARDIOVASCULAR STRESS TEST  10/2012   • CATARACT EXTRACTION  2017   • CHOLECYSTECTOMY     • COLONOSCOPY  2011   • ECHO - CONVERTED  10/2012   • JOINT REPLACEMENT      bilateral knees    • KNEE ARTHROPLASTY Left    • KNEE ARTHROSCOPY     • SHOULDER ARTHROSCOPY Left 7/28/2016    Procedure: LEFT SHOULDER ACROMIOPLASTY,MINI OPEN ROTATOR CUFF REPAIR;  Surgeon: Carlos Eduardo Smith MD;  Location: [facility];  Service:    • SHOULDER SURGERY  05/31/2016    OPEN ACROMICPLASTY RIGHT SHOULDER     Family History   Problem Relation Age of Onset   • Heart disease Other    • Stroke Sister    • Diabetes Mother    • Heart failure Mother    • Heart disease Mother    • Heart attack Mother    • Hypertension Father    • Emphysema Father    • Heart disease Father    • No Known Problems Brother    • Cancer Sister    • Kidney disease Sister    • Heart failure Sister    • Diabetes Brother    • Diabetes Brother    • Diabetes Brother      Past Medical History:   Diagnosis Date   • GERD (gastroesophageal reflux disease)    • Hyperlipidemia    • Hypertension    • Left shoulder pain    • Obesity    • Osteoarthritis of  knees, bilateral    • Overactive bladder    • Right shoulder pain      Patient Active Problem List   Diagnosis   • Complete tear of right rotator cuff   • Stage 3a chronic kidney disease (HCC)   • Cough   • Gastroesophageal reflux disease   • Mixed hyperlipidemia   • Shoulder pain   • Hypertension   • Acromioclavicular joint arthritis   • Impingement syndrome, shoulder   • Complete tear of left rotator cuff   • Urge incontinence   • Atopic rhinitis   • Upper respiratory tract infection   • Bilateral lower extremity edema   • Hypercalcemia   • Right knee pain   • DADA (stress urinary incontinence, female)   • Obesity (BMI 35.0-39.9 without comorbidity)   • Left knee injury   • Acute UTI   • COVID-19 virus infection   • Skin lesion of scalp   • Numbness in left leg   • Multiple bruises   • Vitamin D deficiency   • Laceration of right lower leg       Social History     Tobacco Use   • Smoking status: Former     Packs/day: 2.00     Years: 20.00     Pack years: 40.00     Types: Cigarettes     Quit date:      Years since quittin.8   • Smokeless tobacco: Never   Vaping Use   • Vaping Use: Never used   Substance Use Topics   • Alcohol use: No     Comment: s/p    • Drug use: No       Allergies   Allergen Reactions   • Codeine Other (See Comments)     hyperventilate   • Sulfa Antibiotics Rash       Current Outpatient Medications on File Prior to Visit   Medication Sig   • atorvastatin (LIPITOR) 20 MG tablet Take 1 tablet by mouth once daily   • carvedilol (COREG) 25 MG tablet Take 25 mg by mouth 2 (Two) Times a Day With Meals.   • Cholecalciferol (VITAMIN D PO) Take 1,000 Units by mouth daily.   • Docusate Calcium (STOOL SOFTENER PO) Take 1 tablet by mouth Daily.   • Fexofenadine HCl (ALLEGRA PO) Take 180 mg by mouth daily as needed.   • hydrALAZINE (APRESOLINE) 100 MG tablet Take 100 mg by mouth 2 (Two) Times a Day.   • omeprazole OTC (PriLOSEC OTC) 20 MG EC tablet Take 20 mg by mouth daily.   • oxybutynin XL  "(DITROPAN-XL) 10 MG 24 hr tablet Take 1 tablet by mouth Daily.   • Probiotic Product (PROBIOTIC COLON SUPPORT PO) Take  by mouth daily.   • spironolactone (ALDACTONE) 25 MG tablet Take 25 mg by mouth Daily.     No current facility-administered medications on file prior to visit.         The following portions of the patient's history were reviewed and updated as appropriate: allergies, current medications, past family history, past medical history, past social history, past surgical history and problem list.    Review of Systems   Constitutional: Negative.   HENT: Negative.  Negative for congestion.    Eyes: Negative.    Cardiovascular: Negative.  Negative for chest pain, cyanosis, dyspnea on exertion, irregular heartbeat, leg swelling, near-syncope, orthopnea, palpitations, paroxysmal nocturnal dyspnea and syncope.   Respiratory: Negative.  Negative for shortness of breath.    Hematologic/Lymphatic: Negative.    Musculoskeletal: Negative.    Gastrointestinal: Negative.    Neurological: Negative.  Negative for headaches.          Objective:     /58 (BP Location: Left arm, Patient Position: Sitting, Cuff Size: Adult)   Pulse 80   Ht 149.9 cm (59\")   Wt 79.4 kg (175 lb)   SpO2 98%   BMI 35.35 kg/m²   Constitutional:       Appearance: Healthy appearance. Not in distress.   Neck:      Vascular: No JVR. JVD normal.   Pulmonary:      Effort: Pulmonary effort is normal.      Breath sounds: Normal breath sounds. No wheezing. No rhonchi. No rales.   Chest:      Chest wall: Not tender to palpatation.   Cardiovascular:      PMI at left midclavicular line. Normal rate. Regular rhythm. Normal S1. Normal S2.      Murmurs: There is no murmur.      No gallop. No click. No rub.   Pulses:     Intact distal pulses.   Edema:     Peripheral edema absent.   Abdominal:      General: Bowel sounds are normal.      Palpations: Abdomen is soft.      Tenderness: There is no abdominal tenderness.   Musculoskeletal: Normal range of " motion.         General: No tenderness. Skin:     General: Skin is warm and dry.      Comments: Left lower extremity superficial laceration .     Neurological:      General: No focal deficit present.      Mental Status: Alert and oriented to person, place and time.           Lab Review  Lab Results   Component Value Date     10/10/2022    K 3.4 (L) 10/10/2022     10/10/2022    BUN 18 10/10/2022    CREATININE 1.05 (H) 10/10/2022    GLUCOSE 94 10/10/2022    CALCIUM 9.6 10/10/2022    ALT 13 04/04/2022    ALKPHOS 82 04/04/2022    LABIL2 1.6 06/08/2016     Lab Results   Component Value Date    CKTOTAL 131 04/04/2022     Lab Results   Component Value Date    WBC 6.71 04/04/2022    HGB 12.8 04/04/2022    HCT 40.1 04/04/2022     04/04/2022     Lab Results   Component Value Date    INR 1.97 12/11/2014    INR 2.02 12/08/2014    INR 2.46 12/04/2014     Lab Results   Component Value Date    MG 1.8 12/31/2020     Lab Results   Component Value Date    TSH 1.950 09/14/2018     No results found for: BNP  Lab Results   Component Value Date    CHLPL 133 06/08/2016    CHOL 138 04/04/2022    TRIG 97 04/04/2022    HDL 45 04/04/2022    VLDL 18 04/04/2022    LDLHDL 1.64 04/04/2022     Lab Results   Component Value Date    LDL 75 04/04/2022    LDL 56 09/24/2021       Procedures       I personally viewed and interpreted the patient's LAB data         Assessment:     1. Laceration of right lower leg, subsequent encounter    2. Primary hypertension          Plan:     Laceration of right lower extremity following with wound care she is gradually improving she is changing dressing daily .  Wound looks healthy.  She was advised to follow-up with wound clinic.  Her blood pressure is very well controlled.  Lipid control is also good.  Follow-up scheduled          No follow-ups on file.

## 2022-11-14 NOTE — PROGRESS NOTES
Wound Clinic Note  Patient Identification:  Name:  Emma Ryan  Age:  81 y.o.  Sex:  female  :  1940  MRN:  [id number]   Visit Number:  [id number]  Primary Care Physician:  Jennifer Montano MD     Subjective     Chief complaint:     Traumatic wound right lower leg    History of presenting illness:   Patient is a 81 y.o. female who presents for evaluation for wounds to the LOCATION: right shin. CONTEXT: Trauma. Reports that she fell at home around ONSET: 1 week ago. Tripped over a pair of shoes while using her walker. ASSOCIATED SIGNS AND SYMPTOMS: none new reported. Went to her primary care and is on cefdinir for the redness and swelling in the area. She reports this redness is slowly improving but still present. She reports no imaging was done. There is no reported history of DM. She denies smoking. No other new acute issues are reported.    Interval history:   2022: Patient seen in clinic today for follow-up to right lower leg. Swelling appears to be present has improved. Erythema has decreased. Reports pain has decreased. No new issues or concerns reported. She is tolerating current treatment without complications. Denies any fever or chills.  ---------------------------------------------------------------------------------------------------------------------   Review of Systems   Constitutional: Negative for chills and fever.   Respiratory: Negative for cough and shortness of breath.    Cardiovascular: Positive for leg swelling.   Gastrointestinal: Negative for diarrhea and vomiting.   Musculoskeletal: Negative for gait problem.   Skin: Positive for wound.   Neurological: Negative for dizziness and weakness.      ---------------------------------------------------------------------------------------------------------------------   Past Medical History:   Diagnosis Date   • GERD (gastroesophageal reflux disease)    • Hyperlipidemia    • Hypertension    • Left shoulder pain    •  Obesity    • Osteoarthritis of knees, bilateral    • Overactive bladder    • Right shoulder pain      Past Surgical History:   Procedure Laterality Date   • APPENDECTOMY     • BUNIONECTOMY Right    • CARDIOVASCULAR STRESS TEST  10/2012   • CATARACT EXTRACTION     • CHOLECYSTECTOMY     • COLONOSCOPY     • ECHO - CONVERTED  10/2012   • JOINT REPLACEMENT      bilateral knees    • KNEE ARTHROPLASTY Left    • KNEE ARTHROSCOPY     • SHOULDER ARTHROSCOPY Left 2016    Procedure: LEFT SHOULDER ACROMIOPLASTY,MINI OPEN ROTATOR CUFF REPAIR;  Surgeon: Carlos Eduardo Smith MD;  Location: [facility];  Service:    • SHOULDER SURGERY  2016    OPEN ACROMICPLASTY RIGHT SHOULDER     Family History   Problem Relation Age of Onset   • Heart disease Other    • Stroke Sister    • Diabetes Mother    • Heart failure Mother    • Heart disease Mother    • Heart attack Mother    • Hypertension Father    • Emphysema Father    • Heart disease Father    • No Known Problems Brother    • Cancer Sister    • Kidney disease Sister    • Heart failure Sister    • Diabetes Brother    • Diabetes Brother    • Diabetes Brother      Social History     Socioeconomic History   • Marital status:    Tobacco Use   • Smoking status: Former     Packs/day: 2.00     Years: 20.00     Pack years: 40.00     Types: Cigarettes     Quit date:      Years since quittin.8   • Smokeless tobacco: Never   Vaping Use   • Vaping Use: Never used   Substance and Sexual Activity   • Alcohol use: No     Comment: s/p    • Drug use: No   • Sexual activity: Defer     ---------------------------------------------------------------------------------------------------------------------   Allergies:  Codeine and Sulfa antibiotics  ---------------------------------------------------------------------------------------------------------------------  Objective      ---------------------------------------------------------------------------------------------------------------------   Vital Signs:     No data found.  There were no vitals filed for this visit.  There is no height or weight on file to calculate BMI.  Wt Readings from Last 3 Encounters:   11/03/22 79.4 kg (175 lb)   10/18/22 79.4 kg (175 lb)   09/22/22 79.8 kg (176 lb)       ---------------------------------------------------------------------------------------------------------------------   Physical Exam  Constitutional: Vital sign were reviewed (temperature, pulse, respiration, and blood pressure) and found to be within expected limits, general appearance was assessed and the patient was found to be in no distress and calm and comfortable appears  Skin: Temperature:normal turgor and temperatureColor: normal, no cyanosis, jaundice, pallor or bruising, Moisture: dry,Nails: thickened yellow toenails bed, Hair:thinning to lower extremities .  Physical Exam  Wound Assessment:  Location: Right, lower, leg  Dressing Appearance: dressingappearance: clean  Closure: NA  Changes since last exam: surface area has diminished  and bioburden coverage has diminished   Etiology and classification: skin tear  Wound bed structures/characteristics: full-thickness (subcutaneous tissue is exposed in at least a portion of the wound), clean, red  Edges moist  Periwound characteristics: intact  Periwound Temperature: normal turgor and temperature  Drainage characteristics: moderate, serous   Perfusion characteristics: suggest some degree of PAD    Wound Goal (s):Closure, Epithelization, No further symptoms and Reduction of contamination  Assessment & Plan      Laceration of right lower leg [F37.570W]  -Silvasorb to base and secure with kerlix and ACE  -Recommend ilana protein diet 120g/day along with vitamin C 2000mg/day, vitamin A 5000 Units/day, vitamin D3 5000 Units/day, zinc 50mg/day to help promote wound healing     Obesity  (BMI 35.0-39.9 without comorbidity) [E66.9]  -An obese person?is at greater risk for wound infection and dehiscence or evisceration.    Clinical Impression:Low Complexity    Follow-up: 1 week    AZIZA Carey   [facility]  11/03/2022  [id number]

## 2022-11-15 NOTE — PLAN OF CARE
Goal Outcome Evaluation:  Plan of Care Reviewed With: patient  Progress: no change  Outcome Summary: Patient has done well today. O2 continues at 4l/nc, sat 93%.  VSS.   [Follow-Up: _____] : a [unfilled] follow-up visit [Spouse] : spouse [Family Member] : family member

## 2022-11-16 ENCOUNTER — HOSPITAL ENCOUNTER (OUTPATIENT)
Dept: WOUND CARE | Facility: HOSPITAL | Age: 82
Discharge: HOME | End: 2022-11-16
Admitting: NURSE PRACTITIONER

## 2022-11-16 PROCEDURE — G0463 HOSPITAL OUTPT CLINIC VISIT: HCPCS

## 2022-11-16 NOTE — PROGRESS NOTES
Wound Clinic Note  Patient Identification:  Name:  Emma Ryan  Age:  81 y.o.  Sex:  female  :  1940  MRN:  [id number]   Visit Number:  [id number]  Primary Care Physician:  Jennifer Montano MD     Subjective     Chief complaint:     Traumatic wound right lower leg    History of presenting illness:   Patient is a 81 y.o. female who presents for evaluation for wounds to the LOCATION: right shin. CONTEXT: Trauma. Reports that she fell at home around ONSET: 1 week ago. Tripped over a pair of shoes while using her walker. ASSOCIATED SIGNS AND SYMPTOMS: none new reported. Went to her primary care and is on cefdinir for the redness and swelling in the area. She reports this redness is slowly improving but still present. She reports no imaging was done. There is no reported history of DM. She denies smoking. No other new acute issues are reported.    Interval history:   2022: Patient seen in clinic today for follow-up to right lower leg. Swelling appears to be present has improved. Erythema has decreased. Reports pain has decreased. No new issues or concerns reported. She is tolerating current treatment without complications. Denies any fever or chills.    2022: Patient seen in clinic today for follow-up to right lower leg. Wound appears to be healed at this time. Denies any new issues or concerns.   ---------------------------------------------------------------------------------------------------------------------   Review of Systems   Constitutional: Negative for chills and fever.   Respiratory: Negative for cough and shortness of breath.    Cardiovascular: Positive for leg swelling.   Gastrointestinal: Negative for diarrhea and vomiting.   Musculoskeletal: Negative for gait problem.   Skin: Positive for wound.   Neurological: Negative for dizziness and weakness.      ---------------------------------------------------------------------------------------------------------------------    Past Medical History:   Diagnosis Date   • GERD (gastroesophageal reflux disease)    • Hyperlipidemia    • Hypertension    • Left shoulder pain    • Obesity    • Osteoarthritis of knees, bilateral    • Overactive bladder    • Right shoulder pain      Past Surgical History:   Procedure Laterality Date   • APPENDECTOMY     • BUNIONECTOMY Right    • CARDIOVASCULAR STRESS TEST  10/2012   • CATARACT EXTRACTION     • CHOLECYSTECTOMY     • COLONOSCOPY     • ECHO - CONVERTED  10/2012   • JOINT REPLACEMENT      bilateral knees    • KNEE ARTHROPLASTY Left    • KNEE ARTHROSCOPY     • SHOULDER ARTHROSCOPY Left 2016    Procedure: LEFT SHOULDER ACROMIOPLASTY,MINI OPEN ROTATOR CUFF REPAIR;  Surgeon: Carlos Eduardo Smith MD;  Location: [facility];  Service:    • SHOULDER SURGERY  2016    OPEN ACROMICPLASTY RIGHT SHOULDER     Family History   Problem Relation Age of Onset   • Heart disease Other    • Stroke Sister    • Diabetes Mother    • Heart failure Mother    • Heart disease Mother    • Heart attack Mother    • Hypertension Father    • Emphysema Father    • Heart disease Father    • No Known Problems Brother    • Cancer Sister    • Kidney disease Sister    • Heart failure Sister    • Diabetes Brother    • Diabetes Brother    • Diabetes Brother      Social History     Socioeconomic History   • Marital status:    Tobacco Use   • Smoking status: Former     Packs/day: 2.00     Years: 20.00     Pack years: 40.00     Types: Cigarettes     Quit date:      Years since quittin.9   • Smokeless tobacco: Never   Vaping Use   • Vaping Use: Never used   Substance and Sexual Activity   • Alcohol use: No     Comment: s/p    • Drug use: No   • Sexual activity: Defer     ---------------------------------------------------------------------------------------------------------------------   Allergies:  Codeine and Sulfa  antibiotics  ---------------------------------------------------------------------------------------------------------------------  Objective     ---------------------------------------------------------------------------------------------------------------------   Vital Signs:     No data found.  There were no vitals filed for this visit.  There is no height or weight on file to calculate BMI.  Wt Readings from Last 3 Encounters:   11/03/22 79.4 kg (175 lb)   10/18/22 79.4 kg (175 lb)   09/22/22 79.8 kg (176 lb)       ---------------------------------------------------------------------------------------------------------------------   Physical Exam  Constitutional: Vital sign were reviewed (temperature, pulse, respiration, and blood pressure) and found to be within expected limits, general appearance was assessed and the patient was found to be in no distress and calm and comfortable appears  Skin: Temperature:normal turgor and temperatureColor: normal, no cyanosis, jaundice, pallor or bruising, Moisture: dry,Nails: thickened yellow toenails bed, Hair:thinning to lower extremities .  Physical Exam  Wound Assessment:  Location: Right, lower, leg- healed    Wound Goal (s):Closure, Epithelization, No further symptoms and Reduction of contamination  Assessment & Plan      Laceration of right lower leg [S81.811A]  -Healed    Obesity (BMI 35.0-39.9 without comorbidity) [E66.9]  -An obese person?is at greater risk for wound infection and dehiscence or evisceration.    Clinical Impression:Low Complexity    Follow-up: as needed    AZIZA Carey   [facility]  11/16/2022  [id number]

## 2022-11-17 ENCOUNTER — APPOINTMENT (OUTPATIENT)
Dept: WOUND CARE | Facility: HOSPITAL | Age: 82
End: 2022-11-17

## 2023-01-16 ENCOUNTER — LAB (OUTPATIENT)
Dept: LAB | Facility: HOSPITAL | Age: 83
End: 2023-01-16
Payer: MEDICARE

## 2023-01-16 ENCOUNTER — TRANSCRIBE ORDERS (OUTPATIENT)
Dept: ADMINISTRATIVE | Facility: HOSPITAL | Age: 83
End: 2023-01-16
Payer: MEDICARE

## 2023-01-16 DIAGNOSIS — N18.31 CKD STAGE G3A/A1, GFR 45-59 AND ALBUMIN CREATININE RATIO <30 MG/G: Primary | ICD-10-CM

## 2023-01-16 DIAGNOSIS — E78.2 MIXED HYPERLIPIDEMIA: ICD-10-CM

## 2023-01-16 DIAGNOSIS — N18.31 CKD STAGE G3A/A1, GFR 45-59 AND ALBUMIN CREATININE RATIO <30 MG/G: ICD-10-CM

## 2023-01-16 LAB
ALBUMIN SERPL-MCNC: 3.9 G/DL (ref 3.5–5.2)
ALBUMIN UR-MCNC: <1.2 MG/DL
ALBUMIN/GLOB SERPL: 1.3 G/DL
ALP SERPL-CCNC: 81 U/L (ref 39–117)
ALT SERPL W P-5'-P-CCNC: 13 U/L (ref 1–33)
ANION GAP SERPL CALCULATED.3IONS-SCNC: 10.6 MMOL/L (ref 5–15)
AST SERPL-CCNC: 18 U/L (ref 1–32)
BILIRUB SERPL-MCNC: 0.5 MG/DL (ref 0–1.2)
BUN SERPL-MCNC: 14 MG/DL (ref 8–23)
BUN/CREAT SERPL: 13.2 (ref 7–25)
CALCIUM SPEC-SCNC: 9.6 MG/DL (ref 8.6–10.5)
CHLORIDE SERPL-SCNC: 106 MMOL/L (ref 98–107)
CO2 SERPL-SCNC: 25.4 MMOL/L (ref 22–29)
CREAT SERPL-MCNC: 1.06 MG/DL (ref 0.57–1)
CREAT UR-MCNC: 90.5 MG/DL
EGFRCR SERPLBLD CKD-EPI 2021: 52.9 ML/MIN/1.73
GLOBULIN UR ELPH-MCNC: 2.9 GM/DL
GLUCOSE SERPL-MCNC: 101 MG/DL (ref 65–99)
MICROALBUMIN/CREAT UR: NORMAL MG/G{CREAT}
POTASSIUM SERPL-SCNC: 3.5 MMOL/L (ref 3.5–5.2)
PROT SERPL-MCNC: 6.8 G/DL (ref 6–8.5)
SODIUM SERPL-SCNC: 142 MMOL/L (ref 136–145)

## 2023-01-16 PROCEDURE — 82043 UR ALBUMIN QUANTITATIVE: CPT

## 2023-01-16 PROCEDURE — 36415 COLL VENOUS BLD VENIPUNCTURE: CPT

## 2023-01-16 PROCEDURE — 80061 LIPID PANEL: CPT

## 2023-01-16 PROCEDURE — 80053 COMPREHEN METABOLIC PANEL: CPT

## 2023-01-16 PROCEDURE — 82550 ASSAY OF CK (CPK): CPT

## 2023-01-16 PROCEDURE — 82570 ASSAY OF URINE CREATININE: CPT

## 2023-01-17 ENCOUNTER — OFFICE VISIT (OUTPATIENT)
Dept: CARDIOLOGY | Facility: CLINIC | Age: 83
End: 2023-01-17
Payer: MEDICARE

## 2023-01-17 VITALS
OXYGEN SATURATION: 96 % | HEART RATE: 68 BPM | WEIGHT: 174 LBS | HEIGHT: 59 IN | BODY MASS INDEX: 35.08 KG/M2 | SYSTOLIC BLOOD PRESSURE: 138 MMHG | DIASTOLIC BLOOD PRESSURE: 78 MMHG

## 2023-01-17 DIAGNOSIS — E78.2 MIXED HYPERLIPIDEMIA: Primary | ICD-10-CM

## 2023-01-17 DIAGNOSIS — I10 PRIMARY HYPERTENSION: Chronic | ICD-10-CM

## 2023-01-17 DIAGNOSIS — N39.41 URGE INCONTINENCE: ICD-10-CM

## 2023-01-17 LAB
CHOLEST SERPL-MCNC: 125 MG/DL (ref 0–200)
CK SERPL-CCNC: 111 U/L (ref 20–180)
HDLC SERPL-MCNC: 39 MG/DL (ref 40–60)
LDLC SERPL CALC-MCNC: 62 MG/DL (ref 0–100)
LDLC/HDLC SERPL: 1.52 {RATIO}
TRIGL SERPL-MCNC: 133 MG/DL (ref 0–150)
VLDLC SERPL-MCNC: 24 MG/DL (ref 5–40)

## 2023-01-17 PROCEDURE — 99214 OFFICE O/P EST MOD 30 MIN: CPT | Performed by: INTERNAL MEDICINE

## 2023-01-17 RX ORDER — LOSARTAN POTASSIUM 100 MG/1
100 TABLET ORAL DAILY
COMMUNITY
Start: 2022-12-15 | End: 2023-01-17 | Stop reason: ALTCHOICE

## 2023-01-17 RX ORDER — CLONIDINE HYDROCHLORIDE 0.1 MG/1
0.1 TABLET ORAL 2 TIMES DAILY
COMMUNITY
Start: 2022-12-24

## 2023-01-17 RX ORDER — FUROSEMIDE 20 MG/1
20 TABLET ORAL 2 TIMES DAILY
COMMUNITY

## 2023-01-17 NOTE — PROGRESS NOTES
subjective     Chief Complaint   Patient presents with   • Hypertension     today     History of Present Illness  Emma is 82 years old white female who is here for follow-up.  She has history of hypertension, hyperlipidemia and mild lower extremity edema.  Blood pressure was initially reported as high however repeat check blood pressure was normal  She is taking hydralazine 100 mg 3 times daily, Lasix 20 mg twice daily clonidine 0.1 mg twice a day, Coreg 25 mg twice daily and checking blood pressure closely.    Hyperlipidemia on Lipitor 20 mg daily    She is doing very well denies any chest pain palpitations or shortness of breath or ankle edema and laceration on the right lower extremity is all healed.        Past Surgical History:   Procedure Laterality Date   • APPENDECTOMY     • BUNIONECTOMY Right    • CARDIOVASCULAR STRESS TEST  10/2012   • CATARACT EXTRACTION  2017   • CHOLECYSTECTOMY     • COLONOSCOPY  2011   • ECHO - CONVERTED  10/2012   • JOINT REPLACEMENT      bilateral knees    • KNEE ARTHROPLASTY Left    • KNEE ARTHROSCOPY     • SHOULDER ARTHROSCOPY Left 7/28/2016    Procedure: LEFT SHOULDER ACROMIOPLASTY,MINI OPEN ROTATOR CUFF REPAIR;  Surgeon: Carlos Eduardo Smith MD;  Location[facility];  Service:    • SHOULDER SURGERY  05/31/2016    OPEN ACROMICPLASTY RIGHT SHOULDER     Family History   Problem Relation Age of Onset   • Heart disease Other    • Stroke Sister    • Diabetes Mother    • Heart failure Mother    • Heart disease Mother    • Heart attack Mother    • Hypertension Father    • Emphysema Father    • Heart disease Father    • No Known Problems Brother    • Cancer Sister    • Kidney disease Sister    • Heart failure Sister    • Diabetes Brother    • Diabetes Brother    • Diabetes Brother      Past Medical History:   Diagnosis Date   • GERD (gastroesophageal reflux disease)    • Hyperlipidemia    • Hypertension    • Left shoulder pain    • Obesity    • Osteoarthritis of knees, bilateral    •  Overactive bladder    • Right shoulder pain      Patient Active Problem List   Diagnosis   • Complete tear of right rotator cuff   • Stage 3a chronic kidney disease (HCC)   • Cough   • Gastroesophageal reflux disease   • Mixed hyperlipidemia   • Shoulder pain   • Hypertension   • Acromioclavicular joint arthritis   • Impingement syndrome, shoulder   • Complete tear of left rotator cuff   • Urge incontinence   • Atopic rhinitis   • Upper respiratory tract infection   • Bilateral lower extremity edema   • Hypercalcemia   • Right knee pain   • DADA (stress urinary incontinence, female)   • Obesity (BMI 35.0-39.9 without comorbidity)   • Left knee injury   • Acute UTI   • COVID-19 virus infection   • Skin lesion of scalp   • Numbness in left leg   • Multiple bruises   • Vitamin D deficiency   • Laceration of right lower leg       Social History     Tobacco Use   • Smoking status: Former     Packs/day: 2.00     Years: 20.00     Pack years: 40.00     Types: Cigarettes     Quit date:      Years since quittin.0   • Smokeless tobacco: Never   Vaping Use   • Vaping Use: Never used   Substance Use Topics   • Alcohol use: No     Comment: s/p    • Drug use: No       Allergies   Allergen Reactions   • Codeine Other (See Comments)     hyperventilate   • Sulfa Antibiotics Rash       Current Outpatient Medications on File Prior to Visit   Medication Sig   • atorvastatin (LIPITOR) 20 MG tablet Take 1 tablet by mouth once daily   • carvedilol (COREG) 25 MG tablet Take 25 mg by mouth 2 (Two) Times a Day With Meals.   • Cholecalciferol (VITAMIN D PO) Take 1,000 Units by mouth daily.   • cloNIDine (CATAPRES) 0.1 MG tablet Take 0.1 mg by mouth 2 (Two) Times a Day.   • Docusate Calcium (STOOL SOFTENER PO) Take 1 tablet by mouth Daily.   • Fexofenadine HCl (ALLEGRA PO) Take 180 mg by mouth daily as needed.   • furosemide (LASIX) 20 MG tablet Take 20 mg by mouth 2 (Two) Times a Day.   • hydrALAZINE (APRESOLINE) 100 MG tablet Take  "100 mg by mouth 3 (Three) Times a Day.   • omeprazole OTC (PriLOSEC OTC) 20 MG EC tablet Take 20 mg by mouth daily.   • oxybutynin XL (DITROPAN-XL) 10 MG 24 hr tablet Take 1 tablet by mouth Daily.   • Probiotic Product (PROBIOTIC COLON SUPPORT PO) Take  by mouth daily.   • spironolactone (ALDACTONE) 25 MG tablet Take 25 mg by mouth Daily.     No current facility-administered medications on file prior to visit.         The following portions of the patient's history were reviewed and updated as appropriate: allergies, current medications, past family history, past medical history, past social history, past surgical history and problem list.    Review of Systems   Constitutional: Negative.   HENT: Negative.  Negative for congestion.    Eyes: Negative.    Cardiovascular: Negative.  Negative for chest pain, cyanosis, dyspnea on exertion, irregular heartbeat, leg swelling, near-syncope, orthopnea, palpitations, paroxysmal nocturnal dyspnea and syncope.   Respiratory: Negative.  Negative for shortness of breath.    Hematologic/Lymphatic: Negative.    Musculoskeletal: Negative.    Gastrointestinal: Negative.    Neurological: Negative.  Negative for headaches.          Objective:     /78   Pulse 68   Ht 149.9 cm (59\")   Wt 78.9 kg (174 lb)   SpO2 96%   BMI 35.14 kg/m²   Pulmonary:      Effort: Pulmonary effort is normal.      Breath sounds: Normal breath sounds. No stridor. No wheezing. No rhonchi. No rales.   Cardiovascular:      PMI at left midclavicular line. Normal rate. Regular rhythm. Normal S1. Normal S2.      Murmurs: There is no murmur.      No gallop. No click. No rub.   Pulses:     Intact distal pulses.   Edema:     Peripheral edema absent.           Lab Review  Lab Results   Component Value Date     01/16/2023    K 3.5 01/16/2023     01/16/2023    BUN 14 01/16/2023    CREATININE 1.06 (H) 01/16/2023    GLUCOSE 101 (H) 01/16/2023    CALCIUM 9.6 01/16/2023    ALT 13 01/16/2023    ALKPHOS 81 " 01/16/2023    LABIL2 1.6 06/08/2016     Lab Results   Component Value Date    CKTOTAL 111 01/16/2023     Lab Results   Component Value Date    WBC 6.71 04/04/2022    HGB 12.8 04/04/2022    HCT 40.1 04/04/2022     04/04/2022     Lab Results   Component Value Date    INR 1.97 12/11/2014    INR 2.02 12/08/2014    INR 2.46 12/04/2014     Lab Results   Component Value Date    MG 1.8 12/31/2020     Lab Results   Component Value Date    TSH 1.950 09/14/2018     No results found for: BNP  Lab Results   Component Value Date    CHLPL 133 06/08/2016    CHOL 125 01/16/2023    TRIG 133 01/16/2023    HDL 39 (L) 01/16/2023    VLDL 24 01/16/2023    LDLHDL 1.52 01/16/2023     Lab Results   Component Value Date    LDL 62 01/16/2023    LDL 75 04/04/2022     No results found for: PROBNP  CARDIAC LABS:          Procedures       I personally viewed and interpreted the patient's LAB data         Assessment:     1. Mixed hyperlipidemia    2. Primary hypertension    3. Urge incontinence          Plan:     Hyperlipidemia is very well controlled.  Lab work reviewed LDL is 62, total cholesterol 125 and triglyceride 133.  She will continue Lipitor 20 mg daily.  Healthy lifestyle and weight loss emphasized.    Hypertension is also much better controlled.  She will continue clonidine, hydralazine, Lasix, Coreg.    Stress incontinence is controlled with the Ditropan which was continued.    Follow-up scheduled        No follow-ups on file.

## 2023-03-17 ENCOUNTER — TELEPHONE (OUTPATIENT)
Dept: CARDIOLOGY | Facility: CLINIC | Age: 83
End: 2023-03-17
Payer: MEDICARE

## 2023-03-17 NOTE — TELEPHONE ENCOUNTER
Called and spoke with Emma and advised her to seek medical attention at the nearest walk in clinic as our office does not have a provider today.  She v/u.

## 2023-03-17 NOTE — TELEPHONE ENCOUNTER
Caller: Emma Ryan    Relationship: Self    Best call back number: [phone]    What is the best time to reach you: ANY    Who are you requesting to speak with (clinical staff, provider,  specific staff member): CLINICAL      What was the call regarding: PT STATES HER EYES ARE WATERING AND THROAT IS SORE. PT WOULD LIKE TO BE SEEN.    Do you require a callback: YES

## 2023-04-14 ENCOUNTER — LAB (OUTPATIENT)
Dept: LAB | Facility: HOSPITAL | Age: 83
End: 2023-04-14
Payer: MEDICARE

## 2023-04-14 DIAGNOSIS — E55.9 VITAMIN D DEFICIENCY: ICD-10-CM

## 2023-04-14 DIAGNOSIS — E78.2 MIXED HYPERLIPIDEMIA: ICD-10-CM

## 2023-04-14 LAB
25(OH)D3 SERPL-MCNC: 67.1 NG/ML (ref 30–100)
ALBUMIN SERPL-MCNC: 4.3 G/DL (ref 3.5–5.2)
ALBUMIN/GLOB SERPL: 1.6 G/DL
ALP SERPL-CCNC: 76 U/L (ref 39–117)
ALT SERPL W P-5'-P-CCNC: 14 U/L (ref 1–33)
ANION GAP SERPL CALCULATED.3IONS-SCNC: 11.6 MMOL/L (ref 5–15)
AST SERPL-CCNC: 16 U/L (ref 1–32)
BASOPHILS # BLD AUTO: 0.06 10*3/MM3 (ref 0–0.2)
BASOPHILS NFR BLD AUTO: 0.9 % (ref 0–1.5)
BILIRUB SERPL-MCNC: 0.5 MG/DL (ref 0–1.2)
BUN SERPL-MCNC: 15 MG/DL (ref 8–23)
BUN/CREAT SERPL: 11.7 (ref 7–25)
CALCIUM SPEC-SCNC: 10.3 MG/DL (ref 8.6–10.5)
CHLORIDE SERPL-SCNC: 103 MMOL/L (ref 98–107)
CO2 SERPL-SCNC: 26.4 MMOL/L (ref 22–29)
CREAT SERPL-MCNC: 1.28 MG/DL (ref 0.57–1)
DEPRECATED RDW RBC AUTO: 43.6 FL (ref 37–54)
EGFRCR SERPLBLD CKD-EPI 2021: 42.2 ML/MIN/1.73
EOSINOPHIL # BLD AUTO: 0.07 10*3/MM3 (ref 0–0.4)
EOSINOPHIL NFR BLD AUTO: 1.1 % (ref 0.3–6.2)
ERYTHROCYTE [DISTWIDTH] IN BLOOD BY AUTOMATED COUNT: 13.4 % (ref 12.3–15.4)
GLOBULIN UR ELPH-MCNC: 2.7 GM/DL
GLUCOSE SERPL-MCNC: 83 MG/DL (ref 65–99)
HCT VFR BLD AUTO: 39.1 % (ref 34–46.6)
HGB BLD-MCNC: 13.2 G/DL (ref 12–15.9)
IMM GRANULOCYTES # BLD AUTO: 0.02 10*3/MM3 (ref 0–0.05)
IMM GRANULOCYTES NFR BLD AUTO: 0.3 % (ref 0–0.5)
LYMPHOCYTES # BLD AUTO: 2 10*3/MM3 (ref 0.7–3.1)
LYMPHOCYTES NFR BLD AUTO: 30.3 % (ref 19.6–45.3)
MCH RBC QN AUTO: 29.9 PG (ref 26.6–33)
MCHC RBC AUTO-ENTMCNC: 33.8 G/DL (ref 31.5–35.7)
MCV RBC AUTO: 88.5 FL (ref 79–97)
MONOCYTES # BLD AUTO: 0.68 10*3/MM3 (ref 0.1–0.9)
MONOCYTES NFR BLD AUTO: 10.3 % (ref 5–12)
NEUTROPHILS NFR BLD AUTO: 3.76 10*3/MM3 (ref 1.7–7)
NEUTROPHILS NFR BLD AUTO: 57.1 % (ref 42.7–76)
NRBC BLD AUTO-RTO: 0 /100 WBC (ref 0–0.2)
PLATELET # BLD AUTO: 245 10*3/MM3 (ref 140–450)
PMV BLD AUTO: 11.1 FL (ref 6–12)
POTASSIUM SERPL-SCNC: 3.5 MMOL/L (ref 3.5–5.2)
PROT SERPL-MCNC: 7 G/DL (ref 6–8.5)
RBC # BLD AUTO: 4.42 10*6/MM3 (ref 3.77–5.28)
SODIUM SERPL-SCNC: 141 MMOL/L (ref 136–145)
WBC NRBC COR # BLD: 6.59 10*3/MM3 (ref 3.4–10.8)

## 2023-04-14 PROCEDURE — 85025 COMPLETE CBC W/AUTO DIFF WBC: CPT

## 2023-04-14 PROCEDURE — 80053 COMPREHEN METABOLIC PANEL: CPT

## 2023-04-14 PROCEDURE — 82306 VITAMIN D 25 HYDROXY: CPT

## 2023-04-14 PROCEDURE — 36415 COLL VENOUS BLD VENIPUNCTURE: CPT

## 2023-04-19 ENCOUNTER — OFFICE VISIT (OUTPATIENT)
Dept: CARDIOLOGY | Facility: CLINIC | Age: 83
End: 2023-04-19
Payer: MEDICARE

## 2023-04-19 VITALS
WEIGHT: 172.4 LBS | SYSTOLIC BLOOD PRESSURE: 132 MMHG | HEART RATE: 79 BPM | OXYGEN SATURATION: 96 % | HEIGHT: 59 IN | DIASTOLIC BLOOD PRESSURE: 70 MMHG | BODY MASS INDEX: 34.76 KG/M2

## 2023-04-19 DIAGNOSIS — E78.2 MIXED HYPERLIPIDEMIA: ICD-10-CM

## 2023-04-19 DIAGNOSIS — E66.9 OBESITY (BMI 35.0-39.9 WITHOUT COMORBIDITY): ICD-10-CM

## 2023-04-19 DIAGNOSIS — N39.41 URGE INCONTINENCE: ICD-10-CM

## 2023-04-19 DIAGNOSIS — I10 PRIMARY HYPERTENSION: Primary | Chronic | ICD-10-CM

## 2023-04-19 PROCEDURE — 3078F DIAST BP <80 MM HG: CPT | Performed by: INTERNAL MEDICINE

## 2023-04-19 PROCEDURE — 99214 OFFICE O/P EST MOD 30 MIN: CPT | Performed by: INTERNAL MEDICINE

## 2023-04-19 PROCEDURE — 3075F SYST BP GE 130 - 139MM HG: CPT | Performed by: INTERNAL MEDICINE

## 2023-04-19 NOTE — PROGRESS NOTES
subjective     Chief Complaint   Patient presents with   • Hyperlipidemia     Follow up      History of Present Illness    Emma is 82 years old white female who is here for follow-up.  She is doing very well and is asymptomatic.    Hypertension  Blood pressure is very well controlled she is taking hydralazine 100 mg 3 times daily, Coreg 25 twice daily, Aldactone 25 mg daily and Lasix 20 mg twice daily    Hyperlipidemia  She is taking Lipitor 20 mg daily without any drug side effects.  Lab work will be reviewed.    Urgent continence  She is doing better with the Detrol pan XL.    She is overweight and has been trying to lose weight    Lab work will be reviewed  She is concerned about her renal functions.      Past Surgical History:   Procedure Laterality Date   • APPENDECTOMY     • BUNIONECTOMY Right    • CARDIOVASCULAR STRESS TEST  10/2012   • CATARACT EXTRACTION  2017   • CHOLECYSTECTOMY     • COLONOSCOPY  2011   • ECHO - CONVERTED  10/2012   • JOINT REPLACEMENT      bilateral knees    • KNEE ARTHROPLASTY Left    • KNEE ARTHROSCOPY     • SHOULDER ARTHROSCOPY Left 7/28/2016    Procedure: LEFT SHOULDER ACROMIOPLASTY,MINI OPEN ROTATOR CUFF REPAIR;  Surgeon: Carlos Eduardo Smith MD;  Location: [facility];  Service:    • SHOULDER SURGERY  05/31/2016    OPEN ACROMICPLASTY RIGHT SHOULDER     Family History   Problem Relation Age of Onset   • Heart disease Other    • Stroke Sister    • Diabetes Mother    • Heart failure Mother    • Heart disease Mother    • Heart attack Mother    • Hypertension Father    • Emphysema Father    • Heart disease Father    • No Known Problems Brother    • Cancer Sister    • Kidney disease Sister    • Heart failure Sister    • Diabetes Brother    • Diabetes Brother    • Diabetes Brother      Past Medical History:   Diagnosis Date   • GERD (gastroesophageal reflux disease)    • Hyperlipidemia    • Hypertension    • Left shoulder pain    • Obesity    • Osteoarthritis of knees, bilateral    •  Overactive bladder    • Right shoulder pain      Patient Active Problem List   Diagnosis   • Complete tear of right rotator cuff   • Stage 3a chronic kidney disease   • Cough   • Gastroesophageal reflux disease   • Mixed hyperlipidemia   • Shoulder pain   • Hypertension   • Acromioclavicular joint arthritis   • Impingement syndrome, shoulder   • Complete tear of left rotator cuff   • Urge incontinence   • Atopic rhinitis   • Upper respiratory tract infection   • Bilateral lower extremity edema   • Hypercalcemia   • Right knee pain   • DADA (stress urinary incontinence, female)   • Obesity (BMI 35.0-39.9 without comorbidity)   • Left knee injury   • Acute UTI   • COVID-19 virus infection   • Skin lesion of scalp   • Numbness in left leg   • Multiple bruises   • Vitamin D deficiency   • Laceration of right lower leg       Social History     Tobacco Use   • Smoking status: Former     Packs/day: 2.00     Years: 20.00     Pack years: 40.00     Types: Cigarettes     Quit date:      Years since quittin.3   • Smokeless tobacco: Never   Vaping Use   • Vaping Use: Never used   Substance Use Topics   • Alcohol use: No     Comment: s/p    • Drug use: No       Allergies   Allergen Reactions   • Codeine Other (See Comments)     hyperventilate   • Sulfa Antibiotics Rash       Current Outpatient Medications on File Prior to Visit   Medication Sig   • atorvastatin (LIPITOR) 20 MG tablet Take 1 tablet by mouth once daily   • carvedilol (COREG) 25 MG tablet Take 1 tablet by mouth 2 (Two) Times a Day With Meals.   • Cholecalciferol (VITAMIN D PO) Take 1,000 Units by mouth daily.   • Docusate Calcium (STOOL SOFTENER PO) Take 1 tablet by mouth Daily.   • Fexofenadine HCl (ALLEGRA PO) Take 180 mg by mouth daily as needed.   • furosemide (LASIX) 20 MG tablet Take 1 tablet by mouth 2 (Two) Times a Day.   • hydrALAZINE (APRESOLINE) 100 MG tablet Take 1 tablet by mouth 3 (Three) Times a Day.   • omeprazole OTC (PriLOSEC OTC) 20 MG  "EC tablet Take 1 tablet by mouth Daily.   • oxybutynin XL (DITROPAN-XL) 10 MG 24 hr tablet Take 1 tablet by mouth Daily.   • Probiotic Product (PROBIOTIC COLON SUPPORT PO) Take  by mouth daily.   • spironolactone (ALDACTONE) 25 MG tablet Take 1 tablet by mouth Daily.   • [DISCONTINUED] cloNIDine (CATAPRES) 0.1 MG tablet Take 0.1 mg by mouth 2 (Two) Times a Day. (Patient not taking: Reported on 4/19/2023)     No current facility-administered medications on file prior to visit.         The following portions of the patient's history were reviewed and updated as appropriate: allergies, current medications, past family history, past medical history, past social history, past surgical history and problem list.    Review of Systems   Constitutional: Negative.   HENT: Negative.  Negative for congestion.    Eyes: Negative.    Cardiovascular: Negative.  Negative for chest pain, cyanosis, dyspnea on exertion, irregular heartbeat, leg swelling, near-syncope, orthopnea, palpitations, paroxysmal nocturnal dyspnea and syncope.   Respiratory: Negative.  Negative for shortness of breath.    Hematologic/Lymphatic: Negative.    Musculoskeletal: Negative.    Gastrointestinal: Negative.    Neurological: Negative.  Negative for headaches.          Objective:     /70 (BP Location: Right arm, Patient Position: Sitting, Cuff Size: Adult)   Pulse 79   Ht 149.9 cm (59\")   Wt 78.2 kg (172 lb 6.4 oz)   SpO2 96%   BMI 34.82 kg/m²   Cardiovascular:      PMI at left midclavicular line. Normal rate. Regular rhythm. Normal S1. Normal S2.      Murmurs: There is no murmur.      No gallop. No click. No rub.   Pulses:     Intact distal pulses.   Edema:     Peripheral edema absent.           Lab Review  Lab Results   Component Value Date     04/14/2023    K 3.5 04/14/2023     04/14/2023    BUN 15 04/14/2023    CREATININE 1.28 (H) 04/14/2023    GLUCOSE 83 04/14/2023    CALCIUM 10.3 04/14/2023    ALT 14 04/14/2023    ALKPHOS 76 " 04/14/2023    LABIL2 1.6 06/08/2016     Lab Results   Component Value Date    CKTOTAL 111 01/16/2023     Lab Results   Component Value Date    WBC 6.59 04/14/2023    HGB 13.2 04/14/2023    HCT 39.1 04/14/2023     04/14/2023     Lab Results   Component Value Date    INR 1.97 12/11/2014    INR 2.02 12/08/2014    INR 2.46 12/04/2014     Lab Results   Component Value Date    MG 1.8 12/31/2020     Lab Results   Component Value Date    TSH 1.950 09/14/2018     No results found for: BNP  Lab Results   Component Value Date    CHLPL 133 06/08/2016    CHOL 125 01/16/2023    TRIG 133 01/16/2023    HDL 39 (L) 01/16/2023    VLDL 24 01/16/2023    LDLHDL 1.52 01/16/2023     Lab Results   Component Value Date    LDL 62 01/16/2023    LDL 75 04/04/2022     No results found for: PROBNP            Procedures       I personally viewed and interpreted the patient's LAB data         Assessment:     1. Primary hypertension    2. Mixed hyperlipidemia    3. Obesity (BMI 35.0-39.9 without comorbidity)    4. Urge incontinence          Plan:     Hypertension  Blood pressure is very well controlled she was advised to continue current medications including hydralazine, Coreg, Aldactone and Lasix.    Hyperlipidemia  Lipitor 20 mg daily was continued total cholesterol is 125 with LDL of 62.    Renal functions are slightly worse   BUN 15 creatinine 1.28.  She plans to see Dr. Nash in the near future overall she is doing better    He is trying to lose weight and has lost a few pounds.  Urge incontinence is better with the Ditropan.  Follow-up scheduled      No follow-ups on file.

## 2023-05-15 ENCOUNTER — LAB (OUTPATIENT)
Dept: LAB | Facility: HOSPITAL | Age: 83
End: 2023-05-15
Payer: MEDICARE

## 2023-05-15 ENCOUNTER — TRANSCRIBE ORDERS (OUTPATIENT)
Dept: ADMINISTRATIVE | Facility: HOSPITAL | Age: 83
End: 2023-05-15
Payer: MEDICARE

## 2023-05-15 DIAGNOSIS — N18.31 CHRONIC KIDNEY DISEASE (CKD) STAGE G3A/A1, MODERATELY DECREASED GLOMERULAR FILTRATION RATE (GFR) BETWEEN 45-59 ML/MIN/1.73 SQUARE METER AND ALBUMINURIA CREATININE RATIO LESS THAN 30 MG/G (CMS/H*: Primary | ICD-10-CM

## 2023-05-15 DIAGNOSIS — N18.31 CHRONIC KIDNEY DISEASE (CKD) STAGE G3A/A1, MODERATELY DECREASED GLOMERULAR FILTRATION RATE (GFR) BETWEEN 45-59 ML/MIN/1.73 SQUARE METER AND ALBUMINURIA CREATININE RATIO LESS THAN 30 MG/G (CMS/H*: ICD-10-CM

## 2023-05-15 LAB
ALBUMIN SERPL-MCNC: 4.4 G/DL (ref 3.5–5.2)
ALBUMIN UR-MCNC: <1.2 MG/DL
ALBUMIN/GLOB SERPL: 1.8 G/DL
ALP SERPL-CCNC: 78 U/L (ref 39–117)
ALT SERPL W P-5'-P-CCNC: 20 U/L (ref 1–33)
ANION GAP SERPL CALCULATED.3IONS-SCNC: 16 MMOL/L (ref 5–15)
AST SERPL-CCNC: 21 U/L (ref 1–32)
BILIRUB SERPL-MCNC: 0.5 MG/DL (ref 0–1.2)
BUN SERPL-MCNC: 18 MG/DL (ref 8–23)
BUN/CREAT SERPL: 17.5 (ref 7–25)
CALCIUM SPEC-SCNC: 9.9 MG/DL (ref 8.6–10.5)
CHLORIDE SERPL-SCNC: 103 MMOL/L (ref 98–107)
CO2 SERPL-SCNC: 25 MMOL/L (ref 22–29)
CREAT SERPL-MCNC: 1.03 MG/DL (ref 0.57–1)
CREAT UR-MCNC: 78.5 MG/DL
EGFRCR SERPLBLD CKD-EPI 2021: 54.7 ML/MIN/1.73
GLOBULIN UR ELPH-MCNC: 2.5 GM/DL
GLUCOSE SERPL-MCNC: 91 MG/DL (ref 65–99)
MICROALBUMIN/CREAT UR: NORMAL MG/G{CREAT}
POTASSIUM SERPL-SCNC: 3.3 MMOL/L (ref 3.5–5.2)
PROT SERPL-MCNC: 6.9 G/DL (ref 6–8.5)
SODIUM SERPL-SCNC: 144 MMOL/L (ref 136–145)

## 2023-05-15 PROCEDURE — 82570 ASSAY OF URINE CREATININE: CPT

## 2023-05-15 PROCEDURE — 82043 UR ALBUMIN QUANTITATIVE: CPT

## 2023-05-15 PROCEDURE — 36415 COLL VENOUS BLD VENIPUNCTURE: CPT

## 2023-05-15 PROCEDURE — 80053 COMPREHEN METABOLIC PANEL: CPT

## 2023-06-14 ENCOUNTER — LAB (OUTPATIENT)
Dept: LAB | Facility: HOSPITAL | Age: 83
End: 2023-06-14
Payer: MEDICARE

## 2023-06-14 ENCOUNTER — TRANSCRIBE ORDERS (OUTPATIENT)
Dept: ADMINISTRATIVE | Facility: HOSPITAL | Age: 83
End: 2023-06-14
Payer: MEDICARE

## 2023-06-14 DIAGNOSIS — Z13.29 SCREENING FOR THYROID DISORDER: ICD-10-CM

## 2023-06-14 DIAGNOSIS — E87.6 HYPOKALEMIA: ICD-10-CM

## 2023-06-14 DIAGNOSIS — N18.9 CHRONIC KIDNEY DISEASE, UNSPECIFIED CKD STAGE: ICD-10-CM

## 2023-06-14 DIAGNOSIS — N18.9 CHRONIC KIDNEY DISEASE, UNSPECIFIED CKD STAGE: Primary | ICD-10-CM

## 2023-06-14 LAB
ANION GAP SERPL CALCULATED.3IONS-SCNC: 13 MMOL/L (ref 5–15)
BASOPHILS # BLD AUTO: 0.05 10*3/MM3 (ref 0–0.2)
BASOPHILS NFR BLD AUTO: 0.7 % (ref 0–1.5)
BUN SERPL-MCNC: 14 MG/DL (ref 8–23)
BUN/CREAT SERPL: 13.5 (ref 7–25)
CALCIUM SPEC-SCNC: 9.2 MG/DL (ref 8.6–10.5)
CHLORIDE SERPL-SCNC: 105 MMOL/L (ref 98–107)
CO2 SERPL-SCNC: 25 MMOL/L (ref 22–29)
CREAT SERPL-MCNC: 1.04 MG/DL (ref 0.57–1)
DEPRECATED RDW RBC AUTO: 47.7 FL (ref 37–54)
EGFRCR SERPLBLD CKD-EPI 2021: 54.1 ML/MIN/1.73
EOSINOPHIL # BLD AUTO: 0.1 10*3/MM3 (ref 0–0.4)
EOSINOPHIL NFR BLD AUTO: 1.5 % (ref 0.3–6.2)
ERYTHROCYTE [DISTWIDTH] IN BLOOD BY AUTOMATED COUNT: 14.4 % (ref 12.3–15.4)
FERRITIN SERPL-MCNC: 47.2 NG/ML (ref 13–150)
GLUCOSE SERPL-MCNC: 81 MG/DL (ref 65–99)
HCT VFR BLD AUTO: 39.5 % (ref 34–46.6)
HGB BLD-MCNC: 13 G/DL (ref 12–15.9)
IMM GRANULOCYTES # BLD AUTO: 0.01 10*3/MM3 (ref 0–0.05)
IMM GRANULOCYTES NFR BLD AUTO: 0.1 % (ref 0–0.5)
IRON 24H UR-MRATE: 82 MCG/DL (ref 37–145)
IRON SATN MFR SERPL: 22 % (ref 20–50)
LYMPHOCYTES # BLD AUTO: 2.27 10*3/MM3 (ref 0.7–3.1)
LYMPHOCYTES NFR BLD AUTO: 33.9 % (ref 19.6–45.3)
MAGNESIUM SERPL-MCNC: 2.2 MG/DL (ref 1.6–2.4)
MCH RBC QN AUTO: 29.9 PG (ref 26.6–33)
MCHC RBC AUTO-ENTMCNC: 32.9 G/DL (ref 31.5–35.7)
MCV RBC AUTO: 90.8 FL (ref 79–97)
MONOCYTES # BLD AUTO: 0.65 10*3/MM3 (ref 0.1–0.9)
MONOCYTES NFR BLD AUTO: 9.7 % (ref 5–12)
NEUTROPHILS NFR BLD AUTO: 3.61 10*3/MM3 (ref 1.7–7)
NEUTROPHILS NFR BLD AUTO: 54.1 % (ref 42.7–76)
NRBC BLD AUTO-RTO: 0 /100 WBC (ref 0–0.2)
PLATELET # BLD AUTO: 247 10*3/MM3 (ref 140–450)
PMV BLD AUTO: 11 FL (ref 6–12)
POTASSIUM SERPL-SCNC: 3.6 MMOL/L (ref 3.5–5.2)
RBC # BLD AUTO: 4.35 10*6/MM3 (ref 3.77–5.28)
SODIUM SERPL-SCNC: 143 MMOL/L (ref 136–145)
TIBC SERPL-MCNC: 371 MCG/DL (ref 298–536)
TRANSFERRIN SERPL-MCNC: 249 MG/DL (ref 200–360)
TSH SERPL DL<=0.05 MIU/L-ACNC: 1.6 UIU/ML (ref 0.27–4.2)
WBC NRBC COR # BLD: 6.69 10*3/MM3 (ref 3.4–10.8)

## 2023-06-14 PROCEDURE — 84466 ASSAY OF TRANSFERRIN: CPT

## 2023-06-14 PROCEDURE — 80048 BASIC METABOLIC PNL TOTAL CA: CPT

## 2023-06-14 PROCEDURE — 82728 ASSAY OF FERRITIN: CPT

## 2023-06-14 PROCEDURE — 36415 COLL VENOUS BLD VENIPUNCTURE: CPT

## 2023-06-14 PROCEDURE — 83540 ASSAY OF IRON: CPT

## 2023-06-14 PROCEDURE — 83735 ASSAY OF MAGNESIUM: CPT

## 2023-06-14 PROCEDURE — 85025 COMPLETE CBC W/AUTO DIFF WBC: CPT

## 2023-06-14 PROCEDURE — 84443 ASSAY THYROID STIM HORMONE: CPT

## 2023-07-19 PROBLEM — Z91.89 CHRONIC CHEST PAIN WITH LOW TO MODERATE RISK FOR CAD: Status: ACTIVE | Noted: 2023-07-19

## 2023-07-19 PROBLEM — R07.9 CHRONIC CHEST PAIN WITH LOW TO MODERATE RISK FOR CAD: Status: ACTIVE | Noted: 2023-07-19

## 2023-07-19 PROBLEM — G89.29 CHRONIC CHEST PAIN WITH LOW TO MODERATE RISK FOR CAD: Status: ACTIVE | Noted: 2023-07-19

## 2023-08-07 ENCOUNTER — HOSPITAL ENCOUNTER (OUTPATIENT)
Dept: NUCLEAR MEDICINE | Facility: HOSPITAL | Age: 83
Discharge: HOME | End: 2023-08-07
Payer: MEDICARE

## 2023-08-07 ENCOUNTER — HOSPITAL ENCOUNTER (OUTPATIENT)
Dept: CARDIOLOGY | Facility: HOSPITAL | Age: 83
Discharge: HOME | End: 2023-08-07
Payer: MEDICARE

## 2023-08-07 ENCOUNTER — TELEPHONE (OUTPATIENT)
Dept: CARDIOLOGY | Facility: CLINIC | Age: 83
End: 2023-08-07

## 2023-08-07 DIAGNOSIS — R07.9 CHRONIC CHEST PAIN WITH LOW TO MODERATE RISK FOR CAD: ICD-10-CM

## 2023-08-07 DIAGNOSIS — G89.29 CHRONIC CHEST PAIN WITH LOW TO MODERATE RISK FOR CAD: ICD-10-CM

## 2023-08-07 DIAGNOSIS — Z91.89 CHRONIC CHEST PAIN WITH LOW TO MODERATE RISK FOR CAD: ICD-10-CM

## 2023-08-07 LAB
BH CV NUCLEAR PRIOR STUDY: 3
BH CV REST NUCLEAR ISOTOPE DOSE: 9.5 MCI
BH CV STRESS BP STAGE 1: NORMAL
BH CV STRESS COMMENTS STAGE 1: NORMAL
BH CV STRESS DOSE REGADENOSON STAGE 1: 0.4
BH CV STRESS DURATION MIN STAGE 1: 0
BH CV STRESS DURATION SEC STAGE 1: 10
BH CV STRESS HR STAGE 1: 100
BH CV STRESS NUCLEAR ISOTOPE DOSE: 31.5 MCI
BH CV STRESS PROTOCOL 1: NORMAL
BH CV STRESS RECOVERY BP: NORMAL MMHG
BH CV STRESS RECOVERY HR: 93 BPM
BH CV STRESS STAGE 1: 1
LV EF NUC BP: 89 %
MAXIMAL PREDICTED HEART RATE: 139 BPM
PERCENT MAX PREDICTED HR: 71.94 %
STRESS BASELINE BP: NORMAL MMHG
STRESS BASELINE HR: 84 BPM
STRESS PERCENT HR: 85 %
STRESS POST PEAK BP: NORMAL MMHG
STRESS POST PEAK HR: 100 BPM
STRESS TARGET HR: 118 BPM

## 2023-08-07 PROCEDURE — 78452 HT MUSCLE IMAGE SPECT MULT: CPT | Performed by: INTERNAL MEDICINE

## 2023-08-07 PROCEDURE — 93017 CV STRESS TEST TRACING ONLY: CPT

## 2023-08-07 PROCEDURE — 25010000002 REGADENOSON 0.4 MG/5ML SOLUTION: Performed by: INTERNAL MEDICINE

## 2023-08-07 PROCEDURE — 0 TECHNETIUM SESTAMIBI: Performed by: INTERNAL MEDICINE

## 2023-08-07 PROCEDURE — 93018 CV STRESS TEST I&R ONLY: CPT | Performed by: INTERNAL MEDICINE

## 2023-08-07 PROCEDURE — A9500 TC99M SESTAMIBI: HCPCS | Performed by: INTERNAL MEDICINE

## 2023-08-07 PROCEDURE — 78452 HT MUSCLE IMAGE SPECT MULT: CPT

## 2023-08-07 RX ORDER — REGADENOSON 0.08 MG/ML
0.4 INJECTION, SOLUTION INTRAVENOUS
Status: COMPLETED | OUTPATIENT
Start: 2023-08-07 | End: 2023-08-07

## 2023-08-07 RX ADMIN — REGADENOSON 0.4 MG: 0.08 INJECTION, SOLUTION INTRAVENOUS at 09:16

## 2023-08-07 RX ADMIN — TECHNETIUM TC 99M SESTAMIBI 1 DOSE: 1 INJECTION INTRAVENOUS at 08:11

## 2023-08-07 RX ADMIN — TECHNETIUM TC 99M SESTAMIBI 1 DOSE: 1 INJECTION INTRAVENOUS at 09:16

## 2023-09-05 ENCOUNTER — TRANSCRIBE ORDERS (OUTPATIENT)
Dept: ADMINISTRATIVE | Facility: HOSPITAL | Age: 83
End: 2023-09-05
Payer: MEDICARE

## 2023-09-05 ENCOUNTER — LAB (OUTPATIENT)
Dept: LAB | Facility: HOSPITAL | Age: 83
End: 2023-09-05
Payer: MEDICARE

## 2023-09-05 DIAGNOSIS — N18.31 CHRONIC KIDNEY DISEASE (CKD) STAGE G3A/A1, MODERATELY DECREASED GLOMERULAR FILTRATION RATE (GFR) BETWEEN 45-59 ML/MIN/1.73 SQUARE METER AND ALBUMINURIA CREATININE RATIO LESS THAN 30 MG/G (CMS/H*: ICD-10-CM

## 2023-09-05 DIAGNOSIS — N18.31 CHRONIC KIDNEY DISEASE (CKD) STAGE G3A/A1, MODERATELY DECREASED GLOMERULAR FILTRATION RATE (GFR) BETWEEN 45-59 ML/MIN/1.73 SQUARE METER AND ALBUMINURIA CREATININE RATIO LESS THAN 30 MG/G (CMS/H*: Primary | ICD-10-CM

## 2023-09-05 LAB
ALBUMIN SERPL-MCNC: 4.5 G/DL (ref 3.5–5.2)
ALBUMIN UR-MCNC: 1.2 MG/DL
ALBUMIN/GLOB SERPL: 1.9 G/DL
ALP SERPL-CCNC: 75 U/L (ref 39–117)
ALT SERPL W P-5'-P-CCNC: 12 U/L (ref 1–33)
ANION GAP SERPL CALCULATED.3IONS-SCNC: 13 MMOL/L (ref 5–15)
AST SERPL-CCNC: 17 U/L (ref 1–32)
BILIRUB SERPL-MCNC: 0.4 MG/DL (ref 0–1.2)
BUN SERPL-MCNC: 17 MG/DL (ref 8–23)
BUN/CREAT SERPL: 16.5 (ref 7–25)
CALCIUM SPEC-SCNC: 10 MG/DL (ref 8.6–10.5)
CHLORIDE SERPL-SCNC: 103 MMOL/L (ref 98–107)
CO2 SERPL-SCNC: 25 MMOL/L (ref 22–29)
CREAT SERPL-MCNC: 1.03 MG/DL (ref 0.57–1)
CREAT UR-MCNC: 64.8 MG/DL
EGFRCR SERPLBLD CKD-EPI 2021: 54.7 ML/MIN/1.73
GLOBULIN UR ELPH-MCNC: 2.4 GM/DL
GLUCOSE SERPL-MCNC: 78 MG/DL (ref 65–99)
MICROALBUMIN/CREAT UR: 18.5 MG/G
POTASSIUM SERPL-SCNC: 3.8 MMOL/L (ref 3.5–5.2)
PROT SERPL-MCNC: 6.9 G/DL (ref 6–8.5)
SODIUM SERPL-SCNC: 141 MMOL/L (ref 136–145)

## 2023-09-05 PROCEDURE — 82043 UR ALBUMIN QUANTITATIVE: CPT

## 2023-09-05 PROCEDURE — 82570 ASSAY OF URINE CREATININE: CPT

## 2023-09-05 PROCEDURE — 36415 COLL VENOUS BLD VENIPUNCTURE: CPT

## 2023-09-05 PROCEDURE — 80053 COMPREHEN METABOLIC PANEL: CPT

## 2023-09-30 ENCOUNTER — APPOINTMENT (OUTPATIENT)
Dept: CT IMAGING | Facility: HOSPITAL | Age: 83
End: 2023-09-30
Payer: MEDICARE

## 2023-09-30 ENCOUNTER — HOSPITAL ENCOUNTER (EMERGENCY)
Facility: HOSPITAL | Age: 83
Discharge: HOME | End: 2023-09-30
Attending: EMERGENCY MEDICINE
Payer: MEDICARE

## 2023-09-30 VITALS
BODY MASS INDEX: 33.47 KG/M2 | WEIGHT: 166 LBS | HEIGHT: 59 IN | DIASTOLIC BLOOD PRESSURE: 85 MMHG | TEMPERATURE: 98.1 F | RESPIRATION RATE: 18 BRPM | HEART RATE: 82 BPM | OXYGEN SATURATION: 95 % | SYSTOLIC BLOOD PRESSURE: 128 MMHG

## 2023-09-30 DIAGNOSIS — M54.41 ACUTE MIDLINE LOW BACK PAIN WITH RIGHT-SIDED SCIATICA: Primary | ICD-10-CM

## 2023-09-30 LAB
ALBUMIN SERPL-MCNC: 4 G/DL (ref 3.5–5.2)
ALBUMIN/GLOB SERPL: 1.3 G/DL
ALP SERPL-CCNC: 83 U/L (ref 39–117)
ALT SERPL W P-5'-P-CCNC: 9 U/L (ref 1–33)
ANION GAP SERPL CALCULATED.3IONS-SCNC: 14.4 MMOL/L (ref 5–15)
AST SERPL-CCNC: 14 U/L (ref 1–32)
BASOPHILS # BLD AUTO: 0.04 10*3/MM3 (ref 0–0.2)
BASOPHILS NFR BLD AUTO: 0.4 % (ref 0–1.5)
BILIRUB SERPL-MCNC: 0.4 MG/DL (ref 0–1.2)
BUN SERPL-MCNC: 18 MG/DL (ref 8–23)
BUN/CREAT SERPL: 17.8 (ref 7–25)
CALCIUM SPEC-SCNC: 9.4 MG/DL (ref 8.6–10.5)
CHLORIDE SERPL-SCNC: 106 MMOL/L (ref 98–107)
CO2 SERPL-SCNC: 23.6 MMOL/L (ref 22–29)
CREAT SERPL-MCNC: 1.01 MG/DL (ref 0.57–1)
DEPRECATED RDW RBC AUTO: 51.2 FL (ref 37–54)
EGFRCR SERPLBLD CKD-EPI 2021: 56 ML/MIN/1.73
EOSINOPHIL # BLD AUTO: 0.05 10*3/MM3 (ref 0–0.4)
EOSINOPHIL NFR BLD AUTO: 0.5 % (ref 0.3–6.2)
ERYTHROCYTE [DISTWIDTH] IN BLOOD BY AUTOMATED COUNT: 14.6 % (ref 12.3–15.4)
GLOBULIN UR ELPH-MCNC: 3.2 GM/DL
GLUCOSE SERPL-MCNC: 115 MG/DL (ref 65–99)
HCT VFR BLD AUTO: 38.4 % (ref 34–46.6)
HGB BLD-MCNC: 12.2 G/DL (ref 12–15.9)
HOLD SPECIMEN: NORMAL
HOLD SPECIMEN: NORMAL
IMM GRANULOCYTES # BLD AUTO: 0.04 10*3/MM3 (ref 0–0.05)
IMM GRANULOCYTES NFR BLD AUTO: 0.4 % (ref 0–0.5)
LYMPHOCYTES # BLD AUTO: 1.41 10*3/MM3 (ref 0.7–3.1)
LYMPHOCYTES NFR BLD AUTO: 12.8 % (ref 19.6–45.3)
MCH RBC QN AUTO: 29.8 PG (ref 26.6–33)
MCHC RBC AUTO-ENTMCNC: 31.8 G/DL (ref 31.5–35.7)
MCV RBC AUTO: 93.9 FL (ref 79–97)
MONOCYTES # BLD AUTO: 0.96 10*3/MM3 (ref 0.1–0.9)
MONOCYTES NFR BLD AUTO: 8.7 % (ref 5–12)
NEUTROPHILS NFR BLD AUTO: 77.2 % (ref 42.7–76)
NEUTROPHILS NFR BLD AUTO: 8.53 10*3/MM3 (ref 1.7–7)
NRBC BLD AUTO-RTO: 0 /100 WBC (ref 0–0.2)
PLATELET # BLD AUTO: 236 10*3/MM3 (ref 140–450)
PMV BLD AUTO: 10.9 FL (ref 6–12)
POTASSIUM SERPL-SCNC: 3.5 MMOL/L (ref 3.5–5.2)
PROT SERPL-MCNC: 7.2 G/DL (ref 6–8.5)
RBC # BLD AUTO: 4.09 10*6/MM3 (ref 3.77–5.28)
SODIUM SERPL-SCNC: 144 MMOL/L (ref 136–145)
WBC NRBC COR # BLD: 11.03 10*3/MM3 (ref 3.4–10.8)
WHOLE BLOOD HOLD COAG: NORMAL
WHOLE BLOOD HOLD SPECIMEN: NORMAL

## 2023-09-30 PROCEDURE — 85025 COMPLETE CBC W/AUTO DIFF WBC: CPT | Performed by: PHYSICIAN ASSISTANT

## 2023-09-30 PROCEDURE — 99284 EMERGENCY DEPT VISIT MOD MDM: CPT

## 2023-09-30 PROCEDURE — 72192 CT PELVIS W/O DYE: CPT

## 2023-09-30 PROCEDURE — 72131 CT LUMBAR SPINE W/O DYE: CPT | Performed by: RADIOLOGY

## 2023-09-30 PROCEDURE — 80053 COMPREHEN METABOLIC PANEL: CPT | Performed by: PHYSICIAN ASSISTANT

## 2023-09-30 PROCEDURE — 36415 COLL VENOUS BLD VENIPUNCTURE: CPT

## 2023-09-30 PROCEDURE — 72131 CT LUMBAR SPINE W/O DYE: CPT

## 2023-09-30 PROCEDURE — 72192 CT PELVIS W/O DYE: CPT | Performed by: RADIOLOGY

## 2023-09-30 RX ORDER — HYDROCODONE BITARTRATE AND ACETAMINOPHEN 5; 325 MG/1; MG/1
1 TABLET ORAL EVERY 6 HOURS PRN
Qty: 10 TABLET | Refills: 0 | Status: SHIPPED | OUTPATIENT
Start: 2023-09-30

## 2023-09-30 RX ORDER — HYDROCODONE BITARTRATE AND ACETAMINOPHEN 5; 325 MG/1; MG/1
1 TABLET ORAL ONCE
Status: COMPLETED | OUTPATIENT
Start: 2023-09-30 | End: 2023-09-30

## 2023-09-30 RX ADMIN — HYDROCODONE BITARTRATE AND ACETAMINOPHEN 1 TABLET: 5; 325 TABLET ORAL at 10:13

## 2023-09-30 NOTE — ED NOTES
MEDICAL SCREENING:    Reason for Visit: back pain    Patient initially seen in triage.  The patient was advised further evaluation and diagnostic testing will be needed, some of the treatment and testing will be initiated in the lobby in order to begin the process.  The patient will be returned to the waiting area for the time being and possibly be re-assessed by a subsequent ED provider.  The patient will be brought back to the treatment area in as timely manner as possible.       Travis Castillo PA  09/30/23 0927

## 2023-09-30 NOTE — ED PROVIDER NOTES
Subjective   History of Present Illness  82-year-old female presents secondary to low back pain.  Patient states this started on Wednesday.  She denies any falls or injuries.  She denies any lifting pulling or tugging.  No fever.  No burning with urination, urinary frequency or urgency.  She reports that her pain radiates into her right hip and right posterior thigh.  She states this is worse with movement.  No rash.  She has a past medical history of arthritis, hypertension, CKD, hyperlipidemia and acid reflux.  They present by private vehicle.      Review of Systems   Constitutional: Negative.  Negative for fever.   HENT: Negative.     Respiratory: Negative.     Cardiovascular: Negative.  Negative for chest pain.   Gastrointestinal: Negative.  Negative for abdominal pain.   Endocrine: Negative.    Genitourinary: Negative.  Negative for dysuria.   Musculoskeletal:  Positive for back pain.   Skin: Negative.    Neurological: Negative.    Psychiatric/Behavioral: Negative.     All other systems reviewed and are negative.    Past Medical History:   Diagnosis Date    GERD (gastroesophageal reflux disease)     Hyperlipidemia     Hypertension     Left shoulder pain     Obesity     Osteoarthritis of knees, bilateral     Overactive bladder     Right shoulder pain        Allergies   Allergen Reactions    Codeine Other (See Comments)     hyperventilate    Sulfa Antibiotics Rash       Past Surgical History:   Procedure Laterality Date    APPENDECTOMY      BUNIONECTOMY Right     CARDIOVASCULAR STRESS TEST  10/2012    CATARACT EXTRACTION  2017    CHOLECYSTECTOMY      COLONOSCOPY  2011    ECHO - CONVERTED  10/2012    JOINT REPLACEMENT      bilateral knees     KNEE ARTHROPLASTY Left     KNEE ARTHROSCOPY      SHOULDER ARTHROSCOPY Left 7/28/2016    Procedure: LEFT SHOULDER ACROMIOPLASTY,MINI OPEN ROTATOR CUFF REPAIR;  Surgeon: Carlos Eduardo Smith MD;  Location: [facility];  Service:     SHOULDER SURGERY  05/31/2016    OPEN  ACROMICPLASTY RIGHT SHOULDER       Family History   Problem Relation Age of Onset    Heart disease Other     Stroke Sister     Diabetes Mother     Heart failure Mother     Heart disease Mother     Heart attack Mother     Hypertension Father     Emphysema Father     Heart disease Father     No Known Problems Brother     Cancer Sister     Kidney disease Sister     Heart failure Sister     Diabetes Brother     Diabetes Brother     Diabetes Brother        Social History     Socioeconomic History    Marital status:    Tobacco Use    Smoking status: Former     Packs/day: 2.00     Years: 20.00     Pack years: 40.00     Types: Cigarettes     Quit date:      Years since quittin.7    Smokeless tobacco: Never   Vaping Use    Vaping Use: Never used   Substance and Sexual Activity    Alcohol use: No     Comment: s/p     Drug use: No    Sexual activity: Defer           Objective   Physical Exam  Vitals and nursing note reviewed.   Constitutional:       General: She is not in acute distress.     Appearance: She is well-developed. She is not diaphoretic.   HENT:      Head: Normocephalic and atraumatic.      Right Ear: External ear normal.      Left Ear: External ear normal.      Nose: Nose normal.   Eyes:      Conjunctiva/sclera: Conjunctivae normal.      Pupils: Pupils are equal, round, and reactive to light.   Neck:      Vascular: No JVD.      Trachea: No tracheal deviation.   Cardiovascular:      Rate and Rhythm: Normal rate and regular rhythm.      Heart sounds: Normal heart sounds. No murmur heard.  Pulmonary:      Effort: Pulmonary effort is normal. No respiratory distress.      Breath sounds: Normal breath sounds. No wheezing.   Abdominal:      General: Bowel sounds are normal.      Palpations: Abdomen is soft.      Tenderness: There is no abdominal tenderness.   Musculoskeletal:         General: Tenderness (non tender lumbar) present. No deformity. Normal range of motion.      Cervical back: Normal range  of motion and neck supple.   Skin:     General: Skin is warm and dry.      Coloration: Skin is not pale.      Findings: No erythema or rash.   Neurological:      Mental Status: She is alert and oriented to person, place, and time.      Cranial Nerves: No cranial nerve deficit.   Psychiatric:         Behavior: Behavior normal.         Thought Content: Thought content normal.       Procedures           ED Course              Results for orders placed or performed during the hospital encounter of 09/30/23   Comprehensive Metabolic Panel    Specimen: Arm, Left; Blood   Result Value Ref Range    Glucose 115 (H) 65 - 99 mg/dL    BUN 18 8 - 23 mg/dL    Creatinine 1.01 (H) 0.57 - 1.00 mg/dL    Sodium 144 136 - 145 mmol/L    Potassium 3.5 3.5 - 5.2 mmol/L    Chloride 106 98 - 107 mmol/L    CO2 23.6 22.0 - 29.0 mmol/L    Calcium 9.4 8.6 - 10.5 mg/dL    Total Protein 7.2 6.0 - 8.5 g/dL    Albumin 4.0 3.5 - 5.2 g/dL    ALT (SGPT) 9 1 - 33 U/L    AST (SGOT) 14 1 - 32 U/L    Alkaline Phosphatase 83 39 - 117 U/L    Total Bilirubin 0.4 0.0 - 1.2 mg/dL    Globulin 3.2 gm/dL    A/G Ratio 1.3 g/dL    BUN/Creatinine Ratio 17.8 7.0 - 25.0    Anion Gap 14.4 5.0 - 15.0 mmol/L    eGFR 56.0 (L) >60.0 mL/min/1.73   CBC Auto Differential    Specimen: Arm, Left; Blood   Result Value Ref Range    WBC 11.03 (H) 3.40 - 10.80 10*3/mm3    RBC 4.09 3.77 - 5.28 10*6/mm3    Hemoglobin 12.2 12.0 - 15.9 g/dL    Hematocrit 38.4 34.0 - 46.6 %    MCV 93.9 79.0 - 97.0 fL    MCH 29.8 26.6 - 33.0 pg    MCHC 31.8 31.5 - 35.7 g/dL    RDW 14.6 12.3 - 15.4 %    RDW-SD 51.2 37.0 - 54.0 fl    MPV 10.9 6.0 - 12.0 fL    Platelets 236 140 - 450 10*3/mm3    Neutrophil % 77.2 (H) 42.7 - 76.0 %    Lymphocyte % 12.8 (L) 19.6 - 45.3 %    Monocyte % 8.7 5.0 - 12.0 %    Eosinophil % 0.5 0.3 - 6.2 %    Basophil % 0.4 0.0 - 1.5 %    Immature Grans % 0.4 0.0 - 0.5 %    Neutrophils, Absolute 8.53 (H) 1.70 - 7.00 10*3/mm3    Lymphocytes, Absolute 1.41 0.70 - 3.10 10*3/mm3     Monocytes, Absolute 0.96 (H) 0.10 - 0.90 10*3/mm3    Eosinophils, Absolute 0.05 0.00 - 0.40 10*3/mm3    Basophils, Absolute 0.04 0.00 - 0.20 10*3/mm3    Immature Grans, Absolute 0.04 0.00 - 0.05 10*3/mm3    nRBC 0.0 0.0 - 0.2 /100 WBC   Green Top (Gel)   Result Value Ref Range    Extra Tube Hold for add-ons.    Lavender Top   Result Value Ref Range    Extra Tube hold for add-on    Gold Top - SST   Result Value Ref Range    Extra Tube Hold for add-ons.    Light Blue Top   Result Value Ref Range    Extra Tube Hold for add-ons.      CT Lumbar Spine Without Contrast    Result Date: 9/30/2023  Impression: 1. No acute osseous abnormality with multilevel degenerative changes detailed above.   This report was finalized on 9/30/2023 10:51 AM by Juan Daniel Junior DO.      CT Pelvis Without Contrast    Result Date: 9/30/2023  Impression: 1. No acute process.   This report was finalized on 9/30/2023 10:51 AM by Juan Daniel Junior DO.                                          Medical Decision Making  82-year-old female presents secondary to low back pain.  Patient states this started on Wednesday.  She denies any falls or injuries.  She denies any lifting pulling or tugging.  No fever.  No burning with urination, urinary frequency or urgency.  She reports that her pain radiates into her right hip and right posterior thigh.  She states this is worse with movement.  No rash.  She has a past medical history of arthritis, hypertension, CKD, hyperlipidemia and acid reflux.  They present by private vehicle.    Problems Addressed:  Acute midline low back pain with right-sided sciatica: complicated acute illness or injury    Amount and/or Complexity of Data Reviewed  Labs: ordered. Decision-making details documented in ED Course.  Radiology: ordered. Decision-making details documented in ED Course.    Risk  Prescription drug management.  Risk Details: Reviewed patient's diagnostic work-up and labs with patient and .  They voiced  understanding about need for follow-up.  They were counseled on signs and symptoms worsening appropriate follow-up.        Final diagnoses:   Acute midline low back pain with right-sided sciatica       ED Disposition  ED Disposition       ED Disposition   Discharge    Condition   Stable    Comment   --               Jennifer Montano MD  [address]  [facility] [address]  [phone]    Schedule an appointment as soon as possible for a visit            Medication List        New Prescriptions      HYDROcodone-acetaminophen 5-325 MG per tablet  Commonly known as: NORCO  Take 1 tablet by mouth Every 6 (Six) Hours As Needed for Severe Pain.               Where to Get Your Medications        These medications were sent to [facility] [address] [place] [address], [place] - [address] [facility] - [phone]  - [phone]   [address] [id number]      Phone: [phone]   HYDROcodone-acetaminophen 5-325 MG per tablet            Travis Castillo PA  09/30/23 1605

## 2023-10-12 ENCOUNTER — LAB (OUTPATIENT)
Dept: LAB | Facility: HOSPITAL | Age: 83
End: 2023-10-12
Payer: MEDICARE

## 2023-10-12 DIAGNOSIS — E78.2 MIXED HYPERLIPIDEMIA: ICD-10-CM

## 2023-10-12 LAB
CHOLEST SERPL-MCNC: 120 MG/DL (ref 0–200)
HDLC SERPL-MCNC: 41 MG/DL (ref 40–60)
LDLC SERPL CALC-MCNC: 59 MG/DL (ref 0–100)
LDLC/HDLC SERPL: 1.41 {RATIO}
TRIGL SERPL-MCNC: 106 MG/DL (ref 0–150)
VLDLC SERPL-MCNC: 20 MG/DL (ref 5–40)

## 2023-10-12 PROCEDURE — 80061 LIPID PANEL: CPT

## 2023-10-12 PROCEDURE — 36415 COLL VENOUS BLD VENIPUNCTURE: CPT

## 2023-10-18 ENCOUNTER — OFFICE VISIT (OUTPATIENT)
Dept: CARDIOLOGY | Facility: CLINIC | Age: 83
End: 2023-10-18
Payer: MEDICARE

## 2023-10-18 VITALS
HEIGHT: 59 IN | HEART RATE: 77 BPM | BODY MASS INDEX: 32.66 KG/M2 | DIASTOLIC BLOOD PRESSURE: 52 MMHG | SYSTOLIC BLOOD PRESSURE: 110 MMHG | WEIGHT: 162 LBS | OXYGEN SATURATION: 97 %

## 2023-10-18 DIAGNOSIS — L98.9 SKIN LESION OF SCALP: ICD-10-CM

## 2023-10-18 DIAGNOSIS — E78.2 MIXED HYPERLIPIDEMIA: ICD-10-CM

## 2023-10-18 DIAGNOSIS — N18.31 STAGE 3A CHRONIC KIDNEY DISEASE: Chronic | ICD-10-CM

## 2023-10-18 DIAGNOSIS — I10 PRIMARY HYPERTENSION: Chronic | ICD-10-CM

## 2023-10-18 DIAGNOSIS — M51.36 DEGENERATIVE DISC DISEASE, LUMBAR: Primary | ICD-10-CM

## 2023-10-18 PROBLEM — M51.369 DEGENERATIVE DISC DISEASE, LUMBAR: Status: ACTIVE | Noted: 2023-10-18

## 2023-10-18 RX ORDER — HYDROCODONE BITARTRATE AND ACETAMINOPHEN 5; 325 MG/1; MG/1
1 TABLET ORAL EVERY 6 HOURS PRN
Qty: 30 TABLET | Refills: 0 | Status: SHIPPED | OUTPATIENT
Start: 2023-10-18

## 2023-10-18 NOTE — PROGRESS NOTES
subjective     Chief Complaint   Patient presents with    Hospital Follow Up Visit     LBP worse       Problems Addressed This Visit  1. Degenerative disc disease, lumbar    2. Skin lesion of scalp    3. Primary hypertension    4. Mixed hyperlipidemia    5. Stage 3a chronic kidney disease        History of Present Illness    Emma is 82 years old white female who is here complaining of severe back pain.  She had a CT scan of the pelvis and lumbar spine.  She was found to have foraminal stenosis and degenerative disc disease with multiple disc bulges.  Patient has stage IIIa kidney disease and cannot take NSAID therapy.  We will start on Norco.  Side effects were explained.    Patient has scalp lesion and needs dermatology consult.    Hypertension  Blood pressure is fairly well controlled.  She is taking Procardia, Aldactone, hydralazine and Coreg.    Hyperlipidemia  On Lipitor therapy.    Chronic renal failure stable    Past Surgical History:   Procedure Laterality Date    APPENDECTOMY      BUNIONECTOMY Right     CARDIOVASCULAR STRESS TEST  10/2012    CATARACT EXTRACTION  2017    CHOLECYSTECTOMY      COLONOSCOPY  2011    ECHO - CONVERTED  10/2012    JOINT REPLACEMENT      bilateral knees     KNEE ARTHROPLASTY Left     KNEE ARTHROSCOPY      SHOULDER ARTHROSCOPY Left 7/28/2016    Procedure: LEFT SHOULDER ACROMIOPLASTY,MINI OPEN ROTATOR CUFF REPAIR;  Surgeon: Carlos Eduardo Smith MD;  Location: [facility];  Service:     SHOULDER SURGERY  05/31/2016    OPEN ACROMICPLASTY RIGHT SHOULDER     Family History   Problem Relation Age of Onset    Heart disease Other     Stroke Sister     Diabetes Mother     Heart failure Mother     Heart disease Mother     Heart attack Mother     Hypertension Father     Emphysema Father     Heart disease Father     No Known Problems Brother     Cancer Sister     Kidney disease Sister     Heart failure Sister     Diabetes Brother     Diabetes Brother     Diabetes Brother      Past Medical  History:   Diagnosis Date    GERD (gastroesophageal reflux disease)     Hyperlipidemia     Hypertension     Left shoulder pain     Obesity     Osteoarthritis of knees, bilateral     Overactive bladder     Right shoulder pain      Patient Active Problem List   Diagnosis    Complete tear of right rotator cuff    Stage 3a chronic kidney disease    Cough    Gastroesophageal reflux disease    Mixed hyperlipidemia    Shoulder pain    Hypertension    Acromioclavicular joint arthritis    Impingement syndrome, shoulder    Complete tear of left rotator cuff    Urge incontinence    Atopic rhinitis    Upper respiratory tract infection    Bilateral lower extremity edema    Hypercalcemia    Right knee pain    DADA (stress urinary incontinence, female)    Obesity (BMI 35.0-39.9 without comorbidity)    Left knee injury    Acute UTI    COVID-19 virus infection    Skin lesion of scalp    Numbness in left leg    Multiple bruises    Vitamin D deficiency    Laceration of right lower leg    Chronic chest pain with low to moderate risk for CAD    Degenerative disc disease, lumbar       Social History     Tobacco Use    Smoking status: Former     Packs/day: 2.00     Years: 20.00     Additional pack years: 0.00     Total pack years: 40.00     Types: Cigarettes     Quit date:      Years since quittin.8    Smokeless tobacco: Never   Vaping Use    Vaping Use: Never used   Substance Use Topics    Alcohol use: No     Comment: s/p     Drug use: No       Allergies   Allergen Reactions    Codeine Other (See Comments)     hyperventilate    Sulfa Antibiotics Rash       Current Outpatient Medications on File Prior to Visit   Medication Sig    atorvastatin (LIPITOR) 20 MG tablet Take 1 tablet by mouth once daily    carvedilol (COREG) 25 MG tablet Take 1 tablet by mouth 2 (Two) Times a Day With Meals.    Cholecalciferol (VITAMIN D PO) Take 1,000 Units by mouth daily.    Docusate Calcium (STOOL SOFTENER PO) Take 1 tablet by mouth Daily.     "Fexofenadine HCl (ALLEGRA PO) Take 180 mg by mouth daily as needed.    hydrALAZINE (APRESOLINE) 100 MG tablet Take 1 tablet by mouth 3 (Three) Times a Day.    NIFEdipine XL (PROCARDIA XL) 30 MG 24 hr tablet Take 1 tablet by mouth Daily.    omeprazole OTC (PriLOSEC OTC) 20 MG EC tablet Take 1 tablet by mouth Daily.    oxybutynin XL (DITROPAN-XL) 10 MG 24 hr tablet Take 1 tablet by mouth Daily.    potassium chloride (K-DUR,KLOR-CON) 20 MEQ CR tablet Take 1 tablet by mouth 2 (Two) Times a Day.    Probiotic Product (PROBIOTIC COLON SUPPORT PO) Take  by mouth daily.    spironolactone (ALDACTONE) 25 MG tablet Take 1 tablet by mouth Daily.    furosemide (LASIX) 20 MG tablet Take 1 tablet by mouth 2 (Two) Times a Day.     No current facility-administered medications on file prior to visit.         The following portions of the patient's history were reviewed and updated as appropriate: allergies, current medications, past family history, past medical history, past social history, past surgical history and problem list.    Review of Systems   Constitutional: Negative.   HENT: Negative.  Negative for congestion.    Eyes: Negative.    Cardiovascular: Negative.  Negative for chest pain, cyanosis, dyspnea on exertion, irregular heartbeat, leg swelling, near-syncope, orthopnea, palpitations, paroxysmal nocturnal dyspnea and syncope.   Respiratory: Negative.  Negative for shortness of breath.    Hematologic/Lymphatic: Negative.    Skin:  Positive for suspicious lesions.   Musculoskeletal:  Positive for back pain.   Gastrointestinal: Negative.    Neurological: Negative.  Negative for headaches.          Objective:     /52 (BP Location: Left arm, Patient Position: Sitting, Cuff Size: Adult)   Pulse 77   Ht 149.9 cm (59\")   Wt 73.5 kg (162 lb)   SpO2 97%   BMI 32.72 kg/m²   Pulmonary:      Effort: Pulmonary effort is normal.      Breath sounds: Normal breath sounds. No stridor. No wheezing. No rhonchi. No rales. " "  Cardiovascular:      PMI at left midclavicular line. Normal rate. Regular rhythm. Normal S1. Normal S2.       Murmurs: There is no murmur.      No gallop.  No click. No rub.   Pulses:     Intact distal pulses.   Edema:     Peripheral edema absent.           Lab Review  Lab Results   Component Value Date     09/30/2023    K 3.5 09/30/2023     09/30/2023    BUN 18 09/30/2023    CREATININE 1.01 (H) 09/30/2023    GLUCOSE 115 (H) 09/30/2023    CALCIUM 9.4 09/30/2023    ALT 9 09/30/2023    ALKPHOS 83 09/30/2023    LABIL2 1.6 06/08/2016     Lab Results   Component Value Date    CKTOTAL 111 01/16/2023     Lab Results   Component Value Date    WBC 11.03 (H) 09/30/2023    HGB 12.2 09/30/2023    HCT 38.4 09/30/2023     09/30/2023     Lab Results   Component Value Date    INR 1.97 12/11/2014     Lab Results   Component Value Date    MG 2.2 06/14/2023     Lab Results   Component Value Date    TSH 1.600 06/14/2023     No results found for: \"BNP\"  Lab Results   Component Value Date    CHLPL 133 06/08/2016    CHOL 120 10/12/2023    TRIG 106 10/12/2023    HDL 41 10/12/2023    VLDL 20 10/12/2023    LDL 59 10/12/2023     Lab Results   Component Value Date    LDL 59 10/12/2023    LDL 62 01/16/2023     No results found for: \"PROBNP\"            Procedures     CT Lumbar Spine Without Contrast [[id number]] (Order [id number])  Order  Status: Final result     Study Notes     Libra Shields on 9/30/2023 10:11 AM EDT   Low back pain x 3 days. NKI     Appointment Information    PACS Images     Radiology Images  Study Result    Wet Read   Interpretation per radiologist   Interpreted by Travis Castillo PA on 9/30/2023 11:15 AM EDT   Narrative & Impression   Comparison: None.     Multilevel degenerative changes particularly lower lumbar spine with  grade 2 spondylolisthesis L4 and disc bulge causing severe spinal canal  stenosis. Disc herniation L2-L3 and L3-L4 also noted. No acute fracture  is seen. Advanced degenerative " endplate changes T11-T12 with  disc?osteophyte T11-T12 and T12-L1 causing spinal canal stenosis.  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      IMPRESSION:  Impression:  1. No acute osseous abnormality with multilevel degenerative changes  detailed above.   I personally viewed and interpreted the patient's LAB data         Assessment:     1. Degenerative disc disease, lumbar    2. Skin lesion of scalp    3. Primary hypertension    4. Mixed hyperlipidemia    5. Stage 3a chronic kidney disease          Plan:     Patient complains of back pain, CT scan showed disc bulge causing severe spinal canal stenosis, disc herniation L2-3 and L3-4.  Patient was given Norco for pain control and will make appointment at [place] with Dr. Pfeiffer.    Scalp lesion   Will get dermatology consult.    Hypertension is very well controlled she will continue current medications.    Hyperlipidemia is also very well controlled no change in therapy.    Kidney disease stage IIIa stable patient was advised to avoid NSAID therapy.  Follow-up scheduled      No follow-ups on file.

## 2023-11-07 ENCOUNTER — CLINICAL SUPPORT (OUTPATIENT)
Dept: CARDIOLOGY | Facility: CLINIC | Age: 83
End: 2023-11-07
Payer: MEDICARE

## 2023-11-07 DIAGNOSIS — Z23 NEED FOR PROPHYLACTIC VACCINATION AND INOCULATION AGAINST INFLUENZA: Primary | ICD-10-CM

## 2023-11-08 ENCOUNTER — TELEPHONE (OUTPATIENT)
Dept: CARDIOLOGY | Facility: CLINIC | Age: 83
End: 2023-11-08
Payer: MEDICARE

## 2023-11-08 DIAGNOSIS — R20.0 NUMBNESS IN LEFT LEG: Primary | ICD-10-CM

## 2023-11-08 DIAGNOSIS — M51.36 DEGENERATIVE DISC DISEASE, LUMBAR: ICD-10-CM

## 2023-11-08 NOTE — TELEPHONE ENCOUNTER
Dr. Cisneros office called about pts referral and stated she had to have a Current MRI done before they can complete NeuroSurgery Referral  for Degenerative disc disease, lumbar.     Last MRI was 8/2/2022.

## 2023-12-24 ENCOUNTER — APPOINTMENT (OUTPATIENT)
Dept: GENERAL RADIOLOGY | Facility: HOSPITAL | Age: 83
End: 2023-12-24
Payer: MEDICARE

## 2023-12-24 ENCOUNTER — HOSPITAL ENCOUNTER (EMERGENCY)
Facility: HOSPITAL | Age: 83
Discharge: HOME | End: 2023-12-24
Attending: STUDENT IN AN ORGANIZED HEALTH CARE EDUCATION/TRAINING PROGRAM | Admitting: STUDENT IN AN ORGANIZED HEALTH CARE EDUCATION/TRAINING PROGRAM
Payer: MEDICARE

## 2023-12-24 VITALS
DIASTOLIC BLOOD PRESSURE: 70 MMHG | OXYGEN SATURATION: 96 % | WEIGHT: 155 LBS | SYSTOLIC BLOOD PRESSURE: 142 MMHG | HEART RATE: 78 BPM | HEIGHT: 59 IN | BODY MASS INDEX: 31.25 KG/M2 | RESPIRATION RATE: 20 BRPM | TEMPERATURE: 98.2 F

## 2023-12-24 DIAGNOSIS — J20.5 RSV BRONCHITIS: Primary | ICD-10-CM

## 2023-12-24 LAB
ALBUMIN SERPL-MCNC: 3.9 G/DL (ref 3.5–5.2)
ALBUMIN/GLOB SERPL: 1.3 G/DL
ALP SERPL-CCNC: 91 U/L (ref 39–117)
ALT SERPL W P-5'-P-CCNC: 12 U/L (ref 1–33)
ANION GAP SERPL CALCULATED.3IONS-SCNC: 13.5 MMOL/L (ref 5–15)
AST SERPL-CCNC: 19 U/L (ref 1–32)
BASOPHILS # BLD AUTO: 0.05 10*3/MM3 (ref 0–0.2)
BASOPHILS NFR BLD AUTO: 0.7 % (ref 0–1.5)
BILIRUB SERPL-MCNC: 0.5 MG/DL (ref 0–1.2)
BUN SERPL-MCNC: 11 MG/DL (ref 8–23)
BUN/CREAT SERPL: 10.6 (ref 7–25)
CALCIUM SPEC-SCNC: 8.7 MG/DL (ref 8.6–10.5)
CHLORIDE SERPL-SCNC: 103 MMOL/L (ref 98–107)
CO2 SERPL-SCNC: 28.5 MMOL/L (ref 22–29)
CREAT SERPL-MCNC: 1.04 MG/DL (ref 0.57–1)
CRP SERPL-MCNC: 0.47 MG/DL (ref 0–0.5)
D-LACTATE SERPL-SCNC: 1 MMOL/L (ref 0.5–2)
DEPRECATED RDW RBC AUTO: 54 FL (ref 37–54)
EGFRCR SERPLBLD CKD-EPI 2021: 53.8 ML/MIN/1.73
EOSINOPHIL # BLD AUTO: 0.23 10*3/MM3 (ref 0–0.4)
EOSINOPHIL NFR BLD AUTO: 3.4 % (ref 0.3–6.2)
ERYTHROCYTE [DISTWIDTH] IN BLOOD BY AUTOMATED COUNT: 15.6 % (ref 12.3–15.4)
FERRITIN SERPL-MCNC: 118.5 NG/ML (ref 13–150)
FIBRINOGEN PPP-MCNC: 336 MG/DL (ref 173–524)
FLUAV RNA RESP QL NAA+PROBE: NOT DETECTED
FLUBV RNA RESP QL NAA+PROBE: NOT DETECTED
GLOBULIN UR ELPH-MCNC: 2.9 GM/DL
GLUCOSE SERPL-MCNC: 102 MG/DL (ref 65–99)
HCT VFR BLD AUTO: 37.9 % (ref 34–46.6)
HGB BLD-MCNC: 12 G/DL (ref 12–15.9)
IMM GRANULOCYTES # BLD AUTO: 0.01 10*3/MM3 (ref 0–0.05)
IMM GRANULOCYTES NFR BLD AUTO: 0.1 % (ref 0–0.5)
LDH SERPL-CCNC: 230 U/L (ref 135–214)
LYMPHOCYTES # BLD AUTO: 1.73 10*3/MM3 (ref 0.7–3.1)
LYMPHOCYTES NFR BLD AUTO: 25.4 % (ref 19.6–45.3)
MCH RBC QN AUTO: 29.6 PG (ref 26.6–33)
MCHC RBC AUTO-ENTMCNC: 31.7 G/DL (ref 31.5–35.7)
MCV RBC AUTO: 93.6 FL (ref 79–97)
MONOCYTES # BLD AUTO: 1.11 10*3/MM3 (ref 0.1–0.9)
MONOCYTES NFR BLD AUTO: 16.3 % (ref 5–12)
NEUTROPHILS NFR BLD AUTO: 3.69 10*3/MM3 (ref 1.7–7)
NEUTROPHILS NFR BLD AUTO: 54.1 % (ref 42.7–76)
NRBC BLD AUTO-RTO: 0 /100 WBC (ref 0–0.2)
PLATELET # BLD AUTO: 242 10*3/MM3 (ref 140–450)
PMV BLD AUTO: 10.2 FL (ref 6–12)
POTASSIUM SERPL-SCNC: 3.1 MMOL/L (ref 3.5–5.2)
PROCALCITONIN SERPL-MCNC: 0.11 NG/ML (ref 0–0.25)
PROT SERPL-MCNC: 6.8 G/DL (ref 6–8.5)
RBC # BLD AUTO: 4.05 10*6/MM3 (ref 3.77–5.28)
RSV RNA NPH QL NAA+NON-PROBE: DETECTED
SARS-COV-2 RNA RESP QL NAA+PROBE: NOT DETECTED
SODIUM SERPL-SCNC: 145 MMOL/L (ref 136–145)
WBC NRBC COR # BLD AUTO: 6.82 10*3/MM3 (ref 3.4–10.8)

## 2023-12-24 PROCEDURE — 84145 PROCALCITONIN (PCT): CPT | Performed by: PHYSICIAN ASSISTANT

## 2023-12-24 PROCEDURE — 25810000003 SODIUM CHLORIDE 0.9 % SOLUTION: Performed by: PHYSICIAN ASSISTANT

## 2023-12-24 PROCEDURE — 99283 EMERGENCY DEPT VISIT LOW MDM: CPT

## 2023-12-24 PROCEDURE — 96361 HYDRATE IV INFUSION ADD-ON: CPT

## 2023-12-24 PROCEDURE — 94799 UNLISTED PULMONARY SVC/PX: CPT

## 2023-12-24 PROCEDURE — 86140 C-REACTIVE PROTEIN: CPT | Performed by: PHYSICIAN ASSISTANT

## 2023-12-24 PROCEDURE — 94761 N-INVAS EAR/PLS OXIMETRY MLT: CPT

## 2023-12-24 PROCEDURE — 71045 X-RAY EXAM CHEST 1 VIEW: CPT

## 2023-12-24 PROCEDURE — 83605 ASSAY OF LACTIC ACID: CPT | Performed by: PHYSICIAN ASSISTANT

## 2023-12-24 PROCEDURE — 82728 ASSAY OF FERRITIN: CPT | Performed by: PHYSICIAN ASSISTANT

## 2023-12-24 PROCEDURE — 83615 LACTATE (LD) (LDH) ENZYME: CPT | Performed by: PHYSICIAN ASSISTANT

## 2023-12-24 PROCEDURE — 36415 COLL VENOUS BLD VENIPUNCTURE: CPT

## 2023-12-24 PROCEDURE — 87637 SARSCOV2&INF A&B&RSV AMP PRB: CPT | Performed by: PHYSICIAN ASSISTANT

## 2023-12-24 PROCEDURE — 80053 COMPREHEN METABOLIC PANEL: CPT | Performed by: PHYSICIAN ASSISTANT

## 2023-12-24 PROCEDURE — 96374 THER/PROPH/DIAG INJ IV PUSH: CPT

## 2023-12-24 PROCEDURE — 94640 AIRWAY INHALATION TREATMENT: CPT

## 2023-12-24 PROCEDURE — 25010000002 METHYLPREDNISOLONE PER 125 MG: Performed by: PHYSICIAN ASSISTANT

## 2023-12-24 PROCEDURE — 85025 COMPLETE CBC W/AUTO DIFF WBC: CPT | Performed by: PHYSICIAN ASSISTANT

## 2023-12-24 PROCEDURE — 85384 FIBRINOGEN ACTIVITY: CPT | Performed by: PHYSICIAN ASSISTANT

## 2023-12-24 RX ORDER — ALBUTEROL SULFATE 90 UG/1
2 AEROSOL, METERED RESPIRATORY (INHALATION) EVERY 4 HOURS PRN
Qty: 6.7 G | Refills: 0 | Status: SHIPPED | OUTPATIENT
Start: 2023-12-24

## 2023-12-24 RX ORDER — METHYLPREDNISOLONE SODIUM SUCCINATE 125 MG/2ML
80 INJECTION, POWDER, LYOPHILIZED, FOR SOLUTION INTRAMUSCULAR; INTRAVENOUS ONCE
Status: COMPLETED | OUTPATIENT
Start: 2023-12-24 | End: 2023-12-24

## 2023-12-24 RX ORDER — PREDNISONE 20 MG/1
20 TABLET ORAL DAILY
Qty: 5 TABLET | Refills: 0 | Status: SHIPPED | OUTPATIENT
Start: 2023-12-24

## 2023-12-24 RX ORDER — BENZONATATE 200 MG/1
200 CAPSULE ORAL 3 TIMES DAILY PRN
Qty: 8 CAPSULE | Refills: 0 | Status: SHIPPED | OUTPATIENT
Start: 2023-12-24 | End: 2023-12-26 | Stop reason: SINTOL

## 2023-12-24 RX ORDER — IPRATROPIUM BROMIDE AND ALBUTEROL SULFATE 2.5; .5 MG/3ML; MG/3ML
3 SOLUTION RESPIRATORY (INHALATION) ONCE
Status: COMPLETED | OUTPATIENT
Start: 2023-12-24 | End: 2023-12-24

## 2023-12-24 RX ORDER — POTASSIUM CHLORIDE 1.5 G/1.58G
40 POWDER, FOR SOLUTION ORAL ONCE
Status: COMPLETED | OUTPATIENT
Start: 2023-12-24 | End: 2023-12-24

## 2023-12-24 RX ORDER — HYDROCODONE POLISTIREX AND CHLORPHENIRAMINE POLISTIREX 10; 8 MG/5ML; MG/5ML
5 SUSPENSION, EXTENDED RELEASE ORAL ONCE
Status: COMPLETED | OUTPATIENT
Start: 2023-12-24 | End: 2023-12-24

## 2023-12-24 RX ORDER — SODIUM CHLORIDE 0.9 % (FLUSH) 0.9 %
10 SYRINGE (ML) INJECTION AS NEEDED
Status: DISCONTINUED | OUTPATIENT
Start: 2023-12-24 | End: 2023-12-24 | Stop reason: HOSPADM

## 2023-12-24 RX ADMIN — IPRATROPIUM BROMIDE AND ALBUTEROL SULFATE 3 ML: 2.5; .5 SOLUTION RESPIRATORY (INHALATION) at 13:10

## 2023-12-24 RX ADMIN — POTASSIUM CHLORIDE 40 MEQ: 1.5 POWDER, FOR SOLUTION ORAL at 13:26

## 2023-12-24 RX ADMIN — SODIUM CHLORIDE 1000 ML: 9 INJECTION, SOLUTION INTRAVENOUS at 12:04

## 2023-12-24 RX ADMIN — HYDROCODONE POLISTIREX AND CHLORPHENIRAMINE POLISTIREX 5 ML: 10; 8 SUSPENSION, EXTENDED RELEASE ORAL at 13:26

## 2023-12-24 RX ADMIN — METHYLPREDNISOLONE SODIUM SUCCINATE 80 MG: 125 INJECTION, POWDER, FOR SOLUTION INTRAMUSCULAR; INTRAVENOUS at 13:26

## 2023-12-24 NOTE — ED PROVIDER NOTES
"Subjective   History of Present Illness  84 y/o female that presents to emergency department with c/c \"Cough, congestion, fatigue, nasal congestion\". Patient states that she was recently diagnosed with viral illness. States that she has continued to feel worse. PAtietn denies any other associated complaints at this time.     History provided by:  Patient   used: No    URI  Presenting symptoms: congestion, cough, fatigue and rhinorrhea    Severity:  Moderate  Onset quality:  Gradual  Duration:  3 weeks  Timing:  Intermittent  Progression:  Worsening  Chronicity:  New  Relieved by:  Nothing  Worsened by:  Nothing  Ineffective treatments:  None tried  Associated symptoms: arthralgias and myalgias    Associated symptoms: no headaches    Risk factors: no chronic cardiac disease, no chronic kidney disease, no chronic respiratory disease, no recent illness, no recent travel and no sick contacts        Review of Systems   Constitutional:  Positive for fatigue.   HENT:  Positive for congestion and rhinorrhea.    Eyes: Negative.  Negative for pain and itching.   Respiratory:  Positive for cough and chest tightness.    Cardiovascular: Negative.  Negative for chest pain and leg swelling.   Gastrointestinal: Negative.  Negative for anal bleeding, blood in stool, constipation and diarrhea.   Endocrine: Negative.  Negative for heat intolerance, polydipsia and polyphagia.   Genitourinary: Negative.  Negative for dysuria, enuresis, flank pain, frequency and hematuria.   Musculoskeletal:  Positive for arthralgias and myalgias.   Neurological: Negative.  Negative for light-headedness, numbness and headaches.   Hematological: Negative.  Negative for adenopathy.   Psychiatric/Behavioral: Negative.  Negative for confusion, decreased concentration, dysphoric mood and self-injury. The patient is not hyperactive.    All other systems reviewed and are negative.      Past Medical History:   Diagnosis Date    GERD " (gastroesophageal reflux disease)     Hyperlipidemia     Hypertension     Left shoulder pain     Obesity     Osteoarthritis of knees, bilateral     Overactive bladder     Right shoulder pain        Allergies   Allergen Reactions    Codeine Other (See Comments)     hyperventilate    Sulfa Antibiotics Rash       Past Surgical History:   Procedure Laterality Date    APPENDECTOMY      BUNIONECTOMY Right     CARDIOVASCULAR STRESS TEST  10/2012    CATARACT EXTRACTION  2017    CHOLECYSTECTOMY      COLONOSCOPY      ECHO - CONVERTED  10/2012    JOINT REPLACEMENT      bilateral knees     KNEE ARTHROPLASTY Left     KNEE ARTHROSCOPY      SHOULDER ARTHROSCOPY Left 2016    Procedure: LEFT SHOULDER ACROMIOPLASTY,MINI OPEN ROTATOR CUFF REPAIR;  Surgeon: Carlos Eduardo Smith MD;  Location: [facility];  Service:     SHOULDER SURGERY  2016    OPEN ACROMICPLASTY RIGHT SHOULDER       Family History   Problem Relation Age of Onset    Heart disease Other     Stroke Sister     Diabetes Mother     Heart failure Mother     Heart disease Mother     Heart attack Mother     Hypertension Father     Emphysema Father     Heart disease Father     No Known Problems Brother     Cancer Sister     Kidney disease Sister     Heart failure Sister     Diabetes Brother     Diabetes Brother     Diabetes Brother        Social History     Socioeconomic History    Marital status:    Tobacco Use    Smoking status: Former     Packs/day: 2.00     Years: 20.00     Additional pack years: 0.00     Total pack years: 40.00     Types: Cigarettes     Quit date:      Years since quittin.0    Smokeless tobacco: Never   Vaping Use    Vaping Use: Never used   Substance and Sexual Activity    Alcohol use: No     Comment: s/p     Drug use: No    Sexual activity: Defer           Objective   Physical Exam  Vitals and nursing note reviewed.   Constitutional:       General: She is not in acute distress.     Appearance: Normal appearance. She is  normal weight. She is not ill-appearing, toxic-appearing or diaphoretic.   HENT:      Head: Normocephalic and atraumatic.      Right Ear: Tympanic membrane, ear canal and external ear normal. There is no impacted cerumen.      Left Ear: Tympanic membrane, ear canal and external ear normal. There is no impacted cerumen.      Nose: Congestion and rhinorrhea present.      Mouth/Throat:      Mouth: Mucous membranes are moist.      Pharynx: Oropharynx is clear. No oropharyngeal exudate or posterior oropharyngeal erythema.   Eyes:      General: No scleral icterus.        Right eye: No discharge.         Left eye: No discharge.      Extraocular Movements: Extraocular movements intact.      Conjunctiva/sclera: Conjunctivae normal.      Pupils: Pupils are equal, round, and reactive to light.   Cardiovascular:      Rate and Rhythm: Normal rate and regular rhythm.      Pulses: Normal pulses.      Heart sounds: Normal heart sounds. No murmur heard.     No friction rub. No gallop.   Pulmonary:      Effort: Pulmonary effort is normal. No respiratory distress.      Breath sounds: Normal breath sounds. No stridor. No wheezing, rhonchi or rales.   Chest:      Chest wall: No tenderness.   Abdominal:      General: Abdomen is flat. Bowel sounds are normal. There is no distension.      Palpations: Abdomen is soft. There is no mass.      Tenderness: There is no abdominal tenderness. There is no right CVA tenderness, guarding or rebound.      Hernia: No hernia is present.   Musculoskeletal:         General: No swelling, tenderness, deformity or signs of injury. Normal range of motion.      Cervical back: Normal range of motion and neck supple. No rigidity or tenderness.      Right lower leg: No edema.   Lymphadenopathy:      Cervical: No cervical adenopathy.   Skin:     General: Skin is warm and dry.      Coloration: Skin is not jaundiced or pale.      Findings: No bruising, erythema, lesion or rash.   Neurological:      General: No focal  deficit present.      Mental Status: She is alert and oriented to person, place, and time. Mental status is at baseline.      Cranial Nerves: No cranial nerve deficit.      Sensory: No sensory deficit.      Motor: No weakness.      Coordination: Coordination normal.      Gait: Gait normal.   Psychiatric:         Mood and Affect: Mood normal.         Behavior: Behavior normal.         Thought Content: Thought content normal.         Judgment: Judgment normal.         Procedures           ED Course  ED Course as of 12/24/23 1359   Sun Dec 24, 2023   1323    IMPRESSION:     1.  Heart size at the upper limits of normal.  2.  Coarsened bronchovascular pattern to the lungs.  3.  No conclusive features of pneumonia.  4.  No pleural effusion or pneumothorax.   [BH]      ED Course User Index  [] Azael Marie PA-C                                             Medical Decision Making  Problems Addressed:  RSV bronchitis: complicated acute illness or injury    Amount and/or Complexity of Data Reviewed  Labs: ordered.  Radiology: ordered.    Risk  Prescription drug management.        Final diagnoses:   RSV bronchitis       ED Disposition  ED Disposition       ED Disposition   Discharge    Condition   Stable    Comment   --               Jennifer Montano MD  [address] [place], [place] [id number]  [phone]    Call in 1 day           Medication List        New Prescriptions      albuterol sulfate  (90 Base) MCG/ACT inhaler  Commonly known as: PROVENTIL HFA;VENTOLIN HFA;PROAIR HFA  Inhale 2 puffs Every 4 (Four) Hours As Needed for Wheezing.     benzonatate 200 MG capsule  Commonly known as: TESSALON  Take 1 capsule by mouth 3 (Three) Times a Day As Needed for Cough.     predniSONE 20 MG tablet  Commonly known as: DELTASONE  Take 1 tablet by mouth Daily.               Where to Get Your Medications        These medications were sent to [facility] [address] [place] [address] - [address] [place] [address] [phone]   - [phone] FX  [address] [address]      Phone: [phone]   albuterol sulfate  (90 Base) MCG/ACT inhaler  benzonatate 200 MG capsule  predniSONE 20 MG tablet            Azael Marie PA-C  12/24/23 4396

## 2023-12-26 ENCOUNTER — TELEPHONE (OUTPATIENT)
Dept: CARDIOLOGY | Facility: CLINIC | Age: 83
End: 2023-12-26
Payer: MEDICARE

## 2023-12-26 NOTE — TELEPHONE ENCOUNTER
Called pt and informed her of the following   Per Dr. Charmaine HOOVER  Hallucinations may be from cough syrup.    However stop Tessalon.    Just get over-the-counter cough syrup.    Otherwise continue your medications.    Will see you in about a week

## 2023-12-26 NOTE — TELEPHONE ENCOUNTER
Pt was advised to have a pcp follow up after ED visit being dx with bronchitis and rsv on Sunday 12/24/23. ED sent pt home with Tessalon 200 mg, Prednisone 20 mg and albuterol inhaler. Pt is requesting follow up appt soon or a different med other then Tessalon 200 mg sent in pt is having side effects of hallucinating. Please send to [facility].

## 2024-01-24 ENCOUNTER — OFFICE VISIT (OUTPATIENT)
Dept: CARDIOLOGY | Facility: CLINIC | Age: 84
End: 2024-01-24
Payer: MEDICARE

## 2024-01-24 VITALS
BODY MASS INDEX: 31.85 KG/M2 | HEIGHT: 59 IN | DIASTOLIC BLOOD PRESSURE: 60 MMHG | HEART RATE: 85 BPM | OXYGEN SATURATION: 95 % | SYSTOLIC BLOOD PRESSURE: 116 MMHG | WEIGHT: 158 LBS

## 2024-01-24 DIAGNOSIS — I38 VALVULAR HEART DISEASE: ICD-10-CM

## 2024-01-24 DIAGNOSIS — I10 PRIMARY HYPERTENSION: Chronic | ICD-10-CM

## 2024-01-24 DIAGNOSIS — E78.2 MIXED HYPERLIPIDEMIA: Primary | ICD-10-CM

## 2024-01-24 DIAGNOSIS — I51.89 DIASTOLIC DYSFUNCTION WITHOUT HEART FAILURE: ICD-10-CM

## 2024-01-24 NOTE — PROGRESS NOTES
subjective     Chief Complaint   Patient presents with    Hypertension     Follow up       Problems Addressed This Visit  1. Mixed hyperlipidemia    2. Primary hypertension    3. Valvular heart disease, mild aortic and mitral regurgitation    4. Diastolic dysfunction without heart failure        History of Present Illness    Emma is 83 years old white female who is here for cardiology follow-up patient states that she is switching her medical care to Dr. Nash and does not need any follow-up with me after today's visit.    Hyperlipidemia on Lipitor.  She states that Dr. Nash will take care of all medical problems.  No drug side effects.  She is tolerating Lipitor well and states that her  cannot take Lipitor.    Hypertension  Blood pressure is well-controlled she is taking Procardia XL 30 mg daily, hydralazine 100 mg 3 times daily, Lasix 20 mg 2 a day and Coreg 25 twice daily.    She denies any chest pain palpitations or shortness of breath.    Last stress test 4 months ago was negative for significant exercise-induced myocardial ischemia.    Last echocardiogram had showed mild aortic and mitral regurgitation with grade 1 LV diastolic dysfunction.  LV ejection fraction has been normal patient has clinically no evidence of heart failure.      Past Surgical History:   Procedure Laterality Date    APPENDECTOMY      BUNIONECTOMY Right     CARDIOVASCULAR STRESS TEST  10/2012    CATARACT EXTRACTION  2017    CHOLECYSTECTOMY      COLONOSCOPY  2011    ECHO - CONVERTED  10/2012    JOINT REPLACEMENT      bilateral knees     KNEE ARTHROPLASTY Left     KNEE ARTHROSCOPY      SHOULDER ARTHROSCOPY Left 7/28/2016    Procedure: LEFT SHOULDER ACROMIOPLASTY,MINI OPEN ROTATOR CUFF REPAIR;  Surgeon: Carlos Eduardo Smith MD;  Location: [facility];  Service:     SHOULDER SURGERY  05/31/2016    OPEN ACROMICPLASTY RIGHT SHOULDER     Family History   Problem Relation Age of Onset    Heart disease Other     Stroke Sister     Diabetes  Mother     Heart failure Mother     Heart disease Mother     Heart attack Mother     Hypertension Father     Emphysema Father     Heart disease Father     No Known Problems Brother     Cancer Sister     Kidney disease Sister     Heart failure Sister     Diabetes Brother     Diabetes Brother     Diabetes Brother      Past Medical History:   Diagnosis Date    GERD (gastroesophageal reflux disease)     Hyperlipidemia     Hypertension     Left shoulder pain     Obesity     Osteoarthritis of knees, bilateral     Overactive bladder     Right shoulder pain      Patient Active Problem List   Diagnosis    Complete tear of right rotator cuff    Stage 3a chronic kidney disease    Cough    Gastroesophageal reflux disease    Mixed hyperlipidemia    Shoulder pain    Hypertension    Acromioclavicular joint arthritis    Impingement syndrome, shoulder    Complete tear of left rotator cuff    Urge incontinence    Atopic rhinitis    Upper respiratory tract infection    Bilateral lower extremity edema    Hypercalcemia    Right knee pain    DADA (stress urinary incontinence, female)    Obesity (BMI 35.0-39.9 without comorbidity)    Left knee injury    Acute UTI    COVID-19 virus infection    Skin lesion of scalp    Numbness in left leg    Multiple bruises    Vitamin D deficiency    Laceration of right lower leg    Chronic chest pain with low to moderate risk for CAD    Degenerative disc disease, lumbar    Valvular heart disease, mild aortic and mitral regurgitation    Diastolic dysfunction without heart failure       Social History     Tobacco Use    Smoking status: Former     Packs/day: 2.00     Years: 20.00     Additional pack years: 0.00     Total pack years: 40.00     Types: Cigarettes     Quit date:      Years since quittin.0    Smokeless tobacco: Never   Vaping Use    Vaping Use: Never used   Substance Use Topics    Alcohol use: No     Comment: s/p     Drug use: No       Allergies   Allergen Reactions    Codeine Other  (See Comments)     hyperventilate    Sulfa Antibiotics Rash       Current Outpatient Medications on File Prior to Visit   Medication Sig    albuterol sulfate  (90 Base) MCG/ACT inhaler Inhale 2 puffs Every 4 (Four) Hours As Needed for Wheezing.    atorvastatin (LIPITOR) 20 MG tablet Take 1 tablet by mouth once daily    carvedilol (COREG) 25 MG tablet Take 1 tablet by mouth 2 (Two) Times a Day With Meals.    Cholecalciferol (VITAMIN D PO) Take 1,000 Units by mouth daily.    Docusate Calcium (STOOL SOFTENER PO) Take 1 tablet by mouth Daily.    Fexofenadine HCl (ALLEGRA PO) Take 180 mg by mouth daily as needed.    furosemide (LASIX) 20 MG tablet Take 1 tablet by mouth 2 (Two) Times a Day.    hydrALAZINE (APRESOLINE) 100 MG tablet Take 1 tablet by mouth 3 (Three) Times a Day.    HYDROcodone-acetaminophen (NORCO) 5-325 MG per tablet Take 1 tablet by mouth Every 6 (Six) Hours As Needed for Severe Pain.    NIFEdipine XL (PROCARDIA XL) 30 MG 24 hr tablet Take 1 tablet by mouth Daily.    omeprazole OTC (PriLOSEC OTC) 20 MG EC tablet Take 1 tablet by mouth Daily.    oxybutynin XL (DITROPAN-XL) 10 MG 24 hr tablet Take 1 tablet by mouth Daily.    potassium chloride (K-DUR,KLOR-CON) 20 MEQ CR tablet Take 1 tablet by mouth 2 (Two) Times a Day.    predniSONE (DELTASONE) 20 MG tablet Take 1 tablet by mouth Daily.    Probiotic Product (PROBIOTIC COLON SUPPORT PO) Take  by mouth daily.    spironolactone (ALDACTONE) 25 MG tablet Take 1 tablet by mouth Daily.     No current facility-administered medications on file prior to visit.         The following portions of the patient's history were reviewed and updated as appropriate: allergies, current medications, past family history, past medical history, past social history, past surgical history and problem list.    Review of Systems   Constitutional: Negative.   HENT: Negative.  Negative for congestion.    Eyes: Negative.    Cardiovascular: Negative.  Negative for chest pain,  "cyanosis, dyspnea on exertion, irregular heartbeat, leg swelling, near-syncope, orthopnea, palpitations, paroxysmal nocturnal dyspnea and syncope.   Respiratory: Negative.  Negative for shortness of breath.    Hematologic/Lymphatic: Negative.    Musculoskeletal: Negative.    Gastrointestinal: Negative.    Neurological: Negative.  Negative for headaches.          Objective:     /60 (BP Location: Left arm, Patient Position: Sitting, Cuff Size: Adult)   Pulse 85   Ht 149.9 cm (59\")   Wt 71.7 kg (158 lb)   SpO2 95%   BMI 31.91 kg/m²   Pulmonary:      Effort: Pulmonary effort is normal.      Breath sounds: Normal breath sounds. No stridor. No wheezing. No rhonchi. No rales.   Cardiovascular:      PMI at left midclavicular line. Normal rate. Regular rhythm. Normal S1. Normal S2.       Murmurs: There is no murmur.      No gallop.  No click. No rub.   Pulses:     Intact distal pulses.   Edema:     Peripheral edema absent.           Lab Review  Lab Results   Component Value Date     12/24/2023    K 3.1 (L) 12/24/2023     12/24/2023    BUN 11 12/24/2023    CREATININE 1.04 (H) 12/24/2023    GLUCOSE 102 (H) 12/24/2023    CALCIUM 8.7 12/24/2023    ALT 12 12/24/2023    ALKPHOS 91 12/24/2023    LABIL2 1.6 06/08/2016     Lab Results   Component Value Date    CKTOTAL 111 01/16/2023     Lab Results   Component Value Date    WBC 6.82 12/24/2023    HGB 12.0 12/24/2023    HCT 37.9 12/24/2023     12/24/2023     Lab Results   Component Value Date    INR 1.97 12/11/2014     Lab Results   Component Value Date    MG 2.2 06/14/2023     Lab Results   Component Value Date    TSH 1.600 06/14/2023     No results found for: \"BNP\"  Lab Results   Component Value Date    CHLPL 133 06/08/2016    CHOL 120 10/12/2023    TRIG 106 10/12/2023    HDL 41 10/12/2023    VLDL 20 10/12/2023    LDL 59 10/12/2023     Lab Results   Component Value Date    LDL 59 10/12/2023    LDL 62 01/16/2023     No results found for: \"PROBNP\"         "    Procedures       I personally viewed and interpreted the patient's LAB data         Assessment:     1. Mixed hyperlipidemia    2. Primary hypertension    3. Valvular heart disease, mild aortic and mitral regurgitation    4. Diastolic dysfunction without heart failure          Plan:     Hyperlipidemia  She has been trying to follow diet and is taking Lipitor 20 mg daily last LDL was 59.  No drug side effects.  Liver functions and CK have been normal.  She will continue Lipitor.      Hypertension   Blood pressure is also very well-controlled.      Patient has grade 1 LV diastolic dysfunction, LV ejection fraction is normal and there is no evidence of heart failure.      Valvular heart disease mild aortic and mitral regurgitation with normal LV systolic function.      Patient states that this is her last visit with me and she is switching her care to Dr. Nash .    She will return on a as needed basis .  Healthy lifestyle emphasized.      Thank you for giving me the oppertunity to participate in your patient's cardiac care.  Please do not hesitate to give me a call for any questions or suggestions.  Sincerely,    KURT Montano M.D. FACP FAC    No follow-up was given      No follow-ups on file.

## 2024-01-24 NOTE — LETTER
January 24, 2024     Finn Nash MD  [address] [place] [address]    Patient: Emma Ryan   YOB: 1940   Date of Visit: 1/24/2024       Dear Finn Nash MD    Emma Ryan was in my office today. Below is a copy of my note.    If you have questions, please do not hesitate to call me. I look forward to following Emma along with you.         Sincerely,        Jennifer Montano MD        CC: No Recipients    subjective     Chief Complaint   Patient presents with   • Hypertension     Follow up       Problems Addressed This Visit  1. Mixed hyperlipidemia    2. Primary hypertension    3. Valvular heart disease, mild aortic and mitral regurgitation    4. Diastolic dysfunction without heart failure        History of Present Illness          Past Surgical History:   Procedure Laterality Date   • APPENDECTOMY     • BUNIONECTOMY Right    • CARDIOVASCULAR STRESS TEST  10/2012   • CATARACT EXTRACTION  2017   • CHOLECYSTECTOMY     • COLONOSCOPY  2011   • ECHO - CONVERTED  10/2012   • JOINT REPLACEMENT      bilateral knees    • KNEE ARTHROPLASTY Left    • KNEE ARTHROSCOPY     • SHOULDER ARTHROSCOPY Left 7/28/2016    Procedure: LEFT SHOULDER ACROMIOPLASTY,MINI OPEN ROTATOR CUFF REPAIR;  Surgeon: Carlos Eduardo Smith MD;  Location: [facility];  Service:    • SHOULDER SURGERY  05/31/2016    OPEN ACROMICPLASTY RIGHT SHOULDER     Family History   Problem Relation Age of Onset   • Heart disease Other    • Stroke Sister    • Diabetes Mother    • Heart failure Mother    • Heart disease Mother    • Heart attack Mother    • Hypertension Father    • Emphysema Father    • Heart disease Father    • No Known Problems Brother    • Cancer Sister    • Kidney disease Sister    • Heart failure Sister    • Diabetes Brother    • Diabetes Brother    • Diabetes Brother      Past Medical History:   Diagnosis Date   • GERD (gastroesophageal reflux disease)    • Hyperlipidemia    • Hypertension    • Left  shoulder pain    • Obesity    • Osteoarthritis of knees, bilateral    • Overactive bladder    • Right shoulder pain      Patient Active Problem List   Diagnosis   • Complete tear of right rotator cuff   • Stage 3a chronic kidney disease   • Cough   • Gastroesophageal reflux disease   • Mixed hyperlipidemia   • Shoulder pain   • Hypertension   • Acromioclavicular joint arthritis   • Impingement syndrome, shoulder   • Complete tear of left rotator cuff   • Urge incontinence   • Atopic rhinitis   • Upper respiratory tract infection   • Bilateral lower extremity edema   • Hypercalcemia   • Right knee pain   • DADA (stress urinary incontinence, female)   • Obesity (BMI 35.0-39.9 without comorbidity)   • Left knee injury   • Acute UTI   • COVID-19 virus infection   • Skin lesion of scalp   • Numbness in left leg   • Multiple bruises   • Vitamin D deficiency   • Laceration of right lower leg   • Chronic chest pain with low to moderate risk for CAD   • Degenerative disc disease, lumbar   • Valvular heart disease, mild aortic and mitral regurgitation   • Diastolic dysfunction without heart failure       Social History     Tobacco Use   • Smoking status: Former     Packs/day: 2.00     Years: 20.00     Additional pack years: 0.00     Total pack years: 40.00     Types: Cigarettes     Quit date:      Years since quittin.0   • Smokeless tobacco: Never   Vaping Use   • Vaping Use: Never used   Substance Use Topics   • Alcohol use: No     Comment: s/p    • Drug use: No       Allergies   Allergen Reactions   • Codeine Other (See Comments)     hyperventilate   • Sulfa Antibiotics Rash       Current Outpatient Medications on File Prior to Visit   Medication Sig   • albuterol sulfate  (90 Base) MCG/ACT inhaler Inhale 2 puffs Every 4 (Four) Hours As Needed for Wheezing.   • atorvastatin (LIPITOR) 20 MG tablet Take 1 tablet by mouth once daily   • carvedilol (COREG) 25 MG tablet Take 1 tablet by mouth 2 (Two) Times a  "Day With Meals.   • Cholecalciferol (VITAMIN D PO) Take 1,000 Units by mouth daily.   • Docusate Calcium (STOOL SOFTENER PO) Take 1 tablet by mouth Daily.   • Fexofenadine HCl (ALLEGRA PO) Take 180 mg by mouth daily as needed.   • furosemide (LASIX) 20 MG tablet Take 1 tablet by mouth 2 (Two) Times a Day.   • hydrALAZINE (APRESOLINE) 100 MG tablet Take 1 tablet by mouth 3 (Three) Times a Day.   • HYDROcodone-acetaminophen (NORCO) 5-325 MG per tablet Take 1 tablet by mouth Every 6 (Six) Hours As Needed for Severe Pain.   • NIFEdipine XL (PROCARDIA XL) 30 MG 24 hr tablet Take 1 tablet by mouth Daily.   • omeprazole OTC (PriLOSEC OTC) 20 MG EC tablet Take 1 tablet by mouth Daily.   • oxybutynin XL (DITROPAN-XL) 10 MG 24 hr tablet Take 1 tablet by mouth Daily.   • potassium chloride (K-DUR,KLOR-CON) 20 MEQ CR tablet Take 1 tablet by mouth 2 (Two) Times a Day.   • predniSONE (DELTASONE) 20 MG tablet Take 1 tablet by mouth Daily.   • Probiotic Product (PROBIOTIC COLON SUPPORT PO) Take  by mouth daily.   • spironolactone (ALDACTONE) 25 MG tablet Take 1 tablet by mouth Daily.     No current facility-administered medications on file prior to visit.         The following portions of the patient's history were reviewed and updated as appropriate: allergies, current medications, past family history, past medical history, past social history, past surgical history and problem list.    ROS       Objective:     /60 (BP Location: Left arm, Patient Position: Sitting, Cuff Size: Adult)   Pulse 85   Ht 149.9 cm (59\")   Wt 71.7 kg (158 lb)   SpO2 95%   BMI 31.91 kg/m²   Physical Exam      Lab Review  Lab Results   Component Value Date     12/24/2023    K 3.1 (L) 12/24/2023     12/24/2023    BUN 11 12/24/2023    CREATININE 1.04 (H) 12/24/2023    GLUCOSE 102 (H) 12/24/2023    CALCIUM 8.7 12/24/2023    ALT 12 12/24/2023    ALKPHOS 91 12/24/2023    LABIL2 1.6 06/08/2016     Lab Results   Component Value Date    " "CKTOTAL 111 01/16/2023     Lab Results   Component Value Date    WBC 6.82 12/24/2023    HGB 12.0 12/24/2023    HCT 37.9 12/24/2023     12/24/2023     Lab Results   Component Value Date    INR 1.97 12/11/2014     Lab Results   Component Value Date    MG 2.2 06/14/2023     Lab Results   Component Value Date    TSH 1.600 06/14/2023     No results found for: \"BNP\"  Lab Results   Component Value Date    CHLPL 133 06/08/2016    CHOL 120 10/12/2023    TRIG 106 10/12/2023    HDL 41 10/12/2023    VLDL 20 10/12/2023    LDL 59 10/12/2023     Lab Results   Component Value Date    LDL 59 10/12/2023    LDL 62 01/16/2023     No results found for: \"PROBNP\"            Procedures       I personally viewed and interpreted the patient's LAB data         Assessment:     1. Mixed hyperlipidemia    2. Primary hypertension    3. Valvular heart disease, mild aortic and mitral regurgitation    4. Diastolic dysfunction without heart failure          Plan:              No follow-ups on file.    "

## 2024-02-07 ENCOUNTER — LAB (OUTPATIENT)
Dept: LAB | Facility: HOSPITAL | Age: 84
End: 2024-02-07
Payer: MEDICARE

## 2024-02-07 ENCOUNTER — TRANSCRIBE ORDERS (OUTPATIENT)
Dept: ADMINISTRATIVE | Facility: HOSPITAL | Age: 84
End: 2024-02-07
Payer: MEDICARE

## 2024-02-07 DIAGNOSIS — N18.31 CHRONIC KIDNEY DISEASE (CKD) STAGE G3A/A1, MODERATELY DECREASED GLOMERULAR FILTRATION RATE (GFR) BETWEEN 45-59 ML/MIN/1.73 SQUARE METER AND ALBUMINURIA CREATININE RATIO LESS THAN 30 MG/G (CMS/H*: Primary | ICD-10-CM

## 2024-02-07 DIAGNOSIS — N18.31 CHRONIC KIDNEY DISEASE (CKD) STAGE G3A/A1, MODERATELY DECREASED GLOMERULAR FILTRATION RATE (GFR) BETWEEN 45-59 ML/MIN/1.73 SQUARE METER AND ALBUMINURIA CREATININE RATIO LESS THAN 30 MG/G (CMS/H*: ICD-10-CM

## 2024-02-07 LAB
ALBUMIN SERPL-MCNC: 4.3 G/DL (ref 3.5–5.2)
ALBUMIN UR-MCNC: 2.1 MG/DL
ALBUMIN/GLOB SERPL: 1.7 G/DL
ALP SERPL-CCNC: 76 U/L (ref 39–117)
ALT SERPL W P-5'-P-CCNC: 12 U/L (ref 1–33)
ANION GAP SERPL CALCULATED.3IONS-SCNC: 13 MMOL/L (ref 5–15)
AST SERPL-CCNC: 19 U/L (ref 1–32)
BILIRUB SERPL-MCNC: 0.4 MG/DL (ref 0–1.2)
BUN SERPL-MCNC: 15 MG/DL (ref 8–23)
BUN/CREAT SERPL: 13.8 (ref 7–25)
CALCIUM SPEC-SCNC: 9.8 MG/DL (ref 8.6–10.5)
CHLORIDE SERPL-SCNC: 104 MMOL/L (ref 98–107)
CO2 SERPL-SCNC: 26 MMOL/L (ref 22–29)
CREAT SERPL-MCNC: 1.09 MG/DL (ref 0.57–1)
CREAT UR-MCNC: 83.1 MG/DL
EGFRCR SERPLBLD CKD-EPI 2021: 50.8 ML/MIN/1.73
GLOBULIN UR ELPH-MCNC: 2.6 GM/DL
GLUCOSE SERPL-MCNC: 77 MG/DL (ref 65–99)
MAGNESIUM SERPL-MCNC: 1.8 MG/DL (ref 1.6–2.4)
MICROALBUMIN/CREAT UR: 25.3 MG/G (ref 0–29)
POTASSIUM SERPL-SCNC: 3.6 MMOL/L (ref 3.5–5.2)
PROT SERPL-MCNC: 6.9 G/DL (ref 6–8.5)
SODIUM SERPL-SCNC: 143 MMOL/L (ref 136–145)

## 2024-02-07 PROCEDURE — 80053 COMPREHEN METABOLIC PANEL: CPT

## 2024-02-07 PROCEDURE — 83735 ASSAY OF MAGNESIUM: CPT

## 2024-02-07 PROCEDURE — 82043 UR ALBUMIN QUANTITATIVE: CPT

## 2024-02-07 PROCEDURE — 82570 ASSAY OF URINE CREATININE: CPT

## 2024-02-07 PROCEDURE — 36415 COLL VENOUS BLD VENIPUNCTURE: CPT

## 2024-06-04 ENCOUNTER — TRANSCRIBE ORDERS (OUTPATIENT)
Dept: ADMINISTRATIVE | Facility: HOSPITAL | Age: 84
End: 2024-06-04
Payer: MEDICARE

## 2024-06-04 ENCOUNTER — LAB (OUTPATIENT)
Dept: LAB | Facility: HOSPITAL | Age: 84
End: 2024-06-04
Payer: MEDICARE

## 2024-06-04 DIAGNOSIS — E87.6 HYPOKALEMIA: ICD-10-CM

## 2024-06-04 DIAGNOSIS — N18.31 CHRONIC KIDNEY DISEASE (CKD) STAGE G3A/A1, MODERATELY DECREASED GLOMERULAR FILTRATION RATE (GFR) BETWEEN 45-59 ML/MIN/1.73 SQUARE METER AND ALBUMINURIA CREATININE RATIO LESS THAN 30 MG/G (CMS/H*: ICD-10-CM

## 2024-06-04 DIAGNOSIS — N18.31 CHRONIC KIDNEY DISEASE (CKD) STAGE G3A/A1, MODERATELY DECREASED GLOMERULAR FILTRATION RATE (GFR) BETWEEN 45-59 ML/MIN/1.73 SQUARE METER AND ALBUMINURIA CREATININE RATIO LESS THAN 30 MG/G (CMS/H*: Primary | ICD-10-CM

## 2024-06-04 LAB
ALBUMIN SERPL-MCNC: 4.4 G/DL (ref 3.5–5.2)
ALBUMIN UR-MCNC: 2 MG/DL
ALBUMIN/GLOB SERPL: 1.8 G/DL
ALP SERPL-CCNC: 68 U/L (ref 39–117)
ALT SERPL W P-5'-P-CCNC: 8 U/L (ref 1–33)
ANION GAP SERPL CALCULATED.3IONS-SCNC: 12.4 MMOL/L (ref 5–15)
AST SERPL-CCNC: 20 U/L (ref 1–32)
BILIRUB SERPL-MCNC: 0.3 MG/DL (ref 0–1.2)
BUN SERPL-MCNC: 20 MG/DL (ref 8–23)
BUN/CREAT SERPL: 17.5 (ref 7–25)
CALCIUM SPEC-SCNC: 9.5 MG/DL (ref 8.6–10.5)
CHLORIDE SERPL-SCNC: 102 MMOL/L (ref 98–107)
CO2 SERPL-SCNC: 24.6 MMOL/L (ref 22–29)
CREAT SERPL-MCNC: 1.14 MG/DL (ref 0.57–1)
CREAT UR-MCNC: 57.7 MG/DL
EGFRCR SERPLBLD CKD-EPI 2021: 48.2 ML/MIN/1.73
GLOBULIN UR ELPH-MCNC: 2.5 GM/DL
GLUCOSE SERPL-MCNC: 90 MG/DL (ref 65–99)
MAGNESIUM SERPL-MCNC: 1.7 MG/DL (ref 1.6–2.4)
MICROALBUMIN/CREAT UR: 34.7 MG/G (ref 0–29)
POTASSIUM SERPL-SCNC: 3.5 MMOL/L (ref 3.5–5.2)
PROT SERPL-MCNC: 6.9 G/DL (ref 6–8.5)
SODIUM SERPL-SCNC: 139 MMOL/L (ref 136–145)

## 2024-06-04 PROCEDURE — 82570 ASSAY OF URINE CREATININE: CPT

## 2024-06-04 PROCEDURE — 83735 ASSAY OF MAGNESIUM: CPT

## 2024-06-04 PROCEDURE — 80053 COMPREHEN METABOLIC PANEL: CPT

## 2024-06-04 PROCEDURE — 82043 UR ALBUMIN QUANTITATIVE: CPT

## 2024-06-04 PROCEDURE — 36415 COLL VENOUS BLD VENIPUNCTURE: CPT

## 2024-09-25 ENCOUNTER — LAB (OUTPATIENT)
Dept: LAB | Facility: HOSPITAL | Age: 84
End: 2024-09-25
Payer: MEDICARE

## 2024-09-25 ENCOUNTER — TRANSCRIBE ORDERS (OUTPATIENT)
Dept: ADMINISTRATIVE | Facility: HOSPITAL | Age: 84
End: 2024-09-25
Payer: MEDICARE

## 2024-09-25 DIAGNOSIS — N18.31 CHRONIC KIDNEY DISEASE (CKD) STAGE G3A/A1, MODERATELY DECREASED GLOMERULAR FILTRATION RATE (GFR) BETWEEN 45-59 ML/MIN/1.73 SQUARE METER AND ALBUMINURIA CREATININE RATIO LESS THAN 30 MG/G (CMS/H*: Primary | ICD-10-CM

## 2024-09-25 DIAGNOSIS — N18.31 CHRONIC KIDNEY DISEASE (CKD) STAGE G3A/A1, MODERATELY DECREASED GLOMERULAR FILTRATION RATE (GFR) BETWEEN 45-59 ML/MIN/1.73 SQUARE METER AND ALBUMINURIA CREATININE RATIO LESS THAN 30 MG/G (CMS/H*: ICD-10-CM

## 2024-09-25 LAB
ALBUMIN UR-MCNC: 1.3 MG/DL
CREAT UR-MCNC: 65.4 MG/DL
MAGNESIUM SERPL-MCNC: 2 MG/DL (ref 1.6–2.4)
MICROALBUMIN/CREAT UR: 19.9 MG/G (ref 0–29)

## 2024-09-25 PROCEDURE — 83735 ASSAY OF MAGNESIUM: CPT

## 2024-09-25 PROCEDURE — 36415 COLL VENOUS BLD VENIPUNCTURE: CPT

## 2024-09-25 PROCEDURE — 82043 UR ALBUMIN QUANTITATIVE: CPT

## 2024-09-25 PROCEDURE — 82570 ASSAY OF URINE CREATININE: CPT

## 2024-10-02 ENCOUNTER — LAB (OUTPATIENT)
Dept: LAB | Facility: HOSPITAL | Age: 84
End: 2024-10-02
Payer: MEDICARE

## 2024-10-02 ENCOUNTER — TRANSCRIBE ORDERS (OUTPATIENT)
Dept: ADMINISTRATIVE | Facility: HOSPITAL | Age: 84
End: 2024-10-02
Payer: MEDICARE

## 2024-10-02 DIAGNOSIS — N18.31 CHRONIC KIDNEY DISEASE (CKD) STAGE G3A/A1, MODERATELY DECREASED GLOMERULAR FILTRATION RATE (GFR) BETWEEN 45-59 ML/MIN/1.73 SQUARE METER AND ALBUMINURIA CREATININE RATIO LESS THAN 30 MG/G (CMS/H*: Primary | ICD-10-CM

## 2024-10-02 DIAGNOSIS — N18.31 CHRONIC KIDNEY DISEASE (CKD) STAGE G3A/A1, MODERATELY DECREASED GLOMERULAR FILTRATION RATE (GFR) BETWEEN 45-59 ML/MIN/1.73 SQUARE METER AND ALBUMINURIA CREATININE RATIO LESS THAN 30 MG/G (CMS/H*: ICD-10-CM

## 2024-10-02 LAB
ALBUMIN SERPL-MCNC: 4.1 G/DL (ref 3.5–5.2)
ALBUMIN/GLOB SERPL: 1.4 G/DL
ALP SERPL-CCNC: 77 U/L (ref 39–117)
ALT SERPL W P-5'-P-CCNC: 12 U/L (ref 1–33)
ANION GAP SERPL CALCULATED.3IONS-SCNC: 10.8 MMOL/L (ref 5–15)
AST SERPL-CCNC: 22 U/L (ref 1–32)
BILIRUB SERPL-MCNC: 0.4 MG/DL (ref 0–1.2)
BUN SERPL-MCNC: 17 MG/DL (ref 8–23)
BUN/CREAT SERPL: 16.3 (ref 7–25)
CALCIUM SPEC-SCNC: 9.2 MG/DL (ref 8.6–10.5)
CHLORIDE SERPL-SCNC: 107 MMOL/L (ref 98–107)
CO2 SERPL-SCNC: 25.2 MMOL/L (ref 22–29)
CREAT SERPL-MCNC: 1.04 MG/DL (ref 0.57–1)
EGFRCR SERPLBLD CKD-EPI 2021: 53.8 ML/MIN/1.73
GLOBULIN UR ELPH-MCNC: 3 GM/DL
GLUCOSE SERPL-MCNC: 102 MG/DL (ref 65–99)
POTASSIUM SERPL-SCNC: 3.6 MMOL/L (ref 3.5–5.2)
PROT SERPL-MCNC: 7.1 G/DL (ref 6–8.5)
SODIUM SERPL-SCNC: 143 MMOL/L (ref 136–145)

## 2024-10-02 PROCEDURE — 80053 COMPREHEN METABOLIC PANEL: CPT

## 2024-10-02 PROCEDURE — 36415 COLL VENOUS BLD VENIPUNCTURE: CPT

## 2025-02-10 ENCOUNTER — LAB (OUTPATIENT)
Dept: LAB | Facility: HOSPITAL | Age: 85
End: 2025-02-10
Payer: MEDICARE

## 2025-02-10 ENCOUNTER — TRANSCRIBE ORDERS (OUTPATIENT)
Dept: ADMINISTRATIVE | Facility: HOSPITAL | Age: 85
End: 2025-02-10
Payer: MEDICARE

## 2025-02-10 DIAGNOSIS — N18.31 CHRONIC KIDNEY DISEASE (CKD) STAGE G3A/A1, MODERATELY DECREASED GLOMERULAR FILTRATION RATE (GFR) BETWEEN 45-59 ML/MIN/1.73 SQUARE METER AND ALBUMINURIA CREATININE RATIO LESS THAN 30 MG/G (CMS/H*: ICD-10-CM

## 2025-02-10 DIAGNOSIS — N18.31 CHRONIC KIDNEY DISEASE (CKD) STAGE G3A/A1, MODERATELY DECREASED GLOMERULAR FILTRATION RATE (GFR) BETWEEN 45-59 ML/MIN/1.73 SQUARE METER AND ALBUMINURIA CREATININE RATIO LESS THAN 30 MG/G (CMS/H*: Primary | ICD-10-CM

## 2025-02-10 LAB
ALBUMIN SERPL-MCNC: 4.6 G/DL (ref 3.5–5.2)
ALBUMIN UR-MCNC: 1.9 MG/DL
ALBUMIN/GLOB SERPL: 1.6 G/DL
ALP SERPL-CCNC: 75 U/L (ref 39–117)
ALT SERPL W P-5'-P-CCNC: 10 U/L (ref 1–33)
ANION GAP SERPL CALCULATED.3IONS-SCNC: 16.5 MMOL/L (ref 5–15)
AST SERPL-CCNC: 19 U/L (ref 1–32)
BILIRUB SERPL-MCNC: 0.5 MG/DL (ref 0–1.2)
BUN SERPL-MCNC: 17 MG/DL (ref 8–23)
BUN/CREAT SERPL: 14.7 (ref 7–25)
CALCIUM SPEC-SCNC: 10 MG/DL (ref 8.6–10.5)
CHLORIDE SERPL-SCNC: 102 MMOL/L (ref 98–107)
CO2 SERPL-SCNC: 23.5 MMOL/L (ref 22–29)
CREAT SERPL-MCNC: 1.16 MG/DL (ref 0.57–1)
CREAT UR-MCNC: 71.5 MG/DL
EGFRCR SERPLBLD CKD-EPI 2021: 46.9 ML/MIN/1.73
GLOBULIN UR ELPH-MCNC: 2.9 GM/DL
GLUCOSE SERPL-MCNC: 98 MG/DL (ref 65–99)
MICROALBUMIN/CREAT UR: 26.6 MG/G (ref 0–29)
POTASSIUM SERPL-SCNC: 3.8 MMOL/L (ref 3.5–5.2)
PROT SERPL-MCNC: 7.5 G/DL (ref 6–8.5)
SODIUM SERPL-SCNC: 142 MMOL/L (ref 136–145)

## 2025-02-10 PROCEDURE — 82043 UR ALBUMIN QUANTITATIVE: CPT

## 2025-02-10 PROCEDURE — 82570 ASSAY OF URINE CREATININE: CPT

## 2025-03-05 ENCOUNTER — TRANSCRIBE ORDERS (OUTPATIENT)
Dept: ADMINISTRATIVE | Facility: HOSPITAL | Age: 85
End: 2025-03-05
Payer: MEDICARE

## 2025-03-05 ENCOUNTER — LAB (OUTPATIENT)
Dept: LAB | Facility: HOSPITAL | Age: 85
End: 2025-03-05
Payer: MEDICARE

## 2025-03-05 DIAGNOSIS — R30.0 DYSURIA: ICD-10-CM

## 2025-03-05 DIAGNOSIS — R30.0 DYSURIA: Primary | ICD-10-CM

## 2025-03-05 PROCEDURE — 87086 URINE CULTURE/COLONY COUNT: CPT

## 2025-03-05 PROCEDURE — 81001 URINALYSIS AUTO W/SCOPE: CPT

## 2025-03-05 PROCEDURE — 87077 CULTURE AEROBIC IDENTIFY: CPT

## 2025-03-05 PROCEDURE — 87186 SC STD MICRODIL/AGAR DIL: CPT

## 2025-03-06 LAB
BACTERIA UR QL AUTO: ABNORMAL /HPF
BILIRUB UR QL STRIP: NEGATIVE
CLARITY UR: ABNORMAL
COLOR UR: YELLOW
GLUCOSE UR STRIP-MCNC: NEGATIVE MG/DL
HGB UR QL STRIP.AUTO: NEGATIVE
HYALINE CASTS UR QL AUTO: ABNORMAL /LPF
KETONES UR QL STRIP: NEGATIVE
LEUKOCYTE ESTERASE UR QL STRIP.AUTO: ABNORMAL
NITRITE UR QL STRIP: POSITIVE
PH UR STRIP.AUTO: 6 [PH] (ref 5–8)
PROT UR QL STRIP: ABNORMAL
RBC # UR STRIP: ABNORMAL /HPF
REF LAB TEST METHOD: ABNORMAL
SP GR UR STRIP: 1.02 (ref 1–1.03)
SQUAMOUS #/AREA URNS HPF: ABNORMAL /HPF
UROBILINOGEN UR QL STRIP: ABNORMAL
WBC # UR STRIP: ABNORMAL /HPF

## 2025-03-07 LAB — BACTERIA SPEC AEROBE CULT: ABNORMAL

## 2025-03-17 ENCOUNTER — TRANSCRIBE ORDERS (OUTPATIENT)
Dept: ADMINISTRATIVE | Facility: HOSPITAL | Age: 85
End: 2025-03-17
Payer: MEDICARE

## 2025-03-17 DIAGNOSIS — M19.90 OSTEOARTHRITIS, UNSPECIFIED OSTEOARTHRITIS TYPE, UNSPECIFIED SITE: Primary | ICD-10-CM

## 2025-03-21 ENCOUNTER — HOSPITAL ENCOUNTER (OUTPATIENT)
Facility: HOSPITAL | Age: 85
Discharge: HOME | End: 2025-03-21
Payer: MEDICARE

## 2025-03-21 DIAGNOSIS — M19.90 OSTEOARTHRITIS, UNSPECIFIED OSTEOARTHRITIS TYPE, UNSPECIFIED SITE: ICD-10-CM

## 2025-03-21 PROCEDURE — 77080 DXA BONE DENSITY AXIAL: CPT

## 2025-04-07 ENCOUNTER — APPOINTMENT (OUTPATIENT)
Dept: CT IMAGING | Facility: HOSPITAL | Age: 85
End: 2025-04-07
Payer: MEDICARE

## 2025-04-07 ENCOUNTER — HOSPITAL ENCOUNTER (EMERGENCY)
Facility: HOSPITAL | Age: 85
Discharge: HOME | End: 2025-04-07
Attending: STUDENT IN AN ORGANIZED HEALTH CARE EDUCATION/TRAINING PROGRAM | Admitting: STUDENT IN AN ORGANIZED HEALTH CARE EDUCATION/TRAINING PROGRAM
Payer: MEDICARE

## 2025-04-07 VITALS
SYSTOLIC BLOOD PRESSURE: 126 MMHG | DIASTOLIC BLOOD PRESSURE: 66 MMHG | OXYGEN SATURATION: 94 % | HEART RATE: 75 BPM | TEMPERATURE: 97.6 F | BODY MASS INDEX: 31.87 KG/M2 | RESPIRATION RATE: 16 BRPM | HEIGHT: 59 IN | WEIGHT: 158.07 LBS

## 2025-04-07 DIAGNOSIS — N39.0 ACUTE UTI: Primary | ICD-10-CM

## 2025-04-07 DIAGNOSIS — E83.42 HYPOMAGNESEMIA: ICD-10-CM

## 2025-04-07 LAB
ALBUMIN SERPL-MCNC: 3.8 G/DL (ref 3.5–5.2)
ALBUMIN/GLOB SERPL: 1 G/DL
ALP SERPL-CCNC: 98 U/L (ref 39–117)
ALT SERPL W P-5'-P-CCNC: 11 U/L (ref 1–33)
ANION GAP SERPL CALCULATED.3IONS-SCNC: 16.2 MMOL/L (ref 5–15)
AST SERPL-CCNC: 17 U/L (ref 1–32)
B PARAPERT DNA SPEC QL NAA+PROBE: NOT DETECTED
B PERT DNA SPEC QL NAA+PROBE: NOT DETECTED
BACTERIA UR QL AUTO: ABNORMAL /HPF
BASOPHILS # BLD AUTO: 0.04 10*3/MM3 (ref 0–0.2)
BASOPHILS NFR BLD AUTO: 0.4 % (ref 0–1.5)
BILIRUB SERPL-MCNC: 0.5 MG/DL (ref 0–1.2)
BILIRUB UR QL STRIP: NEGATIVE
BUN SERPL-MCNC: 19 MG/DL (ref 8–23)
BUN/CREAT SERPL: 16 (ref 7–25)
C PNEUM DNA NPH QL NAA+NON-PROBE: NOT DETECTED
CALCIUM SPEC-SCNC: 8.6 MG/DL (ref 8.6–10.5)
CHLORIDE SERPL-SCNC: 103 MMOL/L (ref 98–107)
CLARITY UR: CLEAR
CO2 SERPL-SCNC: 22.8 MMOL/L (ref 22–29)
COLOR UR: YELLOW
CREAT SERPL-MCNC: 1.19 MG/DL (ref 0.57–1)
CRP SERPL-MCNC: 13.36 MG/DL (ref 0–0.5)
D-LACTATE SERPL-SCNC: 1.9 MMOL/L (ref 0.5–2)
DEPRECATED RDW RBC AUTO: 47.2 FL (ref 37–54)
EGFRCR SERPLBLD CKD-EPI 2021: 45.5 ML/MIN/1.73
EOSINOPHIL # BLD AUTO: 0.04 10*3/MM3 (ref 0–0.4)
EOSINOPHIL NFR BLD AUTO: 0.4 % (ref 0.3–6.2)
ERYTHROCYTE [DISTWIDTH] IN BLOOD BY AUTOMATED COUNT: 13.9 % (ref 12.3–15.4)
FLUAV SUBTYP SPEC NAA+PROBE: NOT DETECTED
FLUBV RNA ISLT QL NAA+PROBE: NOT DETECTED
GLOBULIN UR ELPH-MCNC: 3.8 GM/DL
GLUCOSE SERPL-MCNC: 118 MG/DL (ref 65–99)
GLUCOSE UR STRIP-MCNC: NEGATIVE MG/DL
HADV DNA SPEC NAA+PROBE: NOT DETECTED
HCOV 229E RNA SPEC QL NAA+PROBE: NOT DETECTED
HCOV HKU1 RNA SPEC QL NAA+PROBE: NOT DETECTED
HCOV NL63 RNA SPEC QL NAA+PROBE: NOT DETECTED
HCOV OC43 RNA SPEC QL NAA+PROBE: NOT DETECTED
HCT VFR BLD AUTO: 36.1 % (ref 34–46.6)
HGB BLD-MCNC: 11.5 G/DL (ref 12–15.9)
HGB UR QL STRIP.AUTO: ABNORMAL
HMPV RNA NPH QL NAA+NON-PROBE: NOT DETECTED
HOLD SPECIMEN: NORMAL
HOLD SPECIMEN: NORMAL
HPIV1 RNA ISLT QL NAA+PROBE: NOT DETECTED
HPIV2 RNA SPEC QL NAA+PROBE: NOT DETECTED
HPIV3 RNA NPH QL NAA+PROBE: NOT DETECTED
HPIV4 P GENE NPH QL NAA+PROBE: NOT DETECTED
HYALINE CASTS UR QL AUTO: ABNORMAL /LPF
IMM GRANULOCYTES # BLD AUTO: 0.03 10*3/MM3 (ref 0–0.05)
IMM GRANULOCYTES NFR BLD AUTO: 0.3 % (ref 0–0.5)
KETONES UR QL STRIP: NEGATIVE
LEUKOCYTE ESTERASE UR QL STRIP.AUTO: ABNORMAL
LYMPHOCYTES # BLD AUTO: 1.2 10*3/MM3 (ref 0.7–3.1)
LYMPHOCYTES NFR BLD AUTO: 11.5 % (ref 19.6–45.3)
M PNEUMO IGG SER IA-ACNC: NOT DETECTED
MAGNESIUM SERPL-MCNC: 1 MG/DL (ref 1.6–2.4)
MCH RBC QN AUTO: 29.2 PG (ref 26.6–33)
MCHC RBC AUTO-ENTMCNC: 31.9 G/DL (ref 31.5–35.7)
MCV RBC AUTO: 91.6 FL (ref 79–97)
MONOCYTES # BLD AUTO: 0.73 10*3/MM3 (ref 0.1–0.9)
MONOCYTES NFR BLD AUTO: 7 % (ref 5–12)
NEUTROPHILS NFR BLD AUTO: 8.36 10*3/MM3 (ref 1.7–7)
NEUTROPHILS NFR BLD AUTO: 80.4 % (ref 42.7–76)
NITRITE UR QL STRIP: POSITIVE
NRBC BLD AUTO-RTO: 0 /100 WBC (ref 0–0.2)
PH UR STRIP.AUTO: <=5 [PH] (ref 5–8)
PLATELET # BLD AUTO: 392 10*3/MM3 (ref 140–450)
PMV BLD AUTO: 9.7 FL (ref 6–12)
POTASSIUM SERPL-SCNC: 3.2 MMOL/L (ref 3.5–5.2)
PROCALCITONIN SERPL-MCNC: 0.19 NG/ML (ref 0–0.25)
PROT SERPL-MCNC: 7.6 G/DL (ref 6–8.5)
PROT UR QL STRIP: ABNORMAL
RBC # BLD AUTO: 3.94 10*6/MM3 (ref 3.77–5.28)
RBC # UR STRIP: ABNORMAL /HPF
REF LAB TEST METHOD: ABNORMAL
RHINOVIRUS RNA SPEC NAA+PROBE: NOT DETECTED
RSV RNA NPH QL NAA+NON-PROBE: NOT DETECTED
SARS-COV-2 RNA RESP QL NAA+PROBE: NOT DETECTED
SODIUM SERPL-SCNC: 142 MMOL/L (ref 136–145)
SP GR UR STRIP: 1.01 (ref 1–1.03)
SQUAMOUS #/AREA URNS HPF: ABNORMAL /HPF
UROBILINOGEN UR QL STRIP: ABNORMAL
WBC # UR STRIP: ABNORMAL /HPF
WBC NRBC COR # BLD AUTO: 10.4 10*3/MM3 (ref 3.4–10.8)
WHOLE BLOOD HOLD COAG: NORMAL
WHOLE BLOOD HOLD SPECIMEN: NORMAL

## 2025-04-07 PROCEDURE — 36415 COLL VENOUS BLD VENIPUNCTURE: CPT

## 2025-04-07 PROCEDURE — 96366 THER/PROPH/DIAG IV INF ADDON: CPT

## 2025-04-07 PROCEDURE — 96365 THER/PROPH/DIAG IV INF INIT: CPT

## 2025-04-07 PROCEDURE — 99284 EMERGENCY DEPT VISIT MOD MDM: CPT

## 2025-04-07 PROCEDURE — 71250 CT THORAX DX C-: CPT | Performed by: RADIOLOGY

## 2025-04-07 PROCEDURE — 84145 PROCALCITONIN (PCT): CPT | Performed by: PHYSICIAN ASSISTANT

## 2025-04-07 PROCEDURE — 25010000002 MAGNESIUM SULFATE 4 GM/100ML SOLUTION: Performed by: PHYSICIAN ASSISTANT

## 2025-04-07 PROCEDURE — 85025 COMPLETE CBC W/AUTO DIFF WBC: CPT | Performed by: PHYSICIAN ASSISTANT

## 2025-04-07 PROCEDURE — 74176 CT ABD & PELVIS W/O CONTRAST: CPT

## 2025-04-07 PROCEDURE — 87040 BLOOD CULTURE FOR BACTERIA: CPT | Performed by: PHYSICIAN ASSISTANT

## 2025-04-07 PROCEDURE — 87186 SC STD MICRODIL/AGAR DIL: CPT | Performed by: PHYSICIAN ASSISTANT

## 2025-04-07 PROCEDURE — 83735 ASSAY OF MAGNESIUM: CPT | Performed by: PHYSICIAN ASSISTANT

## 2025-04-07 PROCEDURE — 81001 URINALYSIS AUTO W/SCOPE: CPT | Performed by: PHYSICIAN ASSISTANT

## 2025-04-07 PROCEDURE — 74176 CT ABD & PELVIS W/O CONTRAST: CPT | Performed by: RADIOLOGY

## 2025-04-07 PROCEDURE — 71250 CT THORAX DX C-: CPT

## 2025-04-07 PROCEDURE — P9612 CATHETERIZE FOR URINE SPEC: HCPCS

## 2025-04-07 PROCEDURE — 93010 ELECTROCARDIOGRAM REPORT: CPT | Performed by: INTERNAL MEDICINE

## 2025-04-07 PROCEDURE — 87086 URINE CULTURE/COLONY COUNT: CPT | Performed by: PHYSICIAN ASSISTANT

## 2025-04-07 PROCEDURE — 96367 TX/PROPH/DG ADDL SEQ IV INF: CPT

## 2025-04-07 PROCEDURE — 80053 COMPREHEN METABOLIC PANEL: CPT | Performed by: PHYSICIAN ASSISTANT

## 2025-04-07 PROCEDURE — 86140 C-REACTIVE PROTEIN: CPT | Performed by: PHYSICIAN ASSISTANT

## 2025-04-07 PROCEDURE — 93005 ELECTROCARDIOGRAM TRACING: CPT | Performed by: PHYSICIAN ASSISTANT

## 2025-04-07 PROCEDURE — 83605 ASSAY OF LACTIC ACID: CPT | Performed by: PHYSICIAN ASSISTANT

## 2025-04-07 PROCEDURE — 87088 URINE BACTERIA CULTURE: CPT | Performed by: PHYSICIAN ASSISTANT

## 2025-04-07 PROCEDURE — 87077 CULTURE AEROBIC IDENTIFY: CPT | Performed by: PHYSICIAN ASSISTANT

## 2025-04-07 PROCEDURE — 0202U NFCT DS 22 TRGT SARS-COV-2: CPT | Performed by: PHYSICIAN ASSISTANT

## 2025-04-07 PROCEDURE — 25010000002 CEFTRIAXONE PER 250 MG: Performed by: PHYSICIAN ASSISTANT

## 2025-04-07 RX ORDER — ACETAMINOPHEN 500 MG
1000 TABLET ORAL ONCE
Status: COMPLETED | OUTPATIENT
Start: 2025-04-07 | End: 2025-04-07

## 2025-04-07 RX ORDER — CEFDINIR 300 MG/1
300 CAPSULE ORAL 2 TIMES DAILY
Qty: 20 CAPSULE | Refills: 0 | Status: SHIPPED | OUTPATIENT
Start: 2025-04-07

## 2025-04-07 RX ORDER — MAGNESIUM SULFATE HEPTAHYDRATE 40 MG/ML
4 INJECTION, SOLUTION INTRAVENOUS ONCE
Status: COMPLETED | OUTPATIENT
Start: 2025-04-07 | End: 2025-04-07

## 2025-04-07 RX ORDER — SODIUM CHLORIDE 0.9 % (FLUSH) 0.9 %
10 SYRINGE (ML) INJECTION AS NEEDED
Status: DISCONTINUED | OUTPATIENT
Start: 2025-04-07 | End: 2025-04-07 | Stop reason: HOSPADM

## 2025-04-07 RX ADMIN — SODIUM CHLORIDE 2000 MG: 9 INJECTION, SOLUTION INTRAVENOUS at 11:23

## 2025-04-07 RX ADMIN — MAGNESIUM SULFATE HEPTAHYDRATE 4 G: 40 INJECTION, SOLUTION INTRAVENOUS at 11:59

## 2025-04-07 RX ADMIN — ACETAMINOPHEN 1000 MG: 500 TABLET ORAL at 12:07

## 2025-04-07 NOTE — ED PROVIDER NOTES
Subjective   History of Present Illness  84-year-old female presents to the ER chief complaint of flulike symptoms.  Patient verbalizes she has been ill for about the past week.  Denies any shortness of breath or chest pain.  No history of kidney disease.  Currently followed by Dr. Everardo marquez.  States she does not feel like she has had a fever over the past week.  Is complaining of urinary frequency and back pain.  Verbalizes a bowel movement 2 days ago.        Review of Systems   Constitutional:  Positive for fatigue. Negative for fever.   HENT: Negative.     Respiratory: Negative.     Cardiovascular: Negative.  Negative for chest pain.   Gastrointestinal: Negative.  Negative for abdominal pain.   Endocrine: Negative.    Genitourinary:  Positive for frequency. Negative for dysuria.   Musculoskeletal:  Positive for back pain.   Skin: Negative.    Neurological:  Positive for weakness.   Psychiatric/Behavioral: Negative.     All other systems reviewed and are negative.      Past Medical History:   Diagnosis Date    GERD (gastroesophageal reflux disease)     Hyperlipidemia     Hypertension     Left shoulder pain     Obesity     Osteoarthritis of knees, bilateral     Overactive bladder     Right shoulder pain        Allergies   Allergen Reactions    Codeine Other (See Comments)     hyperventilate    Sulfa Antibiotics Rash       Past Surgical History:   Procedure Laterality Date    APPENDECTOMY      BUNIONECTOMY Right     CARDIOVASCULAR STRESS TEST  10/2012    CATARACT EXTRACTION  2017    CHOLECYSTECTOMY      COLONOSCOPY  2011    ECHO - CONVERTED  10/2012    JOINT REPLACEMENT      bilateral knees     KNEE ARTHROPLASTY Left     KNEE ARTHROSCOPY      SHOULDER ARTHROSCOPY Left 7/28/2016    Procedure: LEFT SHOULDER ACROMIOPLASTY,MINI OPEN ROTATOR CUFF REPAIR;  Surgeon: Carlos Eduardo Smith MD;  Location: [facility];  Service:     SHOULDER SURGERY  05/31/2016    OPEN ACROMICPLASTY RIGHT SHOULDER       Family History   Problem  Relation Age of Onset    Heart disease Other     Stroke Sister     Diabetes Mother     Heart failure Mother     Heart disease Mother     Heart attack Mother     Hypertension Father     Emphysema Father     Heart disease Father     No Known Problems Brother     Cancer Sister     Kidney disease Sister     Heart failure Sister     Diabetes Brother     Diabetes Brother     Diabetes Brother        Social History     Socioeconomic History    Marital status:    Tobacco Use    Smoking status: Former     Current packs/day: 0.00     Average packs/day: 2.0 packs/day for 20.0 years (40.0 ttl pk-yrs)     Types: Cigarettes     Start date:      Quit date:      Years since quittin.2    Smokeless tobacco: Never   Vaping Use    Vaping status: Never Used   Substance and Sexual Activity    Alcohol use: No     Comment: s/p     Drug use: No    Sexual activity: Defer           Objective   Physical Exam  Vitals and nursing note reviewed.   Constitutional:       General: She is not in acute distress.     Appearance: She is well-developed. She is not diaphoretic.   HENT:      Head: Normocephalic and atraumatic.      Right Ear: External ear normal.      Left Ear: External ear normal.      Nose: Nose normal.   Eyes:      Conjunctiva/sclera: Conjunctivae normal.      Pupils: Pupils are equal, round, and reactive to light.   Neck:      Vascular: No JVD.      Trachea: No tracheal deviation.   Cardiovascular:      Rate and Rhythm: Normal rate and regular rhythm.      Heart sounds: Normal heart sounds. No murmur heard.  Pulmonary:      Effort: Pulmonary effort is normal. No respiratory distress.      Breath sounds: Normal breath sounds. No wheezing.   Abdominal:      Palpations: Abdomen is soft.      Tenderness: There is no abdominal tenderness.   Musculoskeletal:         General: No deformity. Normal range of motion.      Cervical back: Normal range of motion and neck supple.   Skin:     General: Skin is warm and dry.       Coloration: Skin is not pale.      Findings: No erythema or rash.   Neurological:      Mental Status: She is alert and oriented to person, place, and time.      Cranial Nerves: No cranial nerve deficit.   Psychiatric:         Behavior: Behavior normal.         Thought Content: Thought content normal.         Procedures           ED Course  ED Course as of 04/07/25 1200   Mon Apr 07, 2025   0923 ECG 12 Lead Other; weakness  Normal sinus rhythm, rate 89, QTc 399, no acute ST or T wave changes [CW]   1154 CT chest rad intepreted:  1.  Some minimal patchy subpleural groundglass attenuation that could  represent pneumonitis.  2.  Degenerative changes thoracic spine as described.   [RB]   1155 CT abd pelvis rad interpreted:  1.  Small hiatal hernia.  2.  Degenerative changes lumbar spine as described.      [RB]   1158 Patient's workup is fairly unremarkable.  Magnesium replacement performed in the emergency department.  Urine is nitrite positive which he feels this is associated with an acute urinary tract infection.  Mild pneumonitis.  No notable findings on CT of the abdomen pelvis.  Patient will be treated with Omnicef 300 mg twice a day for 10 days.  Outpatient follow-up was recommended. [RB]      ED Course User Index  [CW] Sean Dietrich DO  [RB] Armin Barbosa II, PA                                                       Medical Decision Making  Problems Addressed:  Acute UTI: complicated acute illness or injury  Hypomagnesemia: complicated acute illness or injury    Amount and/or Complexity of Data Reviewed  Labs: ordered.  Radiology: ordered.  ECG/medicine tests: ordered. Decision-making details documented in ED Course.    Risk  OTC drugs.  Prescription drug management.        Final diagnoses:   Acute UTI   Hypomagnesemia       ED Disposition  ED Disposition       ED Disposition   Discharge    Condition   Stable    Comment   --               Finn Nash MD  [address] [place] [address]  [phone]  [phone]    Schedule an appointment as soon as possible for a visit            Medication List        New Prescriptions      cefdinir 300 MG capsule  Commonly known as: OMNICEF  Take 1 capsule by mouth 2 (Two) Times a Day.               Where to Get Your Medications        These medications were sent to [facility] [address] [place] [address] - [phone] [place] [phone]   [address] [facility] [address]      Phone: [phone]   cefdinir 300 MG capsule            Armin Barbosa II, PA  04/07/25 1422

## 2025-04-08 LAB
QT INTERVAL: 328 MS
QTC INTERVAL: 399 MS

## 2025-04-09 LAB — BACTERIA SPEC AEROBE CULT: ABNORMAL

## 2025-04-12 LAB
BACTERIA SPEC AEROBE CULT: NORMAL
BACTERIA SPEC AEROBE CULT: NORMAL

## 2025-05-12 ENCOUNTER — TRANSCRIBE ORDERS (OUTPATIENT)
Dept: ADMINISTRATIVE | Facility: HOSPITAL | Age: 85
End: 2025-05-12
Payer: MEDICARE

## 2025-05-12 ENCOUNTER — LAB (OUTPATIENT)
Dept: LAB | Facility: HOSPITAL | Age: 85
End: 2025-05-12
Payer: MEDICARE

## 2025-05-12 DIAGNOSIS — M81.0 OSTEOPOROSIS, UNSPECIFIED OSTEOPOROSIS TYPE, UNSPECIFIED PATHOLOGICAL FRACTURE PRESENCE: ICD-10-CM

## 2025-05-12 DIAGNOSIS — M81.0 OSTEOPOROSIS, UNSPECIFIED OSTEOPOROSIS TYPE, UNSPECIFIED PATHOLOGICAL FRACTURE PRESENCE: Primary | ICD-10-CM

## 2025-05-12 LAB — ALP SERPL-CCNC: 89 U/L (ref 39–117)

## 2025-05-12 PROCEDURE — 36415 COLL VENOUS BLD VENIPUNCTURE: CPT

## 2025-05-12 PROCEDURE — 84075 ASSAY ALKALINE PHOSPHATASE: CPT

## 2025-05-12 PROCEDURE — 82652 VIT D 1 25-DIHYDROXY: CPT

## 2025-05-12 PROCEDURE — 82523 COLLAGEN CROSSLINKS: CPT

## 2025-05-14 LAB — 1,25(OH)2D SERPL-MCNC: 36.8 PG/ML (ref 24.8–81.5)

## 2025-05-15 LAB — COLLAGEN CTX SERPL-MCNC: 145 PG/ML

## 2025-05-23 ENCOUNTER — APPOINTMENT (OUTPATIENT)
Dept: GENERAL RADIOLOGY | Facility: HOSPITAL | Age: 85
End: 2025-05-23
Payer: MEDICARE

## 2025-05-23 ENCOUNTER — HOSPITAL ENCOUNTER (INPATIENT)
Facility: HOSPITAL | Age: 85
LOS: 11 days | Discharge: NURSING, REHAB OR LONG-TERM CARE | End: 2025-06-03
Attending: EMERGENCY MEDICINE | Admitting: HOSPITALIST
Payer: MEDICARE

## 2025-05-23 DIAGNOSIS — K64.9 HEMORRHOIDS, UNSPECIFIED HEMORRHOID TYPE: ICD-10-CM

## 2025-05-23 DIAGNOSIS — E83.42 HYPOMAGNESEMIA: Primary | ICD-10-CM

## 2025-05-23 DIAGNOSIS — M19.019 ACROMIOCLAVICULAR JOINT ARTHRITIS, UNSPECIFIED LATERALITY: ICD-10-CM

## 2025-05-23 DIAGNOSIS — I51.89 DIASTOLIC DYSFUNCTION WITHOUT HEART FAILURE: ICD-10-CM

## 2025-05-23 LAB
ALBUMIN SERPL-MCNC: 3.9 G/DL (ref 3.5–5.2)
ALBUMIN/GLOB SERPL: 1.1 G/DL
ALP SERPL-CCNC: 110 U/L (ref 39–117)
ALT SERPL W P-5'-P-CCNC: 10 U/L (ref 1–33)
ANION GAP SERPL CALCULATED.3IONS-SCNC: 17.4 MMOL/L (ref 5–15)
APTT PPP: 33.9 SECONDS (ref 24.5–35.9)
AST SERPL-CCNC: 19 U/L (ref 1–32)
B PARAPERT DNA SPEC QL NAA+PROBE: NOT DETECTED
B PERT DNA SPEC QL NAA+PROBE: NOT DETECTED
BASOPHILS # BLD AUTO: 0.06 10*3/MM3 (ref 0–0.2)
BASOPHILS NFR BLD AUTO: 0.5 % (ref 0–1.5)
BILIRUB SERPL-MCNC: 0.5 MG/DL (ref 0–1.2)
BILIRUB UR QL STRIP: NEGATIVE
BUN SERPL-MCNC: 15 MG/DL (ref 8–23)
BUN/CREAT SERPL: 12.9 (ref 7–25)
C PNEUM DNA NPH QL NAA+NON-PROBE: NOT DETECTED
CALCIUM SPEC-SCNC: 8.1 MG/DL (ref 8.6–10.5)
CHLORIDE SERPL-SCNC: 102 MMOL/L (ref 98–107)
CLARITY UR: CLEAR
CO2 SERPL-SCNC: 23.6 MMOL/L (ref 22–29)
COLOR UR: YELLOW
CREAT SERPL-MCNC: 1.16 MG/DL (ref 0.57–1)
CRP SERPL-MCNC: 9.08 MG/DL (ref 0–0.5)
D-LACTATE SERPL-SCNC: 1.2 MMOL/L (ref 0.5–2)
DEPRECATED RDW RBC AUTO: 51.5 FL (ref 37–54)
EGFRCR SERPLBLD CKD-EPI 2021: 46.9 ML/MIN/1.73
EOSINOPHIL # BLD AUTO: 0.04 10*3/MM3 (ref 0–0.4)
EOSINOPHIL NFR BLD AUTO: 0.3 % (ref 0.3–6.2)
ERYTHROCYTE [DISTWIDTH] IN BLOOD BY AUTOMATED COUNT: 15.8 % (ref 12.3–15.4)
FLUAV SUBTYP SPEC NAA+PROBE: NOT DETECTED
FLUBV RNA ISLT QL NAA+PROBE: NOT DETECTED
GEN 5 1HR TROPONIN T REFLEX: 33 NG/L
GLOBULIN UR ELPH-MCNC: 3.4 GM/DL
GLUCOSE SERPL-MCNC: 120 MG/DL (ref 65–99)
GLUCOSE UR STRIP-MCNC: NEGATIVE MG/DL
HADV DNA SPEC NAA+PROBE: NOT DETECTED
HCOV 229E RNA SPEC QL NAA+PROBE: NOT DETECTED
HCOV HKU1 RNA SPEC QL NAA+PROBE: NOT DETECTED
HCOV NL63 RNA SPEC QL NAA+PROBE: NOT DETECTED
HCOV OC43 RNA SPEC QL NAA+PROBE: NOT DETECTED
HCT VFR BLD AUTO: 32.5 % (ref 34–46.6)
HGB BLD-MCNC: 10.5 G/DL (ref 12–15.9)
HGB UR QL STRIP.AUTO: NEGATIVE
HMPV RNA NPH QL NAA+NON-PROBE: NOT DETECTED
HOLD SPECIMEN: NORMAL
HOLD SPECIMEN: NORMAL
HPIV1 RNA ISLT QL NAA+PROBE: NOT DETECTED
HPIV2 RNA SPEC QL NAA+PROBE: NOT DETECTED
HPIV3 RNA NPH QL NAA+PROBE: NOT DETECTED
HPIV4 P GENE NPH QL NAA+PROBE: NOT DETECTED
IMM GRANULOCYTES # BLD AUTO: 0.07 10*3/MM3 (ref 0–0.05)
IMM GRANULOCYTES NFR BLD AUTO: 0.6 % (ref 0–0.5)
INR PPP: 1 (ref 0.9–1.1)
KETONES UR QL STRIP: ABNORMAL
LEUKOCYTE ESTERASE UR QL STRIP.AUTO: NEGATIVE
LYMPHOCYTES # BLD AUTO: 1.98 10*3/MM3 (ref 0.7–3.1)
LYMPHOCYTES NFR BLD AUTO: 17 % (ref 19.6–45.3)
M PNEUMO IGG SER IA-ACNC: NOT DETECTED
MAGNESIUM SERPL-MCNC: 0.6 MG/DL (ref 1.6–2.4)
MCH RBC QN AUTO: 28.7 PG (ref 26.6–33)
MCHC RBC AUTO-ENTMCNC: 32.3 G/DL (ref 31.5–35.7)
MCV RBC AUTO: 88.8 FL (ref 79–97)
MONOCYTES # BLD AUTO: 0.9 10*3/MM3 (ref 0.1–0.9)
MONOCYTES NFR BLD AUTO: 7.7 % (ref 5–12)
NEUTROPHILS NFR BLD AUTO: 73.9 % (ref 42.7–76)
NEUTROPHILS NFR BLD AUTO: 8.62 10*3/MM3 (ref 1.7–7)
NITRITE UR QL STRIP: NEGATIVE
NRBC BLD AUTO-RTO: 0 /100 WBC (ref 0–0.2)
NT-PROBNP SERPL-MCNC: 1283 PG/ML (ref 0–1800)
PH UR STRIP.AUTO: 5.5 [PH] (ref 5–8)
PLATELET # BLD AUTO: 424 10*3/MM3 (ref 140–450)
PMV BLD AUTO: 9.4 FL (ref 6–12)
POTASSIUM SERPL-SCNC: 2.9 MMOL/L (ref 3.5–5.2)
PROT SERPL-MCNC: 7.3 G/DL (ref 6–8.5)
PROT UR QL STRIP: NEGATIVE
PROTHROMBIN TIME: 13.8 SECONDS (ref 12.5–15.2)
RBC # BLD AUTO: 3.66 10*6/MM3 (ref 3.77–5.28)
RHINOVIRUS RNA SPEC NAA+PROBE: NOT DETECTED
RSV RNA NPH QL NAA+NON-PROBE: NOT DETECTED
SARS-COV-2 RNA RESP QL NAA+PROBE: NOT DETECTED
SODIUM SERPL-SCNC: 143 MMOL/L (ref 136–145)
SP GR UR STRIP: 1.01 (ref 1–1.03)
TROPONIN T % DELTA: -18
TROPONIN T NUMERIC DELTA: -7 NG/L
TROPONIN T SERPL HS-MCNC: 40 NG/L
UROBILINOGEN UR QL STRIP: ABNORMAL
WBC NRBC COR # BLD AUTO: 11.67 10*3/MM3 (ref 3.4–10.8)
WHOLE BLOOD HOLD COAG: NORMAL
WHOLE BLOOD HOLD SPECIMEN: NORMAL

## 2025-05-23 PROCEDURE — 25810000003 SODIUM CHLORIDE 0.9 % SOLUTION: Performed by: NURSE PRACTITIONER

## 2025-05-23 PROCEDURE — 71045 X-RAY EXAM CHEST 1 VIEW: CPT

## 2025-05-23 PROCEDURE — 93005 ELECTROCARDIOGRAM TRACING: CPT | Performed by: NURSE PRACTITIONER

## 2025-05-23 PROCEDURE — 86140 C-REACTIVE PROTEIN: CPT | Performed by: HOSPITALIST

## 2025-05-23 PROCEDURE — 81003 URINALYSIS AUTO W/O SCOPE: CPT | Performed by: NURSE PRACTITIONER

## 2025-05-23 PROCEDURE — 80053 COMPREHEN METABOLIC PANEL: CPT | Performed by: NURSE PRACTITIONER

## 2025-05-23 PROCEDURE — 85025 COMPLETE CBC W/AUTO DIFF WBC: CPT | Performed by: NURSE PRACTITIONER

## 2025-05-23 PROCEDURE — 83880 ASSAY OF NATRIURETIC PEPTIDE: CPT | Performed by: NURSE PRACTITIONER

## 2025-05-23 PROCEDURE — 36415 COLL VENOUS BLD VENIPUNCTURE: CPT

## 2025-05-23 PROCEDURE — 25010000002 MAGNESIUM SULFATE 4 GM/100ML SOLUTION: Performed by: HOSPITALIST

## 2025-05-23 PROCEDURE — 99223 1ST HOSP IP/OBS HIGH 75: CPT

## 2025-05-23 PROCEDURE — 87040 BLOOD CULTURE FOR BACTERIA: CPT | Performed by: NURSE PRACTITIONER

## 2025-05-23 PROCEDURE — 85610 PROTHROMBIN TIME: CPT | Performed by: NURSE PRACTITIONER

## 2025-05-23 PROCEDURE — 83605 ASSAY OF LACTIC ACID: CPT | Performed by: NURSE PRACTITIONER

## 2025-05-23 PROCEDURE — 84484 ASSAY OF TROPONIN QUANT: CPT | Performed by: NURSE PRACTITIONER

## 2025-05-23 PROCEDURE — 83735 ASSAY OF MAGNESIUM: CPT | Performed by: NURSE PRACTITIONER

## 2025-05-23 PROCEDURE — 85730 THROMBOPLASTIN TIME PARTIAL: CPT | Performed by: NURSE PRACTITIONER

## 2025-05-23 PROCEDURE — 25010000002 MAGNESIUM SULFATE 2 GM/50ML SOLUTION: Performed by: HOSPITALIST

## 2025-05-23 PROCEDURE — 99285 EMERGENCY DEPT VISIT HI MDM: CPT

## 2025-05-23 PROCEDURE — 25010000002 HEPARIN (PORCINE) PER 1000 UNITS: Performed by: HOSPITALIST

## 2025-05-23 PROCEDURE — 71045 X-RAY EXAM CHEST 1 VIEW: CPT | Performed by: RADIOLOGY

## 2025-05-23 PROCEDURE — 25010000002 POTASSIUM CHLORIDE 10 MEQ/100ML SOLUTION: Performed by: HOSPITALIST

## 2025-05-23 PROCEDURE — 0202U NFCT DS 22 TRGT SARS-COV-2: CPT | Performed by: HOSPITALIST

## 2025-05-23 RX ORDER — POTASSIUM CHLORIDE 1500 MG/1
40 TABLET, EXTENDED RELEASE ORAL
Status: ACTIVE | OUTPATIENT
Start: 2025-05-23 | End: 2025-05-24

## 2025-05-23 RX ORDER — AMOXICILLIN 250 MG
2 CAPSULE ORAL 2 TIMES DAILY PRN
Status: DISCONTINUED | OUTPATIENT
Start: 2025-05-23 | End: 2025-06-03 | Stop reason: HOSPADM

## 2025-05-23 RX ORDER — SODIUM CHLORIDE 9 MG/ML
40 INJECTION, SOLUTION INTRAVENOUS AS NEEDED
Status: DISCONTINUED | OUTPATIENT
Start: 2025-05-23 | End: 2025-06-03 | Stop reason: HOSPADM

## 2025-05-23 RX ORDER — KETOCONAZOLE 20 MG/G
1 CREAM TOPICAL DAILY PRN
COMMUNITY

## 2025-05-23 RX ORDER — MAGNESIUM SULFATE HEPTAHYDRATE 40 MG/ML
2 INJECTION, SOLUTION INTRAVENOUS
Status: COMPLETED | OUTPATIENT
Start: 2025-05-23 | End: 2025-05-23

## 2025-05-23 RX ORDER — KETOCONAZOLE 20 MG/ML
1 SHAMPOO, SUSPENSION TOPICAL 2 TIMES WEEKLY
COMMUNITY
End: 2025-06-16 | Stop reason: HOSPADM

## 2025-05-23 RX ORDER — BISACODYL 5 MG/1
5 TABLET, DELAYED RELEASE ORAL DAILY PRN
Status: DISCONTINUED | OUTPATIENT
Start: 2025-05-23 | End: 2025-06-03 | Stop reason: HOSPADM

## 2025-05-23 RX ORDER — BISACODYL 10 MG
10 SUPPOSITORY, RECTAL RECTAL DAILY PRN
Status: DISCONTINUED | OUTPATIENT
Start: 2025-05-23 | End: 2025-06-03 | Stop reason: HOSPADM

## 2025-05-23 RX ORDER — POTASSIUM CHLORIDE 1500 MG/1
20 TABLET, EXTENDED RELEASE ORAL DAILY
Status: CANCELLED | OUTPATIENT
Start: 2025-05-24

## 2025-05-23 RX ORDER — SODIUM CHLORIDE 0.9 % (FLUSH) 0.9 %
10 SYRINGE (ML) INJECTION AS NEEDED
Status: DISCONTINUED | OUTPATIENT
Start: 2025-05-23 | End: 2025-06-03 | Stop reason: HOSPADM

## 2025-05-23 RX ORDER — POLYETHYLENE GLYCOL 3350 17 G/17G
17 POWDER, FOR SOLUTION ORAL DAILY PRN
Status: DISCONTINUED | OUTPATIENT
Start: 2025-05-23 | End: 2025-06-03 | Stop reason: HOSPADM

## 2025-05-23 RX ORDER — HEPARIN SODIUM 5000 [USP'U]/ML
5000 INJECTION, SOLUTION INTRAVENOUS; SUBCUTANEOUS EVERY 12 HOURS SCHEDULED
Status: DISCONTINUED | OUTPATIENT
Start: 2025-05-23 | End: 2025-06-03 | Stop reason: HOSPADM

## 2025-05-23 RX ORDER — SODIUM CHLORIDE 0.9 % (FLUSH) 0.9 %
10 SYRINGE (ML) INJECTION EVERY 12 HOURS SCHEDULED
Status: DISCONTINUED | OUTPATIENT
Start: 2025-05-23 | End: 2025-06-03 | Stop reason: HOSPADM

## 2025-05-23 RX ORDER — MAGNESIUM SULFATE HEPTAHYDRATE 40 MG/ML
4 INJECTION, SOLUTION INTRAVENOUS EVERY 4 HOURS
Status: COMPLETED | OUTPATIENT
Start: 2025-05-23 | End: 2025-05-24

## 2025-05-23 RX ORDER — POTASSIUM CHLORIDE 7.45 MG/ML
10 INJECTION INTRAVENOUS
Status: COMPLETED | OUTPATIENT
Start: 2025-05-23 | End: 2025-05-24

## 2025-05-23 RX ORDER — NITROGLYCERIN 0.4 MG/1
0.4 TABLET SUBLINGUAL
Status: DISCONTINUED | OUTPATIENT
Start: 2025-05-23 | End: 2025-06-03 | Stop reason: HOSPADM

## 2025-05-23 RX ADMIN — POTASSIUM CHLORIDE 10 MEQ: 7.46 INJECTION, SOLUTION INTRAVENOUS at 22:16

## 2025-05-23 RX ADMIN — SODIUM CHLORIDE 250 ML: 9 INJECTION, SOLUTION INTRAVENOUS at 18:19

## 2025-05-23 RX ADMIN — Medication 5 MG: at 23:53

## 2025-05-23 RX ADMIN — SODIUM CHLORIDE 40 ML: 9 INJECTION, SOLUTION INTRAVENOUS at 22:16

## 2025-05-23 RX ADMIN — MAGNESIUM SULFATE HEPTAHYDRATE 2 G: 40 INJECTION, SOLUTION INTRAVENOUS at 22:15

## 2025-05-23 RX ADMIN — Medication 10 ML: at 22:17

## 2025-05-23 RX ADMIN — MAGNESIUM SULFATE HEPTAHYDRATE 4 G: 40 INJECTION, SOLUTION INTRAVENOUS at 23:10

## 2025-05-23 RX ADMIN — POTASSIUM CHLORIDE 10 MEQ: 7.46 INJECTION, SOLUTION INTRAVENOUS at 23:51

## 2025-05-23 RX ADMIN — HEPARIN SODIUM 5000 UNITS: 5000 INJECTION INTRAVENOUS; SUBCUTANEOUS at 22:15

## 2025-05-23 NOTE — ED PROVIDER NOTES
Subjective   History of Present Illness  Patient is an 84-year-old female presents today for weakness.  Patient reports she has not been able to walk today and states she has had increased swelling in her lower extremities.  Patient reports she does not feel well but cannot state exactly what is causing her to feel unwell.  Patient denies chest pain.  Patient denies shortness of breath.    Weakness - Generalized      Review of Systems   Constitutional: Negative.    HENT: Negative.     Eyes: Negative.    Respiratory: Negative.     Cardiovascular: Negative.    Gastrointestinal: Negative.    Endocrine: Negative.    Genitourinary: Negative.    Musculoskeletal: Negative.    Skin: Negative.    Allergic/Immunologic: Negative.    Hematological: Negative.    Psychiatric/Behavioral: Negative.         Past Medical History:   Diagnosis Date    GERD (gastroesophageal reflux disease)     Hyperlipidemia     Hypertension     Left shoulder pain     Obesity     Osteoarthritis of knees, bilateral     Overactive bladder     Right shoulder pain        Allergies   Allergen Reactions    Codeine Other (See Comments)     hyperventilate    Sulfa Antibiotics Rash       Past Surgical History:   Procedure Laterality Date    APPENDECTOMY      BUNIONECTOMY Right     CARDIOVASCULAR STRESS TEST  10/2012    CATARACT EXTRACTION  2017    CHOLECYSTECTOMY      COLONOSCOPY  2011    ECHO - CONVERTED  10/2012    JOINT REPLACEMENT      bilateral knees     KNEE ARTHROPLASTY Left     KNEE ARTHROSCOPY      SHOULDER ARTHROSCOPY Left 7/28/2016    Procedure: LEFT SHOULDER ACROMIOPLASTY,MINI OPEN ROTATOR CUFF REPAIR;  Surgeon: Carlos Eduardo Smith MD;  Location: [facility];  Service:     SHOULDER SURGERY  05/31/2016    OPEN ACROMICPLASTY RIGHT SHOULDER       Family History   Problem Relation Age of Onset    Heart disease Other     Stroke Sister     Diabetes Mother     Heart failure Mother     Heart disease Mother     Heart attack Mother     Hypertension Father      Emphysema Father     Heart disease Father     No Known Problems Brother     Cancer Sister     Kidney disease Sister     Heart failure Sister     Diabetes Brother     Diabetes Brother     Diabetes Brother        Social History     Socioeconomic History    Marital status:    Tobacco Use    Smoking status: Former     Current packs/day: 0.00     Average packs/day: 2.0 packs/day for 20.0 years (40.0 ttl pk-yrs)     Types: Cigarettes     Start date:      Quit date:      Years since quittin.4    Smokeless tobacco: Never   Vaping Use    Vaping status: Never Used   Substance and Sexual Activity    Alcohol use: No     Comment: s/p     Drug use: No    Sexual activity: Defer           Objective   Physical Exam  Vitals and nursing note reviewed.   Constitutional:       Appearance: She is well-developed.   HENT:      Head: Normocephalic.      Right Ear: External ear normal.      Left Ear: External ear normal.   Eyes:      Conjunctiva/sclera: Conjunctivae normal.      Pupils: Pupils are equal, round, and reactive to light.   Cardiovascular:      Rate and Rhythm: Normal rate and regular rhythm.      Heart sounds: Normal heart sounds.   Pulmonary:      Effort: Pulmonary effort is normal.      Breath sounds: Normal breath sounds.   Abdominal:      General: Bowel sounds are normal.      Palpations: Abdomen is soft.   Musculoskeletal:         General: Normal range of motion.      Cervical back: Normal range of motion and neck supple.      Right lower leg: Edema present.      Left lower leg: Edema present.   Skin:     General: Skin is warm and dry.      Capillary Refill: Capillary refill takes less than 2 seconds.   Neurological:      Mental Status: She is alert and oriented to person, place, and time.   Psychiatric:         Behavior: Behavior normal.         Thought Content: Thought content normal.         Procedures         Results for orders placed or performed during the hospital encounter of 25    Comprehensive Metabolic Panel    Collection Time: 05/23/25  6:14 PM    Specimen: Blood   Result Value Ref Range    Glucose 120 (H) 65 - 99 mg/dL    BUN 15 8 - 23 mg/dL    Creatinine 1.16 (H) 0.57 - 1.00 mg/dL    Sodium 143 136 - 145 mmol/L    Potassium 2.9 (L) 3.5 - 5.2 mmol/L    Chloride 102 98 - 107 mmol/L    CO2 23.6 22.0 - 29.0 mmol/L    Calcium 8.1 (L) 8.6 - 10.5 mg/dL    Total Protein 7.3 6.0 - 8.5 g/dL    Albumin 3.9 3.5 - 5.2 g/dL    ALT (SGPT) 10 1 - 33 U/L    AST (SGOT) 19 1 - 32 U/L    Alkaline Phosphatase 110 39 - 117 U/L    Total Bilirubin 0.5 0.0 - 1.2 mg/dL    Globulin 3.4 gm/dL    A/G Ratio 1.1 g/dL    BUN/Creatinine Ratio 12.9 7.0 - 25.0    Anion Gap 17.4 (H) 5.0 - 15.0 mmol/L    eGFR 46.9 (L) >60.0 mL/min/1.73   Protime-INR    Collection Time: 05/23/25  6:14 PM    Specimen: Blood   Result Value Ref Range    Protime 13.8 12.5 - 15.2 Seconds    INR 1.00 0.90 - 1.10   aPTT    Collection Time: 05/23/25  6:14 PM    Specimen: Blood   Result Value Ref Range    PTT 33.9 24.5 - 35.9 seconds   BNP    Collection Time: 05/23/25  6:14 PM    Specimen: Blood   Result Value Ref Range    proBNP 1,283.0 0.0 - 1,800.0 pg/mL   CBC Auto Differential    Collection Time: 05/23/25  6:14 PM    Specimen: Blood   Result Value Ref Range    WBC 11.67 (H) 3.40 - 10.80 10*3/mm3    RBC 3.66 (L) 3.77 - 5.28 10*6/mm3    Hemoglobin 10.5 (L) 12.0 - 15.9 g/dL    Hematocrit 32.5 (L) 34.0 - 46.6 %    MCV 88.8 79.0 - 97.0 fL    MCH 28.7 26.6 - 33.0 pg    MCHC 32.3 31.5 - 35.7 g/dL    RDW 15.8 (H) 12.3 - 15.4 %    RDW-SD 51.5 37.0 - 54.0 fl    MPV 9.4 6.0 - 12.0 fL    Platelets 424 140 - 450 10*3/mm3    Neutrophil % 73.9 42.7 - 76.0 %    Lymphocyte % 17.0 (L) 19.6 - 45.3 %    Monocyte % 7.7 5.0 - 12.0 %    Eosinophil % 0.3 0.3 - 6.2 %    Basophil % 0.5 0.0 - 1.5 %    Immature Grans % 0.6 (H) 0.0 - 0.5 %    Neutrophils, Absolute 8.62 (H) 1.70 - 7.00 10*3/mm3    Lymphocytes, Absolute 1.98 0.70 - 3.10 10*3/mm3    Monocytes, Absolute 0.90  0.10 - 0.90 10*3/mm3    Eosinophils, Absolute 0.04 0.00 - 0.40 10*3/mm3    Basophils, Absolute 0.06 0.00 - 0.20 10*3/mm3    Immature Grans, Absolute 0.07 (H) 0.00 - 0.05 10*3/mm3    nRBC 0.0 0.0 - 0.2 /100 WBC   Lactic Acid, Plasma    Collection Time: 05/23/25  6:14 PM    Specimen: Blood   Result Value Ref Range    Lactate 1.2 0.5 - 2.0 mmol/L   High Sensitivity Troponin T    Collection Time: 05/23/25  6:14 PM    Specimen: Blood   Result Value Ref Range    HS Troponin T 40 (H) <14 ng/L   Magnesium    Collection Time: 05/23/25  6:14 PM    Specimen: Blood   Result Value Ref Range    Magnesium 0.6 (C) 1.6 - 2.4 mg/dL   C-reactive Protein    Collection Time: 05/23/25  6:14 PM    Specimen: Blood   Result Value Ref Range    C-Reactive Protein 9.08 (H) 0.00 - 0.50 mg/dL   Green Top (Gel)    Collection Time: 05/23/25  6:14 PM   Result Value Ref Range    Extra Tube Hold for add-ons.    Lavender Top    Collection Time: 05/23/25  6:14 PM   Result Value Ref Range    Extra Tube hold for add-on    Gold Top - SST    Collection Time: 05/23/25  6:14 PM   Result Value Ref Range    Extra Tube Hold for add-ons.    Light Blue Top    Collection Time: 05/23/25  6:14 PM   Result Value Ref Range    Extra Tube Hold for add-ons.    ECG 12 Lead Other; weakness    Collection Time: 05/23/25  6:38 PM   Result Value Ref Range    QT Interval 364 ms    QTC Interval 452 ms   High Sensitivity Troponin T 1Hr    Collection Time: 05/23/25  7:32 PM    Specimen: Blood   Result Value Ref Range    HS Troponin T 33 (H) <14 ng/L    Troponin T Numeric Delta -7 ng/L    Troponin T % Delta -18 Abnormal if >/= 20%   Urinalysis With Culture If Indicated - Urine, Clean Catch    Collection Time: 05/23/25  7:53 PM    Specimen: Urine, Clean Catch   Result Value Ref Range    Color, UA Yellow Yellow, Straw    Appearance, UA Clear Clear    pH, UA 5.5 5.0 - 8.0    Specific Gravity, UA 1.015 1.005 - 1.030    Glucose, UA Negative Negative    Ketones, UA Trace (A) Negative     Bilirubin, UA Negative Negative    Blood, UA Negative Negative    Protein, UA Negative Negative    Leuk Esterase, UA Negative Negative    Nitrite, UA Negative Negative    Urobilinogen, UA 0.2 E.U./dL 0.2 - 1.0 E.U./dL     XR Chest AP   Final Result       Coarsened bronchovascular pattern to the lungs   No edema or pneumonia. No pleural effusion or pneumothorax   No fracture or foreign body.   Slight levoconvex curve at the thoracolumbar junction.   Fixation anchors at the left humeral head.           This report was finalized on 5/23/2025 6:44 PM by Juliano Paulson MD.              ED Course  ED Course as of 05/23/25 2021   Fri May 23, 2025   1857 EKG at 1838 has much baseline artifact.  Regular appearing narrow complex rhythm, rate 93.  QRS duration 94, QTc 452 ms.  No STEMI criteria.  Recommend for this to be repeated.  Electronically signed by Jonathan Olguin MD, 05/23/25, 6:58 PM EDT.   [CM]      ED Course User Index  [CM] Jonathan Olguin MD                                           Results for orders placed or performed during the hospital encounter of 05/23/25   Comprehensive Metabolic Panel    Collection Time: 05/23/25  6:14 PM    Specimen: Blood   Result Value Ref Range    Glucose 120 (H) 65 - 99 mg/dL    BUN 15 8 - 23 mg/dL    Creatinine 1.16 (H) 0.57 - 1.00 mg/dL    Sodium 143 136 - 145 mmol/L    Potassium 2.9 (L) 3.5 - 5.2 mmol/L    Chloride 102 98 - 107 mmol/L    CO2 23.6 22.0 - 29.0 mmol/L    Calcium 8.1 (L) 8.6 - 10.5 mg/dL    Total Protein 7.3 6.0 - 8.5 g/dL    Albumin 3.9 3.5 - 5.2 g/dL    ALT (SGPT) 10 1 - 33 U/L    AST (SGOT) 19 1 - 32 U/L    Alkaline Phosphatase 110 39 - 117 U/L    Total Bilirubin 0.5 0.0 - 1.2 mg/dL    Globulin 3.4 gm/dL    A/G Ratio 1.1 g/dL    BUN/Creatinine Ratio 12.9 7.0 - 25.0    Anion Gap 17.4 (H) 5.0 - 15.0 mmol/L    eGFR 46.9 (L) >60.0 mL/min/1.73   Protime-INR    Collection Time: 05/23/25  6:14 PM    Specimen: Blood   Result Value Ref Range    Protime 13.8 12.5 -  15.2 Seconds    INR 1.00 0.90 - 1.10   aPTT    Collection Time: 05/23/25  6:14 PM    Specimen: Blood   Result Value Ref Range    PTT 33.9 24.5 - 35.9 seconds   BNP    Collection Time: 05/23/25  6:14 PM    Specimen: Blood   Result Value Ref Range    proBNP 1,283.0 0.0 - 1,800.0 pg/mL   CBC Auto Differential    Collection Time: 05/23/25  6:14 PM    Specimen: Blood   Result Value Ref Range    WBC 11.67 (H) 3.40 - 10.80 10*3/mm3    RBC 3.66 (L) 3.77 - 5.28 10*6/mm3    Hemoglobin 10.5 (L) 12.0 - 15.9 g/dL    Hematocrit 32.5 (L) 34.0 - 46.6 %    MCV 88.8 79.0 - 97.0 fL    MCH 28.7 26.6 - 33.0 pg    MCHC 32.3 31.5 - 35.7 g/dL    RDW 15.8 (H) 12.3 - 15.4 %    RDW-SD 51.5 37.0 - 54.0 fl    MPV 9.4 6.0 - 12.0 fL    Platelets 424 140 - 450 10*3/mm3    Neutrophil % 73.9 42.7 - 76.0 %    Lymphocyte % 17.0 (L) 19.6 - 45.3 %    Monocyte % 7.7 5.0 - 12.0 %    Eosinophil % 0.3 0.3 - 6.2 %    Basophil % 0.5 0.0 - 1.5 %    Immature Grans % 0.6 (H) 0.0 - 0.5 %    Neutrophils, Absolute 8.62 (H) 1.70 - 7.00 10*3/mm3    Lymphocytes, Absolute 1.98 0.70 - 3.10 10*3/mm3    Monocytes, Absolute 0.90 0.10 - 0.90 10*3/mm3    Eosinophils, Absolute 0.04 0.00 - 0.40 10*3/mm3    Basophils, Absolute 0.06 0.00 - 0.20 10*3/mm3    Immature Grans, Absolute 0.07 (H) 0.00 - 0.05 10*3/mm3    nRBC 0.0 0.0 - 0.2 /100 WBC   Lactic Acid, Plasma    Collection Time: 05/23/25  6:14 PM    Specimen: Blood   Result Value Ref Range    Lactate 1.2 0.5 - 2.0 mmol/L   High Sensitivity Troponin T    Collection Time: 05/23/25  6:14 PM    Specimen: Blood   Result Value Ref Range    HS Troponin T 40 (H) <14 ng/L   Magnesium    Collection Time: 05/23/25  6:14 PM    Specimen: Blood   Result Value Ref Range    Magnesium 0.6 (C) 1.6 - 2.4 mg/dL   C-reactive Protein    Collection Time: 05/23/25  6:14 PM    Specimen: Blood   Result Value Ref Range    C-Reactive Protein 9.08 (H) 0.00 - 0.50 mg/dL   Green Top (Gel)    Collection Time: 05/23/25  6:14 PM   Result Value Ref Range     Extra Tube Hold for add-ons.    Lavender Top    Collection Time: 05/23/25  6:14 PM   Result Value Ref Range    Extra Tube hold for add-on    Gold Top - SST    Collection Time: 05/23/25  6:14 PM   Result Value Ref Range    Extra Tube Hold for add-ons.    Light Blue Top    Collection Time: 05/23/25  6:14 PM   Result Value Ref Range    Extra Tube Hold for add-ons.    ECG 12 Lead Other; weakness    Collection Time: 05/23/25  6:38 PM   Result Value Ref Range    QT Interval 364 ms    QTC Interval 452 ms   High Sensitivity Troponin T 1Hr    Collection Time: 05/23/25  7:32 PM    Specimen: Blood   Result Value Ref Range    HS Troponin T 33 (H) <14 ng/L    Troponin T Numeric Delta -7 ng/L    Troponin T % Delta -18 Abnormal if >/= 20%   Urinalysis With Culture If Indicated - Urine, Clean Catch    Collection Time: 05/23/25  7:53 PM    Specimen: Urine, Clean Catch   Result Value Ref Range    Color, UA Yellow Yellow, Straw    Appearance, UA Clear Clear    pH, UA 5.5 5.0 - 8.0    Specific Gravity, UA 1.015 1.005 - 1.030    Glucose, UA Negative Negative    Ketones, UA Trace (A) Negative    Bilirubin, UA Negative Negative    Blood, UA Negative Negative    Protein, UA Negative Negative    Leuk Esterase, UA Negative Negative    Nitrite, UA Negative Negative    Urobilinogen, UA 0.2 E.U./dL 0.2 - 1.0 E.U./dL     XR Chest AP   Final Result       Coarsened bronchovascular pattern to the lungs   No edema or pneumonia. No pleural effusion or pneumothorax   No fracture or foreign body.   Slight levoconvex curve at the thoracolumbar junction.   Fixation anchors at the left humeral head.           This report was finalized on 5/23/2025 6:44 PM by Juliano Paulson MD.                        Medical Decision Making  MDM:    Escalation of care including admission/observation considered    - Discussions of management with other providers:  Hospitalist    - Discussed/reviewed with Radiology regarding test interpretation    - Independent  interpretation: None    - Additional patient history obtained from: None    - Review of external non-ED record (if available):  Prior Inpt record, Office record, Outpt record, Prior Outpt labs, PCP record, Outside ED record, Other    - Chronic conditions affecting care: See HPI and medical Hx.    - Social Determinants of health significantly affecting care:  None        Medical Decision Making Discussion:    Patient is an 84-year-old female presents today for weakness.  Patient reports she has not been able to walk today and states she has had increased swelling in her lower extremities.  Patient reports she does not feel well but cannot state exactly what is causing her to feel unwell.  Patient denies chest pain.  Patient denies shortness of breath.      The patient has been given very strict return precautions to return to the emergency department should there be any acute change or worsening of their condition.  I have explained my findings and the patient has expressed understanding to me.  I explained that the work-up performed in the ED has been based on the specific complaint and concern, as the nature of emergency medicine is complaint driven and they understand that new symptoms may arise.  I have told them that, should there be any new symptoms, worsening or changing symptoms, a new work-up may be indicated that they are encouraged to return to the emergency department or promptly contact their primary care physician. We have employed a shared decision-making process as the discussion of their disposition.  The patient has been educated as to the nature of the visit, the tests and work-up performed and the findings from today's visit. At this time, there does not appear to be any acute emergent process that necessitates admission to the hospital, however, the patient understands that this can change unexpectedly. At this time, the patient is stable for discharge home and agrees to follow-up with her  primary care physician in the next 24 to 48 hours or earlier should they be able to obtain an appointment.    The patient was counseled regarding diagnostic results and treatment plan and patient has indicated understanding of these instructions.      Problems Addressed:  Hypomagnesemia: complicated acute illness or injury    Amount and/or Complexity of Data Reviewed  Labs: ordered. Decision-making details documented in ED Course.  Radiology: ordered. Decision-making details documented in ED Course.  ECG/medicine tests: ordered. Decision-making details documented in ED Course.    Risk  OTC drugs.  Decision regarding hospitalization.        Final diagnoses:   Hypomagnesemia       ED Disposition  ED Disposition       ED Disposition   Decision to Admit    Condition   --    Comment   Level of Care: Telemetry [5]   Diagnosis: Hypomagnesemia [634959]   Admitting Physician: SWETHA RAMOS [[id number]]   Attending Physician: SWETHA RAMOS [[id number]]   Certification: I Certify That Inpatient Hospital Services Are Medically Necessary For Greater Than 2 Midnights                 No follow-up provider specified.       Medication List      No changes were made to your prescriptions during this visit.            Jose Barbosa P, APRN  05/23/25 2021

## 2025-05-24 ENCOUNTER — APPOINTMENT (OUTPATIENT)
Dept: CARDIOLOGY | Facility: HOSPITAL | Age: 85
End: 2025-05-24
Payer: MEDICARE

## 2025-05-24 LAB
ALBUMIN SERPL-MCNC: 3.2 G/DL (ref 3.5–5.2)
ALBUMIN/GLOB SERPL: 1.1 G/DL
ALP SERPL-CCNC: 88 U/L (ref 39–117)
ALT SERPL W P-5'-P-CCNC: 7 U/L (ref 1–33)
ANION GAP SERPL CALCULATED.3IONS-SCNC: 13.5 MMOL/L (ref 5–15)
ANION GAP SERPL CALCULATED.3IONS-SCNC: 14.6 MMOL/L (ref 5–15)
AORTIC DIMENSIONLESS INDEX: 0.8 (DI)
AST SERPL-CCNC: 19 U/L (ref 1–32)
AV MEAN PRESS GRAD SYS DOP V1V2: 5 MMHG
AV VMAX SYS DOP: 136 CM/SEC
BASOPHILS # BLD AUTO: 0.04 10*3/MM3 (ref 0–0.2)
BASOPHILS NFR BLD AUTO: 0.4 % (ref 0–1.5)
BH CV ECHO MEAS - ACS: 1.6 CM
BH CV ECHO MEAS - AO MAX PG: 7.4 MMHG
BH CV ECHO MEAS - AO ROOT DIAM: 2.8 CM
BH CV ECHO MEAS - AO V2 VTI: 28.5 CM
BH CV ECHO MEAS - AVA(I,D): 2.28 CM2
BH CV ECHO MEAS - EDV(CUBED): 103.8 ML
BH CV ECHO MEAS - EDV(MOD-SP2): 87.5 ML
BH CV ECHO MEAS - EDV(MOD-SP4): 58.2 ML
BH CV ECHO MEAS - EF(MOD-SP2): 59.4 %
BH CV ECHO MEAS - EF(MOD-SP4): 59.6 %
BH CV ECHO MEAS - ESV(CUBED): 32.8 ML
BH CV ECHO MEAS - ESV(MOD-SP2): 35.5 ML
BH CV ECHO MEAS - ESV(MOD-SP4): 23.5 ML
BH CV ECHO MEAS - FS: 31.9 %
BH CV ECHO MEAS - IVS/LVPW: 1 CM
BH CV ECHO MEAS - IVSD: 0.7 CM
BH CV ECHO MEAS - LA DIMENSION: 2.9 CM
BH CV ECHO MEAS - LAT PEAK E' VEL: 9.1 CM/SEC
BH CV ECHO MEAS - LV DIASTOLIC VOL/BSA (35-75): 35.9 CM2
BH CV ECHO MEAS - LV MASS(C)D: 103.1 GRAMS
BH CV ECHO MEAS - LV MAX PG: 4.8 MMHG
BH CV ECHO MEAS - LV MEAN PG: 3 MMHG
BH CV ECHO MEAS - LV SYSTOLIC VOL/BSA (12-30): 14.5 CM2
BH CV ECHO MEAS - LV V1 MAX: 109 CM/SEC
BH CV ECHO MEAS - LV V1 VTI: 22.9 CM
BH CV ECHO MEAS - LVIDD: 4.7 CM
BH CV ECHO MEAS - LVIDS: 3.2 CM
BH CV ECHO MEAS - LVOT AREA: 2.8 CM2
BH CV ECHO MEAS - LVOT DIAM: 1.9 CM
BH CV ECHO MEAS - LVPWD: 0.7 CM
BH CV ECHO MEAS - MED PEAK E' VEL: 8.7 CM/SEC
BH CV ECHO MEAS - MR MAX PG: 16.9 MMHG
BH CV ECHO MEAS - MR MAX VEL: 205.5 CM/SEC
BH CV ECHO MEAS - MV A MAX VEL: 96.8 CM/SEC
BH CV ECHO MEAS - MV DEC SLOPE: 341 CM/SEC2
BH CV ECHO MEAS - MV DEC TIME: 0.23 SEC
BH CV ECHO MEAS - MV E MAX VEL: 78.4 CM/SEC
BH CV ECHO MEAS - MV E/A: 0.81
BH CV ECHO MEAS - MV MAX PG: 4.5 MMHG
BH CV ECHO MEAS - MV MEAN PG: 2 MMHG
BH CV ECHO MEAS - MV V2 VTI: 23.2 CM
BH CV ECHO MEAS - MVA(VTI): 2.8 CM2
BH CV ECHO MEAS - PA ACC TIME: 0.12 SEC
BH CV ECHO MEAS - PA V2 MAX: 115 CM/SEC
BH CV ECHO MEAS - PI END-D VEL: 109 CM/SEC
BH CV ECHO MEAS - RAP SYSTOLE: 10 MMHG
BH CV ECHO MEAS - RVSP: 34.2 MMHG
BH CV ECHO MEAS - SV(LVOT): 64.9 ML
BH CV ECHO MEAS - SV(MOD-SP2): 52 ML
BH CV ECHO MEAS - SV(MOD-SP4): 34.7 ML
BH CV ECHO MEAS - SVI(LVOT): 40 ML/M2
BH CV ECHO MEAS - SVI(MOD-SP2): 32 ML/M2
BH CV ECHO MEAS - SVI(MOD-SP4): 21.4 ML/M2
BH CV ECHO MEAS - TAPSE (>1.6): 2.37 CM
BH CV ECHO MEAS - TR MAX PG: 24.2 MMHG
BH CV ECHO MEAS - TR MAX VEL: 246 CM/SEC
BH CV ECHO MEASUREMENTS AVERAGE E/E' RATIO: 8.81
BILIRUB SERPL-MCNC: 0.4 MG/DL (ref 0–1.2)
BUN SERPL-MCNC: 11 MG/DL (ref 8–23)
BUN SERPL-MCNC: 13 MG/DL (ref 8–23)
BUN/CREAT SERPL: 11.7 (ref 7–25)
BUN/CREAT SERPL: 12.5 (ref 7–25)
CALCIUM SPEC-SCNC: 7.3 MG/DL (ref 8.6–10.5)
CALCIUM SPEC-SCNC: 7.7 MG/DL (ref 8.6–10.5)
CHLORIDE SERPL-SCNC: 101 MMOL/L (ref 98–107)
CHLORIDE SERPL-SCNC: 103 MMOL/L (ref 98–107)
CO2 SERPL-SCNC: 22.4 MMOL/L (ref 22–29)
CO2 SERPL-SCNC: 22.5 MMOL/L (ref 22–29)
CREAT SERPL-MCNC: 0.94 MG/DL (ref 0.57–1)
CREAT SERPL-MCNC: 1.04 MG/DL (ref 0.57–1)
DEPRECATED RDW RBC AUTO: 53.1 FL (ref 37–54)
EGFRCR SERPLBLD CKD-EPI 2021: 53.4 ML/MIN/1.73
EGFRCR SERPLBLD CKD-EPI 2021: 60.3 ML/MIN/1.73
EOSINOPHIL # BLD AUTO: 0.06 10*3/MM3 (ref 0–0.4)
EOSINOPHIL NFR BLD AUTO: 0.6 % (ref 0.3–6.2)
ERYTHROCYTE [DISTWIDTH] IN BLOOD BY AUTOMATED COUNT: 15.8 % (ref 12.3–15.4)
GLOBULIN UR ELPH-MCNC: 2.9 GM/DL
GLUCOSE BLDC GLUCOMTR-MCNC: 104 MG/DL (ref 70–130)
GLUCOSE BLDC GLUCOMTR-MCNC: 215 MG/DL (ref 70–130)
GLUCOSE SERPL-MCNC: 101 MG/DL (ref 65–99)
GLUCOSE SERPL-MCNC: 139 MG/DL (ref 65–99)
HCT VFR BLD AUTO: 29 % (ref 34–46.6)
HGB BLD-MCNC: 9 G/DL (ref 12–15.9)
IMM GRANULOCYTES # BLD AUTO: 0.06 10*3/MM3 (ref 0–0.05)
IMM GRANULOCYTES NFR BLD AUTO: 0.6 % (ref 0–0.5)
LEFT ATRIUM VOLUME INDEX: 24.9 ML/M2
LV EF BIPLANE MOD: 57.3 %
LYMPHOCYTES # BLD AUTO: 1.82 10*3/MM3 (ref 0.7–3.1)
LYMPHOCYTES NFR BLD AUTO: 17.3 % (ref 19.6–45.3)
MAGNESIUM SERPL-MCNC: 2.1 MG/DL (ref 1.6–2.4)
MCH RBC QN AUTO: 28.7 PG (ref 26.6–33)
MCHC RBC AUTO-ENTMCNC: 31 G/DL (ref 31.5–35.7)
MCV RBC AUTO: 92.4 FL (ref 79–97)
MONOCYTES # BLD AUTO: 1.08 10*3/MM3 (ref 0.1–0.9)
MONOCYTES NFR BLD AUTO: 10.2 % (ref 5–12)
NEUTROPHILS NFR BLD AUTO: 7.48 10*3/MM3 (ref 1.7–7)
NEUTROPHILS NFR BLD AUTO: 70.9 % (ref 42.7–76)
NRBC BLD AUTO-RTO: 0 /100 WBC (ref 0–0.2)
PLATELET # BLD AUTO: 396 10*3/MM3 (ref 140–450)
PMV BLD AUTO: 10 FL (ref 6–12)
POTASSIUM SERPL-SCNC: 2.9 MMOL/L (ref 3.5–5.2)
POTASSIUM SERPL-SCNC: 3 MMOL/L (ref 3.5–5.2)
POTASSIUM SERPL-SCNC: 3.2 MMOL/L (ref 3.5–5.2)
POTASSIUM SERPL-SCNC: 6.9 MMOL/L (ref 3.5–5.2)
PROT SERPL-MCNC: 6.1 G/DL (ref 6–8.5)
RBC # BLD AUTO: 3.14 10*6/MM3 (ref 3.77–5.28)
SODIUM SERPL-SCNC: 137 MMOL/L (ref 136–145)
SODIUM SERPL-SCNC: 140 MMOL/L (ref 136–145)
WBC NRBC COR # BLD AUTO: 10.54 10*3/MM3 (ref 3.4–10.8)

## 2025-05-24 PROCEDURE — 80053 COMPREHEN METABOLIC PANEL: CPT | Performed by: HOSPITALIST

## 2025-05-24 PROCEDURE — 63710000001 INSULIN REGULAR HUMAN PER 5 UNITS

## 2025-05-24 PROCEDURE — 93306 TTE W/DOPPLER COMPLETE: CPT

## 2025-05-24 PROCEDURE — 84132 ASSAY OF SERUM POTASSIUM: CPT

## 2025-05-24 PROCEDURE — 85025 COMPLETE CBC W/AUTO DIFF WBC: CPT | Performed by: HOSPITALIST

## 2025-05-24 PROCEDURE — 82948 REAGENT STRIP/BLOOD GLUCOSE: CPT

## 2025-05-24 PROCEDURE — 84132 ASSAY OF SERUM POTASSIUM: CPT | Performed by: STUDENT IN AN ORGANIZED HEALTH CARE EDUCATION/TRAINING PROGRAM

## 2025-05-24 PROCEDURE — 25010000002 MAGNESIUM SULFATE 4 GM/100ML SOLUTION: Performed by: HOSPITALIST

## 2025-05-24 PROCEDURE — 36415 COLL VENOUS BLD VENIPUNCTURE: CPT | Performed by: HOSPITALIST

## 2025-05-24 PROCEDURE — 93306 TTE W/DOPPLER COMPLETE: CPT | Performed by: INTERNAL MEDICINE

## 2025-05-24 PROCEDURE — 83735 ASSAY OF MAGNESIUM: CPT | Performed by: HOSPITALIST

## 2025-05-24 PROCEDURE — 99232 SBSQ HOSP IP/OBS MODERATE 35: CPT | Performed by: STUDENT IN AN ORGANIZED HEALTH CARE EDUCATION/TRAINING PROGRAM

## 2025-05-24 PROCEDURE — 93005 ELECTROCARDIOGRAM TRACING: CPT

## 2025-05-24 PROCEDURE — 25010000002 HEPARIN (PORCINE) PER 1000 UNITS: Performed by: HOSPITALIST

## 2025-05-24 PROCEDURE — 25010000002 CALCIUM GLUCONATE-NACL 1-0.675 GM/50ML-% SOLUTION

## 2025-05-24 RX ORDER — KETOCONAZOLE 20 MG/G
1 CREAM TOPICAL DAILY PRN
Status: DISCONTINUED | OUTPATIENT
Start: 2025-05-24 | End: 2025-06-03 | Stop reason: HOSPADM

## 2025-05-24 RX ORDER — CARVEDILOL 3.12 MG/1
3.12 TABLET ORAL 2 TIMES DAILY WITH MEALS
Status: DISCONTINUED | OUTPATIENT
Start: 2025-05-24 | End: 2025-05-31

## 2025-05-24 RX ORDER — DEXTROSE MONOHYDRATE 25 G/50ML
25 INJECTION, SOLUTION INTRAVENOUS ONCE
Status: COMPLETED | OUTPATIENT
Start: 2025-05-24 | End: 2025-05-24

## 2025-05-24 RX ORDER — SPIRONOLACTONE 25 MG/1
25 TABLET ORAL DAILY
Status: DISCONTINUED | OUTPATIENT
Start: 2025-05-24 | End: 2025-05-29

## 2025-05-24 RX ORDER — PANTOPRAZOLE SODIUM 40 MG/1
40 TABLET, DELAYED RELEASE ORAL
Status: DISCONTINUED | OUTPATIENT
Start: 2025-05-24 | End: 2025-06-03 | Stop reason: HOSPADM

## 2025-05-24 RX ORDER — POTASSIUM CHLORIDE 1500 MG/1
40 TABLET, EXTENDED RELEASE ORAL
Status: COMPLETED | OUTPATIENT
Start: 2025-05-24 | End: 2025-05-24

## 2025-05-24 RX ORDER — CARVEDILOL 25 MG/1
25 TABLET ORAL 2 TIMES DAILY WITH MEALS
Status: DISCONTINUED | OUTPATIENT
Start: 2025-05-24 | End: 2025-05-24

## 2025-05-24 RX ORDER — POTASSIUM CHLORIDE 1500 MG/1
20 TABLET, EXTENDED RELEASE ORAL DAILY
Status: DISCONTINUED | OUTPATIENT
Start: 2025-05-25 | End: 2025-05-29

## 2025-05-24 RX ORDER — DOCUSATE SODIUM 100 MG/1
100 CAPSULE, LIQUID FILLED ORAL DAILY
Status: DISCONTINUED | OUTPATIENT
Start: 2025-05-24 | End: 2025-06-03 | Stop reason: HOSPADM

## 2025-05-24 RX ORDER — CARVEDILOL 6.25 MG/1
6.25 TABLET ORAL 2 TIMES DAILY WITH MEALS
Status: DISCONTINUED | OUTPATIENT
Start: 2025-05-24 | End: 2025-05-24

## 2025-05-24 RX ORDER — NIFEDIPINE 30 MG/1
30 TABLET, EXTENDED RELEASE ORAL DAILY
Status: DISCONTINUED | OUTPATIENT
Start: 2025-05-24 | End: 2025-05-25

## 2025-05-24 RX ORDER — CALCIUM GLUCONATE 20 MG/ML
1000 INJECTION, SOLUTION INTRAVENOUS ONCE
Status: COMPLETED | OUTPATIENT
Start: 2025-05-24 | End: 2025-05-24

## 2025-05-24 RX ORDER — GARLIC EXTRACT 500 MG
1 CAPSULE ORAL DAILY
Status: DISCONTINUED | OUTPATIENT
Start: 2025-05-24 | End: 2025-06-03 | Stop reason: HOSPADM

## 2025-05-24 RX ORDER — CHOLECALCIFEROL (VITAMIN D3) 25 MCG
1000 TABLET ORAL DAILY
Status: DISCONTINUED | OUTPATIENT
Start: 2025-05-24 | End: 2025-06-03 | Stop reason: HOSPADM

## 2025-05-24 RX ORDER — OXYBUTYNIN CHLORIDE 5 MG/1
10 TABLET, EXTENDED RELEASE ORAL DAILY
Status: DISCONTINUED | OUTPATIENT
Start: 2025-05-24 | End: 2025-06-03 | Stop reason: HOSPADM

## 2025-05-24 RX ORDER — ATORVASTATIN CALCIUM 20 MG/1
20 TABLET, FILM COATED ORAL DAILY
Status: DISCONTINUED | OUTPATIENT
Start: 2025-05-24 | End: 2025-06-03 | Stop reason: HOSPADM

## 2025-05-24 RX ORDER — POTASSIUM CHLORIDE 1500 MG/1
40 TABLET, EXTENDED RELEASE ORAL EVERY 4 HOURS
Status: COMPLETED | OUTPATIENT
Start: 2025-05-24 | End: 2025-05-24

## 2025-05-24 RX ORDER — HYDRALAZINE HYDROCHLORIDE 50 MG/1
100 TABLET, FILM COATED ORAL EVERY 8 HOURS SCHEDULED
Status: DISCONTINUED | OUTPATIENT
Start: 2025-05-24 | End: 2025-05-25

## 2025-05-24 RX ADMIN — ATORVASTATIN CALCIUM 20 MG: 20 TABLET, FILM COATED ORAL at 11:49

## 2025-05-24 RX ADMIN — INSULIN HUMAN 10 UNITS: 100 INJECTION, SOLUTION PARENTERAL at 22:33

## 2025-05-24 RX ADMIN — Medication 1000 UNITS: at 11:49

## 2025-05-24 RX ADMIN — SPIRONOLACTONE 25 MG: 25 TABLET ORAL at 14:35

## 2025-05-24 RX ADMIN — POTASSIUM CHLORIDE 40 MEQ: 1500 TABLET, EXTENDED RELEASE ORAL at 06:41

## 2025-05-24 RX ADMIN — PANTOPRAZOLE SODIUM 40 MG: 40 TABLET, DELAYED RELEASE ORAL at 11:48

## 2025-05-24 RX ADMIN — HEPARIN SODIUM 5000 UNITS: 5000 INJECTION INTRAVENOUS; SUBCUTANEOUS at 20:17

## 2025-05-24 RX ADMIN — CARVEDILOL 3.12 MG: 3.12 TABLET, FILM COATED ORAL at 17:27

## 2025-05-24 RX ADMIN — DOCUSATE SODIUM 100 MG: 100 CAPSULE, LIQUID FILLED ORAL at 11:49

## 2025-05-24 RX ADMIN — OXYBUTYNIN CHLORIDE 10 MG: 5 TABLET, EXTENDED RELEASE ORAL at 11:48

## 2025-05-24 RX ADMIN — POTASSIUM CHLORIDE 40 MEQ: 1500 TABLET, EXTENDED RELEASE ORAL at 17:27

## 2025-05-24 RX ADMIN — POTASSIUM CHLORIDE 40 MEQ: 1500 TABLET, EXTENDED RELEASE ORAL at 11:49

## 2025-05-24 RX ADMIN — Medication 10 ML: at 09:02

## 2025-05-24 RX ADMIN — POTASSIUM CHLORIDE 40 MEQ: 1500 TABLET, EXTENDED RELEASE ORAL at 04:46

## 2025-05-24 RX ADMIN — Medication 1 CAPSULE: at 11:48

## 2025-05-24 RX ADMIN — Medication 10 ML: at 20:16

## 2025-05-24 RX ADMIN — HEPARIN SODIUM 5000 UNITS: 5000 INJECTION INTRAVENOUS; SUBCUTANEOUS at 09:02

## 2025-05-24 RX ADMIN — CALCIUM GLUCONATE 1000 MG: 20 INJECTION, SOLUTION INTRAVENOUS at 22:33

## 2025-05-24 RX ADMIN — MAGNESIUM SULFATE HEPTAHYDRATE 4 G: 40 INJECTION, SOLUTION INTRAVENOUS at 03:06

## 2025-05-24 RX ADMIN — Medication 5 MG: at 20:22

## 2025-05-24 RX ADMIN — DEXTROSE MONOHYDRATE 25 G: 25 INJECTION, SOLUTION INTRAVENOUS at 22:33

## 2025-05-24 NOTE — H&P
"    [facility] Medicine Services  History & Physical    Patient Identification:  Name:  Emma Ryan  Age:  84 y.o.  Sex:  female  :  1940  MRN:  [id number]   Visit Number:  [id number]  Admit Date: 2025   Primary Care Physician:  Finn Nash MD    Subjective       Chief Complaint   Patient presents with    Weakness - Generalized       History of presenting illness:    Emma Ryan is a 84 y.o. female who presented for further evaluation of generalized weakness, difficulty walking, tingling in the right leg and left arm.    Past medical history is significant for Hypertension, CKD stage IIIa, HLD, hemorrhoids, osteoarthritis, GERD, recent history of multiple UTIs-resolved with Antibiotic treatment.    Patient was examined at bedside in her hospital room.  Patient is awake, alert and oriented, in no acute distress.  Patient reports that she has a recent history of urinary tract infection for which she was seen in our ER in early March and early April.  Patient denies urinary symptoms at this time but expressed concern that her current symptoms were due to this.  Patient was reassured that her urine looks to be much improved at this time and does not show signs of UTI.  Patient does admit to having multiple bouts of diarrhea while being treated with antibiotics for these recent urinary tract infections, patient denies vomiting.    Patient reports that last night she woke up with left arm tingling and weakness.  She denies chest pain, shortness of breath, palpitations, diaphoresis, but does note that she has had random muscle spasms in her chest throughout the day today.  Patient noted that today she has \"felt off\" but is unable to pinpoint specific symptoms.  Patient does complain of bilateral leg weakness and tingling in her right leg.  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  Patient feels generally weak all " "over her body including having difficulty swallowing her largest pill this morning.  Patient denies facial asymmetries, headache, unilateral weaknesses, slurred speech, vision changes, difficulty understanding, or any other neurological changes.  Patient understands that the ER workup revealed normal urine, hypomagnesemia, and hypokalemia.  Patient admits to having decreased appetite over the last week but notes that she drinks an electrolyte supplement every day.    ED, vital signs were /54 (BP Location: Left arm, Patient Position: Sitting)   Pulse 96   Temp 97.6 °F (36.4 °C) (Temporal)   Resp 14   Ht 149.9 cm (59\")   Wt 65.8 kg (145 lb)   SpO2 95%   BMI 29.29 kg/m²   ED workup significant for:   HS troponin initially 40, repeat 33, proBNP 1283.0,  Sodium 143, glucose 120, potassium 2.9, magnesium 0.6, creatinine 1.16, GFR 46.9, calcium 8.1, WBC count 11.67, hemoglobin 10.5, lactate 1.2, CRP 9.08  CXR shows coarsened bronchovascular pattern to the lungs.  No edema or pneumonia.  No pleural effusion or pneumothorax.  No fracture or foreign body.  Respiratory PCR panel is negative  EKG shows accelerated junctional rhythm.  Rate 93 bpm.  Nonspecific ST and T wave abnormality.  QTc 452.  Urinalysis shows trace ketones, otherwise within normal limits.    Known Emergency Department medications received prior to my evaluation included:  Medications   sodium chloride 0.9 % bolus 250 mL (0 mL Intravenous Stopped 5/23/25 1919)       Room location at the time of my evaluation was [facility] [place].     ---------------------------------------------------------------------------------------------------------------------   Review of Systems   Constitutional:  Positive for appetite change and fatigue. Negative for chills and fever.   HENT:  Negative for postnasal drip, rhinorrhea, sinus pressure, sneezing and sore throat.    Eyes:  Negative for discharge and redness.   Respiratory:  Negative for cough, shortness of breath and " wheezing.    Cardiovascular:  Negative for chest pain, palpitations and leg swelling.   Gastrointestinal:  Positive for diarrhea and nausea. Negative for vomiting.   Genitourinary:  Negative for difficulty urinating, dysuria, frequency and hematuria.   Musculoskeletal:  Positive for gait problem, myalgias and neck pain. Negative for arthralgias and joint swelling.   Skin:  Negative for rash and wound.   Neurological:  Positive for dizziness, weakness and light-headedness. Negative for speech difficulty and headaches.   Hematological:  Does not bruise/bleed easily.   Psychiatric/Behavioral:  Negative for confusion. The patient is not nervous/anxious.       ---------------------------------------------------------------------------------------------------------------------   Past Medical History:   Diagnosis Date    GERD (gastroesophageal reflux disease)     Hyperlipidemia     Hypertension     Left shoulder pain     Obesity     Osteoarthritis of knees, bilateral     Overactive bladder     Right shoulder pain      Past Surgical History:   Procedure Laterality Date    APPENDECTOMY      BUNIONECTOMY Right     CARDIOVASCULAR STRESS TEST  10/2012    CATARACT EXTRACTION  2017    CHOLECYSTECTOMY      COLONOSCOPY  2011    ECHO - CONVERTED  10/2012    JOINT REPLACEMENT      bilateral knees     KNEE ARTHROPLASTY Left     KNEE ARTHROSCOPY      SHOULDER ARTHROSCOPY Left 7/28/2016    Procedure: LEFT SHOULDER ACROMIOPLASTY,MINI OPEN ROTATOR CUFF REPAIR;  Surgeon: Carlos Eduardo Smith MD;  Location: [facility];  Service:     SHOULDER SURGERY  05/31/2016    OPEN ACROMICPLASTY RIGHT SHOULDER     Family History   Problem Relation Age of Onset    Heart disease Other     Stroke Sister     Diabetes Mother     Heart failure Mother     Heart disease Mother     Heart attack Mother     Hypertension Father     Emphysema Father     Heart disease Father     No Known Problems Brother     Cancer Sister     Kidney disease Sister     Heart failure  Sister     Diabetes Brother     Diabetes Brother     Diabetes Brother      Social History     Socioeconomic History    Marital status:    Tobacco Use    Smoking status: Former     Current packs/day: 0.00     Average packs/day: 2.0 packs/day for 20.0 years (40.0 ttl pk-yrs)     Types: Cigarettes     Start date:      Quit date:      Years since quittin.4    Smokeless tobacco: Never   Vaping Use    Vaping status: Never Used   Substance and Sexual Activity    Alcohol use: No     Comment: s/p     Drug use: No    Sexual activity: Defer     ---------------------------------------------------------------------------------------------------------------------   Allergies:  Codeine and Sulfa antibiotics  ---------------------------------------------------------------------------------------------------------------------   Home medications:  Medications below are reported home medications pulling from within the system; at this time, these medications have not been reconciled unless otherwise specified and are in the verification process for further verifcation as current home medications.  Medications Prior to Admission   Medication Sig Dispense Refill Last Dose/Taking    atorvastatin (LIPITOR) 20 MG tablet Take 1 tablet by mouth once daily 90 tablet 0 2025    carvedilol (COREG) 25 MG tablet Take 1 tablet by mouth 2 (Two) Times a Day With Meals.   2025 Morning    Cholecalciferol (VITAMIN D PO) Take 1,000 Units by mouth daily.   2025    Docusate Calcium (STOOL SOFTENER PO) Take 1 tablet by mouth Daily.   2025    hydrALAZINE (APRESOLINE) 100 MG tablet Take 1 tablet by mouth 3 (Three) Times a Day.   2025 Morning    NIFEdipine XL (PROCARDIA XL) 30 MG 24 hr tablet Take 1 tablet by mouth Daily.   2025    omeprazole OTC (PriLOSEC OTC) 20 MG EC tablet Take 1 tablet by mouth Daily.   2025    oxybutynin XL (DITROPAN-XL) 10 MG 24 hr tablet Take 1 tablet by mouth Daily. 90 tablet  1 5/23/2025    potassium chloride (K-DUR,KLOR-CON) 20 MEQ CR tablet Take 1 tablet by mouth Daily.   5/23/2025    Probiotic Product (PROBIOTIC COLON SUPPORT PO) Take  by mouth daily.   5/23/2025    spironolactone (ALDACTONE) 25 MG tablet Take 1 tablet by mouth Daily.   5/23/2025    ketoconazole (NIZORAL) 2 % cream Apply 1 Application topically to the appropriate area as directed Daily As Needed for Itching or Rash.   Unknown    ketoconazole (NIZORAL) 2 % shampoo Apply 1 Application topically to the appropriate area as directed 2 (Two) Times a Week.   Unknown       Hospital Scheduled Meds:  magnesium sulfate, 2 g, Intravenous, Q30 Min   Followed by  magnesium sulfate, 4 g, Intravenous, Q4H  potassium chloride ER, 40 mEq, Oral, Q2H  potassium chloride, 10 mEq, Intravenous, Q1H           Current listed hospital scheduled medications may not yet reflect those currently placed in orders that are signed and held awaiting patient's arrival to floor.   ---------------------------------------------------------------------------------------------------------------------     Objective     Vital Signs:  Temp:  [97.6 °F (36.4 °C)] 97.6 °F (36.4 °C)  Heart Rate:  [96] 96  Resp:  [14] 14  BP: (115)/(54) 115/54      05/23/25  1741   Weight: 65.8 kg (145 lb)     Body mass index is 29.29 kg/m².  ---------------------------------------------------------------------------------------------------------------------     Physical Exam  Vitals reviewed.   Constitutional:       General: She is not in acute distress.     Appearance: Normal appearance.   HENT:      Head: Normocephalic and atraumatic.      Nose: Nose normal.      Mouth/Throat:      Mouth: Mucous membranes are moist.   Eyes:      Conjunctiva/sclera: Conjunctivae normal.      Pupils: Pupils are equal, round, and reactive to light.   Cardiovascular:      Rate and Rhythm: Normal rate and regular rhythm.      Pulses: Normal pulses.      Heart sounds: Normal heart sounds. No murmur  "heard.  Pulmonary:      Effort: Pulmonary effort is normal. No respiratory distress.      Breath sounds: Normal breath sounds. No wheezing or rales.   Abdominal:      General: There is no distension.      Palpations: Abdomen is soft.      Tenderness: There is no abdominal tenderness.   Musculoskeletal:         General: No deformity.      Right lower leg: Edema present.      Left lower leg: Edema present.   Skin:     General: Skin is warm and dry.      Findings: No erythema, lesion or rash.   Neurological:      Mental Status: She is alert and oriented to person, place, and time. Mental status is at baseline.   Psychiatric:         Mood and Affect: Mood normal.         Behavior: Behavior normal.       ---------------------------------------------------------------------------------------------------------------------  EKG:        I have personally looked at the EKG.  ---------------------------------------------------------------------------------------------------------------------   Results from last 7 days   Lab Units 05/23/25  1814   CRP mg/dL 9.08*   LACTATE mmol/L 1.2   WBC 10*3/mm3 11.67*   HEMOGLOBIN g/dL 10.5*   HEMATOCRIT % 32.5*   MCV fL 88.8   MCHC g/dL 32.3   PLATELETS 10*3/mm3 424   INR  1.00         Results from last 7 days   Lab Units 05/23/25  1814   SODIUM mmol/L 143   POTASSIUM mmol/L 2.9*   MAGNESIUM mg/dL 0.6*   CHLORIDE mmol/L 102   CO2 mmol/L 23.6   BUN mg/dL 15   CREATININE mg/dL 1.16*   CALCIUM mg/dL 8.1*   GLUCOSE mg/dL 120*   ALBUMIN g/dL 3.9   BILIRUBIN mg/dL 0.5   ALK PHOS U/L 110   AST (SGOT) U/L 19   ALT (SGPT) U/L 10   Estimated Creatinine Clearance: 32.3 mL/min (A) (by C-G formula based on SCr of 1.16 mg/dL (H)).  No results found for: \"AMMONIA\"  Results from last 7 days   Lab Units 05/23/25  1932 05/23/25  1814   HSTROP T ng/L 33* 40*     Results from last 7 days   Lab Units 05/23/25  1814   PROBNP pg/mL 1,283.0     No results found for: \"HGBA1C\"  Lab Results   Component Value Date    " "TSH 1.600 06/14/2023    FREET4 1.16 09/14/2018     No results found for: \"PREGTESTUR\", \"PREGSERUM\", \"HCG\", \"HCGQUANT\"  Pain Management Panel  More data exists         Latest Ref Rng & Units 2/10/2025 9/25/2024   Pain Management Panel   Creatinine, Urine mg/dL 71.5  65.4      No results found for: \"BLOODCX\"  No results found for: \"URINECX\"  No results found for: \"WOUNDCX\"  No results found for: \"STOOLCX\"      ---------------------------------------------------------------------------------------------------------------------  Imaging Results (Last 7 Days)       Procedure Component Value Units Date/Time    XR Chest AP [[id number]] Collected: 05/23/25 1837     Updated: 05/23/25 1846    Narrative:      PROCEDURE: Portable chest x-ray examination performed on May 23, 2025.  Single view. Portable technique.     HISTORY: Weakness.     COMPARISON: None.     FINDINGS:     Normal heart size.  Coarsened bronchovascular pattern to the lungs  Slight levoconvex curvature at the thoracolumbar junction.  No edema, pneumonia, pleural effusion, or pneumothorax.  No free air in the upper abdomen.  Fixation anchors at the left humeral head consistent with prior rotator  cuff repair.  Mild osteoarthritis at the glenohumeral joints  Hypoinflated lungs.       Impression:         Coarsened bronchovascular pattern to the lungs  No edema or pneumonia. No pleural effusion or pneumothorax  No fracture or foreign body.  Slight levoconvex curve at the thoracolumbar junction.  Fixation anchors at the left humeral head.        This report was finalized on 5/23/2025 6:44 PM by Juliano Paulson MD.               Cultures:  No results found for: \"BLOODCX\", \"URINECX\", \"WOUNDCX\", \"MRSACX\", \"RESPCX\", \"STOOLCX\"    Last echocardiogram:  Results for orders placed during the hospital encounter of 01/08/21    Adult Transthoracic Echo Complete W/ Cont if Necessary Per Protocol    Interpretation Summary  · Normal left ventricular cavity size and wall thickness " noted. All left ventricular wall segments contract normally  · Left ventricular ejection fraction appears to be 61 - 65%.  · Left ventricular diastolic function is consistent with (grade I) impaired relaxation.  · The aortic valve is abnormal in structure. There is mild thickening of the non-coronary, left coronary and right coronary cusp(s) of the aortic valve. Mild aortic valve regurgitation is present.  · The mitral valve is structurally normal with no significant stenosis present. Mild mitral valve regurgitation is present.  · The tricuspid valve is structurally normal with no significant stenosis present. Mild tricuspid valve regurgitation is present. Estimated right ventricular systolic pressure from tricuspid regurgitation is mildly elevated (35-45 mmHg).  · There is no evidence of pericardial effusion.      I have personally reviewed the above radiology images and read the final radiology report on 05/23/25  ---------------------------------------------------------------------------------------------------------------------  Assessment / Plan     Active Hospital Problems    Diagnosis  POA    **Hypomagnesemia [E83.42]  Yes       ASSESSMENT/PLAN:  Hypomagnesemia, likely secondary to poor oral intake  Hypokalemia, likely secondary to poor oral intake  Admission labs show magnesium 0.6, potassium 2.9, continue to monitor for clinical improvement.  Magnesium replacement protocol started, continue to replace per protocol.  Potassium replacement protocol started, continue to replace per protocol.  Monitor EKG as needed  Acute debility, generalized weakness  Continuous cardiac monitoring on telemetry  Last echo 01/08/2021, EF 61 to 65%.  Obtain updated echocardiogram  Strict I/O's every 6 hours  Repeat BMP, CBC, magnesium in the a.m.  If symptoms do not resolved with treatment of above consider PT/OT consult for evaluation and treatment of ADLs below baseline, and inpatient case management consult for discharge to  appropriate level of care.    Chronic Kidney Disease stage IIIa  Patient presented with Cr 1.16, baseline around 1.14  HS troponin initially 40, repeat 33, proBNP 1283.  Trend Cr and urine output, avoid nephrotoxins, NSAIDs, dehydration and contrast as able.   Essential hypertension  Plan to resume home antihypertensive regimen once med rec is completed by pharmacy.  Holding parameters to prevent hypotension and bradycardia.      ----------  -DVT prophylaxis: Subcu heparin  -Activity: Assistance  -Expected length of stay: INPATIENT status due to the need for care which can only be reasonably provided in an hospital setting such as aggressive/expedited ancillary services and/or consultation services, the necessity for IV medications, close physician monitoring and/or the possible need for procedures.  In such, I feel patient’s risk for adverse outcomes and need for care warrant INPATIENT evaluation and predict the patient’s care encounter to likely last beyond 2 midnights.    -Disposition: Pending clinical course     High risk secondary to hypomagnesemia, generalized weakness    Code Status and Medical Interventions: CPR (Attempt to Resuscitate); Full Support   Ordered at: 05/23/25 2021     Code Status (Patient has no pulse and is not breathing):    CPR (Attempt to Resuscitate)     Medical Interventions (Patient has pulse or is breathing):    Full Support       Chad Canas PA-C   05/23/25  21:20 EDT

## 2025-05-24 NOTE — PROGRESS NOTES
[facility] HOSPITALIST PROGRESS NOTE     Patient Identification:  Name:  Emma Ryan  Age:  84 y.o.  Sex:  female  :  1940  MRN:  [id number]  Visit Number:  [id number]  ROOM: [address] [place] [address]     Primary Care Provider:  Finn Nash MD    Length of stay in inpatient status:  1    Subjective     Chief Compliant:    Chief Complaint   Patient presents with    Weakness - Generalized       History of Presenting Illness: Patient seen evaluated in follow-up for generalized weakness with significant severe electrolyte disturbances with hypomagnesemia hypokalemia likely precipitating.  Patient at time of evaluation noting feeling somewhat improved but still weak.  Patient still undergoing potassium replacement but magnesium not appropriately responding post supplementation.    Objective     Current Hospital Meds:  Acidophilus/Pectin, 1 capsule, Oral, Daily  atorvastatin, 20 mg, Oral, Daily  carvedilol, 3.125 mg, Oral, BID With Meals  cholecalciferol, 1,000 Units, Oral, Daily  docusate sodium, 100 mg, Oral, Daily  heparin (porcine), 5,000 Units, Subcutaneous, Q12H  [Held by provider] hydrALAZINE, 100 mg, Oral, Q8H  [Held by provider] NIFEdipine XL, 30 mg, Oral, Daily  oxybutynin XL, 10 mg, Oral, Daily  pantoprazole, 40 mg, Oral, Q AM  [START ON 2025] potassium chloride, 20 mEq, Oral, Daily  sodium chloride, 10 mL, Intravenous, Q12H  spironolactone, 25 mg, Oral, Daily         ----------------------------------------------------------------------------------------------------------------------  Vital Signs:  Temp:  [98 °F (36.7 °C)-98.5 °F (36.9 °C)] 98.1 °F (36.7 °C)  Heart Rate:  [79-95] 95  Resp:  [14-18] 18  BP: ()/(48-60) 114/60  SpO2:  [90 %-95 %] 94 %  on   ;   Device (Oxygen Therapy): room air  Body mass index is 29.97 kg/m².      Intake/Output Summary (Last 24 hours) at 2025 1933  Last data filed at 2025 1756  Gross per 24 hour   Intake 720 ml   Output 1100 ml   Net -380  "ml      ----------------------------------------------------------------------------------------------------------------------  Physical exam:  Constitutional: Pleasant elderly adult female resting in bed in no distress.  HENT:  Head:  Normocephalic and atraumatic.  Mouth:  Moist mucous membranes.    Eyes:  Conjunctivae and EOM are normal. No scleral icterus.    Cardiovascular:  Normal rate, regular rhythm and normal heart sounds with no murmur.  Pulmonary/Chest:  No respiratory distress, no wheezes, no crackles, with normal breath sounds and good air movement.  Abdominal:  Soft.  Bowel sounds are normal.  No distension and no tenderness.   Musculoskeletal:  No tenderness, and no deformity.  No red or swollen joints anywhere.    Neurological:  Alert and oriented to person, place, and time.  No cranial nerve deficit.  No tongue deviation.  No facial droop.  No slurred speech. Intact Sensation throughout  Skin:  Skin is warm and dry. No rash or lesion noted. No pallor.   Peripheral vascular:  Pulses in all 4 extremities with no clubbing, no cyanosis, trace edema present.  Psychiatric: Appropriate mood and affect, pleasant.   ----------------------------------------------------------------------------------------------------------------------  WBC/HGB/HCT/PLT   10.54/9.0/29.0/396 (05/24 0037)  BUN/CREAT/GLUC/ALT/AST/MIRLANDE/LIP    11/0.94/101/7/19/--/-- (05/24 0037-05/24 0630)  LYTES - Na/K/Cl/CO2: 137/3.2*/101/22.5 (05/24 0630)        No results found for: \"URINECX\"  Blood Culture   Date Value Ref Range Status   05/23/2025 No growth at 24 hours  Preliminary   05/23/2025 No growth at 24 hours  Preliminary       I have personally looked at the labs and they are summarized above.  ----------------------------------------------------------------------------------------------------------------------  Detailed radiology reports for the last 24 hours:  XR Chest AP  Result Date: 5/23/2025   Coarsened bronchovascular pattern to " the lungs No edema or pneumonia. No pleural effusion or pneumothorax No fracture or foreign body. Slight levoconvex curve at the thoracolumbar junction. Fixation anchors at the left humeral head.   This report was finalized on 5/23/2025 6:44 PM by Juliano Paulson MD.      Assessment & Plan      Severe hypomagnesemia  Moderate hypokalemia  Acute generalized weakness    - Patient presenting with generalized weakness likely secondary to significant electrolyte disturbances with severe hypomagnesemia and moderate hypokalemia.    - Patient with initial presenting magnesium of 0.6 improved to 2.1 after supplementation.    - Patient still receiving ongoing potassium supplementation currently improved to 3.2 after initially presenting at 2.9.  Continue supplementation as delayed response likely secondary to hypomagnesemia.    - PT and OT consulted however holiday weekend, will try to get nurses up to ambulate patient tomorrow and if doing well and improved to back to her near baseline with replacement of electrolytes possible consideration for discharge back home.    CKD stage III yea    - Avoid nephrotoxic agents and continue to trend creatinine while in hospital.    Hypertension    - Review of patient's home antihypertensive regiment shows significantly high doses of nifedipine and hydralazine, patient blood pressure normotensive at time of evaluation today and will hold hydralazine nifedipine and proceed with reduced dose Coreg and Aldactone only at this time.    Copied text in portions of the note has been reviewed and is accurate as of 05/24/25    VTE Prophylaxis:     Active VTE Prophylaxis:  Pharmacologic:        Start     Dose Route Frequency Stop    05/23/25 8565  heparin (porcine) 5000 UNIT/ML injection 5,000 Units         5,000 Units SC Every 12 Hours Scheduled --                  Select Mechanical VTE Prophylaxis if Desired & Appropriate       Disposition likely home with family in the next 24 to 48  hours.    Ruy Tenorio DO  [facility] Hospitalist  05/24/25  19:33 EDT     Port Dimensions-X Axis In Cm: 6.6

## 2025-05-24 NOTE — PLAN OF CARE
Goal Outcome Evaluation:   Pt transferred from ER to 3S this shift. Pt is currently resting in bed. VSS with no complaints or S/S of distress. Will continue to follow plan of care.

## 2025-05-25 LAB
ANION GAP SERPL CALCULATED.3IONS-SCNC: 9.8 MMOL/L (ref 5–15)
ANION GAP SERPL CALCULATED.3IONS-SCNC: 9.9 MMOL/L (ref 5–15)
BUN SERPL-MCNC: 15 MG/DL (ref 8–23)
BUN SERPL-MCNC: 19 MG/DL (ref 8–23)
BUN/CREAT SERPL: 14 (ref 7–25)
BUN/CREAT SERPL: 15.6 (ref 7–25)
CALCIUM SPEC-SCNC: 8.5 MG/DL (ref 8.6–10.5)
CALCIUM SPEC-SCNC: 9 MG/DL (ref 8.6–10.5)
CHLORIDE SERPL-SCNC: 101 MMOL/L (ref 98–107)
CHLORIDE SERPL-SCNC: 106 MMOL/L (ref 98–107)
CO2 SERPL-SCNC: 18.1 MMOL/L (ref 22–29)
CO2 SERPL-SCNC: 24.2 MMOL/L (ref 22–29)
CREAT SERPL-MCNC: 1.07 MG/DL (ref 0.57–1)
CREAT SERPL-MCNC: 1.22 MG/DL (ref 0.57–1)
EGFRCR SERPLBLD CKD-EPI 2021: 44.1 ML/MIN/1.73
EGFRCR SERPLBLD CKD-EPI 2021: 51.6 ML/MIN/1.73
GLUCOSE BLDC GLUCOMTR-MCNC: 101 MG/DL (ref 70–130)
GLUCOSE BLDC GLUCOMTR-MCNC: 107 MG/DL (ref 70–130)
GLUCOSE BLDC GLUCOMTR-MCNC: 108 MG/DL (ref 70–130)
GLUCOSE BLDC GLUCOMTR-MCNC: 110 MG/DL (ref 70–130)
GLUCOSE BLDC GLUCOMTR-MCNC: 126 MG/DL (ref 70–130)
GLUCOSE BLDC GLUCOMTR-MCNC: 67 MG/DL (ref 70–130)
GLUCOSE BLDC GLUCOMTR-MCNC: 70 MG/DL (ref 70–130)
GLUCOSE BLDC GLUCOMTR-MCNC: 70 MG/DL (ref 70–130)
GLUCOSE BLDC GLUCOMTR-MCNC: 75 MG/DL (ref 70–130)
GLUCOSE BLDC GLUCOMTR-MCNC: 90 MG/DL (ref 70–130)
GLUCOSE BLDC GLUCOMTR-MCNC: 99 MG/DL (ref 70–130)
GLUCOSE SERPL-MCNC: 64 MG/DL (ref 65–99)
GLUCOSE SERPL-MCNC: 88 MG/DL (ref 65–99)
MAGNESIUM SERPL-MCNC: 3.1 MG/DL (ref 1.6–2.4)
POTASSIUM SERPL-SCNC: 5.9 MMOL/L (ref 3.5–5.2)
POTASSIUM SERPL-SCNC: 5.9 MMOL/L (ref 3.5–5.2)
POTASSIUM SERPL-SCNC: 6.6 MMOL/L (ref 3.5–5.2)
POTASSIUM SERPL-SCNC: 6.9 MMOL/L (ref 3.5–5.2)
SODIUM SERPL-SCNC: 134 MMOL/L (ref 136–145)
SODIUM SERPL-SCNC: 135 MMOL/L (ref 136–145)

## 2025-05-25 PROCEDURE — 99232 SBSQ HOSP IP/OBS MODERATE 35: CPT | Performed by: STUDENT IN AN ORGANIZED HEALTH CARE EDUCATION/TRAINING PROGRAM

## 2025-05-25 PROCEDURE — 93005 ELECTROCARDIOGRAM TRACING: CPT | Performed by: STUDENT IN AN ORGANIZED HEALTH CARE EDUCATION/TRAINING PROGRAM

## 2025-05-25 PROCEDURE — 93005 ELECTROCARDIOGRAM TRACING: CPT | Performed by: HOSPITALIST

## 2025-05-25 PROCEDURE — 80048 BASIC METABOLIC PNL TOTAL CA: CPT | Performed by: STUDENT IN AN ORGANIZED HEALTH CARE EDUCATION/TRAINING PROGRAM

## 2025-05-25 PROCEDURE — 84132 ASSAY OF SERUM POTASSIUM: CPT | Performed by: STUDENT IN AN ORGANIZED HEALTH CARE EDUCATION/TRAINING PROGRAM

## 2025-05-25 PROCEDURE — 83735 ASSAY OF MAGNESIUM: CPT | Performed by: STUDENT IN AN ORGANIZED HEALTH CARE EDUCATION/TRAINING PROGRAM

## 2025-05-25 PROCEDURE — 25010000002 CALCIUM GLUCONATE-NACL 1-0.675 GM/50ML-% SOLUTION

## 2025-05-25 PROCEDURE — 82948 REAGENT STRIP/BLOOD GLUCOSE: CPT

## 2025-05-25 PROCEDURE — 63710000001 INSULIN REGULAR HUMAN PER 5 UNITS: Performed by: STUDENT IN AN ORGANIZED HEALTH CARE EDUCATION/TRAINING PROGRAM

## 2025-05-25 PROCEDURE — 63710000001 INSULIN REGULAR HUMAN PER 5 UNITS

## 2025-05-25 PROCEDURE — 25010000002 HEPARIN (PORCINE) PER 1000 UNITS: Performed by: HOSPITALIST

## 2025-05-25 PROCEDURE — 84132 ASSAY OF SERUM POTASSIUM: CPT

## 2025-05-25 RX ORDER — CARVEDILOL 25 MG/1
12.5 TABLET ORAL 2 TIMES DAILY WITH MEALS
Status: ON HOLD
Start: 2025-05-25 | End: 2025-06-03

## 2025-05-25 RX ORDER — DEXTROSE MONOHYDRATE 25 G/50ML
25 INJECTION, SOLUTION INTRAVENOUS ONCE
Status: COMPLETED | OUTPATIENT
Start: 2025-05-25 | End: 2025-05-25

## 2025-05-25 RX ORDER — INDOMETHACIN 25 MG/1
50 CAPSULE ORAL ONCE
Status: COMPLETED | OUTPATIENT
Start: 2025-05-25 | End: 2025-05-25

## 2025-05-25 RX ORDER — MAGNESIUM OXIDE 400 MG/1
400 TABLET ORAL DAILY
Qty: 30 TABLET | Refills: 0 | Status: ON HOLD | OUTPATIENT
Start: 2025-05-25 | End: 2025-06-03

## 2025-05-25 RX ORDER — CALCIUM GLUCONATE 20 MG/ML
1000 INJECTION, SOLUTION INTRAVENOUS ONCE
Status: COMPLETED | OUTPATIENT
Start: 2025-05-25 | End: 2025-05-25

## 2025-05-25 RX ADMIN — HEPARIN SODIUM 5000 UNITS: 5000 INJECTION INTRAVENOUS; SUBCUTANEOUS at 08:34

## 2025-05-25 RX ADMIN — CALCIUM GLUCONATE 1000 MG: 20 INJECTION, SOLUTION INTRAVENOUS at 05:18

## 2025-05-25 RX ADMIN — SODIUM ZIRCONIUM CYCLOSILICATE 10 G: 10 POWDER, FOR SUSPENSION ORAL at 17:54

## 2025-05-25 RX ADMIN — INSULIN HUMAN 10 UNITS: 100 INJECTION, SOLUTION PARENTERAL at 05:18

## 2025-05-25 RX ADMIN — DEXTROSE MONOHYDRATE 25 G: 25 INJECTION, SOLUTION INTRAVENOUS at 05:18

## 2025-05-25 RX ADMIN — HEPARIN SODIUM 5000 UNITS: 5000 INJECTION INTRAVENOUS; SUBCUTANEOUS at 20:45

## 2025-05-25 RX ADMIN — Medication 5 MG: at 20:59

## 2025-05-25 RX ADMIN — Medication 1000 UNITS: at 08:35

## 2025-05-25 RX ADMIN — INSULIN HUMAN 10 UNITS: 100 INJECTION, SOLUTION PARENTERAL at 14:37

## 2025-05-25 RX ADMIN — DEXTROSE MONOHYDRATE 25 G: 25 INJECTION, SOLUTION INTRAVENOUS at 14:31

## 2025-05-25 RX ADMIN — ATORVASTATIN CALCIUM 20 MG: 20 TABLET, FILM COATED ORAL at 08:34

## 2025-05-25 RX ADMIN — Medication 1 CAPSULE: at 08:35

## 2025-05-25 RX ADMIN — CARVEDILOL 3.12 MG: 3.12 TABLET, FILM COATED ORAL at 08:35

## 2025-05-25 RX ADMIN — SODIUM BICARBONATE 50 MEQ: 84 INJECTION INTRAVENOUS at 15:28

## 2025-05-25 RX ADMIN — Medication 10 ML: at 20:45

## 2025-05-25 RX ADMIN — PANTOPRAZOLE SODIUM 40 MG: 40 TABLET, DELAYED RELEASE ORAL at 05:18

## 2025-05-25 RX ADMIN — SPIRONOLACTONE 25 MG: 25 TABLET ORAL at 08:35

## 2025-05-25 RX ADMIN — POTASSIUM CHLORIDE 20 MEQ: 1500 TABLET, EXTENDED RELEASE ORAL at 08:34

## 2025-05-25 RX ADMIN — OXYBUTYNIN CHLORIDE 10 MG: 5 TABLET, EXTENDED RELEASE ORAL at 08:34

## 2025-05-25 RX ADMIN — Medication 10 ML: at 08:35

## 2025-05-25 RX ADMIN — CARVEDILOL 3.12 MG: 3.12 TABLET, FILM COATED ORAL at 17:16

## 2025-05-25 RX ADMIN — DOCUSATE SODIUM 100 MG: 100 CAPSULE, LIQUID FILLED ORAL at 08:34

## 2025-05-25 NOTE — PLAN OF CARE
Goal Outcome Evaluation: Patient stable for DC per DO Tenorio.     1225 PM: Contacted [employer] regarding DME order for Cushioned Inflatable Ring. Stated they do carry them but he believes it is a private pay item. He is going to reach out to his supervision to verify and call me back.     1305 PM: Montserrat called back and states it is private pay and it would cost around $30. Patient agreeable to pay. [facility] going to check if they have one in stock to deliver.     1300 PM: Spoke to JW Ureña with [employer] regarding patient's Home Health Referral. Facesheet & order faxed per request. States they would see her Tuesday, May 27th.     1405 PM: [facility] delivered cushion to bedside.

## 2025-05-25 NOTE — DISCHARGE INSTR - APPOINTMENTS
Offices are closed on weekends. Will call Tuesday since Monday is a Holiday to schedule appointments, will notify patient once scheduled.

## 2025-05-25 NOTE — PLAN OF CARE
Goal Outcome Evaluation:   Pt being d/c home on RA, family to transport. IV and tele removed.        1400: Pt  discharge discontinued d/t abnormal critical labs.

## 2025-05-25 NOTE — PLAN OF CARE
Goal Outcome Evaluation:   Pt resting in bed this shift with no complaints voiced at this time. No visible acute s/s of distress noted. Plan of care ongoing.

## 2025-05-26 LAB
ANION GAP SERPL CALCULATED.3IONS-SCNC: 12.6 MMOL/L (ref 5–15)
BUN SERPL-MCNC: 23 MG/DL (ref 8–23)
BUN/CREAT SERPL: 18.4 (ref 7–25)
CALCIUM SPEC-SCNC: 9.4 MG/DL (ref 8.6–10.5)
CHLORIDE SERPL-SCNC: 102 MMOL/L (ref 98–107)
CO2 SERPL-SCNC: 21.4 MMOL/L (ref 22–29)
CREAT SERPL-MCNC: 1.25 MG/DL (ref 0.57–1)
EGFRCR SERPLBLD CKD-EPI 2021: 42.9 ML/MIN/1.73
GLUCOSE BLDC GLUCOMTR-MCNC: 98 MG/DL (ref 70–130)
GLUCOSE BLDC GLUCOMTR-MCNC: 99 MG/DL (ref 70–130)
GLUCOSE SERPL-MCNC: 114 MG/DL (ref 65–99)
POTASSIUM SERPL-SCNC: 5.8 MMOL/L (ref 3.5–5.2)
QT INTERVAL: 364 MS
QT INTERVAL: 366 MS
QT INTERVAL: 380 MS
QT INTERVAL: 388 MS
QTC INTERVAL: 422 MS
QTC INTERVAL: 433 MS
QTC INTERVAL: 438 MS
QTC INTERVAL: 452 MS
SODIUM SERPL-SCNC: 136 MMOL/L (ref 136–145)

## 2025-05-26 PROCEDURE — 97166 OT EVAL MOD COMPLEX 45 MIN: CPT

## 2025-05-26 PROCEDURE — 80048 BASIC METABOLIC PNL TOTAL CA: CPT | Performed by: STUDENT IN AN ORGANIZED HEALTH CARE EDUCATION/TRAINING PROGRAM

## 2025-05-26 PROCEDURE — 25010000002 HEPARIN (PORCINE) PER 1000 UNITS: Performed by: HOSPITALIST

## 2025-05-26 PROCEDURE — 99232 SBSQ HOSP IP/OBS MODERATE 35: CPT | Performed by: STUDENT IN AN ORGANIZED HEALTH CARE EDUCATION/TRAINING PROGRAM

## 2025-05-26 PROCEDURE — 82948 REAGENT STRIP/BLOOD GLUCOSE: CPT

## 2025-05-26 PROCEDURE — 97162 PT EVAL MOD COMPLEX 30 MIN: CPT

## 2025-05-26 RX ORDER — CARVEDILOL 6.25 MG/1
6.25 TABLET ORAL 2 TIMES DAILY WITH MEALS
Qty: 60 TABLET | Refills: 0 | OUTPATIENT
Start: 2025-05-26 | End: 2025-06-25

## 2025-05-26 RX ADMIN — Medication 10 ML: at 20:47

## 2025-05-26 RX ADMIN — PANTOPRAZOLE SODIUM 40 MG: 40 TABLET, DELAYED RELEASE ORAL at 05:31

## 2025-05-26 RX ADMIN — CARVEDILOL 3.12 MG: 3.12 TABLET, FILM COATED ORAL at 09:36

## 2025-05-26 RX ADMIN — HEPARIN SODIUM 5000 UNITS: 5000 INJECTION INTRAVENOUS; SUBCUTANEOUS at 09:35

## 2025-05-26 RX ADMIN — CARVEDILOL 3.12 MG: 3.12 TABLET, FILM COATED ORAL at 17:08

## 2025-05-26 RX ADMIN — POTASSIUM CHLORIDE 20 MEQ: 1500 TABLET, EXTENDED RELEASE ORAL at 13:17

## 2025-05-26 RX ADMIN — Medication 10 ML: at 09:35

## 2025-05-26 RX ADMIN — Medication 1 CAPSULE: at 09:35

## 2025-05-26 RX ADMIN — Medication 5 MG: at 20:47

## 2025-05-26 RX ADMIN — HEPARIN SODIUM 5000 UNITS: 5000 INJECTION INTRAVENOUS; SUBCUTANEOUS at 20:47

## 2025-05-26 RX ADMIN — ATORVASTATIN CALCIUM 20 MG: 20 TABLET, FILM COATED ORAL at 09:35

## 2025-05-26 RX ADMIN — DOCUSATE SODIUM 100 MG: 100 CAPSULE, LIQUID FILLED ORAL at 09:35

## 2025-05-26 RX ADMIN — OXYBUTYNIN CHLORIDE 10 MG: 5 TABLET, EXTENDED RELEASE ORAL at 09:35

## 2025-05-26 RX ADMIN — Medication 1000 UNITS: at 09:35

## 2025-05-26 NOTE — PLAN OF CARE
Goal Outcome Evaluation:  Plan of Care Reviewed With: patient        Progress: improving     Pt resting in bed. No acute distress noted. No s/s of pain or decline noted. VSS. POC glucose . Tolerating interventions well. No complaints voiced. Plan of care on going.

## 2025-05-26 NOTE — THERAPY EVALUATION
Acute Care - Physical Therapy Initial Evaluation  FLORIN Mims     Patient Name: Emma Ryan  : 1940  MRN: [id number]  Today's Date: 2025      Visit Dx:     ICD-10-CM ICD-9-CM   1. Hypomagnesemia  E83.42 275.2   2. Hemorrhoids, unspecified hemorrhoid type  K64.9 455.6   3. Diastolic dysfunction without heart failure  I51.89 429.9   4. Acromioclavicular joint arthritis, unspecified laterality  M19.019 716.91     Patient Active Problem List   Diagnosis    Complete tear of right rotator cuff    Stage 3a chronic kidney disease    Cough    Gastroesophageal reflux disease    Mixed hyperlipidemia    Shoulder pain    Hypertension    Acromioclavicular joint arthritis    Impingement syndrome, shoulder    Complete tear of left rotator cuff    Urge incontinence    Atopic rhinitis    Upper respiratory tract infection    Bilateral lower extremity edema    Hypercalcemia    Right knee pain    DADA (stress urinary incontinence, female)    Obesity (BMI 35.0-39.9 without comorbidity)    Left knee injury    Acute UTI    COVID-19 virus infection    Skin lesion of scalp    Numbness in left leg    Multiple bruises    Vitamin D deficiency    Laceration of right lower leg    Chronic chest pain with low to moderate risk for CAD    Degenerative disc disease, lumbar    Valvular heart disease, mild aortic and mitral regurgitation    Diastolic dysfunction without heart failure    Hypomagnesemia     Past Medical History:   Diagnosis Date    GERD (gastroesophageal reflux disease)     Hyperlipidemia     Hypertension     Left shoulder pain     Obesity     Osteoarthritis of knees, bilateral     Overactive bladder     Right shoulder pain      Past Surgical History:   Procedure Laterality Date    APPENDECTOMY      BUNIONECTOMY Right     CARDIOVASCULAR STRESS TEST  10/2012    CATARACT EXTRACTION  2017    CHOLECYSTECTOMY      COLONOSCOPY      ECHO - CONVERTED  10/2012    JOINT REPLACEMENT      bilateral knees     KNEE ARTHROPLASTY Left      KNEE ARTHROSCOPY      SHOULDER ARTHROSCOPY Left 7/28/2016    Procedure: LEFT SHOULDER ACROMIOPLASTY,MINI OPEN ROTATOR CUFF REPAIR;  Surgeon: Carlos Eduardo Smith MD;  Location: [facility];  Service:     SHOULDER SURGERY  05/31/2016    OPEN ACROMICPLASTY RIGHT SHOULDER     PT Assessment (Last 12 Hours)       PT Evaluation and Treatment       Row Name 05/26/25 1217          Physical Therapy Time and Intention    Subjective Information complains of;weakness;fatigue;numbness  -KM     Document Type evaluation  -KM     Mode of Treatment physical therapy  -KM     Patient Effort good  -KM     Symptoms Noted During/After Treatment fatigue  -KM       Row Name 05/26/25 1217          General Information    Patient Profile Reviewed yes  -KM     Patient Observations alert;cooperative;agree to therapy  -KM     Prior Level of Function independent:;all household mobility;ADL's  uses rollator for mobility skills  -KM     Existing Precautions/Restrictions fall  -KM     Risks Reviewed patient:;LOB;nausea/vomiting;dizziness;increased discomfort  -KM     Benefits Reviewed patient:;improve function;increase independence;increase strength;increase balance  -KM     Barriers to Rehab medically complex  -KM       Row Name 05/26/25 1217          Living Environment    Current Living Arrangements home  -KM     People in Home spouse  -KM     Primary Care Provided by self  -KM       Row Name 05/26/25 1217          Home Use of Assistive/Adaptive Equipment    Equipment Currently Used at Home rollator  -KM       Row Name 05/26/25 1217          Pain    Pretreatment Pain Rating 0/10 - no pain  -KM     Posttreatment Pain Rating 0/10 - no pain  -KM       Row Name 05/26/25 1217          Cognition    Affect/Mental Status (Cognition) WFL  -KM     Orientation Status (Cognition) oriented x 3  -KM     Follows Commands (Cognition) WFL  -KM       Row Name 05/26/25 1217          Range of Motion (ROM)    Range of Motion bilateral lower extremities;ROM is WFL   -KM       Row Name 05/26/25 1217          Strength (Manual Muscle Testing)    Strength (Manual Muscle Testing) left lower extremity;strength is WFL  RLE strength grossly 2/5  -KM       Row Name 05/26/25 1217          Sensory Assessment (Somatosensory)    Sensory Subjective Reports numbness  -     Sensory Assessment BLE  -       Row Name 05/26/25 1217          Bed Mobility    Bed Mobility bed mobility (all) activities  -KM     All Activities, [place] (Bed Mobility) minimum assist (75% patient effort)  -KM     Bed Mobility, Safety Issues decreased use of legs for bridging/pushing  -     Assistive Device (Bed Mobility) bed rails;head of bed elevated  -       Row Name 05/26/25 1217          Transfers    Transfers sit-stand transfer;stand-sit transfer  -       Row Name 05/26/25 1217          Sit-Stand Transfer    Sit-Stand [place] (Transfers) moderate assist (50% patient effort)  -     Assistive Device (Sit-Stand Transfers) walker, front-wheeled  -       Row Name 05/26/25 1217          Stand-Sit Transfer    Stand-Sit [place] (Transfers) moderate assist (50% patient effort)  -     Assistive Device (Stand-Sit Transfers) walker, front-wheeled  -       Row Name 05/26/25 1217          Gait/Stairs (Locomotion)    Gait/Stairs Locomotion gait/ambulation independence;gait/ambulation assistive device;distance ambulated  -KM     [place] Level (Gait) maximum assist (25% patient effort)  -KM     Assistive Device (Gait) walker, front-wheeled  -     Patient was able to Ambulate yes  -KM     Distance in Feet (Gait) 2  towards HOB; R knee buckling w/ both steps  -KM     Pattern (Gait) step-to  -KM     Deviations/Abnormal Patterns (Gait) ataxic;base of support, narrow;gait speed decreased  -KM     Right Sided Gait Deviations foot drop/toe drag;knee buckling, right side  -       Row Name 05/26/25 1217          Safety Issues/Impairments Affecting Functional Mobility    Safety Issues Affecting  Function (Mobility) awareness of need for assistance;insight into deficits/self-awareness;judgment;safety precaution awareness  [facility]     Impairments Affecting Function (Mobility) balance;endurance/activity tolerance;strength  -KM       Row Name 05/26/25 1217          Balance    Balance Assessment sitting static balance;standing dynamic balance  -KM     Static Sitting Balance standby assist  -KM     Position, Sitting Balance sitting edge of bed  -KM     Dynamic Standing Balance maximum assist  -KM     Position/Device Used, Standing Balance walker, front-wheeled  -KM       Row Name 05/26/25 1217          Plan of Care Review    Plan of Care Reviewed With patient  -     Outcome Evaluation Pt. evaluation completed during PT session. She was able to perform functional mobility skills w/ modA-maxA. She had increased difficulty ambulating d/t impaired BLE sensation and RLE weakness. Pt. would benefit from more intense PT services prior to return home.  -       Row Name 05/26/25 1217          Therapy Assessment/Plan (PT)    Patient/Family Therapy Goals Statement (PT) return to [facility]  -     Functional Level at Time of Evaluation (PT) modA-maxA  -KM     PT Diagnosis (PT) decreased mobility skills  -KM     Rehab Potential (PT) fair  -     Criteria for Skilled Interventions Met (PT) yes;skilled treatment is necessary  -     Therapy Frequency (PT) 2 times/wk  2-5x/wk  -KM     Predicted Duration of Therapy Intervention (PT) until discharge  -     Problem List (PT) problems related to;balance;mobility;range of motion (ROM);strength  -KM     Activity Limitations Related to Problem List (PT) unable to ambulate safely;unable to transfer safely  -       Row Name 05/26/25 1217          Therapy Plan Review/Discharge Plan (PT)    Therapy Plan Review (PT) evaluation/treatment results reviewed;care plan/treatment goals reviewed;risks/benefits reviewed;patient  -KM       Row Name 05/26/25 1217          Physical Therapy Goals     Bed Mobility Goal Selection (PT) bed mobility, PT goal 1  -KM     Transfer Goal Selection (PT) transfer, PT goal 1  -KM     Gait Training Goal Selection (PT) gait training, PT goal 1  -KM       Row Name 05/26/25 1217          Bed Mobility Goal 1 (PT)    Activity/Assistive Device (Bed Mobility Goal 1, PT) bed mobility activities, all  -KM     [place] Level/Cues Needed (Bed Mobility Goal 1, PT) modified independence  -KM     Time Frame (Bed Mobility Goal 1, PT) by discharge  -KM       Row Name 05/26/25 1217          Transfer Goal 1 (PT)    Activity/Assistive Device (Transfer Goal 1, PT) transfers, all;walker, rolling  -KM     [place] Level/Cues Needed (Transfer Goal 1, PT) modified independence  -KM     Time Frame (Transfer Goal 1, PT) by discharge  -KM       Row Name 05/26/25 1217          Gait Training Goal 1 (PT)    Activity/Assistive Device (Gait Training Goal 1, PT) gait (walking locomotion);assistive device use;walker, rolling  -KM     [place] Level (Gait Training Goal 1, PT) standby assist  -KM     Distance (Gait Training Goal 1, PT) 100'  -KM     Time Frame (Gait Training Goal 1, PT) by discharge  -               User Key  (r) = Recorded By, (t) = Taken By, (c) = Cosigned By      Initials Name Provider Type    Roni John, PT Physical Therapist                    Physical Therapy Education       Title: PT OT SLP Therapies (Done)       Topic: Physical Therapy (Done)       Point: Mobility training (Done)       Learning Progress Summary            Patient Acceptance, E,TB, VU by ABBEY at 5/26/2025 1235    Acceptance, E, NR by RD at 5/25/2025 1214                      Point: Home exercise program (Done)       Learning Progress Summary            Patient Acceptance, E,TB, VU by ABBEY at 5/26/2025 1235    Acceptance, E, NR by RD at 5/25/2025 1214                      Point: Body mechanics (Done)       Learning Progress Summary            Patient Acceptance, E,TB, VU by ABBEY at 5/26/2025 1235     Acceptance, E, NR by RD at 5/25/2025 1214                      Point: Precautions (Done)       Learning Progress Summary            Patient Acceptance, E,TB, VU by ABBEY at 5/26/2025 1235    Acceptance, E, NR by JM at 5/25/2025 1214                                      User Key       Initials Effective Dates Name Provider Type Discipline    RD 06/16/21 -  Olivia Faust, RN Registered Nurse Nurse    ABBEY 05/24/22 -  Roni Mar, PT Physical Therapist PT                  PT Recommendation and Plan  Anticipated Discharge Disposition (PT): skilled nursing facility, inpatient rehabilitation facility (SNF vs IRF depending on pt. progress and activity tolerance)  Planned Therapy Interventions (PT): balance training, bed mobility training, gait training, home exercise program, patient/family education, postural re-education, ROM (range of motion), stair training, strengthening, stretching, transfer training  Therapy Frequency (PT): 2 times/wk (2-5x/wk)  Plan of Care Reviewed With: patient  Outcome Evaluation: Pt. evaluation completed during PT session. She was able to perform functional mobility skills w/ modA-maxA. She had increased difficulty ambulating d/t impaired BLE sensation and RLE weakness. Pt. would benefit from more intense PT services prior to return home.       Time Calculation:    PT Charges       Row Name 05/26/25 1213             Time Calculation    PT Received On 05/26/25  -KM      PT Goal Re-Cert Due Date 06/09/25  -KM                User Key  (r) = Recorded By, (t) = Taken By, (c) = Cosigned By      Initials Name Provider Type    Roni John, PT Physical Therapist                  Therapy Charges for Today       Code Description Service Date Service Provider Modifiers Qty    89070833727 HC PT EVAL MOD COMPLEXITY 4 5/26/2025 Roni Mar, PT GP 1            PT G-Codes  AM-PAC 6 Clicks Score (PT): 18    Roni Mar PT  5/26/2025

## 2025-05-26 NOTE — PLAN OF CARE
Goal Outcome Evaluation: Patient is being discharged home today.

## 2025-05-26 NOTE — PROGRESS NOTES
[facility] HOSPITALIST PROGRESS NOTE     Patient Identification:  Name:  Emma Ryan  Age:  84 y.o.  Sex:  female  :  1940  MRN:  [id number]  Visit Number:  [id number]  ROOM: [address] [place] [address]     Primary Care Provider:  Finn Nash MD    Length of stay in inpatient status:  2    Subjective     Chief Compliant:    Chief Complaint   Patient presents with    Weakness - Generalized       History of Presenting Illness: Patient seen evaluated in follow-up for generalized weakness with significant severe electrolyte disturbances with hypomagnesemia hypokalemia likely precipitating.  Patient seen and evaluated bedside today and feeling better and wishing to return home.  Patient planned for discharge home but notable for potassium of 5.9 and recheck in the afternoon revealed increased to 6.9 likely precipitated by aggressive protocol-based electrolyte replacement in the setting of previous home supplementation and potassium sparing diuretic.    Objective     Current Hospital Meds:  Acidophilus/Pectin, 1 capsule, Oral, Daily  atorvastatin, 20 mg, Oral, Daily  carvedilol, 3.125 mg, Oral, BID With Meals  cholecalciferol, 1,000 Units, Oral, Daily  docusate sodium, 100 mg, Oral, Daily  heparin (porcine), 5,000 Units, Subcutaneous, Q12H  oxybutynin XL, 10 mg, Oral, Daily  pantoprazole, 40 mg, Oral, Q AM  potassium chloride, 20 mEq, Oral, Daily  sodium chloride, 10 mL, Intravenous, Q12H  [Held by provider] spironolactone, 25 mg, Oral, Daily         ----------------------------------------------------------------------------------------------------------------------  Vital Signs:  Temp:  [97.5 °F (36.4 °C)-98.3 °F (36.8 °C)] 98.3 °F (36.8 °C)  Heart Rate:  [75-85] 85  Resp:  [16-20] 20  BP: (103-127)/(52-67) 117/67  SpO2:  [91 %-95 %] 93 %  on   ;   Device (Oxygen Therapy): room air  Body mass index is 29.08 kg/m².      Intake/Output Summary (Last 24 hours) at 2025  Last data filed at 2025  "1611  Gross per 24 hour   Intake 840 ml   Output 3350 ml   Net -2510 ml      ----------------------------------------------------------------------------------------------------------------------  Physical exam:  Constitutional: Pleasant elderly adult female resting in bed in no distress.  HENT:  Head:  Normocephalic and atraumatic.  Mouth:  Moist mucous membranes.    Eyes:  Conjunctivae and EOM are normal. No scleral icterus.    Cardiovascular:  Normal rate, regular rhythm and normal heart sounds with no murmur.  Pulmonary/Chest:  No respiratory distress, no wheezes, no crackles, with normal breath sounds and good air movement.  Abdominal:  Soft.  Bowel sounds are normal.  No distension and no tenderness.   Musculoskeletal:  No tenderness, and no deformity.  No red or swollen joints anywhere.    Neurological:  Alert and oriented to person, place, and time.  No cranial nerve deficit.  No tongue deviation.  No facial droop.  No slurred speech. Intact Sensation throughout  Skin:  Skin is warm and dry. No rash or lesion noted. No pallor.   Peripheral vascular:  Pulses in all 4 extremities with no clubbing, no cyanosis, trace edema present.  Psychiatric: Appropriate mood and affect, pleasant.   ----------------------------------------------------------------------------------------------------------------------     BUN/CREAT/GLUC/ALT/AST/MIRLANDE/LIP    19/1.22/64/--/--/--/-- (05/25 1623)  LYTES - Na/K/Cl/CO2: 135*/6.6*/101/24.2 (05/25 1623)        No results found for: \"URINECX\"  Blood Culture   Date Value Ref Range Status   05/23/2025 No growth at 24 hours  Preliminary   05/23/2025 No growth at 24 hours  Preliminary       I have personally looked at the labs and they are summarized above.  ----------------------------------------------------------------------------------------------------------------------  Detailed radiology reports for the last 24 hours:  No radiology results for the last day    Assessment & Plan  "     Severe hypomagnesemia  Moderate hypokalemia  Severe hyperkalemia  Acute generalized weakness    - Patient presenting with generalized weakness likely secondary to significant electrolyte disturbances with severe hypomagnesemia and moderate hypokalemia.    - Patient with initial presenting magnesium of 0.6 improved to 2.1 after supplementation.    - Patient still receiving ongoing potassium supplementation currently improved to 3.2 after initially presenting at 2.9.  Continue supplementation as delayed response likely secondary to hypomagnesemia.    - PT and OT consulted however holiday weekend, will try to get nurses up to ambulate patient tomorrow and if doing well and improved to back to her near baseline with replacement of electrolytes possible consideration for discharge back home.    - Patient with development of significant hyperkalemia with potassium increased to 6.9.  Patient status post insulin D50 and sodium bicarb with improvement to 6.6.  Patient remaining asymptomatic.  Lokelma administered.  Hold on further potassium supplementation and nurse/pharmacy directed electrolyte replacement protocol discontinued as well as holding patient's home spironolactone.    CKD stage III yea    - Avoid nephrotoxic agents and continue to trend creatinine while in hospital.    Hypertension    - Review of patient's home antihypertensive regiment shows significantly high doses of nifedipine and hydralazine, patient blood pressure normotensive at time of evaluation today and will hold hydralazine nifedipine and proceed with reduced dose Coreg and Aldactone only at this time.    Copied text in portions of the note has been reviewed and is accurate as of 05/25/25    VTE Prophylaxis:     Active VTE Prophylaxis:  Pharmacologic:        Start     Dose Route Frequency Stop    05/23/25 1626  heparin (porcine) 5000 UNIT/ML injection 5,000 Units         5,000 Units SC Every 12 Hours Scheduled --                  Select  Mechanical VTE Prophylaxis if Desired & Appropriate       Disposition likely home in the a.m. if potassium continuing to trend down.    Ruy Tenorio DO  [facility]  05/25/25  20:50 EDT

## 2025-05-26 NOTE — PROGRESS NOTES
[facility] HOSPITALIST PROGRESS NOTE     Patient Identification:  Name:  Emma Ryan  Age:  84 y.o.  Sex:  female  :  1940  MRN:  [id number]  Visit Number:  [id number]  ROOM: [address] [place] [address]     Primary Care Provider:  Finn Nash MD    Length of stay in inpatient status:  3    Subjective     Chief Compliant:    Chief Complaint   Patient presents with    Weakness - Generalized       History of Presenting Illness: Patient seen evaluated in follow-up for generalized weakness with significant severe electrolyte disturbances with hypomagnesemia hypokalemia likely precipitating.  Patient seen and evaluated bedside today and feeling good however worked with physical therapy today and noted to have buckling of left lower extremity with  known right foot drop.  Recommended for possible inpatient rehab.  Discussed with patient and agreeable.  Consult placed.    Objective     Current Hospital Meds:  Acidophilus/Pectin, 1 capsule, Oral, Daily  atorvastatin, 20 mg, Oral, Daily  carvedilol, 3.125 mg, Oral, BID With Meals  cholecalciferol, 1,000 Units, Oral, Daily  docusate sodium, 100 mg, Oral, Daily  heparin (porcine), 5,000 Units, Subcutaneous, Q12H  oxybutynin XL, 10 mg, Oral, Daily  pantoprazole, 40 mg, Oral, Q AM  potassium chloride, 20 mEq, Oral, Daily  sodium chloride, 10 mL, Intravenous, Q12H  [Held by provider] spironolactone, 25 mg, Oral, Daily         ----------------------------------------------------------------------------------------------------------------------  Vital Signs:  Temp:  [97.9 °F (36.6 °C)-98.3 °F (36.8 °C)] 98.2 °F (36.8 °C)  Heart Rate:  [70-97] 82  Resp:  [16-20] 18  BP: (114-141)/(58-72) 141/70  SpO2:  [92 %-95 %] 94 %  on   ;   Device (Oxygen Therapy): room air  Body mass index is 28.52 kg/m².      Intake/Output Summary (Last 24 hours) at 2025  Last data filed at 2025 1514  Gross per 24 hour   Intake 360 ml   Output 3150 ml   Net -2790 ml     "  ----------------------------------------------------------------------------------------------------------------------  Physical exam:  Constitutional: Pleasant elderly adult female resting in bed in no distress.  HENT:  Head:  Normocephalic and atraumatic.  Mouth:  Moist mucous membranes.    Eyes:  Conjunctivae and EOM are normal. No scleral icterus.    Cardiovascular:  Normal rate, regular rhythm and normal heart sounds with no murmur.  Pulmonary/Chest:  No respiratory distress, no wheezes, no crackles, with normal breath sounds and good air movement.  Abdominal:  Soft.  Bowel sounds are normal.  No distension and no tenderness.   Musculoskeletal:  No tenderness, and no deformity.  No red or swollen joints anywhere.    Neurological:  Alert and oriented to person, place, and time.  No cranial nerve deficit.  No tongue deviation.  No facial droop.  No slurred speech. Intact Sensation throughout  Skin:  Skin is warm and dry. No rash or lesion noted. No pallor.   Peripheral vascular:  Pulses in all 4 extremities with no clubbing, no cyanosis, trace edema present.  Psychiatric: Appropriate mood and affect, pleasant.   ----------------------------------------------------------------------------------------------------------------------     BUN/CREAT/GLUC/ALT/AST/MIRLANDE/LIP    23/1.25/114/--/--/--/-- (05/26 0206)  GRISELDA - Na/K/Cl/CO2: 136/5.8*/102/21.4* (05/26 0206)        No results found for: \"URINECX\"  Blood Culture   Date Value Ref Range Status   05/23/2025 No growth at 24 hours  Preliminary   05/23/2025 No growth at 24 hours  Preliminary       I have personally looked at the labs and they are summarized above.  ----------------------------------------------------------------------------------------------------------------------  Detailed radiology reports for the last 24 hours:  No radiology results for the last day    Assessment & Plan      Severe hypomagnesemia  Moderate hypokalemia  Severe hyperkalemia  Acute " generalized weakness    - Patient presenting with generalized weakness likely secondary to significant electrolyte disturbances with severe hypomagnesemia and moderate hypokalemia.    - Patient with initial presenting magnesium of 0.6 improved to 2.1 after supplementation.    - PT and OT consulted however holiday weekend, will try to get nurses up to ambulate patient tomorrow and if doing well and improved to back to her near baseline with replacement of electrolytes possible consideration for discharge back home.    - Patient with development of significant hyperkalemia with potassium increased to 6.9 on 5/25.  Patient status post insulin D50 and sodium bicarb with improvement to 6.6.  Patient remaining asymptomatic.  Lokelma administered.  Hold on further potassium supplementation and nurse/pharmacy directed electrolyte replacement protocol discontinued as well as holding patient's home spironolactone.  Patient potassium improved today to 5.8 continue to hold Aldactone and potassium supplementation.  Suspect further improvement on tomorrow a.m. labs.    CKD stage IIIa    - Avoid nephrotoxic agents and continue to trend creatinine while in hospital.    Hypertension    - Review of patient's home antihypertensive regiment shows significantly high doses of nifedipine and hydralazine, patient blood pressure normotensive at time of evaluation today and will hold hydralazine nifedipine and proceed with reduced dose Coreg and Aldactone only at this time.    Right foot drop  Peripheral neuropathy  Bilateral lower extremity weakness    - Patient seen and evaluated PT and recommended for ongoing rehab with consideration of inpatient rehab and consult placed.    Copied text in portions of the note has been reviewed and is accurate as of 05/26/25    VTE Prophylaxis:     Active VTE Prophylaxis:  Pharmacologic:        Start     Dose Route Frequency Stop    05/23/25 9533  heparin (porcine) 5000 UNIT/ML injection 5,000 Units          5,000 Units SC Every 12 Hours Scheduled --                  Select Mechanical VTE Prophylaxis if Desired & Appropriate       Disposition pending inpatient rehab consult    Ruy Tenorio DO  [facility] Hospitalist  05/26/25  19:13 EDT

## 2025-05-26 NOTE — THERAPY EVALUATION
Patient Name: Emma Ryan  : 1940    MRN: [id number]                              Today's Date: 2025       Admit Date: 2025    Visit Dx: Debility    ICD-10-CM ICD-9-CM   1. Hypomagnesemia  E83.42 275.2   2. Hemorrhoids, unspecified hemorrhoid type  K64.9 455.6   3. Diastolic dysfunction without heart failure  I51.89 429.9   4. Acromioclavicular joint arthritis, unspecified laterality  M19.019 716.91     Patient Active Problem List   Diagnosis    Complete tear of right rotator cuff    Stage 3a chronic kidney disease    Cough    Gastroesophageal reflux disease    Mixed hyperlipidemia    Shoulder pain    Hypertension    Acromioclavicular joint arthritis    Impingement syndrome, shoulder    Complete tear of left rotator cuff    Urge incontinence    Atopic rhinitis    Upper respiratory tract infection    Bilateral lower extremity edema    Hypercalcemia    Right knee pain    DADA (stress urinary incontinence, female)    Obesity (BMI 35.0-39.9 without comorbidity)    Left knee injury    Acute UTI    COVID-19 virus infection    Skin lesion of scalp    Numbness in left leg    Multiple bruises    Vitamin D deficiency    Laceration of right lower leg    Chronic chest pain with low to moderate risk for CAD    Degenerative disc disease, lumbar    Valvular heart disease, mild aortic and mitral regurgitation    Diastolic dysfunction without heart failure    Hypomagnesemia     Past Medical History:   Diagnosis Date    GERD (gastroesophageal reflux disease)     Hyperlipidemia     Hypertension     Left shoulder pain     Obesity     Osteoarthritis of knees, bilateral     Overactive bladder     Right shoulder pain      Past Surgical History:   Procedure Laterality Date    APPENDECTOMY      BUNIONECTOMY Right     CARDIOVASCULAR STRESS TEST  10/2012    CATARACT EXTRACTION  2017    CHOLECYSTECTOMY      COLONOSCOPY      ECHO - CONVERTED  10/2012    JOINT REPLACEMENT      bilateral knees     KNEE ARTHROPLASTY Left      KNEE ARTHROSCOPY      SHOULDER ARTHROSCOPY Left 7/28/2016    Procedure: LEFT SHOULDER ACROMIOPLASTY,MINI OPEN ROTATOR CUFF REPAIR;  Surgeon: Carlos Eduardo Smith MD;  Location: [facility];  Service:     SHOULDER SURGERY  05/31/2016    OPEN ACROMICPLASTY RIGHT SHOULDER      General Information       Row Name 05/26/25 1047          OT Time and Intention    Subjective Information complains of;weakness;fatigue;numbness  -LM     Document Type evaluation  -LM     Mode of Treatment occupational therapy  -LM     Patient Effort good  -LM       Row Name 05/26/25 1047          General Information    Patient Profile Reviewed yes  -LM     Prior Level of Function independent:;ADL's;transfer;all household mobility  rollator  -LM     Existing Precautions/Restrictions fall  -LM     Barriers to Rehab medically complex  -LM       Row Name 05/26/25 1047          Living Environment    Current Living Arrangements home  -LM     People in Home spouse  -LM       Row Name 05/26/25 1047          Cognition    Orientation Status (Cognition) oriented x 3  -LM       Row Name 05/26/25 1047          Safety Issues/Impairments Affecting Functional Mobility    Impairments Affecting Function (Mobility) balance;endurance/activity tolerance;strength  -LM               User Key  (r) = Recorded By, (t) = Taken By, (c) = Cosigned By      Initials Name Provider Type    LM Gabi Blevins OT Occupational Therapist                     Mobility/ADL's       Row Name 05/26/25 1048          Transfers    Transfers toilet transfer  -LM       Row Name 05/26/25 1048          Toilet Transfer    Type (Toilet Transfer) stand pivot/stand step  -LM     Delmar Level (Toilet Transfer) maximum assist (25% patient effort)  -LM       Row Name 05/26/25 1048          Activities of Daily Living    BADL Assessment/Intervention bathing;upper body dressing;lower body dressing;grooming;feeding;toileting  -LM       Row Name 05/26/25 1048          Bathing  Assessment/Intervention    [place]an Level (Bathing) maximum assist (25% patient effort)  -LM       [facility] Name 05/26/25 1048          Upper Body Dressing Assessment/Training    Logan Level (Upper Body Dressing) minimum assist (75% patient effort)  -LM       Row Name 05/26/25 1048          Lower Body Dressing Assessment/Training    Logan Level (Lower Body Dressing) maximum assist (25% patient effort)  -LM       [facility] Name 05/26/25 1048          Grooming Assessment/Training    Logan Level (Grooming) minimum assist (75% patient effort)  -[facility] Name 05/26/25 1048          Self-Feeding Assessment/Training    Logan Level (Feeding) set up  -[facility] Name 05/26/25 1048          Toileting Assessment/Training    Logan Level (Toileting) maximum assist (25% patient effort)  -LM               User Key  (r) = Recorded By, (t) = Taken By, (c) = Cosigned By      Initials Name Provider Type    LM Gabi Blevins, OT Occupational Therapist                   Obj/Interventions       [facility] Name 05/26/25 1049          Sensory Assessment (Somatosensory)    Sensory Subjective Reports numbness  -     Sensory Assessment BLE  -LM       [facility] Name 05/26/25 1049          Vision Assessment/Intervention    Visual Impairment/Limitations WFL  -LM       [facility] Name 05/26/25 1049          Range of Motion Comprehensive    General Range of Motion no range of motion deficits identified  -[facility] Name 05/26/25 1049          Strength Comprehensive (MMT)    General Manual Muscle Testing (MMT) Assessment no strength deficits identified  -LM       Row Name 05/26/25 1049          Motor Skills    Motor Skills functional endurance;coordination  -     Coordination WFL  -LM     Functional Endurance F-  -LM               User Key  (r) = Recorded By, (t) = Taken By, (c) = Cosigned By      Initials Name Provider Type    Gabi Ascencio, OT Occupational Therapist                   Goals/Plan       Row Name 05/26/25 1055           Transfer Goal 1 (OT)    Activity/Assistive Device (Transfer Goal 1, OT) transfers, all;walker, rolling  -LM     Robeson Level/Cues Needed (Transfer Goal 1, OT) moderate assist (50-74% patient effort);maximum assist (25-49% patient effort)  -LM     Time Frame (Transfer Goal 1, OT) by discharge  -LM       Row Name 05/26/25 1052          Bathing Goal 1 (OT)    Activity/Device (Bathing Goal 1, OT) bathing skills, all  -LM     [place]eson Level/Cues Needed (Bathing Goal 1, OT) moderate assist (50-74% patient effort)  -LM     Time Frame (Bathing Goal 1, OT) by discharge  -LM       Row Name 05/26/25 1052          Therapy Assessment/Plan (OT)    Planned Therapy Interventions (OT) activity tolerance training;adaptive equipment training;BADL retraining;transfer/mobility retraining;strengthening exercise;ROM/therapeutic exercise;patient/caregiver education/training  -LM               User Key  (r) = Recorded By, (t) = Taken By, (c) = Cosigned By      Initials Name Provider Type    LM Gabi Blevins, OT Occupational Therapist                   Clinical Impression       Row Name 05/26/25 1050          Pain Assessment    Pretreatment Pain Rating 0/10 - no pain  -LM     Posttreatment Pain Rating 0/10 - no pain  -LM       Row Name 05/26/25 1050          Plan of Care Review    Plan of Care Reviewed With patient  -       Row Name 05/26/25 1050          Therapy Assessment/Plan (OT)    Patient/Family Therapy Goal Statement (OT) return to plof  -LM     Rehab Potential (OT) good  -LM     Criteria for Skilled Therapeutic Interventions Met (OT) yes;meets criteria;skilled treatment is necessary  -     Therapy Frequency (OT) other (see comments)  prn and/or to monitor fxl progress  -       Row Name 05/26/25 1050          Therapy Plan Review/Discharge Plan (OT)    Anticipated Discharge Disposition (OT) inpatient rehabilitation facility  -       Row Name 05/26/25 1050          Positioning and Restraints    Post Treatment  Position bed  -LM     In Bed call light within reach;encouraged to call for assist;exit alarm on;notified nsg  -LM               User Key  (r) = Recorded By, (t) = Taken By, (c) = Cosigned By      Initials Name Provider Type    Gabi Ascencio, OT Occupational Therapist                   Outcome Measures    No documentation.                     OT Recommendation and Plan  Planned Therapy Interventions (OT): activity tolerance training, adaptive equipment training, BADL retraining, transfer/mobility retraining, strengthening exercise, ROM/therapeutic exercise, patient/caregiver education/training  Therapy Frequency (OT): other (see comments) (prn and/or to monitor fxl progress)  Plan of Care Review  Plan of Care Reviewed With: patient     Time Calculation:          Therapy Charges for Today       Code Description Service Date Service Provider Modifiers Qty    [id number]  OT EVAL MOD COMPLEXITY 4 5/26/2025 Gabi Blevins OT GO 1                 Gabi Blevins OT  5/26/2025

## 2025-05-27 LAB
ANION GAP SERPL CALCULATED.3IONS-SCNC: 11.8 MMOL/L (ref 5–15)
BUN SERPL-MCNC: 27 MG/DL (ref 8–23)
BUN/CREAT SERPL: 19.9 (ref 7–25)
CALCIUM SPEC-SCNC: 10 MG/DL (ref 8.6–10.5)
CHLORIDE SERPL-SCNC: 100 MMOL/L (ref 98–107)
CO2 SERPL-SCNC: 22.2 MMOL/L (ref 22–29)
CREAT SERPL-MCNC: 1.36 MG/DL (ref 0.57–1)
EGFRCR SERPLBLD CKD-EPI 2021: 38.7 ML/MIN/1.73
GLUCOSE BLDC GLUCOMTR-MCNC: 103 MG/DL (ref 70–130)
GLUCOSE BLDC GLUCOMTR-MCNC: 118 MG/DL (ref 70–130)
GLUCOSE BLDC GLUCOMTR-MCNC: 139 MG/DL (ref 70–130)
GLUCOSE BLDC GLUCOMTR-MCNC: 271 MG/DL (ref 70–130)
GLUCOSE SERPL-MCNC: 122 MG/DL (ref 65–99)
POTASSIUM SERPL-SCNC: 6.2 MMOL/L (ref 3.5–5.2)
QT INTERVAL: 350 MS
QTC INTERVAL: 416 MS
SODIUM SERPL-SCNC: 134 MMOL/L (ref 136–145)

## 2025-05-27 PROCEDURE — 93005 ELECTROCARDIOGRAM TRACING: CPT

## 2025-05-27 PROCEDURE — 82948 REAGENT STRIP/BLOOD GLUCOSE: CPT

## 2025-05-27 PROCEDURE — 25010000002 HEPARIN (PORCINE) PER 1000 UNITS: Performed by: HOSPITALIST

## 2025-05-27 PROCEDURE — 63710000001 INSULIN REGULAR HUMAN PER 5 UNITS

## 2025-05-27 PROCEDURE — 25010000002 CALCIUM GLUCONATE-NACL 1-0.675 GM/50ML-% SOLUTION

## 2025-05-27 PROCEDURE — 97535 SELF CARE MNGMENT TRAINING: CPT | Performed by: OCCUPATIONAL THERAPIST

## 2025-05-27 PROCEDURE — 99232 SBSQ HOSP IP/OBS MODERATE 35: CPT | Performed by: STUDENT IN AN ORGANIZED HEALTH CARE EDUCATION/TRAINING PROGRAM

## 2025-05-27 PROCEDURE — 80048 BASIC METABOLIC PNL TOTAL CA: CPT | Performed by: STUDENT IN AN ORGANIZED HEALTH CARE EDUCATION/TRAINING PROGRAM

## 2025-05-27 RX ORDER — CALCIUM GLUCONATE 20 MG/ML
1000 INJECTION, SOLUTION INTRAVENOUS ONCE
Status: COMPLETED | OUTPATIENT
Start: 2025-05-27 | End: 2025-05-27

## 2025-05-27 RX ORDER — NITROGLYCERIN 0.4 MG/1
0.4 TABLET SUBLINGUAL
Status: DISCONTINUED | OUTPATIENT
Start: 2025-05-27 | End: 2025-06-03 | Stop reason: HOSPADM

## 2025-05-27 RX ORDER — INDOMETHACIN 25 MG/1
50 CAPSULE ORAL ONCE
Status: COMPLETED | OUTPATIENT
Start: 2025-05-27 | End: 2025-05-27

## 2025-05-27 RX ORDER — DEXTROSE MONOHYDRATE 25 G/50ML
25 INJECTION, SOLUTION INTRAVENOUS ONCE
Status: COMPLETED | OUTPATIENT
Start: 2025-05-27 | End: 2025-05-27

## 2025-05-27 RX ADMIN — ATORVASTATIN CALCIUM 20 MG: 20 TABLET, FILM COATED ORAL at 09:45

## 2025-05-27 RX ADMIN — DOCUSATE SODIUM 100 MG: 100 CAPSULE, LIQUID FILLED ORAL at 09:45

## 2025-05-27 RX ADMIN — Medication 1000 UNITS: at 09:45

## 2025-05-27 RX ADMIN — Medication 1 CAPSULE: at 09:45

## 2025-05-27 RX ADMIN — SODIUM ZIRCONIUM CYCLOSILICATE 10 G: 10 POWDER, FOR SUSPENSION ORAL at 03:48

## 2025-05-27 RX ADMIN — SODIUM ZIRCONIUM CYCLOSILICATE 10 G: 10 POWDER, FOR SUSPENSION ORAL at 16:08

## 2025-05-27 RX ADMIN — Medication 5 MG: at 21:56

## 2025-05-27 RX ADMIN — HEPARIN SODIUM 5000 UNITS: 5000 INJECTION INTRAVENOUS; SUBCUTANEOUS at 21:56

## 2025-05-27 RX ADMIN — DEXTROSE MONOHYDRATE 25 G: 25 INJECTION, SOLUTION INTRAVENOUS at 03:47

## 2025-05-27 RX ADMIN — HEPARIN SODIUM 5000 UNITS: 5000 INJECTION INTRAVENOUS; SUBCUTANEOUS at 09:45

## 2025-05-27 RX ADMIN — PANTOPRAZOLE SODIUM 40 MG: 40 TABLET, DELAYED RELEASE ORAL at 05:05

## 2025-05-27 RX ADMIN — Medication 10 ML: at 09:45

## 2025-05-27 RX ADMIN — OXYBUTYNIN CHLORIDE 10 MG: 5 TABLET, EXTENDED RELEASE ORAL at 09:45

## 2025-05-27 RX ADMIN — INSULIN HUMAN 5 UNITS: 100 INJECTION, SOLUTION PARENTERAL at 03:47

## 2025-05-27 RX ADMIN — Medication 10 ML: at 21:59

## 2025-05-27 RX ADMIN — CALCIUM GLUCONATE 1000 MG: 20 INJECTION, SOLUTION INTRAVENOUS at 03:48

## 2025-05-27 RX ADMIN — CARVEDILOL 3.12 MG: 3.12 TABLET, FILM COATED ORAL at 09:45

## 2025-05-27 RX ADMIN — SODIUM BICARBONATE 50 MEQ: 84 INJECTION INTRAVENOUS at 03:48

## 2025-05-27 NOTE — CASE MANAGEMENT/SOCIAL WORK
Discharge Planning Assessment  [facility]     Patient Name: Emma Ryan  MRN: [id number]  Today's Date: 5/27/2025    Admit Date: 5/23/2025    Plan: SS received consult per [facility] [place].  SS spoke with pt at bedside.  Pt resides at home with spouse, Bob, at [address] [place] [address] and plans to return home at discharge.  Pt currently does not utilize home health services.  Pt has rollator and cane via unknown provider.  Pt's PCP is Dr. Nash.  Pt utilizes [facility] in [place].  Pt stated no POA or Living Will.  Pt transports via private auto per family.  SS noted Inpt rehab consult.  SS spoke with rehab at [phone] who stated they are submitting pt's information to insurance for [facility].  SS will follow.   Discharge Needs Assessment       Row Name 05/27/25 1220       Living Environment    People in Home spouse    Name(s) of People in Home Lives with spouse, Bob    Current Living Arrangements home    Potentially Unsafe Housing Conditions none    Provides Primary Care For spouse    Family Caregiver if Needed child(zuleika), adult    Quality of Family Relationships helpful;involved;supportive       Transition Planning    Patient/Family Anticipates Transition to home with family    Patient/Family Anticipated Services at Transition none    Transportation Anticipated family or friend will provide                   Discharge Plan       Row Name 05/27/25 1221       Plan    Plan SS received consult per [facility] for discharge planning.  SS spoke with pt at bedside.  Pt resides at home with spouse, Bob, at [address] [place] [address] and plans to return home at discharge.  Pt currently does not utilize home health services.  Pt has rollator and cane via unknown provider.  Pt's PCP is Dr. Nash.  Pt utilizes [facility] in [place].  Pt stated no POA or Living Will.  Pt transports via private auto per family.  SS noted Inpt rehab consult.  SS spoke with rehab at ext [phone] who stated they are  submitting pt's information to insurance for possible admit to [facility].  SS will follow.                    Expected Discharge Date and Time       Expected Discharge Date Expected Discharge Time    May 26, 2025            Demographic Summary       Row Name 05/27/25 1219       General Information    Admission Type inpatient    Arrived From home    Referral Source nursing    Reason for Consult discharge planning    Preferred Language English                  Soraya Ann, REGINOW

## 2025-05-27 NOTE — PLAN OF CARE
Problem: Fall Injury Risk  Goal: Absence of Fall and Fall-Related Injury  Outcome: Progressing  Intervention: Identify and Manage Contributors  Recent Flowsheet Documentation  Taken 5/27/2025 0300 by Raquel Nava RN  Medication Review/Management: medications reviewed  Taken 5/27/2025 0100 by Raquel Nava RN  Medication Review/Management: medications reviewed  Taken 5/26/2025 2300 by Raquel Nava RN  Medication Review/Management: medications reviewed  Taken 5/26/2025 2100 by Raquel Nava RN  Medication Review/Management: medications reviewed  Intervention: Promote Injury-Free Environment  Recent Flowsheet Documentation  Taken 5/27/2025 0300 by Raquel Nava RN  Safety Promotion/Fall Prevention: safety round/check completed  Taken 5/27/2025 0100 by Raquel Nava RN  Safety Promotion/Fall Prevention: safety round/check completed  Taken 5/26/2025 2300 by Raquel Nava RN  Safety Promotion/Fall Prevention: safety round/check completed  Taken 5/26/2025 2100 by Raquel Nava RN  Safety Promotion/Fall Prevention: safety round/check completed     Problem: Skin Injury Risk Increased  Goal: Skin Health and Integrity  Outcome: Progressing  Intervention: Optimize Skin Protection  Recent Flowsheet Documentation  Taken 5/26/2025 2000 by Raquel Nava RN  Pressure Reduction Techniques:   frequent weight shift encouraged   heels elevated off bed  Pressure Reduction Devices: pressure-redistributing mattress utilized   Goal Outcome Evaluation: no change

## 2025-05-27 NOTE — PROGRESS NOTES
[facility] HOSPITALIST PROGRESS NOTE     Patient Identification:  Name:  Emma Ryan  Age:  84 y.o.  Sex:  female  :  1940  MRN:  [id number]  Visit Number:  [id number]  ROOM: [address] [place] [address]     Primary Care Provider:  Finn Nash MD    Length of stay in inpatient status:  4    Subjective     Chief Compliant:    Chief Complaint   Patient presents with    Weakness - Generalized       History of Presenting Illness: Patient seen evaluated in follow-up for generalized weakness with significant severe electrolyte disturbances with hypomagnesemia hypokalemia likely precipitating.  Patient seen and evaluated bedside today and feeling well.  Currently awaiting inpatient rehab prior authorization request.  Patient in good spirits and cooperative.    Objective     Current Hospital Meds:  Acidophilus/Pectin, 1 capsule, Oral, Daily  atorvastatin, 20 mg, Oral, Daily  carvedilol, 3.125 mg, Oral, BID With Meals  cholecalciferol, 1,000 Units, Oral, Daily  docusate sodium, 100 mg, Oral, Daily  heparin (porcine), 5,000 Units, Subcutaneous, Q12H  oxybutynin XL, 10 mg, Oral, Daily  pantoprazole, 40 mg, Oral, Q AM  [Held by provider] potassium chloride, 20 mEq, Oral, Daily  sodium chloride, 10 mL, Intravenous, Q12H  [Held by provider] spironolactone, 25 mg, Oral, Daily         ----------------------------------------------------------------------------------------------------------------------  Vital Signs:  Temp:  [97.5 °F (36.4 °C)-98.5 °F (36.9 °C)] 98.4 °F (36.9 °C)  Heart Rate:  [76-95] 95  Resp:  [18] 18  BP: (101-142)/(54-96) 132/84  SpO2:  [93 %-96 %] 93 %  on   ;   Device (Oxygen Therapy): room air  Body mass index is 28.98 kg/m².      Intake/Output Summary (Last 24 hours) at 2025  Last data filed at 2025 1100  Gross per 24 hour   Intake 580 ml   Output 1700 ml   Net -1120 ml     "  ----------------------------------------------------------------------------------------------------------------------  Physical exam:  Constitutional: Pleasant elderly adult female resting in bed in no distress.  HENT:  Head:  Normocephalic and atraumatic.  Mouth:  Moist mucous membranes.    Eyes:  Conjunctivae and EOM are normal. No scleral icterus.    Cardiovascular:  Normal rate, regular rhythm and normal heart sounds with no murmur.  Pulmonary/Chest:  No respiratory distress, no wheezes, no crackles, with normal breath sounds and good air movement.  Abdominal:  Soft.  Bowel sounds are normal.  No distension and no tenderness.   Musculoskeletal:  No tenderness, and no deformity.  No red or swollen joints anywhere.    Neurological:  Alert and oriented to person, place, and time.  No cranial nerve deficit.  No tongue deviation.  No facial droop.  No slurred speech. Intact Sensation throughout  Skin:  Skin is warm and dry. No rash or lesion noted. No pallor.   Peripheral vascular:  Pulses in all 4 extremities with no clubbing, no cyanosis, trace edema present.  Psychiatric: Appropriate mood and affect, pleasant.   ----------------------------------------------------------------------------------------------------------------------     BUN/CREAT/GLUC/ALT/AST/MIRLANDE/LIP    27/1.36/122/--/--/--/-- (05/27 0127)  GRISELDA - Na/K/Cl/CO2: 134*/6.2*/100/22.2 (05/27 0127)        No results found for: \"URINECX\"  Blood Culture   Date Value Ref Range Status   05/23/2025 No growth at 24 hours  Preliminary   05/23/2025 No growth at 24 hours  Preliminary       I have personally looked at the labs and they are summarized above.  ----------------------------------------------------------------------------------------------------------------------  Detailed radiology reports for the last 24 hours:  No radiology results for the last day    Assessment & Plan      Severe hypomagnesemia  Moderate hypokalemia  Severe hyperkalemia  Acute " generalized weakness    - Patient presenting with generalized weakness likely secondary to significant electrolyte disturbances with severe hypomagnesemia and moderate hypokalemia.    - Patient with initial presenting magnesium of 0.6 improved to 2.1 after supplementation.    - PT and OT consulted however holiday weekend, will try to get nurses up to ambulate patient tomorrow and if doing well and improved to back to her near baseline with replacement of electrolytes possible consideration for discharge back home.    - Patient with development of significant hyperkalemia with potassium increased to 6.9 on 5/25.  Patient status post insulin D50 and sodium bicarb with improvement to 6.6.  Patient remaining asymptomatic.  Lokelma administered.  Further potassium supplementatio held n and nurse/pharmacy directed electrolyte replacement protocol discontinued as well as holding patient's home spironolactone.  Patient potassium improved 5.8-day prior, increased back to 6.2 today and repeat dosing of Lokelma ordered.    CKD stage IIIa    - Avoid nephrotoxic agents and continue to trend creatinine while in hospital.    Hypertension    - Review of patient's home antihypertensive regiment shows significantly high doses of nifedipine and hydralazine, patient blood pressure normotensive at time of evaluation today and will hold hydralazine nifedipine and proceed with reduced dose Coreg but holding Aldactone as above.      Right foot drop  Peripheral neuropathy  Bilateral lower extremity weakness    - Patient seen and evaluated PT and recommended for ongoing rehab with consideration of inpatient rehab and consult placed.    Copied text in portions of the note has been reviewed and is accurate as of 05/27/25    VTE Prophylaxis:     Active VTE Prophylaxis:  Pharmacologic:        Start     Dose Route Frequency Stop    05/23/25 3118  heparin (porcine) 5000 UNIT/ML injection 5,000 Units         5,000 Units SC Every 12 Hours Scheduled  --                  Select Mechanical VTE Prophylaxis if Desired & Appropriate       Disposition pending inpatient rehab prior authorization approval or denial    Ruy Tenorio DO  [facility]  05/27/25  19:56 EDT

## 2025-05-27 NOTE — THERAPY TREATMENT NOTE
Acute Care - Occupational Therapy Treatment Note   [facility]     Patient Name: Emma Ryan  : 1940  MRN: [id number]  Today's Date: 2025             Admit Date: 2025       ICD-10-CM ICD-9-CM   1. Hypomagnesemia  E83.42 275.2   2. Hemorrhoids, unspecified hemorrhoid type  K64.9 455.6   3. Diastolic dysfunction without heart failure  I51.89 429.9   4. Acromioclavicular joint arthritis, unspecified laterality  M19.019 716.91     Patient Active Problem List   Diagnosis    Complete tear of right rotator cuff    Stage 3a chronic kidney disease    Cough    Gastroesophageal reflux disease    Mixed hyperlipidemia    Shoulder pain    Hypertension    Acromioclavicular joint arthritis    Impingement syndrome, shoulder    Complete tear of left rotator cuff    Urge incontinence    Atopic rhinitis    Upper respiratory tract infection    Bilateral lower extremity edema    Hypercalcemia    Right knee pain    DADA (stress urinary incontinence, female)    Obesity (BMI 35.0-39.9 without comorbidity)    Left knee injury    Acute UTI    COVID-19 virus infection    Skin lesion of scalp    Numbness in left leg    Multiple bruises    Vitamin D deficiency    Laceration of right lower leg    Chronic chest pain with low to moderate risk for CAD    Degenerative disc disease, lumbar    Valvular heart disease, mild aortic and mitral regurgitation    Diastolic dysfunction without heart failure    Hypomagnesemia     Past Medical History:   Diagnosis Date    GERD (gastroesophageal reflux disease)     Hyperlipidemia     Hypertension     Left shoulder pain     Obesity     Osteoarthritis of knees, bilateral     Overactive bladder     Right shoulder pain      Past Surgical History:   Procedure Laterality Date    APPENDECTOMY      BUNIONECTOMY Right     CARDIOVASCULAR STRESS TEST  10/2012    CATARACT EXTRACTION  2017    CHOLECYSTECTOMY      COLONOSCOPY      ECHO - CONVERTED  10/2012    JOINT REPLACEMENT      bilateral knees      KNEE ARTHROPLASTY Left     KNEE ARTHROSCOPY      SHOULDER ARTHROSCOPY Left 7/28/2016    Procedure: LEFT SHOULDER ACROMIOPLASTY,MINI OPEN ROTATOR CUFF REPAIR;  Surgeon: Carlos Eduardo Smith MD;  [facility]: [facility];  Service:     SHOULDER SURGERY  05/31/2016    OPEN ACROMICPLASTY RIGHT SHOULDER         OT ASSESSMENT FLOWSHEET (Last 12 Hours)       OT Evaluation and Treatment       Row Name 05/27/25 9808                   OT Time and Intention    Subjective Information complains of;weakness  -BF        Document Type therapy note (daily note)  -BF        Patient Effort adequate  -BF        Symptoms Noted During/After Treatment fatigue  -BF        Comment Pt is max assist with L/B dressing at this time, dependent for toileting. Pt reports no sensation in L foot when standing and R drop foot.  -BF           General Information    Existing Precautions/Restrictions fall  -BF           Cognition    Orientation Status (Cognition) oriented x 3  -BF        Follows Commands (Cognition) WFL  -BF           Lower Body Dressing Assessment/Training    Lamar Level (Lower Body Dressing) maximum assist (25% patient effort);verbal cues;nonverbal cues (demo/gesture)  -BF           Grooming Assessment/Training    Lamar Level (Grooming) minimum assist (75% patient effort)  -BF           Toileting Assessment/Training    Lamar Level (Toileting) dependent (less than 25% patient effort)  -BF           Wound 05/26/25 1948 Right gluteal Pressure Injury    Wound - Properties Group Placement Date: 05/26/25  -PL Placement Time: 1948  -PL Present on Original Admission: Y  -TW Side: Right  -TW Location: gluteal  -PL Primary Wound Type: Pressure Inj  -TW    Retired Wound - Properties Group Placement Date: 05/26/25  -PL Placement Time: 1948 -PL Present on Original Admission: Y  -TW Side: Right  -TW Location: gluteal  -PL    Retired Wound - Properties Group Placement Date: 05/26/25  -PL Placement Time: 1948 -PL Present on  Original Admission: Y  -TW Side: Right  -TW Location: gluteal  -PL    Retired Wound - Properties Group Date first assessed: 05/26/25  -PL Time first assessed: 1948  -PL Present on Original Admission: Y  -TW Side: Right  -TW Location: gluteal  -PL       Positioning and Restraints    In Bed fowlers;encouraged to call for assist;call light within reach;exit alarm on  -BF                  User Key  (r) = Recorded By, (t) = Taken By, (c) = Cosigned By      Initials Name Effective Dates    TW Seble Sena RN 06/16/21 -     BF Mattie Fung OT 07/11/23 -     PL Raquel Nava RN 06/12/24 -                            OT Recommendation and Plan              Time Calculation:    Time Calculation- OT       Row Name 05/27/25 1309             Time Calculation- OT    Total Timed Code Minutes- OT 25 minute(s)  -BF                User Key  (r) = Recorded By, (t) = Taken By, (c) = Cosigned By      Initials Name Provider Type     Mattie Fung OT Occupational Therapist                  Therapy Charges for Today       Code Description Service Date Service Provider Modifiers Qty    39703064895 HC OT SELF CARE/MGMT/TRAIN EA 15 MIN 5/27/2025 Mattie Fung OT GO 2                 Mattie Fung OT  5/27/2025

## 2025-05-27 NOTE — NURSING NOTE
WOCN consulted for right gluteal pressure injury. Assessed patient with NESSA Wiley - there is a DTPI noted to the upper right gluteal. It is red/purple and not blanching. Nalini wound is maroon/purple with MASD noted. No open areas observed - no drainage. Order to assess every shift, gently cleanse with foam cleanser, pat dry and apply Z guard BID and prn; leave open to air.    Spoke with patient regarding the importance of turning/repositioning while in bed, to shift weight to help lessen the pressure to the area. Patient voiced understanding.    Lucio score 15. PI prevention orders initiated.   05/27/25 1105   Wound 05/26/25 1948 Right gluteal Pressure Injury   Placement Date/Time: 05/26/25 1948   Present on Original Admission: Yes  Side: Right  Location: gluteal  Primary Wound Type: Pressure Injury   Pressure Injury Stage DTPI   Dressing Appearance open to air   Closure None   Base red;purple;nonblanchable  (surrounded by MASD)   Periwound maroon/purple  (MASD)   Periwound Temperature warm   Periwound Skin Turgor soft   Edges rolled/closed   Drainage Amount none

## 2025-05-27 NOTE — SIGNIFICANT NOTE
05/27/25 1154   Post Acute Pre-Cert Documentation   Request Submitted by Facility - Type: Post Acute   Post-Acute Authorization Type Submitted: IRF   Date Post Acute Pre-Cert Inititated per Facility 05/27/25   Verification from Payer Yes   Accepting Facility [facility]   All Clinicals Submitted? Yes   Had Accepting Facility at Time of Submission Yes   Response Communicated to:    Authorization Number: [id number]   Post Acute Pre-Cert Initiated Comment Prior auth request for Rehab has been submitted to [employer] via [employer] portal with auth#[id number].

## 2025-05-28 LAB
ANION GAP SERPL CALCULATED.3IONS-SCNC: 13.7 MMOL/L (ref 5–15)
BACTERIA SPEC AEROBE CULT: NORMAL
BACTERIA SPEC AEROBE CULT: NORMAL
BUN SERPL-MCNC: 28.5 MG/DL (ref 8–23)
BUN/CREAT SERPL: 21.1 (ref 7–25)
CALCIUM SPEC-SCNC: 10.7 MG/DL (ref 8.6–10.5)
CHLORIDE SERPL-SCNC: 97 MMOL/L (ref 98–107)
CO2 SERPL-SCNC: 23.3 MMOL/L (ref 22–29)
CREAT SERPL-MCNC: 1.35 MG/DL (ref 0.57–1)
EGFRCR SERPLBLD CKD-EPI 2021: 39.1 ML/MIN/1.73
GLUCOSE SERPL-MCNC: 122 MG/DL (ref 65–99)
POTASSIUM SERPL-SCNC: 5.9 MMOL/L (ref 3.5–5.2)
SODIUM SERPL-SCNC: 134 MMOL/L (ref 136–145)

## 2025-05-28 PROCEDURE — 25010000002 FUROSEMIDE PER 20 MG: Performed by: STUDENT IN AN ORGANIZED HEALTH CARE EDUCATION/TRAINING PROGRAM

## 2025-05-28 PROCEDURE — 80048 BASIC METABOLIC PNL TOTAL CA: CPT | Performed by: STUDENT IN AN ORGANIZED HEALTH CARE EDUCATION/TRAINING PROGRAM

## 2025-05-28 PROCEDURE — 97530 THERAPEUTIC ACTIVITIES: CPT

## 2025-05-28 PROCEDURE — 97116 GAIT TRAINING THERAPY: CPT

## 2025-05-28 PROCEDURE — 99232 SBSQ HOSP IP/OBS MODERATE 35: CPT | Performed by: STUDENT IN AN ORGANIZED HEALTH CARE EDUCATION/TRAINING PROGRAM

## 2025-05-28 PROCEDURE — 25010000002 HEPARIN (PORCINE) PER 1000 UNITS: Performed by: HOSPITALIST

## 2025-05-28 PROCEDURE — 97535 SELF CARE MNGMENT TRAINING: CPT

## 2025-05-28 RX ORDER — FUROSEMIDE 10 MG/ML
20 INJECTION INTRAMUSCULAR; INTRAVENOUS ONCE
Status: COMPLETED | OUTPATIENT
Start: 2025-05-28 | End: 2025-05-28

## 2025-05-28 RX ORDER — ACETAMINOPHEN 325 MG/1
650 TABLET ORAL EVERY 6 HOURS PRN
Status: DISCONTINUED | OUTPATIENT
Start: 2025-05-28 | End: 2025-06-03 | Stop reason: HOSPADM

## 2025-05-28 RX ADMIN — ACETAMINOPHEN 650 MG: 325 TABLET ORAL at 05:58

## 2025-05-28 RX ADMIN — PANTOPRAZOLE SODIUM 40 MG: 40 TABLET, DELAYED RELEASE ORAL at 05:58

## 2025-05-28 RX ADMIN — Medication 5 MG: at 20:30

## 2025-05-28 RX ADMIN — Medication 10 ML: at 10:19

## 2025-05-28 RX ADMIN — Medication 10 ML: at 20:30

## 2025-05-28 RX ADMIN — DOCUSATE SODIUM 100 MG: 100 CAPSULE, LIQUID FILLED ORAL at 10:17

## 2025-05-28 RX ADMIN — FUROSEMIDE 20 MG: 10 INJECTION, SOLUTION INTRAMUSCULAR; INTRAVENOUS at 10:17

## 2025-05-28 RX ADMIN — OXYBUTYNIN CHLORIDE 10 MG: 5 TABLET, EXTENDED RELEASE ORAL at 10:16

## 2025-05-28 RX ADMIN — Medication 1000 UNITS: at 10:17

## 2025-05-28 RX ADMIN — HEPARIN SODIUM 5000 UNITS: 5000 INJECTION INTRAVENOUS; SUBCUTANEOUS at 20:30

## 2025-05-28 RX ADMIN — Medication 1 CAPSULE: at 10:16

## 2025-05-28 RX ADMIN — CARVEDILOL 3.12 MG: 3.12 TABLET, FILM COATED ORAL at 10:17

## 2025-05-28 RX ADMIN — HEPARIN SODIUM 5000 UNITS: 5000 INJECTION INTRAVENOUS; SUBCUTANEOUS at 10:17

## 2025-05-28 RX ADMIN — ATORVASTATIN CALCIUM 20 MG: 20 TABLET, FILM COATED ORAL at 10:16

## 2025-05-28 NOTE — THERAPY TREATMENT NOTE
Acute Care - Occupational Therapy Treatment Note   [facility]     Patient Name: Emma Ryan  : 1940  MRN: [id number]  Today's Date: 2025             Admit Date: 2025       ICD-10-CM ICD-9-CM   1. Hypomagnesemia  E83.42 275.2   2. Hemorrhoids, unspecified hemorrhoid type  K64.9 455.6   3. Diastolic dysfunction without heart failure  I51.89 429.9   4. Acromioclavicular joint arthritis, unspecified laterality  M19.019 716.91     Patient Active Problem List   Diagnosis    Complete tear of right rotator cuff    Stage 3a chronic kidney disease    Cough    Gastroesophageal reflux disease    Mixed hyperlipidemia    Shoulder pain    Hypertension    Acromioclavicular joint arthritis    Impingement syndrome, shoulder    Complete tear of left rotator cuff    Urge incontinence    Atopic rhinitis    Upper respiratory tract infection    Bilateral lower extremity edema    Hypercalcemia    Right knee pain    DADA (stress urinary incontinence, female)    Obesity (BMI 35.0-39.9 without comorbidity)    Left knee injury    Acute UTI    COVID-19 virus infection    Skin lesion of scalp    Numbness in left leg    Multiple bruises    Vitamin D deficiency    Laceration of right lower leg    Chronic chest pain with low to moderate risk for CAD    Degenerative disc disease, lumbar    Valvular heart disease, mild aortic and mitral regurgitation    Diastolic dysfunction without heart failure    Hypomagnesemia     Past Medical History:   Diagnosis Date    GERD (gastroesophageal reflux disease)     Hyperlipidemia     Hypertension     Left shoulder pain     Obesity     Osteoarthritis of knees, bilateral     Overactive bladder     Right shoulder pain      Past Surgical History:   Procedure Laterality Date    APPENDECTOMY      BUNIONECTOMY Right     CARDIOVASCULAR STRESS TEST  10/2012    CATARACT EXTRACTION  2017    CHOLECYSTECTOMY      COLONOSCOPY      ECHO - CONVERTED  10/2012    JOINT REPLACEMENT      bilateral knees      KNEE ARTHROPLASTY Left     KNEE ARTHROSCOPY      SHOULDER ARTHROSCOPY Left 7/28/2016    Procedure: LEFT SHOULDER ACROMIOPLASTY,MINI OPEN ROTATOR CUFF REPAIR;  Surgeon: Carlos Eduardo Smith MD;  Location: [facility];  Service:     SHOULDER SURGERY  05/31/2016    OPEN ACROMICPLASTY RIGHT SHOULDER         OT ASSESSMENT FLOWSHEET (Last 12 Hours)       OT Evaluation and Treatment       Row Name 05/28/25 1300                   OT Time and Intention    Subjective Information complains of;weakness;fatigue  -LM        Document Type therapy note (daily note)  -LM        Mode of Treatment occupational therapy  -LM        Patient Effort adequate  -LM        Comment Patient seen this date for fxl mobility and adl retraining.  patient currently requires max/total assist with LE dressing, toileting.  Mod assist with fxl transfers using rw.  Patient will require continued therapy services upon discharge from acute care.  -LM           General Information    Existing Precautions/Restrictions fall  -LM           Pain Assessment    Pretreatment Pain Rating 0/10 - no pain  -LM        Posttreatment Pain Rating 0/10 - no pain  -LM           Bathing Assessment/Intervention    Paulding Level (Bathing) maximum assist (25% patient effort)  -LM           Upper Body Dressing Assessment/Training    Paulding Level (Upper Body Dressing) minimum assist (75% patient effort)  -LM           Lower Body Dressing Assessment/Training    Paulding Level (Lower Body Dressing) maximum assist (25% patient effort);dependent (less than 25% patient effort)  -LM           Grooming Assessment/Training    Paulding Level (Grooming) minimum assist (75% patient effort)  -LM           Self-Feeding Assessment/Training    Paulding Level (Feeding) set up  -LM           Toileting Assessment/Training    Paulding Level (Toileting) maximum assist (25% patient effort);dependent (less than 25% patient effort)  -LM           Wound 05/26/25 1948 Right  gluteal Pressure Injury    Wound - Properties Group Placement Date: 05/26/25  -PL Placement Time: 1948 -PL Present on Original Admission: Y  -TW Side: Right  -TW Location: gluteal  -PL Primary Wound Type: Pressure Inj  -TW    Retired Wound - Properties Group Placement Date: 05/26/25 -PL Placement Time: 1948 -PL Present on Original Admission: Y  -TW Side: Right  -TW Location: gluteal  -PL    Retired Wound - Properties Group Placement Date: 05/26/25 -PL Placement Time: 1948 -PL Present on Original Admission: Y  -TW Side: Right  -TW Location: gluteal  -PL    Retired Wound - Properties Group Date first assessed: 05/26/25 -PL Time first assessed: 1948 -PL Present on Original Admission: Y  -TW Side: Right  -TW Location: gluteal  -PL       Positioning and Restraints    Post Treatment Position chair  -LM        In Chair call light within reach;encouraged to call for assist;notified nsg  -LM                  User Key  (r) = Recorded By, (t) = Taken By, (c) = Cosigned By      Initials Name Effective Dates    TW Seble Sena, RN 06/16/21 -     Gabi Ascencio OT 06/16/21 -     Raquel Ledesma RN 06/12/24 -                            OT Recommendation and Plan  Planned Therapy Interventions (OT): activity tolerance training, adaptive equipment training, BADL retraining, transfer/mobility retraining, strengthening exercise, ROM/therapeutic exercise, patient/caregiver education/training  Therapy Frequency (OT): other (see comments) (prn and/or to monitor fxl progress)  Plan of Care Review  Plan of Care Reviewed With: patient  Plan of Care Reviewed With: patient        Time Calculation:     Therapy Charges for Today       Code Description Service Date Service Provider Modifiers Qty    45215519131 HC OT SELF CARE/MGMT/TRAIN EA 15 MIN 5/28/2025 Gabi Blevins OT GO 2                 Gabi Blevins OT  5/28/2025

## 2025-05-28 NOTE — PLAN OF CARE
Pt sat in chair this morning through lunch without complaints. No s/s of acute distress noted. VSS

## 2025-05-28 NOTE — SIGNIFICANT NOTE
05/28/25 1351   Post Acute Pre-Cert Documentation   Request Submitted by Facility - Type: Post Acute   Post-Acute Authorization Type Submitted: IRF   Date Post Acute Pre-Cert Completed 05/28/25   Response Received from Insurance? P2P Requested   Response Communicated to:    Authorization Number: [id number]   Post Acute Pre-Cert Initiated Comment Message received from [employer] that a p2p has been offered.  Spoke to Soraya in SS.

## 2025-05-28 NOTE — PLAN OF CARE
Goal Outcome Evaluation:      Patient has been resting in bed during this shift. Awaiting approval for placement to inpatient rehabilitation facility. No signs or symptoms of acute distress noted at this time. Plan of care ongoing.

## 2025-05-28 NOTE — CASE MANAGEMENT/SOCIAL WORK
Discharge Planning Assessment  [facility]     Patient Name: Emma Ryan  MRN: [id number]  Today's Date: 5/28/2025    Admit Date: 5/23/2025    Plan: Pt's information has been submitted to insurance for possible admit to [facility].  Pre authorization remains pending at this time.  Pt resides at home with spouse, Bob, at [address] [facility] and plans to return home when medically able.  Pt currently does not utilize home health services.  Pt has rollator and cane via [facility] provider.  Pt's PCP is Dr. Nash.  Pt utilizes [facility] in [place].  Pt stated no POA or Living Will.  Pt transports via private auto per family.  SS will follow.       Discharge Plan       Row Name 05/28/25 1124       Plan    Plan Pt's information has been submitted to insurance for possible admit to [facility].  Pre authorization remains pending at this time.  Pt resides at home with spouse, Bob, at [address] [facility] and plans to return home when medically able.  Pt currently does not utilize home health services.  Pt has rollator and cane via [facility] provider.  Pt's PCP is Dr. Nash.  Pt utilizes [facility] in [place].  Pt stated no POA or Living Will.  Pt transports via private auto per family.  SS will follow.                    Expected Discharge Date and Time       Expected Discharge Date Expected Discharge Time    May 26, 2025                   REGINO RicheyW

## 2025-05-28 NOTE — THERAPY TREATMENT NOTE
Acute Care - Physical Therapy Treatment Note  FLORIN Mims     Patient Name: Emma Ryan  : 1940  MRN: [id number]  Today's Date: 2025      Visit Dx:     ICD-10-CM ICD-9-CM   1. Hypomagnesemia  E83.42 275.2   2. Hemorrhoids, unspecified hemorrhoid type  K64.9 455.6   3. Diastolic dysfunction without heart failure  I51.89 429.9   4. Acromioclavicular joint arthritis, unspecified laterality  M19.019 716.91     Patient Active Problem List   Diagnosis    Complete tear of right rotator cuff    Stage 3a chronic kidney disease    Cough    Gastroesophageal reflux disease    Mixed hyperlipidemia    Shoulder pain    Hypertension    Acromioclavicular joint arthritis    Impingement syndrome, shoulder    Complete tear of left rotator cuff    Urge incontinence    Atopic rhinitis    Upper respiratory tract infection    Bilateral lower extremity edema    Hypercalcemia    Right knee pain    DADA (stress urinary incontinence, female)    Obesity (BMI 35.0-39.9 without comorbidity)    Left knee injury    Acute UTI    COVID-19 virus infection    Skin lesion of scalp    Numbness in left leg    Multiple bruises    Vitamin D deficiency    Laceration of right lower leg    Chronic chest pain with low to moderate risk for CAD    Degenerative disc disease, lumbar    Valvular heart disease, mild aortic and mitral regurgitation    Diastolic dysfunction without heart failure    Hypomagnesemia     Past Medical History:   Diagnosis Date    GERD (gastroesophageal reflux disease)     Hyperlipidemia     Hypertension     Left shoulder pain     Obesity     Osteoarthritis of knees, bilateral     Overactive bladder     Right shoulder pain      Past Surgical History:   Procedure Laterality Date    APPENDECTOMY      BUNIONECTOMY Right     CARDIOVASCULAR STRESS TEST  10/2012    CATARACT EXTRACTION  2017    CHOLECYSTECTOMY      COLONOSCOPY      ECHO - CONVERTED  10/2012    JOINT REPLACEMENT      bilateral knees     KNEE ARTHROPLASTY Left      KNEE ARTHROSCOPY      SHOULDER ARTHROSCOPY Left 7/28/2016    Procedure: LEFT SHOULDER ACROMIOPLASTY,MINI OPEN ROTATOR CUFF REPAIR;  Surgeon: Carlos Eduardo Smith MD;  Location: [facility];  Service:     SHOULDER SURGERY  05/31/2016    OPEN ACROMICPLASTY RIGHT SHOULDER     PT Assessment (Last 12 Hours)       PT Evaluation and Treatment       Row Name 05/28/25 1318          Physical Therapy Time and Intention    Subjective Information complains of;weakness;fatigue  -KM     Document Type therapy note (daily note)  -KM     Mode of Treatment physical therapy  -KM     Patient Effort good  -KM     Symptoms Noted During/After Treatment fatigue  -KM       Row Name 05/28/25 1318          General Information    Patient Profile Reviewed yes  -KM     Patient Observations alert;cooperative;agree to therapy  -KM     Existing Precautions/Restrictions fall  -KM       Row Name 05/28/25 1318          Pain    Pretreatment Pain Rating 0/10 - no pain  -KM     Posttreatment Pain Rating 0/10 - no pain  -KM       Row Name 05/28/25 1318          Cognition    Affect/Mental Status (Cognition) WFL  -KM     Orientation Status (Cognition) oriented x 3  -KM     Follows Commands (Cognition) WFL  -KM       Row Name 05/28/25 1318          Bed Mobility    Bed Mobility bed mobility (all) activities  -KM     All Activities, [place] (Bed Mobility) minimum assist (75% patient effort);moderate assist (50% patient effort)  -KM     Bed Mobility, Safety Issues decreased use of legs for bridging/pushing  -KM     Assistive Device (Bed Mobility) bed rails;head of bed elevated  -KM       Row Name 05/28/25 1318          Transfers    Transfers sit-stand transfer;stand-sit transfer;bed-chair transfer  -KM       Row Name 05/28/25 1318          Bed-Chair Transfer    Bed-Chair [place] (Transfers) minimum assist (75% patient effort)  -KM     Assistive Device (Bed-Chair Transfers) walker, front-wheeled  -KM       Row Name 05/28/25 1318          Sit-Stand  Transfer    Sit-Stand [place] (Transfers) minimum assist (75% patient effort)  -KM     Assistive Device (Sit-Stand Transfers) walker, front-wheeled  -KM       Row Name 05/28/25 1318          Stand-Sit Transfer    Stand-Sit [place] (Transfers) minimum assist (75% patient effort)  -KM     Assistive Device (Stand-Sit Transfers) walker, front-wheeled  -KM       Row Name 05/28/25 1318          Gait/Stairs (Locomotion)    Gait/Stairs Locomotion gait/ambulation independence;gait/ambulation assistive device;distance ambulated  -KM     [place] Level (Gait) minimum assist (75% patient effort)  -KM     Assistive Device (Gait) walker, front-wheeled  -KM     Patient was able to Ambulate yes  -KM     Distance in Feet (Gait) 5  -KM     Pattern (Gait) step-to  -KM     Deviations/Abnormal Patterns (Gait) ataxic;base of support, narrow;gait speed decreased  -KM     Right Sided Gait Deviations foot drop/toe drag;knee buckling, right side  -KM       Row Name 05/28/25 1318          Safety Issues/Impairments Affecting Functional Mobility    Impairments Affecting Function (Mobility) balance;endurance/activity tolerance;strength  -KM       Row Name             Wound 05/26/25 1948 Right gluteal Pressure Injury    Wound - Properties Group Placement Date: 05/26/25 -PL Placement Time: 1948 -PL Present on Original Admission: Y  -TW Side: Right  -TW Location: gluteal  -PL Primary Wound Type: Pressure Inj  -TW    Retired Wound - Properties Group Placement Date: 05/26/25 -PL Placement Time: 1948 -PL Present on Original Admission: Y  -TW Side: Right  -TW Location: gluteal  -PL    Retired Wound - Properties Group Placement Date: 05/26/25 -PL Placement Time: 1948 -PL Present on Original Admission: Y  -TW Side: Right  -TW Location: gluteal  -PL    Retired Wound - Properties Group Date first assessed: 05/26/25 -PL Time first assessed: 1948 -PL Present on Original Admission: Y  -TW Side: Right  -TW Location: gluteal  -PL      Row  Name 05/28/25 1318          Progress Summary (PT)    Daily Progress Summary (PT) Pt. was able to demonstrate improved functional mobility skills compared to prior session. She was able to ambulate minimal distance from bed-chair w/ RW. She tolerated increased activity compared to prior session. Pt. would benefit from more intense PT services at this time.  -               User Key  (r) = Recorded By, (t) = Taken By, (c) = Cosigned By      Initials Name Provider Type    Seble Ch, RN Registered Nurse    Roni John, PT Physical Therapist    PL Raquel Nava, RN Registered Nurse                    Physical Therapy Education       Title: PT OT SLP Therapies (Done)       Topic: Physical Therapy (Done)       Point: Mobility training (Done)       Learning Progress Summary            Patient Acceptance, E,TB, VU,NR by PL at 5/26/2025 2132    Acceptance, E,TB, VU by KM at 5/26/2025 1235    Acceptance, E, NR by RD at 5/25/2025 1214                      Point: Home exercise program (Done)       Learning Progress Summary            Patient Acceptance, E,TB, VU,NR by PL at 5/26/2025 2132    Acceptance, E,TB, VU by KM at 5/26/2025 1235    Acceptance, E, NR by RD at 5/25/2025 1214                      Point: Body mechanics (Done)       Learning Progress Summary            Patient Acceptance, E,TB, VU,NR by PL at 5/26/2025 2132    Acceptance, E,TB, VU by KM at 5/26/2025 1235    Acceptance, E, NR by RD at 5/25/2025 1214                      Point: Precautions (Done)       Learning Progress Summary            Patient Acceptance, E,TB, VU,NR by PL at 5/26/2025 2132    Acceptance, E,TB, VU by KM at 5/26/2025 1235    Acceptance, E, NR by RD at 5/25/2025 1214                                      User Key       Initials Effective Dates Name Provider Type Discipline    RD 06/16/21 -  Olivia Faust, NESSA Registered Nurse Nurse    ABBEY 05/24/22 -  Roni Mar, PT Physical Therapist PT    PL 06/12/24 -  Raquel Nava, RN  Registered Nurse Nurse                  PT Recommendation and Plan  Anticipated Discharge Disposition (PT): skilled nursing facility, inpatient rehabilitation facility (SNF vs IRF depending on pt. progress and activity tolerance)  Planned Therapy Interventions (PT): balance training, bed mobility training, gait training, home exercise program, patient/family education, postural re-education, ROM (range of motion), stair training, strengthening, stretching, transfer training  Therapy Frequency (PT): 2 times/wk (2-5x/wk)  Progress Summary (PT)  Daily Progress Summary (PT): Pt. was able to demonstrate improved functional mobility skills compared to prior session. She was able to ambulate minimal distance from bed-chair w/ RW. She tolerated increased activity compared to prior session. Pt. would benefit from more intense PT services at this time.  Plan of Care Reviewed With: patient  Outcome Evaluation: Pt. evaluation completed during PT session. She was able to perform functional mobility skills w/ modA-maxA. She had increased difficulty ambulating d/t impaired BLE sensation and RLE weakness. Pt. would benefit from more intense PT services prior to return home.       Time Calculation:    PT Charges       Row Name 05/28/25 1318             Time Calculation    PT Received On 05/28/25  -KM         Time Calculation- PT    Total Timed Code Minutes- PT 23 minute(s)  -KM                User Key  (r) = Recorded By, (t) = Taken By, (c) = Cosigned By      Initials Name Provider Type    Roni John, PT Physical Therapist                  Therapy Charges for Today       Code Description Service Date Service Provider Modifiers Qty    [id number]  PT THERAPEUTIC ACT EA 15 MIN 5/28/2025 Roni Mar, PT GP 1    [id number] HC GAIT TRAINING EA 15 MIN 5/28/2025 Roni Mar, PT GP 1            PT G-Codes  AM-PAC 6 Clicks Score (PT): 14    Roni Mar PT  5/28/2025

## 2025-05-28 NOTE — DISCHARGE PLACEMENT REQUEST
"Patient needs post acute skilled nursing admission for PT/OT strength, mobility, ADL and transfer training. BID wound care. Patient lives with spouse who she cares for. PLOF independent.      ICD-10 codes:   R53.81  E83.42  E87.6              Emma Krishnamurthy (84 y.o. Female)       Date of Birth   1940    Social Security Number       Address   [address] [id number]    Home Phone   [phone]    MRN   [id number]       Evangelical   Taoist    Marital Status                               Admission Date   5/23/2025    Admission Type   Emergency    Admitting Provider   Burt Golden MD    Attending Provider   Ruy Tenorio DO    Department, Room/Bed   [address], 3307/1S       Discharge Date       Discharge Disposition       Discharge Destination                                 Attending Provider: Ruy Tenorio DO    Allergies: Codeine, Sulfa Antibiotics    Isolation: None   Infection: None   Code Status: CPR    Ht: 149.9 cm (59\")   Wt: 64.1 kg (141 lb 5 oz)    Admission Cmt: None   Principal Problem: Hypomagnesemia [E83.42]                   Active Insurance as of 5/23/2025       Primary Coverage       Payor Plan Insurance Group Employer/Plan Group    ANTHEM MEDICARE REPLACEMENT ANTHEM MEDICARE ADVANTAGE HMO [id number]       Payor Plan Address Payor Plan Phone Number Payor Plan Fax Number Effective Dates    [address] [phone]  1/1/2025 - None Entered    [facility] [id number]         Subscriber Name Subscriber Birth Date Member ID       EMMA KRISHNAMURTHY 1940 [id number]                     Emergency Contacts        (Rel.) Home Phone Work Phone Mobile Phone    ShelbyBob lim (Spouse) [phone] -- --    ShelbyLashawn (Relative) [phone] -- --    TanInez peralta (Daughter) [phone] -- --                 History & Physical        Chad Canas PA-C at 05/23/25 2120       Attestation signed by Burt Golden " MD Ezra at 25 0041 (Updated)      I have discussed this patient with Chad Canas PA-C, and have reviewed this documentation and agree. Suspect generalized weakness is related to poor PO intake and in turn severe electrolyte disturbances. Suspect junctional rhythm seen on EKG is a result of her electrolyte disturbances as well. Monitor response to electrolyte replacement. Repeat EKG in the morning. Consult PT and OT to evaluate, along with nutrition.                           [facility]  History & Physical    Patient Identification:  Name:  Emma Ryan  Age:  84 y.o.  Sex:  female  :  1940  MRN:  [id number]   Visit Number:  [id number]  Admit Date: 2025   Primary Care Physician:  Finn Nash MD    Subjective       Chief Complaint   Patient presents with    Weakness - Generalized       History of presenting illness:    Emma Ryan is a 84 y.o. female who presented for further evaluation of generalized weakness, difficulty walking, tingling in the right leg and left arm.    Past medical history is significant for Hypertension, CKD stage IIIa, HLD, hemorrhoids, osteoarthritis, GERD, recent history of multiple UTIs-resolved with Antibiotic treatment.    Patient was examined at bedside in her hospital room.  Patient is awake, alert and oriented, in no acute distress.  Patient reports that she has a recent history of urinary tract infection for which she was seen in our ER in early March and early April.  Patient denies urinary symptoms at this time but expressed concern that her current symptoms were due to this.  Patient was reassured that her urine looks to be much improved at this time and does not show signs of UTI.  Patient does admit to having multiple bouts of diarrhea while being treated with antibiotics for these recent urinary tract infections, patient denies vomiting.    Patient reports that last night she woke up with left arm  "tingling and weakness.  She denies chest pain, shortness of breath, palpitations, diaphoresis, but does note that she has had random muscle spasms in her chest throughout the day today.  Patient noted that today she has \"felt off\" but is unable to pinpoint specific symptoms.  Patient does complain of bilateral leg weakness and tingling in her right leg.  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  Patient feels generally weak all over her body including having difficulty swallowing her largest pill this morning.  Patient denies facial asymmetries, headache, unilateral weaknesses, slurred speech, vision changes, difficulty understanding, or any other neurological changes.  Patient understands that the ER workup revealed normal urine, hypomagnesemia, and hypokalemia.  Patient admits to having decreased appetite over the last week but notes that she drinks an electrolyte supplement every day.    ED, vital signs were /54 (BP Location: Left arm, Patient Position: Sitting)   Pulse 96   Temp 97.6 °F (36.4 °C) (Temporal)   Resp 14   Ht 149.9 cm (59\")   Wt 65.8 kg (145 lb)   SpO2 95%   BMI 29.29 kg/m²   ED workup significant for:   HS troponin initially 40, repeat 33, proBNP 1283.0,  Sodium 143, glucose 120, potassium 2.9, magnesium 0.6, creatinine 1.16, GFR 46.9, calcium 8.1, WBC count 11.67, hemoglobin 10.5, lactate 1.2, CRP 9.08  CXR shows coarsened bronchovascular pattern to the lungs.  No edema or pneumonia.  No pleural effusion or pneumothorax.  No fracture or foreign body.  Respiratory PCR panel is negative  EKG shows accelerated junctional rhythm.  Rate 93 bpm.  Nonspecific ST and T wave abnormality.  QTc 452.  Urinalysis shows trace ketones, otherwise within normal limits.    Known Emergency Department medications received prior to my evaluation included:  Medications   sodium chloride 0.9 % bolus 250 mL (0 mL Intravenous Stopped 5/23/25 1919) "       Room location at the time of my evaluation was 307-1.     ---------------------------------------------------------------------------------------------------------------------   Review of Systems   Constitutional:  Positive for appetite change and fatigue. Negative for chills and fever.   HENT:  Negative for postnasal drip, rhinorrhea, sinus pressure, sneezing and sore throat.    Eyes:  Negative for discharge and redness.   Respiratory:  Negative for cough, shortness of breath and wheezing.    Cardiovascular:  Negative for chest pain, palpitations and leg swelling.   Gastrointestinal:  Positive for diarrhea and nausea. Negative for vomiting.   Genitourinary:  Negative for difficulty urinating, dysuria, frequency and hematuria.   Musculoskeletal:  Positive for gait problem, myalgias and neck pain. Negative for arthralgias and joint swelling.   Skin:  Negative for rash and wound.   Neurological:  Positive for dizziness, weakness and light-headedness. Negative for speech difficulty and headaches.   Hematological:  Does not bruise/bleed easily.   Psychiatric/Behavioral:  Negative for confusion. The patient is not nervous/anxious.       ---------------------------------------------------------------------------------------------------------------------   Past Medical History:   Diagnosis Date    GERD (gastroesophageal reflux disease)     Hyperlipidemia     Hypertension     Left shoulder pain     Obesity     Osteoarthritis of knees, bilateral     Overactive bladder     Right shoulder pain      Past Surgical History:   Procedure Laterality Date    APPENDECTOMY      BUNIONECTOMY Right     CARDIOVASCULAR STRESS TEST  10/2012    CATARACT EXTRACTION  2017    CHOLECYSTECTOMY      COLONOSCOPY  2011    ECHO - CONVERTED  10/2012    JOINT REPLACEMENT      bilateral knees     KNEE ARTHROPLASTY Left     KNEE ARTHROSCOPY      SHOULDER ARTHROSCOPY Left 7/28/2016    Procedure: LEFT SHOULDER ACROMIOPLASTY,MINI OPEN ROTATOR CUFF  REPAIR;  Surgeon: Carlos Eduardo Smith MD;  Location: [facility];  Service:     SHOULDER SURGERY  2016    OPEN ACROMICPLASTY RIGHT SHOULDER     Family History   Problem Relation Age of Onset    Heart disease Other     Stroke Sister     Diabetes Mother     Heart failure Mother     Heart disease Mother     Heart attack Mother     Hypertension Father     Emphysema Father     Heart disease Father     No Known Problems Brother     Cancer Sister     Kidney disease Sister     Heart failure Sister     Diabetes Brother     Diabetes Brother     Diabetes Brother      Social History     Socioeconomic History    Marital status:    Tobacco Use    Smoking status: Former     Current packs/day: 0.00     Average packs/day: 2.0 packs/day for 20.0 years (40.0 ttl pk-yrs)     Types: Cigarettes     Start date:      Quit date:      Years since quittin.4    Smokeless tobacco: Never   Vaping Use    Vaping status: Never Used   Substance and Sexual Activity    Alcohol use: No     Comment: s/p     Drug use: No    Sexual activity: Defer     ---------------------------------------------------------------------------------------------------------------------   Allergies:  Codeine and Sulfa antibiotics  ---------------------------------------------------------------------------------------------------------------------   Home medications:  Medications below are reported home medications pulling from within the system; at this time, these medications have not been reconciled unless otherwise specified and are in the verification process for further verifcation as current home medications.  Medications Prior to Admission   Medication Sig Dispense Refill Last Dose/Taking    atorvastatin (LIPITOR) 20 MG tablet Take 1 tablet by mouth once daily 90 tablet 0 2025    carvedilol (COREG) 25 MG tablet Take 1 tablet by mouth 2 (Two) Times a Day With Meals.   2025 Morning    Cholecalciferol (VITAMIN D PO) Take 1,000  Units by mouth daily.   5/23/2025    Docusate Calcium (STOOL SOFTENER PO) Take 1 tablet by mouth Daily.   5/23/2025    hydrALAZINE (APRESOLINE) 100 MG tablet Take 1 tablet by mouth 3 (Three) Times a Day.   5/23/2025 Morning    NIFEdipine XL (PROCARDIA XL) 30 MG 24 hr tablet Take 1 tablet by mouth Daily.   5/23/2025    omeprazole OTC (PriLOSEC OTC) 20 MG EC tablet Take 1 tablet by mouth Daily.   5/23/2025    oxybutynin XL (DITROPAN-XL) 10 MG 24 hr tablet Take 1 tablet by mouth Daily. 90 tablet 1 5/23/2025    potassium chloride (K-DUR,KLOR-CON) 20 MEQ CR tablet Take 1 tablet by mouth Daily.   5/23/2025    Probiotic Product (PROBIOTIC COLON SUPPORT PO) Take  by mouth daily.   5/23/2025    spironolactone (ALDACTONE) 25 MG tablet Take 1 tablet by mouth Daily.   5/23/2025    ketoconazole (NIZORAL) 2 % cream Apply 1 Application topically to the appropriate area as directed Daily As Needed for Itching or Rash.   Unknown    ketoconazole (NIZORAL) 2 % shampoo Apply 1 Application topically to the appropriate area as directed 2 (Two) Times a Week.   Unknown       Hospital Scheduled Meds:  magnesium sulfate, 2 g, Intravenous, Q30 Min   Followed by  magnesium sulfate, 4 g, Intravenous, Q4H  potassium chloride ER, 40 mEq, Oral, Q2H  potassium chloride, 10 mEq, Intravenous, Q1H           Current listed hospital scheduled medications may not yet reflect those currently placed in orders that are signed and held awaiting patient's arrival to floor.   ---------------------------------------------------------------------------------------------------------------------     Objective     Vital Signs:  Temp:  [97.6 °F (36.4 °C)] 97.6 °F (36.4 °C)  Heart Rate:  [96] 96  Resp:  [14] 14  BP: (115)/(54) 115/54      05/23/25  1741   Weight: 65.8 kg (145 lb)     Body mass index is 29.29 kg/m².  ---------------------------------------------------------------------------------------------------------------------     Physical Exam  Vitals reviewed.    Constitutional:       General: She is not in acute distress.     Appearance: Normal appearance.   HENT:      Head: Normocephalic and atraumatic.      Nose: Nose normal.      Mouth/Throat:      Mouth: Mucous membranes are moist.   Eyes:      Conjunctiva/sclera: Conjunctivae normal.      Pupils: Pupils are equal, round, and reactive to light.   Cardiovascular:      Rate and Rhythm: Normal rate and regular rhythm.      Pulses: Normal pulses.      Heart sounds: Normal heart sounds. No murmur heard.  Pulmonary:      Effort: Pulmonary effort is normal. No respiratory distress.      Breath sounds: Normal breath sounds. No wheezing or rales.   Abdominal:      General: There is no distension.      Palpations: Abdomen is soft.      Tenderness: There is no abdominal tenderness.   Musculoskeletal:         General: No deformity.      Right lower leg: Edema present.      Left lower leg: Edema present.   Skin:     General: Skin is warm and dry.      Findings: No erythema, lesion or rash.   Neurological:      Mental Status: She is alert and oriented to person, place, and time. Mental status is at baseline.   Psychiatric:         Mood and Affect: Mood normal.         Behavior: Behavior normal.       ---------------------------------------------------------------------------------------------------------------------  EKG:        I have personally looked at the EKG.  ---------------------------------------------------------------------------------------------------------------------   Results from last 7 days   Lab Units 05/23/25  1814   CRP mg/dL 9.08*   LACTATE mmol/L 1.2   WBC 10*3/mm3 11.67*   HEMOGLOBIN g/dL 10.5*   HEMATOCRIT % 32.5*   MCV fL 88.8   MCHC g/dL 32.3   PLATELETS 10*3/mm3 424   INR  1.00         Results from last 7 days   Lab Units 05/23/25  1814   SODIUM mmol/L 143   POTASSIUM mmol/L 2.9*   MAGNESIUM mg/dL 0.6*   CHLORIDE mmol/L 102   CO2 mmol/L 23.6   BUN mg/dL 15   CREATININE mg/dL 1.16*   CALCIUM mg/dL 8.1*  "  GLUCOSE mg/dL 120*   ALBUMIN g/dL 3.9   BILIRUBIN mg/dL 0.5   ALK PHOS U/L 110   AST (SGOT) U/L 19   ALT (SGPT) U/L 10   Estimated Creatinine Clearance: 32.3 mL/min (A) (by C-G formula based on SCr of 1.16 mg/dL (H)).  No results found for: \"AMMONIA\"  Results from last 7 days   Lab Units 05/23/25  1932 05/23/25 1814   HSTROP T ng/L 33* 40*     Results from last 7 days   Lab Units 05/23/25  1814   PROBNP pg/mL 1,283.0     No results found for: \"HGBA1C\"  Lab Results   Component Value Date    TSH 1.600 06/14/2023    FREET4 1.16 09/14/2018     No results found for: \"PREGTESTUR\", \"PREGSERUM\", \"HCG\", \"HCGQUANT\"  Pain Management Panel  More data exists         Latest Ref Rng & Units 2/10/2025 9/25/2024   Pain Management Panel   Creatinine, Urine mg/dL 71.5  65.4      No results found for: \"BLOODCX\"  No results found for: \"URINECX\"  No results found for: \"WOUNDCX\"  No results found for: \"STOOLCX\"      ---------------------------------------------------------------------------------------------------------------------  Imaging Results (Last 7 Days)       Procedure Component Value Units Date/Time    XR Chest AP [[id number]] Collected: 05/23/25 1837     Updated: 05/23/25 1846    Narrative:      PROCEDURE: Portable chest x-ray examination performed on May 23, 2025.  Single view. Portable technique.     HISTORY: Weakness.     COMPARISON: None.     FINDINGS:     Normal heart size.  Coarsened bronchovascular pattern to the lungs  Slight levoconvex curvature at the thoracolumbar junction.  No edema, pneumonia, pleural effusion, or pneumothorax.  No free air in the upper abdomen.  Fixation anchors at the left humeral head consistent with prior rotator  cuff repair.  Mild osteoarthritis at the glenohumeral joints  Hypoinflated lungs.       Impression:         Coarsened bronchovascular pattern to the lungs  No edema or pneumonia. No pleural effusion or pneumothorax  No fracture or foreign body.  Slight levoconvex curve at the " "thoracolumbar junction.  Fixation anchors at the left humeral head.        This report was finalized on 5/23/2025 6:44 PM by Juliano Paulson MD.               Cultures:  No results found for: \"BLOODCX\", \"URINECX\", \"WOUNDCX\", \"MRSACX\", \"RESPCX\", \"STOOLCX\"    Last echocardiogram:  Results for orders placed during the hospital encounter of 01/08/21    Adult Transthoracic Echo Complete W/ Cont if Necessary Per Protocol    Interpretation Summary  · Normal left ventricular cavity size and wall thickness noted. All left ventricular wall segments contract normally  · Left ventricular ejection fraction appears to be 61 - 65%.  · Left ventricular diastolic function is consistent with (grade I) impaired relaxation.  · The aortic valve is abnormal in structure. There is mild thickening of the non-coronary, left coronary and right coronary cusp(s) of the aortic valve. Mild aortic valve regurgitation is present.  · The mitral valve is structurally normal with no significant stenosis present. Mild mitral valve regurgitation is present.  · The tricuspid valve is structurally normal with no significant stenosis present. Mild tricuspid valve regurgitation is present. Estimated right ventricular systolic pressure from tricuspid regurgitation is mildly elevated (35-45 mmHg).  · There is no evidence of pericardial effusion.      I have personally reviewed the above radiology images and read the final radiology report on 05/23/25  ---------------------------------------------------------------------------------------------------------------------  Assessment / Plan     Active Hospital Problems    Diagnosis  POA    **Hypomagnesemia [E83.42]  Yes       ASSESSMENT/PLAN:  Hypomagnesemia, likely secondary to poor oral intake  Hypokalemia, likely secondary to poor oral intake  Admission labs show magnesium 0.6, potassium 2.9, continue to monitor for clinical improvement.  Magnesium replacement protocol started, continue to replace per " protocol.  Potassium replacement protocol started, continue to replace per protocol.  Monitor EKG as needed  Acute debility, generalized weakness  Continuous cardiac monitoring on telemetry  Last echo 01/08/2021, EF 61 to 65%.  Obtain updated echocardiogram  Strict I/O's every 6 hours  Repeat BMP, CBC, magnesium in the a.m.  If symptoms do not resolved with treatment of above consider PT/OT consult for evaluation and treatment of ADLs below baseline, and inpatient case management consult for discharge to appropriate level of care.    Chronic Kidney Disease stage IIIa  Patient presented with Cr 1.16, baseline around 1.14  HS troponin initially 40, repeat 33, proBNP 1283.  Trend Cr and urine output, avoid nephrotoxins, NSAIDs, dehydration and contrast as able.   Essential hypertension  Plan to resume home antihypertensive regimen once med rec is completed by pharmacy.  Holding parameters to prevent hypotension and bradycardia.      ----------  -DVT prophylaxis: Subcu heparin  -Activity: Assistance  -Expected length of stay: INPATIENT status due to the need for care which can only be reasonably provided in an hospital setting such as aggressive/expedited ancillary services and/or consultation services, the necessity for IV medications, close physician monitoring and/or the possible need for procedures.  In such, I feel patient’s risk for adverse outcomes and need for care warrant INPATIENT evaluation and predict the patient’s care encounter to likely last beyond 2 midnights.    -Disposition: Pending clinical course     High risk secondary to hypomagnesemia, generalized weakness    Code Status and Medical Interventions: CPR (Attempt to Resuscitate); Full Support   Ordered at: 05/23/25 2021     Code Status (Patient has no pulse and is not breathing):    CPR (Attempt to Resuscitate)     Medical Interventions (Patient has pulse or is breathing):    Full Support       Chad Canas PA-C   05/23/25  21:20 EDT      Electronically signed by Burt Golden MD at 05/24/25 0041       Orders (active)        Start     Ordered    05/28/25 1621  Swing Bed Consult  Once        Provider:  (Not yet assigned)    05/28/25 1620    05/28/25 0839  Diet: Regular/House, Renal; Low Potassium; Fluid Consistency: Thin (IDDSI 0)  Diet Effective Now         05/28/25 0838    05/28/25 0549  acetaminophen (TYLENOL) tablet 650 mg  Every 6 Hours PRN         05/28/25 0549    05/27/25 1317  Wound Care  Every 12 Hours         05/27/25 1316    05/27/25 1316  Elevate Heels Off of Bed  Until Discontinued         05/27/25 1316    05/27/25 1316  Turn Patient  Now Then Every 2 Hours         05/27/25 1316    05/27/25 1316  Use Repositioning Wedge to Position Patient  Continuous         05/27/25 1316    05/27/25 1316  Use Seat Cushion When Up In Chair  Continuous         05/27/25 1316    05/27/25 1316  Do NOT Rub or Massage Any Bony Prominence  Continuous         05/27/25 1316    05/27/25 0400  Vital Signs Every Hour and Per Hospital Policy Based on Patient Condition  Every Hour       05/27/25 0331    05/27/25 0331  Continuous Cardiac Monitoring  Continuous        Comments: Follow Standing Orders As Outlined in Process Instructions (Open Order Report to View Full Instructions)    05/27/25 0331    05/27/25 0331  Maintain IV Access  Continuous         05/27/25 0331    05/27/25 0331  Telemetry - Place Orders & Notify Provider of Results When Patient Experiences Acute Chest Pain, Dysrhythmia or Respiratory Distress  Continuous        Comments: Open Order Report to View Parameters Requiring Provider Notification    05/27/25 0331    05/27/25 0331  Continuous Pulse Oximetry  Continuous         05/27/25 0331    05/27/25 0330  nitroglycerin (NITROSTAT) SL tablet 0.4 mg  Every 5 Minutes PRN         05/27/25 0331    05/26/25 2220  Wound Ostomy Eval & Treat  Once         05/26/25 2219    05/25/25 0941  Ambulate Patient  Once        Comments: Ambulate patient in  room, ensure patient functional    05/25/25 0941    05/25/25 0900  [Held by provider]  potassium chloride (KLOR-CON M20) CR tablet 20 mEq  Daily        (On hold since yesterday at 0904 until manually unheld; held by Ruy Tenorio DOHold Reason: Abnormal Labs)    05/24/25 0904    05/25/25 0000  Cushioned Inflatable Ring         05/25/25 1208    05/25/25 0000  carvedilol (COREG) 25 MG tablet  2 Times Daily With Meals         05/25/25 1208    05/25/25 0000  magnesium oxide (MAG-OX) 400 MG tablet  Daily         05/25/25 1208    05/25/25 0000  Discharge Follow-up with PCP         05/25/25 1208    05/25/25 0000  Ambulatory Referral to Home Health         05/25/25 1250    05/24/25 2300  Vital Signs Every Hour and Per Hospital Policy Based on Patient Condition  Every Hour       05/24/25 2215 05/24/25 2214  Telemetry - Place Orders & Notify Provider of Results When Patient Experiences Acute Chest Pain, Dysrhythmia or Respiratory Distress  Continuous        Comments: Open Order Report to View Parameters Requiring Provider Notification    05/24/25 2215 05/24/25 1800  carvedilol (COREG) tablet 3.125 mg  2 Times Daily With Meals         05/24/25 1221    05/24/25 1000  atorvastatin (LIPITOR) tablet 20 mg  Daily         05/24/25 0904    05/24/25 1000  cholecalciferol (VITAMIN D3) tablet 1,000 Units  Daily         05/24/25 0904 05/24/25 1000  docusate sodium (COLACE) capsule 100 mg  Daily         05/24/25 0904 05/24/25 1000  pantoprazole (PROTONIX) EC tablet 40 mg  Every Early Morning         05/24/25 0904 05/24/25 1000  oxybutynin XL (DITROPAN-XL) 24 hr tablet 10 mg  Daily         05/24/25 0904    05/24/25 1000  [Held by provider]  spironolactone (ALDACTONE) tablet 25 mg  Daily        (On hold since Sun 5/25/2025 at 2050 until manually unheld; held by Ruy Tenorio DOHold Reason: Abnormal Labs)    05/24/25 0904    05/24/25 1000  Acidophilus/Pectin capsule 1 capsule  Daily         05/24/25 0904    05/24/25 0903   "ketoconazole (NIZORAL) 2 % cream 1 Application  Daily PRN         05/24/25 0904    05/24/25 0855  Dietary Nutrition Supplements Boost Plus (Ensure Enlive, Ensure Plus)  Daily With Breakfast & Dinner       05/24/25 0855    05/24/25 0800  Oral Care  2 Times Daily       05/23/25 2153 05/24/25 0041  OT Consult: Eval & Treat ADL Performance Below Baseline  Once         05/24/25 0040    05/24/25 0040  PT Consult: Eval & Treat Functional Mobility Below Baseline  Once         05/24/25 0040    05/24/25 0000  Vital Signs  Every 8 Hours        Comments: Per per hospital policy    05/23/25 2153 05/24/25 0000  Strict Intake & Output  Every 6 Hours         05/23/25 2153 05/23/25 2348  melatonin tablet 5 mg  Nightly PRN         05/23/25 2348 05/23/25 2245  sodium chloride 0.9 % flush 10 mL  Every 12 Hours Scheduled         05/23/25 2153 05/23/25 2245  heparin (porcine) 5000 UNIT/ML injection 5,000 Units  Every 12 Hours Scheduled         05/23/25 2153 05/23/25 2154  Notify Physician (with Parameters)  Until Discontinued         05/23/25 2153 05/23/25 2154  Telemetry - Place Orders & Notify Provider of Results When Patient Experiences Acute Chest Pain, Dysrhythmia or Respiratory Distress  Continuous        Comments: Open Order Report to View Parameters Requiring Provider Notification    05/23/25 2153 05/23/25 2154  May Be Off Telemetry for Tests  Continuous         05/23/25 2153 05/23/25 2154  Daily Weights  Daily       05/23/25 2153 05/23/25 2153  sodium chloride 0.9 % flush 10 mL  As Needed         05/23/25 2153 05/23/25 2153  sodium chloride 0.9 % infusion 40 mL  As Needed         05/23/25 2153 05/23/25 2153  nitroglycerin (NITROSTAT) SL tablet 0.4 mg  Every 5 Minutes PRN         05/23/25 2153 05/23/25 2153  sennosides-docusate (PERICOLACE) 8.6-50 MG per tablet 2 tablet  2 Times Daily PRN        Placed in \"And\" Linked Group    05/23/25 2153 05/23/25 2153  polyethylene glycol (MIRALAX) " "packet 17 g  Daily PRN        Placed in \"And\" Linked Group    25  bisacodyl (DULCOLAX) EC tablet 5 mg  Daily PRN        Placed in \"And\" Linked Group    25  bisacodyl (DULCOLAX) suppository 10 mg  Daily PRN        Placed in \"And\" Linked Group    25  potassium chloride (KLOR-CON M20) CR tablet 40 mEq  Every 2 Hours         25  Code Status and Medical Interventions: CPR (Attempt to Resuscitate); Full Support  Continuous         25  Hospitalist (on-call MD unless specified)  Once        Specialty:  Hospitalist  Provider:  (Not yet assigned)    25  Potassium Replacement - Follow Nurse / BPA Driven Protocol  As Needed         25 1853  Magnesium Standard Dose Replacement - Follow Nurse / BPA Driven Protocol  As Needed         25    Unscheduled  Up With Assistance  As Needed       25    Unscheduled  Wound Care  As Needed       25 1316    Pending  carvedilol (COREG) 6.25 MG tablet  2 Times Daily With Meals         Pending                     Physician Progress Notes (most recent note)        Ruy Tenorio DO at 25              [facility] HOSPITALIST PROGRESS NOTE     Patient Identification:  Name:  Emma Ryan  Age:  84 y.o.  Sex:  female  :  1940  MRN:  [id number]  Visit Number:  [id number]  ROOM: [address] [place] [address]     Primary Care Provider:  Finn Nash MD    Length of stay in inpatient status:  4    Subjective     Chief Compliant:    Chief Complaint   Patient presents with    Weakness - Generalized       History of Presenting Illness: Patient seen evaluated in follow-up for generalized weakness with significant severe electrolyte disturbances with hypomagnesemia hypokalemia likely precipitating.  Patient seen and evaluated bedside today and feeling well.  Currently " awaiting inpatient rehab prior authorization request.  Patient in good spirits and cooperative.    Objective     Current Hospital Meds:  Acidophilus/Pectin, 1 capsule, Oral, Daily  atorvastatin, 20 mg, Oral, Daily  carvedilol, 3.125 mg, Oral, BID With Meals  cholecalciferol, 1,000 Units, Oral, Daily  docusate sodium, 100 mg, Oral, Daily  heparin (porcine), 5,000 Units, Subcutaneous, Q12H  oxybutynin XL, 10 mg, Oral, Daily  pantoprazole, 40 mg, Oral, Q AM  [Held by provider] potassium chloride, 20 mEq, Oral, Daily  sodium chloride, 10 mL, Intravenous, Q12H  [Held by provider] spironolactone, 25 mg, Oral, Daily         ----------------------------------------------------------------------------------------------------------------------  Vital Signs:  Temp:  [97.5 °F (36.4 °C)-98.5 °F (36.9 °C)] 98.4 °F (36.9 °C)  Heart Rate:  [76-95] 95  Resp:  [18] 18  BP: (101-142)/(54-96) 132/84  SpO2:  [93 %-96 %] 93 %  on   ;   Device (Oxygen Therapy): room air  Body mass index is 28.98 kg/m².      Intake/Output Summary (Last 24 hours) at 5/27/2025 1956  Last data filed at 5/27/2025 1100  Gross per 24 hour   Intake 580 ml   Output 1700 ml   Net -1120 ml      ----------------------------------------------------------------------------------------------------------------------  Physical exam:  Constitutional: Pleasant elderly adult female resting in bed in no distress.  HENT:  Head:  Normocephalic and atraumatic.  Mouth:  Moist mucous membranes.    Eyes:  Conjunctivae and EOM are normal. No scleral icterus.    Cardiovascular:  Normal rate, regular rhythm and normal heart sounds with no murmur.  Pulmonary/Chest:  No respiratory distress, no wheezes, no crackles, with normal breath sounds and good air movement.  Abdominal:  Soft.  Bowel sounds are normal.  No distension and no tenderness.   Musculoskeletal:  No tenderness, and no deformity.  No red or swollen joints anywhere.    Neurological:  Alert and oriented to person, place, and  "time.  No cranial nerve deficit.  No tongue deviation.  No facial droop.  No slurred speech. Intact Sensation throughout  Skin:  Skin is warm and dry. No rash or lesion noted. No pallor.   Peripheral vascular:  Pulses in all 4 extremities with no clubbing, no cyanosis, trace edema present.  Psychiatric: Appropriate mood and affect, pleasant.   ----------------------------------------------------------------------------------------------------------------------     BUN/CREAT/GLUC/ALT/AST/MIRLANDE/LIP    27/1.36/122/--/--/--/-- (05/27 0127)  LYTES - Na/K/Cl/CO2: 134*/6.2*/100/22.2 (05/27 0127)        No results found for: \"URINECX\"  Blood Culture   Date Value Ref Range Status   05/23/2025 No growth at 24 hours  Preliminary   05/23/2025 No growth at 24 hours  Preliminary       I have personally looked at the labs and they are summarized above.  ----------------------------------------------------------------------------------------------------------------------  Detailed radiology reports for the last 24 hours:  No radiology results for the last day    Assessment & Plan      Severe hypomagnesemia  Moderate hypokalemia  Severe hyperkalemia  Acute generalized weakness    - Patient presenting with generalized weakness likely secondary to significant electrolyte disturbances with severe hypomagnesemia and moderate hypokalemia.    - Patient with initial presenting magnesium of 0.6 improved to 2.1 after supplementation.    - PT and OT consulted however holiday weekend, will try to get nurses up to ambulate patient tomorrow and if doing well and improved to back to her near baseline with replacement of electrolytes possible consideration for discharge back home.    - Patient with development of significant hyperkalemia with potassium increased to 6.9 on 5/25.  Patient status post insulin D50 and sodium bicarb with improvement to 6.6.  Patient remaining asymptomatic.  Lokelma administered.  Further potassium supplementatio held n " and nurse/pharmacy directed electrolyte replacement protocol discontinued as well as holding patient's home spironolactone.  Patient potassium improved 5.8-day prior, increased back to 6.2 today and repeat dosing of Lokelma ordered.    CKD stage IIIa    - Avoid nephrotoxic agents and continue to trend creatinine while in hospital.    Hypertension    - Review of patient's home antihypertensive regiment shows significantly high doses of nifedipine and hydralazine, patient blood pressure normotensive at time of evaluation today and will hold hydralazine nifedipine and proceed with reduced dose Coreg but holding Aldactone as above.      Right foot drop  Peripheral neuropathy  Bilateral lower extremity weakness    - Patient seen and evaluated PT and recommended for ongoing rehab with consideration of inpatient rehab and consult placed.    Copied text in portions of the note has been reviewed and is accurate as of 25    VTE Prophylaxis:     Active VTE Prophylaxis:  Pharmacologic:        Start     Dose Route Frequency Stop    25 9815  heparin (porcine) 5000 UNIT/ML injection 5,000 Units         5,000 Units SC Every 12 Hours Scheduled --                  Select Mechanical VTE Prophylaxis if Desired & Appropriate       Disposition pending inpatient rehab prior authorization approval or denial    Ruy Tenorio DO  [facility] ERist  25  19:56 EDT      Electronically signed by Ruy Tenorio DO at 25          Physical Therapy Notes (most recent note)        Roni Mar, PT at 25 1327  Version 1 of 1         Acute Care - Physical Therapy Treatment Note  [facility]     Patient Name: Emma Ryan  : 1940  MRN: [id number]  Today's Date: 2025      Visit Dx:     ICD-10-CM ICD-9-CM   1. Hypomagnesemia  E83.42 275.2   2. Hemorrhoids, unspecified hemorrhoid type  K64.9 455.6   3. Diastolic dysfunction without heart failure  I51.89 429.9   4. Acromioclavicular joint  arthritis, unspecified laterality  M19.019 716.91     Patient Active Problem List   Diagnosis    Complete tear of right rotator cuff    Stage 3a chronic kidney disease    Cough    Gastroesophageal reflux disease    Mixed hyperlipidemia    Shoulder pain    Hypertension    Acromioclavicular joint arthritis    Impingement syndrome, shoulder    Complete tear of left rotator cuff    Urge incontinence    Atopic rhinitis    Upper respiratory tract infection    Bilateral lower extremity edema    Hypercalcemia    Right knee pain    DADA (stress urinary incontinence, female)    Obesity (BMI 35.0-39.9 without comorbidity)    Left knee injury    Acute UTI    COVID-19 virus infection    Skin lesion of scalp    Numbness in left leg    Multiple bruises    Vitamin D deficiency    Laceration of right lower leg    Chronic chest pain with low to moderate risk for CAD    Degenerative disc disease, lumbar    Valvular heart disease, mild aortic and mitral regurgitation    Diastolic dysfunction without heart failure    Hypomagnesemia     Past Medical History:   Diagnosis Date    GERD (gastroesophageal reflux disease)     Hyperlipidemia     Hypertension     Left shoulder pain     Obesity     Osteoarthritis of knees, bilateral     Overactive bladder     Right shoulder pain      Past Surgical History:   Procedure Laterality Date    APPENDECTOMY      BUNIONECTOMY Right     CARDIOVASCULAR STRESS TEST  10/2012    CATARACT EXTRACTION  2017    CHOLECYSTECTOMY      COLONOSCOPY  2011    ECHO - CONVERTED  10/2012    JOINT REPLACEMENT      bilateral knees     KNEE ARTHROPLASTY Left     KNEE ARTHROSCOPY      SHOULDER ARTHROSCOPY Left 7/28/2016    Procedure: LEFT SHOULDER ACROMIOPLASTY,MINI OPEN ROTATOR CUFF REPAIR;  Surgeon: Carlos Eduardo Smith MD;  Location: [facility];  Service:     SHOULDER SURGERY  05/31/2016    OPEN ACROMICPLASTY RIGHT SHOULDER     PT Assessment (Last 12 Hours)       PT Evaluation and Treatment       Row Name 05/28/25 6003           Physical Therapy Time and Intention    Subjective Information complains of;weakness;fatigue  -KM     Document Type therapy note (daily note)  -KM     Mode of Treatment physical therapy  -KM     Patient Effort good  -KM     Symptoms Noted During/After Treatment fatigue  -KM       Row Name 05/28/25 1318          General Information    Patient Profile Reviewed yes  -KM     Patient Observations alert;cooperative;agree to therapy  -KM     Existing Precautions/Restrictions fall  -KM       Row Name 05/28/25 1318          Pain    Pretreatment Pain Rating 0/10 - no pain  -KM     Posttreatment Pain Rating 0/10 - no pain  -KM       Row Name 05/28/25 1318          Cognition    Affect/Mental Status (Cognition) WFL  -KM     Orientation Status (Cognition) oriented x 3  -KM     Follows Commands (Cognition) WFL  -KM       Row Name 05/28/25 1318          Bed Mobility    Bed Mobility bed mobility (all) activities  -KM     All Activities, [place] (Bed Mobility) minimum assist (75% patient effort);moderate assist (50% patient effort)  -KM     Bed Mobility, Safety Issues decreased use of legs for bridging/pushing  -KM     Assistive Device (Bed Mobility) bed rails;head of bed elevated  -KM       Row Name 05/28/25 1318          Transfers    Transfers sit-stand transfer;stand-sit transfer;bed-chair transfer  -KM       Row Name 05/28/25 1318          Bed-Chair Transfer    Bed-Chair [place] (Transfers) minimum assist (75% patient effort)  -KM     Assistive Device (Bed-Chair Transfers) walker, front-wheeled  -KM       Row Name 05/28/25 1318          Sit-Stand Transfer    Sit-Stand [place] (Transfers) minimum assist (75% patient effort)  -KM     Assistive Device (Sit-Stand Transfers) walker, front-wheeled  -KM       Row Name 05/28/25 1318          Stand-Sit Transfer    Stand-Sit [place] (Transfers) minimum assist (75% patient effort)  -KM     Assistive Device (Stand-Sit Transfers) walker, front-wheeled  -KM       Row Name  05/28/25 1318          Gait/Stairs (Locomotion)    Gait/Stairs Locomotion gait/ambulation independence;gait/ambulation assistive device;distance ambulated  -KM     [place] Level (Gait) minimum assist (75% patient effort)  -KM     Assistive Device (Gait) walker, front-wheeled  -KM     Patient was able to Ambulate yes  -KM     Distance in Feet (Gait) 5  -KM     Pattern (Gait) step-to  -KM     Deviations/Abnormal Patterns (Gait) ataxic;base of support, narrow;gait speed decreased  -KM     Right Sided Gait Deviations foot drop/toe drag;knee buckling, right side  -KM       Row Name 05/28/25 1318          Safety Issues/Impairments Affecting Functional Mobility    Impairments Affecting Function (Mobility) balance;endurance/activity tolerance;strength  -KM       Row Name             Wound 05/26/25 1948 Right gluteal Pressure Injury    Wound - Properties Group Placement Date: 05/26/25  -PL Placement Time: 1948 -PL Present on Original Admission: Y  -TW Side: Right  -TW Location: gluteal  -PL Primary Wound Type: Pressure Inj  -TW    Retired Wound - Properties Group Placement Date: 05/26/25  -PL Placement Time: 1948 -PL Present on Original Admission: Y  -TW Side: Right  -TW Location: gluteal  -PL    Retired Wound - Properties Group Placement Date: 05/26/25  -PL Placement Time: 1948 -PL Present on Original Admission: Y  -TW Side: Right  -TW Location: gluteal  -PL    Retired Wound - Properties Group Date first assessed: 05/26/25  -PL Time first assessed: 1948 -PL Present on Original Admission: Y  -TW Side: Right  -TW Location: gluteal  -PL      Row Name 05/28/25 1318          Progress Summary (PT)    Daily Progress Summary (PT) Pt. was able to demonstrate improved functional mobility skills compared to prior session. She was able to ambulate minimal distance from bed-chair w/ RW. She tolerated increased activity compared to prior session. Pt. would benefit from more intense PT services at this time.  -KM                User Key  (r) = Recorded By, (t) = Taken By, (c) = Cosigned By      Initials Name Provider Type    TW Seble Sena, RN Registered Nurse    Roni John, PT Physical Therapist    Raquel Ledesma, RN Registered Nurse                    Physical Therapy Education       Title: PT OT SLP Therapies (Done)       Topic: Physical Therapy (Done)       Point: Mobility training (Done)       Learning Progress Summary            Patient Acceptance, E,TB, VU,NR by PL at 5/26/2025 2132    Acceptance, E,TB, VU by KM at 5/26/2025 1235    Acceptance, E, NR by RD at 5/25/2025 1214                      Point: Home exercise program (Done)       Learning Progress Summary            Patient Acceptance, E,TB, VU,NR by PL at 5/26/2025 2132    Acceptance, E,TB, VU by KM at 5/26/2025 1235    Acceptance, E, NR by RD at 5/25/2025 1214                      Point: Body mechanics (Done)       Learning Progress Summary            Patient Acceptance, E,TB, VU,NR by PL at 5/26/2025 2132    Acceptance, E,TB, VU by KM at 5/26/2025 1235    Acceptance, E, NR by RD at 5/25/2025 1214                      Point: Precautions (Done)       Learning Progress Summary            Patient Acceptance, E,TB, VU,NR by PL at 5/26/2025 2132    Acceptance, E,TB, VU by KM at 5/26/2025 1235    Acceptance, E, NR by RD at 5/25/2025 1214                                      User Key       Initials Effective Dates Name Provider Type Discipline    RD 06/16/21 -  Olivia Faust, NESSA Registered Nurse Nurse    ABBEY 05/24/22 -  Roni Mar, PT Physical Therapist PT    CHRYSTAL 06/12/24 -  Raquel Nava, RN Registered Nurse Nurse                  PT Recommendation and Plan  Anticipated Discharge Disposition (PT): skilled nursing facility, inpatient rehabilitation facility (SNF vs IRF depending on pt. progress and activity tolerance)  Planned Therapy Interventions (PT): balance training, bed mobility training, gait training, home exercise program, patient/family education, postural  re-education, ROM (range of motion), stair training, strengthening, stretching, transfer training  Therapy Frequency (PT): 2 times/wk (2-5x/wk)  Progress Summary (PT)  Daily Progress Summary (PT): Pt. was able to demonstrate improved functional mobility skills compared to prior session. She was able to ambulate minimal distance from bed-chair w/ RW. She tolerated increased activity compared to prior session. Pt. would benefit from more intense PT services at this time.  Plan of Care Reviewed With: patient  Outcome Evaluation: Pt. evaluation completed during PT session. She was able to perform functional mobility skills w/ modA-maxA. She had increased difficulty ambulating d/t impaired BLE sensation and RLE weakness. Pt. would benefit from more intense PT services prior to return home.       Time Calculation:    PT Charges       Row Name 25 1318             Time Calculation    PT Received On 25  -KM         Time Calculation- PT    Total Timed Code Minutes- PT 23 minute(s)  -KM                User Key  (r) = Recorded By, (t) = Taken By, (c) = Cosigned By      Initials Name Provider Type    Roni John, PT Physical Therapist                  Therapy Charges for Today       Code Description Service Date Service Provider Modifiers Qty    91033675288 HC PT THERAPEUTIC ACT EA 15 MIN 2025 Roni Mar, PT GP 1    86679840500 HC GAIT TRAINING EA 15 MIN 2025 Roni Mar, PT GP 1            PT G-Codes  AM-PAC 6 Clicks Score (PT): 14    Roni Mar PT  2025      Electronically signed by Roni Mar PT at 25 1327          Occupational Therapy Notes (most recent note)        Gabi Blevins, OT at 25 1303          Acute Care - Occupational Therapy Treatment Note  [facility]     Patient Name: Emma Ryan  : 1940  MRN: [id number]  Today's Date: 2025             Admit Date: 2025       ICD-10-CM ICD-9-CM   1. Hypomagnesemia  E83.42 275.2   2. Hemorrhoids,  unspecified hemorrhoid type  K64.9 455.6   3. Diastolic dysfunction without heart failure  I51.89 429.9   4. Acromioclavicular joint arthritis, unspecified laterality  M19.019 716.91     Patient Active Problem List   Diagnosis    Complete tear of right rotator cuff    Stage 3a chronic kidney disease    Cough    Gastroesophageal reflux disease    Mixed hyperlipidemia    Shoulder pain    Hypertension    Acromioclavicular joint arthritis    Impingement syndrome, shoulder    Complete tear of left rotator cuff    Urge incontinence    Atopic rhinitis    Upper respiratory tract infection    Bilateral lower extremity edema    Hypercalcemia    Right knee pain    DADA (stress urinary incontinence, female)    Obesity (BMI 35.0-39.9 without comorbidity)    Left knee injury    Acute UTI    COVID-19 virus infection    Skin lesion of scalp    Numbness in left leg    Multiple bruises    Vitamin D deficiency    Laceration of right lower leg    Chronic chest pain with low to moderate risk for CAD    Degenerative disc disease, lumbar    Valvular heart disease, mild aortic and mitral regurgitation    Diastolic dysfunction without heart failure    Hypomagnesemia     Past Medical History:   Diagnosis Date    GERD (gastroesophageal reflux disease)     Hyperlipidemia     Hypertension     Left shoulder pain     Obesity     Osteoarthritis of knees, bilateral     Overactive bladder     Right shoulder pain      Past Surgical History:   Procedure Laterality Date    APPENDECTOMY      BUNIONECTOMY Right     CARDIOVASCULAR STRESS TEST  10/2012    CATARACT EXTRACTION  2017    CHOLECYSTECTOMY      COLONOSCOPY  2011    ECHO - CONVERTED  10/2012    JOINT REPLACEMENT      bilateral knees     KNEE ARTHROPLASTY Left     KNEE ARTHROSCOPY      SHOULDER ARTHROSCOPY Left 7/28/2016    Procedure: LEFT SHOULDER ACROMIOPLASTY,MINI OPEN ROTATOR CUFF REPAIR;  Surgeon: Carlos Eduardo Smith MD;  Location: [facility];  Service:     SHOULDER SURGERY  05/31/2016     OPEN ACROMICPLASTY RIGHT SHOULDER         OT ASSESSMENT FLOWSHEET (Last 12 Hours)       OT Evaluation and Treatment       Row Name 05/28/25 1300                   OT Time and Intention    Subjective Information complains of;weakness;fatigue  -LM        Document Type therapy note (daily note)  -LM        Mode of Treatment occupational therapy  -LM        Patient Effort adequate  -LM        Comment Patient seen this date for fxl mobility and adl retraining.  patient currently requires max/total assist with LE dressing, toileting.  Mod assist with fxl transfers using rw.  Patient will require continued therapy services upon discharge from acute care.  -LM           General Information    Existing Precautions/Restrictions fall  -LM           Pain Assessment    Pretreatment Pain Rating 0/10 - no pain  -LM        Posttreatment Pain Rating 0/10 - no pain  -LM           Bathing Assessment/Intervention    Reagan Level (Bathing) maximum assist (25% patient effort)  -LM           Upper Body Dressing Assessment/Training    Reagan Level (Upper Body Dressing) minimum assist (75% patient effort)  -LM           Lower Body Dressing Assessment/Training    Reagan Level (Lower Body Dressing) maximum assist (25% patient effort);dependent (less than 25% patient effort)  -LM           Grooming Assessment/Training    Reagan Level (Grooming) minimum assist (75% patient effort)  -LM           Self-Feeding Assessment/Training    Reagan Level (Feeding) set up  -LM           Toileting Assessment/Training    Reagan Level (Toileting) maximum assist (25% patient effort);dependent (less than 25% patient effort)  -LM           Wound 05/26/25 1948 Right gluteal Pressure Injury    Wound - Properties Group Placement Date: 05/26/25  -PL Placement Time: 1948  -PL Present on Original Admission: Y  -TW Side: Right  -TW Location: gluteal  -PL Primary Wound Type: Pressure Inj  -TW    Retired Wound - Properties Group  Placement Date: 25  -PL Placement Time: 1948PL Present on Original Admission: Y  -TW Side: Right  -TW Location: gluteal  -PL    Retired Wound - Properties Group Placement Date: 25  -PL Placement Time: 1948PL Present on Original Admission: Y  -TW Side: Right  -TW Location: gluteal  -PL    Retired Wound - Properties Group Date first assessed: 25  -PL Time first assessed: 1948PL Present on Original Admission: Y  -TW Side: Right  -TW Location: gluteal  -PL       Positioning and Restraints    Post Treatment Position chair  -LM        In Chair call light within reach;encouraged to call for assist;notified nsg  -LM                  User Key  (r) = Recorded By, (t) = Taken By, (c) = Cosigned By      Initials Name Effective Dates    TW Seble Sena, RN 21 -     LM Gabi Blevins OT 21 -     Raquel Ledesma RN 24 -                            OT Recommendation and Plan  Planned Therapy Interventions (OT): activity tolerance training, adaptive equipment training, BADL retraining, transfer/mobility retraining, strengthening exercise, ROM/therapeutic exercise, patient/caregiver education/training  Therapy Frequency (OT): other (see comments) (prn and/or to monitor fxl progress)  Plan of Care Review  Plan of Care Reviewed With: patient  Plan of Care Reviewed With: patient        Time Calculation:     Therapy Charges for Today       Code Description Service Date Service Provider Modifiers Qty    26447995790 HC OT SELF CARE/MGMT/TRAIN EA 15 MIN 2025 Gabi Blevins OT GO 2                 Gabi Blevins OT  2025    Electronically signed by Gabi Blevins OT at 25 1303       [address] [place] [address]  Dept. Phone:  [phone]  Dept. Fax:  [phone] Date Ordered: 2021         Patient:  Emma Ryan MRN:  [id number]   [address]  [place] [address] :  1940  SSN:    Phone: [phone]  "Sex:  F     Weight: 87.2 kg (192 lb 5 oz)         Ht Readings from Last 1 Encounters:   01/09/21 149.9 cm (59\")         Home Oxygen Therapy          (Order ID: [id number])    Diagnosis:  History of COVID-19 (Z86.16 [ICD-10-CM])   Quantity:  1     Delivery Modality: Nasal Cannula  Liters Per Minute: 3  Duration: Continuous  Equipment:  Oxygen Concentrator &  &  Portable Gaseous Oxygen System & Portable Oxygen Contents Gaseous &  Conserving Regulator  Length of Need (99 Months = Lifetime): 99 Months = Lifetime        Authorizing Provider's Phone: [phone]   Authorizing Provider:Yimi Alberto MD  Authorizing Provider's NPI: [id number]  Order Entered By: Yimi Alberto MD 1/21/2021 10:10 AM     Electronically signed by: Yimi Alberto MD 1/21/2021 10:10 AM        "

## 2025-05-28 NOTE — CASE MANAGEMENT/SOCIAL WORK
Discharge Planning Assessment   Prasanna     Patient Name: Emma Ryan  MRN: [id number]  Today's Date: 5/28/2025    Admit Date: 5/23/2025    Plan: SS notified per [facility] that pt has been denied for inpt rehab and offered a peer to peer to be completed by 5 pm EST today at [phone].  SS will follow.       Discharge Plan       Row Name 05/28/25 1450       Plan    Plan SS notified per [facility] that pt has been denied for inpt rehab and offered a peer to peer to be completed by 5 pm EST today at [phone].  SS will follow.                    Expected Discharge Date and Time       Expected Discharge Date Expected Discharge Time    May 29, 2025           REGINO RicheyW

## 2025-05-28 NOTE — PROGRESS NOTES
[facility] HOSPITALIST PROGRESS NOTE     Patient Identification:  Name:  Emma Ryan  Age:  84 y.o.  Sex:  female  :  1940  MRN:  [id number]  Visit Number:  [id number]  ROOM: [address] [place] [address]     Primary Care Provider:  Finn Nash MD    Length of stay in inpatient status:  5    Subjective     Chief Compliant:    Chief Complaint   Patient presents with    Weakness - Generalized       History of Presenting Illness: Patient seen evaluated in follow-up for generalized weakness with significant severe electrolyte disturbances with hypomagnesemia hypokalemia likely precipitating.  Patient seen and evaluated bedside today and feeling wel but tired after PT.  Patient denied for inpatient rehab by insurance and discussed options and pursuing swing bed placement.    Objective     Current Hospital Meds:  Acidophilus/Pectin, 1 capsule, Oral, Daily  atorvastatin, 20 mg, Oral, Daily  carvedilol, 3.125 mg, Oral, BID With Meals  cholecalciferol, 1,000 Units, Oral, Daily  docusate sodium, 100 mg, Oral, Daily  heparin (porcine), 5,000 Units, Subcutaneous, Q12H  oxybutynin XL, 10 mg, Oral, Daily  pantoprazole, 40 mg, Oral, Q AM  [Held by provider] potassium chloride, 20 mEq, Oral, Daily  sodium chloride, 10 mL, Intravenous, Q12H  [Held by provider] spironolactone, 25 mg, Oral, Daily         ----------------------------------------------------------------------------------------------------------------------  Vital Signs:  Temp:  [97.8 °F (36.6 °C)-98.4 °F (36.9 °C)] 98 °F (36.7 °C)  Heart Rate:  [77-87] 79  Resp:  [18] 18  BP: ()/(57-68) 97/57  SpO2:  [90 %-96 %] 93 %  on   ;   Device (Oxygen Therapy): room air  Body mass index is 28.54 kg/m².      Intake/Output Summary (Last 24 hours) at 2025  Last data filed at 2025 1700  Gross per 24 hour   Intake --   Output 2100 ml   Net -2100 ml     "  ----------------------------------------------------------------------------------------------------------------------  Physical exam:  Constitutional: Pleasant elderly adult female resting in bed in no distress.  HENT:  Head:  Normocephalic and atraumatic.  Mouth:  Moist mucous membranes.    Eyes:  Conjunctivae and EOM are normal. No scleral icterus.    Cardiovascular:  Normal rate, regular rhythm and normal heart sounds with no murmur.  Pulmonary/Chest:  No respiratory distress, no wheezes, no crackles, with normal breath sounds and good air movement.  Abdominal:  Soft.  Bowel sounds are normal.  No distension and no tenderness.   Musculoskeletal:  No tenderness, and no deformity.  No red or swollen joints anywhere.    Neurological:  Alert and oriented to person, place, and time.  No cranial nerve deficit.  No tongue deviation.  No facial droop.  No slurred speech. Intact Sensation throughout  Skin:  Skin is warm and dry. No rash or lesion noted. No pallor.   Peripheral vascular:  Pulses in all 4 extremities with no clubbing, no cyanosis, trace edema present.  Psychiatric: Appropriate mood and affect, pleasant.   ----------------------------------------------------------------------------------------------------------------------     BUN/CREAT/GLUC/ALT/AST/MIRLANDE/LIP    28.5/1.35/122/--/--/--/-- (05/28 0033)  GRISELDA - Na/K/Cl/CO2: 134*/5.9*/97*/23.3 (05/28 0033)        No results found for: \"URINECX\"  Blood Culture   Date Value Ref Range Status   05/23/2025 No growth at 24 hours  Preliminary   05/23/2025 No growth at 24 hours  Preliminary       I have personally looked at the labs and they are summarized above.  ----------------------------------------------------------------------------------------------------------------------  Detailed radiology reports for the last 24 hours:  No radiology results for the last day    Assessment & Plan      Severe hypomagnesemia  Moderate hypokalemia  Severe hyperkalemia  Acute " generalized weakness    - Patient presenting with generalized weakness likely secondary to significant electrolyte disturbances with severe hypomagnesemia and moderate hypokalemia.    - Patient with initial presenting magnesium of 0.6 improved to 2.1 after supplementation.    - PT and OT consulted however holiday weekend, will try to get nurses up to ambulate patient tomorrow and if doing well and improved to back to her near baseline with replacement of electrolytes possible consideration for discharge back home.    - Patient with development of significant hyperkalemia with potassium increased to 6.9 on 5/25.  Patient status post insulin D50 and sodium bicarb with improvement to 6.6.  Patient remaining asymptomatic.  Lokelma administered.  Further potassium supplementatio held  and nurse/pharmacy directed electrolyte replacement protocol discontinued as well as holding patient's home spironolactone.  Repeat potassium today stable at 5.9 after Lokelma and will repeat BMP in the a.m.    CKD stage IIIa    - Avoid nephrotoxic agents and continue to trend creatinine while in hospital.    Hypertension    - Review of patient's home antihypertensive regiment shows significantly high doses of nifedipine and hydralazine, patient blood pressure normotensive at time of evaluation today and will hold hydralazine nifedipine and proceed with reduced dose Coreg but holding Aldactone as above.      Right foot drop  Peripheral neuropathy  Bilateral lower extremity weakness    - Patient seen and evaluated PT and recommended for ongoing rehab with consideration of inpatient rehab and consult placed.    Copied text in portions of the note has been reviewed and is accurate as of 05/28/25    VTE Prophylaxis:     Active VTE Prophylaxis:  Pharmacologic:        Start     Dose Route Frequency Stop    05/23/25 4667  heparin (porcine) 5000 UNIT/ML injection 5,000 Units         5,000 Units SC Every 12 Hours Scheduled --                  Select  Mechanical VTE Prophylaxis if Desired & Appropriate       Disposition transferring to MedSurg unit for possible admission to swing bed which has been submitted for insurance approval.    Ruy Tenorio DO  [facility] ERist  05/28/25  19:57 EDT

## 2025-05-28 NOTE — CASE MANAGEMENT/SOCIAL WORK
Discharge Planning Assessment   Prasanna     Patient Name: Emma Ryan  MRN: [id number]  Today's Date: 5/28/2025    Admit Date: 5/23/2025    Plan: SS noted Swing Bed consult and pt information has been submitted to insurance for possible admit to Swing bed.  SS will follow.       Discharge Plan       Row Name 05/28/25 6494       Plan    Plan SS noted Swing Bed consult and pt information has been submitted to insurance for possible admit to Swing bed.  SS will follow.      Row Name 05/28/25 7463       Plan    Plan SS notified per [facility] that pt has been denied for inpt rehab and offered a peer to peer to be completed by 5 pm EST today at [phone].  SS will follow.                    Expected Discharge Date and Time       Expected Discharge Date Expected Discharge Time    May 29, 2025             REGINO RicheyW

## 2025-05-29 LAB
ANION GAP SERPL CALCULATED.3IONS-SCNC: 16.3 MMOL/L (ref 5–15)
BUN SERPL-MCNC: 36.4 MG/DL (ref 8–23)
BUN/CREAT SERPL: 20.2 (ref 7–25)
CALCIUM SPEC-SCNC: 10.6 MG/DL (ref 8.6–10.5)
CHLORIDE SERPL-SCNC: 95 MMOL/L (ref 98–107)
CO2 SERPL-SCNC: 24.7 MMOL/L (ref 22–29)
CREAT SERPL-MCNC: 1.8 MG/DL (ref 0.57–1)
EGFRCR SERPLBLD CKD-EPI 2021: 27.7 ML/MIN/1.73
GLUCOSE SERPL-MCNC: 120 MG/DL (ref 65–99)
MAGNESIUM SERPL-MCNC: 1.9 MG/DL (ref 1.6–2.4)
POTASSIUM SERPL-SCNC: 4.7 MMOL/L (ref 3.5–5.2)
SODIUM SERPL-SCNC: 136 MMOL/L (ref 136–145)

## 2025-05-29 PROCEDURE — 99232 SBSQ HOSP IP/OBS MODERATE 35: CPT | Performed by: STUDENT IN AN ORGANIZED HEALTH CARE EDUCATION/TRAINING PROGRAM

## 2025-05-29 PROCEDURE — 80048 BASIC METABOLIC PNL TOTAL CA: CPT | Performed by: STUDENT IN AN ORGANIZED HEALTH CARE EDUCATION/TRAINING PROGRAM

## 2025-05-29 PROCEDURE — 25010000002 HEPARIN (PORCINE) PER 1000 UNITS: Performed by: HOSPITALIST

## 2025-05-29 PROCEDURE — 83735 ASSAY OF MAGNESIUM: CPT

## 2025-05-29 PROCEDURE — 25810000003 SODIUM CHLORIDE 0.9 % SOLUTION: Performed by: STUDENT IN AN ORGANIZED HEALTH CARE EDUCATION/TRAINING PROGRAM

## 2025-05-29 RX ORDER — SODIUM CHLORIDE 9 MG/ML
100 INJECTION, SOLUTION INTRAVENOUS CONTINUOUS
Status: ACTIVE | OUTPATIENT
Start: 2025-05-29 | End: 2025-05-29

## 2025-05-29 RX ORDER — CASTOR OIL AND BALSAM, PERU 788; 87 MG/G; MG/G
1 OINTMENT TOPICAL EVERY 12 HOURS SCHEDULED
Status: DISCONTINUED | OUTPATIENT
Start: 2025-05-29 | End: 2025-06-03 | Stop reason: HOSPADM

## 2025-05-29 RX ADMIN — SODIUM ZIRCONIUM CYCLOSILICATE 10 G: 10 POWDER, FOR SUSPENSION ORAL at 00:10

## 2025-05-29 RX ADMIN — SODIUM CHLORIDE 100 ML/HR: 9 INJECTION, SOLUTION INTRAVENOUS at 10:17

## 2025-05-29 RX ADMIN — Medication 1000 UNITS: at 08:52

## 2025-05-29 RX ADMIN — SODIUM CHLORIDE 100 ML/HR: 9 INJECTION, SOLUTION INTRAVENOUS at 20:12

## 2025-05-29 RX ADMIN — PANTOPRAZOLE SODIUM 40 MG: 40 TABLET, DELAYED RELEASE ORAL at 05:09

## 2025-05-29 RX ADMIN — Medication 10 ML: at 20:33

## 2025-05-29 RX ADMIN — Medication 1 APPLICATION: at 20:35

## 2025-05-29 RX ADMIN — Medication 5 MG: at 20:34

## 2025-05-29 RX ADMIN — DOCUSATE SODIUM 100 MG: 100 CAPSULE, LIQUID FILLED ORAL at 08:51

## 2025-05-29 RX ADMIN — ATORVASTATIN CALCIUM 20 MG: 20 TABLET, FILM COATED ORAL at 08:51

## 2025-05-29 RX ADMIN — Medication 10 ML: at 09:00

## 2025-05-29 RX ADMIN — HEPARIN SODIUM 5000 UNITS: 5000 INJECTION INTRAVENOUS; SUBCUTANEOUS at 20:34

## 2025-05-29 RX ADMIN — Medication 1 APPLICATION: at 12:45

## 2025-05-29 RX ADMIN — HEPARIN SODIUM 5000 UNITS: 5000 INJECTION INTRAVENOUS; SUBCUTANEOUS at 08:52

## 2025-05-29 RX ADMIN — Medication 1 CAPSULE: at 08:52

## 2025-05-29 RX ADMIN — OXYBUTYNIN CHLORIDE 10 MG: 5 TABLET, EXTENDED RELEASE ORAL at 08:51

## 2025-05-29 RX ADMIN — CARVEDILOL 3.12 MG: 3.12 TABLET, FILM COATED ORAL at 08:51

## 2025-05-29 NOTE — CASE MANAGEMENT/SOCIAL WORK
Discharge Planning Assessment   Prasanna     Patient Name: Emma Ryan  MRN: [id number]  Today's Date: 5/29/2025    Admit Date: 5/23/2025    Plan: SS followed up with [facility] pre-authorization team on this date who states pt's pre-authorization remains pending at this time. SS to follow.     Discharge Plan       Row Name 05/29/25 1541       Plan    Plan SS followed up with [facility] pre-authorization team on this date who states pt's pre-authorization remains pending at this time. SS to follow.        Expected Discharge Date and Time       Expected Discharge Date Expected Discharge Time    May 29, 2025          ALVARO Biswas

## 2025-05-29 NOTE — PLAN OF CARE
Goal Outcome Evaluation:   Pt resting in bed at this time. Pt has voiced no complaints this shift. VSS on RA. Wound care completed per orders. No acute changes noted at this time. Will continue to follow plan of care.

## 2025-05-29 NOTE — NURSING NOTE
WOCN consulted for sacral spine pressure injury. Assessed patient with NESSA Pelaez. The medial sacral spine has a small open area that is red, moist and non-blanching - stage 2 pressure injury. The nalini wound is pink/red, moist and blanching at this time. There is MASD present with some shearing noted.    Follow up to the right gluteal DTPI - the pressure injury continues to be purple, maroon/purple, red, moist and non-blanching. It is intact at this time. Nalini wound is intact, red and blanching at this time. MASD present.    Order to assess every shift, cleanse with foam cleanser, pat dry and apply venelex BID to sacral spine and bilateral gluteals; please cover sacral spine pressure injury with small silicone bordered dressing BID. Educated patient on the importance of changing position, turning/repositioning. Patient verbalized understanding.      Bilateral heels are noted to be very soft but pink, blanching and intact at this time. Spoke with patient concerning the importance of elevating heels off of bed. Patient verbalized understanding and is in agreement for silicone bordered dressings to be applied to bilateral heels for protection.  Order place.    Lucio score 18.  PI prevention orders in place.     05/29/25 0835   Wound 05/26/25 1948 Right gluteal Pressure Injury   Placement Date/Time: 05/26/25 1948   Present on Original Admission: Yes  Side: Right  Location: gluteal  Primary Wound Type: Pressure Injury   Pressure Injury Stage DTPI   Dressing Appearance open to air   Closure None   Base nonblanchable;purple;maroon/purple;red;moist  (MASD present)   Periwound intact;dry;redness;warm;moist  (MASD)   Periwound Temperature warm   Periwound Skin Turgor soft   Edges rolled/closed   Drainage Amount none   Wound 05/29/25 0411 medial sacral spine Pressure Injury   Placement Date/Time: 05/29/25 0411   Present on Original Admission: No  Orientation: medial  Location: sacral spine  Primary Wound Type: Pressure  Injury   Pressure Injury Stage 2   Dressing Appearance open to air   Closure None   Base red;nonblanchable;moist   Periwound intact;moist;pink;redness   Periwound Temperature warm   Periwound Skin Turgor soft   Edges open   Drainage Amount none

## 2025-05-29 NOTE — PROGRESS NOTES
[facility] HOSPITALIST PROGRESS NOTE     Patient Identification:  Name:  Emma Ryan  Age:  84 y.o.  Sex:  female  :  1940  MRN:  [id number]  Visit Number:  [id number]  ROOM: [address] [place] [address]     Primary Care Provider:  Finn Nash MD    Length of stay in inpatient status:  6    Subjective     Chief Compliant:    Chief Complaint   Patient presents with    Weakness - Generalized       History of Presenting Illness: Patient seen evaluated in follow-up for generalized weakness with significant severe electrolyte disturbances with hypomagnesemia hypokalemia likely precipitating.  Patient seen and evaluated bedside today and reports feeling more fatigued.  Patient with slight increase in creatinine and 1 L of IV fluids ordered. Potassium improving.    Objective     Current Hospital Meds:  Acidophilus/Pectin, 1 capsule, Oral, Daily  atorvastatin, 20 mg, Oral, Daily  carvedilol, 3.125 mg, Oral, BID With Meals  castor oil-balsam peru, 1 Application, Topical, Q12H  cholecalciferol, 1,000 Units, Oral, Daily  docusate sodium, 100 mg, Oral, Daily  heparin (porcine), 5,000 Units, Subcutaneous, Q12H  oxybutynin XL, 10 mg, Oral, Daily  pantoprazole, 40 mg, Oral, Q AM  sodium chloride, 10 mL, Intravenous, Q12H  [Held by provider] spironolactone, 25 mg, Oral, Daily      sodium chloride, 100 mL/hr, Last Rate: 100 mL/hr (25 1017)      ----------------------------------------------------------------------------------------------------------------------  Vital Signs:  Temp:  [97.9 °F (36.6 °C)-98.6 °F (37 °C)] 98.2 °F (36.8 °C)  Heart Rate:  [81-97] 97  Resp:  [16-18] 16  BP: ()/(50-66) 115/55  SpO2:  [90 %-96 %] 96 %  on   ;   Device (Oxygen Therapy): room air  Body mass index is 29.48 kg/m².      Intake/Output Summary (Last 24 hours) at 2025 193  Last data filed at 2025 1713  Gross per 24 hour   Intake 1052 ml   Output 800 ml   Net 252 ml     "  ----------------------------------------------------------------------------------------------------------------------  Physical exam:  Constitutional: Pleasant elderly adult female resting in bed in no distress.  HENT:  Head:  Normocephalic and atraumatic.  Mouth:  Moist mucous membranes.    Eyes:  Conjunctivae and EOM are normal. No scleral icterus.    Cardiovascular:  Normal rate, regular rhythm and normal heart sounds with no murmur.  Pulmonary/Chest:  No respiratory distress, no wheezes, no crackles, with normal breath sounds and good air movement.  Abdominal:  Soft.  Bowel sounds are normal.  No distension and no tenderness.   Musculoskeletal:  No tenderness, and no deformity.  No red or swollen joints anywhere.    Neurological:  Alert and oriented to person, place, and time.  No cranial nerve deficit.  No tongue deviation.  No facial droop.  No slurred speech. Intact Sensation throughout  Skin:  Skin is warm and dry. No rash or lesion noted. No pallor.   Peripheral vascular:  Pulses in all 4 extremities with no clubbing, no cyanosis, trace edema present.  Psychiatric: Appropriate mood and affect, pleasant.   ----------------------------------------------------------------------------------------------------------------------     BUN/CREAT/GLUC/ALT/AST/MIRLANDE/LIP    36.4/1.80/120/--/--/--/-- (05/29 0009)  GRISELDA - Na/K/Cl/CO2: 136/4.7/95*/24.7 (05/29 0009)        No results found for: \"URINECX\"  Blood Culture   Date Value Ref Range Status   05/23/2025 No growth at 24 hours  Preliminary   05/23/2025 No growth at 24 hours  Preliminary       I have personally looked at the labs and they are summarized above.  ----------------------------------------------------------------------------------------------------------------------  Detailed radiology reports for the last 24 hours:  No radiology results for the last day    Assessment & Plan      Severe hypomagnesemia  Moderate hypokalemia  Severe hyperkalemia  Acute " generalized weakness    - Patient presenting with generalized weakness likely secondary to significant electrolyte disturbances with severe hypomagnesemia and moderate hypokalemia.    - Patient with initial presenting magnesium of 0.6 improved to 2.1 after supplementation.    - PT and OT consulted however holiday weekend, will try to get nurses up to ambulate patient tomorrow and if doing well and improved to back to her near baseline with replacement of electrolytes possible consideration for discharge back home.    - Patient with development of significant hyperkalemia with potassium increased to 6.9 on 5/25.  Patient status post insulin D50 and sodium bicarb with improvement to 6.6.  Patient remaining asymptomatic.  Lokelma administered.  Further potassium supplementatio held  and nurse/pharmacy directed electrolyte replacement protocol discontinued as well as holding patient's home spironolactone.  Repeat potassium today improved to 4.7.  Repeat BMP tomorrow and if potassium less than 4.5 consider resumption of Aldactone.    PERNELL on CKD stage IIIa    -Patient with development of increasing creatinine with rise to 1.8 today from 1.35 in a roughly 24-hour period consistent with acute kidney injury.  Patient with increased fatigue and reduced oral intake while here in hospital and will give 1 L of normal saline and repeat labs in the a.m.  Patient encouraged to increase intake of fluids.    - Avoid nephrotoxic agents and continue to trend creatinine while in hospital.    Hypertension    - Review of patient's home antihypertensive regiment shows significantly high doses of nifedipine and hydralazine, patient blood pressure normotensive at time of evaluation today and will hold hydralazine nifedipine and proceed with reduced dose Coreg but holding Aldactone as above.      Right foot drop  Peripheral neuropathy  Bilateral lower extremity weakness    - Patient seen and evaluated PT and recommended for ongoing rehab with  consideration of inpatient rehab but denied by insurance.  Patient agreeable to swing bed and submitted for preop and still pending.      Copied text in portions of the note has been reviewed and is accurate as of 05/29/25    VTE Prophylaxis:     Active VTE Prophylaxis:  Pharmacologic:        Start     Dose Route Frequency Stop    05/23/25 1734  heparin (porcine) 5000 UNIT/ML injection 5,000 Units         5,000 Units SC Every 12 Hours Scheduled --                  Select Mechanical VTE Prophylaxis if Desired & Appropriate       Disposition t stepdown to [facility] level of care for possible admission to swing bed which has been submitted for insurance approval.    Ruy Tenorio DO  [facility] Hospitalist  05/29/25  19:39 EDT

## 2025-05-30 LAB
ANION GAP SERPL CALCULATED.3IONS-SCNC: 14.1 MMOL/L (ref 5–15)
BUN SERPL-MCNC: 35.4 MG/DL (ref 8–23)
BUN/CREAT SERPL: 21.6 (ref 7–25)
CALCIUM SPEC-SCNC: 9.4 MG/DL (ref 8.6–10.5)
CHLORIDE SERPL-SCNC: 101 MMOL/L (ref 98–107)
CO2 SERPL-SCNC: 21.9 MMOL/L (ref 22–29)
CREAT SERPL-MCNC: 1.64 MG/DL (ref 0.57–1)
EGFRCR SERPLBLD CKD-EPI 2021: 30.9 ML/MIN/1.73
GLUCOSE SERPL-MCNC: 98 MG/DL (ref 65–99)
POTASSIUM SERPL-SCNC: 4.4 MMOL/L (ref 3.5–5.2)
SODIUM SERPL-SCNC: 137 MMOL/L (ref 136–145)

## 2025-05-30 PROCEDURE — 99232 SBSQ HOSP IP/OBS MODERATE 35: CPT | Performed by: STUDENT IN AN ORGANIZED HEALTH CARE EDUCATION/TRAINING PROGRAM

## 2025-05-30 PROCEDURE — 97530 THERAPEUTIC ACTIVITIES: CPT

## 2025-05-30 PROCEDURE — 80048 BASIC METABOLIC PNL TOTAL CA: CPT | Performed by: STUDENT IN AN ORGANIZED HEALTH CARE EDUCATION/TRAINING PROGRAM

## 2025-05-30 PROCEDURE — 25010000002 HEPARIN (PORCINE) PER 1000 UNITS: Performed by: HOSPITALIST

## 2025-05-30 PROCEDURE — 97110 THERAPEUTIC EXERCISES: CPT

## 2025-05-30 RX ORDER — CALCIUM CARBONATE 500 MG/1
2 TABLET, CHEWABLE ORAL 3 TIMES DAILY PRN
Status: DISCONTINUED | OUTPATIENT
Start: 2025-05-30 | End: 2025-06-03 | Stop reason: HOSPADM

## 2025-05-30 RX ADMIN — HEPARIN SODIUM 5000 UNITS: 5000 INJECTION INTRAVENOUS; SUBCUTANEOUS at 20:04

## 2025-05-30 RX ADMIN — OXYBUTYNIN CHLORIDE 10 MG: 5 TABLET, EXTENDED RELEASE ORAL at 07:50

## 2025-05-30 RX ADMIN — CARVEDILOL 3.12 MG: 3.12 TABLET, FILM COATED ORAL at 17:42

## 2025-05-30 RX ADMIN — ACETAMINOPHEN 650 MG: 325 TABLET ORAL at 14:13

## 2025-05-30 RX ADMIN — Medication 1 CAPSULE: at 07:50

## 2025-05-30 RX ADMIN — Medication 10 ML: at 20:04

## 2025-05-30 RX ADMIN — PANTOPRAZOLE SODIUM 40 MG: 40 TABLET, DELAYED RELEASE ORAL at 07:51

## 2025-05-30 RX ADMIN — Medication 5 MG: at 20:04

## 2025-05-30 RX ADMIN — HEPARIN SODIUM 5000 UNITS: 5000 INJECTION INTRAVENOUS; SUBCUTANEOUS at 07:53

## 2025-05-30 RX ADMIN — DOCUSATE SODIUM 100 MG: 100 CAPSULE, LIQUID FILLED ORAL at 07:51

## 2025-05-30 RX ADMIN — Medication 1000 UNITS: at 07:51

## 2025-05-30 RX ADMIN — Medication 10 ML: at 07:52

## 2025-05-30 RX ADMIN — ATORVASTATIN CALCIUM 20 MG: 20 TABLET, FILM COATED ORAL at 07:51

## 2025-05-30 RX ADMIN — CARVEDILOL 3.12 MG: 3.12 TABLET, FILM COATED ORAL at 07:51

## 2025-05-30 RX ADMIN — Medication 1 APPLICATION: at 20:04

## 2025-05-30 RX ADMIN — Medication 1 APPLICATION: at 07:52

## 2025-05-30 RX ADMIN — CALCIUM CARBONATE 2 TABLET: 500 TABLET, CHEWABLE ORAL at 20:24

## 2025-05-30 NOTE — PLAN OF CARE
Goal Outcome Evaluation:                        Taking over care of pt at this time. Agree with previous shift assessment made by William SZYMANSKI. Pt currently resting in bed. No s/s of acute distress noted at this time. No complaints verbalized at this time. Plan of care ongoing.

## 2025-05-30 NOTE — THERAPY TREATMENT NOTE
Acute Care - Physical Therapy Treatment Note  FLORIN Mims     Patient Name: Emma Ryan  : 1940  MRN: [id number]  Today's Date: 2025      Visit Dx:     ICD-10-CM ICD-9-CM   1. Hypomagnesemia  E83.42 275.2   2. Hemorrhoids, unspecified hemorrhoid type  K64.9 455.6   3. Diastolic dysfunction without heart failure  I51.89 429.9   4. Acromioclavicular joint arthritis, unspecified laterality  M19.019 716.91     Patient Active Problem List   Diagnosis    Complete tear of right rotator cuff    Stage 3a chronic kidney disease    Cough    Gastroesophageal reflux disease    Mixed hyperlipidemia    Shoulder pain    Hypertension    Acromioclavicular joint arthritis    Impingement syndrome, shoulder    Complete tear of left rotator cuff    Urge incontinence    Atopic rhinitis    Upper respiratory tract infection    Bilateral lower extremity edema    Hypercalcemia    Right knee pain    DADA (stress urinary incontinence, female)    Obesity (BMI 35.0-39.9 without comorbidity)    Left knee injury    Acute UTI    COVID-19 virus infection    Skin lesion of scalp    Numbness in left leg    Multiple bruises    Vitamin D deficiency    Laceration of right lower leg    Chronic chest pain with low to moderate risk for CAD    Degenerative disc disease, lumbar    Valvular heart disease, mild aortic and mitral regurgitation    Diastolic dysfunction without heart failure    Hypomagnesemia     Past Medical History:   Diagnosis Date    GERD (gastroesophageal reflux disease)     Hyperlipidemia     Hypertension     Left shoulder pain     Obesity     Osteoarthritis of knees, bilateral     Overactive bladder     Right shoulder pain      Past Surgical History:   Procedure Laterality Date    APPENDECTOMY      BUNIONECTOMY Right     CARDIOVASCULAR STRESS TEST  10/2012    CATARACT EXTRACTION  2017    CHOLECYSTECTOMY      COLONOSCOPY      ECHO - CONVERTED  10/2012    JOINT REPLACEMENT      bilateral knees     KNEE ARTHROPLASTY Left      KNEE ARTHROSCOPY      SHOULDER ARTHROSCOPY Left 7/28/2016    Procedure: LEFT SHOULDER ACROMIOPLASTY,MINI OPEN ROTATOR CUFF REPAIR;  Surgeon: Carlos Eduardo Smith MD;  Location: [facility];  Service:     SHOULDER SURGERY  05/31/2016    OPEN ACROMICPLASTY RIGHT SHOULDER     PT Assessment (Last 12 Hours)       PT Evaluation and Treatment       Row Name 05/30/25 1243          Physical Therapy Time and Intention    Document Type therapy note (daily note)  -     Mode of Treatment physical therapy  -     Patient Effort good  -     Symptoms Noted During/After Treatment fatigue  -     Comment Pt seen for therapy treatment this date, pleasant and cooperative with therapy. Pt participated in LE TE, standing, and lateral R steps along EOB for better positioning in bed.  -       Row Name 05/30/25 1243          Pain    Additional Documentation Pain Scale: FACES Pre/Post-Treatment (Group)  -[facility] Name 05/30/25 1243          Pain Scale: FACES Pre/Post-Treatment    Pain: FACES Scale, Pretreatment 0-->no hurt  -     Posttreatment Pain Rating 0-->no hurt  -[facility] Name 05/30/25 1243          Cognition    Personal Safety Interventions supervised activity;gait belt  -[facility] Name 05/30/25 1243          Bed Mobility    Bed Mobility supine-sit  -     Supine-Sit [place] (Bed Mobility) modified independence  -[facility] Name 05/30/25 1243          Transfers    Transfers sit-stand transfer;stand-sit transfer  -[facility] Name 05/30/25 1243          Bed-Chair Transfer    Bed-Chair [place] (Transfers) --  -[facility] Name 05/30/25 [facility]          Sit-Stand Transfer    Sit-Stand [place] (Transfers) minimum assist (75% patient effort);moderate assist (50% patient effort);other (see comments)  pt grossly min/modA with first STS, on second, pt grossly CGA  -[facility] Name 05/30/25 1243          Stand-Sit Transfer    Stand-Sit [place] (Transfers) contact guard;minimum assist (75% patient  effort)  -       Row Name 05/30/25 1243          Gait/Stairs (Locomotion)    Comment, (Gait/Stairs) Pt able to take 3-4 BL Right Lateral steps along EOB, grossly min/modA, R foot drop appreciated.  -       Row Name 05/30/25 1243          Motor Skills    Therapeutic Exercise hip;knee  marches 3-4x 8-10, S/LAQ 3-4x8-10; therapeutic rest breaks promoted/given  -       Row Name             Wound 05/26/25 1948 Right gluteal Pressure Injury    Wound - Properties Group Placement Date: 05/26/25  -PL Placement Time: 1948  -PL Present on Original Admission: Y  -TW Side: Right  -TW Location: gluteal  -PL Primary Wound Type: Pressure Inj  -TW    Retired Wound - Properties Group Placement Date: 05/26/25  -PL Placement Time: 1948 -PL Present on Original Admission: Y  -TW Side: Right  -TW Location: gluteal  -PL    Retired Wound - Properties Group Placement Date: 05/26/25  -PL Placement Time: 1948 -PL Present on Original Admission: Y  -TW Side: Right  -TW Location: gluteal  -PL    Retired Wound - Properties Group Date first assessed: 05/26/25  -PL Time first assessed: 1948  -PL Present on Original Admission: Y  -TW Side: Right  -TW Location: gluteal  -PL      Row Name             Wound 05/29/25 0411 medial sacral spine Pressure Injury    Wound - Properties Group Placement Date: 05/29/25  -SM Placement Time: 0411  -SM Present on Original Admission: N  -SM Orientation: medial  -SM Location: sacral spine  -SM Primary Wound Type: Pressure Inj  -TW    Retired Wound - Properties Group Placement Date: 05/29/25  -SM Placement Time: 0411  -SM Present on Original Admission: N  -SM Orientation: medial  -SM Location: sacral spine  -SM    Retired Wound - Properties Group Placement Date: 05/29/25  -SM Placement Time: 0411  -SM Present on Original Admission: N  -SM Orientation: medial  -SM Location: sacral spine  -SM    Retired Wound - Properties Group Date first assessed: 05/29/25  -SM Time first assessed: 0411  -SM Present on Original  Admission: N  - Location: sacral spine  -      Row Name 05/30/25 1243          Positioning and Restraints    Pre-Treatment Position in bed  -     Post Treatment Position bed  -     In Bed supine;call light within reach;exit alarm on  -       Row Name 05/30/25 1243          Progress Summary (PT)    Daily Progress Summary (PT) Pt did well with therapy and tolerated well, fatigue. Pt range of CGA to modA with standing and able to take lateral steps along EOB with min/modAx1. No change to POC, prognosis fair/guarded.  -               User Key  (r) = Recorded By, (t) = Taken By, (c) = Cosigned By      Initials Name Provider Type    Seble Ch, RN Registered Nurse    Steph Yin, RN Registered Nurse    Marcelina Woodward, PILLO Physical Therapist    PL Raquel Nava RN Registered Nurse                    Physical Therapy Education       Title: PT OT SLP Therapies (Resolved)       Topic: Physical Therapy (Resolved)       Point: Mobility training (Resolved)       Learning Progress Summary            Patient Acceptance, E,TB, VU,NR by PL at 5/26/2025 2132    Acceptance, E,TB, VU by KM at 5/26/2025 1235    Acceptance, E, NR by RD at 5/25/2025 1214                      Point: Home exercise program (Resolved)       Learning Progress Summary            Patient Acceptance, E,TB, VU,NR by PL at 5/26/2025 2132    Acceptance, E,TB, VU by KM at 5/26/2025 1235    Acceptance, E, NR by RD at 5/25/2025 1214                      Point: Body mechanics (Resolved)       Learning Progress Summary            Patient Acceptance, E,TB, VU,NR by PL at 5/26/2025 2132    Acceptance, E,TB, VU by KM at 5/26/2025 1235    Acceptance, E, NR by RD at 5/25/2025 1214                      Point: Precautions (Resolved)       Learning Progress Summary            Patient Acceptance, E,TB, VU,NR by PL at 5/26/2025 2132    Acceptance, E,TB, VU by KM at 5/26/2025 1235    Acceptance, E, NR by RD at 5/25/2025 1214                                       User Key       Initials Effective Dates Name Provider Type Discipline    RD 06/16/21 -  Olivia Faust, RN Registered Nurse Nurse    KM 05/24/22 -  Roni Mar, PILLO Physical Therapist PT    PL 06/12/24 -  Raquel Nava, NESSA Registered Nurse Nurse                  PT Recommendation and Plan     Progress Summary (PT)  Daily Progress Summary (PT): Pt did well with therapy and tolerated well, fatigue. Pt range of CGA to modA with standing and able to take lateral steps along EOB with min/modAx1. No change to POC, prognosis fair/guarded.       Time Calculation:    PT Charges       Row Name 05/30/25 1402             Time Calculation    Start Time 1243  -      PT Received On 05/30/25  -         Time Calculation- PT    Total Timed Code Minutes- PT 23 minute(s)  -                User Key  (r) = Recorded By, (t) = Taken By, (c) = Cosigned By      Initials Name Provider Type     Marcelina Sandoval, PT Physical Therapist                  Therapy Charges for Today       Code Description Service Date Service Provider Modifiers Qty    25795409010 HC PT THER PROC EA 15 MIN 5/30/2025 Marcelina Sandoval, PT GP 1    98708871825 HC PT THERAPEUTIC ACT EA 15 MIN 5/30/2025 Marcelina Sandoval, PT GP 1            PT G-Codes  AM-PAC 6 Clicks Score (PT): 15    Marcelina Sandoval PT  5/30/2025

## 2025-05-30 NOTE — PROGRESS NOTES
[facility] HOSPITALIST PROGRESS NOTE     Patient Identification:  Name:  Emma Ryan  Age:  84 y.o.  Sex:  female  :  1940  MRN:  [id number]  Visit Number:  [id number]  ROOM: [address] [place] [address]     Primary Care Provider:  Finn Nash MD    Length of stay in inpatient status:  7    Subjective     Chief Compliant:    Chief Complaint   Patient presents with    Weakness - Generalized       History of Presenting Illness: Patient seen evaluated in follow-up for generalized weakness with significant severe electrolyte disturbances with hypomagnesemia hypokalemia likely precipitating.  Patient seen and evaluated bedside today and denies any acute complaints.  Patient remains medically stable but still awaiting insurance approval for swing bed.    Objective     Current Hospital Meds:  Acidophilus/Pectin, 1 capsule, Oral, Daily  atorvastatin, 20 mg, Oral, Daily  carvedilol, 3.125 mg, Oral, BID With Meals  castor oil-balsam peru, 1 Application, Topical, Q12H  cholecalciferol, 1,000 Units, Oral, Daily  docusate sodium, 100 mg, Oral, Daily  heparin (porcine), 5,000 Units, Subcutaneous, Q12H  oxybutynin XL, 10 mg, Oral, Daily  pantoprazole, 40 mg, Oral, Q AM  sodium chloride, 10 mL, Intravenous, Q12H           ----------------------------------------------------------------------------------------------------------------------  Vital Signs:  Temp:  [97.1 °F (36.2 °C)-98.3 °F (36.8 °C)] 97.4 °F (36.3 °C)  Heart Rate:  [79-97] 84  Resp:  [16-20] 20  BP: (100-130)/(49-63) 130/60  SpO2:  [94 %-97 %] 97 %  on   ;   Device (Oxygen Therapy): room air  Body mass index is 27.97 kg/m².      Intake/Output Summary (Last 24 hours) at 2025 1859  Last data filed at 2025 1800  Gross per 24 hour   Intake 960 ml   Output 2300 ml   Net -1340 ml      ----------------------------------------------------------------------------------------------------------------------  Physical exam:  Constitutional: Pleasant  "elderly adult female resting in bed in no distress.  HENT:  Head:  Normocephalic and atraumatic.  Mouth:  Moist mucous membranes.    Eyes:  Conjunctivae and EOM are normal. No scleral icterus.    Cardiovascular:  Normal rate, regular rhythm and normal heart sounds with no murmur.  Pulmonary/Chest:  No respiratory distress, no wheezes, no crackles, with normal breath sounds and good air movement.  Abdominal:  Soft.  Bowel sounds are normal.  No distension and no tenderness.   Musculoskeletal:  No tenderness, and no deformity.  No red or swollen joints anywhere.    Neurological:  Alert and oriented to person, place, and time.  No cranial nerve deficit.  No tongue deviation.  No facial droop.  No slurred speech. Intact Sensation throughout  Skin:  Skin is warm and dry. No rash or lesion noted. No pallor.   Peripheral vascular:  Pulses in all 4 extremities with no clubbing, no cyanosis, trace edema present.  Psychiatric: Appropriate mood and affect, pleasant.   ----------------------------------------------------------------------------------------------------------------------     BUN/CREAT/GLUC/ALT/AST/MIRLANDE/LIP    35.4/1.64/98/--/--/--/-- (05/30 0139)  LYTES - Na/K/Cl/CO2: 137/4.4/101/21.9* (05/30 0139)        No results found for: \"URINECX\"  Blood Culture   Date Value Ref Range Status   05/23/2025 No growth at 24 hours  Preliminary   05/23/2025 No growth at 24 hours  Preliminary       I have personally looked at the labs and they are summarized above.  ----------------------------------------------------------------------------------------------------------------------  Detailed radiology reports for the last 24 hours:  No radiology results for the last day    Assessment & Plan      Severe hypomagnesemia  Moderate hypokalemia  Severe hyperkalemia  Acute generalized weakness    - Patient presenting with generalized weakness likely secondary to significant electrolyte disturbances with severe hypomagnesemia and moderate " hypokalemia.    - Patient with initial presenting magnesium of 0.6 improved to 2.1 after supplementation.    - PT and OT consulted however holiday weekend, will try to get nurses up to ambulate patient tomorrow and if doing well and improved to back to her near baseline with replacement of electrolytes possible consideration for discharge back home.    - Patient with development of significant hyperkalemia with potassium increased to 6.9 on 5/25.  Patient status post insulin D50 and sodium bicarb with improvement to 6.6.  Patient remaining asymptomatic.  Lokelma administered.  Further potassium supplementatio held  and nurse/pharmacy directed electrolyte replacement protocol discontinued as well as holding patient's home spironolactone.  Repeat potassium today remains stable at 4.4.  Blood pressure still within normal limits we will continue to hold Aldactone.    PERNELL on CKD stage IIIa    -Patient with development of increasing creatinine with rise to 1.8 today from 1.35 in a roughly 24-hour period consistent with acute kidney injury.  Patient with increased fatigue and reduced oral intake while here in hospital and given 1 L of normal saline with improvement in creatinine from 1.8-1.64.  Continue to monitor encourage oral intake.    - Avoid nephrotoxic agents and continue to trend creatinine while in hospital.    Hypertension    - Review of patient's home antihypertensive regiment shows significantly high doses of nifedipine and hydralazine, patient blood pressure normotensive at time of evaluation today and will hold hydralazine nifedipine and proceed with reduced dose Coreg but holding Aldactone as above.      Right foot drop  Peripheral neuropathy  Bilateral lower extremity weakness    - Patient seen and evaluated PT and OT and recommended for ongoing rehab with consideration of inpatient rehab but denied by insurance.  Patient agreeable to swing bed and submitted for preop and still pending.      Copied text in  portions of the note has been reviewed and is accurate as of 05/30/25    VTE Prophylaxis:     Active VTE Prophylaxis:  Pharmacologic:        Start     Dose Route Frequency Stop    05/23/25 6970  heparin (porcine) 5000 UNIT/ML injection 5,000 Units         5,000 Units SC Every 12 Hours Scheduled --                  [employer] Mechanical VTE Prophylaxis if Desired & Appropriate       Disposition stepdown to [facility] level of care for possible admission to swing bed which has been submitted for insurance approval.    Ruy Tenorio DO  [facility] Hospitalist  05/30/25  18:54 EDT

## 2025-05-30 NOTE — CASE MANAGEMENT/SOCIAL WORK
Discharge Planning Assessment   Prasanna     Patient Name: Emma Rayn  MRN: [id number]  Today's Date: 5/30/2025    Admit Date: 5/23/2025    Plan: SS spoke with Lead  who stated that pre authorization for Swing bed admit remains pending.  SS will follow.       Discharge Plan       Row Name 05/30/25 1434       Plan    Plan SS spoke with Lead  who stated that pre authorization for Swing bed admit remains pending.  SS will follow.                    Expected Discharge Date and Time       Expected Discharge Date Expected Discharge Time    May 30, 2025           Soraya Ann, REGINOW

## 2025-05-31 LAB
ANION GAP SERPL CALCULATED.3IONS-SCNC: 12.6 MMOL/L (ref 5–15)
BUN SERPL-MCNC: 31.7 MG/DL (ref 8–23)
BUN/CREAT SERPL: 21.7 (ref 7–25)
CALCIUM SPEC-SCNC: 10.6 MG/DL (ref 8.6–10.5)
CHLORIDE SERPL-SCNC: 100 MMOL/L (ref 98–107)
CO2 SERPL-SCNC: 23.4 MMOL/L (ref 22–29)
CREAT SERPL-MCNC: 1.46 MG/DL (ref 0.57–1)
EGFRCR SERPLBLD CKD-EPI 2021: 35.6 ML/MIN/1.73
GLUCOSE BLDC GLUCOMTR-MCNC: 131 MG/DL (ref 70–130)
GLUCOSE SERPL-MCNC: 107 MG/DL (ref 65–99)
POTASSIUM SERPL-SCNC: 4.4 MMOL/L (ref 3.5–5.2)
SODIUM SERPL-SCNC: 136 MMOL/L (ref 136–145)

## 2025-05-31 PROCEDURE — 25010000002 HEPARIN (PORCINE) PER 1000 UNITS: Performed by: HOSPITALIST

## 2025-05-31 PROCEDURE — 82948 REAGENT STRIP/BLOOD GLUCOSE: CPT

## 2025-05-31 PROCEDURE — 80048 BASIC METABOLIC PNL TOTAL CA: CPT | Performed by: STUDENT IN AN ORGANIZED HEALTH CARE EDUCATION/TRAINING PROGRAM

## 2025-05-31 PROCEDURE — 99231 SBSQ HOSP IP/OBS SF/LOW 25: CPT | Performed by: STUDENT IN AN ORGANIZED HEALTH CARE EDUCATION/TRAINING PROGRAM

## 2025-05-31 RX ORDER — METOPROLOL SUCCINATE 25 MG/1
12.5 TABLET, EXTENDED RELEASE ORAL
Status: DISCONTINUED | OUTPATIENT
Start: 2025-06-01 | End: 2025-06-03 | Stop reason: HOSPADM

## 2025-05-31 RX ADMIN — CARVEDILOL 3.12 MG: 3.12 TABLET, FILM COATED ORAL at 08:31

## 2025-05-31 RX ADMIN — Medication 1 CAPSULE: at 08:31

## 2025-05-31 RX ADMIN — PANTOPRAZOLE SODIUM 40 MG: 40 TABLET, DELAYED RELEASE ORAL at 05:48

## 2025-05-31 RX ADMIN — Medication 10 ML: at 20:11

## 2025-05-31 RX ADMIN — Medication 1 APPLICATION: at 20:11

## 2025-05-31 RX ADMIN — HEPARIN SODIUM 5000 UNITS: 5000 INJECTION INTRAVENOUS; SUBCUTANEOUS at 08:32

## 2025-05-31 RX ADMIN — Medication 1 APPLICATION: at 08:32

## 2025-05-31 RX ADMIN — ATORVASTATIN CALCIUM 20 MG: 20 TABLET, FILM COATED ORAL at 08:31

## 2025-05-31 RX ADMIN — OXYBUTYNIN CHLORIDE 10 MG: 5 TABLET, EXTENDED RELEASE ORAL at 08:31

## 2025-05-31 RX ADMIN — Medication 10 ML: at 08:32

## 2025-05-31 RX ADMIN — Medication 5 MG: at 20:11

## 2025-05-31 RX ADMIN — HEPARIN SODIUM 5000 UNITS: 5000 INJECTION INTRAVENOUS; SUBCUTANEOUS at 20:11

## 2025-05-31 RX ADMIN — Medication 1000 UNITS: at 08:31

## 2025-05-31 RX ADMIN — DOCUSATE SODIUM 100 MG: 100 CAPSULE, LIQUID FILLED ORAL at 08:32

## 2025-05-31 NOTE — PLAN OF CARE
Goal Outcome Evaluation: Pt sat up in chair for a period this afternoon; pt tolerated well. Wound care completed this shift. No complaints verbalized. Will continue to monitor & follow plan of care.

## 2025-06-01 PROCEDURE — 99231 SBSQ HOSP IP/OBS SF/LOW 25: CPT | Performed by: STUDENT IN AN ORGANIZED HEALTH CARE EDUCATION/TRAINING PROGRAM

## 2025-06-01 PROCEDURE — 25010000002 HEPARIN (PORCINE) PER 1000 UNITS: Performed by: HOSPITALIST

## 2025-06-01 RX ADMIN — Medication 1 APPLICATION: at 20:07

## 2025-06-01 RX ADMIN — HEPARIN SODIUM 5000 UNITS: 5000 INJECTION INTRAVENOUS; SUBCUTANEOUS at 20:07

## 2025-06-01 RX ADMIN — OXYBUTYNIN CHLORIDE 10 MG: 5 TABLET, EXTENDED RELEASE ORAL at 08:15

## 2025-06-01 RX ADMIN — DOCUSATE SODIUM 100 MG: 100 CAPSULE, LIQUID FILLED ORAL at 08:15

## 2025-06-01 RX ADMIN — Medication 1 APPLICATION: at 08:16

## 2025-06-01 RX ADMIN — Medication 1000 UNITS: at 08:15

## 2025-06-01 RX ADMIN — Medication 1 CAPSULE: at 08:15

## 2025-06-01 RX ADMIN — HEPARIN SODIUM 5000 UNITS: 5000 INJECTION INTRAVENOUS; SUBCUTANEOUS at 08:14

## 2025-06-01 RX ADMIN — ATORVASTATIN CALCIUM 20 MG: 20 TABLET, FILM COATED ORAL at 08:15

## 2025-06-01 RX ADMIN — PANTOPRAZOLE SODIUM 40 MG: 40 TABLET, DELAYED RELEASE ORAL at 05:59

## 2025-06-01 RX ADMIN — Medication 5 MG: at 20:07

## 2025-06-01 NOTE — PLAN OF CARE
Goal Outcome Evaluation:  Problem: Adult Inpatient Plan of Care  Goal: Plan of Care Review  Outcome: Progressing  Flowsheets (Taken 6/1/2025 1607)  Progress: no change  Outcome Evaluation: Patient sitting up in bed. NAD noted. No acute events throughout shift noted. Pending transfer to [address] [place] [address]. Pending swing bed referral. POC ongoing.  Plan of Care Reviewed With: patient                          [FreeTextEntry1] : Mr. HAYS is a 73 year old male who  returns today  in follow up with regard to a history of type 2 diabetes mellitus.  The Diabetes has been present for over 5 years.There is no known history of retinopathy, nephropathy. He  too denies any history of neuropathy but notes occasional tingling in gutierrez feet. Current dm medication include   Metformin 500 mg one in Am and one in PM.   HGM of late has shown values to be running  in the 140s in the AM. Only testing once daily in the AM.  There has been no significant hypoglycemia.  He  denies any chest pain, sob, neurologic or ophthalmologic complaints. He  too denies any new podiatric concerns. He  is up to date with his ophthalmologic visits without hx of retinopathy.\par POCT A1c returned today at 7.4%  ; previously 8.6% 4/13/2021\par POCT glucose returned today at  157  mg/dL \par Additional medical history includes that of  htn, and hyperlipidemia. Is on losartan and Pravastatin and Ezetimibe.  Denies muscle aches.\par Has met with hematologist Dr. Gualberto Godinez- potassium had previously been elevated but was normal upon repeat of blood work. May be started on losartan. \par \par \par \par \par \par \par

## 2025-06-01 NOTE — PROGRESS NOTES
[facility] HOSPITALIST PROGRESS NOTE     Patient Identification:  Name:  Emma Ryan  Age:  84 y.o.  Sex:  female  :  1940  MRN:  [id number]  Visit Number:  [id number]  ROOM: [address] [place] [address]     Primary Care Provider:  Finn Nash MD    Length of stay in inpatient status:  9    Subjective     Chief Compliant:    Chief Complaint   Patient presents with    Weakness - Generalized       History of Presenting Illness: Patient seen evaluated in follow-up for generalized weakness with significant severe electrolyte disturbances with hypomagnesemia hypokalemia likely precipitating.  Patient seen and evaluated today while sitting up in bed watching her [employer]  morning service.  Patient denying any acute complaints or issues.  Discussed possible response regarding swing bed admission tomorrow.    Objective     Current Hospital Meds:  Acidophilus/Pectin, 1 capsule, Oral, Daily  atorvastatin, 20 mg, Oral, Daily  castor oil-balsam peru, 1 Application, Topical, Q12H  cholecalciferol, 1,000 Units, Oral, Daily  docusate sodium, 100 mg, Oral, Daily  heparin (porcine), 5,000 Units, Subcutaneous, Q12H  metoprolol succinate XL, 12.5 mg, Oral, Q24H  oxybutynin XL, 10 mg, Oral, Daily  pantoprazole, 40 mg, Oral, Q AM  sodium chloride, 10 mL, Intravenous, Q12H           ----------------------------------------------------------------------------------------------------------------------  Vital Signs:  Temp:  [97.6 °F (36.4 °C)-98.4 °F (36.9 °C)] 98.4 °F (36.9 °C)  Heart Rate:  [76-84] 81  Resp:  [18-20] 18  BP: ()/(53-62) 99/56  SpO2:  [91 %-96 %] 96 %  on   ;   Device (Oxygen Therapy): room air  Body mass index is 28.59 kg/m².      Intake/Output Summary (Last 24 hours) at 2025 1841  Last data filed at 2025 1800  Gross per 24 hour   Intake 720 ml   Output 1950 ml   Net -1230 ml     "  ----------------------------------------------------------------------------------------------------------------------  Physical exam:  Constitutional: Pleasant elderly adult female resting in bed in no distress.  HENT:  Head:  Normocephalic and atraumatic.  Mouth:  Moist mucous membranes.    Eyes:  Conjunctivae and EOM are normal. No scleral icterus.    Cardiovascular:  Normal rate, regular rhythm and normal heart sounds with no murmur.  Pulmonary/Chest:  No respiratory distress, no wheezes, no crackles, with normal breath sounds and good air movement.  Abdominal:  Soft.  Bowel sounds are normal.  No distension and no tenderness.   Musculoskeletal:  No tenderness, and no deformity.  No red or swollen joints anywhere.    Neurological:  Alert and oriented to person, place, and time.  No cranial nerve deficit.  No tongue deviation.  No facial droop.  No slurred speech. Intact Sensation throughout  Skin:  Skin is warm and dry. No rash or lesion noted. No pallor.   Peripheral vascular:  Pulses in all 4 extremities with no clubbing, no cyanosis, trace edema present.  Psychiatric: Appropriate mood and affect, pleasant.   ----------------------------------------------------------------------------------------------------------------------                 No results found for: \"URINECX\"  Blood Culture   Date Value Ref Range Status   05/23/2025 No growth at 24 hours  Preliminary   05/23/2025 No growth at 24 hours  Preliminary       I have personally looked at the labs and they are summarized above.  ----------------------------------------------------------------------------------------------------------------------  Detailed radiology reports for the last 24 hours:  No radiology results for the last day    Assessment & Plan      Severe hypomagnesemia  Moderate hypokalemia  Severe hyperkalemia  Acute generalized weakness    - Patient presenting with generalized weakness likely secondary to significant electrolyte " disturbances with severe hypomagnesemia and moderate hypokalemia.    - Patient with initial presenting magnesium of 0.6 improved to 2.1 after supplementation.    - PT and OT consulted however holiday weekend, will try to get nurses up to ambulate patient tomorrow and if doing well and improved to back to her near baseline with replacement of electrolytes possible consideration for discharge back home.    - Patient with development of significant hyperkalemia with potassium increased to 6.9 on 5/25.  Patient status post insulin D50 and sodium bicarb with improvement to 6.6.  Patient remaining asymptomatic.  Lokelma administered.  Further potassium supplementatio held  and nurse/pharmacy directed electrolyte replacement protocol discontinued as well as holding patient's home spironolactone.  Repeat potassium today remains stable at 4.4.  Blood pressure still within normal limits we will continue to hold Aldactone.  Lab holiday today and will repeat chemistry panel tomorrow a.m.    PERNELL on CKD stage IIIa    -Patient with development of increasing creatinine with rise to 1.8 today from 1.35 in a roughly 24-hour period consistent with acute kidney injury.  Patient with increased fatigue and reduced oral intake while here in hospital and given 1 L of normal saline with improvement in creatinine from 1.8-1.64 now further improved to 1.46.  Continue to monitor encourage oral intake.    - Avoid nephrotoxic agents and continue to trend creatinine while in hospital.    Hypertension    - Review of patient's home antihypertensive regiment shows significantly high doses of nifedipine and hydralazine, patient blood pressure normotensive most of patient's hospitalization and will discontinue hydralazine nifedipine and proceed with metoprolol but holding Aldactone as above.      Right foot drop  Peripheral neuropathy  Bilateral lower extremity weakness    - Patient seen and evaluated PT and OT and recommended for ongoing rehab with  consideration of inpatient rehab but denied by insurance.  Patient agreeable to swing bed and submitted for preop and still pending.      Copied text in portions of the note has been reviewed and is accurate as of 06/01/25    VTE Prophylaxis:     Active VTE Prophylaxis:  Pharmacologic:        Start     Dose Route Frequency Stop    05/23/25 8498  heparin (porcine) 5000 UNIT/ML injection 5,000 Units         5,000 Units SC Every 12 Hours Scheduled --                  Select Mechanical VTE Prophylaxis if Desired & Appropriate       Disposition stepdown to [facility] level of care while awaiting possible admission to swing bed which has been submitted for insurance approval and will not hear her answer until Monday due to the weekend.    Ruy Tenorio DO  [facility] Hospitalist  06/01/25  18:41 EDT

## 2025-06-01 NOTE — PROGRESS NOTES
[facility] HOSPITALIST PROGRESS NOTE     Patient Identification:  Name:  Emma Ryan  Age:  84 y.o.  Sex:  female  :  1940  MRN:  [id number]  Visit Number:  [id number]  ROOM: [address] [place] [address]     Primary Care Provider:  Finn Nash MD    Length of stay in inpatient status:  8    Subjective     Chief Compliant:    Chief Complaint   Patient presents with    Weakness - Generalized       History of Presenting Illness: Patient seen evaluated in follow-up for generalized weakness with significant severe electrolyte disturbances with hypomagnesemia hypokalemia likely precipitating.  Patient seen and evaluated today while sitting up in chair eating lunch.  Patient reports that she is feeling well.  Patient unfortunately continues in inpatient status while awaiting insurance approval for swing bed delayed by onset of the weekend.    Objective     Current Hospital Meds:  Acidophilus/Pectin, 1 capsule, Oral, Daily  atorvastatin, 20 mg, Oral, Daily  castor oil-balsam peru, 1 Application, Topical, Q12H  cholecalciferol, 1,000 Units, Oral, Daily  docusate sodium, 100 mg, Oral, Daily  heparin (porcine), 5,000 Units, Subcutaneous, Q12H  [START ON 2025] metoprolol succinate XL, 12.5 mg, Oral, Q24H  oxybutynin XL, 10 mg, Oral, Daily  pantoprazole, 40 mg, Oral, Q AM  sodium chloride, 10 mL, Intravenous, Q12H           ----------------------------------------------------------------------------------------------------------------------  Vital Signs:  Temp:  [97.1 °F (36.2 °C)-98.5 °F (36.9 °C)] 97.6 °F (36.4 °C)  Heart Rate:  [71-84] 84  Resp:  [20] 20  BP: ()/(50-71) 110/62  SpO2:  [93 %-97 %] 94 %  on   ;   Device (Oxygen Therapy): room air  Body mass index is 29.39 kg/m².      Intake/Output Summary (Last 24 hours) at 2025  Last data filed at 2025 1501  Gross per 24 hour   Intake 600 ml   Output 1650 ml   Net -1050 ml     "  ----------------------------------------------------------------------------------------------------------------------  Physical exam:  Constitutional: Pleasant elderly adult female resting in bed in no distress.  HENT:  Head:  Normocephalic and atraumatic.  Mouth:  Moist mucous membranes.    Eyes:  Conjunctivae and EOM are normal. No scleral icterus.    Cardiovascular:  Normal rate, regular rhythm and normal heart sounds with no murmur.  Pulmonary/Chest:  No respiratory distress, no wheezes, no crackles, with normal breath sounds and good air movement.  Abdominal:  Soft.  Bowel sounds are normal.  No distension and no tenderness.   Musculoskeletal:  No tenderness, and no deformity.  No red or swollen joints anywhere.    Neurological:  Alert and oriented to person, place, and time.  No cranial nerve deficit.  No tongue deviation.  No facial droop.  No slurred speech. Intact Sensation throughout  Skin:  Skin is warm and dry. No rash or lesion noted. No pallor.   Peripheral vascular:  Pulses in all 4 extremities with no clubbing, no cyanosis, trace edema present.  Psychiatric: Appropriate mood and affect, pleasant.   ----------------------------------------------------------------------------------------------------------------------     BUN/CREAT/GLUC/ALT/AST/MIRLANDE/LIP    31.7/1.46/107/--/--/--/-- (05/31 0334)  GRISELDA - Na/K/Cl/CO2: 136/4.4/100/23.4 (05/31 8704)        No results found for: \"URINECX\"  Blood Culture   Date Value Ref Range Status   05/23/2025 No growth at 24 hours  Preliminary   05/23/2025 No growth at 24 hours  Preliminary       I have personally looked at the labs and they are summarized above.  ----------------------------------------------------------------------------------------------------------------------  Detailed radiology reports for the last 24 hours:  No radiology results for the last day    Assessment & Plan      Severe hypomagnesemia  Moderate hypokalemia  Severe hyperkalemia  Acute " generalized weakness    - Patient presenting with generalized weakness likely secondary to significant electrolyte disturbances with severe hypomagnesemia and moderate hypokalemia.    - Patient with initial presenting magnesium of 0.6 improved to 2.1 after supplementation.    - PT and OT consulted however holiday weekend, will try to get nurses up to ambulate patient tomorrow and if doing well and improved to back to her near baseline with replacement of electrolytes possible consideration for discharge back home.    - Patient with development of significant hyperkalemia with potassium increased to 6.9 on 5/25.  Patient status post insulin D50 and sodium bicarb with improvement to 6.6.  Patient remaining asymptomatic.  Lokelma administered.  Further potassium supplementatio held  and nurse/pharmacy directed electrolyte replacement protocol discontinued as well as holding patient's home spironolactone.  Repeat potassium today remains stable at 4.4.  Blood pressure still within normal limits we will continue to hold Aldactone.  Will give lab holiday and recheck chemistry panel on Monday.    PERNELL on CKD stage IIIa    -Patient with development of increasing creatinine with rise to 1.8 today from 1.35 in a roughly 24-hour period consistent with acute kidney injury.  Patient with increased fatigue and reduced oral intake while here in hospital and given 1 L of normal saline with improvement in creatinine from 1.8-1.64 now further improved to 1.46.  Continue to monitor encourage oral intake.    - Avoid nephrotoxic agents and continue to trend creatinine while in hospital.    Hypertension    - Review of patient's home antihypertensive regiment shows significantly high doses of nifedipine and hydralazine, patient blood pressure normotensive at time of evaluation today and will hold hydralazine nifedipine and proceed with reduced dose Coreg but holding Aldactone as above.      Right foot drop  Peripheral neuropathy  Bilateral  lower extremity weakness    - Patient seen and evaluated PT and OT and recommended for ongoing rehab with consideration of inpatient rehab but denied by insurance.  Patient agreeable to swing bed and submitted for preop and still pending.      Copied text in portions of the note has been reviewed and is accurate as of 05/31/25    VTE Prophylaxis:     Active VTE Prophylaxis:  Pharmacologic:        Start     Dose Route Frequency Stop    05/23/25 9782  heparin (porcine) 5000 UNIT/ML injection 5,000 Units         5,000 Units SC Every 12 Hours Scheduled --                  Select Mechanical VTE Prophylaxis if Desired & Appropriate       Disposition stepdown to [facility] level of care for possible admission to swing bed which has been submitted for insurance approval and will not hear her answer until Monday due to the weekend.    Ruy Tenroio DO  [facility] Hospitalist  05/31/25  20:06 EDT

## 2025-06-02 LAB
ANION GAP SERPL CALCULATED.3IONS-SCNC: 14.1 MMOL/L (ref 5–15)
BUN SERPL-MCNC: 28.3 MG/DL (ref 8–23)
BUN/CREAT SERPL: 19.8 (ref 7–25)
CALCIUM SPEC-SCNC: 10.1 MG/DL (ref 8.6–10.5)
CHLORIDE SERPL-SCNC: 99 MMOL/L (ref 98–107)
CO2 SERPL-SCNC: 20.9 MMOL/L (ref 22–29)
CREAT SERPL-MCNC: 1.43 MG/DL (ref 0.57–1)
DEPRECATED RDW RBC AUTO: 51.7 FL (ref 37–54)
EGFRCR SERPLBLD CKD-EPI 2021: 36.5 ML/MIN/1.73
ERYTHROCYTE [DISTWIDTH] IN BLOOD BY AUTOMATED COUNT: 15.7 % (ref 12.3–15.4)
GLUCOSE SERPL-MCNC: 87 MG/DL (ref 65–99)
HCT VFR BLD AUTO: 35 % (ref 34–46.6)
HGB BLD-MCNC: 11 G/DL (ref 12–15.9)
MCH RBC QN AUTO: 28.8 PG (ref 26.6–33)
MCHC RBC AUTO-ENTMCNC: 31.4 G/DL (ref 31.5–35.7)
MCV RBC AUTO: 91.6 FL (ref 79–97)
PLATELET # BLD AUTO: 521 10*3/MM3 (ref 140–450)
PMV BLD AUTO: 9.7 FL (ref 6–12)
POTASSIUM SERPL-SCNC: 4.1 MMOL/L (ref 3.5–5.2)
RBC # BLD AUTO: 3.82 10*6/MM3 (ref 3.77–5.28)
SODIUM SERPL-SCNC: 134 MMOL/L (ref 136–145)
WBC NRBC COR # BLD AUTO: 10.15 10*3/MM3 (ref 3.4–10.8)

## 2025-06-02 PROCEDURE — 25010000002 HEPARIN (PORCINE) PER 1000 UNITS: Performed by: STUDENT IN AN ORGANIZED HEALTH CARE EDUCATION/TRAINING PROGRAM

## 2025-06-02 PROCEDURE — 99232 SBSQ HOSP IP/OBS MODERATE 35: CPT | Performed by: FAMILY MEDICINE

## 2025-06-02 PROCEDURE — 25010000002 HEPARIN (PORCINE) PER 1000 UNITS: Performed by: HOSPITALIST

## 2025-06-02 PROCEDURE — 85027 COMPLETE CBC AUTOMATED: CPT | Performed by: STUDENT IN AN ORGANIZED HEALTH CARE EDUCATION/TRAINING PROGRAM

## 2025-06-02 PROCEDURE — 80048 BASIC METABOLIC PNL TOTAL CA: CPT | Performed by: STUDENT IN AN ORGANIZED HEALTH CARE EDUCATION/TRAINING PROGRAM

## 2025-06-02 RX ADMIN — PANTOPRAZOLE SODIUM 40 MG: 40 TABLET, DELAYED RELEASE ORAL at 05:36

## 2025-06-02 RX ADMIN — DOCUSATE SODIUM 100 MG: 100 CAPSULE, LIQUID FILLED ORAL at 08:41

## 2025-06-02 RX ADMIN — ACETAMINOPHEN 650 MG: 325 TABLET ORAL at 03:13

## 2025-06-02 RX ADMIN — Medication 1 APPLICATION: at 08:41

## 2025-06-02 RX ADMIN — OXYBUTYNIN CHLORIDE 10 MG: 5 TABLET, EXTENDED RELEASE ORAL at 08:40

## 2025-06-02 RX ADMIN — Medication 1 CAPSULE: at 08:40

## 2025-06-02 RX ADMIN — ATORVASTATIN CALCIUM 20 MG: 20 TABLET, FILM COATED ORAL at 08:40

## 2025-06-02 RX ADMIN — HEPARIN SODIUM 5000 UNITS: 5000 INJECTION INTRAVENOUS; SUBCUTANEOUS at 20:29

## 2025-06-02 RX ADMIN — Medication 1 APPLICATION: at 20:29

## 2025-06-02 RX ADMIN — Medication 5 MG: at 20:29

## 2025-06-02 RX ADMIN — Medication 1000 UNITS: at 08:40

## 2025-06-02 RX ADMIN — HEPARIN SODIUM 5000 UNITS: 5000 INJECTION INTRAVENOUS; SUBCUTANEOUS at 08:40

## 2025-06-02 NOTE — PROGRESS NOTES
[facility] HOSPITALIST PROGRESS NOTE     Patient Identification:  Name:  Emma Ryan  Age:  84 y.o.  Sex:  female  :  1940  MRN:  [id number]  Visit Number:  [id number]  ROOM: [address] [place] [address]     Primary Care Provider:  Finn Nash MD     Date of Admission: 2025    Length of stay in inpatient status:  10    Subjective     Chief Compliant:    Chief Complaint   Patient presents with    Weakness - Generalized     History of Presenting Illness:    Patient was seen and examined face-to-face.  She continues to complain of some generalized weakness and is looking forward to moving forward with a swing bed.  Patient says she is going to get stronger so she can go home and help her ailing .  Objective     Current Hospital Meds:  Acidophilus/Pectin, 1 capsule, Oral, Daily  atorvastatin, 20 mg, Oral, Daily  castor oil-balsam peru, 1 Application, Topical, Q12H  cholecalciferol, 1,000 Units, Oral, Daily  docusate sodium, 100 mg, Oral, Daily  heparin (porcine), 5,000 Units, Subcutaneous, Q12H  metoprolol succinate XL, 12.5 mg, Oral, Q24H  oxybutynin XL, 10 mg, Oral, Daily  pantoprazole, 40 mg, Oral, Q AM  sodium chloride, 10 mL, Intravenous, Q12H       Current Antimicrobial Therapy:  Anti-Infectives (From admission, onward)      None          Current Diuretic Therapy:  Diuretics (From admission, onward)      Ordered     Dose/Rate Route Frequency Start Stop    25 0839  furosemide (LASIX) injection 20 mg        Ordering Provider: Ruy Tenorio DO    20 mg Intravenous Once 25 0900 25 1017          ----------------------------------------------------------------------------------------------------------------------  Vital Signs:  Temp:  [97.2 °F (36.2 °C)-98.4 °F (36.9 °C)] 97.9 °F (36.6 °C)  Heart Rate:  [76-85] 76  Resp:  [18] 18  BP: ()/(53-69) 102/59  SpO2:  [95 %-96 %] 95 %  on   ;   Device (Oxygen Therapy): room air  Body mass index is 26.34 kg/m².    Wt Readings  from Last 3 Encounters:   06/02/25 59.1 kg (130 lb 6.4 oz)   04/07/25 71.7 kg (158 lb 1.1 oz)   01/24/24 71.7 kg (158 lb)     Intake & Output (last 3 days)         05/30 0701 05/31 0700 05/31 0701 06/01 0700 06/01 0701  06/02 0700 06/02 0701 06/03 0700    P.O. 960 600 720 480    I.V. (mL/kg)        Total Intake(mL/kg) 960 (14.5) 600 (9.3) 720 (12.2) 480 (8.1)    Urine (mL/kg/hr) 2200 (1.4) 2300 (1.5) 1300 (0.9) 500 (1.4)    Total Output 2200 2300 1300 500    Net -1240 -1700 -580 -20            Urine Unmeasured Occurrence 1 x 1 x 1 x           Diet: Regular/House, Renal; Low Potassium; Fluid Consistency: Thin (IDDSI 0)  ----------------------------------------------------------------------------------------------------------------------  Physical Exam  Constitutional:  Well-developed and well-nourished.  No acute distress.      HENT:  Head:  Normocephalic and atraumatic.  Mouth:  Moist mucous membranes.    Eyes:  Conjunctivae and EOM are normal. No scleral icterus.    Neck:  Neck supple.  No JVD present.    Cardiovascular:  Normal rate, regular rhythm and normal heart sounds with no murmur.  Pulmonary/Chest:  No respiratory distress, no wheezes, no crackles, with normal breath sounds and good air movement.  Abdominal:  Soft. No distension and no tenderness.   Musculoskeletal:  No tenderness, and no deformity.  No red or swollen joints anywhere.    Neurological:  Alert and oriented to person, place, and time.  No cranial nerve deficit.    Skin:  Skin is warm and dry. No rash noted. No pallor.   Peripheral vascular:  No clubbing, no cyanosis, no edema.  Psychiatric: Appropriate mood and affect  : Deferred    ----------------------------------------------------------------------------------------------------------------------  Tele:    ----------------------------------------------------------------------------------------------------------------------  LABS:    CBC and coagulation:  Results from last 7 days   Lab  "Units 06/02/25  0404   WBC 10*3/mm3 10.15   HEMOGLOBIN g/dL 11.0*   HEMATOCRIT % 35.0   MCV fL 91.6   MCHC g/dL 31.4*   PLATELETS 10*3/mm3 521*     Acid/base balance:      Renal and electrolytes:  Results from last 7 days   Lab Units 06/02/25  0404 05/31/25  0334 05/30/25  0139 05/29/25  0009 05/28/25  0033 05/27/25  0127   SODIUM mmol/L 134* 136 137 136 134* 134*   POTASSIUM mmol/L 4.1 4.4 4.4 4.7 5.9* 6.2*   MAGNESIUM mg/dL  --   --   --  1.9  --   --    CHLORIDE mmol/L 99 100 101 95* 97* 100   CO2 mmol/L 20.9* 23.4 21.9* 24.7 23.3 22.2   BUN mg/dL 28.3* 31.7* 35.4* 36.4* 28.5* 27*   CREATININE mg/dL 1.43* 1.46* 1.64* 1.80* 1.35* 1.36*   CALCIUM mg/dL 10.1 10.6* 9.4 10.6* 10.7* 10.0   GLUCOSE mg/dL 87 107* 98 120* 122* 122*     Estimated Creatinine Clearance: 24.9 mL/min (A) (by C-G formula based on SCr of 1.43 mg/dL (H)).    Liver and pancreatic function:        Invalid input(s): \"PROT\"  Endocrine function:  No results found for: \"HGBA1C\"  Point of care bedside glucose levels:  Results from last 7 days   Lab Units 05/31/25  1915 05/27/25  1607 05/27/25  0601 05/27/25  0506 05/27/25  0403   GLUCOSE mg/dL 131* 118 103 139* 271*     Glucose levels from the [facility]:  Results from last 7 days   Lab Units 06/02/25  0404 05/31/25  0334 05/30/25  0139 05/29/25  0009 05/28/25  0033 05/27/25  0127   GLUCOSE mg/dL 87 107* 98 120* 122* 122*     Lab Results   Component Value Date    TSH 1.600 06/14/2023    FREET4 1.16 09/14/2018     Cardiac:        Cultures:  Lab Results   Component Value Date    COLORU Yellow 05/23/2025    CLARITYU Clear 05/23/2025    PHUR 5.5 05/23/2025    GLUCOSEU Negative 05/23/2025    KETONESU Trace (A) 05/23/2025    BLOODU Negative 05/23/2025    NITRITEU Negative 05/23/2025    LEUKOCYTESUR Negative 05/23/2025    BILIRUBINUR Negative 05/23/2025    UROBILINOGEN 0.2 E.U./dL 05/23/2025    RBCUA None Seen 04/07/2025    WBCUA 6-10 (A) 04/07/2025    BACTERIA 3+ (A) 04/07/2025     Microbiology Results (last 10 " days)       Procedure Component Value - Date/Time    Respiratory Panel PCR w/COVID-19(SARS-CoV-2) MARINE/BENEDICTO/EDD/PAD/COR/DYAN In-House, NP Swab in UTM/VTM, 2 HR TAT - Swab, Nasopharynx [[id number]]  (Normal) Collected: 05/23/25 2021    Lab Status: Final result Specimen: Swab from Nasopharynx Updated: 05/23/25 2115     ADENOVIRUS, PCR Not Detected     Coronavirus 229E Not Detected     Coronavirus HKU1 Not Detected     Coronavirus NL63 Not Detected     Coronavirus OC43 Not Detected     COVID19 Not Detected     Human Metapneumovirus Not Detected     Human Rhinovirus/Enterovirus Not Detected     Influenza A PCR Not Detected     Influenza B PCR Not Detected     Parainfluenza Virus 1 Not Detected     Parainfluenza Virus 2 Not Detected     Parainfluenza Virus 3 Not Detected     Parainfluenza Virus 4 Not Detected     RSV, PCR Not Detected     Bordetella pertussis pcr Not Detected     Bordetella parapertussis PCR Not Detected     Chlamydophila pneumoniae PCR Not Detected     Mycoplasma pneumo by PCR Not Detected    Narrative:      In the setting of a positive respiratory panel with a viral infection PLUS a negative procalcitonin without other underlying concern for bacterial infection, consider observing off antibiotics or discontinuation of antibiotics and continue supportive care. If the respiratory panel is positive for atypical bacterial infection (Bordetella pertussis, Chlamydophila pneumoniae, or Mycoplasma pneumoniae), consider antibiotic de-escalation to target atypical bacterial infection.    Blood Culture - Blood, Arm, Right [[id number]]  (Normal) Collected: 05/23/25 1814    Lab Status: Final result Specimen: Blood from Arm, Right Updated: 05/28/25 1831     Blood Culture No growth at 5 days    Blood Culture - Blood, Arm, Left [[id number]]  (Normal) Collected: 05/23/25 1814    Lab Status: Final result Specimen: Blood from Arm, Left Updated: 05/28/25 1831     Blood Culture No growth at 5 days            No results found  "for: \"PREGTESTUR\", \"PREGSERUM\", \"HCG\", \"HCGQUANT\"  Pain Management Panel  More data exists         Latest Ref Rng & Units 2/10/2025 9/25/2024   Pain Management Panel   Creatinine, Urine mg/dL 71.5  65.4        I have personally looked at the labs and they are summarized above.  ----------------------------------------------------------------------------------------------------------------------  Detailed radiology reports for the last 24 hours:    Imaging Results (Last 24 Hours)       ** No results found for the last 24 hours. **          I have personally looked at the radiology images and I have read the available final and preliminary reports.    Assessment & Plan    Severe hypomagnesemia  Moderate hypokalemia  Severe hyperkalemia  Acute generalized weakness  - Will continue to monitor electrolyte imbalances  - PT and OT continue to follow  - Patient has now been approved for swing bed however we cannot place her there until tomorrow    PERNELL on CKD stage IIIa  - Patient's essentially back to baseline at this point no further aggressive hydration is necessary  - Continue to avoid nephrotoxic agents    Hypertension  - We will continue the antihypertensive regimen designed while she has been here    VTE Prophylaxis:   Active VTE Prophylaxis:  Pharmacologic:        Start     Dose Route Frequency Stop    05/23/25 6992  heparin (porcine) 5000 UNIT/ML injection 5,000 Units         5,000 Units SC Every 12 Hours Scheduled --                  Select Mechanical VTE Prophylaxis if Desired & Appropriate     The patient is high risk due to the following diagnoses/reasons: Generalized weakness    Disposition: Place in swing bed tomorrow    Glenroy Lyn DO  [facility] Hospitalist  06/02/25  13:04 EDT    "

## 2025-06-02 NOTE — PLAN OF CARE
Goal Outcome Evaluation:         Pt A/O x 4. Pt transferred to [address] [place] from [address] [place]. VSS. No acute changes. Will continue to monitor. Will continue POC.

## 2025-06-02 NOTE — CASE MANAGEMENT/SOCIAL WORK
Discharge Planning Assessment   [place]     Patient Name: Emma Ryan  MRN: [id number]  Today's Date: 6/2/2025    Admit Date: 5/23/2025       Discharge Plan       Row Name 06/02/25 1041       Plan    Plan Swing bed pre-authorization has been approved (6/1 to 6/6). SS discussed pt with attending provider and [facility]. Attending provider voices pt is stable for swing bed admission. Attending provider notified pt will be admitted to swing bed on 6/3. Pt will be transferred to [address] [place] [address] and admitted to swing bed on 6/3. SS to follow.              Expected Discharge Date and Time       Expected Discharge Date Expected Discharge Time    Jun 2, 2025         ALISON Ford

## 2025-06-02 NOTE — PAYOR COMM NOTE
"CONTACT:  CHRISTOPHER MCKEON MSN, RN  UTILIZATION MANAGEMENT DEPT.  [facility]  [address] [facility], [address]  PHONE:  [phone]  FAX: [phone]    CLINICAL UPDATE    REF # [id number]     Emma Krishnamurthy (84 y.o. Female)       Date of Birth   1940    Social Security Number       Address   [address] [place] [address]    Home Phone   [phone]    MRN   [id number]       Synagogue   Congregation    Marital Status                               Admission Date   5/23/2025    Admission Type   Emergency    Admitting Provider   Burt Golden MD    Attending Provider   Glenroy Lyn DO    Department, Room/Bed   [facility], 3307/1S       Discharge Date       Discharge Disposition       Discharge Destination                                 Attending Provider: Glenroy Lyn DO    Allergies: Codeine, Sulfa Antibiotics    Isolation: None   Infection: None   Code Status: CPR    Ht: 149.9 cm (59\")   Wt: 59.1 kg (130 lb 6.4 oz)    Admission Cmt: None   Principal Problem: Hypomagnesemia [E83.42]                   Active Insurance as of 5/23/2025       Primary Coverage       Payor Plan Insurance Group Employer/Plan Group    ANTHEM MEDICARE REPLACEMENT ANTH MEDICARE ADVANTAGE HMO [id number]       Payor Plan Address Payor Plan Phone Number Payor Plan Fax Number Effective Dates    [address] [phone]  1/1/2025 - None Entered    [facility] [id number]         Subscriber Name Subscriber Birth Date Member ID       EMMA KRISHNAMURTHY 1940 [id number]                     Emergency Contacts        (Rel.) Home Phone Work Phone Mobile Phone    Bob Krishnamurthy (Spouse) [phone] -- --    Lashawn Krishnamurthy (Relative) [phone] -- --    Inez Maddox (Daughter) [phone] -- --              Vital Signs (last 3 days)       Date/Time Temp Temp src Pulse Resp BP Patient Position SpO2    06/02/25 0816 98 (36.7) Oral 84 18 104/58 Lying 95    " 06/02/25 0347 97.2 (36.2) Oral 82 18 100/53 Lying 95    06/01/25 2354 97.9 (36.6) Oral 84 18 124/69 Lying 95    06/01/25 1900 98.2 (36.8) Oral 85 18 111/59 Lying 96    06/01/25 1616 98.4 (36.9) Oral 81 18 99/56 Lying --    06/01/25 1205 -- -- 84 18 95/60 Lying 96    06/01/25 0710 -- -- 76 -- -- -- 94    06/01/25 0700 -- -- 77 -- -- -- 95    06/01/25 0645 97.9 (36.6) Oral 80 20 105/53 Lying 92    06/01/25 0344 97.9 (36.6) Oral 80 20 102/59 Lying 91    05/31/25 2332 97.7 (36.5) Oral 78 20 101/56 Lying 91    05/31/25 1916 97.6 (36.4) Oral 84 20 110/62 Lying 94    05/31/25 1501 97.1 (36.2) Oral -- 20 123/50 Lying 97    05/31/25 1100 98.5 (36.9) Oral 81 20 127/71 Lying --    05/31/25 0705 97.5 (36.4) Oral -- 20 105/61 Lying 97    05/31/25 0353 98.1 (36.7) Oral 71 20 103/64 Lying 93    05/30/25 2302 97.9 (36.6) Oral 72 20 95/55 Lying 94    05/30/25 1904 97.7 (36.5) Oral 78 20 99/55 Lying 92    05/30/25 1742 -- -- 84 -- 130/60 -- --    05/30/25 1504 97.4 (36.3) Oral 81 20 101/56 Lying 97    05/30/25 1118 97.1 (36.2) Oral 85 18 103/49 Lying 97    05/30/25 0643 97.5 (36.4) Oral 80 18 106/63 Lying 95    05/30/25 0352 98.2 (36.8) Oral 79 16 103/57 Lying 94          Intake & Output (last 3 days)         05/30 0701 05/31 0700 05/31 0701 06/01 0700 06/01 0701 06/02 0700 06/02 0701 06/03 0700    P.O. 960 600 720 240    I.V. (mL/kg)        Total Intake(mL/kg) 960 (14.5) 600 (9.3) 720 (12.2) 240 (4.1)    Urine (mL/kg/hr) 2200 (1.4) 2300 (1.5) 1300 (0.9)     Total Output 2200 2300 1300     Net -1240 -1700 -580 +240            Urine Unmeasured Occurrence 1 x 1 x 1 x            Medication Administration Report for Emma Ryan as of 5/30/25 through 6/2/25     Legend:    Given Hold Not Given Due Canceled Entry Other Actions    Time Time (Time) Time Time-Action         Discontinued     Completed     Future     MAR Hold     Linked             Medications 05/30/25 05/31/25 06/01/25 06/02/25     acetaminophen (TYLENOL) tablet 650  mg  Dose: 650 mg  Freq: Every 6 Hours PRN Route: PO  PRN Reasons: Mild Pain,Headache,Fever  Start: 05/28/25 0549     Admin Instructions:   If given for fever, use fever parameter: fever greater than 100.4 °F  Based on patient request - if ordered for moderate or severe pain, provider allows for administration of a medication prescribed for a lower pain scale.    Do not exceed 4 grams of acetaminophen in a 24 hr period. Max dose of 2gm for AST/ALT greater than 120 units/L.    If given for pain, use the following pain scale:   Mild Pain = Pain Score of 1-3, CPOT 1-2  Moderate Pain = Pain Score of 4-6, CPOT 3-4  Severe Pain = Pain Score of 7-10, CPOT 5-8      1413-Given           0313-Given           Acidophilus/Pectin capsule 1 capsule  Dose: 1 capsule  Freq: Daily Route: PO  Start: 05/24/25 1000      0750-Given     0900-Canceled Entry        0831-Given         0815-Given         0840-Given           atorvastatin (LIPITOR) tablet 20 mg  Dose: 20 mg  Freq: Daily Route: PO  Start: 05/24/25 1000     Admin Instructions:   Avoid grapefruit juice.      0751-Given     0900-Canceled Entry        0831-Given         0815-Given         0840-Given            sennosides-docusate (PERICOLACE) 8.6-50 MG per tablet 2 tablet  Dose: 2 tablet  Freq: 2 Times Daily PRN Route: PO  PRN Reason: Constipation  Start: 05/23/25 2153     Admin Instructions:   Start bowel management regimen if patient has not had a bowel movement after 12 hours.            And   polyethylene glycol (MIRALAX) packet 17 g  Dose: 17 g  Freq: Daily PRN Route: PO  PRN Reason: Constipation  PRN Comment: Use if senna-docusate is ineffective  Start: 05/23/25 2153     Admin Instructions:   Use if no bowel movement after 12 hours. Mix in 6-8 ounces of water.  Use 4-8 ounces of water, tea, or juice for each 17 gram dose.            And   bisacodyl (DULCOLAX) EC tablet 5 mg  Dose: 5 mg  Freq: Daily PRN Route: PO  PRN Reason: Constipation  PRN Comment: Use if polyethylene  glycol is ineffective  Start: 05/23/25 2153     Admin Instructions:   Use if no bowel movement after 12 hours.  Swallow whole. Do not crush, split, or chew tablet.            And   bisacodyl (DULCOLAX) suppository 10 mg  Dose: 10 mg  Freq: Daily PRN Route: RE  PRN Reason: Constipation  PRN Comment: Use if bisacodyl oral is ineffective  Start: 05/23/25 2153     Admin Instructions:   Use if no bowel movement after 12 hours.  Hold for diarrhea            calcium carbonate (TUMS) chewable tablet 500 mg (200 mg elemental)  Dose: 2 tablet  Freq: 3 Times Daily PRN Route: PO  PRN Reason: Heartburn  Start: 05/30/25 2018     Admin Instructions:   One tablet contains 200 mg elemental calcium.  Take with food.      2024-Given              castor oil-balsam peru (VENELEX) ointment 1 Application  Dose: 1 Application  Freq: Every 12 Hours Scheduled Route: TOP  Start: 05/29/25 1200     Admin Instructions:   Apply to sacral spine pressure injury and bilateral gluteals BID.      0752-Given     0900-Canceled Entry       2004-Given         0832-Given     2011-Given        0816-Given     2007-Given        0841-Given     2100          cholecalciferol (VITAMIN D3) tablet 1,000 Units  Dose: 1,000 Units  Freq: Daily Route: PO  Start: 05/24/25 1000      0751-Given     0900-Canceled Entry        0831-Given         0815-Given         0840-Given           docusate sodium (COLACE) capsule 100 mg  Dose: 100 mg  Freq: Daily Route: PO  Start: 05/24/25 1000     Admin Instructions:   Swallow whole.  Do not open, crush, or chew capsule.      0751-Given     0900-Canceled Entry        0832-Given         0815-Given         0841-Given [C]           heparin (porcine) 5000 UNIT/ML injection 5,000 Units  Dose: 5,000 Units  Freq: Every 12 Hours Scheduled Route: SC  Indications of Use: PROPHYLAXIS OF VENOUS THROMBOEMBOLISM  Start: 05/23/25 2245      0753-Given     0900-Canceled Entry       2004-Given         0832-Given     2011-Given        0814-Given      "2007-Given        0840-Given     2100          ketoconazole (NIZORAL) 2 % cream 1 Application  Dose: 1 Application  Freq: Daily PRN Route: TOP  PRN Reasons: Itching,Rash  Start: 05/24/25 0903     Admin Instructions:   Apply to rash            Magnesium Standard Dose Replacement - Follow Nurse / BPA Driven Protocol  Freq: As Needed Route: XX  PRN Reason: Other  Start: 05/23/25 1853     Admin Instructions:   Open Order & Select \"[facility]HS Electrolyte Replacement Protocol Algorithm\" to View Details            melatonin tablet 5 mg  Dose: 5 mg  Freq: Nightly PRN Route: PO  PRN Reason: Sleep  Start: 05/23/25 2348      2004-Given         2011-Given         2007-Given            metoprolol succinate XL (TOPROL-XL) 24 hr tablet 12.5 mg  Dose: 12.5 mg  Freq: Every 24 Hours Scheduled Route: PO  Start: 06/01/25 0900     Admin Instructions:   Hold for SBP less than 100, DBP less than 60, or heart rate less than 50    Do not crush or chew the capsules or tablets. The drug may not work as designed if the capsule or tablet is crushed or chewed. Swallow whole.  Do not crush or chew.        (0814)-Not Given         (0840)-Not Given           nitroglycerin (NITROSTAT) SL tablet 0.4 mg  Dose: 0.4 mg  Freq: Every 5 Minutes PRN Route: SL  PRN Reason: Chest Pain  Start: 05/27/25 0330     Admin Instructions:   If Pain Unrelieved After 3 Doses Notify MD  May administer up to 3 doses per episode. Hold if SBP less than 100.            nitroglycerin (NITROSTAT) SL tablet 0.4 mg  Dose: 0.4 mg  Freq: Every 5 Minutes PRN Route: SL  PRN Reason: Chest Pain  Start: 05/23/25 2153     Admin Instructions:   If Pain Unrelieved After 3 Doses Notify MD  May administer up to 3 doses per episode. Hold if SBP less than 100.            oxybutynin XL (DITROPAN-XL) 24 hr tablet 10 mg  Dose: 10 mg  Freq: Daily Route: PO  Start: 05/24/25 1000     Admin Instructions:   Do not crush or chew the capsules or tablets. The drug may not work as designed if the capsule or " "tablet is crushed or chewed. Swallow whole.      0750-Given     0900-Canceled Entry        0831-Given         0815-Given         0840-Given           pantoprazole (PROTONIX) EC tablet 40 mg  Dose: 40 mg  Freq: Every Early Morning Route: PO  Start: 05/24/25 1000     Admin Instructions:   Swallow whole; do not crush, split, or chew.      0751-Given         0548-Given         0559-Given         0536-Given           Potassium Replacement - Follow Nurse / BPA Driven Protocol  Freq: As Needed Route: XX  PRN Reason: Other  Start: 05/23/25 1853     Admin Instructions:   Open Order & Select \"[facility] Electrolyte Replacement Protocol Algorithm\" to View Details            sodium chloride 0.9 % flush 10 mL  Dose: 10 mL  Freq: As Needed Route: IV  PRN Reason: Line Care  Start: 05/23/25 2153            sodium chloride 0.9 % flush 10 mL  Dose: 10 mL  Freq: Every 12 Hours Scheduled Route: IV  Start: 05/23/25 2245      0752-Given     0900-Canceled Entry       2004-Given         0832-Given     2011-Given        (0815)-Not Given [C]     2007-Canceled Entry        (0841)-Not Given     2100          sodium chloride 0.9 % infusion 40 mL  Dose: 40 mL  Freq: As Needed Route: IV  PRN Reason: Line Care  Start: 05/23/25 2153     Admin Instructions:   Following administration of an IV intermittent medication, flush line with 40mL NS at 100mL/hr.            Completed Medications  Medications 05/30/25 05/31/25 06/01/25 06/02/25      calcium gluconate 1000 Mg/50ml 0.675% NaCl IV SOLN  Dose: 1,000 mg  Freq: Once Route: IV  Start: 05/27/25 0430 End: 05/27/25 0403            calcium gluconate 1000 Mg/50ml 0.675% NaCl IV SOLN  Dose: 1,000 mg  Freq: Once Route: IV  Start: 05/25/25 0545 End: 05/25/25 0533            calcium gluconate 1000 Mg/50ml 0.675% NaCl IV SOLN  Dose: 1,000 mg  Freq: Once Route: IV  Start: 05/24/25 2315 End: 05/24/25 2248            dextrose (D50W) (25 g/50 mL) IV injection 25 g  Dose: 25 g  Freq: Once Route: IV  Start: 05/27/25 0430 " End: 05/27/25 0347     Admin Instructions:   Administer IV for Hyperkalemia              dextrose (D50W) (25 g/50 mL) IV injection 25 g  Dose: 25 g  Freq: Once Route: IV  Start: 05/25/25 1445 End: 05/25/25 1431     Admin Instructions:   Administer IV for Hyperkalemia              dextrose (D50W) (25 g/50 mL) IV injection 25 g  Dose: 25 g  Freq: Once Route: IV  Start: 05/25/25 0545 End: 05/25/25 0518     Admin Instructions:   Administer IV for Hyperkalemia              dextrose (D50W) (25 g/50 mL) IV injection 25 g  Dose: 25 g  Freq: Once Route: IV  Start: 05/24/25 2315 End: 05/24/25 2233     Admin Instructions:   Administer IV for Hyperkalemia              furosemide (LASIX) injection 20 mg  Dose: 20 mg  Freq: Once Route: IV  Start: 05/28/25 0900 End: 05/28/25 1017     Admin Instructions:   Hold for SBP less than 100, DBP less than 60.            insulin regular (humuLIN R,novoLIN R) injection 10 Units  Dose: 10 Units  Freq: Once Route: IV  Start: 05/25/25 1445 End: 05/25/25 1437     Admin Instructions:   Administer IV for Hyperkalemia   Caution: Look alike/sound alike drug alert            insulin regular (humuLIN R,novoLIN R) injection 10 Units  Dose: 10 Units  Freq: Once Route: IV  Start: 05/25/25 0545 End: 05/25/25 0518     Admin Instructions:   Administer IV for Hyperkalemia   Caution: Look alike/sound alike drug alert            insulin regular (humuLIN R,novoLIN R) injection 10 Units  Dose: 10 Units  Freq: Once Route: IV  Start: 05/24/25 2315 End: 05/24/25 2233     Admin Instructions:   Administer IV for Hyperkalemia   Caution: Look alike/sound alike drug alert            insulin regular (humuLIN R,novoLIN R) injection 5 Units  Dose: 5 Units  Freq: Once Route: IV  Start: 05/27/25 0430 End: 05/27/25 0347     Admin Instructions:   Administer IV for Hyperkalemia   Caution: Look alike/sound alike drug alert           magnesium sulfate 2g/50 mL (PREMIX) infusion  Dose: 2 g  Freq: Every 30 Minutes Route: IV  Start:  05/23/25 2104 End: 05/23/25 2245            Followed by   magnesium sulfate 4g/100mL (PREMIX) infusion  Dose: 4 g  Freq: Every 4 Hours Route: IV  Start: 05/23/25 2154 End: 05/24/25 0706            potassium chloride (KLOR-CON M20) CR tablet 40 mEq  Dose: 40 mEq  Freq: Every 4 Hours Route: PO  Start: 05/24/25 1200 End: 05/24/25 1727     Admin Instructions:   Do not crush or chew the capsules or tablets. The drug may not work as designed if the capsule or tablet is crushed or chewed. Swallow whole.  Take with food.            potassium chloride (KLOR-CON M20) CR tablet 40 mEq  Dose: 40 mEq  Freq: Every 2 Hours Route: PO  Start: 05/24/25 0430 End: 05/24/25 0641     Admin Instructions:   Do not crush or chew the capsules or tablets. The drug may not work as designed if the capsule or tablet is crushed or chewed. Swallow whole.  Take with food.            potassium chloride 10 mEq in 100 mL IVPB  Dose: 10 mEq  Freq: Every 1 Hour Route: IV  Start: 05/23/25 2124 End: 05/24/25 0051     Admin Instructions:   OUTPATIENT/NON-MONITORED UNITS: Potassium Chloride standard bolus infusion rate is a maximum of 10 mEq/hr on unmonitored patients    MONITORED UNITS: Potassium Chloride standard bolus infusion rate is a maximum of 20 mEq/hr on ECG monitored patients ONLY            sodium bicarbonate injection 8.4% 50 mEq  Dose: 50 mEq  Freq: Once Route: IV  Indications Comment: Hyperkalemia  Start: 05/27/25 0430 End: 05/27/25 0348            sodium bicarbonate injection 8.4% 50 mEq  Dose: 50 mEq  Freq: Once Route: IV  Indications Comment: Hyperkalemia  Start: 05/25/25 1445 End: 05/25/25 1528            sodium chloride 0.9 % bolus 250 mL  Dose: 250 mL  Freq: Once Route: IV  Start: 05/23/25 1807 End: 05/23/25 1919            sodium zirconium cyclosilicate (LOKELMA) packet 10 g  Dose: 10 g  Freq: Once Route: PO  Start: 05/29/25 0045 End: 05/29/25 0010     Admin Instructions:   Empty entire contents of the packet(s) into a glass with at  least 3 tablespoons (45 mL) of water. Stir well and drink immediately; if powder remains in the glass, add water, stir and drink immediately; repeat until no powder remains. Administer other oral medications at least 2 hours before or 2 hours after dose.            sodium zirconium cyclosilicate (LOKELMA) packet 10 g  Dose: 10 g  Freq: Once Route: PO  Start: 05/27/25 1000 End: 05/27/25 1608     Admin Instructions:   Empty entire contents of the packet(s) into a glass with at least 3 tablespoons (45 mL) of water. Stir well and drink immediately; if powder remains in the glass, add water, stir and drink immediately; repeat until no powder remains. Administer other oral medications at least 2 hours before or 2 hours after dose.            sodium zirconium cyclosilicate (LOKELMA) packet 10 g  Dose: 10 g  Freq: Once Route: PO  Indications of Use: HYPERKALEMIA  Start: 05/27/25 0430 End: 05/27/25 0348     Admin Instructions:   Empty entire contents of the packet(s) into a glass with at least 3 tablespoons (45 mL) of water. Stir well and drink immediately; if powder remains in the glass, add water, stir and drink immediately; repeat until no powder remains. Administer other oral medications at least 2 hours before or 2 hours after dose.            sodium zirconium cyclosilicate (LOKELMA) packet 10 g  Dose: 10 g  Freq: Once Route: PO  Start: 05/25/25 1845 End: 05/25/25 1754     Admin Instructions:   Empty entire contents of the packet(s) into a glass with at least 3 tablespoons (45 mL) of water. Stir well and drink immediately; if powder remains in the glass, add water, stir and drink immediately; repeat until no powder remains. Administer other oral medications at least 2 hours before or 2 hours after dose.            Discontinued Medications  Medications 05/30/25 05/31/25 06/01/25 06/02/25      carvedilol (COREG) tablet 25 mg  Dose: 25 mg  Freq: 2 Times Daily With Meals Route: PO  Start: 05/24/25 1000 End: 05/24/25 1221      Admin Instructions:   Hold for SBP less than 100, DBP less than 60, or heart rate less than 50. If a dose is held, please contact the provider.  Give with food.            carvedilol (COREG) tablet 3.125 mg  Dose: 3.125 mg  Freq: 2 Times Daily With Meals Route: PO  Start: 05/24/25 1800 End: 05/31/25 0841     Admin Instructions:   Hold for SBP less than 100, DBP less than 60, or heart rate less than 50. If a dose is held, please contact the provider.  Give with food.      0751-Given     1742-Given        0831-Given             carvedilol (COREG) tablet 6.25 mg  Dose: 6.25 mg  Freq: 2 Times Daily With Meals Route: PO  Start: 05/24/25 1800 End: 05/24/25 1221     Admin Instructions:   Hold for SBP less than 100, DBP less than 60, or heart rate less than 50. If a dose is held, please contact the provider.  Give with food.            hydrALAZINE (APRESOLINE) tablet 100 mg  Dose: 100 mg  Freq: Every 8 Hours Scheduled Route: PO  Start: 05/24/25 1400 End: 05/25/25 0940     Admin Instructions:   Hold for SBP less than 100, DBP less than 60.  Caution: Look alike/sound alike drug alert            NIFEdipine XL (PROCARDIA XL) 24 hr tablet 30 mg  Dose: 30 mg  Freq: Daily Route: PO  Start: 05/24/25 1000 End: 05/25/25 2049     Admin Instructions:   Hold for SBP less than 100, DBP less than 60.  Caution: Look alike/sound alike drug alert. Avoid grapefruit juice. Swallow whole. Do not crush, split or chew.            potassium chloride (KLOR-CON M20) CR tablet 20 mEq  Dose: 20 mEq  Freq: Daily Route: PO  Start: 05/25/25 0900 End: 05/29/25 0858     Admin Instructions:   Do not crush or chew the capsules or tablets. The drug may not work as designed if the capsule or tablet is crushed or chewed. Swallow whole.  Take with food.            potassium chloride (KLOR-CON M20) CR tablet 40 mEq  Dose: 40 mEq  Freq: Every 2 Hours Route: PO  Start: 05/23/25 2124 End: 05/24/25 0103     Admin Instructions:   Do not crush or chew the capsules or  tablets. The drug may not work as designed if the capsule or tablet is crushed or chewed. Swallow whole.  Take with food.            sodium chloride 0.9 % infusion  Rate: 100 mL/hr Dose: 100 mL/hr  Freq: Continuous Route: IV  Start: 05/29/25 1000 End: 05/29/25 2016            spironolactone (ALDACTONE) tablet 25 mg  Dose: 25 mg  Freq: Daily Route: PO  Start: 05/24/25 1000 End: 05/29/25 1942     Admin Instructions:   Hold for SBP less than 100, DBP less than 60.  Group 1 (Yellow) Hazardous Drug - See Handling Guide                        Lab Results (last 72 hours)       Procedure Component Value Units Date/Time    Basic Metabolic Panel [[id number]]  (Abnormal) Collected: 06/02/25 0404    Specimen: Blood Updated: 06/02/25 0439     Glucose 87 mg/dL      BUN 28.3 mg/dL      Creatinine 1.43 mg/dL      Sodium 134 mmol/L      Potassium 4.1 mmol/L      Chloride 99 mmol/L      CO2 20.9 mmol/L      Calcium 10.1 mg/dL      BUN/Creatinine Ratio 19.8     Anion Gap 14.1 mmol/L      eGFR 36.5 mL/min/1.73     Narrative:      GFR Categories in Chronic Kidney Disease (CKD)              GFR Category          GFR (mL/min/1.73)    Interpretation  G1                    90 or greater        Normal or high (1)  G2                    60-89                Mild decrease (1)  G3a                   45-59                Mild to moderate decrease  G3b                   30-44                Moderate to severe decrease  G4                    15-29                Severe decrease  G5                    14 or less           Kidney failure    (1)In the absence of evidence of kidney disease, neither GFR category G1 or G2 fulfill the criteria for CKD.    eGFR calculation 2021 CKD-EPI creatinine equation, which does not include race as a factor    CBC (No Diff) [[id number]]  (Abnormal) Collected: 06/02/25 0404    Specimen: Blood Updated: 06/02/25 0418     WBC 10.15 10*3/mm3      RBC 3.82 10*6/mm3      Hemoglobin 11.0 g/dL      Hematocrit 35.0 %      MCV  91.6 fL      MCH 28.8 pg      MCHC 31.4 g/dL      RDW 15.7 %      RDW-SD 51.7 fl      MPV 9.7 fL      Platelets 521 10*3/mm3     POC Glucose Once [[id number]]  (Abnormal) Collected: 05/31/25 1915    Specimen: Blood Updated: 05/31/25 1922     Glucose 131 mg/dL     Basic Metabolic Panel [[id number]]  (Abnormal) Collected: 05/31/25 0334    Specimen: Blood Updated: 05/31/25 0415     Glucose 107 mg/dL      BUN 31.7 mg/dL      Creatinine 1.46 mg/dL      Sodium 136 mmol/L      Potassium 4.4 mmol/L      Chloride 100 mmol/L      CO2 23.4 mmol/L      Calcium 10.6 mg/dL      BUN/Creatinine Ratio 21.7     Anion Gap 12.6 mmol/L      eGFR 35.6 mL/min/1.73     Narrative:      GFR Categories in Chronic Kidney Disease (CKD)              GFR Category          GFR (mL/min/1.73)    Interpretation  G1                    90 or greater        Normal or high (1)  G2                    60-89                Mild decrease (1)  G3a                   45-59                Mild to moderate decrease  G3b                   30-44                Moderate to severe decrease  G4                    15-29                Severe decrease  G5                    14 or less           Kidney failure    (1)In the absence of evidence of kidney disease, neither GFR category G1 or G2 fulfill the criteria for CKD.    eGFR calculation 2021 CKD-EPI creatinine equation, which does not include race as a factor          Imaging Results (Last 72 Hours)       ** No results found for the last 72 hours. **          Orders (last 72 hrs)        Start     Ordered    06/02/25 0600  Basic Metabolic Panel  Morning Draw         06/01/25 1846 06/02/25 0600  CBC (No Diff)  Morning Draw         06/01/25 1846 06/01/25 0900  metoprolol succinate XL (TOPROL-XL) 24 hr tablet 12.5 mg  Every 24 Hours Scheduled         05/31/25 0841    05/31/25 1923  POC Glucose Once  PROCEDURE ONCE        Comments: Complete no more than 45 minutes prior to patient eating      05/31/25 1915 05/31/25  0840  Ambulate Patient  Every Shift       05/31/25 0840    05/31/25 0600  Basic Metabolic Panel  Morning Draw         05/30/25 1856    05/30/25 2018  calcium carbonate (TUMS) chewable tablet 500 mg (200 mg elemental)  3 Times Daily PRN         05/30/25 2018    05/29/25 1200  castor oil-balsam peru (VENELEX) ointment 1 Application  Every 12 Hours Scheduled         05/29/25 1107    05/28/25 0549  acetaminophen (TYLENOL) tablet 650 mg  Every 6 Hours PRN         05/28/25 0549    05/27/25 0400  Vital Signs Every Hour and Per Hospital Policy Based on Patient Condition  Every Hour       05/27/25 0331    05/27/25 0330  nitroglycerin (NITROSTAT) SL tablet 0.4 mg  Every 5 Minutes PRN         05/27/25 0331    05/25/25 0000  Cushioned Inflatable Ring         05/25/25 1208    05/25/25 0000  carvedilol (COREG) 25 MG tablet  2 Times Daily With Meals         05/25/25 1208    05/25/25 0000  magnesium oxide (MAG-OX) 400 MG tablet  Daily         05/25/25 1208    05/25/25 0000  Discharge Follow-up with PCP         05/25/25 1208    05/25/25 0000  Ambulatory Referral to Home Health         05/25/25 1250    05/24/25 2300  Vital Signs Every Hour and Per Hospital Policy Based on Patient Condition  Every Hour       05/24/25 2215    05/24/25 1800  carvedilol (COREG) tablet 3.125 mg  2 Times Daily With Meals,   Status:  Discontinued         05/24/25 1221    05/24/25 1000  atorvastatin (LIPITOR) tablet 20 mg  Daily         05/24/25 0904    05/24/25 1000  cholecalciferol (VITAMIN D3) tablet 1,000 Units  Daily         05/24/25 0904 05/24/25 1000  docusate sodium (COLACE) capsule 100 mg  Daily         05/24/25 0904 05/24/25 1000  pantoprazole (PROTONIX) EC tablet 40 mg  Every Early Morning         05/24/25 0904 05/24/25 1000  oxybutynin XL (DITROPAN-XL) 24 hr tablet 10 mg  Daily         05/24/25 0904    05/24/25 1000  Acidophilus/Pectin capsule 1 capsule  Daily         05/24/25 0904    05/24/25 0903  ketoconazole (NIZORAL) 2 % cream 1  "Application  Daily PRN         05/24/25 0904    05/24/25 0855  Dietary Nutrition Supplements Boost Plus (Ensure Enlive, Ensure Plus)  Daily With Breakfast & Dinner       05/24/25 0855    05/24/25 0800  Oral Care  2 Times Daily       05/23/25 2153 05/23/25 2348  melatonin tablet 5 mg  Nightly PRN         05/23/25 2348 05/23/25 2245  sodium chloride 0.9 % flush 10 mL  Every 12 Hours Scheduled         05/23/25 2153 05/23/25 2245  heparin (porcine) 5000 UNIT/ML injection 5,000 Units  Every 12 Hours Scheduled         05/23/25 2153 05/23/25 2154  Daily Weights  Daily       05/23/25 2153 05/23/25 2153  sodium chloride 0.9 % flush 10 mL  As Needed         05/23/25 2153 05/23/25 2153  sodium chloride 0.9 % infusion 40 mL  As Needed         05/23/25 2153 05/23/25 2153  nitroglycerin (NITROSTAT) SL tablet 0.4 mg  Every 5 Minutes PRN         05/23/25 2153 05/23/25 2153  sennosides-docusate (PERICOLACE) 8.6-50 MG per tablet 2 tablet  2 Times Daily PRN        Placed in \"And\" Linked Group    05/23/25 2153 05/23/25 2153  polyethylene glycol (MIRALAX) packet 17 g  Daily PRN        Placed in \"And\" Linked Group    05/23/25 2153 05/23/25 2153  bisacodyl (DULCOLAX) EC tablet 5 mg  Daily PRN        Placed in \"And\" Linked Group    05/23/25 2153 05/23/25 2153  bisacodyl (DULCOLAX) suppository 10 mg  Daily PRN        Placed in \"And\" Linked Group    05/23/25 2153 05/23/25 1853  Potassium Replacement - Follow Nurse / BPA Driven Protocol  As Needed         05/23/25 1853 05/23/25 1853  Magnesium Standard Dose Replacement - Follow Nurse / BPA Driven Protocol  As Needed         05/23/25 1853    Unscheduled  Up With Assistance  As Needed       05/23/25 2153    Unscheduled  Wound Care  As Needed       05/27/25 1316    --  ketoconazole (NIZORAL) 2 % cream  Daily PRN         05/23/25 2049    --  ketoconazole (NIZORAL) 2 % shampoo  2 Times Weekly         05/23/25 2049    Pending  carvedilol (COREG) 6.25 MG tablet "  2 Times Daily With Meals         Pending    Signed and Held  Discontinue Cardiac Monitoring  Once         Signed and Held                     Physician Progress Notes (last 72 hours)        Ruy Tenorio DO at 25 1841              [facility] HOSPITALIST PROGRESS NOTE     Patient Identification:  Name:  Emma Ryan  Age:  84 y.o.  Sex:  female  :  1940  MRN:  [id number]  Visit Number:  [id number]  ROOM: [address] [place] [address]     Primary Care Provider:  Finn Nash MD    Length of stay in inpatient status:  9    Subjective     Chief Compliant:    Chief Complaint   Patient presents with    Weakness - Generalized       History of Presenting Illness: Patient seen evaluated in follow-up for generalized weakness with significant severe electrolyte disturbances with hypomagnesemia hypokalemia likely precipitating.  Patient seen and evaluated today while sitting up in bed watching her [employer]  morning service.  Patient denying any acute complaints or issues.  Discussed possible response regarding swing bed admission tomorrow.    Objective     Current Hospital Meds:  Acidophilus/Pectin, 1 capsule, Oral, Daily  atorvastatin, 20 mg, Oral, Daily  castor oil-balsam peru, 1 Application, Topical, Q12H  cholecalciferol, 1,000 Units, Oral, Daily  docusate sodium, 100 mg, Oral, Daily  heparin (porcine), 5,000 Units, Subcutaneous, Q12H  metoprolol succinate XL, 12.5 mg, Oral, Q24H  oxybutynin XL, 10 mg, Oral, Daily  pantoprazole, 40 mg, Oral, Q AM  sodium chloride, 10 mL, Intravenous, Q12H           ----------------------------------------------------------------------------------------------------------------------  Vital Signs:  Temp:  [97.6 °F (36.4 °C)-98.4 °F (36.9 °C)] 98.4 °F (36.9 °C)  Heart Rate:  [76-84] 81  Resp:  [18-20] 18  BP: ()/(53-62) 99/56  SpO2:  [91 %-96 %] 96 %  on   ;   Device (Oxygen Therapy): room air  Body mass index is 28.59 kg/m².      Intake/Output Summary (Last 24  "hours) at 6/1/2025 1841  Last data filed at 6/1/2025 1800  Gross per 24 hour   Intake 720 ml   Output 1950 ml   Net -1230 ml      ----------------------------------------------------------------------------------------------------------------------  Physical exam:  Constitutional: Pleasant elderly adult female resting in bed in no distress.  HENT:  Head:  Normocephalic and atraumatic.  Mouth:  Moist mucous membranes.    Eyes:  Conjunctivae and EOM are normal. No scleral icterus.    Cardiovascular:  Normal rate, regular rhythm and normal heart sounds with no murmur.  Pulmonary/Chest:  No respiratory distress, no wheezes, no crackles, with normal breath sounds and good air movement.  Abdominal:  Soft.  Bowel sounds are normal.  No distension and no tenderness.   Musculoskeletal:  No tenderness, and no deformity.  No red or swollen joints anywhere.    Neurological:  Alert and oriented to person, place, and time.  No cranial nerve deficit.  No tongue deviation.  No facial droop.  No slurred speech. Intact Sensation throughout  Skin:  Skin is warm and dry. No rash or lesion noted. No pallor.   Peripheral vascular:  Pulses in all 4 extremities with no clubbing, no cyanosis, trace edema present.  Psychiatric: Appropriate mood and affect, pleasant.   ----------------------------------------------------------------------------------------------------------------------                 No results found for: \"URINECX\"  Blood Culture   Date Value Ref Range Status   05/23/2025 No growth at 24 hours  Preliminary   05/23/2025 No growth at 24 hours  Preliminary       I have personally looked at the labs and they are summarized above.  ----------------------------------------------------------------------------------------------------------------------  Detailed radiology reports for the last 24 hours:  No radiology results for the last day    Assessment & Plan      Severe hypomagnesemia  Moderate hypokalemia  Severe " hyperkalemia  Acute generalized weakness    - Patient presenting with generalized weakness likely secondary to significant electrolyte disturbances with severe hypomagnesemia and moderate hypokalemia.    - Patient with initial presenting magnesium of 0.6 improved to 2.1 after supplementation.    - PT and OT consulted however holiday weekend, will try to get nurses up to ambulate patient tomorrow and if doing well and improved to back to her near baseline with replacement of electrolytes possible consideration for discharge back home.    - Patient with development of significant hyperkalemia with potassium increased to 6.9 on 5/25.  Patient status post insulin D50 and sodium bicarb with improvement to 6.6.  Patient remaining asymptomatic.  Lokelma administered.  Further potassium supplementatio held  and nurse/pharmacy directed electrolyte replacement protocol discontinued as well as holding patient's home spironolactone.  Repeat potassium today remains stable at 4.4.  Blood pressure still within normal limits we will continue to hold Aldactone.  Lab holiday today and will repeat chemistry panel tomorrow a.m.    PERNELL on CKD stage IIIa    -Patient with development of increasing creatinine with rise to 1.8 today from 1.35 in a roughly 24-hour period consistent with acute kidney injury.  Patient with increased fatigue and reduced oral intake while here in hospital and given 1 L of normal saline with improvement in creatinine from 1.8-1.64 now further improved to 1.46.  Continue to monitor encourage oral intake.    - Avoid nephrotoxic agents and continue to trend creatinine while in hospital.    Hypertension    - Review of patient's home antihypertensive regiment shows significantly high doses of nifedipine and hydralazine, patient blood pressure normotensive most of patient's hospitalization and will discontinue hydralazine nifedipine and proceed with metoprolol but holding Aldactone as above.      Right foot  drop  Peripheral neuropathy  Bilateral lower extremity weakness    - Patient seen and evaluated PT and OT and recommended for ongoing rehab with consideration of inpatient rehab but denied by insurance.  Patient agreeable to swing bed and submitted for preop and still pending.      Copied text in portions of the note has been reviewed and is accurate as of 25    VTE Prophylaxis:     Active VTE Prophylaxis:  Pharmacologic:        Start     Dose Route Frequency Stop    25 5735  heparin (porcine) 5000 UNIT/ML injection 5,000 Units         5,000 Units SC Every 12 Hours Scheduled --                  Select Mechanical VTE Prophylaxis if Desired & Appropriate       Disposition stepdown to [facility] level of care while awaiting possible admission to swing bed which has been submitted for insurance approval and will not hear her answer until Monday due to the weekend.    Ruy Tenorio DO  [facility] Hospitalist  25  18:41 EDT      Electronically signed by Ruy Tenorio DO at 25 1844       Ruy Tenorio DO at 25              [facility] PROGRESS NOTE     Patient Identification:  Name:  Emma Ryan  Age:  84 y.o.  Sex:  female  :  1940  MRN:  [id number]  Visit Number:  [id number]  ROOM: [address] [place] [address]     Primary Care Provider:  Finn Nash MD    Length of stay in inpatient status:  8    Subjective     Chief Compliant:    Chief Complaint   Patient presents with    Weakness - Generalized       History of Presenting Illness: Patient seen evaluated in follow-up for generalized weakness with significant severe electrolyte disturbances with hypomagnesemia hypokalemia likely precipitating.  Patient seen and evaluated today while sitting up in chair eating lunch.  Patient reports that she is feeling well.  Patient unfortunately continues in inpatient status while awaiting insurance approval for swing bed delayed by onset of the weekend.    Objective      Current Hospital Meds:  Acidophilus/Pectin, 1 capsule, Oral, Daily  atorvastatin, 20 mg, Oral, Daily  castor oil-balsam peru, 1 Application, Topical, Q12H  cholecalciferol, 1,000 Units, Oral, Daily  docusate sodium, 100 mg, Oral, Daily  heparin (porcine), 5,000 Units, Subcutaneous, Q12H  [START ON 6/1/2025] metoprolol succinate XL, 12.5 mg, Oral, Q24H  oxybutynin XL, 10 mg, Oral, Daily  pantoprazole, 40 mg, Oral, Q AM  sodium chloride, 10 mL, Intravenous, Q12H           ----------------------------------------------------------------------------------------------------------------------  Vital Signs:  Temp:  [97.1 °F (36.2 °C)-98.5 °F (36.9 °C)] 97.6 °F (36.4 °C)  Heart Rate:  [71-84] 84  Resp:  [20] 20  BP: ()/(50-71) 110/62  SpO2:  [93 %-97 %] 94 %  on   ;   Device (Oxygen Therapy): room air  Body mass index is 29.39 kg/m².      Intake/Output Summary (Last 24 hours) at 5/31/2025 2006  Last data filed at 5/31/2025 1501  Gross per 24 hour   Intake 600 ml   Output 1650 ml   Net -1050 ml      ----------------------------------------------------------------------------------------------------------------------  Physical exam:  Constitutional: Pleasant elderly adult female resting in bed in no distress.  HENT:  Head:  Normocephalic and atraumatic.  Mouth:  Moist mucous membranes.    Eyes:  Conjunctivae and EOM are normal. No scleral icterus.    Cardiovascular:  Normal rate, regular rhythm and normal heart sounds with no murmur.  Pulmonary/Chest:  No respiratory distress, no wheezes, no crackles, with normal breath sounds and good air movement.  Abdominal:  Soft.  Bowel sounds are normal.  No distension and no tenderness.   Musculoskeletal:  No tenderness, and no deformity.  No red or swollen joints anywhere.    Neurological:  Alert and oriented to person, place, and time.  No cranial nerve deficit.  No tongue deviation.  No facial droop.  No slurred speech. Intact Sensation throughout  Skin:  Skin is warm and  "dry. No rash or lesion noted. No pallor.   Peripheral vascular:  Pulses in all 4 extremities with no clubbing, no cyanosis, trace edema present.  Psychiatric: Appropriate mood and affect, pleasant.   ----------------------------------------------------------------------------------------------------------------------     BUN/CREAT/GLUC/ALT/AST/MIRLANDE/LIP    31.7/1.46/107/--/--/--/-- (05/31 0334)  LYTES - Na/K/Cl/CO2: 136/4.4/100/23.4 (05/31 0334)        No results found for: \"URINECX\"  Blood Culture   Date Value Ref Range Status   05/23/2025 No growth at 24 hours  Preliminary   05/23/2025 No growth at 24 hours  Preliminary       I have personally looked at the labs and they are summarized above.  ----------------------------------------------------------------------------------------------------------------------  Detailed radiology reports for the last 24 hours:  No radiology results for the last day    Assessment & Plan      Severe hypomagnesemia  Moderate hypokalemia  Severe hyperkalemia  Acute generalized weakness    - Patient presenting with generalized weakness likely secondary to significant electrolyte disturbances with severe hypomagnesemia and moderate hypokalemia.    - Patient with initial presenting magnesium of 0.6 improved to 2.1 after supplementation.    - PT and OT consulted however holiday weekend, will try to get nurses up to ambulate patient tomorrow and if doing well and improved to back to her near baseline with replacement of electrolytes possible consideration for discharge back home.    - Patient with development of significant hyperkalemia with potassium increased to 6.9 on 5/25.  Patient status post insulin D50 and sodium bicarb with improvement to 6.6.  Patient remaining asymptomatic.  Lokelma administered.  Further potassium supplementatio held  and nurse/pharmacy directed electrolyte replacement protocol discontinued as well as holding patient's home spironolactone.  Repeat potassium today " remains stable at 4.4.  Blood pressure still within normal limits we will continue to hold Aldactone.  Will give lab holiday and recheck chemistry panel on Monday.    PERNELL on CKD stage IIIa    -Patient with development of increasing creatinine with rise to 1.8 today from 1.35 in a roughly 24-hour period consistent with acute kidney injury.  Patient with increased fatigue and reduced oral intake while here in hospital and given 1 L of normal saline with improvement in creatinine from 1.8-1.64 now further improved to 1.46.  Continue to monitor encourage oral intake.    - Avoid nephrotoxic agents and continue to trend creatinine while in hospital.    Hypertension    - Review of patient's home antihypertensive regiment shows significantly high doses of nifedipine and hydralazine, patient blood pressure normotensive at time of evaluation today and will hold hydralazine nifedipine and proceed with reduced dose Coreg but holding Aldactone as above.      Right foot drop  Peripheral neuropathy  Bilateral lower extremity weakness    - Patient seen and evaluated PT and OT and recommended for ongoing rehab with consideration of inpatient rehab but denied by insurance.  Patient agreeable to swing bed and submitted for preop and still pending.      Copied text in portions of the note has been reviewed and is accurate as of 05/31/25    VTE Prophylaxis:     Active VTE Prophylaxis:  Pharmacologic:        Start     Dose Route Frequency Stop    05/23/25 7814  heparin (porcine) 5000 UNIT/ML injection 5,000 Units         5,000 Units SC Every 12 Hours Scheduled --                  Select Mechanical VTE Prophylaxis if Desired & Appropriate       Disposition stepdown to [facility] level of care for possible admission to swing bed which has been submitted for insurance approval and will not hear her answer until Monday due to the weekend.    Ruy Tenorio DO  [facility] Hospitalist  05/31/25  20:06 EDT      Electronically signed by  Ruy Tenorio DO at 25       Ruy Tenorio DO at 25 4663              [facility]     Patient Identification:  Name:  Emma Ryan  Age:  84 y.o.  Sex:  female  :  1940  MRN:  [id number]  Visit Number:  [id number]  ROOM: [address] [place] [address]     Primary Care Provider:  Finn Nash MD    Length of stay in inpatient status:  7    Subjective     Chief Compliant:    Chief Complaint   Patient presents with    Weakness - Generalized       History of Presenting Illness: Patient seen evaluated in follow-up for generalized weakness with significant severe electrolyte disturbances with hypomagnesemia hypokalemia likely precipitating.  Patient seen and evaluated bedside today and denies any acute complaints.  Patient remains medically stable but still awaiting insurance approval for swing bed.    Objective     Current Hospital Meds:  Acidophilus/Pectin, 1 capsule, Oral, Daily  atorvastatin, 20 mg, Oral, Daily  carvedilol, 3.125 mg, Oral, BID With Meals  castor oil-balsam peru, 1 Application, Topical, Q12H  cholecalciferol, 1,000 Units, Oral, Daily  docusate sodium, 100 mg, Oral, Daily  heparin (porcine), 5,000 Units, Subcutaneous, Q12H  oxybutynin XL, 10 mg, Oral, Daily  pantoprazole, 40 mg, Oral, Q AM  sodium chloride, 10 mL, Intravenous, Q12H           ----------------------------------------------------------------------------------------------------------------------  Vital Signs:  Temp:  [97.1 °F (36.2 °C)-98.3 °F (36.8 °C)] 97.4 °F (36.3 °C)  Heart Rate:  [79-97] 84  Resp:  [16-20] 20  BP: (100-130)/(49-63) 130/60  SpO2:  [94 %-97 %] 97 %  on   ;   Device (Oxygen Therapy): room air  Body mass index is 27.97 kg/m².      Intake/Output Summary (Last 24 hours) at 2025 7661  Last data filed at 2025 1800  Gross per 24 hour   Intake 960 ml   Output 2300 ml   Net -1340 ml     "  ----------------------------------------------------------------------------------------------------------------------  Physical exam:  Constitutional: Pleasant elderly adult female resting in bed in no distress.  HENT:  Head:  Normocephalic and atraumatic.  Mouth:  Moist mucous membranes.    Eyes:  Conjunctivae and EOM are normal. No scleral icterus.    Cardiovascular:  Normal rate, regular rhythm and normal heart sounds with no murmur.  Pulmonary/Chest:  No respiratory distress, no wheezes, no crackles, with normal breath sounds and good air movement.  Abdominal:  Soft.  Bowel sounds are normal.  No distension and no tenderness.   Musculoskeletal:  No tenderness, and no deformity.  No red or swollen joints anywhere.    Neurological:  Alert and oriented to person, place, and time.  No cranial nerve deficit.  No tongue deviation.  No facial droop.  No slurred speech. Intact Sensation throughout  Skin:  Skin is warm and dry. No rash or lesion noted. No pallor.   Peripheral vascular:  Pulses in all 4 extremities with no clubbing, no cyanosis, trace edema present.  Psychiatric: Appropriate mood and affect, pleasant.   ----------------------------------------------------------------------------------------------------------------------     BUN/CREAT/GLUC/ALT/AST/MIRLANDE/LIP    35.4/1.64/98/--/--/--/-- (05/30 0139)  GRISELDA - Na/K/Cl/CO2: 137/4.4/101/21.9* (05/30 0139)        No results found for: \"URINECX\"  Blood Culture   Date Value Ref Range Status   05/23/2025 No growth at 24 hours  Preliminary   05/23/2025 No growth at 24 hours  Preliminary       I have personally looked at the labs and they are summarized above.  ----------------------------------------------------------------------------------------------------------------------  Detailed radiology reports for the last 24 hours:  No radiology results for the last day    Assessment & Plan      Severe hypomagnesemia  Moderate hypokalemia  Severe hyperkalemia  Acute " generalized weakness    - Patient presenting with generalized weakness likely secondary to significant electrolyte disturbances with severe hypomagnesemia and moderate hypokalemia.    - Patient with initial presenting magnesium of 0.6 improved to 2.1 after supplementation.    - PT and OT consulted however holiday weekend, will try to get nurses up to ambulate patient tomorrow and if doing well and improved to back to her near baseline with replacement of electrolytes possible consideration for discharge back home.    - Patient with development of significant hyperkalemia with potassium increased to 6.9 on 5/25.  Patient status post insulin D50 and sodium bicarb with improvement to 6.6.  Patient remaining asymptomatic.  Lokelma administered.  Further potassium supplementatio held  and nurse/pharmacy directed electrolyte replacement protocol discontinued as well as holding patient's home spironolactone.  Repeat potassium today remains stable at 4.4.  Blood pressure still within normal limits we will continue to hold Aldactone.    PERNELL on CKD stage IIIa    -Patient with development of increasing creatinine with rise to 1.8 today from 1.35 in a roughly 24-hour period consistent with acute kidney injury.  Patient with increased fatigue and reduced oral intake while here in hospital and given 1 L of normal saline with improvement in creatinine from 1.8-1.64.  Continue to monitor encourage oral intake.    - Avoid nephrotoxic agents and continue to trend creatinine while in hospital.    Hypertension    - Review of patient's home antihypertensive regiment shows significantly high doses of nifedipine and hydralazine, patient blood pressure normotensive at time of evaluation today and will hold hydralazine nifedipine and proceed with reduced dose Coreg but holding Aldactone as above.      Right foot drop  Peripheral neuropathy  Bilateral lower extremity weakness    - Patient seen and evaluated PT and OT and recommended for  ongoing rehab with consideration of inpatient rehab but denied by insurance.  Patient agreeable to swing bed and submitted for preop and still pending.      Copied text in portions of the note has been reviewed and is accurate as of 05/30/25    VTE Prophylaxis:     Active VTE Prophylaxis:  Pharmacologic:        Start     Dose Route Frequency Stop    05/23/25 3277  heparin (porcine) 5000 UNIT/ML injection 5,000 Units         5,000 Units SC Every 12 Hours Scheduled --                  Select Mechanical VTE Prophylaxis if Desired & Appropriate       Disposition stepdown to [facility] level of care for possible admission to swing bed which has been submitted for insurance approval.    Ruy Tenorio DO  [facility]  05/30/25  18:54 EDT      Electronically signed by Ruy Tenorio DO at 05/30/25 3952

## 2025-06-02 NOTE — NURSING NOTE
Patient to be transferred to 3N room 347B for swing-bed status. Report called to Eric SZYMANSKI. Wound care completed. No IV access. Cardiac monitoring not ordered. Patient to make family aware of room change. Belongings with patient for transport.

## 2025-06-03 ENCOUNTER — HOSPITAL ENCOUNTER (INPATIENT)
Facility: HOSPITAL | Age: 85
LOS: 13 days | Discharge: HOME HEALTH | End: 2025-06-16
Attending: FAMILY MEDICINE | Admitting: FAMILY MEDICINE
Payer: MEDICARE

## 2025-06-03 VITALS
HEIGHT: 59 IN | WEIGHT: 130.8 LBS | BODY MASS INDEX: 26.37 KG/M2 | OXYGEN SATURATION: 94 % | HEART RATE: 80 BPM | DIASTOLIC BLOOD PRESSURE: 59 MMHG | SYSTOLIC BLOOD PRESSURE: 104 MMHG | TEMPERATURE: 97.8 F | RESPIRATION RATE: 16 BRPM

## 2025-06-03 DIAGNOSIS — R53.81 PHYSICAL DECONDITIONING: Primary | ICD-10-CM

## 2025-06-03 PROCEDURE — 25010000002 HEPARIN (PORCINE) PER 1000 UNITS: Performed by: STUDENT IN AN ORGANIZED HEALTH CARE EDUCATION/TRAINING PROGRAM

## 2025-06-03 PROCEDURE — 97162 PT EVAL MOD COMPLEX 30 MIN: CPT

## 2025-06-03 PROCEDURE — 99238 HOSP IP/OBS DSCHRG MGMT 30/<: CPT | Performed by: FAMILY MEDICINE

## 2025-06-03 PROCEDURE — 25010000002 HEPARIN (PORCINE) PER 1000 UNITS: Performed by: FAMILY MEDICINE

## 2025-06-03 RX ORDER — OXYBUTYNIN CHLORIDE 5 MG/1
10 TABLET, EXTENDED RELEASE ORAL DAILY
Status: DISCONTINUED | OUTPATIENT
Start: 2025-06-04 | End: 2025-06-16 | Stop reason: HOSPADM

## 2025-06-03 RX ORDER — BISACODYL 5 MG/1
5 TABLET, DELAYED RELEASE ORAL DAILY PRN
Status: CANCELLED | OUTPATIENT
Start: 2025-06-03

## 2025-06-03 RX ORDER — DOCUSATE SODIUM 100 MG/1
100 CAPSULE, LIQUID FILLED ORAL DAILY
Status: CANCELLED | OUTPATIENT
Start: 2025-06-04

## 2025-06-03 RX ORDER — ATORVASTATIN CALCIUM 20 MG/1
20 TABLET, FILM COATED ORAL DAILY
Status: DISCONTINUED | OUTPATIENT
Start: 2025-06-04 | End: 2025-06-16 | Stop reason: HOSPADM

## 2025-06-03 RX ORDER — SODIUM CHLORIDE 9 MG/ML
40 INJECTION, SOLUTION INTRAVENOUS AS NEEDED
Status: CANCELLED | OUTPATIENT
Start: 2025-06-03

## 2025-06-03 RX ORDER — POLYETHYLENE GLYCOL 3350 17 G/17G
17 POWDER, FOR SOLUTION ORAL DAILY PRN
Status: CANCELLED | OUTPATIENT
Start: 2025-06-03

## 2025-06-03 RX ORDER — SODIUM CHLORIDE 9 MG/ML
40 INJECTION, SOLUTION INTRAVENOUS AS NEEDED
Status: DISCONTINUED | OUTPATIENT
Start: 2025-06-03 | End: 2025-06-16 | Stop reason: HOSPADM

## 2025-06-03 RX ORDER — CASTOR OIL AND BALSAM, PERU 788; 87 MG/G; MG/G
1 OINTMENT TOPICAL EVERY 12 HOURS SCHEDULED
Status: CANCELLED | OUTPATIENT
Start: 2025-06-03

## 2025-06-03 RX ORDER — HYDRALAZINE HYDROCHLORIDE 100 MG/1
100 TABLET, FILM COATED ORAL 3 TIMES DAILY
COMMUNITY
End: 2025-06-16 | Stop reason: HOSPADM

## 2025-06-03 RX ORDER — POLYETHYLENE GLYCOL 3350 17 G/17G
17 POWDER, FOR SOLUTION ORAL DAILY PRN
Status: DISCONTINUED | OUTPATIENT
Start: 2025-06-03 | End: 2025-06-16 | Stop reason: HOSPADM

## 2025-06-03 RX ORDER — KETOCONAZOLE 20 MG/G
1 CREAM TOPICAL DAILY PRN
Status: DISCONTINUED | OUTPATIENT
Start: 2025-06-03 | End: 2025-06-16 | Stop reason: HOSPADM

## 2025-06-03 RX ORDER — HEPARIN SODIUM 5000 [USP'U]/ML
5000 INJECTION, SOLUTION INTRAVENOUS; SUBCUTANEOUS EVERY 12 HOURS SCHEDULED
Status: CANCELLED | OUTPATIENT
Start: 2025-06-03

## 2025-06-03 RX ORDER — NITROGLYCERIN 0.4 MG/1
0.4 TABLET SUBLINGUAL
Status: CANCELLED | OUTPATIENT
Start: 2025-06-03

## 2025-06-03 RX ORDER — SODIUM CHLORIDE 0.9 % (FLUSH) 0.9 %
10 SYRINGE (ML) INJECTION EVERY 12 HOURS SCHEDULED
Status: CANCELLED | OUTPATIENT
Start: 2025-06-03

## 2025-06-03 RX ORDER — KETOCONAZOLE 20 MG/G
1 CREAM TOPICAL DAILY PRN
Status: CANCELLED | OUTPATIENT
Start: 2025-06-03

## 2025-06-03 RX ORDER — GARLIC EXTRACT 500 MG
1 CAPSULE ORAL DAILY
Status: DISCONTINUED | OUTPATIENT
Start: 2025-06-04 | End: 2025-06-16 | Stop reason: HOSPADM

## 2025-06-03 RX ORDER — NITROGLYCERIN 0.4 MG/1
0.4 TABLET SUBLINGUAL
Status: DISCONTINUED | OUTPATIENT
Start: 2025-06-03 | End: 2025-06-16 | Stop reason: HOSPADM

## 2025-06-03 RX ORDER — SODIUM CHLORIDE 0.9 % (FLUSH) 0.9 %
10 SYRINGE (ML) INJECTION AS NEEDED
Status: DISCONTINUED | OUTPATIENT
Start: 2025-06-03 | End: 2025-06-16 | Stop reason: HOSPADM

## 2025-06-03 RX ORDER — CHOLECALCIFEROL (VITAMIN D3) 25 MCG
1000 TABLET ORAL DAILY
Status: DISCONTINUED | OUTPATIENT
Start: 2025-06-04 | End: 2025-06-16 | Stop reason: HOSPADM

## 2025-06-03 RX ORDER — AMOXICILLIN 250 MG
2 CAPSULE ORAL 2 TIMES DAILY PRN
Status: DISCONTINUED | OUTPATIENT
Start: 2025-06-03 | End: 2025-06-16 | Stop reason: HOSPADM

## 2025-06-03 RX ORDER — CHOLECALCIFEROL (VITAMIN D3) 25 MCG
1000 TABLET ORAL DAILY
Status: CANCELLED | OUTPATIENT
Start: 2025-06-04

## 2025-06-03 RX ORDER — METOPROLOL SUCCINATE 25 MG/1
12.5 TABLET, EXTENDED RELEASE ORAL
Status: DISCONTINUED | OUTPATIENT
Start: 2025-06-04 | End: 2025-06-16 | Stop reason: HOSPADM

## 2025-06-03 RX ORDER — POTASSIUM CHLORIDE 1500 MG/1
20 TABLET, EXTENDED RELEASE ORAL DAILY
COMMUNITY
End: 2025-06-16 | Stop reason: HOSPADM

## 2025-06-03 RX ORDER — METOPROLOL SUCCINATE 25 MG/1
12.5 TABLET, EXTENDED RELEASE ORAL
Status: CANCELLED | OUTPATIENT
Start: 2025-06-04

## 2025-06-03 RX ORDER — ACETAMINOPHEN 325 MG/1
650 TABLET ORAL EVERY 6 HOURS PRN
Status: CANCELLED | OUTPATIENT
Start: 2025-06-03

## 2025-06-03 RX ORDER — HEPARIN SODIUM 5000 [USP'U]/ML
5000 INJECTION, SOLUTION INTRAVENOUS; SUBCUTANEOUS EVERY 12 HOURS SCHEDULED
Status: DISCONTINUED | OUTPATIENT
Start: 2025-06-03 | End: 2025-06-16 | Stop reason: HOSPADM

## 2025-06-03 RX ORDER — GARLIC EXTRACT 500 MG
1 CAPSULE ORAL DAILY
Status: CANCELLED | OUTPATIENT
Start: 2025-06-04

## 2025-06-03 RX ORDER — OXYBUTYNIN CHLORIDE 5 MG/1
10 TABLET, EXTENDED RELEASE ORAL DAILY
Status: CANCELLED | OUTPATIENT
Start: 2025-06-04

## 2025-06-03 RX ORDER — SODIUM CHLORIDE 0.9 % (FLUSH) 0.9 %
10 SYRINGE (ML) INJECTION EVERY 12 HOURS SCHEDULED
Status: DISCONTINUED | OUTPATIENT
Start: 2025-06-03 | End: 2025-06-16 | Stop reason: HOSPADM

## 2025-06-03 RX ORDER — BISACODYL 10 MG
10 SUPPOSITORY, RECTAL RECTAL DAILY PRN
Status: DISCONTINUED | OUTPATIENT
Start: 2025-06-03 | End: 2025-06-16 | Stop reason: HOSPADM

## 2025-06-03 RX ORDER — DOCUSATE SODIUM 100 MG/1
100 CAPSULE, LIQUID FILLED ORAL DAILY
Status: DISCONTINUED | OUTPATIENT
Start: 2025-06-04 | End: 2025-06-16 | Stop reason: HOSPADM

## 2025-06-03 RX ORDER — CASTOR OIL AND BALSAM, PERU 788; 87 MG/G; MG/G
1 OINTMENT TOPICAL EVERY 12 HOURS SCHEDULED
Status: DISCONTINUED | OUTPATIENT
Start: 2025-06-03 | End: 2025-06-16 | Stop reason: HOSPADM

## 2025-06-03 RX ORDER — ACETAMINOPHEN 325 MG/1
650 TABLET ORAL EVERY 6 HOURS PRN
Status: DISCONTINUED | OUTPATIENT
Start: 2025-06-03 | End: 2025-06-16 | Stop reason: HOSPADM

## 2025-06-03 RX ORDER — AMOXICILLIN 250 MG
2 CAPSULE ORAL 2 TIMES DAILY PRN
Status: CANCELLED | OUTPATIENT
Start: 2025-06-03

## 2025-06-03 RX ORDER — ATORVASTATIN CALCIUM 20 MG/1
20 TABLET, FILM COATED ORAL DAILY
Status: CANCELLED | OUTPATIENT
Start: 2025-06-04

## 2025-06-03 RX ORDER — CARVEDILOL 25 MG/1
25 TABLET ORAL 2 TIMES DAILY WITH MEALS
COMMUNITY
End: 2025-06-16 | Stop reason: HOSPADM

## 2025-06-03 RX ORDER — PANTOPRAZOLE SODIUM 40 MG/1
40 TABLET, DELAYED RELEASE ORAL
Status: DISCONTINUED | OUTPATIENT
Start: 2025-06-04 | End: 2025-06-16 | Stop reason: HOSPADM

## 2025-06-03 RX ORDER — CALCIUM CARBONATE 500 MG/1
2 TABLET, CHEWABLE ORAL 3 TIMES DAILY PRN
Status: CANCELLED | OUTPATIENT
Start: 2025-06-03

## 2025-06-03 RX ORDER — BISACODYL 10 MG
10 SUPPOSITORY, RECTAL RECTAL DAILY PRN
Status: CANCELLED | OUTPATIENT
Start: 2025-06-03

## 2025-06-03 RX ORDER — BISACODYL 5 MG/1
5 TABLET, DELAYED RELEASE ORAL DAILY PRN
Status: DISCONTINUED | OUTPATIENT
Start: 2025-06-03 | End: 2025-06-16 | Stop reason: HOSPADM

## 2025-06-03 RX ORDER — CALCIUM CARBONATE 500 MG/1
2 TABLET, CHEWABLE ORAL 3 TIMES DAILY PRN
Status: DISCONTINUED | OUTPATIENT
Start: 2025-06-03 | End: 2025-06-16 | Stop reason: HOSPADM

## 2025-06-03 RX ORDER — PANTOPRAZOLE SODIUM 40 MG/1
40 TABLET, DELAYED RELEASE ORAL
Status: CANCELLED | OUTPATIENT
Start: 2025-06-04

## 2025-06-03 RX ORDER — SODIUM CHLORIDE 0.9 % (FLUSH) 0.9 %
10 SYRINGE (ML) INJECTION AS NEEDED
Status: CANCELLED | OUTPATIENT
Start: 2025-06-03

## 2025-06-03 RX ADMIN — HEPARIN SODIUM 5000 UNITS: 5000 INJECTION INTRAVENOUS; SUBCUTANEOUS at 20:41

## 2025-06-03 RX ADMIN — DOCUSATE SODIUM 100 MG: 100 CAPSULE, LIQUID FILLED ORAL at 09:05

## 2025-06-03 RX ADMIN — Medication 1 CAPSULE: at 09:05

## 2025-06-03 RX ADMIN — Medication 1000 UNITS: at 09:05

## 2025-06-03 RX ADMIN — OXYBUTYNIN CHLORIDE 10 MG: 5 TABLET, EXTENDED RELEASE ORAL at 09:05

## 2025-06-03 RX ADMIN — PANTOPRAZOLE SODIUM 40 MG: 40 TABLET, DELAYED RELEASE ORAL at 05:09

## 2025-06-03 RX ADMIN — Medication 1 APPLICATION: at 20:41

## 2025-06-03 RX ADMIN — Medication 1 APPLICATION: at 09:07

## 2025-06-03 RX ADMIN — HEPARIN SODIUM 5000 UNITS: 5000 INJECTION INTRAVENOUS; SUBCUTANEOUS at 09:06

## 2025-06-03 RX ADMIN — ATORVASTATIN CALCIUM 20 MG: 20 TABLET, FILM COATED ORAL at 09:05

## 2025-06-03 NOTE — PLAN OF CARE
Goal Outcome Evaluation:         Pt a/ox4 this shift, VSS, pt got up to BSC with RN, has not had any complaints of pain this shift, wound care completed per order. Pt is resting in bed at this time without complaint POC ongoing

## 2025-06-03 NOTE — NURSING NOTE
WOCN consult received related to swing bed admission. Patient continues with right gluteal pressure injury and medial sacral spine pressure injury. Wound care orders remain in place.  PI prevention orders remain in place.     Lucio score 16.

## 2025-06-03 NOTE — CASE MANAGEMENT/SOCIAL WORK
Case Management/Social Work    Patient Name:  Emma Ryan  YOB: 1940  MRN: [id number]  Admit Date:  (Not on file)        Patient has been approved for 5 initial swing bed days with clinical updates due on 6/5/25. SS and provider notified via secure chat.       Electronically signed by:  Nicki Carter RN  06/03/25 11:17 EDT

## 2025-06-03 NOTE — PLAN OF CARE
Goal Outcome Evaluation:  Plan of Care Reviewed With: patient        Progress: no change  Outcome Evaluation: Pt resting in bed at this time. PT has been up and ambulated to the chair multiple times. Wound care completed per orders. Pt has had no complaints or concerns noted at this time. Plan of care ongoing.

## 2025-06-03 NOTE — CASE MANAGEMENT/SOCIAL WORK
Discharge Planning Assessment  FLORIN Mims     Patient Name: Emma Ryan  MRN: [id number]  Today's Date: 6/3/2025    Admit Date: 5/23/2025            Discharge Plan       Row Name 06/03/25 0946       Plan    Plan Provider and SS notified that swing bed has been approved with next clinical updates due 6/5. SS to follow.                  ALVARO Bocanegra

## 2025-06-03 NOTE — THERAPY EVALUATION
Acute Care - Physical Therapy Initial Evaluation   Prasanna     Patient Name: Emma Ryan  : 1940  MRN: [id number]  Today's Date: 6/3/2025      Visit Dx:   No diagnosis found.  Patient Active Problem List   Diagnosis    Complete tear of right rotator cuff    Stage 3a chronic kidney disease    Cough    Gastroesophageal reflux disease    Mixed hyperlipidemia    Shoulder pain    Hypertension    Acromioclavicular joint arthritis    Impingement syndrome, shoulder    Complete tear of left rotator cuff    Urge incontinence    Atopic rhinitis    Upper respiratory tract infection    Bilateral lower extremity edema    Hypercalcemia    Right knee pain    DADA (stress urinary incontinence, female)    Obesity (BMI 35.0-39.9 without comorbidity)    Left knee injury    Acute UTI    COVID-19 virus infection    Skin lesion of scalp    Numbness in left leg    Multiple bruises    Vitamin D deficiency    Laceration of right lower leg    Chronic chest pain with low to moderate risk for CAD    Degenerative disc disease, lumbar    Valvular heart disease, mild aortic and mitral regurgitation    Diastolic dysfunction without heart failure    Hypomagnesemia     Past Medical History:   Diagnosis Date    GERD (gastroesophageal reflux disease)     Hyperlipidemia     Hypertension     Left shoulder pain     Obesity     Osteoarthritis of knees, bilateral     Overactive bladder     Right shoulder pain      Past Surgical History:   Procedure Laterality Date    APPENDECTOMY      BUNIONECTOMY Right     CARDIOVASCULAR STRESS TEST  10/2012    CATARACT EXTRACTION  2017    CHOLECYSTECTOMY      COLONOSCOPY      ECHO - CONVERTED  10/2012    JOINT REPLACEMENT      bilateral knees     KNEE ARTHROPLASTY Left     KNEE ARTHROSCOPY      SHOULDER ARTHROSCOPY Left 2016    Procedure: LEFT SHOULDER ACROMIOPLASTY,MINI OPEN ROTATOR CUFF REPAIR;  Surgeon: Carlos Eduardo Smith MD;  [facility];  Service:     SHOULDER SURGERY  2016     OPEN ACROMICPLASTY RIGHT SHOULDER     PT Assessment (Last 12 Hours)       PT Evaluation and Treatment       Row Name 06/03/25 1401          Physical Therapy Time and Intention    Subjective Information complains of;weakness;fatigue  -KM     Document Type evaluation  -KM     Mode of Treatment individual therapy;physical therapy  -KM     Patient Effort good  -KM     Symptoms Noted During/After Treatment fatigue  -KM       Row Name 06/03/25 1401          General Information    Patient Profile Reviewed yes  -KM     Patient Observations alert;cooperative;agree to therapy  -KM     Prior Level of Function independent:;all household mobility;ADL's  modified independent w/ rollator, per pt. report  -KM     Existing Precautions/Restrictions fall  -KM     Risks Reviewed patient:;LOB;nausea/vomiting;dizziness;increased discomfort  -KM     Benefits Reviewed patient:;improve function;increase independence;increase strength;increase balance  -KM     Barriers to Rehab medically complex  -KM       Row Name 06/03/25 1401          Living Environment    Current Living Arrangements home  -KM     People in Home spouse  -KM     Primary Care Provided by self  -KM       Row Name 06/03/25 1401          Home Use of Assistive/Adaptive Equipment    Equipment Currently Used at Home rollator  -KM       Row Name 06/03/25 1401          Pain    Pretreatment Pain Rating 0/10 - no pain  -KM     Posttreatment Pain Rating 0/10 - no pain  -KM       Row Name 06/03/25 1401          Cognition    Affect/Mental Status (Cognition) WFL  -KM     Orientation Status (Cognition) oriented x 3  -KM     Follows Commands (Cognition) WFL  -KM       Row Name 06/03/25 1401          Range of Motion (ROM)    Range of Motion bilateral lower extremities;ROM is WFL  R ankle dorsiflexion limited from foot drop  -KM       Row Name 06/03/25 1401          Strength (Manual Muscle Testing)    Strength (Manual Muscle Testing) left lower extremity;strength is WFL  RLE strength grossly  3+/5; RLE dorsiflexion 0/5  -KM       Row Name 06/03/25 1401          Bed Mobility    Bed Mobility bed mobility (all) activities  -KM     All Activities, Frost (Bed Mobility) moderate assist (50% patient effort)  -KM     Bed Mobility, Safety Issues decreased use of legs for bridging/pushing  -KM     Assistive Device (Bed Mobility) bed rails;head of bed elevated  -KM       Row Name 06/03/25 1401          Transfers    Transfers sit-stand transfer;stand-sit transfer  -KM       Row Name 06/03/25 1401          Sit-Stand Transfer    Sit-Stand Frost (Transfers) moderate assist (50% patient effort);maximum assist (25% patient effort)  -KM     Assistive Device (Sit-Stand Transfers) walker, front-wheeled  -KM       Row Name 06/03/25 1401          Stand-Sit Transfer    Stand-Sit Frost (Transfers) moderate assist (50% patient effort)  -KM     Assistive Device (Stand-Sit Transfers) walker, front-wheeled  -KM       Row Name 06/03/25 1401          Gait/Stairs (Locomotion)    Gait/Stairs Locomotion gait/ambulation independence;gait/ambulation assistive device;distance ambulated  -KM     Frost Level (Gait) maximum assist (25% patient effort)  -KM     Assistive Device (Gait) walker, front-wheeled  -KM     Patient was able to Ambulate yes  -KM     Distance in Feet (Gait) 3  x3  -KM     Pattern (Gait) step-to  -KM     Deviations/Abnormal Patterns (Gait) ataxic;base of support, narrow;gait speed decreased  -KM     Right Sided Gait Deviations foot drop/toe drag;knee buckling, right side  -KM       Row Name 06/03/25 1401          Safety Issues/Impairments Affecting Functional Mobility    Impairments Affecting Function (Mobility) balance;endurance/activity tolerance;strength  -KM       Row Name 06/03/25 1401          Balance    Balance Assessment sitting static balance;standing dynamic balance  -KM     Static Sitting Balance standby assist  -KM     Position, Sitting Balance sitting edge of bed;sitting in chair   -KM     Dynamic Standing Balance maximum assist  -KM     Position/Device Used, Standing Balance walker, front-wheeled  -KM       Row Name 06/03/25 1401          Motor Skills    Comments, Therapeutic Exercise seated ther-ex  -KM     Additional Documentation Comments, Therapeutic Exercise (Row)  -KM       Row Name             Wound 05/29/25 0411 medial sacral spine Pressure Injury    Wound - Properties Group Placement Date: 05/29/25  -SM Placement Time: 0411 -SM Present on Original Admission: N  -SM Orientation: medial  -SM Location: sacral spine  -SM Primary Wound Type: Pressure Inj  -TW    Retired Wound - Properties Group Placement Date: 05/29/25  -SM Placement Time: 0411 -SM Present on Original Admission: N  -SM Orientation: medial  -SM Location: sacral spine  -SM    Retired Wound - Properties Group Placement Date: 05/29/25  -SM Placement Time: 0411 -SM Present on Original Admission: N  -SM Orientation: medial  -SM Location: sacral spine  -SM    Retired Wound - Properties Group Date first assessed: 05/29/25  -SM Time first assessed: 0411 -SM Present on Original Admission: N  -SM Location: sacral spine  -SM      Row Name             Wound 05/26/25 1948 Right gluteal Pressure Injury    Wound - Properties Group Placement Date: 05/26/25  -PL Placement Time: 1948  -PL Present on Original Admission: Y  -TW Side: Right  -TW Location: gluteal  -PL Primary Wound Type: Pressure Inj  -TW    Retired Wound - Properties Group Placement Date: 05/26/25  -PL Placement Time: 1948  -PL Present on Original Admission: Y  -TW Side: Right  -TW Location: gluteal  -PL    Retired Wound - Properties Group Placement Date: 05/26/25  -PL Placement Time: 1948  -PL Present on Original Admission: Y  -TW Side: Right  -TW Location: gluteal  -PL    Retired Wound - Properties Group Date first assessed: 05/26/25  -PL Time first assessed: 1948  -PL Present on Original Admission: Y  -TW Side: Right  -TW Location: gluteal  -PL      Row Name 06/03/25  1401          Plan of Care Review    Plan of Care Reviewed With patient  -     Outcome Evaluation Pt. evaluation completed during PT session. She was able to perform functional mobility skills w/ modA-maxA. She was able to ambulate minimal distance w/ RW and maxA. She tolerated session well. Pt. would benefit from skilled PT services to improve functional mobility skills prior to dischare.  -       Row Name 06/03/25 1401          Therapy Assessment/Plan (PT)    Patient/Family Therapy Goals Statement (PT) return to PLOF  -KM     Functional Level at Time of Evaluation (PT) modA-maxA  -KM     PT Diagnosis (PT) decreased mobility skills  -KM     Rehab Potential (PT) fair  -KM     Criteria for Skilled Interventions Met (PT) yes;skilled treatment is necessary  -KM     Therapy Frequency (PT) 5 times/wk  -KM     Predicted Duration of Therapy Intervention (PT) until discharge  -     Problem List (PT) problems related to;balance;mobility;range of motion (ROM);strength  -KM     Activity Limitations Related to Problem List (PT) unable to ambulate safely;unable to transfer safely  -KM       Row Name 06/03/25 1401          Therapy Plan Review/Discharge Plan (PT)    Therapy Plan Review (PT) evaluation/treatment results reviewed;care plan/treatment goals reviewed;risks/benefits reviewed;patient  -KM       Row Name 06/03/25 1401          Physical Therapy Goals    Bed Mobility Goal Selection (PT) bed mobility, PT goal 1  -KM     Transfer Goal Selection (PT) transfer, PT goal 1  -KM     Gait Training Goal Selection (PT) gait training, PT goal 1  -KM       Row Name 06/03/25 1401          Bed Mobility Goal 1 (PT)    Activity/Assistive Device (Bed Mobility Goal 1, PT) bed mobility activities, all  -KM     [place] Level/Cues Needed (Bed Mobility Goal 1, PT) modified independence  -KM     Time Frame (Bed Mobility Goal 1, PT) by discharge  -KM       Row Name 06/03/25 1401          Transfer Goal 1 (PT)    Activity/Assistive Device  (Transfer Goal 1, PT) transfers, all;walker, rolling  -KM     [place] Level/Cues Needed (Transfer Goal 1, PT) modified independence  -KM     Time Frame (Transfer Goal 1, PT) by discharge  -       Row Name 06/03/25 1401          Gait Training Goal 1 (PT)    Activity/Assistive Device (Gait Training Goal 1, PT) gait (walking locomotion);assistive device use;walker, rolling  -KM     [place] Level (Gait Training Goal 1, PT) standby assist  -KM     Distance (Gait Training Goal 1, PT) 100'  -KM     Time Frame (Gait Training Goal 1, PT) by discharge  -               User Key  (r) = Recorded By, (t) = Taken By, (c) = Cosigned By      Initials Name Provider Type    Seble Ch, RN Registered Nurse    Steph Yin RN Registered Nurse    Roni John, PT Physical Therapist    Raquel Ledesma RN Registered Nurse                    Physical Therapy Education       Title: PT OT SLP Therapies (Done)       Topic: Physical Therapy (Done)       Point: Mobility training (Done)       Learning Progress Summary            Patient Acceptance, E,TB, VU by  at 6/3/2025 1415                      Point: Home exercise program (Done)       Learning Progress Summary            Patient Acceptance, E,TB, VU by  at 6/3/2025 1415                      Point: Body mechanics (Done)       Learning Progress Summary            Patient Acceptance, E,TB, VU by  at 6/3/2025 1415                      Point: Precautions (Done)       Learning Progress Summary            Patient Acceptance, E,TB, VU by  at 6/3/2025 1415                                      User Key       Initials Effective Dates Name Provider Type Discipline     05/24/22 -  Roni Mar, PT Physical Therapist PT                  PT Recommendation and Plan  Anticipated Discharge Disposition (PT):  (TBD)  Planned Therapy Interventions (PT): balance training, bed mobility training, gait training, home exercise program, patient/family education,  postural re-education, ROM (range of motion), strengthening, stretching, transfer training, wheelchair management/propulsion training  Therapy Frequency (PT): 5 times/wk  Plan of Care Reviewed With: patient  Outcome Evaluation: Pt. evaluation completed during PT session. She was able to perform functional mobility skills w/ modA-maxA. She was able to ambulate minimal distance w/ RW and maxA. She tolerated session well. Pt. would benefit from skilled PT services to improve functional mobility skills prior to dischare.       Time Calculation:    PT Charges       Row Name 06/03/25 1359             Time Calculation    PT Received On 06/03/25  -KM      PT Goal Re-Cert Due Date 06/17/25  -KM                User Key  (r) = Recorded By, (t) = Taken By, (c) = Cosigned By      Initials Name Provider Type    Roni John, PT Physical Therapist                  Therapy Charges for Today       Code Description Service Date Service Provider Modifiers Qty    55095021854 HC PT EVAL MOD COMPLEXITY 4 6/3/2025 Roni Mar, PT GP 1            PT G-Codes  AM-PAC 6 Clicks Score (PT): 15    Roni Mar PT  6/3/2025

## 2025-06-03 NOTE — DISCHARGE SUMMARY
[facility] HOSPITALISTS DISCHARGE SUMMARY    Patient Identification:  Name:  Emma Ryan  Age:  84 y.o.  Sex:  female  :  1940  MRN:  [id number]  Visit Number:  [id number]    Date of Admission: 2025  Date of Discharge:  6/3/2025     PCP: Finn Nash MD    DISCHARGE DIAGNOSIS  Severe hypomagnesemia  Moderate hypokalemia  Severe hyperkalemia  Acute generalized weakness  PERNELL  Hypertension    CONSULTS   Consults       Date and Time Order Name Status Description    2025  8:15 PM Hospitalist (on-call MD unless specified)              PROCEDURES PERFORMED      HOSPITAL COURSE  Patient is a 84 y.o. female presented to [facility] complaining of weakness.  Please see the admitting history and physical for further details.    Patient presented initially with hypomagnesemia and hypokalemia.  The potassium got replaced and went too high up to 6.9.  She received the hyperkalemia regimen.  And her potassium normalized.  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  Patient was found to be extremely weak throughout her hospitalization.  It was decided the patient should be put in a swing bed for PT and OT before discharged home.  Will continue to monitor laboratory data and replace accordingly.    VITAL SIGNS:  Temp:  [97.8 °F (36.6 °C)-98.7 °F (37.1 °C)] 97.8 °F (36.6 °C)  Heart Rate:  [80-88] 80  Resp:  [16-18] 16  BP: (103-116)/(59-73) 104/59  SpO2:  [94 %] 94 %  on   ;   Device (Oxygen Therapy): room air    Body mass index is 26.42 kg/m².  Wt Readings from Last 3 Encounters:   25 59.3 kg (130 lb 12.8 oz)   25 71.7 kg (158 lb 1.1 oz)   24 71.7 kg (158 lb)       PHYSICAL EXAM:  Constitutional:  Well-developed and well-nourished.  No acute distress.      HENT:  Head:  Normocephalic and atraumatic.  Mouth:  Moist mucous membranes.    Eyes:  Conjunctivae and EOM are normal. No scleral icterus.    Neck:  Neck supple.  No JVD  present.    Cardiovascular:  Normal rate, regular rhythm and normal heart sounds with no murmur.  Pulmonary/Chest:  No respiratory distress, no wheezes, no crackles, with normal breath sounds and good air movement.  Abdominal:  Soft. No distension and no tenderness.   Musculoskeletal:  No tenderness, and no deformity.  No red or swollen joints anywhere.    Neurological:  Alert and oriented to person, place, and time.  No cranial nerve deficit.    Skin:  Skin is warm and dry. No rash noted. No pallor.   Peripheral vascular:  No clubbing, no cyanosis, no edema.  Psychiatric: Appropriate mood and affect  :     DISCHARGE DISPOSITION   Stable    DISCHARGE MEDICATIONS:     Discharge Medications        New Medications        Instructions Start Date   magnesium oxide 400 MG tablet  Commonly known as: MAG-OX   400 mg, Oral, Daily             Changes to Medications        Instructions Start Date   carvedilol 25 MG tablet  Commonly known as: COREG  What changed: how much to take   12.5 mg, Oral, 2 Times Daily With Meals             Continue These Medications        Instructions Start Date   atorvastatin 20 MG tablet  Commonly known as: LIPITOR   Take 1 tablet by mouth once daily      ketoconazole 2 % cream  Commonly known as: NIZORAL   1 Application, Topical, Daily PRN      ketoconazole 2 % shampoo  Commonly known as: NIZORAL   1 Application, Topical, 2 Times Weekly      omeprazole OTC 20 MG EC tablet  Commonly known as: PriLOSEC OTC   20 mg, Daily      oxybutynin XL 10 MG 24 hr tablet  Commonly known as: DITROPAN-XL   10 mg, Oral, Daily      PROBIOTIC COLON SUPPORT PO   Daily      spironolactone 25 MG tablet  Commonly known as: ALDACTONE   25 mg, Oral, Daily      STOOL SOFTENER PO   1 tablet, Daily      VITAMIN D PO   1,000 Units, Daily             Stop These Medications      hydrALAZINE 100 MG tablet  Commonly known as: APRESOLINE     NIFEdipine XL 30 MG 24 hr tablet  Commonly known as: PROCARDIA XL     potassium chloride  20 MEQ CR tablet  Commonly known as: KLOR-CON M20              Diet Instructions    Diet: Regular/House; Fluid Consistency: Thin          Activity Instructions    As tolerated.          Additional Instructions for the Follow-ups that You Need to Schedule       Ambulatory Referral to Home Health   As directed      Face to Face Visit Date: 5/25/2025   Follow-up provider for Plan of Care?: I treated the patient in an acute care facility and will not continue treatment after discharge.   Follow-up provider: JORGE L RAM [[id number]]   Reason/Clinical Findings: age associated physical debility,  hypomagnesemia and hypokalemia, medication education   Describe mobility limitations that make leaving home difficult: save as above as well as advanced age with dependent    Nursing/Therapeutic Services Requested: Physical Therapy Occupational Therapy Skilled Nursing   Skilled nursing orders: Medication education   Frequency: 1 Week 1        Discharge Follow-up with PCP   As directed       Currently Documented PCP:    Jorge L Ram MD    PCP Phone Number:    [phone]     Follow Up Details: 1 week follow up               Contact information for follow-up providers       Jorge L Ram MD Follow up in 1 week(s).    Specialty: Nephrology  Why: Office is closed, will call patient about follow-up appt. with Dr. Ram on 05/27/25.  Contact information:  [address] [place] [id number] [phone]                       Contact information for after-discharge care       Destination       [facility] .    Service: Skilled Nursing  Contact information:  [address] [place] [id number] [phone]                                    TEST  RESULTS PENDING AT DISCHARGE       CODE STATUS  Code Status and Medical Interventions: CPR (Attempt to Resuscitate); Full Support   Ordered at: 05/23/25 2021     Code Status (Patient has no pulse and is not breathing):    CPR (Attempt to Resuscitate)      Medical Interventions (Patient has pulse or is breathing):    Full Support       Glenroy Lyn DO  [facility]  06/03/25  13:45 EDT    Please note that this discharge summary required more than 30 minutes to complete.

## 2025-06-04 PROCEDURE — 97530 THERAPEUTIC ACTIVITIES: CPT

## 2025-06-04 PROCEDURE — 25010000002 HEPARIN (PORCINE) PER 1000 UNITS: Performed by: FAMILY MEDICINE

## 2025-06-04 PROCEDURE — 97116 GAIT TRAINING THERAPY: CPT

## 2025-06-04 PROCEDURE — 97166 OT EVAL MOD COMPLEX 45 MIN: CPT

## 2025-06-04 RX ADMIN — HEPARIN SODIUM 5000 UNITS: 5000 INJECTION INTRAVENOUS; SUBCUTANEOUS at 20:05

## 2025-06-04 RX ADMIN — DOCUSATE SODIUM 100 MG: 100 CAPSULE, LIQUID FILLED ORAL at 08:26

## 2025-06-04 RX ADMIN — OXYBUTYNIN CHLORIDE 10 MG: 5 TABLET, EXTENDED RELEASE ORAL at 08:26

## 2025-06-04 RX ADMIN — HEPARIN SODIUM 5000 UNITS: 5000 INJECTION INTRAVENOUS; SUBCUTANEOUS at 08:26

## 2025-06-04 RX ADMIN — PANTOPRAZOLE SODIUM 40 MG: 40 TABLET, DELAYED RELEASE ORAL at 06:00

## 2025-06-04 RX ADMIN — ATORVASTATIN CALCIUM 20 MG: 20 TABLET, FILM COATED ORAL at 08:26

## 2025-06-04 RX ADMIN — Medication 1 APPLICATION: at 08:26

## 2025-06-04 RX ADMIN — Medication 1000 UNITS: at 08:26

## 2025-06-04 RX ADMIN — Medication 5 MG: at 20:06

## 2025-06-04 RX ADMIN — Medication 1 APPLICATION: at 20:06

## 2025-06-04 RX ADMIN — Medication 1 CAPSULE: at 08:26

## 2025-06-04 RX ADMIN — METOPROLOL SUCCINATE 12.5 MG: 25 TABLET, EXTENDED RELEASE ORAL at 08:25

## 2025-06-04 NOTE — THERAPY TREATMENT NOTE
Acute Care - Physical Therapy Treatment Note   Prasanna     Patient Name: Emma Ryan  : 1940  MRN: [id number]  Today's Date: 2025      Visit Dx:   No diagnosis found.  Patient Active Problem List   Diagnosis    Complete tear of right rotator cuff    Stage 3a chronic kidney disease    Cough    Gastroesophageal reflux disease    Mixed hyperlipidemia    Shoulder pain    Hypertension    Acromioclavicular joint arthritis    Impingement syndrome, shoulder    Complete tear of left rotator cuff    Urge incontinence    Atopic rhinitis    Upper respiratory tract infection    Bilateral lower extremity edema    Hypercalcemia    Right knee pain    DADA (stress urinary incontinence, female)    Obesity (BMI 35.0-39.9 without comorbidity)    Left knee injury    Acute UTI    COVID-19 virus infection    Skin lesion of scalp    Numbness in left leg    Multiple bruises    Vitamin D deficiency    Laceration of right lower leg    Chronic chest pain with low to moderate risk for CAD    Degenerative disc disease, lumbar    Valvular heart disease, mild aortic and mitral regurgitation    Diastolic dysfunction without heart failure    Hypomagnesemia     Past Medical History:   Diagnosis Date    GERD (gastroesophageal reflux disease)     Hyperlipidemia     Hypertension     Left shoulder pain     Obesity     Osteoarthritis of knees, bilateral     Overactive bladder     Right shoulder pain      Past Surgical History:   Procedure Laterality Date    APPENDECTOMY      BUNIONECTOMY Right     CARDIOVASCULAR STRESS TEST  10/2012    CATARACT EXTRACTION  2017    CHOLECYSTECTOMY      COLONOSCOPY      ECHO - CONVERTED  10/2012    JOINT REPLACEMENT      bilateral knees     KNEE ARTHROPLASTY Left     KNEE ARTHROSCOPY      SHOULDER ARTHROSCOPY Left 2016    Procedure: LEFT SHOULDER ACROMIOPLASTY,MINI OPEN ROTATOR CUFF REPAIR;  Surgeon: Carlos Eduardo Smith MD;  Location: [facility];  Service:     SHOULDER SURGERY  2016     OPEN ACROMICPLASTY RIGHT SHOULDER     PT Assessment (Last 12 Hours)       PT Evaluation and Treatment       Row Name 06/04/25 1411          Physical Therapy Time and Intention    Subjective Information complains of;weakness;fatigue  -KM     Document Type therapy note (daily note)  -KM     Mode of Treatment physical therapy  -KM     Patient Effort good  -KM     Symptoms Noted During/After Treatment fatigue  -KM       Row Name 06/04/25 1411          General Information    Patient Profile Reviewed yes  -KM     Patient Observations alert;cooperative;agree to therapy  -KM     Existing Precautions/Restrictions fall  -KM       Row Name 06/04/25 1411          Pain    Pretreatment Pain Rating 0/10 - no pain  -KM     Posttreatment Pain Rating 0/10 - no pain  -KM       [facility] Name 06/04/25 1411          Cognition    Affect/Mental Status (Cognition) WFL  -KM     Orientation Status (Cognition) oriented x 3  -KM     Follows Commands (Cognition) WFL  -KM       [facility] Name 06/04/25 1411          Bed Mobility    Comment, (Bed Mobility) pt. up in chair upon arrival  -KM       [facility] Name 06/04/25 1411          Transfers    Transfers sit-stand transfer;stand-sit transfer  -[facility] Name 06/04/25 1411          Sit-Stand Transfer    Sit-Stand [place] (Transfers) minimum assist (75% patient effort)  -KM     Assistive Device (Sit-Stand Transfers) walker, front-wheeled  -KM       Row Name 06/04/25 1411          Stand-Sit Transfer    Stand-Sit [place] (Transfers) minimum assist (75% patient effort)  -KM     Assistive Device (Stand-Sit Transfers) walker, front-wheeled  -KM       [facility] Name 06/04/25 1411          Gait/Stairs (Locomotion)    Gait/Stairs Locomotion gait/ambulation independence;gait/ambulation assistive device;distance ambulated  -KM     [place] Level (Gait) minimum assist (75% patient effort)  -KM     Assistive Device (Gait) walker, front-wheeled  -KM     Distance in Feet (Gait) 20  -KM     Pattern (Gait) step-to  -KM      Deviations/Abnormal Patterns (Gait) ataxic;base of support, narrow;gait speed decreased  -KM     Right Sided Gait Deviations foot drop/toe drag;knee buckling, right side  -KM       Row Name 06/04/25 1411          Safety Issues/Impairments Affecting Functional Mobility    Impairments Affecting Function (Mobility) balance;endurance/activity tolerance;strength  -KM       Row Name 06/04/25 1411          Motor Skills    Comments, Therapeutic Exercise seated ther-ex  -KM       Row Name             Wound 05/29/25 0411 medial sacral spine Pressure Injury    Wound - Properties Group Placement Date: 05/29/25  -SM Placement Time: 0411  -SM Present on Original Admission: N  -SM Orientation: medial  -SM Location: sacral spine  -SM Primary Wound Type: Pressure Inj  -TW    Retired Wound - Properties Group Placement Date: 05/29/25  -SM Placement Time: 0411  -SM Present on Original Admission: N  -SM Orientation: medial  -SM Location: sacral spine  -SM    Retired Wound - Properties Group Placement Date: 05/29/25  -SM Placement Time: 0411  -SM Present on Original Admission: N  -SM Orientation: medial  -SM Location: sacral spine  -SM    Retired Wound - Properties Group Date first assessed: 05/29/25  -SM Time first assessed: 0411  -SM Present on Original Admission: N  -SM Location: sacral spine  -SM      Row Name             Wound 05/26/25 1948 Right gluteal Pressure Injury    Wound - Properties Group Placement Date: 05/26/25  -PL Placement Time: 1948 -PL Present on Original Admission: Y  -TW Side: Right  -TW Location: gluteal  -PL Primary Wound Type: Pressure Inj  -TW    Retired Wound - Properties Group Placement Date: 05/26/25  -PL Placement Time: 1948  -PL Present on Original Admission: Y  -TW Side: Right  -TW Location: gluteal  -PL    Retired Wound - Properties Group Placement Date: 05/26/25  -PL Placement Time: 1948  -PL Present on Original Admission: Y  -TW Side: Right  -TW Location: gluteal  -PL    Retired Wound - Properties  Group Date first assessed: 05/26/25  -PL Time first assessed: 1948  -PL Present on Original Admission: Y  -TW Side: Right  -TW Location: gluteal  -PL      Row Name 06/04/25 1411          Progress Summary (PT)    Daily Progress Summary (PT) Pt. was able to demonstrate functional mobility skills w/ decreased assistance. She was able to ambulate short distance w/ RW. She tolerated increased acitivity compared to prior sessions. Pt. would continue to benefit from PT services.  -               User Key  (r) = Recorded By, (t) = Taken By, (c) = Cosigned By      Initials Name Provider Type    TW Seble Sena, RN Registered Nurse    Steph Yin, RN Registered Nurse    Roni John, PT Physical Therapist    Raquel Ledesma, RN Registered Nurse                    Physical Therapy Education       Title: PT OT SLP Therapies (Done)       Topic: Physical Therapy (Done)       Point: Mobility training (Done)       Learning Progress Summary            Patient Acceptance, E,TB, VU by  at 6/3/2025 1415                      Point: Home exercise program (Done)       Learning Progress Summary            Patient Acceptance, E,TB, VU by KM at 6/3/2025 1415                      Point: Body mechanics (Done)       Learning Progress Summary            Patient Acceptance, E,TB, VU by KM at 6/3/2025 1415                      Point: Precautions (Done)       Learning Progress Summary            Patient Acceptance, E,TB, VU by  at 6/3/2025 1415                                      User Key       Initials Effective Dates Name Provider Type Discipline     05/24/22 -  Roni Mar, PT Physical Therapist PT                  PT Recommendation and Plan  Anticipated Discharge Disposition (PT):  (TBD)  Planned Therapy Interventions (PT): balance training, bed mobility training, gait training, home exercise program, patient/family education, postural re-education, ROM (range of motion), strengthening, stretching, transfer  training, wheelchair management/propulsion training  Therapy Frequency (PT): 5 times/wk  Progress Summary (PT)  Daily Progress Summary (PT): Pt. was able to demonstrate functional mobility skills w/ decreased assistance. She was able to ambulate short distance w/ RW. She tolerated increased acitivity compared to prior sessions. Pt. would continue to benefit from PT services.  Plan of Care Reviewed With: patient  Outcome Evaluation: Pt. evaluation completed during PT session. She was able to perform functional mobility skills w/ modA-maxA. She was able to ambulate minimal distance w/ RW and maxA. She tolerated session well. Pt. would benefit from skilled PT services to improve functional mobility skills prior to dischare.       Time Calculation:    PT Charges       Row Name 06/04/25 1419             Time Calculation    PT Received On 06/04/25  -KM         Time Calculation- PT    Total Timed Code Minutes- PT 25 minute(s)  -KM                User Key  (r) = Recorded By, (t) = Taken By, (c) = Cosigned By      Initials Name Provider Type    Roni John, PT Physical Therapist                  Therapy Charges for Today       Code Description Service Date Service Provider Modifiers Qty    43977937822 HC PT EVAL MOD COMPLEXITY 4 6/3/2025 Roni Mar, PT GP 1    08315436823 HC PT THERAPEUTIC ACT EA 15 MIN 6/4/2025 Roni Mar, PT GP 1    61949724769 HC GAIT TRAINING EA 15 MIN 6/4/2025 Roni Mar, PT GP 1            PT G-Codes  AM-PAC 6 Clicks Score (PT): 15    Roni Mar PT  6/4/2025

## 2025-06-04 NOTE — THERAPY EVALUATION
Patient Name: Emma Ryan  : 1940    MRN: [id number]                              Today's Date: 2025       Admit Date: 6/3/2025    Visit Dx: No diagnosis found.  Patient Active Problem List   Diagnosis    Complete tear of right rotator cuff    Stage 3a chronic kidney disease    Cough    Gastroesophageal reflux disease    Mixed hyperlipidemia    Shoulder pain    Hypertension    Acromioclavicular joint arthritis    Impingement syndrome, shoulder    Complete tear of left rotator cuff    Urge incontinence    Atopic rhinitis    Upper respiratory tract infection    Bilateral lower extremity edema    Hypercalcemia    Right knee pain    DADA (stress urinary incontinence, female)    Obesity (BMI 35.0-39.9 without comorbidity)    Left knee injury    Acute UTI    COVID-19 virus infection    Skin lesion of scalp    Numbness in left leg    Multiple bruises    Vitamin D deficiency    Laceration of right lower leg    Chronic chest pain with low to moderate risk for CAD    Degenerative disc disease, lumbar    Valvular heart disease, mild aortic and mitral regurgitation    Diastolic dysfunction without heart failure    Hypomagnesemia     Past Medical History:   Diagnosis Date    GERD (gastroesophageal reflux disease)     Hyperlipidemia     Hypertension     Left shoulder pain     Obesity     Osteoarthritis of knees, bilateral     Overactive bladder     Right shoulder pain      Past Surgical History:   Procedure Laterality Date    APPENDECTOMY      BUNIONECTOMY Right     CARDIOVASCULAR STRESS TEST  10/2012    CATARACT EXTRACTION  2017    CHOLECYSTECTOMY      COLONOSCOPY      ECHO - CONVERTED  10/2012    JOINT REPLACEMENT      bilateral knees     KNEE ARTHROPLASTY Left     KNEE ARTHROSCOPY      SHOULDER ARTHROSCOPY Left 2016    Procedure: LEFT SHOULDER ACROMIOPLASTY,MINI OPEN ROTATOR CUFF REPAIR;  Surgeon: Carlos Eduardo Smith MD;  Location: [facility];  Service:     SHOULDER SURGERY  2016    OPEN  ACROMICPLASTY RIGHT SHOULDER      General Information       Row Name 06/04/25 1447          OT Time and Intention    Subjective Information complains of;weakness  -     Document Type evaluation  -     Mode of Treatment individual therapy;occupational therapy  -     Patient Effort good  -     Symptoms Noted During/After Treatment fatigue  -     Comment Patient agreeable to Swing bed evaluation.  -       Row Name 06/04/25 1447          General Information    Patient Profile Reviewed yes  -     Prior Level of Function independent:;all household mobility;ADL's  rollator for functional mobility  -     Existing Precautions/Restrictions fall  -     Barriers to Rehab medically complex  -       Row Name 06/04/25 1447          Occupational Profile    Reason for Services/Referral (Occupational Profile) Patient was admitted to [facility] Swing bed from acute hospitalization on 6/3/2025. She was referred for OT evaluation due to change in functional performance with ADLs, functional mobility, and/or transfers.  -     Performance Patterns (Occupational Profile) Patient asked about preferred activities. She was watching television with this provided within room.  -       Row Name 06/04/25 1447          Living Environment    Current Living Arrangements home  -     People in Home spouse  -       Row Name 06/04/25 1447          Cognition    Orientation Status (Cognition) oriented x 3  -       Row Name 06/04/25 1447          Safety Issues/Impairments Affecting Functional Mobility    Safety Issues Affecting Function (Mobility) awareness of need for assistance;insight into deficits/self-awareness  -     Impairments Affecting Function (Mobility) balance;endurance/activity tolerance;strength  -               User Key  (r) = Recorded By, (t) = Taken By, (c) = Cosigned By      Initials Name Provider Type    Carline Blackwell, OT Occupational Therapist                     Mobility/ADL's       Row  Name 06/04/25 1456          Bed Mobility    Comment, (Bed Mobility) patient up in chair upon arrival  -[facility] Name 06/04/25 1456          Transfers    Transfers stand-sit transfer;bed-chair transfer;sit-stand transfer  -[facility] Name 06/04/25 1456          Bed-Chair Transfer    Bed-Chair [place] (Transfers) minimum assist (75% patient effort)  -     Assistive Device (Bed-Chair Transfers) walker, front-wheeled  -[facility] Name 06/04/25 1456          Sit-Stand Transfer    Sit-Stand [place] (Transfers) minimum assist (75% patient effort)  -     Assistive Device (Sit-Stand Transfers) walker, front-wheeled  -[facility] Name 06/04/25 1456          Stand-Sit Transfer    Stand-Sit [place] (Transfers) minimum assist (75% patient effort)  -     Assistive Device (Stand-Sit Transfers) walker, front-wheeled  -[facility] Name 06/04/25 1456          Activities of Daily Living    BADL Assessment/Intervention bathing;upper body dressing;lower body dressing;grooming;feeding;toileting  -[facility] Name 06/04/25 1456          Bathing Assessment/Intervention    [place] Level (Bathing) bathing skills;moderate assist (50% patient effort)  -[facility] Name 06/04/25 1456          Upper Body Dressing Assessment/Training    [place] Level (Upper Body Dressing) upper body dressing skills;minimum assist (75% patient effort)  -[facility] Name 06/04/25 1456          Lower Body Dressing Assessment/Training    [place] Level (Lower Body Dressing) lower body dressing skills;maximum assist (25% patient effort)  -[facility] Name 06/04/25 1456          Grooming Assessment/Training    [place] Level (Grooming) grooming skills;minimum assist (75% patient effort)  -[facility] Name 06/04/25 1456          Self-Feeding Assessment/Training    [place] Level (Feeding) feeding skills;set up  -[facility] Name 06/04/25 145          Toileting Assessment/Training    [place] Level  (Toileting) toileting skills;maximum assist (25% patient effort)  -               User Key  (r) = Recorded By, (t) = Taken By, (c) = Cosigned By      Initials Name Provider Type    Carline Blackwell OT Occupational Therapist                   Obj/Interventions       [facility] Name 06/04/25 1457          Sensory Assessment (Somatosensory)    Sensory Assessment (Somatosensory) UE sensation intact  -[facility] Name 06/04/25 1457          Vision Assessment/Intervention    Visual Impairment/Limitations WFL  -[facility] Name 06/04/25 1457          Range of Motion Comprehensive    General Range of Motion bilateral upper extremity ROM WFL  -[facility] Name 06/04/25 1457          Strength Comprehensive (MMT)    Comment, General Manual Muscle Testing (MMT) Assessment 4/5 MMT in BUEs  -[facility] Name 06/04/25 1457          Motor Skills    Motor Skills coordination;functional endurance  -     Functional Endurance fair  -[facility] Name 06/04/25 1457          Balance    Balance Assessment sitting static balance;standing static balance  -     Static Sitting Balance standby assist  -     Dynamic Standing Balance minimal assist  -     Position/Device Used, Standing Balance walker, rolling  -               User Key  (r) = Recorded By, (t) = Taken By, (c) = Cosigned By      Initials Name Provider Type    Carline Blackwell OT Occupational Therapist                   Goals/Plan       [facility] Name 06/04/25 1452          Transfer Goal 1 (OT)    Activity/Assistive Device (Transfer Goal 1, OT) toilet;commode, 3-in-1  -     Time Frame (Transfer Goal 1, OT) by discharge  -[facility] Name 06/04/25 1454          Problem Specific Goal 1 (OT)    Problem Specific Goal 1 (OT) Patient will perform sustained activity X15 minutes to promote functional endurance/activity tolerance needed for daily routine.  -     Time Frame (Problem Specific Goal 1, OT) by discharge  -[facility] Name 06/04/25 1450          Therapy  Assessment/Plan (OT)    Planned Therapy Interventions (OT) activity tolerance training;BADL retraining;functional balance retraining;patient/caregiver education/training;occupation/activity based interventions;transfer/mobility retraining;strengthening exercise;ROM/therapeutic exercise  -               User Key  (r) = Recorded By, (t) = Taken By, (c) = Cosigned By      Initials Name Provider Type     Carline Camacho, OT Occupational Therapist                   Clinical Impression       Row Name 06/04/25 1458          Pain Assessment    Pretreatment Pain Rating 0/10 - no pain  -     Posttreatment Pain Rating 0/10 - no pain  -       Row Name 06/04/25 1458          Plan of Care Review    Plan of Care Reviewed With patient  -     Progress no change  -     Outcome Evaluation Patient seen for OT evaluation. She presents with functional limitations including generalized weakness, impaired balance, and limited functional endurance/activity tolerance needed for daily occupations. She would benefit from ongoing OT services to promote highest level of independence and safety prior to discharge.  -       Row Name 06/04/25 1458          Therapy Assessment/Plan (OT)    Patient/Family Therapy Goal Statement (OT) return to [facility]  -     Rehab Potential (OT) good  -     Criteria for Skilled Therapeutic Interventions Met (OT) yes;meets criteria;skilled treatment is necessary  [facility]     Therapy Frequency (OT) 5 times/wk  -     Predicted Duration of Therapy Intervention (OT) discharge  -       Row Name 06/04/25 1458          Therapy Plan Review/Discharge Plan (OT)    Equipment Needs Upon Discharge (OT) other (see comments)  D  -     Anticipated Discharge Disposition (OT) home with assist;home  -       Row Name 06/04/25 1458          Positioning and Restraints    Pre-Treatment Position sitting in chair/recliner  -     Post Treatment Position chair  -     In Chair sitting;call light within reach;encouraged to  call for assist  chair alarm in place on arrival but not activated  -               User Key  (r) = Recorded By, (t) = Taken By, (c) = Cosigned By      Initials Name Provider Type    Carline Blackwell OT Occupational Therapist                   Outcome Measures       Row Name 06/04/25 1500          How much help from another is currently needed...    Putting on and taking off regular lower body clothing? 2  -KP     Bathing (including washing, rinsing, and drying) 3  -KP     Toileting (which includes using toilet bed pan or urinal) 2  -KP     Putting on and taking off regular upper body clothing 3  -KP     Taking care of personal grooming (such as brushing teeth) 3  -KP     Eating meals 4  -KP     AM-PAC 6 Clicks Score (OT) 17  -       Row Name 06/04/25 0826          How much help from another person do you currently need...    Turning from your back to your side while in flat bed without using bedrails? 4  -HH     Moving from lying on back to sitting on the side of a flat bed without bedrails? 3  -HH     Moving to and from a bed to a chair (including a wheelchair)? 2  -HH     Standing up from a chair using your arms (e.g., wheelchair, bedside chair)? 2  -HH     Climbing 3-5 steps with a railing? 2  -HH     To walk in hospital room? 2  -HH     AM-PAC 6 Clicks Score (PT) 15  -       Row Name 06/04/25 1500          Functional Assessment    Outcome Measure Options AM-PAC 6 Clicks Daily Activity (OT)  -               User Key  (r) = Recorded By, (t) = Taken By, (c) = Cosigned By      Initials Name Provider Type    Carline Blackwell OT Occupational Therapist     Trini Golden, RN Registered Nurse                      OT Recommendation and Plan  Planned Therapy Interventions (OT): activity tolerance training, BADL retraining, functional balance retraining, patient/caregiver education/training, occupation/activity based interventions, transfer/mobility retraining, strengthening exercise, ROM/therapeutic  exercise  Therapy Frequency (OT): 5 times/wk  Plan of Care Review  Plan of Care Reviewed With: patient  Progress: no change  Outcome Evaluation: Patient seen for OT evaluation. She presents with functional limitations including generalized weakness, impaired balance, and limited functional endurance/activity tolerance needed for daily occupations. She would benefit from ongoing OT services to promote highest level of independence and safety prior to discharge.     Time Calculation:         Time Calculation- OT       Row Name 06/04/25 1500             Time Calculation- OT    OT Received On 06/04/25  -                User Key  (r) = Recorded By, (t) = Taken By, (c) = Cosigned By      Initials Name Provider Type     Carline Camacho OT Occupational Therapist                  Therapy Charges for Today       Code Description Service Date Service Provider Modifiers Qty    1071941 HC OT EVAL MOD COMPLEXITY 4 6/4/2025 Carline Camacho OT GO 1                 Carline Camacho OT  6/4/2025

## 2025-06-04 NOTE — PLAN OF CARE
Goal Outcome Evaluation:              Outcome Evaluation: Pt's VSS on RA. Pt has ambulated in room this shift. Wound care completed per order. No concerns or complaints at this time. Will follow POC.

## 2025-06-04 NOTE — H&P
[facility]   HISTORY AND PHYSICAL    Patient Name: Emma Ryan  : 1940  MRN: [id number]  Primary Care Physician:  Finn Nash MD  Date of admission: 6/3/2025    Subjective   Subjective     Chief Complaint: Weakness    History of Present Illness  Patient is a an 84-year-old female admitted to swing bed for generalized weakness.  The plan is for her to stay as long as she needs to in order to get strong enough to go home and help take care of her ailing .  Patient overall is feeling better than when she first came into the hospital where she was treated for hypomagnesemia and hypokalemia.  Review of Systems   As stated above in his present illness all other systems were reviewed and subsequently negative  Personal History     Past Medical History:   Diagnosis Date    GERD (gastroesophageal reflux disease)     Hyperlipidemia     Hypertension     Left shoulder pain     Obesity     Osteoarthritis of knees, bilateral     Overactive bladder     Right shoulder pain        Past Surgical History:   Procedure Laterality Date    APPENDECTOMY      BUNIONECTOMY Right     CARDIOVASCULAR STRESS TEST  10/2012    CATARACT EXTRACTION      CHOLECYSTECTOMY      COLONOSCOPY      ECHO - CONVERTED  10/2012    JOINT REPLACEMENT      bilateral knees     KNEE ARTHROPLASTY Left     KNEE ARTHROSCOPY      SHOULDER ARTHROSCOPY Left 2016    Procedure: LEFT SHOULDER ACROMIOPLASTY,MINI OPEN ROTATOR CUFF REPAIR;  Surgeon: Carlos Eduardo Smith MD;  Location: [facility];  Service:     SHOULDER SURGERY  2016    OPEN ACROMICPLASTY RIGHT SHOULDER       Family History: family history includes Cancer in her sister; Diabetes in her brother, brother, brother, and mother; Emphysema in her father; Heart attack in her mother; Heart disease in her father, mother, and another family member; Heart failure in her mother and sister; Hypertension in her father; Kidney disease in her sister; No Known Problems in her  brother; Stroke in her sister. Otherwise pertinent FHx was reviewed and not pertinent to current issue.    Social History:  reports that she quit smoking about 49 years ago. Her smoking use included cigarettes. She started smoking about 69 years ago. She has a 40 pack-year smoking history. She has never used smokeless tobacco. She reports that she does not drink alcohol and does not use drugs.    Home Medications:  Docusate Calcium, Probiotic Product, Vitamin D, atorvastatin, carvedilol, hydrALAZINE, ketoconazole, omeprazole OTC, oxybutynin XL, potassium chloride, and spironolactone    Allergies:  Allergies   Allergen Reactions    Codeine Other (See Comments)     hyperventilate    Sulfa Antibiotics Rash       Objective    Objective     Vitals:   Temp:  [97.8 °F (36.6 °C)-98.3 °F (36.8 °C)] 98.3 °F (36.8 °C)  Heart Rate:  [68-79] 68  Resp:  [16-18] 18  BP: (100-116)/(50-71) 116/68    Physical Exam  Constitutional:  Well-developed and well-nourished.  No acute distress.      HENT:  Head:  Normocephalic and atraumatic.  Mouth:  Moist mucous membranes.    Eyes:  Conjunctivae and EOM are normal. No scleral icterus.    Neck:  Neck supple.  No JVD present.    Cardiovascular:  Normal rate, regular rhythm and normal heart sounds with no murmur.  Pulmonary/Chest:  No respiratory distress, no wheezes, no crackles, with normal breath sounds and good air movement.  Abdominal:  Soft. No distension and no tenderness.   Musculoskeletal:  No tenderness, and no deformity.  No red or swollen joints anywhere.    Neurological:  Alert and oriented to person, place, and time.  No cranial nerve deficit.    Skin:  Skin is warm and dry. No rash noted. No pallor.   Peripheral vascular:  No clubbing, no cyanosis, no edema.  Psychiatric: Appropriate mood and affect  :    Result Review    Result Review:  I have personally reviewed the results from the time of this admission to 6/4/2025 12:58 EDT and agree with these findings:  []  Laboratory  list / accordion  []  Microbiology  []  Radiology  []  EKG/Telemetry   []  Cardiology/Vascular   []  Pathology  []  Old records  []  Other:  Most notable findings include:       Assessment & Plan   Assessment / Plan     Brief Patient Summary:  Emma Ryan is a 84 y.o. female who was transferred to swing bed for physical rehabilitation after a stay for electrolyte imbalance    Active Hospital Problems:      Generalized deconditioning  Hypomagnesemia  Hypokalemia  - Patient was placed in swing bed  - PT and OT being consulted  - We will defer to them for proper planning  -  is on board and helping  - Continue to monitor lab work every 3 days  VTE Prophylaxis:  Pharmacologic VTE prophylaxis orders are present.        CODE STATUS:    Code Status (Patient has no pulse and is not breathing): CPR (Attempt to Resuscitate)  Medical Interventions (Patient has pulse or is breathing): Full Support    Admission Status:  I believe this patient meets Swing bed   status.    Glenroy Lyn, DO

## 2025-06-04 NOTE — PLAN OF CARE
Goal Outcome Evaluation:              Outcome Evaluation: Patient resting in bed at this time. VSS. Wound care completed per orders. No acute changes or complaints at this time. Will continue plan of care.

## 2025-06-05 PROBLEM — R53.81 PHYSICAL DECONDITIONING: Status: ACTIVE | Noted: 2025-06-05

## 2025-06-05 PROCEDURE — 97110 THERAPEUTIC EXERCISES: CPT

## 2025-06-05 PROCEDURE — 25010000002 HEPARIN (PORCINE) PER 1000 UNITS: Performed by: FAMILY MEDICINE

## 2025-06-05 PROCEDURE — 97530 THERAPEUTIC ACTIVITIES: CPT

## 2025-06-05 PROCEDURE — 97116 GAIT TRAINING THERAPY: CPT

## 2025-06-05 RX ADMIN — Medication 1 APPLICATION: at 09:25

## 2025-06-05 RX ADMIN — ATORVASTATIN CALCIUM 20 MG: 20 TABLET, FILM COATED ORAL at 09:23

## 2025-06-05 RX ADMIN — TUBERCULIN PURIFIED PROTEIN DERIVATIVE 5 UNITS: 5 INJECTION, SOLUTION INTRADERMAL at 10:36

## 2025-06-05 RX ADMIN — Medication 1 APPLICATION: at 20:18

## 2025-06-05 RX ADMIN — POLYETHYLENE GLYCOL (3350) 17 G: 17 POWDER, FOR SOLUTION ORAL at 20:18

## 2025-06-05 RX ADMIN — CALCIUM CARBONATE 2 TABLET: 500 TABLET, CHEWABLE ORAL at 20:18

## 2025-06-05 RX ADMIN — OXYBUTYNIN CHLORIDE 10 MG: 5 TABLET, EXTENDED RELEASE ORAL at 09:23

## 2025-06-05 RX ADMIN — PANTOPRAZOLE SODIUM 40 MG: 40 TABLET, DELAYED RELEASE ORAL at 05:31

## 2025-06-05 RX ADMIN — DOCUSATE SODIUM 100 MG: 100 CAPSULE, LIQUID FILLED ORAL at 09:21

## 2025-06-05 RX ADMIN — HEPARIN SODIUM 5000 UNITS: 5000 INJECTION INTRAVENOUS; SUBCUTANEOUS at 09:21

## 2025-06-05 RX ADMIN — HEPARIN SODIUM 5000 UNITS: 5000 INJECTION INTRAVENOUS; SUBCUTANEOUS at 20:18

## 2025-06-05 RX ADMIN — Medication 1000 UNITS: at 09:21

## 2025-06-05 RX ADMIN — Medication 1 CAPSULE: at 09:22

## 2025-06-05 RX ADMIN — Medication 5 MG: at 20:18

## 2025-06-05 NOTE — PLAN OF CARE
Goal Outcome Evaluation:  Plan of Care Reviewed With: patient        Progress: no change  Outcome Evaluation: Pt resting in bed at this time. VSS on RA. No acute changes. No concerns or requests at this time. Will continue plan of care.

## 2025-06-05 NOTE — PLAN OF CARE
Goal Outcome Evaluation:   Pt resting in bed at this time. VSS on room air. Voices no complaints or concerns at this time. Worked with PT today ambulated in hallway set up in chair. Will continue swing bed plan of care.

## 2025-06-05 NOTE — CASE MANAGEMENT/SOCIAL WORK
Discharge Planning Assessment  [facility]     Patient Name: Emma Ryan  MRN: [id number]  Today's Date: 6/5/2025    Admit Date: 6/3/2025    Plan: Pt was admitted to swing bed on 6/3 for therapy services. SS spoke to pt at bedside on this date. Pt lives with spouse, Bob ([address] [place], [place]) and she plans to return home at discharge. PCP is Dr. Nash. Pt does not have a POA or living will. Pt has a rollator and cane via unknown provider. Pt does not utilize home health services. SS to follow and assist as needed with discharge planning.   Discharge Needs Assessment       Row Name 06/05/25 1117       Living Environment    People in Home spouse    Name(s) of People in Home Bob    Primary Care Provided by self    Provides Primary Care For no one    Family Caregiver if Needed spouse    Quality of Family Relationships unable to assess    Able to Return to Prior Arrangements yes       Transition Planning    Patient/Family Anticipates Transition to home with family;home with help/services    Transportation Anticipated family or friend will provide       Discharge Needs Assessment    Equipment Currently Used at Home cane, straight;rollator  unknown provider                   Discharge Plan       Row Name 06/05/25 1120       Plan    Plan Pt was admitted to swing bed on 6/3 for therapy services. SS spoke to pt at bedside on this date. Pt lives with spouse, Bob ([address] [place], [place]) and she plans to return home at discharge. PCP is Dr. Nash. Pt does not have a POA or living will. Pt has a rollator and cane via unknown provider. Pt does not utilize home health services. SS to follow and assist as needed with discharge planning.    Patient/Family in Agreement with Plan yes                  Selected Continued Care - Prior Encounters Includes continued care and service providers with selected services from prior encounters from 3/5/2025 to 6/5/2025      Discharged on 6/3/2025 Admission date: 5/23/2025  - Discharge disposition: Swing Bed      Destination       Service Provider Services Address Phone Fax Patient Preferred     [facility] [address] [place] [address] [place] [address] [phone] -- --                             Demographic Summary       Row Name 06/05/25 1116       General Information    Referral Source physician    Reason for Consult --  Family/Patient Request                ALISON Ford

## 2025-06-05 NOTE — THERAPY TREATMENT NOTE
Acute Care - Occupational Therapy Treatment Note  [facility]     Patient Name: Emma Ryan  : 1940  MRN: [id number]  Today's Date: 2025             Admit Date: 6/3/2025     No diagnosis found.  Patient Active Problem List   Diagnosis    Complete tear of right rotator cuff    Stage 3a chronic kidney disease    Cough    Gastroesophageal reflux disease    Mixed hyperlipidemia    Shoulder pain    Hypertension    Acromioclavicular joint arthritis    Impingement syndrome, shoulder    Complete tear of left rotator cuff    Urge incontinence    Atopic rhinitis    Upper respiratory tract infection    Bilateral lower extremity edema    Hypercalcemia    Right knee pain    DADA (stress urinary incontinence, female)    Obesity (BMI 35.0-39.9 without comorbidity)    Left knee injury    Acute UTI    COVID-19 virus infection    Skin lesion of scalp    Numbness in left leg    Multiple bruises    Vitamin D deficiency    Laceration of right lower leg    Chronic chest pain with low to moderate risk for CAD    Degenerative disc disease, lumbar    Valvular heart disease, mild aortic and mitral regurgitation    Diastolic dysfunction without heart failure    Hypomagnesemia    Physical deconditioning     Past Medical History:   Diagnosis Date    GERD (gastroesophageal reflux disease)     Hyperlipidemia     Hypertension     Left shoulder pain     Obesity     Osteoarthritis of knees, bilateral     Overactive bladder     Right shoulder pain      Past Surgical History:   Procedure Laterality Date    APPENDECTOMY      BUNIONECTOMY Right     CARDIOVASCULAR STRESS TEST  10/2012    CATARACT EXTRACTION  2017    CHOLECYSTECTOMY      COLONOSCOPY      ECHO - CONVERTED  10/2012    JOINT REPLACEMENT      bilateral knees     KNEE ARTHROPLASTY Left     KNEE ARTHROSCOPY      SHOULDER ARTHROSCOPY Left 2016    Procedure: LEFT SHOULDER ACROMIOPLASTY,MINI OPEN ROTATOR CUFF REPAIR;  Surgeon: Carlos Eduardo Smith MD;  Location: [facility]  Continuity of Care Form    Patient Name: Yamini Ambrosio   :  2008  MRN:  [id number]    Admit date:  10/21/2024  Discharge date:  ***    Code Status Order: No Order   Advance Directives:   Advance Care Flowsheet Documentation             Admitting Physician:  No admitting provider for patient encounter.  PCP: Kailash Garvey MD    Discharging Nurse: ***  Discharging Hospital Unit/Room#:   Discharging Unit Phone Number: ***    Emergency Contact:   Extended Emergency Contact Information  Primary Emergency Contact: Adam Ambrosio Nathan  Address: [address] [place] [address] [facility]  Home Phone: [phone]  Mobile Phone: [phone]  Relation: Parent   needed? No  Secondary Emergency Contact: Geraldine Ramachandran   [facility]  Home Phone: [phone]  Relation: Grandparent    Past Surgical History:  History reviewed. No pertinent surgical history.    Immunization History:   Immunization History   Administered Date(s) Administered    DTaP 2009, 2009, 2009, 2010    Hepatitis B 2009, 2009, 2009, 2009    Hib, unspecified 2009, 2009, 2009, 2010    MMR, PRIORIX, M-M-R II, (age 12m+), SC, 0.5mL 2010    Poliovirus, IPOL, (age 6w+), SC/IM, 0.5mL 2009, 2009, 2009    Varicella, VARIVAX, (age 12m+), SC, 0.5mL 2010       Active Problems:  There is no problem list on file for this patient.      Isolation/Infection:   Isolation            No Isolation          Patient Infection Status       None to display            Nurse Assessment:  Last Vital Signs: /66   Pulse 93   Temp 98.5 °F (36.9 °C) (Temporal)   Resp 16   Wt 55.8 kg (123 lb)   LMP 10/14/2024   SpO2 100%     Last documented pain score (0-10 scale): Pain Level: 8  Last Weight:   Wt Readings from Last 1 Encounters:   10/21/24 55.8 kg (123 lb) (59%, Z= 0.23)*     * Growth percentiles are  OR;  Service:     SHOULDER SURGERY  05/31/2016    OPEN ACROMICPLASTY RIGHT SHOULDER         OT ASSESSMENT FLOWSHEET (Last 12 Hours)       OT Evaluation and Treatment       Row Name 06/05/25 1206                   OT Time and Intention    Subjective Information no complaints  -LA        Document Type therapy note (daily note)  -LA        Mode of Treatment occupational therapy  -LA        Patient Effort good  -LA           General Information    Patient Profile Reviewed yes  -LA        Patient/Family/Caregiver Comments/Observations Patient agreeable to therapy this date. Patient pleasant and cooperative with treatment this date. Patient with good carryover of exercises throughout the day.  -LA        Existing Precautions/Restrictions fall  -LA           Cognition    Affect/Mental Status (Cognition) WFL  -LA        Orientation Status (Cognition) oriented x 3  -LA        Follows Commands (Cognition) WFL  -LA           Sit-Stand Transfer    Sit-Stand [place] (Transfers) contact guard;standby assist  -LA           Stand-Sit Transfer    Stand-Sit [place] (Transfers) contact guard;standby assist  -LA           Motor Skills    Motor Skills coordination;functional endurance  -LA        Coordination WFL  -LA        Therapeutic Exercise shoulder;elbow/forearm;hand;wrist  -LA        Comments, Therapeutic Exercise UE exercises in all planes 20 x2 sets  -LA           Wound 05/29/25 0411 medial sacral spine Pressure Injury    Wound - Properties Group Placement Date: 05/29/25  -SM Placement Time: 0411 -SM Present on Original Admission: N  -SM Orientation: medial  -SM Location: sacral spine  -SM Primary Wound Type: Pressure Inj  -TW    Retired Wound - Properties Group Placement Date: 05/29/25  -SM Placement Time: 0411 -SM Present on Original Admission: N  -SM Orientation: medial  -SM Location: sacral spine  -SM    Retired Wound - Properties Group Placement Date: 05/29/25  -SM Placement Time: 0411 -SM Present on Original  Admission: N  -SM Orientation: medial  -SM Location: sacral spine  -SM    Retired Wound - Properties Group Date first assessed: 05/29/25  -SM Time first assessed: 0411 -SM Present on Original Admission: N  -SM Location: sacral spine  -SM       Wound 05/26/25 1948 Right gluteal Pressure Injury    Wound - Properties Group Placement Date: 05/26/25  -PL Placement Time: 1948 -PL Present on Original Admission: Y  -TW Side: Right  -TW Location: gluteal  -PL Primary Wound Type: Pressure Inj  -TW    Retired Wound - Properties Group Placement Date: 05/26/25  -PL Placement Time: 1948 -PL Present on Original Admission: Y  -TW Side: Right  -TW Location: gluteal  -PL    Retired Wound - Properties Group Placement Date: 05/26/25  -PL Placement Time: 1948 -PL Present on Original Admission: Y  -TW Side: Right  -TW Location: gluteal  -PL    Retired Wound - Properties Group Date first assessed: 05/26/25  -PL Time first assessed: 1948 -PL Present on Original Admission: Y  -TW Side: Right  -TW Location: gluteal  -PL       Plan of Care Review    Plan of Care Reviewed With patient  -LA           Positioning and Restraints    Pre-Treatment Position sitting in chair/recliner  -LA        Post Treatment Position chair  -LA        In Chair sitting;call light within reach;encouraged to call for assist;exit alarm on  -LA                  User Key  (r) = Recorded By, (t) = Taken By, (c) = Cosigned By      Initials Name Effective Dates    TW Seble Sena RN 06/16/21 -     SM Steph Valentin RN 07/18/22 -     LA Lena Patel OT 02/14/22 -     PL Raquel Nava RN 06/12/24 -                            OT Recommendation and Plan     Plan of Care Review  Plan of Care Reviewed With: patient  Plan of Care Reviewed With: patient     Outcome Measures       Row Name 06/05/25 1200             How much help from another is currently needed...    Putting on and taking off regular lower body clothing? 2  -LA      Bathing (including washing,  rinsing, and drying) 3  -LA      Toileting (which includes using toilet bed pan or urinal) 3  -LA      Putting on and taking off regular upper body clothing 4  -LA      Taking care of personal grooming (such as brushing teeth) 4  -LA      Eating meals 4  -LA      AM-PAC 6 Clicks Score (OT) 20  -LA         Functional Assessment    Outcome Measure Options AM-PAC 6 Clicks Daily Activity (OT)  -LA                User Key  (r) = Recorded By, (t) = Taken By, (c) = Cosigned By      Initials Name Provider Type    Lena Santizo OT Occupational Therapist                    Time Calculation:    Time Calculation- OT       Row Name 06/05/25 1212             Time Calculation- OT    Total Timed Code Minutes- OT 25 minute(s)  -LA                User Key  (r) = Recorded By, (t) = Taken By, (c) = Cosigned By      Initials Name Provider Type    Lena Santizo OT Occupational Therapist                  Therapy Charges for Today       Code Description Service Date Service Provider Modifiers Qty    [id number]  OT THER PROC EA 15 MIN 6/5/2025 Lena Patel OT GO 1    [id number]  OT THERAPEUTIC ACT EA 15 MIN 6/5/2025 Lena Patel OT GO 1                 Lena Patel OT  6/5/2025

## 2025-06-05 NOTE — PAYOR COMM NOTE
"CONTACT:  TOVA HUDSON RN  UTILIZATION MANAGEMENT DEPT.   [facility]    [address] [facility] [address]   PHONE:  [phone]   FAX: [phone]   NPI [id number]        DC TO SWING BED 6/3/25--ADDITIONAL DAYS AUTH PENDING    REF # [id number]             Emma Krishnamurthy (84 y.o. Female)       Date of Birth   1940    Social Security Number       Address   [address] [facility] [address]    Home Phone   [phone]    MRN   [id number]       Episcopal   [employer]    Marital Status                               Admission Date   5/23/2025    Admission Type   Emergency    Admitting Provider   Burt Golden MD    Attending Provider       Department, Room/Bed   [facility] [address] [place], 3347/2S       Discharge Date   6/3/2025    Discharge Disposition   Swing Bed    Discharge Destination                                 Attending Provider: (none)   Allergies: Codeine, Sulfa Antibiotics    Isolation: None   Infection: None   Code Status: CPR    Ht: 149.9 cm (59\")   Wt: 59.3 kg (130 lb 12.8 oz)    Admission Cmt: None   Principal Problem: Hypomagnesemia [E83.42]                   Active Insurance as of 5/23/2025       Primary Coverage       Payor Plan Insurance Group Employer/Plan Group    ANTHEM MEDICARE REPLACEMENT [facility] MEDICARE ADVANTAGE HMO [id number]       Payor Plan Address Payor Plan Phone Number Payor Plan Fax Number Effective Dates    [address] [phone]  1/1/2025 - None Entered    [facility] [address]         Subscriber Name Subscriber Birth Date Member ID       EMMA KRISHNAMURTHY 1940 [id number]                     Emergency Contacts        (Rel.) Home Phone Work Phone Mobile Phone    ShelbyBob (Spouse) [phone] -- --    Lashawn Krishnamurthy (Relative) [phone] -- --    Inez Maddox (Daughter) [phone] -- --                 Discharge Summary        Glenroy Lyn DO at 06/03/25 [facility]5              [facility] " [facility] DISCHARGE SUMMARY    Patient Identification:  Name:  Emma Ryan  Age:  84 y.o.  Sex:  female  :  1940  MRN:  [id number]  Visit Number:  [id number]    Date of Admission: 2025  Date of Discharge:  6/3/2025     PCP: Finn Nash MD    DISCHARGE DIAGNOSIS  Severe hypomagnesemia  Moderate hypokalemia  Severe hyperkalemia  Acute generalized weakness  PERNELL  Hypertension    CONSULTS   Consults       Date and Time Order Name Status Description    2025  8:15 PM Hospitalist (on-call MD unless specified)              PROCEDURES PERFORMED      HOSPITAL COURSE  Patient is a 84 y.o. female presented to [facility] complaining of weakness.  Please see the admitting history and physical for further details.    Patient presented initially with hypomagnesemia and hypokalemia.  The potassium got replaced and went too high up to 6.9.  She received the hyperkalemia regimen.  And her potassium normalized.  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  Patient was found to be extremely weak throughout her hospitalization.  It was decided the patient should be put in a swing bed for PT and OT before discharged home.  Will continue to monitor laboratory data and replace accordingly.    VITAL SIGNS:  Temp:  [97.8 °F (36.6 °C)-98.7 °F (37.1 °C)] 97.8 °F (36.6 °C)  Heart Rate:  [80-88] 80  Resp:  [16-18] 16  BP: (103-116)/(59-73) 104/59  SpO2:  [94 %] 94 %  on   ;   Device (Oxygen Therapy): room air    Body mass index is 26.42 kg/m².  Wt Readings from Last 3 Encounters:   25 59.3 kg (130 lb 12.8 oz)   25 71.7 kg (158 lb 1.1 oz)   24 71.7 kg (158 lb)       PHYSICAL EXAM:  Constitutional:  Well-developed and well-nourished.  No acute distress.      HENT:  Head:  Normocephalic and atraumatic.  Mouth:  Moist mucous membranes.    Eyes:  Conjunctivae and EOM are normal. No scleral icterus.    Neck:  Neck supple.  No JVD present.     Cardiovascular:  Normal rate, regular rhythm and normal heart sounds with no murmur.  Pulmonary/Chest:  No respiratory distress, no wheezes, no crackles, with normal breath sounds and good air movement.  Abdominal:  Soft. No distension and no tenderness.   Musculoskeletal:  No tenderness, and no deformity.  No red or swollen joints anywhere.    Neurological:  Alert and oriented to person, place, and time.  No cranial nerve deficit.    Skin:  Skin is warm and dry. No rash noted. No pallor.   Peripheral vascular:  No clubbing, no cyanosis, no edema.  Psychiatric: Appropriate mood and affect  :     DISCHARGE DISPOSITION   Stable    DISCHARGE MEDICATIONS:     Discharge Medications        New Medications        Instructions Start Date   magnesium oxide 400 MG tablet  Commonly known as: MAG-OX   400 mg, Oral, Daily             Changes to Medications        Instructions Start Date   carvedilol 25 MG tablet  Commonly known as: COREG  What changed: how much to take   12.5 mg, Oral, 2 Times Daily With Meals             Continue These Medications        Instructions Start Date   atorvastatin 20 MG tablet  Commonly known as: LIPITOR   Take 1 tablet by mouth once daily      ketoconazole 2 % cream  Commonly known as: NIZORAL   1 Application, Topical, Daily PRN      ketoconazole 2 % shampoo  Commonly known as: NIZORAL   1 Application, Topical, 2 Times Weekly      omeprazole OTC 20 MG EC tablet  Commonly known as: PriLOSEC OTC   20 mg, Daily      oxybutynin XL 10 MG 24 hr tablet  Commonly known as: DITROPAN-XL   10 mg, Oral, Daily      PROBIOTIC COLON SUPPORT PO   Daily      spironolactone 25 MG tablet  Commonly known as: ALDACTONE   25 mg, Oral, Daily      STOOL SOFTENER PO   1 tablet, Daily      VITAMIN D PO   1,000 Units, Daily             Stop These Medications      hydrALAZINE 100 MG tablet  Commonly known as: APRESOLINE     NIFEdipine XL 30 MG 24 hr tablet  Commonly known as: PROCARDIA XL     potassium chloride 20 MEQ CR  tablet  Commonly known as: KLOR-CON M20              Diet Instructions    Diet: Regular/House; Fluid Consistency: Thin          Activity Instructions    As tolerated.          Additional Instructions for the Follow-ups that You Need to Schedule       Ambulatory Referral to Home Health   As directed      Face to Face Visit Date: 5/25/2025   Follow-up provider for Plan of Care?: I treated the patient in an acute care facility and will not continue treatment after discharge.   Follow-up provider: JORGE L RAM [[id number]]   Reason/Clinical Findings: age associated physical debility,  hypomagnesemia and hypokalemia, medication education   Describe mobility limitations that make leaving home difficult: save as above as well as advanced age with dependent    Nursing/Therapeutic Services Requested: Physical Therapy Occupational Therapy Skilled Nursing   Skilled nursing orders: Medication education   Frequency: 1 Week 1        Discharge Follow-up with PCP   As directed       Currently Documented PCP:    Jorge L Ram MD    PCP Phone Number:    [phone]     Follow Up Details: 1 week follow up               Contact information for follow-up providers       Jorge L Ram MD Follow up in 1 week(s).    Specialty: Nephrology  Why: Office is closed, will call patient about follow-up appt. with Dr. Ram on 05/27/25.  Contact information:  [address] [place] [address] [phone]                       Contact information for after-discharge care       Destination       [facility] .    Service: Skilled Nursing  Contact information:  [address] [place] [id number] [phone]                                    TEST  RESULTS PENDING AT DISCHARGE       CODE STATUS  Code Status and Medical Interventions: CPR (Attempt to Resuscitate); Full Support   Ordered at: 05/23/25 2021     Code Status (Patient has no pulse and is not breathing):    CPR (Attempt to Resuscitate)     Medical  Interventions (Patient has pulse or is breathing):    Full Support       Rama Couch DO  [facility] Hospitalist  06/03/25  13:45 EDT    Please note that this discharge summary required more than 30 minutes to complete.       Electronically signed by Rama Couch DO at 06/03/25 1347       Discharge Order (From admission, onward)       Start     Ordered    06/03/25 1328  Discharge readmit patient  Once        Expected Discharge Date: 06/03/25   Discharge Disposition: Swing Bed   Physician of Record for Attribution - Please select from Treatment Team: RAMA COUCH [[id number]]   Review needed by CMO to determine Physician of Record: No      Question Answer Comment   Physician of Record for Attribution - Please select from Treatment Team RAMA COUCH    Review needed by CMO to determine Physician of Record No        06/03/25 1327    05/26/25 0829  Discharge patient  Once,   Status:  Canceled        Expected Discharge Date: 05/26/25   Discharge Disposition: Home or Self Care   Physician of Record for Attribution - Please select from Treatment Team: ISAIAH YOUNG [[id number]]   Review needed by CMO to determine Physician of Record: No      Question Answer Comment   Physician of Record for Attribution - Please select from Treatment Team ISAIAH YOUNG    Review needed by CMO to determine Physician of Record No        05/26/25 0828    05/25/25 1207  Discharge patient  Once,   Status:  Canceled        Expected Discharge Date: 05/25/25   Discharge Disposition: Home or Self Care   Physician of Record for Attribution - Please select from Treatment Team: ISAIAH YOUNG [[id number]]   Review needed by CMO to determine Physician of Record: No      Question Answer Comment   Physician of Record for Attribution - Please select from Treatment Team ISAIAH YOUNG    Review needed by CMO to determine Physician of Record No        05/25/25 1208

## 2025-06-05 NOTE — PAYOR COMM NOTE
"Nicki Carter RN CM/Swing Bed  [email or web address]  Phone: [phone] Fax: [phone]  -NPI [id number]  [id number]  Authorization No: [id number]    Patient was admitted to post acute swing bed for PT/OT strength, mobility, ADL and transfer training. Lives at home with spouse and plans to return home when she can safely do so. PLOF independent with all household ADLs and ambulates via rollator.     ICD 10  R53.81  E83.42    Emma Ryan (84 y.o. Female)       Date of Birth   1940    Social Security Number       Address   [address] [address]    Home Phone   [phone]    MRN   [id number]       Christian   Jehovah's witness    Marital Status                               Admission Date   6/3/2025    Admission Type   Urgent    Admitting Provider   Glenroy Lyn DO    Attending Provider   Glenroy Lyn DO    Department, Room/Bed   [address], [facility]7/       Discharge Date       Discharge Disposition       Discharge Destination                                 Attending Provider: Glenroy Lyn DO    Allergies: Codeine, Sulfa Antibiotics    Isolation: None   Infection: None   Code Status: CPR    Ht: 149.9 cm (59\")   Wt: 60.5 kg (133 lb 6.4 oz)    Admission Cmt: None   Principal Problem: None                  Active Insurance as of 6/3/2025       Primary Coverage       Payor Plan Insurance Group Employer/Plan Group    [facility] MEDICARE REPLACEMENT ANTHEM MEDICARE ADVANTAGE O [id number]       Payor Plan Address Payor Plan Phone Number Payor Plan Fax Number Effective Dates    [address] [phone]  1/1/2025 - None Entered    [facility] [id number]         Subscriber Name Subscriber Birth Date Member ID       EMMA RYAN 1940 [id number]                     Emergency Contacts        (Rel.) Home Phone Work Phone Mobile Phone    Bob Ryan (Spouse) [phone] -- --    Lashawn Ryan (Relative) [phone] -- -- "    Inez Maddox (Daughter) [phone] -- --                 History & Physical        [place]go[place]lenroy DO at [facility]          [facility]   HISTORY AND PHYSICAL    Patient Name: Emma Ryan  : 1940  MRN: [id number]  Primary Care Physician:  Finn Nash MD  Date of admission: 6/3/2025    Subjective  Subjective     Chief Complaint: Weakness    History of Present Illness  Patient is a an 84-year-old female admitted to swing bed for generalized weakness.  The plan is for her to stay as long as she needs to in order to get strong enough to go home and help take care of her ailing .  Patient overall is feeling better than when she first came into the hospital where she was treated for hypomagnesemia and hypokalemia.  Review of Systems   As stated above in his present illness all other systems were reviewed and subsequently negative  Personal History     Past Medical History:   Diagnosis Date    GERD (gastroesophageal reflux disease)     Hyperlipidemia     Hypertension     Left shoulder pain     Obesity     Osteoarthritis of knees, bilateral     Overactive bladder     Right shoulder pain        Past Surgical History:   Procedure Laterality Date    APPENDECTOMY      BUNIONECTOMY Right     CARDIOVASCULAR STRESS TEST  10/2012    CATARACT EXTRACTION      CHOLECYSTECTOMY      COLONOSCOPY      ECHO - CONVERTED  10/2012    JOINT REPLACEMENT      bilateral knees     KNEE ARTHROPLASTY Left     KNEE ARTHROSCOPY      SHOULDER ARTHROSCOPY Left 2016    Procedure: LEFT SHOULDER ACROMIOPLASTY,MINI OPEN ROTATOR CUFF REPAIR;  Surgeon: Carlos Eduardo Smith MD;  Location: [facility];  Service:     SHOULDER SURGERY  2016    OPEN ACROMICPLASTY RIGHT SHOULDER       Family History: family history includes Cancer in her sister; Diabetes in her brother, brother, brother, and mother; Emphysema in her father; Heart attack in her mother; Heart disease in her father, mother, and another  family member; Heart failure in her mother and sister; Hypertension in her father; Kidney disease in her sister; No Known Problems in her brother; Stroke in her sister. Otherwise pertinent FHx was reviewed and not pertinent to current issue.    Social History:  reports that she quit smoking about 49 years ago. Her smoking use included cigarettes. She started smoking about 69 years ago. She has a 40 pack-year smoking history. She has never used smokeless tobacco. She reports that she does not drink alcohol and does not use drugs.    Home Medications:  Docusate Calcium, Probiotic Product, Vitamin D, atorvastatin, carvedilol, hydrALAZINE, ketoconazole, omeprazole OTC, oxybutynin XL, potassium chloride, and spironolactone    Allergies:  Allergies   Allergen Reactions    Codeine Other (See Comments)     hyperventilate    Sulfa Antibiotics Rash       Objective   Objective     Vitals:   Temp:  [97.8 °F (36.6 °C)-98.3 °F (36.8 °C)] 98.3 °F (36.8 °C)  Heart Rate:  [68-79] 68  Resp:  [16-18] 18  BP: (100-116)/(50-71) 116/68    Physical Exam  Constitutional:  Well-developed and well-nourished.  No acute distress.      HENT:  Head:  Normocephalic and atraumatic.  Mouth:  Moist mucous membranes.    Eyes:  Conjunctivae and EOM are normal. No scleral icterus.    Neck:  Neck supple.  No JVD present.    Cardiovascular:  Normal rate, regular rhythm and normal heart sounds with no murmur.  Pulmonary/Chest:  No respiratory distress, no wheezes, no crackles, with normal breath sounds and good air movement.  Abdominal:  Soft. No distension and no tenderness.   Musculoskeletal:  No tenderness, and no deformity.  No red or swollen joints anywhere.    Neurological:  Alert and oriented to person, place, and time.  No cranial nerve deficit.    Skin:  Skin is warm and dry. No rash noted. No pallor.   Peripheral vascular:  No clubbing, no cyanosis, no edema.  Psychiatric: Appropriate mood and affect  :    Result Review   Result Review:  I  have personally reviewed the results from the time of this admission to 6/4/2025 12:58 EDT and agree with these findings:  []  Laboratory list / accordion  []  Microbiology  []  Radiology  []  EKG/Telemetry   []  Cardiology/Vascular   []  Pathology  []  Old records  []  Other:  Most notable findings include:       Assessment & Plan  Assessment / Plan     Brief Patient Summary:  Emma Ryan is a 84 y.o. female who was transferred to swing bed for physical rehabilitation after a stay for electrolyte imbalance    Active Hospital Problems:      Generalized deconditioning  Hypomagnesemia  Hypokalemia  - Patient was placed in swing bed  - PT and OT being consulted  - We will defer to them for proper planning  -  is on board and helping  - Continue to monitor lab work every 3 days  VTE Prophylaxis:  Pharmacologic VTE prophylaxis orders are present.        CODE STATUS:    Code Status (Patient has no pulse and is not breathing): CPR (Attempt to Resuscitate)  Medical Interventions (Patient has pulse or is breathing): Full Support    Admission Status:  I believe this patient meets Swing bed   status.    Glenroy Lyn DO    Electronically signed by Glenroy Lyn DO at 06/04/25 1301       Current Facility-Administered Medications   Medication Dose Route Frequency Provider Last Rate Last Admin    acetaminophen (TYLENOL) tablet 650 mg  650 mg Oral Q6H PRN Glenroy Lyn DO        Acidophilus/Pectin capsule 1 capsule  1 capsule Oral Daily Glenroy Lyn DO   1 capsule at 06/04/25 0826    atorvastatin (LIPITOR) tablet 20 mg  20 mg Oral Daily Glenroy Lyn DO   20 mg at 06/04/25 0826    sennosides-docusate (PERICOLACE) 8.6-50 MG per tablet 2 tablet  2 tablet Oral BID PRN Glenroy Lyn DO        And    polyethylene glycol (MIRALAX) packet 17 g  17 g Oral Daily PRN Glenroy Lyn DO        And    bisacodyl (DULCOLAX) EC tablet 5 mg  5 mg Oral Daily PRN Glenroy Lyn DO        And    bisacodyl (DULCOLAX)  suppository 10 mg  10 mg Rectal Daily PRN Glenroy Lyn DO        calcium carbonate (TUMS) chewable tablet 500 mg (200 mg elemental)  2 tablet Oral TID PRN Glenroy Lyn DO        castor oil-balsam peru (VENELEX) ointment 1 Application  1 Application Topical Q12H Glenroy Lyn DO   1 Application at 06/04/25 2006    cholecalciferol (VITAMIN D3) tablet 1,000 Units  1,000 Units Oral Daily Glenroy Lyn DO   1,000 Units at 06/04/25 0826    docusate sodium (COLACE) capsule 100 mg  100 mg Oral Daily Glenroy Lyn DO   100 mg at 06/04/25 0826    heparin (porcine) 5000 UNIT/ML injection 5,000 Units  5,000 Units Subcutaneous Q12H Glenroy Lyn DO   5,000 Units at 06/04/25 2005    ketoconazole (NIZORAL) 2 % cream 1 Application  1 Application Topical Daily PRN Glenroy Lyn DO        Magnesium Standard Dose Replacement - Follow Nurse / BPA Driven Protocol   Not Applicable PRN Glenroy Lyn DO        melatonin tablet 5 mg  5 mg Oral Nightly PRN Glenroy Lyn DO   5 mg at 06/04/25 2006    metoprolol succinate XL (TOPROL-XL) 24 hr tablet 12.5 mg  12.5 mg Oral Q24H Glenroy Lyn DO   12.5 mg at 06/04/25 0825    nitroglycerin (NITROSTAT) SL tablet 0.4 mg  0.4 mg Sublingual Q5 Min PRN Glenroy Lyn DO        nitroglycerin (NITROSTAT) SL tablet 0.4 mg  0.4 mg Sublingual Q5 Min PRN Glenroy Lyn DO        oxybutynin XL (DITROPAN-XL) 24 hr tablet 10 mg  10 mg Oral Daily Glenroy Lyn DO   10 mg at 06/04/25 0826    pantoprazole (PROTONIX) EC tablet 40 mg  40 mg Oral Q AM Glenroy Lyn DO   40 mg at 06/05/25 0531    Potassium Replacement - Follow Nurse / BPA Driven Protocol   Not Applicable PRN Glenroy Lyn DO        sodium chloride 0.9 % flush 10 mL  10 mL Intravenous Q12H Glenroy Lyn DO        sodium chloride 0.9 % flush 10 mL  10 mL Intravenous PRN Glenroy Lyn DO        sodium chloride 0.9 % infusion 40 mL  40 mL Intravenous PRN Glenroy Lyn DO         Orders (active)        Start     Ordered     06/04/25 1356  Diet: Regular/House; Fluid Consistency: Thin (IDDSI 0)  Diet Effective Now         06/04/25 1356    06/04/25 0900  atorvastatin (LIPITOR) tablet 20 mg  Daily         06/03/25 1356    06/04/25 0900  cholecalciferol (VITAMIN D3) tablet 1,000 Units  Daily         06/03/25 1356    06/04/25 0900  docusate sodium (COLACE) capsule 100 mg  Daily         06/03/25 1356    06/04/25 0900  oxybutynin XL (DITROPAN-XL) 24 hr tablet 10 mg  Daily         06/03/25 1356    06/04/25 0900  Acidophilus/Pectin capsule 1 capsule  Daily         06/03/25 1356    06/04/25 0900  metoprolol succinate XL (TOPROL-XL) 24 hr tablet 12.5 mg  Every 24 Hours Scheduled         06/03/25 1357    06/04/25 0600  Daily Weights  Daily       06/03/25 1356    06/04/25 0600  pantoprazole (PROTONIX) EC tablet 40 mg  Every Early Morning         06/03/25 1356    06/03/25 2100  sodium chloride 0.9 % flush 10 mL  Every 12 Hours Scheduled         06/03/25 1356 06/03/25 2100  heparin (porcine) 5000 UNIT/ML injection 5,000 Units  Every 12 Hours Scheduled         06/03/25 1356 06/03/25 2100  castor oil-balsam peru (VENELEX) ointment 1 Application  Every 12 Hours Scheduled         06/03/25 1357    06/03/25 2040  Ambulate Patient  Every Shift       06/03/25 1357 06/03/25 2000  Wound Care  Every 12 Hours         06/03/25 1357    06/03/25 1800  Oral Care  2 Times Daily       06/03/25 1356    06/03/25 1800  Strict Intake & Output  Every 6 Hours         06/03/25 1356 06/03/25 1800  Dietary Nutrition Supplements Boost Plus (Ensure Enlive, Ensure Plus)  Daily With Breakfast & Dinner       06/03/25 1356 06/03/25 1600  Vital Signs  Every 8 Hours        Comments: Per per hospital policy    06/03/25 1356    06/03/25 1400  Vital Signs Every Hour and Per Hospital Policy Based on Patient Condition  Every Hour       06/03/25 1357 06/03/25 1400  Vital Signs Every Hour and Per Hospital Policy Based on Patient Condition  Every Hour       06/03/25 1358     06/03/25 1357  Hospitalist (on-call MD unless specified)  Once        Specialty:  Hospitalist  Provider:  Glenroy Lyn DO    06/03/25 1356    06/03/25 1357  Notify Physician (with Parameters)  Until Discontinued         06/03/25 1356    06/03/25 1357  Code Status and Medical Interventions: CPR (Attempt to Resuscitate); Full Support  Continuous         06/03/25 1356    06/03/25 1357  Telemetry - Place Orders & Notify Provider of Results When Patient Experiences Acute Chest Pain, Dysrhythmia or Respiratory Distress  Continuous        Comments: Open Order Report to View Parameters Requiring Provider Notification    06/03/25 1356    06/03/25 1357  May Be Off Telemetry for Tests  Continuous         06/03/25 1356    06/03/25 1357  PT Consult: Eval & Treat Functional Mobility Below Baseline  Once         06/03/25 1356    06/03/25 1357  OT Consult: Eval & Treat ADL Performance Below Baseline  Once         06/03/25 1356    06/03/25 1357  Telemetry - Place Orders & Notify Provider of Results When Patient Experiences Acute Chest Pain, Dysrhythmia or Respiratory Distress  Continuous        Comments: Open Order Report to View Parameters Requiring Provider Notification    06/03/25 1357    06/03/25 1357  Wound Ostomy Eval & Treat  Once         06/03/25 1357    06/03/25 1357  Maintain IV Access  Continuous         06/03/25 1357    06/03/25 1357  Telemetry - Place Orders & Notify Provider of Results When Patient Experiences Acute Chest Pain, Dysrhythmia or Respiratory Distress  Continuous        Comments: Open Order Report to View Parameters Requiring Provider Notification    06/03/25 1357    06/03/25 1357  Continuous Pulse Oximetry  Continuous         06/03/25 1357    06/03/25 1357  Elevate Heels Off of Bed  Until Discontinued         06/03/25 1357    06/03/25 1357  Turn Patient  Now Then Every 2 Hours         06/03/25 1357    06/03/25 1357  Use Repositioning Wedge to Position Patient  Continuous         06/03/25 1357     "06/03/25 1357  Use Seat Cushion When Up In Chair  Continuous         06/03/25 1357    06/03/25 1357  Do NOT Rub or Massage Any Bony Prominence  Continuous         06/03/25 1357    06/03/25 1357  Wound Ostomy Eval & Treat  Once         06/03/25 1357    06/03/25 1356  Magnesium Standard Dose Replacement - Follow Nurse / BPA Driven Protocol  As Needed         06/03/25 1356    06/03/25 1356  Potassium Replacement - Follow Nurse / BPA Driven Protocol  As Needed         06/03/25 1356    06/03/25 1356  sodium chloride 0.9 % flush 10 mL  As Needed         06/03/25 1356    06/03/25 1356  sodium chloride 0.9 % infusion 40 mL  As Needed         06/03/25 1356    06/03/25 1356  nitroglycerin (NITROSTAT) SL tablet 0.4 mg  Every 5 Minutes PRN         06/03/25 1356    06/03/25 1356  sennosides-docusate (PERICOLACE) 8.6-50 MG per tablet 2 tablet  2 Times Daily PRN        Placed in \"And\" Linked Group    06/03/25 1356    06/03/25 1356  polyethylene glycol (MIRALAX) packet 17 g  Daily PRN        Placed in \"And\" Linked Group    06/03/25 1356    06/03/25 1356  bisacodyl (DULCOLAX) EC tablet 5 mg  Daily PRN        Placed in \"And\" Linked Group    06/03/25 1356    06/03/25 1356  bisacodyl (DULCOLAX) suppository 10 mg  Daily PRN        Placed in \"And\" Linked Group    06/03/25 1356    06/03/25 1356  ketoconazole (NIZORAL) 2 % cream 1 Application  Daily PRN         06/03/25 1356    06/03/25 1356  melatonin tablet 5 mg  Nightly PRN         06/03/25 1356    06/03/25 1356  nitroglycerin (NITROSTAT) SL tablet 0.4 mg  Every 5 Minutes PRN         06/03/25 1357    06/03/25 1356  acetaminophen (TYLENOL) tablet 650 mg  Every 6 Hours PRN         06/03/25 1357    06/03/25 1356  calcium carbonate (TUMS) chewable tablet 500 mg (200 mg elemental)  3 Times Daily PRN         06/03/25 1357    Unscheduled  Up With Assistance  As Needed       06/03/25 1356    Unscheduled  Wound Care  As Needed       06/03/25 1357                  Physician Progress Notes (most " recent note)    No notes of this type exist for this encounter.       Consult Notes (most recent note)    No notes of this type exist for this encounter.       Nutrition Notes (most recent note)    No notes exist for this encounter.          Physical Therapy Notes (most recent note)        Roni Mar, PT at 25 1432  Version 1 of 1         Acute Care - Physical Therapy Treatment Note  FLORIN Mims     Patient Name: Emma Ryan  : 1940  MRN: [id number]  Today's Date: 2025      Visit Dx:   No diagnosis found.  Patient Active Problem List   Diagnosis    Complete tear of right rotator cuff    Stage 3a chronic kidney disease    Cough    Gastroesophageal reflux disease    Mixed hyperlipidemia    Shoulder pain    Hypertension    Acromioclavicular joint arthritis    Impingement syndrome, shoulder    Complete tear of left rotator cuff    Urge incontinence    Atopic rhinitis    Upper respiratory tract infection    Bilateral lower extremity edema    Hypercalcemia    Right knee pain    DADA (stress urinary incontinence, female)    Obesity (BMI 35.0-39.9 without comorbidity)    Left knee injury    Acute UTI    COVID-19 virus infection    Skin lesion of scalp    Numbness in left leg    Multiple bruises    Vitamin D deficiency    Laceration of right lower leg    Chronic chest pain with low to moderate risk for CAD    Degenerative disc disease, lumbar    Valvular heart disease, mild aortic and mitral regurgitation    Diastolic dysfunction without heart failure    Hypomagnesemia     Past Medical History:   Diagnosis Date    GERD (gastroesophageal reflux disease)     Hyperlipidemia     Hypertension     Left shoulder pain     Obesity     Osteoarthritis of knees, bilateral     Overactive bladder     Right shoulder pain      Past Surgical History:   Procedure Laterality Date    APPENDECTOMY      BUNIONECTOMY Right     CARDIOVASCULAR STRESS TEST  10/2012    CATARACT EXTRACTION  2017    CHOLECYSTECTOMY       COLONOSCOPY  2011    ECHO - CONVERTED  10/2012    JOINT REPLACEMENT      bilateral knees     KNEE ARTHROPLASTY Left     KNEE ARTHROSCOPY      SHOULDER ARTHROSCOPY Left 7/28/2016    Procedure: LEFT SHOULDER ACROMIOPLASTY,MINI OPEN ROTATOR CUFF REPAIR;  Surgeon: Carlos Eduardo Smith MD;  [facility];  Service:     SHOULDER SURGERY  05/31/2016    OPEN ACROMICPLASTY RIGHT SHOULDER     PT Assessment (Last 12 Hours)       PT Evaluation and Treatment       Row Name 06/04/25 1411          Physical Therapy Time and Intention    Subjective Information complains of;weakness;fatigue  -KM     Document Type therapy note (daily note)  -KM     Mode of Treatment physical therapy  -KM     Patient Effort good  -KM     Symptoms Noted During/After Treatment fatigue  -KM       Row Name 06/04/25 1411          General Information    Patient Profile Reviewed yes  -KM     Patient Observations alert;cooperative;agree to therapy  -KM     Existing Precautions/Restrictions fall  -KM       Row Name 06/04/25 1411          Pain    Pretreatment Pain Rating 0/10 - no pain  -KM     Posttreatment Pain Rating 0/10 - no pain  -KM       Row Name 06/04/25 1411          Cognition    Affect/Mental Status (Cognition) WFL  -KM     Orientation Status (Cognition) oriented x 3  -KM     Follows Commands (Cognition) WFL  -KM       Row Name 06/04/25 1411          Bed Mobility    Comment, (Bed Mobility) pt. up in chair upon arrival  -KM       Row Name 06/04/25 1411          Transfers    Transfers sit-stand transfer;stand-sit transfer  -       Row Name 06/04/25 1411          Sit-Stand Transfer    Sit-Stand [place] (Transfers) minimum assist (75% patient effort)  -KM     Assistive Device (Sit-Stand Transfers) walker, front-wheeled  -KM       Row Name 06/04/25 1411          Stand-Sit Transfer    Stand-Sit [place] (Transfers) minimum assist (75% patient effort)  -KM     Assistive Device (Stand-Sit Transfers) walker, front-wheeled  -       Row Name  06/04/25 1411          Gait/Stairs (Locomotion)    Gait/Stairs Locomotion gait/ambulation independence;gait/ambulation assistive device;distance ambulated  -KM     Graham Level (Gait) minimum assist (75% patient effort)  -KM     Assistive Device (Gait) walker, front-wheeled  -KM     Distance in Feet (Gait) 20  -KM     Pattern (Gait) step-to  -KM     Deviations/Abnormal Patterns (Gait) ataxic;base of support, narrow;gait speed decreased  -KM     Right Sided Gait Deviations foot drop/toe drag;knee buckling, right side  -KM       Row Name 06/04/25 1411          Safety Issues/Impairments Affecting Functional Mobility    Impairments Affecting Function (Mobility) balance;endurance/activity tolerance;strength  -KM       Row Name 06/04/25 1411          Motor Skills    Comments, Therapeutic Exercise seated ther-ex  -KM       Row Name             Wound 05/29/25 0411 medial sacral spine Pressure Injury    Wound - Properties Group Placement Date: 05/29/25  -SM Placement Time: 0411  -SM Present on Original Admission: N  -SM Orientation: medial  -SM Location: sacral spine  -SM Primary Wound Type: Pressure Inj  -TW    Retired Wound - Properties Group Placement Date: 05/29/25  -SM Placement Time: 0411  -SM Present on Original Admission: N  -SM Orientation: medial  -SM Location: sacral spine  -SM    Retired Wound - Properties Group Placement Date: 05/29/25  -SM Placement Time: 0411 -SM Present on Original Admission: N  -SM Orientation: medial  -SM Location: sacral spine  -SM    Retired Wound - Properties Group Date first assessed: 05/29/25  -SM Time first assessed: 0411  -SM Present on Original Admission: N  -SM Location: sacral spine  -SM      Row Name             Wound 05/26/25 1948 Right gluteal Pressure Injury    Wound - Properties Group Placement Date: 05/26/25  -PL Placement Time: 1948  -PL Present on Original Admission: Y  -TW Side: Right  -TW Location: gluteal  -PL Primary Wound Type: Pressure Inj  -TW    Retired  Wound - Properties Group Placement Date: 05/26/25  -PL Placement Time: 1948 -PL Present on Original Admission: Y  -TW Side: Right  -TW Location: gluteal  -PL    Retired Wound - Properties Group Placement Date: 05/26/25  -PL Placement Time: 1948 -PL Present on Original Admission: Y  -TW Side: Right  -TW Location: gluteal  -PL    Retired Wound - Properties Group Date first assessed: 05/26/25  -PL Time first assessed: 1948 -PL Present on Original Admission: Y  -TW Side: Right  -TW Location: gluteal  -PL      Row Name 06/04/25 1411          Progress Summary (PT)    Daily Progress Summary (PT) Pt. was able to demonstrate functional mobility skills w/ decreased assistance. She was able to ambulate short distance w/ RW. She tolerated increased acitivity compared to prior sessions. Pt. would continue to benefit from PT services.  -               User Key  (r) = Recorded By, (t) = Taken By, (c) = Cosigned By      Initials Name Provider Type    Seble Ch, RN Registered Nurse    Steph Yin, RN Registered Nurse    Roni John, PT Physical Therapist    Raquel Ledesma, RN Registered Nurse                    Physical Therapy Education       Title: PT OT SLP Therapies (Done)       Topic: Physical Therapy (Done)       Point: Mobility training (Done)       Learning Progress Summary            Patient Acceptance, E,TB, VU by  at 6/3/2025 1415                      Point: Home exercise program (Done)       Learning Progress Summary            Patient Acceptance, E,TB, VU by KM at 6/3/2025 1415                      Point: Body mechanics (Done)       Learning Progress Summary            Patient Acceptance, E,TB, VU by ABBEY at 6/3/2025 1415                      Point: Precautions (Done)       Learning Progress Summary            Patient Acceptance, E,TB, VU by ABBEY at 6/3/2025 1415                                      User Key       Initials Effective Dates Name Provider Type Discipline    KM 05/24/22 -   Roni Mar, PT Physical Therapist PT                  PT Recommendation and Plan  Anticipated Discharge Disposition (PT):  (TBD)  Planned Therapy Interventions (PT): balance training, bed mobility training, gait training, home exercise program, patient/family education, postural re-education, ROM (range of motion), strengthening, stretching, transfer training, wheelchair management/propulsion training  Therapy Frequency (PT): 5 times/wk  Progress Summary (PT)  Daily Progress Summary (PT): Pt. was able to demonstrate functional mobility skills w/ decreased assistance. She was able to ambulate short distance w/ RW. She tolerated increased acitivity compared to prior sessions. Pt. would continue to benefit from PT services.  Plan of Care Reviewed With: patient  Outcome Evaluation: Pt. evaluation completed during PT session. She was able to perform functional mobility skills w/ modA-maxA. She was able to ambulate minimal distance w/ RW and maxA. She tolerated session well. Pt. would benefit from skilled PT services to improve functional mobility skills prior to dischare.       Time Calculation:    PT Charges       Row Name 06/04/25 1772             Time Calculation    PT Received On 06/04/25  -KM         Time Calculation- PT    Total Timed Code Minutes- PT 25 minute(s)  -KM                User Key  (r) = Recorded By, (t) = Taken By, (c) = Cosigned By      Initials Name Provider Type    KM Roni Mar, PT Physical Therapist                  Therapy Charges for Today       Code Description Service Date Service Provider Modifiers Qty    55111239610 HC PT EVAL MOD COMPLEXITY 4 6/3/2025 Roni Mar, PT GP 1    92450145492 HC PT THERAPEUTIC ACT EA 15 MIN 6/4/2025 Roni Mar, PT GP 1    56316246754 HC GAIT TRAINING EA 15 MIN 6/4/2025 Roni Mar, PT GP 1            PT G-Codes  AM-PAC 6 Clicks Score (PT): 15    Roni Mar PT  6/4/2025      Electronically signed by Roni Mar PT at 06/04/25 0907           Occupational Therapy Notes (most recent note)        Carline Camacho, OT at 25 1501          Patient Name: Emma Ryan  : 1940    MRN: [id number]                              Today's Date: 2025       Admit Date: 6/3/2025    Visit Dx: No diagnosis found.  Patient Active Problem List   Diagnosis    Complete tear of right rotator cuff    Stage 3a chronic kidney disease    Cough    Gastroesophageal reflux disease    Mixed hyperlipidemia    Shoulder pain    Hypertension    Acromioclavicular joint arthritis    Impingement syndrome, shoulder    Complete tear of left rotator cuff    Urge incontinence    Atopic rhinitis    Upper respiratory tract infection    Bilateral lower extremity edema    Hypercalcemia    Right knee pain    DADA (stress urinary incontinence, female)    Obesity (BMI 35.0-39.9 without comorbidity)    Left knee injury    Acute UTI    COVID-19 virus infection    Skin lesion of scalp    Numbness in left leg    Multiple bruises    Vitamin D deficiency    Laceration of right lower leg    Chronic chest pain with low to moderate risk for CAD    Degenerative disc disease, lumbar    Valvular heart disease, mild aortic and mitral regurgitation    Diastolic dysfunction without heart failure    Hypomagnesemia     Past Medical History:   Diagnosis Date    GERD (gastroesophageal reflux disease)     Hyperlipidemia     Hypertension     Left shoulder pain     Obesity     Osteoarthritis of knees, bilateral     Overactive bladder     Right shoulder pain      Past Surgical History:   Procedure Laterality Date    APPENDECTOMY      BUNIONECTOMY Right     CARDIOVASCULAR STRESS TEST  10/2012    CATARACT EXTRACTION  2017    CHOLECYSTECTOMY      COLONOSCOPY      ECHO - CONVERTED  10/2012    JOINT REPLACEMENT      bilateral knees     KNEE ARTHROPLASTY Left     KNEE ARTHROSCOPY      SHOULDER ARTHROSCOPY Left 2016    Procedure: LEFT SHOULDER ACROMIOPLASTY,MINI OPEN ROTATOR CUFF REPAIR;  Surgeon:  Carlos Eduardo Smith MD;  Location: [facility];  Service:     SHOULDER SURGERY  05/31/2016    OPEN ACROMICPLASTY RIGHT SHOULDER      General Information       Row Name 06/04/25 1447          OT Time and Intention    Subjective Information complains of;weakness  -     Document Type evaluation  -     Mode of Treatment individual therapy;occupational therapy  -     Patient Effort good  -     Symptoms Noted During/After Treatment fatigue  -     Comment Patient agreeable to Swing bed evaluation.  -       Row Name 06/04/25 1447          General Information    Patient Profile Reviewed yes  -     Prior Level of Function independent:;all household mobility;ADL's  rollator for functional mobility  -     Existing Precautions/Restrictions fall  -     Barriers to Rehab medically complex  -       Row Name 06/04/25 1447          Occupational Profile    Reason for Services/Referral (Occupational Profile) Patient was admitted to [facility] Swing bed from acute hospitalization on 6/3/2025. She was referred for OT evaluation due to change in functional performance with ADLs, functional mobility, and/or transfers.  -     Performance Patterns (Occupational Profile) Patient asked about preferred activities. She was watching television with this provided within room.  -       Row Name 06/04/25 1447          Living Environment    Current Living Arrangements home  -     People in Home spouse  -       Row Name 06/04/25 1447          Cognition    Orientation Status (Cognition) oriented x 3  -       Row Name 06/04/25 1447          Safety Issues/Impairments Affecting Functional Mobility    Safety Issues Affecting Function (Mobility) awareness of need for assistance;insight into deficits/self-awareness  -     Impairments Affecting Function (Mobility) balance;endurance/activity tolerance;strength  -               User Key  (r) = Recorded By, (t) = Taken By, (c) = Cosigned By      Initials Name  Provider Type     Carline Camacho OT Occupational Therapist                     Mobility/ADL's       Row Name 06/04/25 1456          Bed Mobility    Comment, (Bed Mobility) patient up in chair upon arrival  -[facility] Name 06/04/25 1456          Transfers    Transfers stand-sit transfer;bed-chair transfer;sit-stand transfer  -[facility] Name 06/04/25 1456          Bed-Chair Transfer    Bed-Chair [place] (Transfers) minimum assist (75% patient effort)  -     Assistive Device (Bed-Chair Transfers) walker, front-wheeled  -[facility] Name 06/04/25 1456          Sit-Stand Transfer    Sit-Stand [place] (Transfers) minimum assist (75% patient effort)  -     Assistive Device (Sit-Stand Transfers) walker, front-wheeled  -[facility] Name 06/04/25 1456          Stand-Sit Transfer    Stand-Sit [place] (Transfers) minimum assist (75% patient effort)  -     Assistive Device (Stand-Sit Transfers) walker, front-wheeled  -[facility] Name 06/04/25 1456          Activities of Daily Living    BADL Assessment/Intervention bathing;upper body dressing;lower body dressing;grooming;feeding;toileting  -[facility] Name 06/04/25 1456          Bathing Assessment/Intervention    [place] Level (Bathing) bathing skills;moderate assist (50% patient effort)  -[facility] Name 06/04/25 1456          Upper Body Dressing Assessment/Training    [place] Level (Upper Body Dressing) upper body dressing skills;minimum assist (75% patient effort)  -[facility] Name 06/04/25 1456          Lower Body Dressing Assessment/Training    [place] Level (Lower Body Dressing) lower body dressing skills;maximum assist (25% patient effort)  -[facility] Name 06/04/25 1456          Grooming Assessment/Training    [place] Level (Grooming) grooming skills;minimum assist (75% patient effort)  -[facility] Name 06/04/25 1456          Self-Feeding Assessment/Training    [place] Level (Feeding) feeding skills;set  up  -[facility] Name 06/04/25 1456          Toileting Assessment/Training    [place] Level (Toileting) toileting skills;maximum assist (25% patient effort)  -               User Key  (r) = Recorded By, (t) = Taken By, (c) = Cosigned By      Initials Name Provider Type    Carline Blackwell OT Occupational Therapist                   Obj/Interventions       Row Name 06/04/25 1457          Sensory Assessment (Somatosensory)    Sensory Assessment (Somatosensory) UE sensation intact  -KP       Row Name 06/04/25 1457          Vision Assessment/Intervention    Visual Impairment/Limitations WFL  -KP       Row Name 06/04/25 1457          Range of Motion Comprehensive    General Range of Motion bilateral upper extremity ROM L  -KP       Row Name 06/04/25 1457          Strength Comprehensive (MMT)    Comment, General Manual Muscle Testing (MMT) Assessment 4/5 MMT in BUEs  -KP       Row Name 06/04/25 1457          Motor Skills    Motor Skills coordination;functional endurance  -     Functional Endurance fair  -KP       Row Name 06/04/25 1457          Balance    Balance Assessment sitting static balance;standing static balance  -     Static Sitting Balance standby assist  -     Dynamic Standing Balance minimal assist  -     Position/Device Used, Standing Balance walker, rolling  -               User Key  (r) = Recorded By, (t) = Taken By, (c) = Cosigned By      Initials Name Provider Type    Carline Blackwell OT Occupational Therapist                   Goals/Plan       Row Name 06/04/25 1457          Transfer Goal 1 (OT)    Activity/Assistive Device (Transfer Goal 1, OT) toilet;commode, 3-in-1  -     Time Frame (Transfer Goal 1, OT) by discharge  -KP       Row Name 06/04/25 1455          Problem Specific Goal 1 (OT)    Problem Specific Goal 1 (OT) Patient will perform sustained activity X15 minutes to promote functional endurance/activity tolerance needed for daily routine.  -     Time Frame (Problem  Specific Goal 1, OT) by discharge  -       Row Name 06/04/25 1459          Therapy Assessment/Plan (OT)    Planned Therapy Interventions (OT) activity tolerance training;BADL retraining;functional balance retraining;patient/caregiver education/training;occupation/activity based interventions;transfer/mobility retraining;strengthening exercise;ROM/therapeutic exercise  -               User Key  (r) = Recorded By, (t) = Taken By, (c) = Cosigned By      Initials Name Provider Type     Carline Camacho, IHSAN Occupational Therapist                   Clinical Impression       Row Name 06/04/25 1458          Pain Assessment    Pretreatment Pain Rating 0/10 - no pain  -     Posttreatment Pain Rating 0/10 - no pain  -[facility] Name 06/04/25 1458          Plan of Care Review    Plan of Care Reviewed With patient  -     Progress no change  -     Outcome Evaluation Patient seen for OT evaluation. She presents with functional limitations including generalized weakness, impaired balance, and limited functional endurance/activity tolerance needed for daily occupations. She would benefit from ongoing OT services to promote highest level of independence and safety prior to discharge.  -       Row Name 06/04/25 1458          Therapy Assessment/Plan (OT)    Patient/Family Therapy Goal Statement (OT) return to [facility]  -     Rehab Potential (OT) good  -     Criteria for Skilled Therapeutic Interventions Met (OT) yes;meets criteria;skilled treatment is necessary  -     Therapy Frequency (OT) 5 times/wk  -     Predicted Duration of Therapy Intervention (OT) discharge  -       Row Name 06/04/25 1458          Therapy Plan Review/Discharge Plan (OT)    Equipment Needs Upon Discharge (OT) other (see comments)  TBD  -     Anticipated Discharge Disposition (OT) home with assist;home  -[facility] Name 06/04/25 1454          Positioning and Restraints    Pre-Treatment Position sitting in chair/recliner  -     Post  Treatment Position chair  -KP     In Chair sitting;call light within reach;encouraged to call for assist  chair alarm in place on arrival but not activated  -               User Key  (r) = Recorded By, (t) = Taken By, (c) = Cosigned By      Initials Name Provider Type    Carline Blackwell OT Occupational Therapist                   Outcome Measures       Row Name 06/04/25 1500          How much help from another is currently needed...    Putting on and taking off regular lower body clothing? 2  -KP     Bathing (including washing, rinsing, and drying) 3  -KP     Toileting (which includes using toilet bed pan or urinal) 2  -KP     Putting on and taking off regular upper body clothing 3  -KP     Taking care of personal grooming (such as brushing teeth) 3  -KP     Eating meals 4  -KP     AM-PAC 6 Clicks Score (OT) 17  -       Row Name 06/04/25 0826          How much help from another person do you currently need...    Turning from your back to your side while in flat bed without using bedrails? 4  -HH     Moving from lying on back to sitting on the side of a flat bed without bedrails? 3  -HH     Moving to and from a bed to a chair (including a wheelchair)? 2  -HH     Standing up from a chair using your arms (e.g., wheelchair, bedside chair)? 2  -HH     Climbing 3-5 steps with a railing? 2  -HH     To walk in hospital room? 2  -HH     AM-PAC 6 Clicks Score (PT) 15  -HH       Row Name 06/04/25 1500          Functional Assessment    Outcome Measure Options AM-PAC 6 Clicks Daily Activity (OT)  -               User Key  (r) = Recorded By, (t) = Taken By, (c) = Cosigned By      Initials Name Provider Type    Carline Blackwell OT Occupational Therapist    Trini Vasquez, RN Registered Nurse                      OT Recommendation and Plan  Planned Therapy Interventions (OT): activity tolerance training, BADL retraining, functional balance retraining, patient/caregiver education/training, occupation/activity based  interventions, transfer/mobility retraining, strengthening exercise, ROM/therapeutic exercise  Therapy Frequency (OT): 5 times/wk  Plan of Care Review  Plan of Care Reviewed With: patient  Progress: no change  Outcome Evaluation: Patient seen for OT evaluation. She presents with functional limitations including generalized weakness, impaired balance, and limited functional endurance/activity tolerance needed for daily occupations. She would benefit from ongoing OT services to promote highest level of independence and safety prior to discharge.     Time Calculation:         Time Calculation- OT       Row Name 25 1500             Time Calculation- OT    OT Received On 25  -                User Key  (r) = Recorded By, (t) = Taken By, (c) = Cosigned By      Initials Name Provider Type     Carline Camacho OT Occupational Therapist                  Therapy Charges for Today       Code Description Service Date Service Provider Modifiers Qty    35206630100 HC OT EVAL MOD COMPLEXITY 4 2025 Carline Camacho OT GO 1                 Carline Camacho OT  2025    Electronically signed by Carline Camacho OT at 25 1501       Roni Mar, PILLO   Physical Therapist  Physical Therapy     Therapy Evaluation      Signed     Date of Service: 25  Creation Time: 25     Signed       Expand All Collapse All[]Expand All by Default    Acute Care - Physical Therapy Initial Evaluation  FLORIN Mims     Patient Name: Emma Ryan                 : 1940                    MRN: [id number]  Today's Date: 6/3/2025                                    Visit Dx:   Visit Diagnosis   No diagnosis found.     Problem List       Patient Active Problem List   Diagnosis    Complete tear of right rotator cuff    Stage 3a chronic kidney disease    Cough    Gastroesophageal reflux disease    Mixed hyperlipidemia    Shoulder pain    Hypertension    Acromioclavicular joint arthritis    Impingement syndrome,  shoulder    Complete tear of left rotator cuff    Urge incontinence    Atopic rhinitis    Upper respiratory tract infection    Bilateral lower extremity edema    Hypercalcemia    Right knee pain    DADA (stress urinary incontinence, female)    Obesity (BMI 35.0-39.9 without comorbidity)    Left knee injury    Acute UTI    COVID-19 virus infection    Skin lesion of scalp    Numbness in left leg    Multiple bruises    Vitamin D deficiency    Laceration of right lower leg    Chronic chest pain with low to moderate risk for CAD    Degenerative disc disease, lumbar    Valvular heart disease, mild aortic and mitral regurgitation    Diastolic dysfunction without heart failure    Hypomagnesemia         Medical History        Past Medical History:   Diagnosis Date    GERD (gastroesophageal reflux disease)      Hyperlipidemia      Hypertension      Left shoulder pain      Obesity      Osteoarthritis of knees, bilateral      Overactive bladder      Right shoulder pain           Surgical History         Past Surgical History:   Procedure Laterality Date    APPENDECTOMY        BUNIONECTOMY Right      CARDIOVASCULAR STRESS TEST   10/2012    CATARACT EXTRACTION   2017    CHOLECYSTECTOMY        COLONOSCOPY   2011    ECHO - CONVERTED   10/2012    JOINT REPLACEMENT         bilateral knees     KNEE ARTHROPLASTY Left      KNEE ARTHROSCOPY        SHOULDER ARTHROSCOPY Left 7/28/2016     Procedure: LEFT SHOULDER ACROMIOPLASTY,MINI OPEN ROTATOR CUFF REPAIR;  Surgeon: Carlos Eduardo Smith MD;  Location: [facility];  Service:     SHOULDER SURGERY   05/31/2016     OPEN ACROMICPLASTY RIGHT SHOULDER         PT Assessment (Last 12 Hours)            PT Evaluation and Treatment         Row Name 06/03/25 1401                 Physical Therapy Time and Intention     Subjective Information complains of;weakness;fatigue  -KM       Document Type evaluation  -KM       Mode of Treatment individual therapy;physical therapy  -KM       Patient Effort good   -KM       Symptoms Noted During/After Treatment fatigue  -KM          Row Name 06/03/25 1401                 General Information     Patient Profile Reviewed yes  -KM       Patient Observations alert;cooperative;agree to therapy  -KM       Prior Level of Function independent:;all household mobility;ADL's  modified independent w/ rollator, per pt. report  -KM       Existing Precautions/Restrictions fall  -KM       Risks Reviewed patient:;LOB;nausea/vomiting;dizziness;increased discomfort  -KM       Benefits Reviewed patient:;improve function;increase independence;increase strength;increase balance  -KM       Barriers to Rehab medically complex  -KM          Row Name 06/03/25 1401                 Living Environment     Current Living Arrangements home  -KM       People in Home spouse  -KM       Primary Care Provided by self  -KM          Row Name 06/03/25 1401                 Home Use of Assistive/Adaptive Equipment     Equipment Currently Used at Home rollator  -KM          Row Name 06/03/25 1401                 Pain     Pretreatment Pain Rating 0/10 - no pain  -KM       Posttreatment Pain Rating 0/10 - no pain  -KM          Row Name 06/03/25 1401                 Cognition     Affect/Mental Status (Cognition) WFL  -KM       Orientation Status (Cognition) oriented x 3  -KM       Follows Commands (Cognition) WFL  -KM          Row Name 06/03/25 1401                 Range of Motion (ROM)     Range of Motion bilateral lower extremities;ROM is WFL  R ankle dorsiflexion limited from foot drop  -KM          Row Name 06/03/25 1401                 Strength (Manual Muscle Testing)     Strength (Manual Muscle Testing) left lower extremity;strength is WFL  RLE strength grossly 3+/5; RLE dorsiflexion 0/5  -KM          Row Name 06/03/25 1401                 Bed Mobility     Bed Mobility bed mobility (all) activities  -KM       All Activities, [place] (Bed Mobility) moderate assist (50% patient effort)  -KM       Bed Mobility,  Safety Issues decreased use of legs for bridging/pushing  -KM       Assistive Device (Bed Mobility) bed rails;head of bed elevated  -KM          Row Name 06/03/25 1401                 Transfers     Transfers sit-stand transfer;stand-sit transfer  -KM          Row Name 06/03/25 1401                 Sit-Stand Transfer     Sit-Stand [place] (Transfers) moderate assist (50% patient effort);maximum assist (25% patient effort)  -KM       Assistive Device (Sit-Stand Transfers) walker, front-wheeled  -KM          Row Name 06/03/25 1401                 Stand-Sit Transfer     Stand-Sit [place] (Transfers) moderate assist (50% patient effort)  -KM       Assistive Device (Stand-Sit Transfers) walker, front-wheeled  -KM          Row Name 06/03/25 1401                 Gait/Stairs (Locomotion)     Gait/Stairs Locomotion gait/ambulation independence;gait/ambulation assistive device;distance ambulated  -KM       [place] Level (Gait) maximum assist (25% patient effort)  -KM       Assistive Device (Gait) walker, front-wheeled  -KM       Patient was able to Ambulate yes  -KM       Distance in Feet (Gait) 3  x3  -KM       Pattern (Gait) step-to  -KM       Deviations/Abnormal Patterns (Gait) ataxic;base of support, narrow;gait speed decreased  -KM       Right Sided Gait Deviations foot drop/toe drag;knee buckling, right side  -KM          Row Name 06/03/25 1401                 Safety Issues/Impairments Affecting Functional Mobility     Impairments Affecting Function (Mobility) balance;endurance/activity tolerance;strength  -KM          Row Name 06/03/25 1401                 Balance     Balance Assessment sitting static balance;standing dynamic balance  -KM       Static Sitting Balance standby assist  -KM       Position, Sitting Balance sitting edge of bed;sitting in chair  -KM       Dynamic Standing Balance maximum assist  -KM       Position/Device Used, Standing Balance walker, front-wheeled  -KM          Row Name  06/03/25 1401                 Motor Skills     Comments, Therapeutic Exercise seated ther-ex  -KM       Additional Documentation Comments, Therapeutic Exercise (Row)  -KM          Row Name                      Wound 05/29/25 0411 medial sacral spine Pressure Injury     Wound - Properties Group Placement Date: 05/29/25  -SM Placement Time: 0411 -SM Present on Original Admission: N  -SM Orientation: medial  -SM Location: sacral spine  -SM Primary Wound Type: Pressure Inj  -TW     Retired Wound - Properties Group Placement Date: 05/29/25  -SM Placement Time: 0411 -SM Present on Original Admission: N  -SM Orientation: medial  -SM Location: sacral spine  -SM     Retired Wound - Properties Group Placement Date: 05/29/25  -SM Placement Time: 0411 -SM Present on Original Admission: N  -SM Orientation: medial  -SM Location: sacral spine  -SM     Retired Wound - Properties Group Date first assessed: 05/29/25  -SM Time first assessed: 0411 -SM Present on Original Admission: N  -SM Location: sacral spine  -SM        Row Name                      Wound 05/26/25 1948 Right gluteal Pressure Injury     Wound - Properties Group Placement Date: 05/26/25  -PL Placement Time: 1948  -PL Present on Original Admission: Y  -TW Side: Right  -TW Location: gluteal  -PL Primary Wound Type: Pressure Inj  -TW     Retired Wound - Properties Group Placement Date: 05/26/25  -PL Placement Time: 1948  -PL Present on Original Admission: Y  -TW Side: Right  -TW Location: gluteal  -PL     Retired Wound - Properties Group Placement Date: 05/26/25  -PL Placement Time: 1948  -PL Present on Original Admission: Y  -TW Side: Right  -TW Location: gluteal  -PL     Retired Wound - Properties Group Date first assessed: 05/26/25  -PL Time first assessed: 1948  -PL Present on Original Admission: Y  -TW Side: Right  -TW Location: gluteal  -PL        Row Name 06/03/25 1401                 Plan of Care Review     Plan of Care Reviewed With patient  -KM        Outcome Evaluation Pt. evaluation completed during PT session. She was able to perform functional mobility skills w/ modA-maxA. She was able to ambulate minimal distance w/ RW and maxA. She tolerated session well. Pt. would benefit from skilled PT services to improve functional mobility skills prior to dischare.  -          Row Name 06/03/25 1401                 Therapy Assessment/Plan (PT)     Patient/Family Therapy Goals Statement (PT) return to PLOF  -KM       Functional Level at Time of Evaluation (PT) modA-maxA  -KM       PT Diagnosis (PT) decreased mobility skills  -KM       Rehab Potential (PT) fair  -KM       Criteria for Skilled Interventions Met (PT) yes;skilled treatment is necessary  -KM       Therapy Frequency (PT) 5 times/wk  -KM       Predicted Duration of Therapy Intervention (PT) until discharge  -KM       Problem List (PT) problems related to;balance;mobility;range of motion (ROM);strength  -KM       Activity Limitations Related to Problem List (PT) unable to ambulate safely;unable to transfer safely  -KM          Row Name 06/03/25 1401                 Therapy Plan Review/Discharge Plan (PT)     Therapy Plan Review (PT) evaluation/treatment results reviewed;care plan/treatment goals reviewed;risks/benefits reviewed;patient  -KM          Row Name 06/03/25 1401                 Physical Therapy Goals     Bed Mobility Goal Selection (PT) bed mobility, PT goal 1  -KM       Transfer Goal Selection (PT) transfer, PT goal 1  -KM       Gait Training Goal Selection (PT) gait training, PT goal 1  -KM          Row Name 06/03/25 1401                 Bed Mobility Goal 1 (PT)     Activity/Assistive Device (Bed Mobility Goal 1, PT) bed mobility activities, all  -KM       [place] Level/Cues Needed (Bed Mobility Goal 1, PT) modified independence  -KM       Time Frame (Bed Mobility Goal 1, PT) by discharge  -          Row Name 06/03/25 1401                 Transfer Goal 1 (PT)     Activity/Assistive Device  (Transfer Goal 1, PT) transfers, all;walker, rolling  -KM       [place] Level/Cues Needed (Transfer Goal 1, PT) modified independence  -KM       Time Frame (Transfer Goal 1, PT) by discharge  -          Row Name 06/03/25 1401                 Gait Training Goal 1 (PT)     Activity/Assistive Device (Gait Training Goal 1, PT) gait (walking locomotion);assistive device use;walker, rolling  -KM       [place] Level (Gait Training Goal 1, PT) standby assist  -KM       Distance (Gait Training Goal 1, PT) 100'  -KM       Time Frame (Gait Training Goal 1, PT) by discharge  -                    User Key  (r) = Recorded By, (t) = Taken By, (c) = Cosigned By        Initials Name Provider Type     Seble Ch, RN Registered Nurse     Steph Yin RN Registered Nurse     Roni John, PT Physical Therapist     Raquel Ledesma RN Registered Nurse                             Physical Therapy Education            Title: PT OT SLP Therapies (Done)         Topic: Physical Therapy (Done)         Point: Mobility training (Done)         Learning Progress Summary             Patient Acceptance, E,TB, VU by  at 6/3/2025 1415                            Point: Home exercise program (Done)         Learning Progress Summary             Patient Acceptance, E,TB, VU by  at 6/3/2025 1415                            Point: Body mechanics (Done)         Learning Progress Summary             Patient Acceptance, E,TB, VU by  at 6/3/2025 1415                            Point: Precautions (Done)         Learning Progress Summary             Patient Acceptance, E,TB, VU by  at 6/3/2025 1415                                                User Key         Initials Effective Dates Name Provider Type Discipline      05/24/22 -  Roni Mar, PT Physical Therapist PT                          PT Recommendation and Plan  Anticipated Discharge Disposition (PT):  (TBD)  Planned Therapy Interventions (PT): balance  training, bed mobility training, gait training, home exercise program, patient/family education, postural re-education, ROM (range of motion), strengthening, stretching, transfer training, wheelchair management/propulsion training  Therapy Frequency (PT): 5 times/wk  Plan of Care Reviewed With: patient  Outcome Evaluation: Pt. evaluation completed during PT session. She was able to perform functional mobility skills w/ modA-maxA. She was able to ambulate minimal distance w/ RW and maxA. She tolerated session well. Pt. would benefit from skilled PT services to improve functional mobility skills prior to dischare.               Time Calculation:     PT Charges         Row Name 25 1359                       Time Calculation     PT Received On 25  -KM         PT Goal Re-Cert Due Date 25  -KM                      User Key  (r) = Recorded By, (t) = Taken By, (c) = Cosigned By        Initials Name Provider Type     Roni John, PT Physical Therapist                       Therapy Charges for Today         Code Description Service Date Service Provider Modifiers Qty     98067135120 HC PT EVAL MOD COMPLEXITY 4 6/3/2025 Roni Mar, PT GP 1                PT G-Codes  AM-PAC 6 Clicks Score (PT): 15     Roni Mar, PT                   6/3/2025                                     Marcelina Sandoval, PILLO   Physical Therapist  Physical Therapy     Therapy Treatment Note      Signed     Date of Service: 25  Creation Time: 25   Related encounter: ED to Hosp-Admission (Discharged) from 2025 in [address] with Glenroy Lyn DO and Burt Golden MD     Signed       Expand All Collapse All[]Expand All by Default    Acute Care - Physical Therapy Treatment Note  [facility]     Patient Name: Emma Ryan                 : 1940                    MRN: [id number]  Today's Date: 2025                                  Visit Dx:   Visit Diagnosis        ICD-10-CM ICD-9-CM   1. Hypomagnesemia  E83.42 275.2   2. Hemorrhoids, unspecified hemorrhoid type  K64.9 455.6   3. Diastolic dysfunction without heart failure  I51.89 429.9   4. Acromioclavicular joint arthritis, unspecified laterality  M19.019 716.91         Problem List       Patient Active Problem List   Diagnosis    Complete tear of right rotator cuff    Stage 3a chronic kidney disease    Cough    Gastroesophageal reflux disease    Mixed hyperlipidemia    Shoulder pain    Hypertension    Acromioclavicular joint arthritis    Impingement syndrome, shoulder    Complete tear of left rotator cuff    Urge incontinence    Atopic rhinitis    Upper respiratory tract infection    Bilateral lower extremity edema    Hypercalcemia    Right knee pain    DADA (stress urinary incontinence, female)    Obesity (BMI 35.0-39.9 without comorbidity)    Left knee injury    Acute UTI    COVID-19 virus infection    Skin lesion of scalp    Numbness in left leg    Multiple bruises    Vitamin D deficiency    Laceration of right lower leg    Chronic chest pain with low to moderate risk for CAD    Degenerative disc disease, lumbar    Valvular heart disease, mild aortic and mitral regurgitation    Diastolic dysfunction without heart failure    Hypomagnesemia         Medical History        Past Medical History:   Diagnosis Date    GERD (gastroesophageal reflux disease)      Hyperlipidemia      Hypertension      Left shoulder pain      Obesity      Osteoarthritis of knees, bilateral      Overactive bladder      Right shoulder pain           Surgical History         Past Surgical History:   Procedure Laterality Date    APPENDECTOMY        BUNIONECTOMY Right      CARDIOVASCULAR STRESS TEST   10/2012    CATARACT EXTRACTION   2017    CHOLECYSTECTOMY        COLONOSCOPY   2011    ECHO - CONVERTED   10/2012    JOINT REPLACEMENT         bilateral knees     KNEE ARTHROPLASTY Left      KNEE ARTHROSCOPY        SHOULDER ARTHROSCOPY Left 7/28/2016      Procedure: LEFT SHOULDER ACROMIOPLASTY,MINI OPEN ROTATOR CUFF REPAIR;  Surgeon: Carlos Eduardo Smith MD;  Location: [facility];  Service:     SHOULDER SURGERY   05/31/2016     OPEN ACROMICPLASTY RIGHT SHOULDER         PT Assessment (Last 12 Hours)            PT Evaluation and Treatment         Row Name 05/30/25 1243                 Physical Therapy Time and Intention     Document Type therapy note (daily note)  -       Mode of Treatment physical therapy  -       Patient Effort good  -       Symptoms Noted During/After Treatment fatigue  -       Comment Pt seen for therapy treatment this date, pleasant and cooperative with therapy. Pt participated in LE TE, standing, and lateral R steps along EOB for better positioning in bed.  -          Row Name 05/30/25 1243                 Pain     Additional Documentation Pain Scale: FACES Pre/Post-Treatment (Group)  -[facility] Name 05/30/25 1243                 Pain Scale: FACES Pre/Post-Treatment     Pain: FACES Scale, Pretreatment 0-->no hurt  -       Posttreatment Pain Rating 0-->no hurt  -[facility] Name 05/30/25 1243                 Cognition     Personal Safety Interventions supervised activity;gait belt  -[facility] Name 05/30/25 1243                 Bed Mobility     Bed Mobility supine-sit  -       Supine-Sit [place] (Bed Mobility) modified independence  -[facility] Name 05/30/25 1243                 Transfers     Transfers sit-stand transfer;stand-sit transfer  -[facility] Name 05/30/25 1243                 Bed-Chair Transfer     Bed-Chair [place] (Transfers) --  -[facility] Name 05/30/25 1243                 Sit-Stand Transfer     Sit-Stand [place] (Transfers) minimum assist (75% patient effort);moderate assist (50% patient effort);other (see comments)  pt grossly min/modA with first STS, on second, pt grossly CGA  -[facility] Name 05/30/25 1243                 Stand-Sit Transfer     Stand-Sit  [place] (Transfers) contact guard;minimum assist (75% patient effort)  -          Row Name 05/30/25 1243                 Gait/Stairs (Locomotion)     Comment, (Gait/Stairs) Pt able to take 3-4 BL Right Lateral steps along EOB, grossly min/modA, R foot drop appreciated.  -          Row Name 05/30/25 1243                 Motor Skills     Therapeutic Exercise hip;knee  marches 3-4x 8-10, S/LAQ 3-4x8-10; therapeutic rest breaks promoted/given  -          Row Name                      Wound 05/26/25 1948 Right gluteal Pressure Injury     Wound - Properties Group Placement Date: 05/26/25  -PL Placement Time: 1948  -PL Present on Original Admission: Y  -TW Side: Right  -TW Location: gluteal  -PL Primary Wound Type: Pressure Inj  -TW     Retired Wound - Properties Group Placement Date: 05/26/25  -PL Placement Time: 1948  -PL Present on Original Admission: Y  -TW Side: Right  -TW Location: gluteal  -PL     Retired Wound - Properties Group Placement Date: 05/26/25  -PL Placement Time: 1948  -PL Present on Original Admission: Y  -TW Side: Right  -TW Location: gluteal  -PL     Retired Wound - Properties Group Date first assessed: 05/26/25  -PL Time first assessed: 1948  -PL Present on Original Admission: Y  -TW Side: Right  -TW Location: gluteal  -PL        Row Name                      Wound 05/29/25 0411 medial sacral spine Pressure Injury     Wound - Properties Group Placement Date: 05/29/25  -SM Placement Time: 0411  -SM Present on Original Admission: N  -SM Orientation: medial  -SM Location: sacral spine  -SM Primary Wound Type: Pressure Inj  -TW     Retired Wound - Properties Group Placement Date: 05/29/25  -SM Placement Time: 0411  -SM Present on Original Admission: N  -SM Orientation: medial  -SM Location: sacral spine  -SM     Retired Wound - Properties Group Placement Date: 05/29/25  -SM Placement Time: 0411  -SM Present on Original Admission: N  -SM Orientation: medial  -SM Location: sacral spine  -SM      Retired Wound - Properties Group Date first assessed: 05/29/25  - Time first assessed: 0411  -SM Present on Original Admission: N  -SM Location: sacral spine  -        Row Name 05/30/25 1243                 Positioning and Restraints     Pre-Treatment Position in bed  -       Post Treatment Position bed  -KH       In Bed supine;call light within reach;exit alarm on  -KH          Row Name 05/30/25 1243                 Progress Summary (PT)     Daily Progress Summary (PT) Pt did well with therapy and tolerated well, fatigue. Pt range of CGA to modA with standing and able to take lateral steps along EOB with min/modAx1. No change to POC, prognosis fair/guarded.  -                    User Key  (r) = Recorded By, (t) = Taken By, (c) = Cosigned By        Initials Name Provider Type     Seble Ch, RN Registered Nurse     Steph Yin RN Registered Nurse     Marcelina Woodward, PT Physical Therapist     PL Raquel Nava, RN Registered Nurse                             Physical Therapy Education            Title: PT OT SLP Therapies (Resolved)         Topic: Physical Therapy (Resolved)         Point: Mobility training (Resolved)         Learning Progress Summary             Patient Acceptance, E,TB, VU,NR by PL at 5/26/2025 2132     Acceptance, E,TB, VU by KM at 5/26/2025 1235     Acceptance, E, NR by RD at 5/25/2025 1214                            Point: Home exercise program (Resolved)         Learning Progress Summary             Patient Acceptance, E,TB, VU,NR by PL at 5/26/2025 2132     Acceptance, E,TB, VU by KM at 5/26/2025 1235     Acceptance, E, NR by RD at 5/25/2025 1214                            Point: Body mechanics (Resolved)         Learning Progress Summary             Patient Acceptance, E,TB, VU,NR by PL at 5/26/2025 2132     Acceptance, E,TB, VU by KM at 5/26/2025 1235     Acceptance, E, NR by RD at 5/25/2025 1214                            Point: Precautions (Resolved)          Learning Progress Summary             Patient Acceptance, E,TB, VU,NR by PL at 5/26/2025 2132     Acceptance, E,TB, VU by KM at 5/26/2025 1235     Acceptance, E, NR by RD at 5/25/2025 1214                                                User Key         Initials Effective Dates Name Provider Type Discipline     RD 06/16/21 -  Olivia Faust, RN Registered Nurse Nurse     KM 05/24/22 -  Roni Mar, PT Physical Therapist PT     PL 06/12/24 -  Raquel Nava, NESSA Registered Nurse Nurse                          PT Recommendation and Plan  Progress Summary (PT)  Daily Progress Summary (PT): Pt did well with therapy and tolerated well, fatigue. Pt range of CGA to modA with standing and able to take lateral steps along EOB with min/modAx1. No change to POC, prognosis fair/guarded.               Time Calculation:     PT Charges         Row Name 05/30/25 1402                       Time Calculation     Start Time 1243  -KH         PT Received On 05/30/25  -                 Time Calculation- PT     Total Timed Code Minutes- PT 23 minute(s)  -                      User Key  (r) = Recorded By, (t) = Taken By, (c) = Cosigned By        Initials Name Provider Type     Marcelina Woodward, PT Physical Therapist                       Therapy Charges for Today         Code Description Service Date Service Provider Modifiers Qty     99819652266  PT THER PROC EA 15 MIN 5/30/2025 Marcelina Sandoval, PT GP 1     40139468403 HC PT THERAPEUTIC ACT EA 15 MIN 5/30/2025 Marcelina Sandoval, PT GP 1                PT G-Codes  AM-PAC 6 Clicks Score (PT): 15     Marcelina Sandoval PT                  5/30/2025                 Seble Sena, RN   Registered Nurse  Wound Care     Nursing Note      Signed     Date of Service: 06/03/25 1433  Creation Time: 06/03/25 1433     Signed         WOCN consult received related to swing bed admission. Patient continues with right gluteal pressure injury and medial sacral spine pressure injury. Wound care  orders remain in place.  PI prevention orders remain in place.      Lucio score 16.

## 2025-06-05 NOTE — THERAPY TREATMENT NOTE
Acute Care - Physical Therapy Treatment Note   Prasanna     Patient Name: Emma Ryan  : 1940  MRN: [id number]  Today's Date: 2025      Visit Dx:   No diagnosis found.  Patient Active Problem List   Diagnosis    Complete tear of right rotator cuff    Stage 3a chronic kidney disease    Cough    Gastroesophageal reflux disease    Mixed hyperlipidemia    Shoulder pain    Hypertension    Acromioclavicular joint arthritis    Impingement syndrome, shoulder    Complete tear of left rotator cuff    Urge incontinence    Atopic rhinitis    Upper respiratory tract infection    Bilateral lower extremity edema    Hypercalcemia    Right knee pain    DADA (stress urinary incontinence, female)    Obesity (BMI 35.0-39.9 without comorbidity)    Left knee injury    Acute UTI    COVID-19 virus infection    Skin lesion of scalp    Numbness in left leg    Multiple bruises    Vitamin D deficiency    Laceration of right lower leg    Chronic chest pain with low to moderate risk for CAD    Degenerative disc disease, lumbar    Valvular heart disease, mild aortic and mitral regurgitation    Diastolic dysfunction without heart failure    Hypomagnesemia    Physical deconditioning     Past Medical History:   Diagnosis Date    GERD (gastroesophageal reflux disease)     Hyperlipidemia     Hypertension     Left shoulder pain     Obesity     Osteoarthritis of knees, bilateral     Overactive bladder     Right shoulder pain      Past Surgical History:   Procedure Laterality Date    APPENDECTOMY      BUNIONECTOMY Right     CARDIOVASCULAR STRESS TEST  10/2012    CATARACT EXTRACTION  2017    CHOLECYSTECTOMY      COLONOSCOPY      ECHO - CONVERTED  10/2012    JOINT REPLACEMENT      bilateral knees     KNEE ARTHROPLASTY Left     KNEE ARTHROSCOPY      SHOULDER ARTHROSCOPY Left 2016    Procedure: LEFT SHOULDER ACROMIOPLASTY,MINI OPEN ROTATOR CUFF REPAIR;  Surgeon: Carlos Eduardo Smith MD;  Location: [facility];  Service:     SHOULDER  SURGERY  05/31/2016    OPEN ACROMICPLASTY RIGHT SHOULDER     PT Assessment (Last 12 Hours)       PT Evaluation and Treatment       Row Name 06/05/25 1358          Physical Therapy Time and Intention    Subjective Information no complaints  -KM     Document Type therapy note (daily note)  -KM     Mode of Treatment physical therapy  -KM     Patient Effort good  -KM     Symptoms Noted During/After Treatment fatigue  -KM       Row Name 06/05/25 1358          General Information    Patient Profile Reviewed yes  -KM     Patient Observations alert;cooperative;agree to therapy  -KM     Existing Precautions/Restrictions fall  -KM       Row Name 06/05/25 1358          Pain    Pretreatment Pain Rating 0/10 - no pain  -KM     Posttreatment Pain Rating 0/10 - no pain  -KM       Row Name 06/05/25 1358          Cognition    Affect/Mental Status (Cognition) WFL  -     Orientation Status (Cognition) oriented x 3  -KM     Follows Commands (Cognition) WFL  -KM       Row Name 06/05/25 1358          Bed Mobility    Comment, (Bed Mobility) pt. up in chair upon arrival  -KM       Row Name 06/05/25 1358          Transfers    Transfers sit-stand transfer;stand-sit transfer  -       Row Name 06/05/25 1358          Sit-Stand Transfer    Sit-Stand [place] (Transfers) contact guard;standby assist  -     Assistive Device (Sit-Stand Transfers) walker, front-wheeled  [facility]       Row Name 06/05/25 1358          Stand-Sit Transfer    Stand-Sit [place] (Transfers) contact guard;standby assist  -     Assistive Device (Stand-Sit Transfers) walker, front-wheeled  -[facility] Name 06/05/25 1359          Gait/Stairs (Locomotion)    Gait/Stairs Locomotion gait/ambulation independence;gait/ambulation assistive device;distance ambulated  -KM     [place] Level (Gait) contact guard;minimum assist (75% patient effort)  -     Assistive Device (Gait) walker, front-wheeled  [facility]     Patient was able to Ambulate yes  -KM     Distance in Feet  (Gait) 28  -KM     Pattern (Gait) step-to  -KM     Deviations/Abnormal Patterns (Gait) ataxic;base of support, narrow;gait speed decreased  -KM     Right Sided Gait Deviations foot drop/toe drag;knee buckling, right side  -KM       Row Name 06/05/25 1358          Safety Issues/Impairments Affecting Functional Mobility    Impairments Affecting Function (Mobility) balance;endurance/activity tolerance;strength  -KM       Row Name 06/05/25 1358          Motor Skills    Comments, Therapeutic Exercise seated ther-ex  -KM       Row Name             Wound 05/29/25 0411 medial sacral spine Pressure Injury    Wound - Properties Group Placement Date: 05/29/25  -SM Placement Time: 0411  -SM Present on Original Admission: N  -SM Orientation: medial  -SM Location: sacral spine  -SM Primary Wound Type: Pressure Inj  -TW    Retired Wound - Properties Group Placement Date: 05/29/25  -SM Placement Time: 0411 -SM Present on Original Admission: N  -SM Orientation: medial  -SM Location: sacral spine  -SM    Retired Wound - Properties Group Placement Date: 05/29/25  -SM Placement Time: 0411  -SM Present on Original Admission: N  -SM Orientation: medial  -SM Location: sacral spine  -SM    Retired Wound - Properties Group Date first assessed: 05/29/25  -SM Time first assessed: 0411  -SM Present on Original Admission: N  -SM Location: sacral spine  -SM      Row Name             Wound 05/26/25 1948 Right gluteal Pressure Injury    Wound - Properties Group Placement Date: 05/26/25  -PL Placement Time: 1948 -PL Present on Original Admission: Y  -TW Side: Right  -TW Location: gluteal  -PL Primary Wound Type: Pressure Inj  -TW    Retired Wound - Properties Group Placement Date: 05/26/25  -PL Placement Time: 1948  -PL Present on Original Admission: Y  -TW Side: Right  -TW Location: gluteal  -PL    Retired Wound - Properties Group Placement Date: 05/26/25  -PL Placement Time: 1948 -PL Present on Original Admission: Y  -TW Side: Right  -TW  Location: gluteal  -PL    Retired Wound - Properties Group Date first assessed: 05/26/25  -PL Time first assessed: 1948  -PL Present on Original Admission: Y  -TW Side: Right  -TW Location: gluteal  -PL      Row Name 06/05/25 1358          Progress Summary (PT)    Daily Progress Summary (PT) Pt. continues to show progress w/ functional mobility skills. She was able to increased ambulation distance and quality w/ RW. She tolerated increased activity skills. Pt. would continue to benefit from  PT services.  -               User Key  (r) = Recorded By, (t) = Taken By, (c) = Cosigned By      Initials Name Provider Type    TW Seble Sena, RN Registered Nurse    Steph Yin, RN Registered Nurse    Roni John, PT Physical Therapist    Raquel Ledesma, RN Registered Nurse                    Physical Therapy Education       Title: PT OT SLP Therapies (Done)       Topic: Physical Therapy (Done)       Point: Mobility training (Done)       Learning Progress Summary            Patient Acceptance, E,TB, VU by  at 6/3/2025 1415                      Point: Home exercise program (Done)       Learning Progress Summary            Patient Acceptance, E,TB, VU by  at 6/3/2025 1415                      Point: Body mechanics (Done)       Learning Progress Summary            Patient Acceptance, E,TB, VU by  at 6/3/2025 1415                      Point: Precautions (Done)       Learning Progress Summary            Patient Acceptance, E,TB, VU by  at 6/3/2025 1415                                      User Key       Initials Effective Dates Name Provider Type [facility] 05/24/22 -  Roni Mar, PT Physical Therapist PT                  PT Recommendation and Plan  Anticipated Discharge Disposition (PT):  (TBD)  Planned Therapy Interventions (PT): balance training, bed mobility training, gait training, home exercise program, patient/family education, postural re-education, ROM (range of motion),  strengthening, stretching, transfer training, wheelchair management/propulsion training  Therapy Frequency (PT): 5 times/wk  Progress Summary (PT)  Daily Progress Summary (PT): Pt. continues to show progress w/ functional mobility skills. She was able to increased ambulation distance and quality w/ RW. She tolerated increased activity skills. Pt. would continue to benefit from  PT services.  Plan of Care Reviewed With: patient  Outcome Evaluation: Pt. evaluation completed during PT session. She was able to perform functional mobility skills w/ modA-maxA. She was able to ambulate minimal distance w/ RW and maxA. She tolerated session well. Pt. would benefit from skilled PT services to improve functional mobility skills prior to dischare.   Outcome Measures       Row Name 06/05/25 1200             How much help from another is currently needed...    Putting on and taking off regular lower body clothing? 2  -LA      Bathing (including washing, rinsing, and drying) 3  -LA      Toileting (which includes using toilet bed pan or urinal) 3  -LA      Putting on and taking off regular upper body clothing 4  -LA      Taking care of personal grooming (such as brushing teeth) 4  -LA      Eating meals 4  -LA      AM-PAC 6 Clicks Score (OT) 20  -LA         Functional Assessment    Outcome Measure Options AM-PAC 6 Clicks Daily Activity (OT)  -LA                User Key  (r) = Recorded By, (t) = Taken By, (c) = Cosigned By      Initials Name Provider Type    Lena Santizo, OT Occupational Therapist                     Time Calculation:    PT Charges       Row Name 06/05/25 1398             Time Calculation    PT Received On 06/05/25  -KM         Time Calculation- PT    Total Timed Code Minutes- PT 23 minute(s)  -KM                User Key  (r) = Recorded By, (t) = Taken By, (c) = Cosigned By      Initials Name Provider Type    Roni John, PT Physical Therapist                  Therapy Charges for Today       Code  Description Service Date Service Provider Modifiers Qty    56821695074 HC PT THERAPEUTIC ACT EA 15 MIN 6/4/2025 Roni Mar, PT GP 1    07272371791 HC GAIT TRAINING EA 15 MIN 6/4/2025 Roni Mar, PT GP 1    61544478830 HC PT THERAPEUTIC ACT EA 15 MIN 6/5/2025 Roni Mar, PT GP 1    14713586822 HC GAIT TRAINING EA 15 MIN 6/5/2025 Roni Mar, PT GP 1            PT G-Codes  Outcome Measure Options: AM-PAC 6 Clicks Daily Activity (OT)  AM-PAC 6 Clicks Score (PT): 15  AM-PAC 6 Clicks Score (OT): 20    Roni Mar PT  6/5/2025

## 2025-06-06 PROCEDURE — 25010000002 HEPARIN (PORCINE) PER 1000 UNITS: Performed by: FAMILY MEDICINE

## 2025-06-06 PROCEDURE — 97530 THERAPEUTIC ACTIVITIES: CPT

## 2025-06-06 PROCEDURE — 97116 GAIT TRAINING THERAPY: CPT

## 2025-06-06 RX ADMIN — Medication 1 CAPSULE: at 09:09

## 2025-06-06 RX ADMIN — PANTOPRAZOLE SODIUM 40 MG: 40 TABLET, DELAYED RELEASE ORAL at 06:20

## 2025-06-06 RX ADMIN — OXYBUTYNIN CHLORIDE 10 MG: 5 TABLET, EXTENDED RELEASE ORAL at 09:09

## 2025-06-06 RX ADMIN — Medication 1 APPLICATION: at 09:12

## 2025-06-06 RX ADMIN — Medication 5 MG: at 21:09

## 2025-06-06 RX ADMIN — DOCUSATE SODIUM 100 MG: 100 CAPSULE, LIQUID FILLED ORAL at 09:08

## 2025-06-06 RX ADMIN — ATORVASTATIN CALCIUM 20 MG: 20 TABLET, FILM COATED ORAL at 09:10

## 2025-06-06 RX ADMIN — HEPARIN SODIUM 5000 UNITS: 5000 INJECTION INTRAVENOUS; SUBCUTANEOUS at 09:10

## 2025-06-06 RX ADMIN — Medication 1000 UNITS: at 09:09

## 2025-06-06 RX ADMIN — HEPARIN SODIUM 5000 UNITS: 5000 INJECTION INTRAVENOUS; SUBCUTANEOUS at 20:51

## 2025-06-06 RX ADMIN — Medication 1 APPLICATION: at 20:51

## 2025-06-06 NOTE — THERAPY TREATMENT NOTE
Patient Name: Emma Ryan  : 1940    MRN: [id number]                              Today's Date: 2025       Admit Date: 6/3/2025    Visit Dx: No diagnosis found.  Patient Active Problem List   Diagnosis    Complete tear of right rotator cuff    Stage 3a chronic kidney disease    Cough    Gastroesophageal reflux disease    Mixed hyperlipidemia    Shoulder pain    Hypertension    Acromioclavicular joint arthritis    Impingement syndrome, shoulder    Complete tear of left rotator cuff    Urge incontinence    Atopic rhinitis    Upper respiratory tract infection    Bilateral lower extremity edema    Hypercalcemia    Right knee pain    DADA (stress urinary incontinence, female)    Obesity (BMI 35.0-39.9 without comorbidity)    Left knee injury    Acute UTI    COVID-19 virus infection    Skin lesion of scalp    Numbness in left leg    Multiple bruises    Vitamin D deficiency    Laceration of right lower leg    Chronic chest pain with low to moderate risk for CAD    Degenerative disc disease, lumbar    Valvular heart disease, mild aortic and mitral regurgitation    Diastolic dysfunction without heart failure    Hypomagnesemia    Physical deconditioning     Past Medical History:   Diagnosis Date    GERD (gastroesophageal reflux disease)     Hyperlipidemia     Hypertension     Left shoulder pain     Obesity     Osteoarthritis of knees, bilateral     Overactive bladder     Right shoulder pain      Past Surgical History:   Procedure Laterality Date    APPENDECTOMY      BUNIONECTOMY Right     CARDIOVASCULAR STRESS TEST  10/2012    CATARACT EXTRACTION  2017    CHOLECYSTECTOMY      COLONOSCOPY      ECHO - CONVERTED  10/2012    JOINT REPLACEMENT      bilateral knees     KNEE ARTHROPLASTY Left     KNEE ARTHROSCOPY      SHOULDER ARTHROSCOPY Left 2016    Procedure: LEFT SHOULDER ACROMIOPLASTY,MINI OPEN ROTATOR CUFF REPAIR;  Surgeon: Carlos Eduardo Smith MD;  Location: [facility];  Service:     SHOULDER  SURGERY  05/31/2016    OPEN ACROMICPLASTY RIGHT SHOULDER      General Information       Row Name 06/06/25 1345          OT Time and Intention    Subjective Information complains of;fatigue;weakness  -     Document Type therapy note (daily note)  -     Mode of Treatment occupational therapy  -     Patient Effort good  -     Symptoms Noted During/After Treatment fatigue  -     Comment Patient with initial decline in the a.m. stating she was tired and not feeling well. She was able to complete short session after lunch. Patient reports she has been performing exercises from bed level.  -       Row Name 06/06/25 1345          General Information    Patient Profile Reviewed yes  -     Existing Precautions/Restrictions fall  -     Barriers to Rehab medically complex  -       Row Name 06/06/25 1345          Cognition    Orientation Status (Cognition) oriented x 3  -       Row Name 06/06/25 1345          Safety Issues/Impairments Affecting Functional Mobility    Impairments Affecting Function (Mobility) balance;endurance/activity tolerance;strength  -               User Key  (r) = Recorded By, (t) = Taken By, (c) = Cosigned By      Initials Name Provider Type     Carline Camacho, IHSAN Occupational Therapist                     Mobility/ADL's       Row Name 06/06/25 1346          Bed Mobility    Bed Mobility bed mobility (all) activities  -     All Activities, [place] (Bed Mobility) moderate assist (50% patient effort)  -     Assistive Device (Bed Mobility) bed rails;head of bed elevated  -       Row Name 06/06/25 1346          Transfers    Transfers sit-stand transfer;stand-sit transfer  -       Row Name 06/06/25 1346          Bed-Chair Transfer    Bed-Chair [place] (Transfers) standby assist  -     Assistive Device (Bed-Chair Transfers) walker, front-wheeled  -       Row Name 06/06/25 1346          Sit-Stand Transfer    Sit-Stand [place] (Transfers) standby assist  -      Assistive Device (Sit-Stand Transfers) walker, front-wheeled  -               User Key  (r) = Recorded By, (t) = Taken By, (c) = Cosigned By      Initials Name Provider Type    Carline Blackwell OT Occupational Therapist                   Obj/Interventions       Row Name 06/06/25 [facility]          Motor Skills    Motor Skills functional endurance  -     Functional Endurance fair plus  -       Row Name 06/06/25 [facility]          Balance    Balance Assessment sitting static balance;standing static balance  -     Static Sitting Balance standby assist  -     Static Standing Balance contact guard  -     Position/Device Used, Standing Balance walker, front-wheeled  -     Balance Interventions sitting;standing;static;minimal challenge;occupation based/functional task  -               User Key  (r) = Recorded By, (t) = Taken By, (c) = Cosigned By      Initials Name Provider Type    Carline Blackwell OT Occupational Therapist                   Goals/Plan    No documentation.                  Clinical Impression       Row Name 06/06/25 [facility]          Pain Assessment    Pretreatment Pain Rating 0/10 - no pain  -     Posttreatment Pain Rating 0/10 - no pain  -       Row Name 06/06/25 [facility]          Plan of Care Review    Plan of Care Reviewed With patient  -     Progress improving  -     Outcome Evaluation Patient seen for OT treatment. She did not feel well but participated with therapy. Continue plan of care.  -       Row Name 06/06/25 1347          Therapy Plan Review/Discharge Plan (OT)    Anticipated Discharge Disposition (OT) home with assist;home  -[facility] Name 06/06/25 [facility]          Positioning and Restraints    Pre-Treatment Position in bed  -     Post Treatment Position bed  -     In Bed fowlers;call light within reach;encouraged to call for assist;exit alarm on  -               User Key  (r) = Recorded By, (t) = Taken By, (c) = Cosigned By      Initials Name Provider Type    BRAYAN Camacho  IHSAN [place] Occupational Therapist                   Outcome Measures       Row Name 06/06/25 1356          How much help from another is currently needed...    Putting on and taking off regular lower body clothing? 2  -KP     Bathing (including washing, rinsing, and drying) 3  -KP     Toileting (which includes using toilet bed pan or urinal) 3  -KP     Putting on and taking off regular upper body clothing 4  -KP     Taking care of personal grooming (such as brushing teeth) 4  -KP     Eating meals 4  -KP     AM-PAC 6 Clicks Score (OT) 20  -KP       Row Name 06/06/25 0909          How much help from another person do you currently need...    Turning from your back to your side while in flat bed without using bedrails? 4  -TP     Moving from lying on back to sitting on the side of a flat bed without bedrails? 3  -TP     Moving to and from a bed to a chair (including a wheelchair)? 2  -TP     Standing up from a chair using your arms (e.g., wheelchair, bedside chair)? 2  -TP     Climbing 3-5 steps with a railing? 2  -TP     To walk in hospital room? 2  -TP     AM-PAC 6 Clicks Score (PT) 15  -TP       Row Name 06/06/25 1356          Functional Assessment    Outcome Measure Options AM-PAC 6 Clicks Daily Activity (OT)  -               User Key  (r) = Recorded By, (t) = Taken By, (c) = Cosigned By      Initials Name Provider Type     Carline Camacho OT Occupational Therapist    TP Tracy Pete RN Registered Nurse                      OT Recommendation and Plan  Planned Therapy Interventions (OT): activity tolerance training, BADL retraining, functional balance retraining, patient/caregiver education/training, occupation/activity based interventions, transfer/mobility retraining, strengthening exercise, ROM/therapeutic exercise  Therapy Frequency (OT): 5 times/wk  Plan of Care Review  Plan of Care Reviewed With: patient  Progress: improving  Outcome Evaluation: Patient seen for OT treatment. She did not feel well but  participated with therapy. Continue plan of care.     Time Calculation:         Time Calculation- OT       Row Name 06/06/25 1356             Time Calculation- OT    Total Timed Code Minutes- OT 16 minute(s)  -      OT Received On 06/06/25  -                User Key  (r) = Recorded By, (t) = Taken By, (c) = Cosigned By      Initials Name Provider Type     Carline Camacho OT Occupational Therapist                  Therapy Charges for Today       Code Description Service Date Service Provider Modifiers Qty    [id number]  OT THERAPEUTIC ACT EA 15 MIN 6/6/2025 Carline Camacho OT GO 1                 Carline Camacho OT  6/6/2025

## 2025-06-06 NOTE — THERAPY TREATMENT NOTE
Acute Care - Physical Therapy Treatment Note   [place]     Patient Name: Emma Ryan  : 1940  MRN: [id number]  Today's Date: 2025      Visit Dx:   No diagnosis found.  Patient Active Problem List   Diagnosis    Complete tear of right rotator cuff    Stage 3a chronic kidney disease    Cough    Gastroesophageal reflux disease    Mixed hyperlipidemia    Shoulder pain    Hypertension    Acromioclavicular joint arthritis    Impingement syndrome, shoulder    Complete tear of left rotator cuff    Urge incontinence    Atopic rhinitis    Upper respiratory tract infection    Bilateral lower extremity edema    Hypercalcemia    Right knee pain    DADA (stress urinary incontinence, female)    Obesity (BMI 35.0-39.9 without comorbidity)    Left knee injury    Acute UTI    COVID-19 virus infection    Skin lesion of scalp    Numbness in left leg    Multiple bruises    Vitamin D deficiency    Laceration of right lower leg    Chronic chest pain with low to moderate risk for CAD    Degenerative disc disease, lumbar    Valvular heart disease, mild aortic and mitral regurgitation    Diastolic dysfunction without heart failure    Hypomagnesemia    Physical deconditioning     Past Medical History:   Diagnosis Date    GERD (gastroesophageal reflux disease)     Hyperlipidemia     Hypertension     Left shoulder pain     Obesity     Osteoarthritis of knees, bilateral     Overactive bladder     Right shoulder pain      Past Surgical History:   Procedure Laterality Date    APPENDECTOMY      BUNIONECTOMY Right     CARDIOVASCULAR STRESS TEST  10/2012    CATARACT EXTRACTION  2017    CHOLECYSTECTOMY      COLONOSCOPY      ECHO - CONVERTED  10/2012    JOINT REPLACEMENT      bilateral knees     KNEE ARTHROPLASTY Left     KNEE ARTHROSCOPY      SHOULDER ARTHROSCOPY Left 2016    Procedure: LEFT SHOULDER ACROMIOPLASTY,MINI OPEN ROTATOR CUFF REPAIR;  Surgeon: Carlos Eduardo Smith MD;  Location: [facility];  Service:     SHOULDER  SURGERY  05/31/2016    OPEN ACROMICPLASTY RIGHT SHOULDER     PT Assessment (Last 12 Hours)       PT Evaluation and Treatment       Row Name 06/06/25 1357          Physical Therapy Time and Intention    Subjective Information complains of;fatigue;weakness  -KM     Document Type therapy note (daily note)  -KM     Mode of Treatment physical therapy  -KM     Patient Effort good  -KM     Symptoms Noted During/After Treatment fatigue  -KM       Row Name 06/06/25 1357          General Information    Patient Profile Reviewed yes  -KM     Patient Observations alert;cooperative;agree to therapy  -KM     Existing Precautions/Restrictions fall  -KM       Row Name 06/06/25 1357          Pain    Pretreatment Pain Rating 0/10 - no pain  -KM     Posttreatment Pain Rating 0/10 - no pain  -KM       Row Name 06/06/25 1357          Cognition    Affect/Mental Status (Cognition) WFL  -KM     Orientation Status (Cognition) oriented x 3  -KM     Follows Commands (Cognition) WFL  -KM       Row Name 06/06/25 1357          Bed Mobility    Bed Mobility bed mobility (all) activities  -KM     All Activities, [place] (Bed Mobility) moderate assist (50% patient effort)  -KM     Bed Mobility, Safety Issues decreased use of legs for bridging/pushing  -KM     Assistive Device (Bed Mobility) bed rails;head of bed elevated  -KM       Row Name 06/06/25 1357          Transfers    Transfers sit-stand transfer;stand-sit transfer  -       Row Name 06/06/25 1357          Sit-Stand Transfer    Sit-Stand [place] (Transfers) standby assist  -     Assistive Device (Sit-Stand Transfers) walker, front-wheeled  -       Row Name 06/06/25 1357          Stand-Sit Transfer    Stand-Sit [place] (Transfers) standby assist  -     Assistive Device (Stand-Sit Transfers) walker, front-wheeled  -       Row Name 06/06/25 1357          Gait/Stairs (Locomotion)    Gait/Stairs Locomotion gait/ambulation independence;gait/ambulation assistive  device;distance ambulated  -KM     [place] Level (Gait) contact guard;minimum assist (75% patient effort)  -KM     Assistive Device (Gait) walker, front-wheeled  -KM     Patient was able to Ambulate yes  -KM     Distance in Feet (Gait) 40  -KM     Pattern (Gait) step-to  -KM     Deviations/Abnormal Patterns (Gait) ataxic;base of support, narrow;gait speed decreased  -KM     Right Sided Gait Deviations foot drop/toe drag;knee buckling, right side  -KM       Row Name 06/06/25 1357          Safety Issues/Impairments Affecting Functional Mobility    Impairments Affecting Function (Mobility) balance;endurance/activity tolerance;strength  -KM       Row Name             Wound 05/29/25 0411 medial sacral spine Pressure Injury    Wound - Properties Group Placement Date: 05/29/25 -SM Placement Time: 0411 -SM Present on Original Admission: N  -SM Orientation: medial  -SM Location: sacral spine  -SM Primary Wound Type: Pressure Inj  -TW    Retired Wound - Properties Group Placement Date: 05/29/25  -SM Placement Time: 0411 -SM Present on Original Admission: N  -SM Orientation: medial  -SM Location: sacral spine  -SM    Retired Wound - Properties Group Placement Date: 05/29/25  -SM Placement Time: 0411  -SM Present on Original Admission: N  -SM Orientation: medial  -SM Location: sacral spine  -SM    Retired Wound - Properties Group Date first assessed: 05/29/25  -SM Time first assessed: 0411 -SM Present on Original Admission: N  -SM Location: sacral spine  -SM      Row Name             Wound 05/26/25 1948 Right gluteal Pressure Injury    Wound - Properties Group Placement Date: 05/26/25  -PL Placement Time: 1948  -PL Present on Original Admission: Y  -TW Side: Right  -TW Location: gluteal  -PL Primary Wound Type: Pressure Inj  -TW    Retired Wound - Properties Group Placement Date: 05/26/25  -PL Placement Time: 1948 -PL Present on Original Admission: Y  -TW Side: Right  -TW Location: gluteal  -PL    Retired Wound -  Properties Group Placement Date: 05/26/25  -PL Placement Time: 1948 -PL Present on Original Admission: Y  -TW Side: Right  -TW Location: gluteal  -PL    Retired Wound - Properties Group Date first assessed: 05/26/25  -PL Time first assessed: 1948  -PL Present on Original Admission: Y  -TW Side: Right  -TW Location: gluteal  -PL      Row Name 06/06/25 1357          Progress Summary (PT)    Daily Progress Summary (PT) Pt. was able to demonstrate bed mobility skills w/ modA. She was able to perform transfers and ambulation w/ SBA. She was able to improve ambulation distance and quality w/ RW. She would continue to benefit from PT services.  -               User Key  (r) = Recorded By, (t) = Taken By, (c) = Cosigned By      Initials Name Provider Type    Seble Ch, RN Registered Nurse    Steph Yin RN Registered Nurse    Roni John, PT Physical Therapist    Raquel Ledesma, RN Registered Nurse                    Physical Therapy Education       Title: PT OT SLP Therapies (Done)       Topic: Physical Therapy (Done)       Point: Mobility training (Done)       Learning Progress Summary            Patient Acceptance, E,TB, VU by  at 6/3/2025 1415                      Point: Home exercise program (Done)       Learning Progress Summary            Patient Acceptance, E,TB, VU by  at 6/3/2025 1415                      Point: Body mechanics (Done)       Learning Progress Summary            Patient Acceptance, E,TB, VU by  at 6/3/2025 1415                      Point: Precautions (Done)       Learning Progress Summary            Patient Acceptance, E,TB, VU by  at 6/3/2025 1415                                      User Key       Initials Effective Dates Name Provider Type Discipline     05/24/22 -  Roni Mar, PT Physical Therapist PT                  PT Recommendation and Plan  Anticipated Discharge Disposition (PT):  (TBD)  Planned Therapy Interventions (PT): balance training, bed  mobility training, gait training, home exercise program, patient/family education, postural re-education, ROM (range of motion), strengthening, stretching, transfer training, wheelchair management/propulsion training  Therapy Frequency (PT): 5 times/wk  Progress Summary (PT)  Daily Progress Summary (PT): Pt. was able to demonstrate bed mobility skills w/ modA. She was able to perform transfers and ambulation w/ SBA. She was able to improve ambulation distance and quality w/ RW. She would continue to benefit from PT services.  Plan of Care Reviewed With: patient  Outcome Evaluation: Pt. evaluation completed during PT session. She was able to perform functional mobility skills w/ modA-maxA. She was able to ambulate minimal distance w/ RW and maxA. She tolerated session well. Pt. would benefit from skilled PT services to improve functional mobility skills prior to dischare.   Outcome Measures       Row Name 06/05/25 1200             How much help from another is currently needed...    Putting on and taking off regular lower body clothing? 2  -LA      Bathing (including washing, rinsing, and drying) 3  -LA      Toileting (which includes using toilet bed pan or urinal) 3  -LA      Putting on and taking off regular upper body clothing 4  -LA      Taking care of personal grooming (such as brushing teeth) 4  -LA      Eating meals 4  -LA      AM-PAC 6 Clicks Score (OT) 20  -LA         Functional Assessment    Outcome Measure Options AM-PAC 6 Clicks Daily Activity (OT)  -LA                User Key  (r) = Recorded By, (t) = Taken By, (c) = Cosigned By      Initials Name Provider Type    Lena Santizo OT Occupational Therapist                     Time Calculation:    PT Charges       Row Name 06/06/25 4886             Time Calculation    PT Received On 06/06/25  -KM         Time Calculation- PT    Total Timed Code Minutes- PT 23 minute(s)  -KM                User Key  (r) = Recorded By, (t) = Taken By, (c) = Cosigned By       Initials Name Provider Type     Roni Mar, PT Physical Therapist                  Therapy Charges for Today       Code Description Service Date Service Provider Modifiers Qty    82330930404 HC PT THERAPEUTIC ACT EA 15 MIN 6/5/2025 Roni Mar, PT GP 1    04386444943 HC GAIT TRAINING EA 15 MIN 6/5/2025 Roni Mar, PT GP 1    31521645719 HC PT THERAPEUTIC ACT EA 15 MIN 6/6/2025 Roni Mar, PT GP 1    87002970107 HC GAIT TRAINING EA 15 MIN 6/6/2025 Roni Mar, PT GP 1            PT G-Codes  Outcome Measure Options: AM-PAC 6 Clicks Daily Activity (OT)  AM-PAC 6 Clicks Score (PT): 15  AM-PAC 6 Clicks Score (OT): 20    Roni Mar PT  6/6/2025

## 2025-06-06 NOTE — CASE MANAGEMENT/SOCIAL WORK
Case Management/Social Work    Patient Name:  Emma Ryan  YOB: 1940  MRN: [id number]  Admit Date:  6/3/2025        Patient is approved for 5 additional swing bed days with next clinical updates due 6/10/25. SS and provider notified via secure chat.       Electronically signed by:  Nicki Carter RN  06/06/25 07:15 EDT

## 2025-06-06 NOTE — CASE MANAGEMENT/SOCIAL WORK
Discharge Planning Assessment   [place]     Patient Name: Emma Ryan  MRN: [id number]  Today's Date: 6/6/2025    Admit Date: 6/3/2025     Discharge Plan       Row Name 06/06/25 1045       Plan    Plan Pt was admitted to swing bed on 6/3 for therapy services (approved for 5 additional swing bed days with clinical updates due on 6/10). SS provided update to pt on this date. Pt lives with spouse, Bob ([address] [place]) and she plans to return home at discharge.Pt has a rollator and cane via unknown provider. Pt does not utilize home health services. SS to follow.                  Selected Continued Care - Prior Encounters Includes continued care and service providers with selected services from prior encounters from 3/5/2025 to 6/6/2025      Discharged on 6/3/2025 Admission date: 5/23/2025 - Discharge disposition: Swing Bed      Destination       Service Provider Services Address Phone Fax Patient Preferred     [facility] [address] [facility] [place] [id number] [phone] -- --                        ALISON Ford

## 2025-06-06 NOTE — PLAN OF CARE
Goal Outcome Evaluation:   Pt resting in bed at this time. VSS on room air. Swing bed pt. Voices no complaints or concerns at this time. Wound care completed per orders. Will continue plan of care.

## 2025-06-06 NOTE — PLAN OF CARE
Goal Outcome Evaluation:              Outcome Evaluation: Patient has been resting in bed throughout the night. VSS on RA. no acute changes noted at this time. will continue with POC

## 2025-06-07 PROCEDURE — 97110 THERAPEUTIC EXERCISES: CPT

## 2025-06-07 PROCEDURE — 97530 THERAPEUTIC ACTIVITIES: CPT

## 2025-06-07 PROCEDURE — 25010000002 HEPARIN (PORCINE) PER 1000 UNITS: Performed by: FAMILY MEDICINE

## 2025-06-07 RX ADMIN — Medication 1000 UNITS: at 08:27

## 2025-06-07 RX ADMIN — METOPROLOL SUCCINATE 12.5 MG: 25 TABLET, EXTENDED RELEASE ORAL at 08:27

## 2025-06-07 RX ADMIN — Medication 1 CAPSULE: at 08:27

## 2025-06-07 RX ADMIN — BISACODYL 10 MG: 10 SUPPOSITORY RECTAL at 08:26

## 2025-06-07 RX ADMIN — Medication 1 APPLICATION: at 20:25

## 2025-06-07 RX ADMIN — OXYBUTYNIN CHLORIDE 10 MG: 5 TABLET, EXTENDED RELEASE ORAL at 08:26

## 2025-06-07 RX ADMIN — Medication 1 APPLICATION: at 08:27

## 2025-06-07 RX ADMIN — DOCUSATE SODIUM 100 MG: 100 CAPSULE, LIQUID FILLED ORAL at 08:26

## 2025-06-07 RX ADMIN — PANTOPRAZOLE SODIUM 40 MG: 40 TABLET, DELAYED RELEASE ORAL at 05:19

## 2025-06-07 RX ADMIN — ATORVASTATIN CALCIUM 20 MG: 20 TABLET, FILM COATED ORAL at 08:26

## 2025-06-07 RX ADMIN — HEPARIN SODIUM 5000 UNITS: 5000 INJECTION INTRAVENOUS; SUBCUTANEOUS at 20:24

## 2025-06-07 RX ADMIN — Medication 5 MG: at 20:24

## 2025-06-07 RX ADMIN — HEPARIN SODIUM 5000 UNITS: 5000 INJECTION INTRAVENOUS; SUBCUTANEOUS at 08:26

## 2025-06-07 NOTE — THERAPY TREATMENT NOTE
Acute Care - Physical Therapy Treatment Note   [place]     Patient Name: Emma Ryan  : 1940  MRN: [id number]  Today's Date: 2025      Visit Dx:   No diagnosis found.  Patient Active Problem List   Diagnosis    Complete tear of right rotator cuff    Stage 3a chronic kidney disease    Cough    Gastroesophageal reflux disease    Mixed hyperlipidemia    Shoulder pain    Hypertension    Acromioclavicular joint arthritis    Impingement syndrome, shoulder    Complete tear of left rotator cuff    Urge incontinence    Atopic rhinitis    Upper respiratory tract infection    Bilateral lower extremity edema    Hypercalcemia    Right knee pain    DADA (stress urinary incontinence, female)    Obesity (BMI 35.0-39.9 without comorbidity)    Left knee injury    Acute UTI    COVID-19 virus infection    Skin lesion of scalp    Numbness in left leg    Multiple bruises    Vitamin D deficiency    Laceration of right lower leg    Chronic chest pain with low to moderate risk for CAD    Degenerative disc disease, lumbar    Valvular heart disease, mild aortic and mitral regurgitation    Diastolic dysfunction without heart failure    Hypomagnesemia    Physical deconditioning     Past Medical History:   Diagnosis Date    GERD (gastroesophageal reflux disease)     Hyperlipidemia     Hypertension     Left shoulder pain     Obesity     Osteoarthritis of knees, bilateral     Overactive bladder     Right shoulder pain      Past Surgical History:   Procedure Laterality Date    APPENDECTOMY      BUNIONECTOMY Right     CARDIOVASCULAR STRESS TEST  10/2012    CATARACT EXTRACTION      CHOLECYSTECTOMY      COLONOSCOPY      ECHO - CONVERTED  10/2012    JOINT REPLACEMENT      bilateral knees     KNEE ARTHROPLASTY Left     KNEE ARTHROSCOPY      SHOULDER ARTHROSCOPY Left 2016    Procedure: LEFT SHOULDER ACROMIOPLASTY,MINI OPEN ROTATOR CUFF REPAIR;  Surgeon: Carlos Eduardo Smith MD;  Location: [facility];  Service:     SHOULDER  SURGERY  05/31/2016    OPEN ACROMICPLASTY RIGHT SHOULDER     PT Assessment (Last 12 Hours)       PT Evaluation and Treatment       Row Name 06/07/25 1020          Physical Therapy Time and Intention    Document Type therapy note (daily note)  -     Mode of Treatment physical therapy  -     Patient Effort good  -     Comment Pt seen for therapy treatment this date, pleasant and cooperative with therapy. Pt participated in LE TE and standing TE for endurance/strengthening, pt already completed a set of exercises prior to PT session.  -       Row Name 06/07/25 1020          Pain Scale: FACES Pre/Post-Treatment    Pain: FACES Scale, Pretreatment 0-->no hurt  -     Posttreatment Pain Rating 0-->no hurt  -       Row Name 06/07/25 1020          Sit-Stand Transfer    Sit-Stand [place] (Transfers) contact guard;minimum assist (75% patient effort)  -     Assistive Device (Sit-Stand Transfers) walker, front-wheeled  -       Row Name 06/07/25 1020          Stand-Sit Transfer    Stand-Sit [place] (Transfers) contact guard  recommend supervision for safe sit  -     Assistive Device (Stand-Sit Transfers) walker, front-wheeled  -       Row Name 06/07/25 1020          Motor Skills    Therapeutic Exercise hip;knee  standing marches with RW BL 2x9; BL LAQ 2x15, therapeutic rest breaks promoted  -       Row Name             Wound 05/29/25 0411 medial sacral spine Pressure Injury    Wound - Properties Group Placement Date: 05/29/25 -SM Placement Time: 0411 -SM Present on Original Admission: N  -SM Orientation: medial  -SM Location: sacral spine  -SM Primary Wound Type: Pressure Inj  -TW    Retired Wound - Properties Group Placement Date: 05/29/25  -SM Placement Time: 0411 -SM Present on Original Admission: N  -SM Orientation: medial  -SM Location: sacral spine  -SM    Retired Wound - Properties Group Placement Date: 05/29/25  -SM Placement Time: 0411 -SM Present on Original Admission: N  -SM  Orientation: medial  -SM Location: sacral spine  -SM    Retired Wound - Properties Group Date first assessed: 05/29/25  -SM Time first assessed: 0411  -SM Present on Original Admission: N  -SM Location: sacral spine  -SM      Row Name             Wound 05/26/25 1948 Right gluteal Pressure Injury    Wound - Properties Group Placement Date: 05/26/25  -PL Placement Time: 1948 -PL Present on Original Admission: Y  -TW Side: Right  -TW Location: gluteal  -PL Primary Wound Type: Pressure Inj  -TW    Retired Wound - Properties Group Placement Date: 05/26/25  -PL Placement Time: 1948  -PL Present on Original Admission: Y  -TW Side: Right  -TW Location: gluteal  -PL    Retired Wound - Properties Group Placement Date: 05/26/25  -PL Placement Time: 1948 -PL Present on Original Admission: Y  -TW Side: Right  -TW Location: gluteal  -PL    Retired Wound - Properties Group Date first assessed: 05/26/25  -PL Time first assessed: 1948  -PL Present on Original Admission: Y  -TW Side: Right  -TW Location: gluteal  -PL      Row Name 06/07/25 1020          Positioning and Restraints    Pre-Treatment Position sitting in chair/recliner  -KH     Post Treatment Position chair  -KH     In Chair sitting;call light within reach  -KH       Row Name 06/07/25 1020          Progress Summary (PT)    Daily Progress Summary (PT) Pt seen for treatment this date, pleasant and cooperative, pt tolerated treatment well, grossly CGA with transfers, no change to POC, prognosis fair to good.  -KH               User Key  (r) = Recorded By, (t) = Taken By, (c) = Cosigned By      Initials Name Provider Type    TW Seble Sena, RN Registered Nurse    Steph Yin, RN Registered Nurse    Marcelina Woodward, PT Physical Therapist    PL Raquel Nava, RN Registered Nurse                    Physical Therapy Education       Title: PT OT SLP Therapies (Done)       Topic: Physical Therapy (Done)       Point: Mobility training (Done)       Learning  Progress Summary            Patient Acceptance, E,TB, VU by  at 6/3/2025 1415                      Point: Home exercise program (Done)       Learning Progress Summary            Patient Acceptance, E,TB, VU by  at 6/3/2025 1415                      Point: Body mechanics (Done)       Learning Progress Summary            Patient Acceptance, E,TB, VU by  at 6/3/2025 1415                      Point: Precautions (Done)       Learning Progress Summary            Patient Acceptance, E,TB, VU by  at 6/3/2025 1415                                      User Key       Initials Effective Dates Name Provider Type Discipline     05/24/22 -  Roni Mar, PT Physical Therapist PT                  PT Recommendation and Plan     Progress Summary (PT)  Daily Progress Summary (PT): Pt seen for treatment this date, pleasant and cooperative, pt tolerated treatment well, grossly CGA with transfers, no change to POC, prognosis fair to good.   Outcome Measures       Row Name 06/07/25 1200 06/05/25 1200          How much help from another is currently needed...    Putting on and taking off regular lower body clothing? 2  -LA 2  -LA     Bathing (including washing, rinsing, and drying) 3  -LA 3  -LA     Toileting (which includes using toilet bed pan or urinal) 3  -LA 3  -LA     Putting on and taking off regular upper body clothing 4  -LA 4  -LA     Taking care of personal grooming (such as brushing teeth) 4  -LA 4  -LA     Eating meals 4  -LA 4  -LA     AM-PAC 6 Clicks Score (OT) 20  -LA 20  -LA        Functional Assessment    Outcome Measure Options AM-PAC 6 Clicks Daily Activity (OT)  -LA AM-PAC 6 Clicks Daily Activity (OT)  -LA               User Key  (r) = Recorded By, (t) = Taken By, (c) = Cosigned By      Initials Name Provider Type    Lena Santizo, OT Occupational Therapist                     Time Calculation:    PT Charges       Row Name 06/07/25 1233             Time Calculation    Start Time 1020  -KH      PT  Received On 06/07/25  -TROY         Time Calculation- PT    Total Timed Code Minutes- PT 15 minute(s)  -TROY                User Key  (r) = Recorded By, (t) = Taken By, (c) = Cosigned By      Initials Name Provider Type    Marcelina Woodward, PILLO Physical Therapist                  Therapy Charges for Today       Code Description Service Date Service Provider Modifiers Qty    91184835948 HC PT THER PROC EA 15 MIN 6/7/2025 Marcelina Sandoval, PILLO GP 1            PT G-Codes  Outcome Measure Options: AM-PAC 6 Clicks Daily Activity (OT)  AM-PAC 6 Clicks Score (PT): 16  AM-PAC 6 Clicks Score (OT): 20    Marcelina Sandoval PT  6/7/2025

## 2025-06-07 NOTE — PLAN OF CARE
Goal Outcome Evaluation:  Plan of Care Reviewed With: patient           Outcome Evaluation: Patient resting in bed at this itme. A&O. VSS on RA. No acute changes noted. Patient worke with PT/OT this shift. Sat in chair. Wound care completed per orders. PRN suppository x1 given this AM for c/o continued constipation, patient was able to have BMx2 this shift. No other requests or complaints at this time. Will continue with POC.

## 2025-06-07 NOTE — THERAPY TREATMENT NOTE
Acute Care - Occupational Therapy Treatment Note  FLORIN Mims     Patient Name: Emma Ryan  : 1940  MRN: [id number]  Today's Date: 2025             Admit Date: 6/3/2025     No diagnosis found.  Patient Active Problem List   Diagnosis    Complete tear of right rotator cuff    Stage 3a chronic kidney disease    Cough    Gastroesophageal reflux disease    Mixed hyperlipidemia    Shoulder pain    Hypertension    Acromioclavicular joint arthritis    Impingement syndrome, shoulder    Complete tear of left rotator cuff    Urge incontinence    Atopic rhinitis    Upper respiratory tract infection    Bilateral lower extremity edema    Hypercalcemia    Right knee pain    DADA (stress urinary incontinence, female)    Obesity (BMI 35.0-39.9 without comorbidity)    Left knee injury    Acute UTI    COVID-19 virus infection    Skin lesion of scalp    Numbness in left leg    Multiple bruises    Vitamin D deficiency    Laceration of right lower leg    Chronic chest pain with low to moderate risk for CAD    Degenerative disc disease, lumbar    Valvular heart disease, mild aortic and mitral regurgitation    Diastolic dysfunction without heart failure    Hypomagnesemia    Physical deconditioning     Past Medical History:   Diagnosis Date    GERD (gastroesophageal reflux disease)     Hyperlipidemia     Hypertension     Left shoulder pain     Obesity     Osteoarthritis of knees, bilateral     Overactive bladder     Right shoulder pain      Past Surgical History:   Procedure Laterality Date    APPENDECTOMY      BUNIONECTOMY Right     CARDIOVASCULAR STRESS TEST  10/2012    CATARACT EXTRACTION  2017    CHOLECYSTECTOMY      COLONOSCOPY      ECHO - CONVERTED  10/2012    JOINT REPLACEMENT      bilateral knees     KNEE ARTHROPLASTY Left     KNEE ARTHROSCOPY      SHOULDER ARTHROSCOPY Left 2016    Procedure: LEFT SHOULDER ACROMIOPLASTY,MINI OPEN ROTATOR CUFF REPAIR;  Surgeon: Carlos Eduardo Smith MD;  Location: [facility]  OR;  Service:     SHOULDER SURGERY  05/31/2016    OPEN ACROMICPLASTY RIGHT SHOULDER         OT ASSESSMENT FLOWSHEET (Last 12 Hours)       OT Evaluation and Treatment       Row Name 06/07/25 1223                   OT Time and Intention    Subjective Information no complaints  -LA        Document Type therapy note (daily note)  -LA        Mode of Treatment occupational therapy  -LA        Total Minutes, Occupational Therapy 25  -LA        Patient Effort good  -LA           General Information    Patient Profile Reviewed yes  -LA        General Observations of Patient Patient with good motivation to improve ability. Patient completing daily HEP that therapist leaves for patient demonstrated by improved ROM and improved activity tolerance.  -LA        Existing Precautions/Restrictions fall  -LA           Cognition    Affect/Mental Status (Cognition) WFL  -LA        Orientation Status (Cognition) oriented x 3  -LA        Follows Commands (Cognition) WFL  -LA           Strength Comprehensive (MMT)    General Manual Muscle Testing (MMT) Assessment --  right shoulder strength still noted however improvement noted with exercises  -LA           Sit-Stand Transfer    Sit-Stand [place] (Transfers) standby assist  -LA           Motor Skills    Motor Skills coordination;functional endurance  -LA        Therapeutic Exercise shoulder;elbow/forearm;wrist;hand  -LA        Comments, Therapeutic Exercise Exercises completed in all planes. Improved ROM and exercise tolerance noted. Patient with good carryover of exercises throughout the day.  -LA           Wound 05/29/25 0411 medial sacral spine Pressure Injury    Wound - Properties Group Placement Date: 05/29/25  -SM Placement Time: 0411 -SM Present on Original Admission: N  -SM Orientation: medial  -SM Location: sacral spine  -SM Primary Wound Type: Pressure Inj  -TW    Retired Wound - Properties Group Placement Date: 05/29/25  -SM Placement Time: 0411 -SM Present on Original  Admission: N  -SM Orientation: medial  -SM Location: sacral spine  -SM    Retired Wound - Properties Group Placement Date: 05/29/25  -SM Placement Time: 0411 -SM Present on Original Admission: N  -SM Orientation: medial  -SM Location: sacral spine  -SM    Retired Wound - Properties Group Date first assessed: 05/29/25  -SM Time first assessed: 0411 -SM Present on Original Admission: N  -SM Location: sacral spine  -SM       Wound 05/26/25 1948 Right gluteal Pressure Injury    Wound - Properties Group Placement Date: 05/26/25  -PL Placement Time: 1948 -PL Present on Original Admission: Y  -TW Side: Right  -TW Location: gluteal  -PL Primary Wound Type: Pressure Inj  -TW    Retired Wound - Properties Group Placement Date: 05/26/25  -PL Placement Time: 1948 -PL Present on Original Admission: Y  -TW Side: Right  -TW Location: gluteal  -PL    Retired Wound - Properties Group Placement Date: 05/26/25  -PL Placement Time: 1948 -PL Present on Original Admission: Y  -TW Side: Right  -TW Location: gluteal  -PL    Retired Wound - Properties Group Date first assessed: 05/26/25  -PL Time first assessed: 1948  -PL Present on Original Admission: Y  -TW Side: Right  -TW Location: gluteal  -PL       Plan of Care Review    Plan of Care Reviewed With patient  -LA           Positioning and Restraints    Pre-Treatment Position sitting in chair/recliner  -LA        Post Treatment Position chair  -LA        In Chair sitting;call light within reach;encouraged to call for assist  -LA                  User Key  (r) = Recorded By, (t) = Taken By, (c) = Cosigned By      Initials Name Effective Dates    TW Seble Sena RN 06/16/21 -     SM Steph Valentin RN 07/18/22 -     Lena Santizo OT 02/14/22 -     PL Raquel Nava RN 06/12/24 -                            OT Recommendation and Plan     Plan of Care Review  Plan of Care Reviewed With: patient  Plan of Care Reviewed With: patient     Outcome Measures       Row Name 06/07/25  1200 06/05/25 1200          How much help from another is currently needed...    Putting on and taking off regular lower body clothing? 2  -LA 2  -LA     Bathing (including washing, rinsing, and drying) 3  -LA 3  -LA     Toileting (which includes using toilet bed pan or urinal) 3  -LA 3  -LA     Putting on and taking off regular upper body clothing 4  -LA 4  -LA     Taking care of personal grooming (such as brushing teeth) 4  -LA 4  -LA     Eating meals 4  -LA 4  -LA     AM-PAC 6 Clicks Score (OT) 20  -LA 20  -LA        Functional Assessment    Outcome Measure Options AM-PAC 6 Clicks Daily Activity (OT)  -LA AM-PAC 6 Clicks Daily Activity (OT)  -LA               User Key  (r) = Recorded By, (t) = Taken By, (c) = Cosigned By      Initials Name Provider Type    Lena Santizo OT Occupational Therapist                    Time Calculation:    Time Calculation- OT       Row Name 06/07/25 1230             Time Calculation- OT    Total Timed Code Minutes- OT 25 minute(s)  -LA                User Key  (r) = Recorded By, (t) = Taken By, (c) = Cosigned By      Initials Name Provider Type    Lena Santizo OT Occupational Therapist                  Therapy Charges for Today       Code Description Service Date Service Provider Modifiers Qty    24529032879  OT THER PROC EA 15 MIN 6/7/2025 Lena Patel OT GO 1    [id number]  OT THERAPEUTIC ACT EA 15 MIN 6/7/2025 Lena Patel OT GO 1                 Lena Patel OT  6/7/2025

## 2025-06-07 NOTE — PLAN OF CARE
Goal Outcome Evaluation:           Progress: no change  Outcome Evaluation: Patient resting in bed at this time. VSS on room air. PRN medication given per orders. Patient voices no concerns or complaints at this time. Will continue plan of care.

## 2025-06-08 PROCEDURE — 25010000002 HEPARIN (PORCINE) PER 1000 UNITS: Performed by: FAMILY MEDICINE

## 2025-06-08 RX ADMIN — OXYBUTYNIN CHLORIDE 10 MG: 5 TABLET, EXTENDED RELEASE ORAL at 10:03

## 2025-06-08 RX ADMIN — HEPARIN SODIUM 5000 UNITS: 5000 INJECTION INTRAVENOUS; SUBCUTANEOUS at 21:43

## 2025-06-08 RX ADMIN — Medication 1 APPLICATION: at 21:43

## 2025-06-08 RX ADMIN — DOCUSATE SODIUM 100 MG: 100 CAPSULE, LIQUID FILLED ORAL at 10:04

## 2025-06-08 RX ADMIN — HEPARIN SODIUM 5000 UNITS: 5000 INJECTION INTRAVENOUS; SUBCUTANEOUS at 10:04

## 2025-06-08 RX ADMIN — Medication 1 APPLICATION: at 10:05

## 2025-06-08 RX ADMIN — Medication 5 MG: at 21:43

## 2025-06-08 RX ADMIN — METOPROLOL SUCCINATE 12.5 MG: 25 TABLET, EXTENDED RELEASE ORAL at 10:04

## 2025-06-08 RX ADMIN — Medication 1000 UNITS: at 10:04

## 2025-06-08 RX ADMIN — PANTOPRAZOLE SODIUM 40 MG: 40 TABLET, DELAYED RELEASE ORAL at 05:09

## 2025-06-08 RX ADMIN — ATORVASTATIN CALCIUM 20 MG: 20 TABLET, FILM COATED ORAL at 10:03

## 2025-06-08 RX ADMIN — Medication 1 CAPSULE: at 10:04

## 2025-06-08 NOTE — PLAN OF CARE
Goal Outcome Evaluation:  Plan of Care Reviewed With: patient        Progress: no change  Outcome Evaluation: Patient resting in bed at this time. VSS on RA. PRN melatonin given per pt request. Wound care completed per order. No concerns or complaints at this time.

## 2025-06-08 NOTE — PLAN OF CARE
Goal Outcome Evaluation:  Plan of Care Reviewed With: patient           Outcome Evaluation: Patient resting in bed at this time. VSS on room air. Wound care done per orders. No acute changes or concerns at this time. Will continue with plan of care.

## 2025-06-09 ENCOUNTER — APPOINTMENT (OUTPATIENT)
Dept: MRI IMAGING | Facility: HOSPITAL | Age: 85
End: 2025-06-09
Payer: MEDICARE

## 2025-06-09 LAB
ANION GAP SERPL CALCULATED.3IONS-SCNC: 14.2 MMOL/L (ref 5–15)
BUN SERPL-MCNC: 21.4 MG/DL (ref 8–23)
BUN/CREAT SERPL: 18.8 (ref 7–25)
CALCIUM SPEC-SCNC: 9.2 MG/DL (ref 8.6–10.5)
CHLORIDE SERPL-SCNC: 100 MMOL/L (ref 98–107)
CO2 SERPL-SCNC: 21.8 MMOL/L (ref 22–29)
CREAT SERPL-MCNC: 1.14 MG/DL (ref 0.57–1)
EGFRCR SERPLBLD CKD-EPI 2021: 47.9 ML/MIN/1.73
GLUCOSE SERPL-MCNC: 156 MG/DL (ref 65–99)
MAGNESIUM SERPL-MCNC: 1.6 MG/DL (ref 1.6–2.4)
POTASSIUM SERPL-SCNC: 4 MMOL/L (ref 3.5–5.2)
SODIUM SERPL-SCNC: 136 MMOL/L (ref 136–145)

## 2025-06-09 PROCEDURE — 80048 BASIC METABOLIC PNL TOTAL CA: CPT | Performed by: INTERNAL MEDICINE

## 2025-06-09 PROCEDURE — 97116 GAIT TRAINING THERAPY: CPT

## 2025-06-09 PROCEDURE — 72148 MRI LUMBAR SPINE W/O DYE: CPT

## 2025-06-09 PROCEDURE — 25010000002 HEPARIN (PORCINE) PER 1000 UNITS: Performed by: FAMILY MEDICINE

## 2025-06-09 PROCEDURE — 83735 ASSAY OF MAGNESIUM: CPT | Performed by: INTERNAL MEDICINE

## 2025-06-09 PROCEDURE — 97110 THERAPEUTIC EXERCISES: CPT

## 2025-06-09 PROCEDURE — 97530 THERAPEUTIC ACTIVITIES: CPT

## 2025-06-09 RX ADMIN — ACETAMINOPHEN 650 MG: 325 TABLET ORAL at 15:24

## 2025-06-09 RX ADMIN — ATORVASTATIN CALCIUM 20 MG: 20 TABLET, FILM COATED ORAL at 08:36

## 2025-06-09 RX ADMIN — PANTOPRAZOLE SODIUM 40 MG: 40 TABLET, DELAYED RELEASE ORAL at 05:18

## 2025-06-09 RX ADMIN — Medication 1 APPLICATION: at 21:14

## 2025-06-09 RX ADMIN — HEPARIN SODIUM 5000 UNITS: 5000 INJECTION INTRAVENOUS; SUBCUTANEOUS at 21:14

## 2025-06-09 RX ADMIN — ACETAMINOPHEN 650 MG: 325 TABLET ORAL at 06:27

## 2025-06-09 RX ADMIN — OXYBUTYNIN CHLORIDE 10 MG: 5 TABLET, EXTENDED RELEASE ORAL at 08:36

## 2025-06-09 RX ADMIN — Medication 1000 UNITS: at 08:36

## 2025-06-09 RX ADMIN — HEPARIN SODIUM 5000 UNITS: 5000 INJECTION INTRAVENOUS; SUBCUTANEOUS at 08:36

## 2025-06-09 RX ADMIN — Medication 1 APPLICATION: at 08:42

## 2025-06-09 RX ADMIN — DOCUSATE SODIUM 100 MG: 100 CAPSULE, LIQUID FILLED ORAL at 08:36

## 2025-06-09 RX ADMIN — Medication 5 MG: at 21:14

## 2025-06-09 RX ADMIN — METOPROLOL SUCCINATE 12.5 MG: 25 TABLET, EXTENDED RELEASE ORAL at 08:36

## 2025-06-09 RX ADMIN — Medication 1 CAPSULE: at 08:36

## 2025-06-09 NOTE — THERAPY TREATMENT NOTE
Acute Care - Physical Therapy Treatment Note   Prasanna     Patient Name: Emma Ryan  : 1940  MRN: [id number]  Today's Date: 2025      Visit Dx:   No diagnosis found.  Patient Active Problem List   Diagnosis    Complete tear of right rotator cuff    Stage 3a chronic kidney disease    Cough    Gastroesophageal reflux disease    Mixed hyperlipidemia    Shoulder pain    Hypertension    Acromioclavicular joint arthritis    Impingement syndrome, shoulder    Complete tear of left rotator cuff    Urge incontinence    Atopic rhinitis    Upper respiratory tract infection    Bilateral lower extremity edema    Hypercalcemia    Right knee pain    DADA (stress urinary incontinence, female)    Obesity (BMI 35.0-39.9 without comorbidity)    Left knee injury    Acute UTI    COVID-19 virus infection    Skin lesion of scalp    Numbness in left leg    Multiple bruises    Vitamin D deficiency    Laceration of right lower leg    Chronic chest pain with low to moderate risk for CAD    Degenerative disc disease, lumbar    Valvular heart disease, mild aortic and mitral regurgitation    Diastolic dysfunction without heart failure    Hypomagnesemia    Physical deconditioning     Past Medical History:   Diagnosis Date    GERD (gastroesophageal reflux disease)     Hyperlipidemia     Hypertension     Left shoulder pain     Obesity     Osteoarthritis of knees, bilateral     Overactive bladder     Right shoulder pain      Past Surgical History:   Procedure Laterality Date    APPENDECTOMY      BUNIONECTOMY Right     CARDIOVASCULAR STRESS TEST  10/2012    CATARACT EXTRACTION  2017    CHOLECYSTECTOMY      COLONOSCOPY      ECHO - CONVERTED  10/2012    JOINT REPLACEMENT      bilateral knees     KNEE ARTHROPLASTY Left     KNEE ARTHROSCOPY      SHOULDER ARTHROSCOPY Left 2016    Procedure: LEFT SHOULDER ACROMIOPLASTY,MINI OPEN ROTATOR CUFF REPAIR;  Surgeon: Carlos Eduardo Smith MD;  Location: [facility];  Service:     SHOULDER  SURGERY  05/31/2016    OPEN ACROMICPLASTY RIGHT SHOULDER     PT Assessment (Last 12 Hours)       PT Evaluation and Treatment       [facility] Name 06/09/25 1610          Physical Therapy Time and Intention    Subjective Information complains of;weakness;pain  -     Document Type therapy note (daily note)  -     Mode of Treatment physical therapy  -     Patient Effort good  -HC     Comment Pt and RN in agreement for PT. Pt walked 20' x 2 with RW min/ mod A. Supine and EOB exercises to tolerance.  -[facility] Name 06/09/25 1610          Pain    Pretreatment Pain Rating 7/10  -     Posttreatment Pain Rating 7/10  -[facility] Name 06/09/25 1610          Cognition    Personal Safety Interventions fall prevention program maintained;gait belt;nonskid shoes/slippers when out of bed;supervised activity  -[facility] Name 06/09/25 1610          Bed Mobility    Bed Mobility supine-sit;sit-supine;scooting/bridging  -     Scooting/Bridging [place] (Bed Mobility) moderate assist (50% patient effort);2 person assist  -     Supine-Sit [place] (Bed Mobility) minimum assist (75% patient effort)  -     Sit-Supine [place] (Bed Mobility) minimum assist (75% patient effort)  -[facility] Name 06/09/25 1610          Sit-Stand Transfer    Sit-Stand [place] (Transfers) contact guard;minimum assist (75% patient effort)  -     Assistive Device (Sit-Stand Transfers) walker, front-wheeled  -[facility] Name 06/09/25 1610          Stand-Sit Transfer    Stand-Sit [place] (Transfers) contact guard  recommend supervision for safe sit  -     Assistive Device (Stand-Sit Transfers) walker, front-wheeled  -[facility] Name 06/09/25 1610          Gait/Stairs (Locomotion)    [place] Level (Gait) minimum assist (75% patient effort);moderate assist (50% patient effort)  -     Assistive Device (Gait) walker, front-wheeled  -     Patient was able to Ambulate yes  -     Distance in Feet (Gait) 20  x 2   -HC     Pattern (Gait) step-to  -HC     Deviations/Abnormal Patterns (Gait) ataxic;base of support, narrow;gait speed decreased  -HC       Row Name 06/09/25 1610          Motor Skills    Therapeutic Exercise other (see comments)  Sitting: AP LAQ, March, Knees in/out. Supine: Ap, inversion/eversion, SLR, hip abd,heel slide.  -HC       Row Name             Wound 05/29/25 0411 medial sacral spine Pressure Injury    Wound - Properties Group Placement Date: 05/29/25  -SM Placement Time: 0411  -SM Present on Original Admission: N  -SM Orientation: medial  -SM Location: sacral spine  -SM Primary Wound Type: Pressure Inj  -TW    Retired Wound - Properties Group Placement Date: 05/29/25  -SM Placement Time: 0411 -SM Present on Original Admission: N  -SM Orientation: medial  -SM Location: sacral spine  -SM    Retired Wound - Properties Group Placement Date: 05/29/25  -SM Placement Time: 0411 -SM Present on Original Admission: N  -SM Orientation: medial  -SM Location: sacral spine  -SM    Retired Wound - Properties Group Date first assessed: 05/29/25  -SM Time first assessed: 0411 -SM Present on Original Admission: N  -SM Location: sacral spine  -SM      Row Name             Wound 05/26/25 1948 Right gluteal Pressure Injury    Wound - Properties Group Placement Date: 05/26/25  -PL Placement Time: 1948 -PL Present on Original Admission: Y  -TW Side: Right  -TW Location: gluteal  -PL Primary Wound Type: Pressure Inj  -TW    Retired Wound - Properties Group Placement Date: 05/26/25  -PL Placement Time: 1948 -PL Present on Original Admission: Y  -TW Side: Right  -TW Location: gluteal  -PL    Retired Wound - Properties Group Placement Date: 05/26/25  -PL Placement Time: 1948  -PL Present on Original Admission: Y  -TW Side: Right  -TW Location: gluteal  -PL    Retired Wound - Properties Group Date first assessed: 05/26/25  -PL Time first assessed: 1948 -PL Present on Original Admission: Y  -TW Side: Right  -TW Location:  gluteal  -PL      Row Name 06/09/25 1610          Positioning and Restraints    Pre-Treatment Position in bed  -HC     Post Treatment Position bed  -HC     In Bed fowlers;call light within reach;encouraged to call for assist;exit alarm on;side rails up x2;notified nsg  -HC               User Key  (r) = Recorded By, (t) = Taken By, (c) = Cosigned By      Initials Name Provider Type    TW Seble Sena, RN Registered Nurse     Steph Valentin, RN Registered Nurse    HC Amanda Shah PTA Physical Therapist Assistant    PL Raquel Nava, RN Registered Nurse                    Physical Therapy Education       Title: PT OT SLP Therapies (Done)       Topic: Physical Therapy (Done)       Point: Mobility training (Done)       Learning Progress Summary            Patient Acceptance, E,TB, VU,NR by  at 6/8/2025 0008    Acceptance, E,TB, VU by  at 6/3/2025 1415                      Point: Home exercise program (Done)       Learning Progress Summary            Patient Acceptance, E,TB, VU,NR by  at 6/8/2025 0008    Acceptance, E,TB, VU by  at 6/3/2025 1415                      Point: Body mechanics (Done)       Learning Progress Summary            Patient Acceptance, E,TB, VU,NR by  at 6/8/2025 0008    Acceptance, E,TB, VU by  at 6/3/2025 1415                      Point: Precautions (Done)       Learning Progress Summary            Patient Acceptance, E,TB, VU,NR by  at 6/8/2025 0008    Acceptance, E,TB, VU by KM at 6/3/2025 1415                                      User Key       Initials Effective Dates Name Provider Type Discipline     05/24/22 -  Roni Mar, PT Physical Therapist PT     02/06/24 -  Jorge Alberto Savage, RN Registered Nurse Nurse                  PT Recommendation and Plan         Outcome Measures       Row Name 06/07/25 1200             How much help from another is currently needed...    Putting on and taking off regular lower body clothing? 2  -LA      Bathing (including  washing, rinsing, and drying) 3  -LA      Toileting (which includes using toilet bed pan or urinal) 3  -LA      Putting on and taking off regular upper body clothing 4  -LA      Taking care of personal grooming (such as brushing teeth) 4  -LA      Eating meals 4  -LA      AM-PAC 6 Clicks Score (OT) 20  -LA         Functional Assessment    Outcome Measure Options AM-PAC 6 Clicks Daily Activity (OT)  -LA                User Key  (r) = Recorded By, (t) = Taken By, (c) = Cosigned By      Initials Name Provider Type    Lena Santizo OT Occupational Therapist                     Time Calculation:    PT Charges       Row Name 06/09/25 1617             Time Calculation    PT Received On 06/09/25  -         Time Calculation- PT    Total Timed Code Minutes- PT 55 minute(s)  -                User Key  (r) = Recorded By, (t) = Taken By, (c) = Cosigned By      Initials Name Provider Type    Amanda Leija PTA Physical Therapist Assistant                  Therapy Charges for Today       Code Description Service Date Service Provider Modifiers Qty    54096435307  GAIT TRAINING EA 15 MIN 6/9/2025 Amanda Shah PTA GP, CQ 1    [id number]  PT THER PROC EA 15 MIN 6/9/2025 Amanda Shah PTA GP, CQ 1    [id number]  PT THERAPEUTIC ACT EA 15 MIN 6/9/2025 Amanda Shah PTA GP, CQ 2            PT G-Codes  Outcome Measure Options: AM-PAC 6 Clicks Daily Activity (OT)  AM-PAC 6 Clicks Score (PT): 16  AM-PAC 6 Clicks Score (OT): 20    Amanda Shah PTA  6/9/2025

## 2025-06-09 NOTE — PLAN OF CARE
Goal Outcome Evaluation:              Outcome Evaluation: Patient has rested in bed this shift, no complaints or request at this time, wound care complete,  VSS, Will continue plan of care.

## 2025-06-09 NOTE — PROGRESS NOTES
Ms. Ryan is our 85 yo F with hx of ypertension, CKD stage IIIa, HLD, hemorrhoids, osteoarthritis, GERD, recent history of multiple UTIs who presented with weakness. Patient tells me it was more acute weakness and numbness of LLE. She notes she could not even move her toes. Patient found to have hypokalemia, hypomagnesemia with replacement. Of note she briefly became hyperkalemic after replacement. She was transferred for swing bed for continued PT/OT.     Today patient noted frustration, not feeling informed. She notes she feels she has not been updated as she should have about planned dispo. She also noted displeasure with lab sticks. She noted she also felt her numbness/weakness of LLE had not been explained and was frustrated with that. I talked to patient and her  via speaker phone. We discussed MRI l;lumbar spine and she was agreeable. I think much of it may be deconditioning but given neurological changes in unilateral LE will get MRI of lumbar spine. She did not numbness and weakness in other extremities at times as well. Repeat labs this AM improved. Will give lab holiday tomorrow. Clinical updates due tomorrow.

## 2025-06-09 NOTE — PLAN OF CARE
Goal Outcome Evaluation:           Progress: no change  Outcome Evaluation: pt has had wound care done this shift, no complaints from pt, will continue plan of care.

## 2025-06-09 NOTE — CASE MANAGEMENT/SOCIAL WORK
Discharge Planning Assessment   Prasanna     Patient Name: Emma Ryan  MRN: [id number]  Today's Date: 6/9/2025    Admit Date: 6/3/2025       Discharge Plan       Row Name 06/09/25 1806       Plan    Plan Pt was admitted to swing bed on 6/3 for therapy services, swing bed days are approved clinical updates due on 6/10. Pt lives with spouse, Bob ([address]. [place], [place]) and she plans to return home at discharge.Pt has a rollator and cane via unknown provider. Pt does not utilize home health services. SS to follow.                    REGINO BocanegraW

## 2025-06-10 PROCEDURE — 25010000002 HEPARIN (PORCINE) PER 1000 UNITS: Performed by: FAMILY MEDICINE

## 2025-06-10 PROCEDURE — 97110 THERAPEUTIC EXERCISES: CPT

## 2025-06-10 PROCEDURE — 97530 THERAPEUTIC ACTIVITIES: CPT

## 2025-06-10 PROCEDURE — 97116 GAIT TRAINING THERAPY: CPT

## 2025-06-10 RX ADMIN — OXYBUTYNIN CHLORIDE 10 MG: 5 TABLET, EXTENDED RELEASE ORAL at 08:29

## 2025-06-10 RX ADMIN — METOPROLOL SUCCINATE 12.5 MG: 25 TABLET, EXTENDED RELEASE ORAL at 08:29

## 2025-06-10 RX ADMIN — Medication 1 APPLICATION: at 20:23

## 2025-06-10 RX ADMIN — ATORVASTATIN CALCIUM 20 MG: 20 TABLET, FILM COATED ORAL at 08:29

## 2025-06-10 RX ADMIN — Medication 1000 UNITS: at 08:29

## 2025-06-10 RX ADMIN — HEPARIN SODIUM 5000 UNITS: 5000 INJECTION INTRAVENOUS; SUBCUTANEOUS at 08:29

## 2025-06-10 RX ADMIN — Medication 1 APPLICATION: at 08:37

## 2025-06-10 RX ADMIN — Medication 5 MG: at 20:39

## 2025-06-10 RX ADMIN — PANTOPRAZOLE SODIUM 40 MG: 40 TABLET, DELAYED RELEASE ORAL at 05:24

## 2025-06-10 RX ADMIN — DOCUSATE SODIUM 100 MG: 100 CAPSULE, LIQUID FILLED ORAL at 08:29

## 2025-06-10 RX ADMIN — HEPARIN SODIUM 5000 UNITS: 5000 INJECTION INTRAVENOUS; SUBCUTANEOUS at 20:23

## 2025-06-10 RX ADMIN — Medication 1 CAPSULE: at 08:29

## 2025-06-10 NOTE — PLAN OF CARE
Goal Outcome Evaluation:              Outcome Evaluation: Pt A/Ox4 this shift, VSS on RA. Wound care completed per order. Pt ambulated in room with x1 assist this shift. Pt denies any other concerns at this time, will continue with POC.

## 2025-06-10 NOTE — PLAN OF CARE
Goal Outcome Evaluation:              Outcome Evaluation: Patient has rested in bed this shift, wound care completed per order, no complaints or request at this time, VSS, Will continue plan of care.

## 2025-06-10 NOTE — PAYOR COMM NOTE
"Nicki Carter RN CM/Swing Bed  [email or web address]  Phone: [phone] Fax: [phone]  -NPI [id number]  [id number]  Authorization No: [id number]    Patient was admitted to post acute swing bed for PT/OT strength, mobility, ADL and transfer training. Lives at home with spouse and plans to return home when she can safely do so. PLOF independent with all household ADLs and ambulates via rollator.      ICD 10  R53.81  E83.42    Emma Krishnamurthy (84 y.o. Female)       Date of Birth   1940    Social Security Number       Address   [address] [address]    Home Phone   [phone]    MRN   [id number]       Restoration[employer]   M[employer]on    Marital Status                               Admission Date   6/3/2025    Admission Type   Urgent    Admitting Provider   Glenroy Lyn DO    Attending Provider   Joseph Sanchez MD    Department, Room/Bed   [place] [address]/       Discharge Date       Discharge Disposition       Discharge Destination                                 Attending Provider: Joseph Sanchez MD    Allergies: Codeine, Sulfa Antibiotics    Isolation: None   Infection: None   Code Status: CPR    Ht: 149.9 cm (59\")   Wt: 64.1 kg (141 lb 6.4 oz)    Admission Cmt: None   Principal Problem: Physical deconditioning [R53.81]                   Active Insurance as of 6/3/2025       Primary Coverage       Payor Plan Insurance Group Employer/Plan Group    ANTH MEDICARE REPLACEMENT [facility] MEDICARE ADVANTAGE O [id number]       Payor Plan Address Payor Plan Phone Number Payor Plan Fax Number Effective Dates    [address] [phone]  1/1/2025 - None Entered    [facility] [id number]         Subscriber Name Subscriber Birth Date Member ID       EMMA KRISHNAMURTHY 1940 [id number]                     Emergency Contacts        (Rel.) Home Phone Work Phone Mobile Phone    Bob Krishnamurthy (Spouse) [phone] -- --    Lashawn Krishnamurthy " (Relative) [phone] -- --    Inez Maddox (Daughter) [phone] -- --              Current Facility-Administered Medications   Medication Dose Route Frequency Provider Last Rate Last Admin    acetaminophen (TYLENOL) tablet 650 mg  650 mg Oral Q6H PRN Glenroy Lyn DO   650 mg at 06/09/25 1524    Acidophilus/Pectin capsule 1 capsule  1 capsule Oral Daily Glenroy Lyn DO   1 capsule at 06/09/25 0836    atorvastatin (LIPITOR) tablet 20 mg  20 mg Oral Daily Gelnroy Lyn DO   20 mg at 06/09/25 0836    sennosides-docusate (PERICOLACE) 8.6-50 MG per tablet 2 tablet  2 tablet Oral BID PRN Glenroy Lyn DO        And    polyethylene glycol (MIRALAX) packet 17 g  17 g Oral Daily PRN Glenroy Lyn DO   17 g at 06/05/25 2018    And    bisacodyl (DULCOLAX) EC tablet 5 mg  5 mg Oral Daily PRN Glenroy Lyn DO        And    bisacodyl (DULCOLAX) suppository 10 mg  10 mg Rectal Daily PRN Glenroy Lyn DO   10 mg at 06/07/25 0826    calcium carbonate (TUMS) chewable tablet 500 mg (200 mg elemental)  2 tablet Oral TID PRN Glenroy Lyn DO   2 tablet at 06/05/25 2018    castor oil-balsam peru (VENELEX) ointment 1 Application  1 Application Topical Q12H Glenroy Lyn DO   1 Application at 06/09/25 2114    cholecalciferol (VITAMIN D3) tablet 1,000 Units  1,000 Units Oral Daily Glenroy Lyn DO   1,000 Units at 06/09/25 0836    docusate sodium (COLACE) capsule 100 mg  100 mg Oral Daily Glenroy Lyn DO   100 mg at 06/09/25 0836    heparin (porcine) 5000 UNIT/ML injection 5,000 Units  5,000 Units Subcutaneous Q12H Glenroy Lyn DO   5,000 Units at 06/09/25 2114    ketoconazole (NIZORAL) 2 % cream 1 Application  1 Application Topical Daily PRN Glenroy Lyn DO        Magnesium Standard Dose Replacement - Follow Nurse / BPA Driven Protocol   Not Applicable PRN Glenroy Lyn DO        melatonin tablet 5 mg  5 mg Oral Nightly PRN Glenroy Lyn DO   5 mg at 06/09/25 2114    metoprolol succinate XL  (TOPROL-XL) 24 hr tablet 12.5 mg  12.5 mg Oral Q24H Glenroy Lyn DO   12.5 mg at 06/09/25 0836    nitroglycerin (NITROSTAT) SL tablet 0.4 mg  0.4 mg Sublingual Q5 Min PRN Glenroy Lyn DO        nitroglycerin (NITROSTAT) SL tablet 0.4 mg  0.4 mg Sublingual Q5 Min PRN Glenroy Lyn DO        oxybutynin XL (DITROPAN-XL) 24 hr tablet 10 mg  10 mg Oral Daily Glenroy Lyn DO   10 mg at 06/09/25 0836    pantoprazole (PROTONIX) EC tablet 40 mg  40 mg Oral Q AM Glenroy Lyn DO   40 mg at 06/10/25 0524    Potassium Replacement - Follow Nurse / BPA Driven Protocol   Not Applicable PRN Glenroy Lyn DO        sodium chloride 0.9 % flush 10 mL  10 mL Intravenous Q12H Glenroy Lyn DO        sodium chloride 0.9 % flush 10 mL  10 mL Intravenous PRN Glenroy Lyn DO        sodium chloride 0.9 % infusion 40 mL  40 mL Intravenous PRN Glenroy Lyn DO        [START ON 6/19/2025] tuberculin injection 5 Units  5 Units Intradermal Once Glenroy Lyn DO         Orders (active)        Start     Ordered    06/19/25 0600  tuberculin injection 5 Units  Once         06/05/25 0726    06/19/25 0600  TB Skin Test (Reading)  Once        Comments: Use Enter / Edit Results to Document Results      06/05/25 0726    06/05/25 0800  Vital Signs  Every Morning         06/05/25 0726    06/05/25 0725  Initial Swing Bed Certification  Once         06/05/25 0726    06/05/25 0725  Weigh Patient Upon Admission and Every Sunday  Weekly         06/05/25 0726    06/05/25 0725  Intake & Output  Every Shift       06/05/25 0726    06/05/25 0725  TB Skin Test (Reading)  Once        Comments: Use Enter / Edit Results to Document Results Upon Patient Return to Facility      06/05/25 0726    06/05/25 0725  OT Consult: Eval & Treat ADL Performance Below Baseline  Once         06/05/25 0726    06/04/25 1356  Diet: Regular/House; Fluid Consistency: Thin (IDDSI 0)  Diet Effective Now         06/04/25 1356    06/04/25 0900  atorvastatin (LIPITOR)  tablet 20 mg  Daily         06/03/25 1356    06/04/25 0900  cholecalciferol (VITAMIN D3) tablet 1,000 Units  Daily         06/03/25 1356    06/04/25 0900  docusate sodium (COLACE) capsule 100 mg  Daily         06/03/25 1356    06/04/25 0900  oxybutynin XL (DITROPAN-XL) 24 hr tablet 10 mg  Daily         06/03/25 1356    06/04/25 0900  Acidophilus/Pectin capsule 1 capsule  Daily         06/03/25 1356    06/04/25 0900  metoprolol succinate XL (TOPROL-XL) 24 hr tablet 12.5 mg  Every 24 Hours Scheduled         06/03/25 1357    06/04/25 0600  Daily Weights  Daily       06/03/25 1356    06/04/25 0600  pantoprazole (PROTONIX) EC tablet 40 mg  Every Early Morning         06/03/25 1356    06/03/25 2100  sodium chloride 0.9 % flush 10 mL  Every 12 Hours Scheduled         06/03/25 1356 06/03/25 2100  heparin (porcine) 5000 UNIT/ML injection 5,000 Units  Every 12 Hours Scheduled         06/03/25 1356    06/03/25 2100  castor oil-balsam peru (VENELEX) ointment 1 Application  Every 12 Hours Scheduled         06/03/25 1357    06/03/25 2040  Ambulate Patient  Every Shift       06/03/25 1357    06/03/25 2000  Wound Care  Every 12 Hours         06/03/25 1357    06/03/25 1800  Oral Care  2 Times Daily       06/03/25 1356 06/03/25 1800  Strict Intake & Output  Every 6 Hours         06/03/25 1356 06/03/25 1800  Dietary Nutrition Supplements Boost Plus (Ensure Enlive, Ensure Plus)  Daily With Breakfast & Dinner       06/03/25 1356 06/03/25 1600  Vital Signs  Every 8 Hours        Comments: Per per hospital policy    06/03/25 1356    06/03/25 1400  Vital Signs Every Hour and Per Hospital Policy Based on Patient Condition  Every Hour       06/03/25 1357 06/03/25 1400  Vital Signs Every Hour and Per Hospital Policy Based on Patient Condition  Every Hour       06/03/25 1357    06/03/25 1357  Hospitalist (on-call MD unless specified)  Once        Specialty:  Hospitalist  Provider:  Glenroy Lyn DO    06/03/25 1356    06/03/25  1357  Notify Physician (with Parameters)  Until Discontinued         06/03/25 1356    06/03/25 1357  Code Status and Medical Interventions: CPR (Attempt to Resuscitate); Full Support  Continuous         06/03/25 1356    06/03/25 1357  Telemetry - Place Orders & Notify Provider of Results When Patient Experiences Acute Chest Pain, Dysrhythmia or Respiratory Distress  Continuous        Comments: Open Order Report to View Parameters Requiring Provider Notification    06/03/25 1356    06/03/25 1357  May Be Off Telemetry for Tests  Continuous         06/03/25 1356    06/03/25 1357  PT Consult: Eval & Treat Functional Mobility Below Baseline  Once         06/03/25 1356    06/03/25 1357  OT Consult: Eval & Treat ADL Performance Below Baseline  Once         06/03/25 1356    06/03/25 1357  Telemetry - Place Orders & Notify Provider of Results When Patient Experiences Acute Chest Pain, Dysrhythmia or Respiratory Distress  Continuous        Comments: Open Order Report to View Parameters Requiring Provider Notification    06/03/25 1357    06/03/25 1357  Wound Ostomy Eval & Treat  Once         06/03/25 1357    06/03/25 1357  Maintain IV Access  Continuous         06/03/25 1357    06/03/25 1357  Telemetry - Place Orders & Notify Provider of Results When Patient Experiences Acute Chest Pain, Dysrhythmia or Respiratory Distress  Continuous        Comments: Open Order Report to View Parameters Requiring Provider Notification    06/03/25 1357    06/03/25 1357  Continuous Pulse Oximetry  Continuous         06/03/25 1357    06/03/25 1357  Elevate Heels Off of Bed  Until Discontinued         06/03/25 1357    06/03/25 1357  Turn Patient  Now Then Every 2 Hours         06/03/25 1357    06/03/25 1357  Use Repositioning Wedge to Position Patient  Continuous         06/03/25 1357    06/03/25 1357  Use Seat Cushion When Up In Chair  Continuous         06/03/25 1357    06/03/25 1357  Do NOT Rub or Massage Any Bony Prominence  Continuous       "   06/03/25 1357    06/03/25 1357  Wound Ostomy Eval & Treat  Once         06/03/25 1357    06/03/25 1356  Magnesium Standard Dose Replacement - Follow Nurse / BPA Driven Protocol  As Needed         06/03/25 1356    06/03/25 1356  Potassium Replacement - Follow Nurse / BPA Driven Protocol  As Needed         06/03/25 1356    06/03/25 1356  sodium chloride 0.9 % flush 10 mL  As Needed         06/03/25 1356    06/03/25 1356  sodium chloride 0.9 % infusion 40 mL  As Needed         06/03/25 1356    06/03/25 1356  nitroglycerin (NITROSTAT) SL tablet 0.4 mg  Every 5 Minutes PRN         06/03/25 1356    06/03/25 1356  sennosides-docusate (PERICOLACE) 8.6-50 MG per tablet 2 tablet  2 Times Daily PRN        Placed in \"And\" Linked Group    06/03/25 1356    06/03/25 1356  polyethylene glycol (MIRALAX) packet 17 g  Daily PRN        Placed in \"And\" Linked Group    06/03/25 1356    06/03/25 1356  bisacodyl (DULCOLAX) EC tablet 5 mg  Daily PRN        Placed in \"And\" Linked Group    06/03/25 1356    06/03/25 1356  bisacodyl (DULCOLAX) suppository 10 mg  Daily PRN        Placed in \"And\" Linked Group    06/03/25 1356    06/03/25 1356  ketoconazole (NIZORAL) 2 % cream 1 Application  Daily PRN         06/03/25 1356    06/03/25 1356  melatonin tablet 5 mg  Nightly PRN         06/03/25 1356    06/03/25 1356  nitroglycerin (NITROSTAT) SL tablet 0.4 mg  Every 5 Minutes PRN         06/03/25 1357    06/03/25 1356  acetaminophen (TYLENOL) tablet 650 mg  Every 6 Hours PRN         06/03/25 1357    06/03/25 1356  calcium carbonate (TUMS) chewable tablet 500 mg (200 mg elemental)  3 Times Daily PRN         06/03/25 1357    Unscheduled  Up With Assistance  As Needed       06/03/25 1356    Unscheduled  Wound Care  As Needed       06/03/25 1357                     Physician Progress Notes (most recent note)        Joseph Sanchez MD at 06/09/25 1222          Ms. Ryan is our 85 yo F with hx of ypertension, CKD stage IIIa, HLD, hemorrhoids, " osteoarthritis, GERD, recent history of multiple UTIs who presented with weakness. Patient tells me it was more acute weakness and numbness of LLE. She notes she could not even move her toes. Patient found to have hypokalemia, hypomagnesemia with replacement. Of note she briefly became hyperkalemic after replacement. She was transferred for swing bed for continued PT/OT.     Today patient noted frustration, not feeling informed. She notes she feels she has not been updated as she should have about planned dispo. She also noted displeasure with lab sticks. She noted she also felt her numbness/weakness of LLE had not been explained and was frustrated with that. I talked to patient and her  via speaker phone. We discussed MRI l;lumbar spine and she was agreeable. I think much of it may be deconditioning but given neurological changes in unilateral LE will get MRI of lumbar spine. She did not numbness and weakness in other extremities at times as well. Repeat labs this AM improved. Will give lab holiday tomorrow. Clinical updates due tomorrow.     Electronically signed by Joseph Sanchez MD at 25 1234       Consult Notes (most recent note)    No notes of this type exist for this encounter.          Physical Therapy Notes (most recent note)        Amanda Shah PTA at 25 1618  Version 1 of 1         Acute Care - Physical Therapy Treatment Note  FLORIN Mims     Patient Name: Emma Ryan  : 1940  MRN: [id number]  Today's Date: 2025      Visit Dx:   No diagnosis found.  Patient Active Problem List   Diagnosis    Complete tear of right rotator cuff    Stage 3a chronic kidney disease    Cough    Gastroesophageal reflux disease    Mixed hyperlipidemia    Shoulder pain    Hypertension    Acromioclavicular joint arthritis    Impingement syndrome, shoulder    Complete tear of left rotator cuff    Urge incontinence    Atopic rhinitis    Upper respiratory tract infection    Bilateral  lower extremity edema    Hypercalcemia    Right knee pain    DADA (stress urinary incontinence, female)    Obesity (BMI 35.0-39.9 without comorbidity)    Left knee injury    Acute UTI    COVID-19 virus infection    Skin lesion of scalp    Numbness in left leg    Multiple bruises    Vitamin D deficiency    Laceration of right lower leg    Chronic chest pain with low to moderate risk for CAD    Degenerative disc disease, lumbar    Valvular heart disease, mild aortic and mitral regurgitation    Diastolic dysfunction without heart failure    Hypomagnesemia    Physical deconditioning     Past Medical History:   Diagnosis Date    GERD (gastroesophageal reflux disease)     Hyperlipidemia     Hypertension     Left shoulder pain     Obesity     Osteoarthritis of knees, bilateral     Overactive bladder     Right shoulder pain      Past Surgical History:   Procedure Laterality Date    APPENDECTOMY      BUNIONECTOMY Right     CARDIOVASCULAR STRESS TEST  10/2012    CATARACT EXTRACTION  2017    CHOLECYSTECTOMY      COLONOSCOPY  2011    ECHO - CONVERTED  10/2012    JOINT REPLACEMENT      bilateral knees     KNEE ARTHROPLASTY Left     KNEE ARTHROSCOPY      SHOULDER ARTHROSCOPY Left 7/28/2016    Procedure: LEFT SHOULDER ACROMIOPLASTY,MINI OPEN ROTATOR CUFF REPAIR;  Surgeon: Carlos Eduardo Smith MD;  Location: [facility];  Service:     SHOULDER SURGERY  05/31/2016    OPEN ACROMICPLASTY RIGHT SHOULDER     PT Assessment (Last 12 Hours)       PT Evaluation and Treatment       Row Name 06/09/25 1610          Physical Therapy Time and Intention    Subjective Information complains of;weakness;pain  -HC     Document Type therapy note (daily note)  -HC     Mode of Treatment physical therapy  -HC     Patient Effort good  -     Comment Pt and RN in agreement for PT. Pt walked 20' x 2 with RW min/ mod A. Supine and EOB exercises to tolerance.  -       Row Name 06/09/25 1610          Pain    Pretreatment Pain Rating 7/10  -HC      Posttreatment Pain Rating 7/10  -       Row Name 06/09/25 1610          Cognition    Personal Safety Interventions fall prevention program maintained;gait belt;nonskid shoes/slippers when out of bed;supervised activity  -       Row Name 06/09/25 1610          Bed Mobility    Bed Mobility supine-sit;sit-supine;scooting/bridging  -     Scooting/Bridging Jewell (Bed Mobility) moderate assist (50% patient effort);2 person assist  -     Supine-Sit [place] (Bed Mobility) minimum assist (75% patient effort)  -     Sit-Supine Jewell (Bed Mobility) minimum assist (75% patient effort)  -       Row Name 06/09/25 1610          Sit-Stand Transfer    Sit-Stand Jewell (Transfers) contact guard;minimum assist (75% patient effort)  -     Assistive Device (Sit-Stand Transfers) walker, front-wheeled  -       Row Name 06/09/25 1610          Stand-Sit Transfer    Stand-Sit Jewell (Transfers) contact guard  recommend supervision for safe sit  -HC     Assistive Device (Stand-Sit Transfers) walker, front-wheeled  -       Row Name 06/09/25 1610          Gait/Stairs (Locomotion)    Jewell Level (Gait) minimum assist (75% patient effort);moderate assist (50% patient effort)  -     Assistive Device (Gait) walker, front-wheeled  -     Patient was able to Ambulate yes  -HC     Distance in Feet (Gait) 20  x 2  -HC     Pattern (Gait) step-to  -HC     Deviations/Abnormal Patterns (Gait) ataxic;base of support, narrow;gait speed decreased  -       Row Name 06/09/25 1610          Motor Skills    Therapeutic Exercise other (see comments)  Sitting: AP LAQ, March, Knees in/out. Supine: Ap, inversion/eversion, SLR, hip abd,heel slide.  -       Row Name             Wound 05/29/25 0411 medial sacral spine Pressure Injury    Wound - Properties Group Placement Date: 05/29/25  -SM Placement Time: 0411  -SM Present on Original Admission: N  -SM Orientation: medial  -SM Location: sacral spine  -SM  Primary Wound Type: Pressure Inj  -TW    Retired Wound - Properties Group Placement Date: 05/29/25  -SM Placement Time: 0411 -SM Present on Original Admission: N  -SM Orientation: medial  -SM Location: sacral spine  -SM    Retired Wound - Properties Group Placement Date: 05/29/25  -SM Placement Time: 0411 -SM Present on Original Admission: N  -SM Orientation: medial  -SM Location: sacral spine  -SM    Retired Wound - Properties Group Date first assessed: 05/29/25 -SM Time first assessed: 0411 -SM Present on Original Admission: N  -SM Location: sacral spine  -SM      Row Name             Wound 05/26/25 1948 Right gluteal Pressure Injury    Wound - Properties Group Placement Date: 05/26/25  -PL Placement Time: 1948 -PL Present on Original Admission: Y  -TW Side: Right  -TW Location: gluteal  -PL Primary Wound Type: Pressure Inj  -TW    Retired Wound - Properties Group Placement Date: 05/26/25  -PL Placement Time: 1948 -PL Present on Original Admission: Y  -TW Side: Right  -TW Location: gluteal  -PL    Retired Wound - Properties Group Placement Date: 05/26/25  -PL Placement Time: 1948 -PL Present on Original Admission: Y  -TW Side: Right  -TW Location: gluteal  -PL    Retired Wound - Properties Group Date first assessed: 05/26/25  -PL Time first assessed: 1948 -PL Present on Original Admission: Y  -TW Side: Right  -TW Location: gluteal  -PL      Row Name 06/09/25 1610          Positioning and Restraints    Pre-Treatment Position in bed  -HC     Post Treatment Position bed  -HC     In Bed fowlers;call light within reach;encouraged to call for assist;exit alarm on;side rails up x2;notified nsg  -HC               User Key  (r) = Recorded By, (t) = Taken By, (c) = Cosigned By      Initials Name Provider Type    Seble Ch, RN Registered Nurse    Steph Yin RN Registered Nurse    Amanda Leija PTA Physical Therapist Assistant    PL Raquel Nava RN Registered Nurse                     Physical Therapy Education       Title: PT OT SLP Therapies (Done)       Topic: Physical Therapy (Done)       Point: Mobility training (Done)       Learning Progress Summary            Patient Acceptance, E,TB, VU,NR by RG at 6/8/2025 0008    Acceptance, E,TB, VU by KM at 6/3/2025 1415                      Point: Home exercise program (Done)       Learning Progress Summary            Patient Acceptance, E,TB, VU,NR by RG at 6/8/2025 0008    Acceptance, E,TB, VU by KM at 6/3/2025 1415                      Point: Body mechanics (Done)       Learning Progress Summary            Patient Acceptance, E,TB, VU,NR by RG at 6/8/2025 0008    Acceptance, E,TB, VU by KM at 6/3/2025 1415                      Point: Precautions (Done)       Learning Progress Summary            Patient Acceptance, E,TB, VU,NR by RG at 6/8/2025 0008    Acceptance, E,TB, VU by KM at 6/3/2025 1415                                      User Key       Initials Effective Dates Name Provider Type Discipline     05/24/22 -  Roni Mar, PT Physical Therapist PT     02/06/24 -  Jorge Alberto Savage, RN Registered Nurse Nurse                  PT Recommendation and Plan         Outcome Measures       Row Name 06/07/25 1200             How much help from another is currently needed...    Putting on and taking off regular lower body clothing? 2  -LA      Bathing (including washing, rinsing, and drying) 3  -LA      Toileting (which includes using toilet bed pan or urinal) 3  -LA      Putting on and taking off regular upper body clothing 4  -LA      Taking care of personal grooming (such as brushing teeth) 4  -LA      Eating meals 4  -LA      AM-PAC 6 Clicks Score (OT) 20  -LA         Functional Assessment    Outcome Measure Options AM-PAC 6 Clicks Daily Activity (OT)  -LA                User Key  (r) = Recorded By, (t) = Taken By, (c) = Cosigned By      Initials Name Provider Type    Lena Santizo OT Occupational Therapist                     Time  Calculation:    PT Charges       Row Name 25 1617             Time Calculation    PT Received On 25  -         Time Calculation- PT    Total Timed Code Minutes- PT 55 minute(s)  -                User Key  (r) = Recorded By, (t) = Taken By, (c) = Cosigned By      Initials Name Provider Type     Amanda Shah PTA Physical Therapist Assistant                  Therapy Charges for Today       Code Description Service Date Service Provider Modifiers Qty    [id number]  GAIT TRAINING EA 15 MIN 2025 Amanda Shah PTA GP, CQ 1    [id number]  PT THER PROC EA 15 MIN 2025 Amanda Shah PTA GP, CQ 1    [id number]  PT THERAPEUTIC ACT EA 15 MIN 2025 Amanda Shah PTA GP, CQ 2            PT G-Codes  Outcome Measure Options: AM-PAC 6 Clicks Daily Activity (OT)  AM-PAC 6 Clicks Score (PT): 16  AM-PAC 6 Clicks Score (OT): 20    Amanda Shah PTA  2025      Electronically signed by Amanda Shah PTA at 25 1618          Occupational Therapy Notes (most recent note)        Lena Patel, OT at 25 1231          Acute Care - Occupational Therapy Treatment Note   [facility]     Patient Name: Emma Ryan  : 1940  MRN: [id number]  Today's Date: 2025             Admit Date: 6/3/2025     No diagnosis found.  Patient Active Problem List   Diagnosis    Complete tear of right rotator cuff    Stage 3a chronic kidney disease    Cough    Gastroesophageal reflux disease    Mixed hyperlipidemia    Shoulder pain    Hypertension    Acromioclavicular joint arthritis    Impingement syndrome, shoulder    Complete tear of left rotator cuff    Urge incontinence    Atopic rhinitis    Upper respiratory tract infection    Bilateral lower extremity edema    Hypercalcemia    Right knee pain    DADA (stress urinary incontinence, female)    Obesity (BMI 35.0-39.9 without comorbidity)    Left knee injury    Acute UTI    COVID-19 virus infection     Skin lesion of scalp    Numbness in left leg    Multiple bruises    Vitamin D deficiency    Laceration of right lower leg    Chronic chest pain with low to moderate risk for CAD    Degenerative disc disease, lumbar    Valvular heart disease, mild aortic and mitral regurgitation    Diastolic dysfunction without heart failure    Hypomagnesemia    Physical deconditioning     Past Medical History:   Diagnosis Date    GERD (gastroesophageal reflux disease)     Hyperlipidemia     Hypertension     Left shoulder pain     Obesity     Osteoarthritis of knees, bilateral     Overactive bladder     Right shoulder pain      Past Surgical History:   Procedure Laterality Date    APPENDECTOMY      BUNIONECTOMY Right     CARDIOVASCULAR STRESS TEST  10/2012    CATARACT EXTRACTION  2017    CHOLECYSTECTOMY      COLONOSCOPY  2011    ECHO - CONVERTED  10/2012    JOINT REPLACEMENT      bilateral knees     KNEE ARTHROPLASTY Left     KNEE ARTHROSCOPY      SHOULDER ARTHROSCOPY Left 7/28/2016    Procedure: LEFT SHOULDER ACROMIOPLASTY,MINI OPEN ROTATOR CUFF REPAIR;  Surgeon: Carlos Eduardo Smith MD;  Location: [facility];  Service:     SHOULDER SURGERY  05/31/2016    OPEN ACROMICPLASTY RIGHT SHOULDER         OT ASSESSMENT FLOWSHEET (Last 12 Hours)       OT Evaluation and Treatment       Row Name 06/07/25 1223                   OT Time and Intention    Subjective Information no complaints  -LA        Document Type therapy note (daily note)  -LA        Mode of Treatment occupational therapy  -LA        Total Minutes, Occupational Therapy 25  -LA        Patient Effort good  -LA           General Information    Patient Profile Reviewed yes  -LA        General Observations of Patient Patient with good motivation to improve ability. Patient completing daily HEP that therapist leaves for patient demonstrated by improved ROM and improved activity tolerance.  -LA        Existing Precautions/Restrictions fall  -LA           Cognition    Affect/Mental  Status (Cognition) WFL  -LA        Orientation Status (Cognition) oriented x 3  -LA        Follows Commands (Cognition) WFL  -LA           Strength Comprehensive (MMT)    General Manual Muscle Testing (MMT) Assessment --  right shoulder strength still noted however improvement noted with exercises  -LA           Sit-Stand Transfer    Sit-Stand [place] (Transfers) standby assist  -LA           Motor Skills    Motor Skills coordination;functional endurance  -LA        Therapeutic Exercise shoulder;elbow/forearm;wrist;hand  -LA        Comments, Therapeutic Exercise Exercises completed in all planes. Improved ROM and exercise tolerance noted. Patient with good carryover of exercises throughout the day.  -LA           Wound 05/29/25 0411 medial sacral spine Pressure Injury    Wound - Properties Group Placement Date: 05/29/25  -SM Placement Time: 0411 -SM Present on Original Admission: N  -SM Orientation: medial  -SM Location: sacral spine  -SM Primary Wound Type: Pressure Inj  -TW    Retired Wound - Properties Group Placement Date: 05/29/25  -SM Placement Time: 0411 -SM Present on Original Admission: N  -SM Orientation: medial  -SM Location: sacral spine  -SM    Retired Wound - Properties Group Placement Date: 05/29/25  -SM Placement Time: 0411 -SM Present on Original Admission: N  -SM Orientation: medial  -SM Location: sacral spine  -SM    Retired Wound - Properties Group Date first assessed: 05/29/25  -SM Time first assessed: 0411 -SM Present on Original Admission: N  -SM Location: sacral spine  -SM       Wound 05/26/25 1948 Right gluteal Pressure Injury    Wound - Properties Group Placement Date: 05/26/25  -PL Placement Time: 1948 -PL Present on Original Admission: Y  -TW Side: Right  -TW Location: gluteal  -PL Primary Wound Type: Pressure Inj  -TW    Retired Wound - Properties Group Placement Date: 05/26/25  -PL Placement Time: 1948  -PL Present on Original Admission: Y  -TW Side: Right  -TW Location:  gluteal  -PL    Retired Wound - Properties Group Placement Date: 05/26/25  -PL Placement Time: 1948 -PL Present on Original Admission: Y  -TW Side: Right  -TW Location: gluteal  -PL    Retired Wound - Properties Group Date first assessed: 05/26/25  -PL Time first assessed: 1948  -PL Present on Original Admission: Y  -TW Side: Right  -TW Location: gluteal  -PL       Plan of Care Review    Plan of Care Reviewed With patient  -LA           Positioning and Restraints    Pre-Treatment Position sitting in chair/recliner  -LA        Post Treatment Position chair  -LA        In Chair sitting;call light within reach;encouraged to call for assist  -LA                  User Key  (r) = Recorded By, (t) = Taken By, (c) = Cosigned By      Initials Name Effective Dates    TW Seble Sena RN 06/16/21 -     Steph Yin RN 07/18/22 -     Lena Santizo OT 02/14/22 -     Raquel Ledesma RN 06/12/24 -                            OT Recommendation and Plan     Plan of Care Review  Plan of Care Reviewed With: patient  Plan of Care Reviewed With: patient     Outcome Measures       Row Name 06/07/25 1200 06/05/25 1200          How much help from another is currently needed...    Putting on and taking off regular lower body clothing? 2  -LA 2  -LA     Bathing (including washing, rinsing, and drying) 3  -LA 3  -LA     Toileting (which includes using toilet bed pan or urinal) 3  -LA 3  -LA     Putting on and taking off regular upper body clothing 4  -LA 4  -LA     Taking care of personal grooming (such as brushing teeth) 4  -LA 4  -LA     Eating meals 4  -LA 4  -LA     AM-PAC 6 Clicks Score (OT) 20  -LA 20  -LA        Functional Assessment    Outcome Measure Options AM-PAC 6 Clicks Daily Activity (OT)  -LA AM-PAC 6 Clicks Daily Activity (OT)  -LA               User Key  (r) = Recorded By, (t) = Taken By, (c) = Cosigned By      Initials Name Provider Type    Lena Santizo, IHSAN Occupational Therapist                     Time Calculation:    Time Calculation- OT       Row Name 06/07/25 1230             Time Calculation- OT    Total Timed Code Minutes- OT 25 minute(s)  -LA                User Key  (r) = Recorded By, (t) = Taken By, (c) = Cosigned By      Initials Name Provider Type    Lena Santizo OT Occupational Therapist                  Therapy Charges for Today       Code Description Service Date Service Provider Modifiers Qty    19647059801  OT THER PROC EA 15 MIN 6/7/2025 Lena Patel OT GO 1    [id number]  OT THERAPEUTIC ACT EA 15 MIN 6/7/2025 Lena Patel OT GO 1                 Lena Patel OT  6/7/2025    Electronically signed by Lena Patel OT at 06/07/25 1231       Seble Sena, RN   Registered Nurse  Wound Care  Nursing Note      Signed  Date of Service:  06/03/25 1433  Creation Time:  06/03/25 1433     Signed        WOCN consult received related to swing bed admission. Patient continues with right gluteal pressure injury and medial sacral spine pressure injury. Wound care orders remain in place.  PI prevention orders remain in place.      Lucio score 16.                   Julieta Costa, RN   Registered Nurse  Nursing     Plan of Care      Signed     Date of Service: 06/10/25 0336  Creation Time: 06/10/25 0336     Signed         Goal Outcome Evaluation:  Outcome Evaluation: Patient has rested in bed this shift, wound care completed per order, no complaints or request at this time, VSS, Will continue plan of care.                 Sanjuana Yu, BSW     Case Management     Case Management/Social Work      Signed     Date of Service: 06/09/25 1807  Creation Time: 06/09/25 1807     Signed         Discharge Planning Assessment  FLORIN Mims     Patient Name: Emma Ryan                      MRN: [id number]  Today's Date: 6/9/2025                  Admit Date: 6/3/2025          Discharge Plan         Row Name 06/09/25 1806           Plan     Plan Pt was admitted to swing bed on  6/3 for therapy services, swing bed days are approved clinical updates due on 6/10. Pt lives with spouse, Bob ([address] [place], [place]) and she plans to return home at discharge.Pt has a rollator and cane via unknown provider. Pt does not utilize home health services. SS to follow.                          REGINO BocanegraW

## 2025-06-10 NOTE — PROGRESS NOTES
Ms. Ryan is our 83 yo F with hx of ypertension, CKD stage IIIa, HLD, hemorrhoids, osteoarthritis, GERD, recent history of multiple UTIs who presented with weakness. Patient tells me it was more acute weakness and numbness of LLE. She notes she could not even move her toes. Patient found to have hypokalemia, hypomagnesemia with replacement. Of note she briefly became hyperkalemic after replacement. She was transferred for swing bed for continued PT/OT. MRI lumbar spine with multilevel DDD. Symptoms are not acute and MRI findings also look like more subacute/chronic degenerative changes. Will need outpatient neurosurgery evaluation. Patient denies any new complaints today. Notes plan to work with PT/OT this afternoon. Labs/vitals/notes reviewed. Renal function has improved to baseline.     Patient approved until 6/16 with clinical updates due at that time. Patient would like to get strong enough to return home to help care for her .

## 2025-06-10 NOTE — THERAPY TREATMENT NOTE
Acute Care - Physical Therapy Treatment Note   [place]     Patient Name: Emma Ryan  : 1940  MRN: [id number]  Today's Date: 6/10/2025      Visit Dx:   No diagnosis found.  Patient Active Problem List   Diagnosis    Complete tear of right rotator cuff    Stage 3a chronic kidney disease    Cough    Gastroesophageal reflux disease    Mixed hyperlipidemia    Shoulder pain    Hypertension    Acromioclavicular joint arthritis    Impingement syndrome, shoulder    Complete tear of left rotator cuff    Urge incontinence    Atopic rhinitis    Upper respiratory tract infection    Bilateral lower extremity edema    Hypercalcemia    Right knee pain    DADA (stress urinary incontinence, female)    Obesity (BMI 35.0-39.9 without comorbidity)    Left knee injury    Acute UTI    COVID-19 virus infection    Skin lesion of scalp    Numbness in left leg    Multiple bruises    Vitamin D deficiency    Laceration of right lower leg    Chronic chest pain with low to moderate risk for CAD    Degenerative disc disease, lumbar    Valvular heart disease, mild aortic and mitral regurgitation    Diastolic dysfunction without heart failure    Hypomagnesemia    Physical deconditioning     Past Medical History:   Diagnosis Date    GERD (gastroesophageal reflux disease)     Hyperlipidemia     Hypertension     Left shoulder pain     Obesity     Osteoarthritis of knees, bilateral     Overactive bladder     Right shoulder pain      Past Surgical History:   Procedure Laterality Date    APPENDECTOMY      BUNIONECTOMY Right     CARDIOVASCULAR STRESS TEST  10/2012    CATARACT EXTRACTION  2017    CHOLECYSTECTOMY      COLONOSCOPY      ECHO - CONVERTED  10/2012    JOINT REPLACEMENT      bilateral knees     KNEE ARTHROPLASTY Left     KNEE ARTHROSCOPY      SHOULDER ARTHROSCOPY Left 2016    Procedure: LEFT SHOULDER ACROMIOPLASTY,MINI OPEN ROTATOR CUFF REPAIR;  Surgeon: Carlos Eduardo Smith MD;  Location: [facility];  Service:      SHOULDER SURGERY  05/31/2016    OPEN ACROMICPLASTY RIGHT SHOULDER     PT Assessment (Last 12 Hours)       PT Evaluation and Treatment       Row Name 06/10/25 1422          Physical Therapy Time and Intention    Subjective Information complains of;weakness  -     Document Type therapy note (daily note)  -     Mode of Treatment physical therapy  -     Patient Effort good  -HC     Comment Pt and RN in agreement for PT. Pt walked 15' then 25' with RW CGA/min A. Supine and sitting exercises completed to tolerance.  -       Row Name 06/10/25 1422          Pain    Pretreatment Pain Rating 0/10 - no pain  -     Posttreatment Pain Rating 0/10 - no pain  -       Row Name 06/10/25 1422          Cognition    Personal Safety Interventions fall prevention program maintained;gait belt;nonskid shoes/slippers when out of bed;supervised activity  -       Row Name 06/10/25 1422          Bed Mobility    Bed Mobility supine-sit;sit-supine;scooting/bridging  -     Sit-Supine [place] (Bed Mobility) minimum assist (75% patient effort)  -     Comment, (Bed Mobility) up in chair upon arrival  -       Row Name 06/10/25 1422          Sit-Stand Transfer    Sit-Stand [place] (Transfers) contact guard;minimum assist (75% patient effort)  -     Assistive Device (Sit-Stand Transfers) walker, front-wheeled  -       Row Name 06/10/25 1422          Stand-Sit Transfer    Stand-Sit [place] (Transfers) contact guard  recommend supervision for safe sit  -     Assistive Device (Stand-Sit Transfers) walker, front-wheeled  -       Row Name 06/10/25 1422          Gait/Stairs (Locomotion)    [place] Level (Gait) minimum assist (75% patient effort)  -     Assistive Device (Gait) walker, front-wheeled  -     Distance in Feet (Gait) 15  25  -     Pattern (Gait) step-to  -     Deviations/Abnormal Patterns (Gait) ataxic;base of support, narrow;gait speed decreased  -       Row Name 06/10/25 1422           Motor Skills    Therapeutic Exercise other (see comments)  Sittting: AP, LAQ, March, knees in/out. Supine: Inversion/eversion, SLR, hip abd, Heel slide  -HC       Row Name             Wound 05/29/25 0411 medial sacral spine Pressure Injury    Wound - Properties Group Placement Date: 05/29/25 -SM Placement Time: 0411 -SM Present on Original Admission: N  -SM Orientation: medial  -SM Location: sacral spine  -SM Primary Wound Type: Pressure Inj  -TW    Retired Wound - Properties Group Placement Date: 05/29/25  -SM Placement Time: 0411 -SM Present on Original Admission: N  -SM Orientation: medial  -SM Location: sacral spine  -SM    Retired Wound - Properties Group Placement Date: 05/29/25  -SM Placement Time: 0411 -SM Present on Original Admission: N  -SM Orientation: medial  -SM Location: sacral spine  -SM    Retired Wound - Properties Group Date first assessed: 05/29/25  -SM Time first assessed: 0411 -SM Present on Original Admission: N  -SM Location: sacral spine  -SM      Row Name             Wound 05/26/25 1948 Right gluteal Pressure Injury    Wound - Properties Group Placement Date: 05/26/25  -PL Placement Time: 1948  -PL Present on Original Admission: Y  -TW Side: Right  -TW Location: gluteal  -PL Primary Wound Type: Pressure Inj  -TW    Retired Wound - Properties Group Placement Date: 05/26/25  -PL Placement Time: 1948 -PL Present on Original Admission: Y  -TW Side: Right  -TW Location: gluteal  -PL    Retired Wound - Properties Group Placement Date: 05/26/25  -PL Placement Time: 1948  -PL Present on Original Admission: Y  -TW Side: Right  -TW Location: gluteal  -PL    Retired Wound - Properties Group Date first assessed: 05/26/25  -PL Time first assessed: 1948  -PL Present on Original Admission: Y  -TW Side: Right  -TW Location: gluteal  -PL      Row Name 06/10/25 1422          Positioning and Restraints    Pre-Treatment Position sitting in chair/recliner  -HC     Post Treatment Position bed  -HC     In  Bed fowlers;call light within reach;exit alarm on;encouraged to call for assist;side rails up x2  -HC               User Key  (r) = Recorded By, (t) = Taken By, (c) = Cosigned By      Initials Name Provider Type    Seble Ch, RN Registered Nurse    Steph Yin, RN Registered Nurse    HC Amanda Shah PTA Physical Therapist Assistant    Raquel Ledesma RN Registered Nurse                    Physical Therapy Education       Title: PT OT SLP Therapies (Done)       Topic: Physical Therapy (Done)       Point: Mobility training (Done)       Learning Progress Summary            Patient Acceptance, E,TB, VU,NR by  at 6/8/2025 0008    Acceptance, E,TB, VU by KM at 6/3/2025 1415                      Point: Home exercise program (Done)       Learning Progress Summary            Patient Acceptance, E,TB, VU,NR by RG at 6/8/2025 0008    Acceptance, E,TB, VU by KM at 6/3/2025 1415                      Point: Body mechanics (Done)       Learning Progress Summary            Patient Acceptance, E,TB, VU,NR by RG at 6/8/2025 0008    Acceptance, E,TB, VU by KM at 6/3/2025 1415                      Point: Precautions (Done)       Learning Progress Summary            Patient Acceptance, E,TB, VU,NR by  at 6/8/2025 0008    Acceptance, E,TB, VU by KM at 6/3/2025 1415                                      User Key       Initials Effective Dates Name Provider Type Discipline     05/24/22 -  Roni Mar, PT Physical Therapist PT     02/06/24 -  Jorge Alberto Savage, RN Registered Nurse Nurse                  PT Recommendation and Plan             Time Calculation:    PT Charges       Row Name 06/10/25 1429             Time Calculation    PT Received On 06/10/25  -HC         Time Calculation- PT    Total Timed Code Minutes- PT 53 minute(s)  -HC                User Key  (r) = Recorded By, (t) = Taken By, (c) = Cosigned By      Initials Name Provider Type    HC Amanda Shah PTA Physical Therapist  Assistant                  Therapy Charges for Today       Code Description Service Date Service Provider Modifiers Qty    12344475175 HC GAIT TRAINING EA 15 MIN 6/9/2025 Amanda Shah, PTA GP, CQ 1    10314221960 HC PT THER PROC EA 15 MIN 6/9/2025 Amanda Shah, PTA GP, CQ 1    41674776411 HC PT THERAPEUTIC ACT EA 15 MIN 6/9/2025 Amanda Shah, PTA GP, CQ 2    52142064823 HC GAIT TRAINING EA 15 MIN 6/10/2025 Amanda Shah, PTA GP, CQ 1    13335085715 HC PT THER PROC EA 15 MIN 6/10/2025 Amanda Shah, PTA GP, CQ 2    98010075610 HC PT THERAPEUTIC ACT EA 15 MIN 6/10/2025 Amanda Shah, PTA GP, CQ 1            PT G-Codes  Outcome Measure Options: AM-PAC 6 Clicks Daily Activity (OT)  AM-PAC 6 Clicks Score (PT): 18  AM-PAC 6 Clicks Score (OT): 20    Amanda Shah PTA  6/10/2025

## 2025-06-10 NOTE — CASE MANAGEMENT/SOCIAL WORK
Discharge Planning Assessment  FLORIN Mims     Patient Name: Emma Ryan  MRN: [id number]  Today's Date: 6/10/2025    Admit Date: 6/3/2025            Discharge Plan       Row Name 06/10/25 1008       Plan    Plan SS notified by Swing Bed RN, Patient has been approved for additional swing bed days with next clinical updates due 6/16/25. SS to follow.                    ALVARO Bocanegra

## 2025-06-11 PROCEDURE — 97116 GAIT TRAINING THERAPY: CPT

## 2025-06-11 PROCEDURE — 25010000002 HEPARIN (PORCINE) PER 1000 UNITS: Performed by: FAMILY MEDICINE

## 2025-06-11 PROCEDURE — 97530 THERAPEUTIC ACTIVITIES: CPT

## 2025-06-11 PROCEDURE — 97110 THERAPEUTIC EXERCISES: CPT

## 2025-06-11 RX ADMIN — Medication 1 APPLICATION: at 08:08

## 2025-06-11 RX ADMIN — ATORVASTATIN CALCIUM 20 MG: 20 TABLET, FILM COATED ORAL at 08:07

## 2025-06-11 RX ADMIN — HEPARIN SODIUM 5000 UNITS: 5000 INJECTION INTRAVENOUS; SUBCUTANEOUS at 08:08

## 2025-06-11 RX ADMIN — OXYBUTYNIN CHLORIDE 10 MG: 5 TABLET, EXTENDED RELEASE ORAL at 08:07

## 2025-06-11 RX ADMIN — ACETAMINOPHEN 650 MG: 325 TABLET ORAL at 06:02

## 2025-06-11 RX ADMIN — Medication 5 MG: at 21:24

## 2025-06-11 RX ADMIN — Medication 1 APPLICATION: at 21:24

## 2025-06-11 RX ADMIN — Medication 1000 UNITS: at 08:07

## 2025-06-11 RX ADMIN — Medication 1 CAPSULE: at 08:07

## 2025-06-11 RX ADMIN — ACETAMINOPHEN 650 MG: 325 TABLET ORAL at 21:24

## 2025-06-11 RX ADMIN — PANTOPRAZOLE SODIUM 40 MG: 40 TABLET, DELAYED RELEASE ORAL at 05:59

## 2025-06-11 RX ADMIN — DOCUSATE SODIUM 100 MG: 100 CAPSULE, LIQUID FILLED ORAL at 08:07

## 2025-06-11 RX ADMIN — HEPARIN SODIUM 5000 UNITS: 5000 INJECTION INTRAVENOUS; SUBCUTANEOUS at 21:24

## 2025-06-11 NOTE — PROGRESS NOTES
Ms. Ryan is our 85 yo F with hx of ypertension, CKD stage IIIa, HLD, hemorrhoids, osteoarthritis, GERD, recent history of multiple UTIs who presented with weakness. Patient tells me it was more acute weakness and numbness of LLE. She notes she could not even move her toes. Patient found to have hypokalemia, hypomagnesemia with replacement. Of note she briefly became hyperkalemic after replacement. She was transferred for swing bed for continued PT/OT. MRI lumbar spine with multilevel DDD. Symptoms are not acute and MRI findings also look like more subacute/chronic degenerative changes. Will need outpatient neurosurgery evaluation. Labs/vitals/notes reviewed. Renal function has improved to baseline. Patient continues to work with PT/OT. Patient approved until 6/16 with clinical updates due at that time. Patient would like to get strong enough to return home to help care for her

## 2025-06-11 NOTE — THERAPY TREATMENT NOTE
Acute Care - Physical Therapy Treatment Note   Prasanna     Patient Name: Emma Ryan  : 1940  MRN: [id number]  Today's Date: 2025      Visit Dx:   No diagnosis found.  Patient Active Problem List   Diagnosis    Complete tear of right rotator cuff    Stage 3a chronic kidney disease    Cough    Gastroesophageal reflux disease    Mixed hyperlipidemia    Shoulder pain    Hypertension    Acromioclavicular joint arthritis    Impingement syndrome, shoulder    Complete tear of left rotator cuff    Urge incontinence    Atopic rhinitis    Upper respiratory tract infection    Bilateral lower extremity edema    Hypercalcemia    Right knee pain    DADA (stress urinary incontinence, female)    Obesity (BMI 35.0-39.9 without comorbidity)    Left knee injury    Acute UTI    COVID-19 virus infection    Skin lesion of scalp    Numbness in left leg    Multiple bruises    Vitamin D deficiency    Laceration of right lower leg    Chronic chest pain with low to moderate risk for CAD    Degenerative disc disease, lumbar    Valvular heart disease, mild aortic and mitral regurgitation    Diastolic dysfunction without heart failure    Hypomagnesemia    Physical deconditioning     Past Medical History:   Diagnosis Date    GERD (gastroesophageal reflux disease)     Hyperlipidemia     Hypertension     Left shoulder pain     Obesity     Osteoarthritis of knees, bilateral     Overactive bladder     Right shoulder pain      Past Surgical History:   Procedure Laterality Date    APPENDECTOMY      BUNIONECTOMY Right     CARDIOVASCULAR STRESS TEST  10/2012    CATARACT EXTRACTION      CHOLECYSTECTOMY      COLONOSCOPY      ECHO - CONVERTED  10/2012    JOINT REPLACEMENT      bilateral knees     KNEE ARTHROPLASTY Left     KNEE ARTHROSCOPY      SHOULDER ARTHROSCOPY Left 2016    Procedure: LEFT SHOULDER ACROMIOPLASTY,MINI OPEN ROTATOR CUFF REPAIR;  Surgeon: Carlos Eduardo Smith MD;  Location: [facility];  Service:      SHOULDER SURGERY  05/31/2016    OPEN ACROMICPLASTY RIGHT SHOULDER     PT Assessment (Last 12 Hours)       PT Evaluation and Treatment       Row Name 06/11/25 1318          Physical Therapy Time and Intention    Subjective Information no complaints  -     Document Type therapy note (daily note)  -     Mode of Treatment physical therapy  -     Patient Effort good  -     Comment Pt and RN in agreement for PT. Pt walked 30' x 2 with RW min A. Sitting exercises to tolerance up in chair. Standing exercises with min A.  -       Row Name 06/11/25 1318          Pain    Pretreatment Pain Rating 0/10 - no pain  -     Posttreatment Pain Rating 0/10 - no pain  -       Row Name 06/11/25 1318          Cognition    Personal Safety Interventions fall prevention program maintained;gait belt;nonskid shoes/slippers when out of bed;supervised activity  -       Row Name 06/11/25 1318          Bed Mobility    Bed Mobility supine-sit;sit-supine;scooting/bridging  -     Comment, (Bed Mobility) Up on BSC  -       Row Name 06/11/25 1318          Sit-Stand Transfer    Sit-Stand [place] (Transfers) contact guard;minimum assist (75% patient effort)  -     Assistive Device (Sit-Stand Transfers) walker, front-wheeled  -       Row Name 06/11/25 1318          Stand-Sit Transfer    Stand-Sit [place] (Transfers) contact guard  recommend supervision for safe sit  -     Assistive Device (Stand-Sit Transfers) walker, front-wheeled  -       Row Name 06/11/25 1318          Gait/Stairs (Locomotion)    [place] Level (Gait) minimum assist (75% patient effort)  -     Assistive Device (Gait) walker, front-wheeled  -     Distance in Feet (Gait) 30  -     Pattern (Gait) step-to  -     Deviations/Abnormal Patterns (Gait) ataxic;base of support, narrow;gait speed decreased  -       Row Name 06/11/25 1318          Motor Skills    Therapeutic Exercise other (see comments)  Sitting: AP, LAQ, March, Knees in/out.  standing: march, mini squats.  -HC       Row Name             Wound 05/29/25 0411 medial sacral spine Pressure Injury    Wound - Properties Group Placement Date: 05/29/25  -SM Placement Time: 0411 -SM Present on Original Admission: N  -SM Orientation: medial  -SM Location: sacral spine  -SM Primary Wound Type: Pressure Inj  -TW    Retired Wound - Properties Group Placement Date: 05/29/25  -SM Placement Time: 0411 -SM Present on Original Admission: N  -SM Orientation: medial  -SM Location: sacral spine  -SM    Retired Wound - Properties Group Placement Date: 05/29/25  -SM Placement Time: 0411 -SM Present on Original Admission: N  -SM Orientation: medial  -SM Location: sacral spine  -SM    Retired Wound - Properties Group Date first assessed: 05/29/25  -SM Time first assessed: 0411 -SM Present on Original Admission: N  -SM Location: sacral spine  -SM      Row Name             Wound 05/26/25 1948 Right gluteal Pressure Injury    Wound - Properties Group Placement Date: 05/26/25  -PL Placement Time: 1948  -PL Present on Original Admission: Y  -TW Side: Right  -TW Location: gluteal  -PL Primary Wound Type: Pressure Inj  -TW    Retired Wound - Properties Group Placement Date: 05/26/25  -PL Placement Time: 1948  -PL Present on Original Admission: Y  -TW Side: Right  -TW Location: gluteal  -PL    Retired Wound - Properties Group Placement Date: 05/26/25  -PL Placement Time: 1948  -PL Present on Original Admission: Y  -TW Side: Right  -TW Location: gluteal  -PL    Retired Wound - Properties Group Date first assessed: 05/26/25  -PL Time first assessed: 1948  -PL Present on Original Admission: Y  -TW Side: Right  -TW Location: gluteal  -PL      Row Name 06/11/25 1318          Positioning and Restraints    Pre-Treatment Position bedside commode  -HC     Post Treatment Position chair  -HC     In Chair sitting;call light within reach;encouraged to call for assist;notified nsg  -HC               User Key  (r) = Recorded By,  (t) = Taken By, (c) = Cosigned By      Initials Name Provider Type    TW Seble Sena, RN Registered Nurse     Steph Valentin, RN Registered Nurse     Amanda Shah PTA Physical Therapist Assistant    PL Raquel Nava, RN Registered Nurse                    Physical Therapy Education       Title: PT OT SLP Therapies (Done)       Topic: Physical Therapy (Done)       Point: Mobility training (Done)       Learning Progress Summary            Patient Acceptance, E,TB, VU,NR by  at 6/8/2025 0008    Acceptance, E,TB, VU by  at 6/3/2025 1415                      Point: Home exercise program (Done)       Learning Progress Summary            Patient Acceptance, E,TB, VU,NR by  at 6/8/2025 0008    Acceptance, E,TB, VU by  at 6/3/2025 1415                      Point: Body mechanics (Done)       Learning Progress Summary            Patient Acceptance, E,TB, VU,NR by  at 6/8/2025 0008    Acceptance, E,TB, VU by  at 6/3/2025 1415                      Point: Precautions (Done)       Learning Progress Summary            Patient Acceptance, E,TB, VU,NR by  at 6/8/2025 0008    Acceptance, E,TB, VU by  at 6/3/2025 1415                                      User Key       Initials Effective Dates Name Provider Type Discipline     05/24/22 -  Roni Mar, PT Physical Therapist PT     02/06/24 -  Jorge Alberto Savage, RN Registered Nurse Nurse                  PT Recommendation and Plan             Time Calculation:    PT Charges       Row Name 06/11/25 1324             Time Calculation    PT Received On 06/11/25  -HC         Time Calculation- PT    Total Timed Code Minutes- PT 54 minute(s)  -HC                User Key  (r) = Recorded By, (t) = Taken By, (c) = Cosigned By      Initials Name Provider Type     Amanda Shah, NILE Physical Therapist Assistant                  Therapy Charges for Today       Code Description Service Date Service Provider Modifiers Qty    38668812934 HC GAIT  TRAINING EA 15 MIN 6/10/2025 Amanda Shah, PTA GP, CQ 1    54444333320 HC PT THER PROC EA 15 MIN 6/10/2025 Amanda Shah, PTA GP, CQ 2    46765857095 HC PT THERAPEUTIC ACT EA 15 MIN 6/10/2025 Amanda Shah, PTA GP, CQ 1    92058891636 HC GAIT TRAINING EA 15 MIN 6/11/2025 Amanda Shah, PTA GP, CQ 1    94697933742 HC PT THER PROC EA 15 MIN 6/11/2025 Amanda Shah, PTA GP, CQ 2    51793690061 HC PT THERAPEUTIC ACT EA 15 MIN 6/11/2025 Amanda Shah, PTA GP, CQ 1            PT G-Codes  Outcome Measure Options: AM-PAC 6 Clicks Daily Activity (OT)  AM-PAC 6 Clicks Score (PT): 18  AM-PAC 6 Clicks Score (OT): 20    Amanda Shah PTA  6/11/2025

## 2025-06-11 NOTE — THERAPY TREATMENT NOTE
Patient Name: Emma Ryan  : 1940    MRN: [id number]                              Today's Date: 2025       Admit Date: 6/3/2025    Visit Dx: No diagnosis found.  Patient Active Problem List   Diagnosis    Complete tear of right rotator cuff    Stage 3a chronic kidney disease    Cough    Gastroesophageal reflux disease    Mixed hyperlipidemia    Shoulder pain    Hypertension    Acromioclavicular joint arthritis    Impingement syndrome, shoulder    Complete tear of left rotator cuff    Urge incontinence    Atopic rhinitis    Upper respiratory tract infection    Bilateral lower extremity edema    Hypercalcemia    Right knee pain    DADA (stress urinary incontinence, female)    Obesity (BMI 35.0-39.9 without comorbidity)    Left knee injury    Acute UTI    COVID-19 virus infection    Skin lesion of scalp    Numbness in left leg    Multiple bruises    Vitamin D deficiency    Laceration of right lower leg    Chronic chest pain with low to moderate risk for CAD    Degenerative disc disease, lumbar    Valvular heart disease, mild aortic and mitral regurgitation    Diastolic dysfunction without heart failure    Hypomagnesemia    Physical deconditioning     Past Medical History:   Diagnosis Date    GERD (gastroesophageal reflux disease)     Hyperlipidemia     Hypertension     Left shoulder pain     Obesity     Osteoarthritis of knees, bilateral     Overactive bladder     Right shoulder pain      Past Surgical History:   Procedure Laterality Date    APPENDECTOMY      BUNIONECTOMY Right     CARDIOVASCULAR STRESS TEST  10/2012    CATARACT EXTRACTION  2017    CHOLECYSTECTOMY      COLONOSCOPY      ECHO - CONVERTED  10/2012    JOINT REPLACEMENT      bilateral knees     KNEE ARTHROPLASTY Left     KNEE ARTHROSCOPY      SHOULDER ARTHROSCOPY Left 2016    Procedure: LEFT SHOULDER ACROMIOPLASTY,MINI OPEN ROTATOR CUFF REPAIR;  Surgeon: Carlos Eduardo Smith MD;  Location: [facility];  Service:     SHOULDER  SURGERY  05/31/2016    OPEN ACROMICPLASTY RIGHT SHOULDER      General Information       Row Name 06/11/25 1406          OT Time and Intention    Subjective Information no complaints  -     Document Type therapy note (daily note)  -     Mode of Treatment individual therapy;occupational therapy  -     Patient Effort good  -     Symptoms Noted During/After Treatment none  -     Comment Patient agreeable to OT treatment.  -       Row Name 06/11/25 1406          General Information    Patient Profile Reviewed yes  -     Existing Precautions/Restrictions fall  -     Barriers to Rehab medically complex  -       Row Name 06/11/25 1406          Cognition    Orientation Status (Cognition) oriented x 3  -       Row Name 06/11/25 1406          Safety Issues/Impairments Affecting Functional Mobility    Impairments Affecting Function (Mobility) balance;endurance/activity tolerance;strength  -               User Key  (r) = Recorded By, (t) = Taken By, (c) = Cosigned By      Initials Name Provider Type     Carline Camacho, OT Occupational Therapist                     Mobility/ADL's       Row Name 06/11/25 1407          Bed Mobility    Bed Mobility supine-sit  -     Supine-Sit [place] (Bed Mobility) standby assist  -     Assistive Device (Bed Mobility) bed rails;head of bed elevated  -[facility] Name 06/11/25 1407          Transfers    Transfers sit-stand transfer;stand-sit transfer;toilet transfer  -[facility] Name 06/11/25 1407          Sit-Stand Transfer    Sit-Stand [place] (Transfers) contact guard  -     Assistive Device (Sit-Stand Transfers) walker, front-wheeled  -[facility] Name 06/11/25 1407          Stand-Sit Transfer    Stand-Sit [place] (Transfers) contact guard  -     Assistive Device (Stand-Sit Transfers) walker, front-wheeled  -[facility] Name 06/11/25 1407          Toilet Transfer    Type (Toilet Transfer) stand pivot/stand step  -     [place] Level  (Toilet Transfer) contact guard;minimum assist (75% patient effort)  -     Assistive Device (Toilet Transfer) commode, 3-in-1  -KP               User Key  (r) = Recorded By, (t) = Taken By, (c) = Cosigned By      Initials Name Provider Type    Carline Blackwell OT Occupational Therapist                   Obj/Interventions       Row Name 06/11/25 1408          Motor Skills    Motor Skills functional endurance  -KP     Functional Endurance fair plus  -KP               User Key  (r) = Recorded By, (t) = Taken By, (c) = Cosigned By      Initials Name Provider Type    Carline Blackwell OT Occupational Therapist                   Goals/Plan    No documentation.                  Clinical Impression       Row Name 06/11/25 1408          Pain Assessment    Pretreatment Pain Rating 0/10 - no pain  -KP     Posttreatment Pain Rating 0/10 - no pain  -KP       Row Name 06/11/25 1408          Plan of Care Review    Plan of Care Reviewed With patient  -     Progress improving  -     Outcome Evaluation Patient seen for OT treatment. She was assisted to [facility] from bed. Patient with improved functional mobility/transfers at [facility]/minimal assist with rolling walker. She had no complaints. Continue plan of care.  -KP       Row Name 06/11/25 1408          Positioning and Restraints    Pre-Treatment Position in bed  -KP     Post Treatment Position bs  -KP     On BS commode notified nsg;sitting;call light within reach;encouraged to call for assist  -KP               User Key  (r) = Recorded By, (t) = Taken By, (c) = Cosigned By      Initials Name Provider Type    Carline Blackwell OT Occupational Therapist                   Outcome Measures       Row Name 06/11/25 1409          How much help from another is currently needed...    Putting on and taking off regular lower body clothing? 3  -KP     Bathing (including washing, rinsing, and drying) 3  -KP     Toileting (which includes using toilet bed pan or urinal) 3  -KP     Putting on  and taking off regular upper body clothing 4  -KP     Taking care of personal grooming (such as brushing teeth) 4  -KP     Eating meals 4  -     AM-PAC 6 Clicks Score (OT) 21  -KP       Row Name 06/11/25 0807          How much help from another person do you currently need...    Turning from your back to your side while in flat bed without using bedrails? 4  -SR     Moving from lying on back to sitting on the side of a flat bed without bedrails? 3  -SR     Moving to and from a bed to a chair (including a wheelchair)? 3  -SR     Standing up from a chair using your arms (e.g., wheelchair, bedside chair)? 3  -SR     Climbing 3-5 steps with a railing? 2  -SR     To walk in hospital room? 3  -SR     AM-PAC 6 Clicks Score (PT) 18  -SR       Row Name 06/11/25 1409          Functional Assessment    Outcome Measure Options AM-PAC 6 Clicks Daily Activity (OT)  -               User Key  (r) = Recorded By, (t) = Taken By, (c) = Cosigned By      Initials Name Provider Type    Carline Blackwell, OT Occupational Therapist    Geri Bennett, RN Registered Nurse                      OT Recommendation and Plan  Planned Therapy Interventions (OT): activity tolerance training, BADL retraining, functional balance retraining, patient/caregiver education/training, occupation/activity based interventions, transfer/mobility retraining, strengthening exercise, ROM/therapeutic exercise  Therapy Frequency (OT): 5 times/wk  Plan of Care Review  Plan of Care Reviewed With: patient  Progress: improving  Outcome Evaluation: Patient seen for OT treatment. She was assisted to [facility] from bed. Patient with improved functional mobility/transfers at [facility]/minimal assist with rolling walker. She had no complaints. Continue plan of care.     Time Calculation:         Time Calculation- OT       Row Name 06/11/25 1409             Time Calculation- OT    Total Timed Code Minutes- OT 15 minute(s)  -      OT Received On 06/11/25  -                User  Key  (r) = Recorded By, (t) = Taken By, (c) = Cosigned By      Initials Name Provider Type     Carline Camacho OT Occupational Therapist                  Therapy Charges for Today       Code Description Service Date Service Provider Modifiers Qty    10955888469 HC OT THERAPEUTIC ACT EA 15 MIN 6/11/2025 Carline Camacho OT GO 1                 Carline Camacho OT  6/11/2025

## 2025-06-11 NOTE — CASE MANAGEMENT/SOCIAL WORK
Discharge Planning Assessment   Prasanna     Patient Name: Emma Ryan  MRN: [id number]  Today's Date: 6/11/2025    Admit Date: 6/3/2025              Discharge Plan       Row Name 06/11/25 1711       Plan    Plan Pt was admitted to swing bed on 6/3 for therapy services, swing bed days are approved clinical updates due on 6/16. Pt lives with spouse, Bob ([address]. [place], [place]) and she plans to return home at discharge.Pt has a rollator and cane via unknown provider. Pt does not utilize home health services. SS to follow.                    ALVARO Bocanegra

## 2025-06-11 NOTE — PLAN OF CARE
Goal Outcome Evaluation:  Plan of Care Reviewed With: patient        Progress: improving  Outcome Evaluation: VSS on RA. Pt is A&Ox4. Pt ambulated in room and hallway this shift. Pt worked with physical therapy. Wound care completed per orders. Pt voices no complaints or concerns at this time. Will continue POC.

## 2025-06-12 PROCEDURE — 97116 GAIT TRAINING THERAPY: CPT

## 2025-06-12 PROCEDURE — 97110 THERAPEUTIC EXERCISES: CPT

## 2025-06-12 PROCEDURE — 25010000002 HEPARIN (PORCINE) PER 1000 UNITS: Performed by: FAMILY MEDICINE

## 2025-06-12 PROCEDURE — 97530 THERAPEUTIC ACTIVITIES: CPT

## 2025-06-12 RX ADMIN — DOCUSATE SODIUM 100 MG: 100 CAPSULE, LIQUID FILLED ORAL at 08:44

## 2025-06-12 RX ADMIN — Medication 1000 UNITS: at 08:43

## 2025-06-12 RX ADMIN — HEPARIN SODIUM 5000 UNITS: 5000 INJECTION INTRAVENOUS; SUBCUTANEOUS at 20:43

## 2025-06-12 RX ADMIN — Medication 1 CAPSULE: at 08:46

## 2025-06-12 RX ADMIN — Medication 1 APPLICATION: at 20:43

## 2025-06-12 RX ADMIN — HEPARIN SODIUM 5000 UNITS: 5000 INJECTION INTRAVENOUS; SUBCUTANEOUS at 08:43

## 2025-06-12 RX ADMIN — ATORVASTATIN CALCIUM 20 MG: 20 TABLET, FILM COATED ORAL at 08:43

## 2025-06-12 RX ADMIN — ACETAMINOPHEN 650 MG: 325 TABLET ORAL at 20:43

## 2025-06-12 RX ADMIN — PANTOPRAZOLE SODIUM 40 MG: 40 TABLET, DELAYED RELEASE ORAL at 05:02

## 2025-06-12 RX ADMIN — Medication 1 APPLICATION: at 08:47

## 2025-06-12 RX ADMIN — OXYBUTYNIN CHLORIDE 10 MG: 5 TABLET, EXTENDED RELEASE ORAL at 08:43

## 2025-06-12 RX ADMIN — Medication 5 MG: at 20:43

## 2025-06-12 NOTE — THERAPY TREATMENT NOTE
Acute Care - Physical Therapy Treatment Note   Prasanna     Patient Name: Emma Ryan  : 1940  MRN: [id number]  Today's Date: 2025      Visit Dx:   No diagnosis found.  Patient Active Problem List   Diagnosis    Complete tear of right rotator cuff    Stage 3a chronic kidney disease    Cough    Gastroesophageal reflux disease    Mixed hyperlipidemia    Shoulder pain    Hypertension    Acromioclavicular joint arthritis    Impingement syndrome, shoulder    Complete tear of left rotator cuff    Urge incontinence    Atopic rhinitis    Upper respiratory tract infection    Bilateral lower extremity edema    Hypercalcemia    Right knee pain    DADA (stress urinary incontinence, female)    Obesity (BMI 35.0-39.9 without comorbidity)    Left knee injury    Acute UTI    COVID-19 virus infection    Skin lesion of scalp    Numbness in left leg    Multiple bruises    Vitamin D deficiency    Laceration of right lower leg    Chronic chest pain with low to moderate risk for CAD    Degenerative disc disease, lumbar    Valvular heart disease, mild aortic and mitral regurgitation    Diastolic dysfunction without heart failure    Hypomagnesemia    Physical deconditioning     Past Medical History:   Diagnosis Date    GERD (gastroesophageal reflux disease)     Hyperlipidemia     Hypertension     Left shoulder pain     Obesity     Osteoarthritis of knees, bilateral     Overactive bladder     Right shoulder pain      Past Surgical History:   Procedure Laterality Date    APPENDECTOMY      BUNIONECTOMY Right     CARDIOVASCULAR STRESS TEST  10/2012    CATARACT EXTRACTION      CHOLECYSTECTOMY      COLONOSCOPY      ECHO - CONVERTED  10/2012    JOINT REPLACEMENT      bilateral knees     KNEE ARTHROPLASTY Left     KNEE ARTHROSCOPY      SHOULDER ARTHROSCOPY Left 2016    Procedure: LEFT SHOULDER ACROMIOPLASTY,MINI OPEN ROTATOR CUFF REPAIR;  Surgeon: Carlos Eduardo Smith MD;  Location: [facility];  Service:      SHOULDER SURGERY  05/31/2016    OPEN ACROMICPLASTY RIGHT SHOULDER     PT Assessment (Last 12 Hours)       PT Evaluation and Treatment       Row Name 06/12/25 1025          Physical Therapy Time and Intention    Subjective Information no complaints  -LL     Document Type therapy note (daily note)  -LL     Mode of Treatment physical therapy;individual therapy  -LL     Patient Effort good  -LL     Comment Patient & RN in agreement for participation this date.  -LL       Row Name 06/12/25 1025          Pain    Pretreatment Pain Rating 0/10 - no pain  -LL     Posttreatment Pain Rating 0/10 - no pain  -LL       Row Name 06/12/25 1025          Cognition    Affect/Mental Status (Cognition) WFL  -LL     Orientation Status (Cognition) oriented x 3  -LL     Follows Commands (Cognition) WFL  -LL     Personal Safety Interventions fall prevention program maintained;gait belt;nonskid shoes/slippers when out of bed;supervised activity  -LL       Row Name 06/12/25 1025          Bed Mobility    Bed Mobility supine-sit;sit-supine;scooting/bridging  -LL     Supine-Sit [place] (Bed Mobility) standby assist  -LL     Assistive Device (Bed Mobility) bed rails;head of bed elevated  -LL       Row Name 06/12/25 1025          Transfers    Transfers toilet transfer  -LL       Row Name 06/12/25 1025          Sit-Stand Transfer    Sit-Stand [place] (Transfers) contact guard  -LL     Assistive Device (Sit-Stand Transfers) walker, front-wheeled  -LL       Row Name 06/12/25 1025          Stand-Sit Transfer    Stand-Sit [place] (Transfers) contact guard  recommend supervision for safe sit  -LL     Assistive Device (Stand-Sit Transfers) walker, front-wheeled  -LL       Row Name 06/12/25 1025          Toilet Transfer    Type (Toilet Transfer) sit-stand;stand-sit  -LL     [place] Level (Toilet Transfer) contact guard  -LL     Assistive Device (Toilet Transfer) commode, bedside with drop arms  -       Row Name 06/12/25 1025           Gait/Stairs (Locomotion)    [place] Level (Gait) minimum assist (75% patient effort);contact guard;verbal cues;nonverbal cues (demo/gesture)  -LL     Assistive Device (Gait) walker, front-wheeled  -LL     Distance in Feet (Gait) --  30' x 2  -LL     Pattern (Gait) step-to  -LL     Deviations/Abnormal Patterns (Gait) ataxic;base of support, narrow;gait speed decreased  -LL     Right Sided Gait Deviations foot drop/toe drag;knee buckling, right side  -LL       Row Name 06/12/25 1025          Motor Skills    Comments, Therapeutic Exercise --  Seated LAQ, marching, hip abd/add, L AP 20-25 reps each  -LL       Row Name             Wound 05/29/25 0411 medial sacral spine Pressure Injury    Wound - Properties Group Placement Date: 05/29/25  -SM Placement Time: 0411  -SM Present on Original Admission: N  -SM Orientation: medial  -SM Location: sacral spine  -SM Primary Wound Type: Pressure Inj  -TW    Retired Wound - Properties Group Placement Date: 05/29/25  -SM Placement Time: 0411  -SM Present on Original Admission: N  -SM Orientation: medial  -SM Location: sacral spine  -SM    Retired Wound - Properties Group Placement Date: 05/29/25  -SM Placement Time: 0411  -SM Present on Original Admission: N  -SM Orientation: medial  -SM Location: sacral spine  -SM    Retired Wound - Properties Group Date first assessed: 05/29/25  -SM Time first assessed: 0411  -SM Present on Original Admission: N  -SM Location: sacral spine  -SM      Row Name             Wound 05/26/25 1948 Right gluteal Pressure Injury    Wound - Properties Group Placement Date: 05/26/25  -PL Placement Time: 1948  -PL Present on Original Admission: Y  -TW Side: Right  -TW Location: gluteal  -PL Primary Wound Type: Pressure Inj  -TW    Retired Wound - Properties Group Placement Date: 05/26/25  -PL Placement Time: 1948  -PL Present on Original Admission: Y  -TW Side: Right  -TW Location: gluteal  -PL    Retired Wound - Properties Group Placement Date: 05/26/25   -PL Placement Time: 1948  -PL Present on Original Admission: Y  -TW Side: Right  -TW Location: gluteal  -PL    Retired Wound - Properties Group Date first assessed: 05/26/25  -PL Time first assessed: 1948  -PL Present on Original Admission: Y  -TW Side: Right  -TW Location: gluteal  -PL      Row Name 06/12/25 1025          Positioning and Restraints    Pre-Treatment Position in bed  -LL     Post Treatment Position chair  -LL     In Chair sitting;call light within reach;encouraged to call for assist;notified nsg  With tray table in front of her  -LL               User Key  (r) = Recorded By, (t) = Taken By, (c) = Cosigned By      Initials Name Provider Type    Seble Ch, RN Registered Nurse    Rubina Goel PTA Physical Therapist Assistant    Steph Yin, RN Registered Nurse    Raquel Ledesma RN Registered Nurse                    Physical Therapy Education       Title: PT OT SLP Therapies (Done)       Topic: Physical Therapy (Done)       Point: Mobility training (Done)       Learning Progress Summary            Patient Acceptance, E,TB, VU by RG at 6/12/2025 0316    Acceptance, E,TB, VU,NR by RG at 6/8/2025 0008    Acceptance, E,TB, VU by KM at 6/3/2025 1415                      Point: Home exercise program (Done)       Learning Progress Summary            Patient Acceptance, E,TB, VU by RG at 6/12/2025 0316    Acceptance, E,TB, VU,NR by RG at 6/8/2025 0008    Acceptance, E,TB, VU by KM at 6/3/2025 1415                      Point: Body mechanics (Done)       Learning Progress Summary            Patient Acceptance, E,TB, VU by RG at 6/12/2025 0316    Acceptance, E,TB, VU,NR by RG at 6/8/2025 0008    Acceptance, E,TB, VU by KM at 6/3/2025 1415                      Point: Precautions (Done)       Learning Progress Summary            Patient Acceptance, E,TB, VU by RG at 6/12/2025 0316    Acceptance, E,TB, VU,NR by RG at 6/8/2025 0008    Acceptance, E,TB, VU by KM at 6/3/2025 1415                                       User Key       Initials Effective Dates Name Provider Type Discipline    KM 05/24/22 -  Roni Mar, PT Physical Therapist PT    RG 02/06/24 -  Jorge Alberto Savage, RN Registered Nurse Nurse                  PT Recommendation and Plan             Time Calculation:    PT Charges       Row Name 06/12/25 1029             Time Calculation    PT Received On 06/12/25  -LL         Time Calculation- PT    Total Timed Code Minutes- PT 55 minute(s)  -LL                User Key  (r) = Recorded By, (t) = Taken By, (c) = Cosigned By      Initials Name Provider Type    LL Rubina Louis PTA Physical Therapist Assistant                  Therapy Charges for Today       Code Description Service Date Service Provider Modifiers Qty    18957576161 HC PT THERAPEUTIC ACT EA 15 MIN 6/12/2025 Rubina Louis PTA GP, CQ 2    75376264867 HC PT THER PROC EA 15 MIN 6/12/2025 Rubina Louis PTA GP, CQ 1    53525478987 HC GAIT TRAINING EA 15 MIN 6/12/2025 Rubina Louis PTA GP, CQ 1            PT G-Codes  Outcome Measure Options: AM-PAC 6 Clicks Daily Activity (OT)  AM-PAC 6 Clicks Score (PT): 18  AM-PAC 6 Clicks Score (OT): 21    Viviana Louis PTA  6/12/2025

## 2025-06-12 NOTE — PLAN OF CARE
Goal Outcome Evaluation:  Plan of Care Reviewed With: patient        Progress: no change  Outcome Evaluation: VSS on RA. Pt A&Ox4. Patient ambulated in hallway with physical therapy. Wound care completed per orders. Patient voices no complaints or concerns at this time. Will continue POC.

## 2025-06-12 NOTE — PLAN OF CARE
Goal Outcome Evaluation:  Plan of Care Reviewed With: patient        Progress: no change  Outcome Evaluation: Pt resting in bed during assessment, VSS. Pt has no complaints or concerns at this time. Will cont POC.

## 2025-06-12 NOTE — PROGRESS NOTES
Ms. Ryan is our 83 yo F with hx of ypertension, CKD stage IIIa, HLD, hemorrhoids, osteoarthritis, GERD, recent history of multiple UTIs who presented with weakness. Patient tells me it was more acute weakness and numbness of LLE. She notes she could not even move her toes. Patient found to have hypokalemia, hypomagnesemia with replacement. Of note she briefly became hyperkalemic after replacement. She was transferred for swing bed for continued PT/OT. MRI lumbar spine with multilevel DDD. Symptoms are not acute and MRI findings also look like more subacute/chronic degenerative changes. Will need outpatient neurosurgery evaluation. Labs/vitals/notes reviewed. Renal function has improved to baseline. Patient continues to work with PT/OT. Patient approved until 6/16 with clinical updates due at that time. Patient would like to get strong enough to return home to help care for her . Patient denied any new concerns today, notes walking some in hallway assisted with PT.

## 2025-06-13 PROCEDURE — 97530 THERAPEUTIC ACTIVITIES: CPT

## 2025-06-13 PROCEDURE — 97110 THERAPEUTIC EXERCISES: CPT

## 2025-06-13 PROCEDURE — 25010000002 HEPARIN (PORCINE) PER 1000 UNITS: Performed by: FAMILY MEDICINE

## 2025-06-13 PROCEDURE — 97116 GAIT TRAINING THERAPY: CPT

## 2025-06-13 RX ADMIN — ATORVASTATIN CALCIUM 20 MG: 20 TABLET, FILM COATED ORAL at 08:22

## 2025-06-13 RX ADMIN — METOPROLOL SUCCINATE 12.5 MG: 25 TABLET, EXTENDED RELEASE ORAL at 08:22

## 2025-06-13 RX ADMIN — PANTOPRAZOLE SODIUM 40 MG: 40 TABLET, DELAYED RELEASE ORAL at 04:57

## 2025-06-13 RX ADMIN — DOCUSATE SODIUM 100 MG: 100 CAPSULE, LIQUID FILLED ORAL at 08:23

## 2025-06-13 RX ADMIN — Medication 1 APPLICATION: at 08:23

## 2025-06-13 RX ADMIN — Medication 1 APPLICATION: at 20:43

## 2025-06-13 RX ADMIN — HEPARIN SODIUM 5000 UNITS: 5000 INJECTION INTRAVENOUS; SUBCUTANEOUS at 20:42

## 2025-06-13 RX ADMIN — Medication 5 MG: at 20:42

## 2025-06-13 RX ADMIN — OXYBUTYNIN CHLORIDE 10 MG: 5 TABLET, EXTENDED RELEASE ORAL at 08:23

## 2025-06-13 RX ADMIN — Medication 1000 UNITS: at 08:22

## 2025-06-13 RX ADMIN — HEPARIN SODIUM 5000 UNITS: 5000 INJECTION INTRAVENOUS; SUBCUTANEOUS at 08:23

## 2025-06-13 RX ADMIN — Medication 1 CAPSULE: at 08:23

## 2025-06-13 RX ADMIN — ACETAMINOPHEN 650 MG: 325 TABLET ORAL at 20:42

## 2025-06-13 RX ADMIN — CALCIUM CARBONATE 2 TABLET: 500 TABLET, CHEWABLE ORAL at 18:17

## 2025-06-13 NOTE — PROGRESS NOTES
Ms. Ryan is our 83 yo F with hx of ypertension, CKD stage IIIa, HLD, hemorrhoids, osteoarthritis, GERD, recent history of multiple UTIs who presented with weakness. Patient tells me it was more acute weakness and numbness of LLE. She notes she could not even move her toes. Patient found to have hypokalemia, hypomagnesemia with replacement. Of note she briefly became hyperkalemic after replacement. She was transferred for swing bed for continued PT/OT. MRI lumbar spine with multilevel DDD. Symptoms are not acute and MRI findings also look like more subacute/chronic degenerative changes. Will need outpatient neurosurgery evaluation. Labs/vitals/notes reviewed. Renal function has improved to baseline. Patient continues to work with PT/OT. Patient approved until 6/16 with clinical updates due at that time. Patient would like to get strong enough to return home to help care for her . Will repeat labs in AM.

## 2025-06-13 NOTE — THERAPY TREATMENT NOTE
Acute Care - Occupational Therapy Treatment Note  FLORIN Mims     Patient Name: Emma Ryan  : 1940  MRN: [id number]  Today's Date: 2025             Admit Date: 6/3/2025     No diagnosis found.  Patient Active Problem List   Diagnosis    Complete tear of right rotator cuff    Stage 3a chronic kidney disease    Cough    Gastroesophageal reflux disease    Mixed hyperlipidemia    Shoulder pain    Hypertension    Acromioclavicular joint arthritis    Impingement syndrome, shoulder    Complete tear of left rotator cuff    Urge incontinence    Atopic rhinitis    Upper respiratory tract infection    Bilateral lower extremity edema    Hypercalcemia    Right knee pain    DADA (stress urinary incontinence, female)    Obesity (BMI 35.0-39.9 without comorbidity)    Left knee injury    Acute UTI    COVID-19 virus infection    Skin lesion of scalp    Numbness in left leg    Multiple bruises    Vitamin D deficiency    Laceration of right lower leg    Chronic chest pain with low to moderate risk for CAD    Degenerative disc disease, lumbar    Valvular heart disease, mild aortic and mitral regurgitation    Diastolic dysfunction without heart failure    Hypomagnesemia    Physical deconditioning     Past Medical History:   Diagnosis Date    GERD (gastroesophageal reflux disease)     Hyperlipidemia     Hypertension     Left shoulder pain     Obesity     Osteoarthritis of knees, bilateral     Overactive bladder     Right shoulder pain      Past Surgical History:   Procedure Laterality Date    APPENDECTOMY      BUNIONECTOMY Right     CARDIOVASCULAR STRESS TEST  10/2012    CATARACT EXTRACTION  2017    CHOLECYSTECTOMY      COLONOSCOPY      ECHO - CONVERTED  10/2012    JOINT REPLACEMENT      bilateral knees     KNEE ARTHROPLASTY Left     KNEE ARTHROSCOPY      SHOULDER ARTHROSCOPY Left 2016    Procedure: LEFT SHOULDER ACROMIOPLASTY,MINI OPEN ROTATOR CUFF REPAIR;  Surgeon: Carlos Eduardo Smith MD;  Location: [facility]  OR;  Service:     SHOULDER SURGERY  05/31/2016    OPEN ACROMICPLASTY RIGHT SHOULDER         OT ASSESSMENT FLOWSHEET (Last 12 Hours)       OT Evaluation and Treatment       Row Name 06/13/25 1606                   OT Time and Intention    Document Type therapy note (daily note)  -KR        Mode of Treatment occupational therapy  -KR        Patient Effort good  -KR        Comment Pt seen on this date for AE/DME review for needs in home environment. BUE AROM from supported seated, to enhance ability to perform mobility/self care tasks.  -KR           General Information    Prior Level of Function independent:  -KR        Equipment Currently Used at Home commode, bedside;walker, rolling  -KR           Living Environment    Current Living Arrangements home  -KR        People in Home spouse  -KR        Primary Care Provided by self  -KR           Shoulder (Therapeutic Exercise)    Shoulder (Therapeutic Exercise) AROM (active range of motion)  -KR        Shoulder AROM (Therapeutic Exercise) bilateral;flexion;extension;sitting  -KR           Elbow/Forearm (Therapeutic Exercise)    Elbow/Forearm (Therapeutic Exercise) AROM (active range of motion)  -KR        Elbow/Forearm AROM (Therapeutic Exercise) bilateral;flexion;extension;sitting  -KR           Hand (Therapeutic Exercise)    Hand (Therapeutic Exercise) AROM (active range of motion)  -KR        Hand AROM/AAROM (Therapeutic Exercise) bilateral;finger flexion;finger extension  -KR           Wound 05/29/25 0411 medial sacral spine Pressure Injury    Wound - Properties Group Placement Date: 05/29/25  -SM Placement Time: 0411 -SM Present on Original Admission: N  -SM Orientation: medial  -SM Location: sacral spine  -SM Primary Wound Type: Pressure Inj  -TW    Retired Wound - Properties Group Placement Date: 05/29/25  - Placement Time: 0411 -SM Present on Original Admission: N  -SM Orientation: medial  -SM Location: sacral spine  -SM    Retired Wound - Properties Group  Placement Date: 05/29/25  -SM Placement Time: 0411 -SM Present on Original Admission: N  -SM Orientation: medial  -SM Location: sacral spine  -SM    Retired Wound - Properties Group Date first assessed: 05/29/25  -SM Time first assessed: 0411 -SM Present on Original Admission: N  -SM Location: sacral spine  -SM       Wound 05/26/25 1948 Right gluteal Pressure Injury    Wound - Properties Group Placement Date: 05/26/25  -PL Placement Time: 1948 -PL Present on Original Admission: Y  -TW Side: Right  -TW Location: gluteal  -PL Primary Wound Type: Pressure Inj  -TW    Retired Wound - Properties Group Placement Date: 05/26/25  -PL Placement Time: 1948 -PL Present on Original Admission: Y  -TW Side: Right  -TW Location: gluteal  -PL    Retired Wound - Properties Group Placement Date: 05/26/25  -PL Placement Time: 1948 -PL Present on Original Admission: Y  -TW Side: Right  -TW Location: gluteal  -PL    Retired Wound - Properties Group Date first assessed: 05/26/25  -PL Time first assessed: 1948 -PL Present on Original Admission: Y  -TW Side: Right  -TW Location: gluteal  -PL       Plan of Care Review    Plan of Care Reviewed With patient  -KR        Progress improving  -KR           Progress Summary (OT)    Progress Toward Functional Goals (OT) progress toward functional goals as expected  -KR                  User Key  (r) = Recorded By, (t) = Taken By, (c) = Cosigned By      Initials Name Effective Dates    TW Seble Sena RN 06/16/21 -     KR Alan Mortensen OT 06/16/21 -     SM Steph Valentin RN 07/18/22 - 06/10/25    Raquel Ledesma RN 06/12/24 -                            OT Recommendation and Plan     Progress Toward Functional Goals (OT): progress toward functional goals as expected  Plan of Care Review  Plan of Care Reviewed With: patient  Progress: improving  Plan of Care Reviewed With: patient     Outcome Measures       Row Name 06/13/25 6120             How much help from another is currently  needed...    Putting on and taking off regular lower body clothing? 3  -KR      Bathing (including washing, rinsing, and drying) 3  -KR      Toileting (which includes using toilet bed pan or urinal) 3  -KR      Putting on and taking off regular upper body clothing 3  -KR      Taking care of personal grooming (such as brushing teeth) 3  -KR      Eating meals 4  -KR      AM-PAC 6 Clicks Score (OT) 19  -KR                User Key  (r) = Recorded By, (t) = Taken By, (c) = Cosigned By      Initials Name Provider Type    Alan Garrido OT Occupational Therapist                    Time Calculation:     Therapy Charges for Today       Code Description Service Date Service Provider Modifiers Qty    90697601046  OT THERAPEUTIC ACT EA 15 MIN 6/13/2025 Alan Mortensen OT GO 1                 Alan Mortensen OT  6/13/2025

## 2025-06-13 NOTE — THERAPY TREATMENT NOTE
Acute Care - Physical Therapy Treatment Note   [place]     Patient Name: Emma Ryan  : 1940  MRN: [id number]  Today's Date: 2025      Visit Dx:   No diagnosis found.  Patient Active Problem List   Diagnosis    Complete tear of right rotator cuff    Stage 3a chronic kidney disease    Cough    Gastroesophageal reflux disease    Mixed hyperlipidemia    Shoulder pain    Hypertension    Acromioclavicular joint arthritis    Impingement syndrome, shoulder    Complete tear of left rotator cuff    Urge incontinence    Atopic rhinitis    Upper respiratory tract infection    Bilateral lower extremity edema    Hypercalcemia    Right knee pain    DADA (stress urinary incontinence, female)    Obesity (BMI 35.0-39.9 without comorbidity)    Left knee injury    Acute UTI    COVID-19 virus infection    Skin lesion of scalp    Numbness in left leg    Multiple bruises    Vitamin D deficiency    Laceration of right lower leg    Chronic chest pain with low to moderate risk for CAD    Degenerative disc disease, lumbar    Valvular heart disease, mild aortic and mitral regurgitation    Diastolic dysfunction without heart failure    Hypomagnesemia    Physical deconditioning     Past Medical History:   Diagnosis Date    GERD (gastroesophageal reflux disease)     Hyperlipidemia     Hypertension     Left shoulder pain     Obesity     Osteoarthritis of knees, bilateral     Overactive bladder     Right shoulder pain      Past Surgical History:   Procedure Laterality Date    APPENDECTOMY      BUNIONECTOMY Right     CARDIOVASCULAR STRESS TEST  10/2012    CATARACT EXTRACTION      CHOLECYSTECTOMY      COLONOSCOPY      ECHO - CONVERTED  10/2012    JOINT REPLACEMENT      bilateral knees     KNEE ARTHROPLASTY Left     KNEE ARTHROSCOPY      SHOULDER ARTHROSCOPY Left 2016    Procedure: LEFT SHOULDER ACROMIOPLASTY,MINI OPEN ROTATOR CUFF REPAIR;  Surgeon: Carlos Eduardo Smith MD;  Location: [facility];  Service:      SHOULDER SURGERY  05/31/2016    OPEN ACROMICPLASTY RIGHT SHOULDER     PT Assessment (Last 12 Hours)       PT Evaluation and Treatment       Row Name 06/13/25 1454          Physical Therapy Time and Intention    Subjective Information no complaints  -LL     Document Type therapy note (daily note)  -LL     Mode of Treatment physical therapy;individual therapy  -LL     Patient Effort good  -LL     Comment Patient & RN in agreement for participation with PT.  -LL       Row Name 06/13/25 1454          Pain    Pretreatment Pain Rating 0/10 - no pain  -LL     Posttreatment Pain Rating 0/10 - no pain  -LL       Row Name 06/13/25 1454          Cognition    Affect/Mental Status (Cognition) WFL  -     Orientation Status (Cognition) oriented x 3  -LL     Follows Commands (Cognition) WFL  -     Personal Safety Interventions fall prevention program maintained;gait belt;nonskid shoes/slippers when out of bed;supervised activity  -LL       Row Name 06/13/25 1454          Bed Mobility    Bed Mobility supine-sit;sit-supine;scooting/bridging  -LL     Supine-Sit [place] (Bed Mobility) standby assist  -LL     Assistive Device (Bed Mobility) bed rails;head of bed elevated  -LL       Row Name 06/13/25 1454          Transfers    Transfers toilet transfer  -       Row Name 06/13/25 1454          Sit-Stand Transfer    Sit-Stand [place] (Transfers) contact guard  -LL     Assistive Device (Sit-Stand Transfers) walker, front-wheeled  -LL       Row Name 06/13/25 1454          Stand-Sit Transfer    Stand-Sit [place] (Transfers) contact guard  -LL     Assistive Device (Stand-Sit Transfers) walker, front-wheeled  -LL       Row Name 06/13/25 1454          Toilet Transfer    Type (Toilet Transfer) sit-stand;stand-sit  -LL     [place] Level (Toilet Transfer) contact guard  -LL     Assistive Device (Toilet Transfer) commode, bedside with drop arms  -       Row Name 06/13/25 1454          Gait/Stairs (Locomotion)     [place] Level (Gait) minimum assist (75% patient effort);contact guard;verbal cues;nonverbal cues (demo/gesture)  -LL     Assistive Device (Gait) walker, front-wheeled  -LL     Distance in Feet (Gait) --  35' x 2  -LL     Pattern (Gait) step-to  -LL     Deviations/Abnormal Patterns (Gait) ataxic;base of support, narrow;gait speed decreased  -LL     Right Sided Gait Deviations foot drop/toe drag;knee buckling, right side  -LL       Row Name 06/13/25 1454          Motor Skills    Comments, Therapeutic Exercise --  Seated: LAQ, marching, hip abd/add, L AP  -LL       Row Name             Wound 05/29/25 0411 medial sacral spine Pressure Injury    Wound - Properties Group Placement Date: 05/29/25  -SM Placement Time: 0411 -SM Present on Original Admission: N  -SM Orientation: medial  -SM Location: sacral spine  -SM Primary Wound Type: Pressure Inj  -TW    Retired Wound - Properties Group Placement Date: 05/29/25  -SM Placement Time: 0411 -SM Present on Original Admission: N  -SM Orientation: medial  -SM Location: sacral spine  -SM    Retired Wound - Properties Group Placement Date: 05/29/25  -SM Placement Time: 0411 -SM Present on Original Admission: N  -SM Orientation: medial  -SM Location: sacral spine  -SM    Retired Wound - Properties Group Date first assessed: 05/29/25  -SM Time first assessed: 0411 -SM Present on Original Admission: N  -SM Location: sacral spine  -SM      Row Name             Wound 05/26/25 1948 Right gluteal Pressure Injury    Wound - Properties Group Placement Date: 05/26/25  -PL Placement Time: 1948 -PL Present on Original Admission: Y  -TW Side: Right  -TW Location: gluteal  -PL Primary Wound Type: Pressure Inj  -TW    Retired Wound - Properties Group Placement Date: 05/26/25  -PL Placement Time: 1948  -PL Present on Original Admission: Y  -TW Side: Right  -TW Location: gluteal  -PL    Retired Wound - Properties Group Placement Date: 05/26/25  -PL Placement Time: 1948 -PL Present on  Original Admission: Y  -TW Side: Right  -TW Location: gluteal  -PL    Retired Wound - Properties Group Date first assessed: 05/26/25  -PL Time first assessed: 1948  -PL Present on Original Admission: Y  -TW Side: Right  -TW Location: gluteal  -PL      Row Name 06/13/25 1454          Positioning and Restraints    Pre-Treatment Position in bed  -LL     Post Treatment Position bsc  -LL     On BS commode notified nsg;sitting;call light within reach;encouraged to call for assist  -LL               User Key  (r) = Recorded By, (t) = Taken By, (c) = Cosigned By      Initials Name Provider Type    Seble Ch, RN Registered Nurse    LL Rubina Louis PTA Physical Therapist Assistant    Steph Yin RN Registered Nurse    Raquel Ledesma RN Registered Nurse                    Physical Therapy Education       Title: PT OT SLP Therapies (Done)       Topic: Physical Therapy (Done)       Point: Mobility training (Done)       Learning Progress Summary            Patient Acceptance, E,TB, VU by RG at 6/12/2025 0316    Acceptance, E,TB, VU,NR by RG at 6/8/2025 0008    Acceptance, E,TB, VU by KM at 6/3/2025 1415                      Point: Home exercise program (Done)       Learning Progress Summary            Patient Acceptance, E,TB, VU by RG at 6/12/2025 0316    Acceptance, E,TB, VU,NR by RG at 6/8/2025 0008    Acceptance, E,TB, VU by KM at 6/3/2025 1415                      Point: Body mechanics (Done)       Learning Progress Summary            Patient Acceptance, E,TB, VU by RG at 6/12/2025 0316    Acceptance, E,TB, VU,NR by RG at 6/8/2025 0008    Acceptance, E,TB, VU by KM at 6/3/2025 1415                      Point: Precautions (Done)       Learning Progress Summary            Patient Acceptance, E,TB, VU by RG at 6/12/2025 0316    Acceptance, E,TB, VU,NR by RG at 6/8/2025 0008    Acceptance, E,TB, VU by KM at 6/3/2025 1415                                      User Key       Initials Effective Dates Name  Provider Type Discipline    KM 05/24/22 -  Roni Mar, PT Physical Therapist PT    RG 02/06/24 -  Jorge Alberto Savage, RN Registered Nurse Nurse                  PT Recommendation and Plan             Time Calculation:    PT Charges       Row Name 06/13/25 1500             Time Calculation    PT Received On 06/13/25  -LL         Time Calculation- PT    Total Timed Code Minutes- PT 45 minute(s)  -LL                User Key  (r) = Recorded By, (t) = Taken By, (c) = Cosigned By      Initials Name Provider Type    LL Rubina Louis PTA Physical Therapist Assistant                  Therapy Charges for Today       Code Description Service Date Service Provider Modifiers Qty    71059629545 HC PT THERAPEUTIC ACT EA 15 MIN 6/12/2025 Rubina Louis, PTA GP, CQ 2    98381963102 HC PT THER PROC EA 15 MIN 6/12/2025 Rubina Louis, PTA GP, CQ 1    26453882577 HC GAIT TRAINING EA 15 MIN 6/12/2025 Rubina Louis, PTA GP, CQ 1    40200242424 HC GAIT TRAINING EA 15 MIN 6/13/2025 Rubina Louis, PTA GP, CQ 1    99851008409 HC PT THERAPEUTIC ACT EA 15 MIN 6/13/2025 Rubina Louis, PTA GP, CQ 1    46328418966 HC PT THER PROC EA 15 MIN 6/13/2025 Rubina Louis, PTA GP, CQ 1            PT G-Codes  Outcome Measure Options: AM-PAC 6 Clicks Daily Activity (OT)  AM-PAC 6 Clicks Score (PT): 18  AM-PAC 6 Clicks Score (OT): 21    Viviana Louis PTA  6/13/2025

## 2025-06-13 NOTE — PLAN OF CARE
Goal Outcome Evaluation:              Outcome Evaluation: Pt's VSS on RA. Pt has amubulated in room this shift. Wound care complete per order. No concerns or complaints at this time. Will follow POC.

## 2025-06-13 NOTE — CASE MANAGEMENT/SOCIAL WORK
Discharge Planning Assessment   [place]     Patient Name: Emma Ryan  MRN: [id number]  Today's Date: 6/13/2025    Admit Date: 6/3/2025            Discharge Plan       Row Name 06/13/25 1626       Plan    Plan Pt was admitted to swing bed on 6/3 for therapy services, swing bed days are approved clinical updates due on 6/16. Pt lives with spouse, Bob ([address] [place], [place]) and she plans to return home at discharge.Pt has a rollator and cane via unknown provider. Pt does not utilize home health services. SS to follow.                  ALVARO Bocanegra

## 2025-06-13 NOTE — PLAN OF CARE
Goal Outcome Evaluation:  Plan of Care Reviewed With: patient        Progress: no change  Outcome Evaluation: Patient resting in bed at this time. VSS on RA. Pt ambulated to [facility]. Pt c/o pain, PRN medication given per MAR. No concerns or complaints at this time. Will continue with plan of care.

## 2025-06-14 LAB
ANION GAP SERPL CALCULATED.3IONS-SCNC: 11.6 MMOL/L (ref 5–15)
BASOPHILS # BLD AUTO: 0.04 10*3/MM3 (ref 0–0.2)
BASOPHILS NFR BLD AUTO: 0.5 % (ref 0–1.5)
BUN SERPL-MCNC: 18.9 MG/DL (ref 8–23)
BUN/CREAT SERPL: 19.7 (ref 7–25)
CALCIUM SPEC-SCNC: 9.4 MG/DL (ref 8.6–10.5)
CHLORIDE SERPL-SCNC: 105 MMOL/L (ref 98–107)
CO2 SERPL-SCNC: 23.4 MMOL/L (ref 22–29)
CREAT SERPL-MCNC: 0.96 MG/DL (ref 0.57–1)
DEPRECATED RDW RBC AUTO: 53.9 FL (ref 37–54)
EGFRCR SERPLBLD CKD-EPI 2021: 58.8 ML/MIN/1.73
EOSINOPHIL # BLD AUTO: 0.13 10*3/MM3 (ref 0–0.4)
EOSINOPHIL NFR BLD AUTO: 1.6 % (ref 0.3–6.2)
ERYTHROCYTE [DISTWIDTH] IN BLOOD BY AUTOMATED COUNT: 15.8 % (ref 12.3–15.4)
GLUCOSE SERPL-MCNC: 87 MG/DL (ref 65–99)
HCT VFR BLD AUTO: 35.7 % (ref 34–46.6)
HGB BLD-MCNC: 10.8 G/DL (ref 12–15.9)
IMM GRANULOCYTES # BLD AUTO: 0.04 10*3/MM3 (ref 0–0.05)
IMM GRANULOCYTES NFR BLD AUTO: 0.5 % (ref 0–0.5)
LYMPHOCYTES # BLD AUTO: 2.73 10*3/MM3 (ref 0.7–3.1)
LYMPHOCYTES NFR BLD AUTO: 32.7 % (ref 19.6–45.3)
MCH RBC QN AUTO: 28.2 PG (ref 26.6–33)
MCHC RBC AUTO-ENTMCNC: 30.3 G/DL (ref 31.5–35.7)
MCV RBC AUTO: 93.2 FL (ref 79–97)
MONOCYTES # BLD AUTO: 0.86 10*3/MM3 (ref 0.1–0.9)
MONOCYTES NFR BLD AUTO: 10.3 % (ref 5–12)
NEUTROPHILS NFR BLD AUTO: 4.54 10*3/MM3 (ref 1.7–7)
NEUTROPHILS NFR BLD AUTO: 54.4 % (ref 42.7–76)
NRBC BLD AUTO-RTO: 0 /100 WBC (ref 0–0.2)
PLATELET # BLD AUTO: 379 10*3/MM3 (ref 140–450)
PMV BLD AUTO: 10 FL (ref 6–12)
POTASSIUM SERPL-SCNC: 3.9 MMOL/L (ref 3.5–5.2)
RBC # BLD AUTO: 3.83 10*6/MM3 (ref 3.77–5.28)
SODIUM SERPL-SCNC: 140 MMOL/L (ref 136–145)
WBC NRBC COR # BLD AUTO: 8.34 10*3/MM3 (ref 3.4–10.8)

## 2025-06-14 PROCEDURE — 97116 GAIT TRAINING THERAPY: CPT

## 2025-06-14 PROCEDURE — 85025 COMPLETE CBC W/AUTO DIFF WBC: CPT | Performed by: INTERNAL MEDICINE

## 2025-06-14 PROCEDURE — 25010000002 HEPARIN (PORCINE) PER 1000 UNITS: Performed by: FAMILY MEDICINE

## 2025-06-14 PROCEDURE — 97110 THERAPEUTIC EXERCISES: CPT

## 2025-06-14 PROCEDURE — 99309 SBSQ NF CARE MODERATE MDM 30: CPT | Performed by: INTERNAL MEDICINE

## 2025-06-14 PROCEDURE — 80048 BASIC METABOLIC PNL TOTAL CA: CPT | Performed by: INTERNAL MEDICINE

## 2025-06-14 PROCEDURE — 97530 THERAPEUTIC ACTIVITIES: CPT

## 2025-06-14 RX ADMIN — PANTOPRAZOLE SODIUM 40 MG: 40 TABLET, DELAYED RELEASE ORAL at 05:40

## 2025-06-14 RX ADMIN — OXYBUTYNIN CHLORIDE 10 MG: 5 TABLET, EXTENDED RELEASE ORAL at 09:40

## 2025-06-14 RX ADMIN — Medication 1 APPLICATION: at 20:50

## 2025-06-14 RX ADMIN — DOCUSATE SODIUM 100 MG: 100 CAPSULE, LIQUID FILLED ORAL at 09:40

## 2025-06-14 RX ADMIN — Medication 1000 UNITS: at 09:40

## 2025-06-14 RX ADMIN — Medication 5 MG: at 20:50

## 2025-06-14 RX ADMIN — ATORVASTATIN CALCIUM 20 MG: 20 TABLET, FILM COATED ORAL at 09:40

## 2025-06-14 RX ADMIN — Medication 1 CAPSULE: at 09:40

## 2025-06-14 RX ADMIN — Medication 1 APPLICATION: at 09:41

## 2025-06-14 RX ADMIN — HEPARIN SODIUM 5000 UNITS: 5000 INJECTION INTRAVENOUS; SUBCUTANEOUS at 20:50

## 2025-06-14 RX ADMIN — ACETAMINOPHEN 650 MG: 325 TABLET ORAL at 14:35

## 2025-06-14 RX ADMIN — HEPARIN SODIUM 5000 UNITS: 5000 INJECTION INTRAVENOUS; SUBCUTANEOUS at 09:40

## 2025-06-14 RX ADMIN — ACETAMINOPHEN 650 MG: 325 TABLET ORAL at 20:50

## 2025-06-14 NOTE — THERAPY TREATMENT NOTE
Patient Name: Emma Ryan  : 1940    MRN: [id number]                              Today's Date: 2025       Admit Date: 6/3/2025    Visit Dx: No diagnosis found.  Patient Active Problem List   Diagnosis    Complete tear of right rotator cuff    Stage 3a chronic kidney disease    Cough    Gastroesophageal reflux disease    Mixed hyperlipidemia    Shoulder pain    Hypertension    Acromioclavicular joint arthritis    Impingement syndrome, shoulder    Complete tear of left rotator cuff    Urge incontinence    Atopic rhinitis    Upper respiratory tract infection    Bilateral lower extremity edema    Hypercalcemia    Right knee pain    DADA (stress urinary incontinence, female)    Obesity (BMI 35.0-39.9 without comorbidity)    Left knee injury    Acute UTI    COVID-19 virus infection    Skin lesion of scalp    Numbness in left leg    Multiple bruises    Vitamin D deficiency    Laceration of right lower leg    Chronic chest pain with low to moderate risk for CAD    Degenerative disc disease, lumbar    Valvular heart disease, mild aortic and mitral regurgitation    Diastolic dysfunction without heart failure    Hypomagnesemia    Physical deconditioning     Past Medical History:   Diagnosis Date    GERD (gastroesophageal reflux disease)     Hyperlipidemia     Hypertension     Left shoulder pain     Obesity     Osteoarthritis of knees, bilateral     Overactive bladder     Right shoulder pain      Past Surgical History:   Procedure Laterality Date    APPENDECTOMY      BUNIONECTOMY Right     CARDIOVASCULAR STRESS TEST  10/2012    CATARACT EXTRACTION  2017    CHOLECYSTECTOMY      COLONOSCOPY      ECHO - CONVERTED  10/2012    JOINT REPLACEMENT      bilateral knees     KNEE ARTHROPLASTY Left     KNEE ARTHROSCOPY      SHOULDER ARTHROSCOPY Left 2016    Procedure: LEFT SHOULDER ACROMIOPLASTY,MINI OPEN ROTATOR CUFF REPAIR;  Surgeon: Carlos Eduardo Smith MD;  Location: [facility];  Service:     SHOULDER  SURGERY  05/31/2016    OPEN ACROMICPLASTY RIGHT SHOULDER      General Information       Row Name 06/14/25 1106          OT Time and Intention    Subjective Information no complaints  -     Document Type therapy note (daily note)  -     Mode of Treatment individual therapy;occupational therapy  -     Patient Effort good  -KP     Comment Patient agreeable to therapy with no complaints. She reports she hopes to go home next week and did well with PT earlier this morning.  -       Row Name 06/14/25 1106          General Information    Patient Profile Reviewed yes  -     Existing Precautions/Restrictions fall  -     Barriers to Rehab medically complex  -       Row Name 06/14/25 1106          Cognition    Orientation Status (Cognition) oriented x 3  -       Row Name 06/14/25 1106          Safety Issues/Impairments Affecting Functional Mobility    Impairments Affecting Function (Mobility) balance;endurance/activity tolerance;strength  -               User Key  (r) = Recorded By, (t) = Taken By, (c) = Cosigned By      Initials Name Provider Type    Carline Blackwell OT Occupational Therapist                     Mobility/ADL's       Row Name 06/14/25 1106          Bed Mobility    Comment, (Bed Mobility) up in chair upon arrival  -               User Key  (r) = Recorded By, (t) = Taken By, (c) = Cosigned By      Initials Name Provider Type    Carline Blackwell OT Occupational Therapist                   Obj/Interventions       Row Name 06/14/25 1107          Motor Skills    Functional Endurance fair plus/good  -     Therapeutic Exercise --  BUE AROM exercises through functional movement patterns X20 reps; seated up in chair  -               User Key  (r) = Recorded By, (t) = Taken By, (c) = Cosigned By      Initials Name Provider Type    Carline Blackwell OT Occupational Therapist                   Goals/Plan    No documentation.                  Clinical Impression       Row Name 06/14/25 1107           Pain Assessment    Pretreatment Pain Rating 0/10 - no pain  -     Posttreatment Pain Rating 0/10 - no pain  -KP       Row Name 06/14/25 1107          Plan of Care Review    Plan of Care Reviewed With patient  -     Progress improving  -     Outcome Evaluation Patient seen for OT completing seated UE AROM exercises. She tolerated well. Patient and therapist also discussed home ADL performance. She has a walk-in shower with seat, but feels she would benefit from home health to assist initially to prevent falls/risk of injury and ensure highest independence level. Continue plan of care.  -       Row Name 06/14/25 1107          Therapy Plan Review/Discharge Plan (OT)    Anticipated Discharge Disposition (OT) home with assist;home with home health  -[facility] Name 06/14/25 1107          Positioning and Restraints    Pre-Treatment Position sitting in chair/recliner  -     Post Treatment Position chair  -KP     In Chair sitting;call light within reach;encouraged to call for assist;exit alarm on  -               User Key  (r) = Recorded By, (t) = Taken By, (c) = Cosigned By      Initials Name Provider Type    Carline Blackwell, IHSAN Occupational Therapist                   Outcome Measures       Row Name 06/14/25 1108          How much help from another is currently needed...    Putting on and taking off regular lower body clothing? 3  -KP     Bathing (including washing, rinsing, and drying) 3  -KP     Toileting (which includes using toilet bed pan or urinal) 3  -KP     Putting on and taking off regular upper body clothing 3  -KP     Taking care of personal grooming (such as brushing teeth) 3  -KP     Eating meals 4  -KP     AM-PAC 6 Clicks Score (OT) 19  -       Row Name 06/14/25 1108          Functional Assessment    Outcome Measure Options AM-PAC 6 Clicks Daily Activity (OT)  -               User Key  (r) = Recorded By, (t) = Taken By, (c) = Cosigned By      Initials Name Provider Type    BRAYAN  Carline Camacho OT Occupational Therapist                      OT Recommendation and Plan  Planned Therapy Interventions (OT): activity tolerance training, BADL retraining, functional balance retraining, patient/caregiver education/training, occupation/activity based interventions, transfer/mobility retraining, strengthening exercise, ROM/therapeutic exercise  Therapy Frequency (OT): 5 times/wk  Plan of Care Review  Plan of Care Reviewed With: patient  Progress: improving  Outcome Evaluation: Patient seen for OT completing seated UE AROM exercises. She tolerated well. Patient and therapist also discussed home ADL performance. She has a walk-in shower with seat, but feels she would benefit from home health to assist initially to prevent falls/risk of injury and ensure highest independence level. Continue plan of care.     Time Calculation:         Time Calculation- OT       Row Name 06/14/25 1108             Time Calculation- OT    Total Timed Code Minutes- OT 16 minute(s)  -      OT Received On 06/14/25  -                User Key  (r) = Recorded By, (t) = Taken By, (c) = Cosigned By      Initials Name Provider Type     Carline Camacho OT Occupational Therapist                  Therapy Charges for Today       Code Description Service Date Service Provider Modifiers Qty    [id number]  OT THER PROC EA 15 MIN 6/14/2025 Carline Camacho OT GO 1                 Carline Camacho OT  6/14/2025

## 2025-06-14 NOTE — THERAPY TREATMENT NOTE
Acute Care - Physical Therapy Treatment Note   Prasanna     Patient Name: Emma Ryan  : 1940  MRN: [id number]  Today's Date: 2025      Visit Dx:   No diagnosis found.  Patient Active Problem List   Diagnosis    Complete tear of right rotator cuff    Stage 3a chronic kidney disease    Cough    Gastroesophageal reflux disease    Mixed hyperlipidemia    Shoulder pain    Hypertension    Acromioclavicular joint arthritis    Impingement syndrome, shoulder    Complete tear of left rotator cuff    Urge incontinence    Atopic rhinitis    Upper respiratory tract infection    Bilateral lower extremity edema    Hypercalcemia    Right knee pain    DADA (stress urinary incontinence, female)    Obesity (BMI 35.0-39.9 without comorbidity)    Left knee injury    Acute UTI    COVID-19 virus infection    Skin lesion of scalp    Numbness in left leg    Multiple bruises    Vitamin D deficiency    Laceration of right lower leg    Chronic chest pain with low to moderate risk for CAD    Degenerative disc disease, lumbar    Valvular heart disease, mild aortic and mitral regurgitation    Diastolic dysfunction without heart failure    Hypomagnesemia    Physical deconditioning     Past Medical History:   Diagnosis Date    GERD (gastroesophageal reflux disease)     Hyperlipidemia     Hypertension     Left shoulder pain     Obesity     Osteoarthritis of knees, bilateral     Overactive bladder     Right shoulder pain      Past Surgical History:   Procedure Laterality Date    APPENDECTOMY      BUNIONECTOMY Right     CARDIOVASCULAR STRESS TEST  10/2012    CATARACT EXTRACTION      CHOLECYSTECTOMY      COLONOSCOPY      ECHO - CONVERTED  10/2012    JOINT REPLACEMENT      bilateral knees     KNEE ARTHROPLASTY Left     KNEE ARTHROSCOPY      SHOULDER ARTHROSCOPY Left 2016    Procedure: LEFT SHOULDER ACROMIOPLASTY,MINI OPEN ROTATOR CUFF REPAIR;  Surgeon: Carlos Eduardo Smith MD;  Location: [facility];  Service:      SHOULDER SURGERY  05/31/2016    OPEN ACROMICPLASTY RIGHT SHOULDER     PT Assessment (Last 12 Hours)       PT Evaluation and Treatment       Row Name 06/14/25 1201          Physical Therapy Time and Intention    Subjective Information no complaints  -KM     Document Type therapy note (daily note)  -KM     Mode of Treatment individual therapy;physical therapy  -KM     Patient Effort good  -KM     Symptoms Noted During/After Treatment none  -KM       Row Name 06/14/25 1201          General Information    Patient Profile Reviewed yes  -KM     Patient Observations alert;cooperative;agree to therapy  -KM     Existing Precautions/Restrictions fall  -KM       Row Name 06/14/25 1201          Pain    Pretreatment Pain Rating 0/10 - no pain  -KM     Posttreatment Pain Rating 0/10 - no pain  -KM       Row Name 06/14/25 1201          Cognition    Affect/Mental Status (Cognition) WFL  -KM     Orientation Status (Cognition) oriented x 3  -KM     Follows Commands (Cognition) WFL  -KM       Row Name 06/14/25 1201          Bed Mobility    Bed Mobility supine-sit  -KM     Supine-Sit [place] (Bed Mobility) standby assist  -KM     Assistive Device (Bed Mobility) bed rails;head of bed elevated  -KM       Row Name 06/14/25 1201          Transfers    Transfers sit-stand transfer;stand-sit transfer;bed-chair transfer  -KM       Row Name 06/14/25 1201          Bed-Chair Transfer    Bed-Chair [place] (Transfers) standby assist  -KM     Assistive Device (Bed-Chair Transfers) walker, front-wheeled  -KM       Row Name 06/14/25 1201          Sit-Stand Transfer    Sit-Stand [place] (Transfers) standby assist  -     Assistive Device (Sit-Stand Transfers) walker, front-wheeled  -KM       Row Name 06/14/25 1201          Stand-Sit Transfer    Stand-Sit [place] (Transfers) standby assist  -     Assistive Device (Stand-Sit Transfers) walker, front-wheeled  -KM       Row Name 06/14/25 1201          Gait/Stairs (Locomotion)     Gait/Stairs Locomotion gait/ambulation independence;gait/ambulation assistive device;distance ambulated  -KM     [place] (Gait) standby assist  -KM     Assistive Device (Gait) walker, front-wheeled  -KM     Patient was able to Ambulate yes  -KM     Distance in Feet (Gait) 40  x2  -KM     Pattern (Gait) step-to  -KM     Deviations/Abnormal Patterns (Gait) ataxic;base of support, narrow;gait speed decreased  -KM     Right Sided Gait Deviations foot drop/toe drag;knee buckling, right side  -KM       Row Name 06/14/25 1201          Safety Issues/Impairments Affecting Functional Mobility    Impairments Affecting Function (Mobility) balance;endurance/activity tolerance;strength  -KM       Row Name             Wound 05/29/25 0411 medial sacral spine Pressure Injury    Wound - Properties Group Placement Date: 05/29/25  -SM Placement Time: 0411 -SM Present on Original Admission: N  -SM Orientation: medial  -SM Location: sacral spine  -SM Primary Wound Type: Pressure Inj  -TW    Retired Wound - Properties Group Placement Date: 05/29/25  -SM Placement Time: 0411 -SM Present on Original Admission: N  -SM Orientation: medial  -SM Location: sacral spine  -SM    Retired Wound - Properties Group Placement Date: 05/29/25  -SM Placement Time: 0411 -SM Present on Original Admission: N  -SM Orientation: medial  -SM Location: sacral spine  -SM    Retired Wound - Properties Group Date first assessed: 05/29/25  -SM Time first assessed: 0411 -SM Present on Original Admission: N  -SM Location: sacral spine  -SM      Row Name             Wound 05/26/25 1948 Right gluteal Pressure Injury    Wound - Properties Group Placement Date: 05/26/25  -PL Placement Time: 1948  -PL Present on Original Admission: Y  -TW Side: Right  -TW Location: gluteal  -PL Primary Wound Type: Pressure Inj  -TW    Retired Wound - Properties Group Placement Date: 05/26/25  -PL Placement Time: 1948 -PL Present on Original Admission: Y  -TW Side: Right  -TW  Location: gluteal  -PL    Retired Wound - Properties Group Placement Date: 05/26/25  -PL Placement Time: 1948 -PL Present on Original Admission: Y  -TW Side: Right  -TW Location: gluteal  -PL    Retired Wound - Properties Group Date first assessed: 05/26/25  -PL Time first assessed: 1948 -PL Present on Original Admission: Y  -TW Side: Right  -TW Location: gluteal  -PL      Row Name 06/14/25 1201          Progress Summary (PT)    Daily Progress Summary (PT) Pt. was able to demonstrate all mobility skills w/ SBA. She was able to ambulate increased distance w/ RW. She tolerates increeased activity. Pt. would continue to benefit from PT services.  -KM               User Key  (r) = Recorded By, (t) = Taken By, (c) = Cosigned By      Initials Name Provider Type    Seble Ch, RN Registered Nurse    Steph Yin RN Registered Nurse    Roni John, PILLO Physical Therapist    Raquel Ledesma RN Registered Nurse                    Physical Therapy Education       Title: PT OT SLP Therapies (Done)       Topic: Physical Therapy (Done)       Point: Mobility training (Done)       Learning Progress Summary            Patient Acceptance, E,TB, VU by RG at 6/12/2025 0316    Acceptance, E,TB, VU,NR by RG at 6/8/2025 0008    Acceptance, E,TB, VU by KM at 6/3/2025 1415                      Point: Home exercise program (Done)       Learning Progress Summary            Patient Acceptance, E,TB, VU by RG at 6/12/2025 0316    Acceptance, E,TB, VU,NR by RG at 6/8/2025 0008    Acceptance, E,TB, VU by KM at 6/3/2025 1415                      Point: Body mechanics (Done)       Learning Progress Summary            Patient Acceptance, E,TB, VU by RG at 6/12/2025 0316    Acceptance, E,TB, VU,NR by RG at 6/8/2025 0008    Acceptance, E,TB, VU by KM at 6/3/2025 1415                      Point: Precautions (Done)       Learning Progress Summary            Patient Acceptance, E,TB, VU by RG at 6/12/2025 0316    Acceptance,  E,TB, VU,NR by  at 6/8/2025 0008    Acceptance, E,TB, VU by  at 6/3/2025 1415                                      User Key       Initials Effective Dates Name Provider Type Discipline     05/24/22 -  Roni Mar, PT Physical Therapist PT    RG 02/06/24 -  Jorge Alberto Savage, RN Registered Nurse Nurse                  PT Recommendation and Plan  Anticipated Discharge Disposition (PT):  (TBD)  Planned Therapy Interventions (PT): balance training, bed mobility training, gait training, home exercise program, patient/family education, postural re-education, ROM (range of motion), strengthening, stretching, transfer training, wheelchair management/propulsion training  Therapy Frequency (PT): 5 times/wk  Progress Summary (PT)  Daily Progress Summary (PT): Pt. was able to demonstrate all mobility skills w/ SBA. She was able to ambulate increased distance w/ RW. She tolerates increeased activity. Pt. would continue to benefit from PT services.  Plan of Care Reviewed With: patient  Outcome Evaluation: Pt. evaluation completed during PT session. She was able to perform functional mobility skills w/ modA-maxA. She was able to ambulate minimal distance w/ RW and maxA. She tolerated session well. Pt. would benefit from skilled PT services to improve functional mobility skills prior to dischare.   Outcome Measures       Row Name 06/13/25 1625             How much help from another is currently needed...    Putting on and taking off regular lower body clothing? 3  -KR      Bathing (including washing, rinsing, and drying) 3  -KR      Toileting (which includes using toilet bed pan or urinal) 3  -KR      Putting on and taking off regular upper body clothing 3  -KR      Taking care of personal grooming (such as brushing teeth) 3  -KR      Eating meals 4  -KR      AM-PAC 6 Clicks Score (OT) 19  -KR                User Key  (r) = Recorded By, (t) = Taken By, (c) = Cosigned By      Initials Name Provider Type    Alan Garrido, OT  Occupational Therapist                     Time Calculation:    PT Charges       Row Name 06/14/25 1156             Time Calculation    PT Received On 06/14/25  -KM         Time Calculation- PT    Total Timed Code Minutes- PT 23 minute(s)  -KM                User Key  (r) = Recorded By, (t) = Taken By, (c) = Cosigned By      Initials Name Provider Type    Roni John, PT Physical Therapist                  Therapy Charges for Today       Code Description Service Date Service Provider Modifiers Qty    26860811429 HC PT THERAPEUTIC ACT EA 15 MIN 6/14/2025 Roni Mar, PT GP 1    72210615491 HC GAIT TRAINING EA 15 MIN 6/14/2025 Roni Mar, PT GP 1            PT G-Codes  Outcome Measure Options: AM-PAC 6 Clicks Daily Activity (OT)  AM-PAC 6 Clicks Score (PT): 18  AM-PAC 6 Clicks Score (OT): 19    Roni Mar PT  6/14/2025

## 2025-06-14 NOTE — PROGRESS NOTES
[facility] HOSPITALIST PROGRESS NOTE     Patient Identification:  Name:  Emma Ryan  Age:  84 y.o.  Sex:  female  :  1940  MRN:  [id number]  Visit Number:  [id number]  ROOM: [address] [place] [address]     Primary Care Provider:  Finn Nash MD    Length of stay in inpatient status:  11    Subjective     Chief Compliant:  No chief complaint on file.      History of Presenting Illness:    Patient was in bedside chair eating lunch. Denied any new complaints. Reports she is hopeful to go home early this week. No family bedside       ROS:  Otherwise 10 point ROS negative other than documented above in HPI.     Objective     Current Hospital Meds:Acidophilus/Pectin, 1 capsule, Oral, Daily  atorvastatin, 20 mg, Oral, Daily  castor oil-balsam peru, 1 Application, Topical, Q12H  cholecalciferol, 1,000 Units, Oral, Daily  docusate sodium, 100 mg, Oral, Daily  heparin (porcine), 5,000 Units, Subcutaneous, Q12H  metoprolol succinate XL, 12.5 mg, Oral, Q24H  oxybutynin XL, 10 mg, Oral, Daily  pantoprazole, 40 mg, Oral, Q AM  sodium chloride, 10 mL, Intravenous, Q12H  [START ON 2025] tuberculin, 5 Units, Intradermal, Once         Current Antimicrobial Therapy:  Anti-Infectives (From admission, onward)      None          Current Diuretic Therapy:  Diuretics (From admission, onward)      None          ----------------------------------------------------------------------------------------------------------------------  Vital Signs:  Temp:  [97.7 °F (36.5 °C)-98.8 °F (37.1 °C)] 97.7 °F (36.5 °C)  Heart Rate:  [66-87] 66  Resp:  [16-18] 18  BP: ()/(57-58) 107/58  SpO2:  [93 %] 93 %  on   ;   Device (Oxygen Therapy): room air  Body mass index is 28.45 kg/m².    Wt Readings from Last 3 Encounters:   25 63.9 kg (140 lb 14 oz)   25 59.3 kg (130 lb 12.8 oz)   25 71.7 kg (158 lb 1.1 oz)     Intake & Output (last 3 days)          07 07 07  0700 06/14 0701  06/15 0700    P.O. 480 860 600 260    Total Intake(mL/kg) 480 (7.6) 860 (13.5) 600 (9.4) 260 (4.1)    Urine (mL/kg/hr) 800 (0.5) 650 (0.4) 650 (0.4)     Stool        Total Output 800 650 650     Net -320 +210 -50 +260            Urine Unmeasured Occurrence 2 x 3 x 3 x     Stool Unmeasured Occurrence  1 x            Diet: Regular/House; Fluid Consistency: Thin (IDDSI 0)  ----------------------------------------------------------------------------------------------------------------------  Physical exam:  Constitutional:  Well-developed and well-nourished.  No respiratory distress. Elderly appearing female in bedside chair.     HENT:  Head:  Normocephalic and atraumatic.  Mouth:  Moist mucous membranes.    Eyes:  Conjunctivae and EOM are normal. No scleral icterus.    Neck:  Neck supple.  No JVD present.    Cardiovascular:  Normal rate, regular rhythm and normal heart sounds with no murmur.  Pulmonary/Chest:  No respiratory distress, no wheezes, no crackles, with normal breath sounds and good air movement.  Abdominal:  Soft.  Bowel sounds are normal.  No distension and no tenderness.   Musculoskeletal:  No edema, no tenderness, and no deformity.  No red or swollen joints anywhere.    Neurological:  Alert and oriented to person, place, and time.  No cranial nerve deficit.  No tongue deviation.  No facial droop.  No slurred speech.   Skin:  Skin is warm and dry. No rash noted. No pallor.   Peripheral vascular:  Pulses in all 4 extremities with no clubbing, no cyanosis, no edema.  ----------------------------------------------------------------------------------------------------------------------  Tele:    ----------------------------------------------------------------------------------------------------------------------  Results from last 7 days   Lab Units 06/14/25  0426   WBC 10*3/mm3 8.34   HEMOGLOBIN g/dL 10.8*   HEMATOCRIT % 35.7   MCV fL 93.2   MCHC g/dL 30.3*   PLATELETS 10*3/mm3 379        "  Results from last 7 days   Lab Units 06/14/25  0426 06/09/25  0829   SODIUM mmol/L 140 136   POTASSIUM mmol/L 3.9 4.0   MAGNESIUM mg/dL  --  1.6   CHLORIDE mmol/L 105 100   CO2 mmol/L 23.4 21.8*   BUN mg/dL 18.9 21.4   CREATININE mg/dL 0.96 1.14*   CALCIUM mg/dL 9.4 9.2   GLUCOSE mg/dL 87 156*   Estimated Creatinine Clearance: 38.4 mL/min (by C-G formula based on SCr of 0.96 mg/dL).  No results found for: \"AMMONIA\"              No results found for: \"HGBA1C\", \"POCGLU\"  Lab Results   Component Value Date    TSH 1.600 06/14/2023    FREET4 1.16 09/14/2018     No results found for: \"PREGTESTUR\", \"PREGSERUM\", \"HCG\", \"HCGQUANT\"  Pain Management Panel  More data exists         Latest Ref Rng & Units 2/10/2025 9/25/2024   Pain Management Panel   Creatinine, Urine mg/dL 71.5  65.4      Brief Urine Lab Results  (Last result in the past 365 days)        Color   Clarity   Blood   Leuk Est   Nitrite   Protein   CREAT   Urine HCG        05/23/25 1953 Yellow   Clear   Negative   Negative   Negative   Negative                 No results found for: \"BLOODCX\"  No results found for: \"URINECX\"  No results found for: \"WOUNDCX\"  No results found for: \"STOOLCX\"  No results found for: \"RESPCX\"  No results found for: \"AFBCX\"        I have personally looked at the labs and they are summarized above.  ----------------------------------------------------------------------------------------------------------------------  Detailed radiology reports for the last 24 hours:    Imaging Results (Last 24 Hours)       ** No results found for the last 24 hours. **          Assessment & Plan    #Severe hypomagnesemia  #Moderate hypokalemia  #Severe hyperkalemia  #Acute generalized weakness  #PERNELL  Hypertension    Patient admitted with hypokalemia, hypomagnesemia. Replaced.  Of note she briefly became hyperkalemic after replacement. She was transferred for swing bed for continued PT/OT. MRI lumbar spine with multilevel DDD. Symptoms are not acute and MRI " findings also look like more subacute/chronic degenerative changes. Will need outpatient neurosurgery evaluation. Labs/vitals/notes reviewed. Renal function has improved to baseline. Patient continues to work with PT/OT. Patient approved until 6/16 with clinical updates due at that time. Patient would like to get strong enough to return home to help care for her . Repeat labs this  AM stable.     Code status: Full     Dispo: Continue swing bed stay with updates due on 6/16.     Joseph Sanchez MD  [facility] Hospitalist  06/14/25  13:26 EDT

## 2025-06-14 NOTE — PLAN OF CARE
Goal Outcome Evaluation:   Pt resting in bed at this time. VSS on room air. Worked with PT today, ambulated in room. Prn pain meds per mar. Voices no complaints or concerns at this time. Will continue plan of care.

## 2025-06-14 NOTE — PLAN OF CARE
Goal Outcome Evaluation:  Plan of Care Reviewed With: patient        Progress: no change  Outcome Evaluation: Patient resting in bed during shift. VSS on room air. Wound care complete per orders. Patient complained of pain, PRN meds given. No acute changes noted at this time, will continue with plan of care.

## 2025-06-15 PROCEDURE — 25010000002 HEPARIN (PORCINE) PER 1000 UNITS: Performed by: FAMILY MEDICINE

## 2025-06-15 RX ADMIN — OXYBUTYNIN CHLORIDE 10 MG: 5 TABLET, EXTENDED RELEASE ORAL at 08:18

## 2025-06-15 RX ADMIN — Medication 1 APPLICATION: at 21:54

## 2025-06-15 RX ADMIN — PANTOPRAZOLE SODIUM 40 MG: 40 TABLET, DELAYED RELEASE ORAL at 05:17

## 2025-06-15 RX ADMIN — Medication 1000 UNITS: at 08:18

## 2025-06-15 RX ADMIN — DOCUSATE SODIUM 100 MG: 100 CAPSULE, LIQUID FILLED ORAL at 08:18

## 2025-06-15 RX ADMIN — ACETAMINOPHEN 650 MG: 325 TABLET ORAL at 21:54

## 2025-06-15 RX ADMIN — HEPARIN SODIUM 5000 UNITS: 5000 INJECTION INTRAVENOUS; SUBCUTANEOUS at 21:54

## 2025-06-15 RX ADMIN — Medication 5 MG: at 21:54

## 2025-06-15 RX ADMIN — HEPARIN SODIUM 5000 UNITS: 5000 INJECTION INTRAVENOUS; SUBCUTANEOUS at 08:19

## 2025-06-15 RX ADMIN — ATORVASTATIN CALCIUM 20 MG: 20 TABLET, FILM COATED ORAL at 08:18

## 2025-06-15 RX ADMIN — Medication 1 CAPSULE: at 08:19

## 2025-06-15 RX ADMIN — METOPROLOL SUCCINATE 12.5 MG: 25 TABLET, EXTENDED RELEASE ORAL at 08:19

## 2025-06-15 RX ADMIN — Medication 1 APPLICATION: at 08:19

## 2025-06-15 NOTE — PROGRESS NOTES
Ms. Ryan is our 85 yo F with hx of ypertension, CKD stage IIIa, HLD, hemorrhoids, osteoarthritis, GERD, recent history of multiple UTIs who presented with weakness. Patient tells me it was more acute weakness and numbness of LLE. She notes she could not even move her toes. Patient found to have hypokalemia, hypomagnesemia with replacement. Of note she briefly became hyperkalemic after replacement. She was transferred for swing bed for continued PT/OT. MRI lumbar spine with multilevel DDD. Symptoms are not acute and MRI findings also look like more subacute/chronic degenerative changes. Will need outpatient neurosurgery evaluation but patient would like to defer any surgery currently. Labs/vitals/notes reviewed. Renal function has improved to baseline. Patient continues to work with PT/OT. Patient approved until 6/16 with clinical updates due at that time. Patient would like to get strong enough to return home to help care for her .

## 2025-06-15 NOTE — PLAN OF CARE
Goal Outcome Evaluation:  Plan of Care Reviewed With: patient        Progress: no change  Outcome Evaluation: Patient resting in bed during shift. VSS on room air. Patient requested PRN melatonin and tylenol, see MAR. Patient refused to ambulate this shift, patient was educated and encouraged. Wound care complete per orders. No acute changes noted at this time. Will continue with plan of care.

## 2025-06-15 NOTE — PLAN OF CARE
Goal Outcome Evaluation:         Pt resting in bed at this time. Pt up to bedside commode this shift. Wound care performed. No other complaints at this time.

## 2025-06-16 ENCOUNTER — READMISSION MANAGEMENT (OUTPATIENT)
Dept: CALL CENTER | Facility: HOSPITAL | Age: 85
End: 2025-06-16
Payer: MEDICARE

## 2025-06-16 VITALS
TEMPERATURE: 98 F | RESPIRATION RATE: 16 BRPM | WEIGHT: 145.2 LBS | OXYGEN SATURATION: 96 % | BODY MASS INDEX: 29.27 KG/M2 | SYSTOLIC BLOOD PRESSURE: 136 MMHG | DIASTOLIC BLOOD PRESSURE: 58 MMHG | HEART RATE: 55 BPM | HEIGHT: 59 IN

## 2025-06-16 PROCEDURE — 99316 NF DSCHRG MGMT 30 MIN+: CPT | Performed by: FAMILY MEDICINE

## 2025-06-16 PROCEDURE — 25010000002 HEPARIN (PORCINE) PER 1000 UNITS: Performed by: FAMILY MEDICINE

## 2025-06-16 RX ORDER — METOPROLOL SUCCINATE 25 MG/1
12.5 TABLET, EXTENDED RELEASE ORAL
Qty: 30 TABLET | Refills: 3 | Status: SHIPPED | OUTPATIENT
Start: 2025-06-16

## 2025-06-16 RX ORDER — CALCIUM CARBONATE 500 MG/1
2 TABLET, CHEWABLE ORAL 3 TIMES DAILY PRN
Qty: 60 TABLET | Refills: 3 | Status: SHIPPED | OUTPATIENT
Start: 2025-06-16

## 2025-06-16 RX ADMIN — HEPARIN SODIUM 5000 UNITS: 5000 INJECTION INTRAVENOUS; SUBCUTANEOUS at 08:03

## 2025-06-16 RX ADMIN — Medication 1 APPLICATION: at 08:03

## 2025-06-16 RX ADMIN — PANTOPRAZOLE SODIUM 40 MG: 40 TABLET, DELAYED RELEASE ORAL at 06:15

## 2025-06-16 RX ADMIN — ATORVASTATIN CALCIUM 20 MG: 20 TABLET, FILM COATED ORAL at 08:03

## 2025-06-16 RX ADMIN — DOCUSATE SODIUM 100 MG: 100 CAPSULE, LIQUID FILLED ORAL at 08:03

## 2025-06-16 RX ADMIN — OXYBUTYNIN CHLORIDE 10 MG: 5 TABLET, EXTENDED RELEASE ORAL at 08:03

## 2025-06-16 RX ADMIN — Medication 1 CAPSULE: at 08:03

## 2025-06-16 RX ADMIN — Medication 1000 UNITS: at 08:03

## 2025-06-16 NOTE — NURSING NOTE
Told MD that the patients PCP will be out till July. Patient already had an appointment on July 9th, asked MD if that was ok for the Pt to keep that appointment or  would the Pt need to be seen sooner. MD said that the July 9th appointment was fine to keep.

## 2025-06-16 NOTE — DISCHARGE SUMMARY
[facility] HOSPITALISTS DISCHARGE SUMMARY    Patient Identification:  Name:  Emma Ryan  Age:  84 y.o.  Sex:  female  :  1940  MRN:  [id number]  Visit Number:  [id number]    Date of Admission: 6/3/2025  Date of Discharge:  2025     PCP: Finn Nash MD    DISCHARGE DIAGNOSIS  Severe hypomagnesemia  Moderate hypokalemia  Severe hyperkalemia  Acute generalized weakness  PERNELL  Hypertension    CONSULTS   Consults       Date and Time Order Name Status Description    6/3/2025  1:57 PM Hospitalist (on-call MD unless specified)              PROCEDURES PERFORMED      HOSPITAL COURSE  Patient is a 84 y.o. female presented to [facility] complaining of weakness.  Please see the admitting history and physical for further details.    Please see discharge summary from her acute visit for details leading up to her swing bed admission.  Once in swing bed the patient has been working with physical therapy and Occupational Therapy closely.  She has become stronger each day.  The goal was to have the patient able to do not only her ADLs but also to help take care of her .  She thinks that her strength is capable of doing that now and wants to be discharged home.      VITAL SIGNS:  Temp:  [98 °F (36.7 °C)-98.3 °F (36.8 °C)] 98 °F (36.7 °C)  Heart Rate:  [55-67] 55  Resp:  [16] 16  BP: (111-136)/(58-60) 136/58  SpO2:  [96 %] 96 %  on   ;   Device (Oxygen Therapy): room air    Body mass index is 29.33 kg/m².  Wt Readings from Last 3 Encounters:   25 65.9 kg (145 lb 3.2 oz)   25 59.3 kg (130 lb 12.8 oz)   25 71.7 kg (158 lb 1.1 oz)       PHYSICAL EXAM:  Constitutional:  Well-developed and well-nourished.  No acute distress.      HENT:  Head:  Normocephalic and atraumatic.  Mouth:  Moist mucous membranes.    Eyes:  Conjunctivae and EOM are normal. No scleral icterus.    Neck:  Neck supple.  No JVD present.    Cardiovascular:  Normal rate, regular rhythm and normal  heart sounds with no murmur.  Pulmonary/Chest:  No respiratory distress, no wheezes, no crackles, with normal breath sounds and good air movement.  Abdominal:  Soft. No distension and no tenderness.   Musculoskeletal:  No tenderness, and no deformity.  No red or swollen joints anywhere.    Neurological:  Alert and oriented to person, place, and time.  No cranial nerve deficit.    Skin:  Skin is warm and dry. No rash noted. No pallor.   Peripheral vascular:  No clubbing, no cyanosis, no edema.  Psychiatric: Appropriate mood and affect      DISCHARGE DISPOSITION   Stable    DISCHARGE MEDICATIONS:     Discharge Medications        ASK your doctor about these medications        Instructions Start Date   atorvastatin 20 MG tablet  Commonly known as: LIPITOR   Take 1 tablet by mouth once daily      carvedilol 25 MG tablet  Commonly known as: COREG  Ask about: Which instructions should I use?   25 mg, Oral, 2 Times Daily With Meals      hydrALAZINE 100 MG tablet  Commonly known as: APRESOLINE   100 mg, Oral, 3 Times Daily      ketoconazole 2 % cream  Commonly known as: NIZORAL   1 Application, Topical, Daily PRN      ketoconazole 2 % shampoo  Commonly known as: NIZORAL   1 Application, Topical, 2 Times Weekly      omeprazole OTC 20 MG EC tablet  Commonly known as: PriLOSEC OTC   20 mg, Daily      oxybutynin XL 10 MG 24 hr tablet  Commonly known as: DITROPAN-XL   10 mg, Oral, Daily      potassium chloride 20 MEQ CR tablet  Commonly known as: KLOR-CON M20   20 mEq, Oral, Daily      PROBIOTIC COLON SUPPORT PO   1 tablet, Oral, Daily      spironolactone 25 MG tablet  Commonly known as: ALDACTONE   25 mg, Oral, Daily      STOOL SOFTENER PO   1 tablet, Daily      VITAMIN D PO   1,000 Units, Daily                      TEST  RESULTS PENDING AT DISCHARGE       CODE STATUS  Code Status and Medical Interventions: CPR (Attempt to Resuscitate); Full Support   Ordered at: 06/03/25 2858     Code Status (Patient has no pulse and is not  breathing):    CPR (Attempt to Resuscitate)     Medical Interventions (Patient has pulse or is breathing):    Full Support       The ASCVD Risk score (Renu LOPEZ, et al., 2019) failed to calculate for the following reasons:    The 2019 ASCVD risk score is only valid for ages 40 to 79     Glenroy Lyn DO  [facility]  06/16/25  11:59 EDT    Please note that this discharge summary required more than 30 minutes to complete.

## 2025-06-16 NOTE — DISCHARGE PLACEMENT REQUEST
"Emma Krishnamurthy (84 y.o. Female)       Date of Birth   1940    Social Security Number       Address   [address] [place] [place] [address]    Home Phone   [phone]    MRN   [id number]       Jainism   Cheondoism    Marital Status                               Admission Date   6/3/2025    Admission Type   Urgent    Admitting Provider   Glenroy Lyn DO    Attending Provider   Glenroy Lyn DO    Department, Room/Bed   [address], 3347/2S       Discharge Date       Discharge Disposition   Home-Health Care [facility]    Discharge Destination                                 Attending Provider: Glenroy Lyn DO    Allergies: Codeine, Sulfa Antibiotics    Isolation: None   Infection: None   Code Status: CPR    Ht: 149.9 cm (59\")   Wt: 65.9 kg (145 lb 3.2 oz)    Admission Cmt: None   Principal Problem: Physical deconditioning [R53.81]                   Active Insurance as of 6/3/2025       Primary Coverage       Payor Plan Insurance Group Employer/Plan Group    ANTH MEDICARE REPLACEMENT [facility] MEDICARE ADVANTAGE HMO [id number]       Payor Plan Address Payor Plan Phone Number Payor Plan Fax Number Effective Dates    [address] [phone]  2025 - None Entered    [facility] [address]         Subscriber Name Subscriber Birth Date Member ID       EMMA KRISHNAMURTHY 1940 [id number]                     Emergency Contacts        (Rel.) Home Phone Work Phone Mobile Phone    Bob Krishnamurthy (Spouse) [phone] -- --    ShelbyLashawn lim (Relative) [phone] -- --    Inez Maddox (Daughter) [phone] -- --          [address]  [place] [address]  Phone:  [phone]  Fax:  [phone] Date: 2025      Ambulatory Referral to Home Health     Patient:  Emma Krishnamurthy MRN:  [id number]   [address] [place] [address] :  1940  SSN:    Phone: [phone] Sex:  F      INSURANCE PAYOR " PLAN GROUP # SUBSCRIBER ID   Primary:    [employer] MEDICARE REPLACEMENT [id number]      Referring Provider Information:  RAMA LYN Phone: [phone] Fax: [phone]       Referral Information:   # Visits:  999 Referral Type: Home Health [42]   Urgency:  Routine Referral Reason: Specialty Services Required   Start Date: 2025 End Date:  To be determined by Insurer   Diagnosis: Physical deconditioning (R53.81)      Refer to Dept:   Refer to Provider:   Refer to Provider Phone:   Refer to Facility:       Face to Face Visit Date: 2025  Follow-up provider for Plan of Care? I treated the patient in an acute care facility and will not continue treatment after discharge.  Follow-up provider: FINN RAM [[id number]]  Reason/Clinical Findings: Significant generalized weakness  Describe mobility limitations that make leaving home difficult: Difficulty ambulating significant distance  Nursing/Therapeutic Services Requested: Physical Therapy  PT orders: Strengthening  Frequency: 1 Week 1     This document serves as a request of services and does not constitute Insurance authorization or approval of services.  To determine eligibility, please contact the members Insurance carrier to verify and review coverage.     If you have medical questions regarding this request for services. Please contact [address] at [phone] during normal business hours.        Authorizing Provider:Rama Lyn DO  Authorizing Provider's NPI: [id number]  Order Entered By: Rama Lyn DO 2025 12:09 PM     Electronically signed by: Rama Lyn DO 2025 12:09 PM          History & Physical        Rama Lyn DO at 25 1258          [facility]   HISTORY AND PHYSICAL    Patient Name: Emma Ryan  : 1940  MRN: [id number]  Primary Care Physician:  Finn Ram MD  Date of admission: 6/3/2025    Subjective  Subjective     Chief Complaint:  Weakness    History of Present Illness  Patient is a an 84-year-old female admitted to swing bed for generalized weakness.  The plan is for her to stay as long as she needs to in order to get strong enough to go home and help take care of her ailing .  Patient overall is feeling better than when she first came into the hospital where she was treated for hypomagnesemia and hypokalemia.  Review of Systems   As stated above in his present illness all other systems were reviewed and subsequently negative  Personal History     Past Medical History:   Diagnosis Date    GERD (gastroesophageal reflux disease)     Hyperlipidemia     Hypertension     Left shoulder pain     Obesity     Osteoarthritis of knees, bilateral     Overactive bladder     Right shoulder pain        Past Surgical History:   Procedure Laterality Date    APPENDECTOMY      BUNIONECTOMY Right     CARDIOVASCULAR STRESS TEST  10/2012    CATARACT EXTRACTION  2017    CHOLECYSTECTOMY      COLONOSCOPY  2011    ECHO - CONVERTED  10/2012    JOINT REPLACEMENT      bilateral knees     KNEE ARTHROPLASTY Left     KNEE ARTHROSCOPY      SHOULDER ARTHROSCOPY Left 7/28/2016    Procedure: LEFT SHOULDER ACROMIOPLASTY,MINI OPEN ROTATOR CUFF REPAIR;  Surgeon: Carlos Eduardo Smith MD;  Location: [facility];  Service:     SHOULDER SURGERY  05/31/2016    OPEN ACROMICPLASTY RIGHT SHOULDER       Family History: family history includes Cancer in her sister; Diabetes in her brother, brother, brother, and mother; Emphysema in her father; Heart attack in her mother; Heart disease in her father, mother, and another family member; Heart failure in her mother and sister; Hypertension in her father; Kidney disease in her sister; No Known Problems in her brother; Stroke in her sister. Otherwise pertinent FHx was reviewed and not pertinent to current issue.    Social History:  reports that she quit smoking about 49 years ago. Her smoking use included cigarettes. She started smoking  about 69 years ago. She has a 40 pack-year smoking history. She has never used smokeless tobacco. She reports that she does not drink alcohol and does not use drugs.    Home Medications:  Docusate Calcium, Probiotic Product, Vitamin D, atorvastatin, carvedilol, hydrALAZINE, ketoconazole, omeprazole OTC, oxybutynin XL, potassium chloride, and spironolactone    Allergies:  Allergies   Allergen Reactions    Codeine Other (See Comments)     hyperventilate    Sulfa Antibiotics Rash       Objective   Objective     Vitals:   Temp:  [97.8 °F (36.6 °C)-98.3 °F (36.8 °C)] 98.3 °F (36.8 °C)  Heart Rate:  [68-79] 68  Resp:  [16-18] 18  BP: (100-116)/(50-71) 116/68    Physical Exam  Constitutional:  Well-developed and well-nourished.  No acute distress.      HENT:  Head:  Normocephalic and atraumatic.  Mouth:  Moist mucous membranes.    Eyes:  Conjunctivae and EOM are normal. No scleral icterus.    Neck:  Neck supple.  No JVD present.    Cardiovascular:  Normal rate, regular rhythm and normal heart sounds with no murmur.  Pulmonary/Chest:  No respiratory distress, no wheezes, no crackles, with normal breath sounds and good air movement.  Abdominal:  Soft. No distension and no tenderness.   Musculoskeletal:  No tenderness, and no deformity.  No red or swollen joints anywhere.    Neurological:  Alert and oriented to person, place, and time.  No cranial nerve deficit.    Skin:  Skin is warm and dry. No rash noted. No pallor.   Peripheral vascular:  No clubbing, no cyanosis, no edema.  Psychiatric: Appropriate mood and affect  :    Result Review   Result Review:  I have personally reviewed the results from the time of this admission to 6/4/2025 12:58 EDT and agree with these findings:  []  Laboratory list / accordion  []  Microbiology  []  Radiology  []  EKG/Telemetry   []  Cardiology/Vascular   []  Pathology  []  Old records  []  Other:  Most notable findings include:       Assessment & Plan  Assessment / Plan     Brief Patient  Summary:  Emma Ryan is a 84 y.o. female who was transferred to swing bed for physical rehabilitation after a stay for electrolyte imbalance    Active Hospital Problems:      Generalized deconditioning  Hypomagnesemia  Hypokalemia  - Patient was placed in swing bed  - PT and OT being consulted  - We will defer to them for proper planning  -  is on board and helping  - Continue to monitor lab work every 3 days  VTE Prophylaxis:  Pharmacologic VTE prophylaxis orders are present.        CODE STATUS:    Code Status (Patient has no pulse and is not breathing): CPR (Attempt to Resuscitate)  Medical Interventions (Patient has pulse or is breathing): Full Support    Admission Status:  I believe this patient meets Swing bed   status.    Glenroy Lyn DO    Electronically signed by Glenroy Lyn DO at 06/04/25 1301          Physician Progress Notes (most recent note)        Joseph Sanchez MD at 06/15/25 1006          Ms. Ryan is our 85 yo F with hx of ypertension, CKD stage IIIa, HLD, hemorrhoids, osteoarthritis, GERD, recent history of multiple UTIs who presented with weakness. Patient tells me it was more acute weakness and numbness of LLE. She notes she could not even move her toes. Patient found to have hypokalemia, hypomagnesemia with replacement. Of note she briefly became hyperkalemic after replacement. She was transferred for swing bed for continued PT/OT. MRI lumbar spine with multilevel DDD. Symptoms are not acute and MRI findings also look like more subacute/chronic degenerative changes. Will need outpatient neurosurgery evaluation but patient would like to defer any surgery currently. Labs/vitals/notes reviewed. Renal function has improved to baseline. Patient continues to work with PT/OT. Patient approved until 6/16 with clinical updates due at that time. Patient would like to get strong enough to return home to help care for her .    Electronically signed by Joseph Sanchez MD  at 06/15/25 1014          Physical Therapy Notes (most recent note)        Roni Mar, PT at 25 1252  Version 1 of 1         Acute Care - Physical Therapy Treatment Note/Discharge  FLORIN Mims     Patient Name: mEma Ryan  : 1940  MRN: [id number]  Today's Date: 2025                Admit Date: 6/3/2025    Visit Dx:    ICD-10-CM ICD-9-CM   1. Physical deconditioning  R53.81 799.3     Patient Active Problem List   Diagnosis    Complete tear of right rotator cuff    Stage 3a chronic kidney disease    Cough    Gastroesophageal reflux disease    Mixed hyperlipidemia    Shoulder pain    Hypertension    Acromioclavicular joint arthritis    Impingement syndrome, shoulder    Complete tear of left rotator cuff    Urge incontinence    Atopic rhinitis    Upper respiratory tract infection    Bilateral lower extremity edema    Hypercalcemia    Right knee pain    DADA (stress urinary incontinence, female)    Obesity (BMI 35.0-39.9 without comorbidity)    Left knee injury    Acute UTI    COVID-19 virus infection    Skin lesion of scalp    Numbness in left leg    Multiple bruises    Vitamin D deficiency    Laceration of right lower leg    Chronic chest pain with low to moderate risk for CAD    Degenerative disc disease, lumbar    Valvular heart disease, mild aortic and mitral regurgitation    Diastolic dysfunction without heart failure    Hypomagnesemia    Physical deconditioning     Past Medical History:   Diagnosis Date    GERD (gastroesophageal reflux disease)     Hyperlipidemia     Hypertension     Left shoulder pain     Obesity     Osteoarthritis of knees, bilateral     Overactive bladder     Right shoulder pain      Past Surgical History:   Procedure Laterality Date    APPENDECTOMY      BUNIONECTOMY Right     CARDIOVASCULAR STRESS TEST  10/2012    CATARACT EXTRACTION  2017    CHOLECYSTECTOMY      COLONOSCOPY      ECHO - CONVERTED  10/2012    JOINT REPLACEMENT      bilateral knees     KNEE  ARTHROPLASTY Left     KNEE ARTHROSCOPY      SHOULDER ARTHROSCOPY Left 7/28/2016    Procedure: LEFT SHOULDER ACROMIOPLASTY,MINI OPEN ROTATOR CUFF REPAIR;  Surgeon: Carlos Eduardo Smith MD;  Location: [facility];  Service:     SHOULDER SURGERY  05/31/2016    OPEN ACROMICPLASTY RIGHT SHOULDER       PT Assessment (Last 12 Hours)       PT Evaluation and Treatment       Row Name             Wound 05/29/25 0411 medial sacral spine Pressure Injury    Wound - Properties Group Placement Date: 05/29/25 -SM Placement Time: 0411 -SM Present on Original Admission: N  -SM Orientation: medial  -SM Location: sacral spine  -SM Primary Wound Type: Pressure Inj  -TW    Retired Wound - Properties Group Placement Date: 05/29/25 -SM Placement Time: 0411 -SM Present on Original Admission: N  -SM Orientation: medial  -SM Location: sacral spine  -SM    Retired Wound - Properties Group Placement Date: 05/29/25 -SM Placement Time: 0411  -SM Present on Original Admission: N  -SM Orientation: medial  -SM Location: sacral spine  -SM    Retired Wound - Properties Group Date first assessed: 05/29/25 -SM Time first assessed: 0411  -SM Present on Original Admission: N  -SM Location: sacral spine  -SM      Row Name             Wound 05/26/25 1948 Right gluteal Pressure Injury    Wound - Properties Group Placement Date: 05/26/25  -PL Placement Time: 1948 -PL Present on Original Admission: Y  -TW Side: Right  -TW Location: gluteal  -PL Primary Wound Type: Pressure Inj  -TW    Retired Wound - Properties Group Placement Date: 05/26/25  -PL Placement Time: 1948 -PL Present on Original Admission: Y  -TW Side: Right  -TW Location: gluteal  -PL    Retired Wound - Properties Group Placement Date: 05/26/25  -PL Placement Time: 1948  -PL Present on Original Admission: Y  -TW Side: Right  -TW Location: gluteal  -PL    Retired Wound - Properties Group Date first assessed: 05/26/25  -PL Time first assessed: 1948 -PL Present on Original Admission: Y  -TW  Side: Right  -TW Location: gluteal  -PL      Row Name 06/16/25 1248          Physical Therapy Goals    Bed Mobility Goal Selection (PT) bed mobility, PT goal 1  -KM     Transfer Goal Selection (PT) transfer, PT goal 1  -KM     Gait Training Goal Selection (PT) gait training, PT goal 1  -KM       Row Name 06/16/25 1248          Bed Mobility Goal 1 (PT)    Activity/Assistive Device (Bed Mobility Goal 1, PT) bed mobility activities, all  -KM     [place] Level/Cues Needed (Bed Mobility Goal 1, PT) modified independence  -KM     Time Frame (Bed Mobility Goal 1, PT) by discharge  -KM     Progress/Outcomes (Bed Mobility Goal 1, PT) goal met  -[facility] Name 06/16/25 1248          Transfer Goal 1 (PT)    Activity/Assistive Device (Transfer Goal 1, PT) transfers, all;walker, rolling  -KM     [place] Level/Cues Needed (Transfer Goal 1, PT) modified independence  -KM     Time Frame (Transfer Goal 1, PT) by discharge  -KM     Progress/Outcome (Transfer Goal 1, PT) goal met  -[facility] Name 06/16/25 1248          Gait Training Goal 1 (PT)    Activity/Assistive Device (Gait Training Goal 1, PT) gait (walking locomotion);assistive device use;walker, rolling  -KM     [place] Level (Gait Training Goal 1, PT) standby assist  -KM     Distance (Gait Training Goal 1, PT) 100'  -KM     Time Frame (Gait Training Goal 1, PT) by discharge  -KM     Progress/Outcome (Gait Training Goal 1, PT) goal partially met;discharged from [facility]  -       Row Name 06/16/25 1248          Discharge Summary (PT)    Additional Documentation Discharge Summary (PT) (Group)  Progress [facility] Name 06/16/25 1248          Discharge Summary (PT)    Reason for Discharge (PT Discharge Summary) patient met all goals and outcomes;patient discharged from this facility  -     Outcomes Achieved Upon Discharge (PT Discharge Summary) all goals met within established timeframes;goals partially achieved prior to discharge  -KM     Transfer to Another  Level of Care or Facility (PT Discharge Summary) home with assist recommended;home with home health recommended  -KM     Discharge Summary Statement (PT) Pt. was able to meet or almost meet all goals. She was able to perform functional mobility skills w/ modified independence. She was able to ambulate w/ SBA and RW. She tolerated PT services well while in swing bed. Pt. to be discharged from facility on this date.  -KM               User Key  (r) = Recorded By, (t) = Taken By, (c) = Cosigned By      Initials Name Provider Type    Seble Ch, RN Registered Nurse    Steph Yin, RN Registered Nurse    Roni John, PT Physical Therapist    Raquel Ledesma, RN Registered Nurse                      Physical Therapy Education       Title: PT OT SLP Therapies (Resolved)       Topic: Physical Therapy (Resolved)       Point: Mobility training (Resolved)       Learning Progress Summary            Patient Acceptance, E,TB, VU by RG at 6/16/2025 0453    Acceptance, E,TB, VU by RG at 6/12/2025 0316    Acceptance, E,TB, VU,NR by RG at 6/8/2025 0008    Acceptance, E,TB, VU by KM at 6/3/2025 1415                      Point: Home exercise program (Resolved)       Learning Progress Summary            Patient Acceptance, E,TB, VU by RG at 6/16/2025 0453    Acceptance, E,TB, VU by RG at 6/12/2025 0316    Acceptance, E,TB, VU,NR by RG at 6/8/2025 0008    Acceptance, E,TB, VU by KM at 6/3/2025 1415                      Point: Body mechanics (Resolved)       Learning Progress Summary            Patient Acceptance, E,TB, VU by RG at 6/16/2025 0453    Acceptance, E,TB, VU by RG at 6/12/2025 0316    Acceptance, E,TB, VU,NR by RG at 6/8/2025 0008    Acceptance, E,TB, VU by KM at 6/3/2025 1415                      Point: Precautions (Resolved)       Learning Progress Summary            Patient Acceptance, E,TB, VU by RG at 6/16/2025 0453    Acceptance, E,TB, VU by RG at 6/12/2025 0316    Acceptance, E,TB, VU,NR by RG at  6/8/2025 0008    Acceptance, E,TB, VU by  at 6/3/2025 1415                                      User Key       Initials Effective Dates Name Provider Type Discipline    KM 05/24/22 -  Roni Mar, PT Physical Therapist PT    RG 02/06/24 -  Jorge Alberto Savage, RN Registered Nurse Nurse                    PT Recommendation and Plan  Anticipated Discharge Disposition (PT): home with assist, home with home health  Planned Therapy Interventions (PT): balance training, bed mobility training, gait training, home exercise program, patient/family education, postural re-education, ROM (range of motion), strengthening, stretching, transfer training, wheelchair management/propulsion training  Therapy Frequency (PT): 5 times/wk  Progress Summary (PT)  Daily Progress Summary (PT): Pt. was able to demonstrate all mobility skills w/ SBA. She was able to ambulate increased distance w/ RW. She tolerates increeased activity. Pt. would continue to benefit from PT services.  Plan of Care Reviewed With: patient  Outcome Evaluation: Pt. evaluation completed during PT session. She was able to perform functional mobility skills w/ modA-maxA. She was able to ambulate minimal distance w/ RW and maxA. She tolerated session well. Pt. would benefit from skilled PT services to improve functional mobility skills prior to dischare.     Outcome Measures       Row Name 06/13/25 1625             How much help from another is currently needed...    Putting on and taking off regular lower body clothing? 3  -KR      Bathing (including washing, rinsing, and drying) 3  -KR      Toileting (which includes using toilet bed pan or urinal) 3  -KR      Putting on and taking off regular upper body clothing 3  -KR      Taking care of personal grooming (such as brushing teeth) 3  -KR      Eating meals 4  -KR      AM-PAC 6 Clicks Score (OT) 19  -KR                User Key  (r) = Recorded By, (t) = Taken By, (c) = Cosigned By      Initials Name Provider Type    KR  Alan Mortensen OT Occupational Therapist                     Time Calculation:         PT G-Codes  Outcome Measure Options: AM-PAC 6 Clicks Daily Activity (OT)  AM-PAC 6 Clicks Score (PT): 18  AM-PAC 6 Clicks Score (OT): 19    PT Discharge Summary  Anticipated Discharge Disposition (PT): home with assist, home with home health  Reason for Discharge: All goals achieved  Discharge Destination: Home with assist, Home with home health    Roni Mar, PT  2025         Electronically signed by Roni Mar, PT at 25 1253          Occupational Therapy Notes (most recent note)        Carline Camacho, OT at 25 1107          Patient Name: Emma Ryan  : 1940    MRN: [id number]                              Today's Date: 2025       Admit Date: 6/3/2025    Visit Dx: No diagnosis found.  Patient Active Problem List   Diagnosis    Complete tear of right rotator cuff    Stage 3a chronic kidney disease    Cough    Gastroesophageal reflux disease    Mixed hyperlipidemia    Shoulder pain    Hypertension    Acromioclavicular joint arthritis    Impingement syndrome, shoulder    Complete tear of left rotator cuff    Urge incontinence    Atopic rhinitis    Upper respiratory tract infection    Bilateral lower extremity edema    Hypercalcemia    Right knee pain    DADA (stress urinary incontinence, female)    Obesity (BMI 35.0-39.9 without comorbidity)    Left knee injury    Acute UTI    COVID-19 virus infection    Skin lesion of scalp    Numbness in left leg    Multiple bruises    Vitamin D deficiency    Laceration of right lower leg    Chronic chest pain with low to moderate risk for CAD    Degenerative disc disease, lumbar    Valvular heart disease, mild aortic and mitral regurgitation    Diastolic dysfunction without heart failure    Hypomagnesemia    Physical deconditioning     Past Medical History:   Diagnosis Date    GERD (gastroesophageal reflux disease)     Hyperlipidemia     Hypertension      Left shoulder pain     Obesity     Osteoarthritis of knees, bilateral     Overactive bladder     Right shoulder pain      Past Surgical History:   Procedure Laterality Date    APPENDECTOMY      BUNIONECTOMY Right     CARDIOVASCULAR STRESS TEST  10/2012    CATARACT EXTRACTION  2017    CHOLECYSTECTOMY      COLONOSCOPY  2011    ECHO - CONVERTED  10/2012    JOINT REPLACEMENT      bilateral knees     KNEE ARTHROPLASTY Left     KNEE ARTHROSCOPY      SHOULDER ARTHROSCOPY Left 7/28/2016    Procedure: LEFT SHOULDER ACROMIOPLASTY,MINI OPEN ROTATOR CUFF REPAIR;  Surgeon: Carlos Eduardo Smith MD;  Location: [facility];  Service:     SHOULDER SURGERY  05/31/2016    OPEN ACROMICPLASTY RIGHT SHOULDER      General Information       Row Name 06/14/25 1106          OT Time and Intention    Subjective Information no complaints  -     Document Type therapy note (daily note)  -     Mode of Treatment individual therapy;occupational therapy  -     Patient Effort good  -     Comment Patient agreeable to therapy with no complaints. She reports she hopes to go home next week and did well with PT earlier this morning.  -       Row Name 06/14/25 1106          General Information    Patient Profile Reviewed yes  -     Existing Precautions/Restrictions fall  -     Barriers to Rehab medically complex  -       Row Name 06/14/25 1106          Cognition    Orientation Status (Cognition) oriented x 3  -       Row Name 06/14/25 1106          Safety Issues/Impairments Affecting Functional Mobility    Impairments Affecting Function (Mobility) balance;endurance/activity tolerance;strength  -               User Key  (r) = Recorded By, (t) = Taken By, (c) = Cosigned By      Initials Name Provider Type    Carline Blackwell, OT Occupational Therapist                     Mobility/ADL's       Row Name 06/14/25 1106          Bed Mobility    Comment, (Bed Mobility) up in chair upon arrival  -               User Key  (r) = Recorded By, (t)  = Taken By, (c) = Cosigned By      Initials Name Provider Type    Carline Blackwell OT Occupational Therapist                   Obj/Interventions       Row Name 06/14/25 1107          Motor Skills    Functional Endurance fair plus/good  -     Therapeutic Exercise --  BUE AROM exercises through functional movement patterns X20 reps; seated up in chair  -               User Key  (r) = Recorded By, (t) = Taken By, (c) = Cosigned By      Initials Name Provider Type    Carline Blackwell OT Occupational Therapist                   Goals/Plan    No documentation.                  Clinical Impression       Row Name 06/14/25 1107          Pain Assessment    Pretreatment Pain Rating 0/10 - no pain  -     Posttreatment Pain Rating 0/10 - no pain  -KP       Row Name 06/14/25 1107          Plan of Care Review    Plan of Care Reviewed With patient  -     Progress improving  -     Outcome Evaluation Patient seen for OT completing seated UE AROM exercises. She tolerated well. Patient and therapist also discussed home ADL performance. She has a walk-in shower with seat, but feels she would benefit from home health to assist initially to prevent falls/risk of injury and ensure highest independence level. Continue plan of care.  -       Row Name 06/14/25 1107          Therapy Plan Review/Discharge Plan (OT)    Anticipated Discharge Disposition (OT) home with assist;home with home health  -       Row Name 06/14/25 1107          Positioning and Restraints    Pre-Treatment Position sitting in chair/recliner  -     Post Treatment Position chair  -KP     In Chair sitting;call light within reach;encouraged to call for assist;exit alarm on  -               User Key  (r) = Recorded By, (t) = Taken By, (c) = Cosigned By      Initials Name Provider Type    Carline Blackwell OT Occupational Therapist                   Outcome Measures       Row Name 06/14/25 1108          How much help from another is currently needed...     Putting on and taking off regular lower body clothing? 3  -KP     Bathing (including washing, rinsing, and drying) 3  -KP     Toileting (which includes using toilet bed pan or urinal) 3  -KP     Putting on and taking off regular upper body clothing 3  -KP     Taking care of personal grooming (such as brushing teeth) 3  -KP     Eating meals 4  -KP     AM-PAC 6 Clicks Score (OT) 19  -       Row Name 06/14/25 1108          Functional Assessment    Outcome Measure Options AM-PAC 6 Clicks Daily Activity (OT)  -               User Key  (r) = Recorded By, (t) = Taken By, (c) = Cosigned By      Initials Name Provider Type    Carline Blackwell OT Occupational Therapist                      OT Recommendation and Plan  Planned Therapy Interventions (OT): activity tolerance training, BADL retraining, functional balance retraining, patient/caregiver education/training, occupation/activity based interventions, transfer/mobility retraining, strengthening exercise, ROM/therapeutic exercise  Therapy Frequency (OT): 5 times/wk  Plan of Care Review  Plan of Care Reviewed With: patient  Progress: improving  Outcome Evaluation: Patient seen for OT completing seated UE AROM exercises. She tolerated well. Patient and therapist also discussed home ADL performance. She has a walk-in shower with seat, but feels she would benefit from home health to assist initially to prevent falls/risk of injury and ensure highest independence level. Continue plan of care.     Time Calculation:         Time Calculation- OT       Row Name 06/14/25 1108             Time Calculation- OT    Total Timed Code Minutes- OT 16 minute(s)  -      OT Received On 06/14/25  -                User Key  (r) = Recorded By, (t) = Taken By, (c) = Cosigned By      Initials Name Provider Type    Carline Blackwell OT Occupational Therapist                  Therapy Charges for Today       Code Description Service Date Service Provider Modifiers Qty    [id number]  OT  THER PROC EA 15 MIN 2025 Carline Camacho OT GO 1                 Carline Camacho OT  2025    Electronically signed by Carline Camacho OT at 25 1109          Discharge Summary        Glenroy Lyn DO at 25 1159              [facility] HOSPITALISTS DISCHARGE SUMMARY    Patient Identification:  Name:  Emma Ryan  Age:  84 y.o.  Sex:  female  :  1940  MRN:  [id number]  Visit Number:  [id number]    Date of Admission: 6/3/2025  Date of Discharge:  2025     PCP: Finn Nash MD    DISCHARGE DIAGNOSIS  Severe hypomagnesemia  Moderate hypokalemia  Severe hyperkalemia  Acute generalized weakness  PERNELL  Hypertension    CONSULTS   Consults       Date and Time Order Name Status Description    6/3/2025  1:57 PM Hospitalist (on-call MD unless specified)              PROCEDURES PERFORMED      HOSPITAL COURSE  Patient is a 84 y.o. female presented to [facility] complaining of weakness.  Please see the admitting history and physical for further details.    Please see discharge summary from her acute visit for details leading up to her swing bed admission.  Once in swing bed the patient has been working with physical therapy and Occupational Therapy closely.  She has become stronger each day.  The goal was to have the patient able to do not only her ADLs but also to help take care of her .  She thinks that her strength is capable of doing that now and wants to be discharged home.      VITAL SIGNS:  Temp:  [98 °F (36.7 °C)-98.3 °F (36.8 °C)] 98 °F (36.7 °C)  Heart Rate:  [55-67] 55  Resp:  [16] 16  BP: (111-136)/(58-60) 136/58  SpO2:  [96 %] 96 %  on   ;   Device (Oxygen Therapy): room air    Body mass index is 29.33 kg/m².  Wt Readings from Last 3 Encounters:   25 65.9 kg (145 lb 3.2 oz)   25 59.3 kg (130 lb 12.8 oz)   25 71.7 kg (158 lb 1.1 oz)       PHYSICAL EXAM:  Constitutional:  Well-developed and well-nourished.  No acute distress.       HENT:  Head:  Normocephalic and atraumatic.  Mouth:  Moist mucous membranes.    Eyes:  Conjunctivae and EOM are normal. No scleral icterus.    Neck:  Neck supple.  No JVD present.    Cardiovascular:  Normal rate, regular rhythm and normal heart sounds with no murmur.  Pulmonary/Chest:  No respiratory distress, no wheezes, no crackles, with normal breath sounds and good air movement.  Abdominal:  Soft. No distension and no tenderness.   Musculoskeletal:  No tenderness, and no deformity.  No red or swollen joints anywhere.    Neurological:  Alert and oriented to person, place, and time.  No cranial nerve deficit.    Skin:  Skin is warm and dry. No rash noted. No pallor.   Peripheral vascular:  No clubbing, no cyanosis, no edema.  Psychiatric: Appropriate mood and affect      DISCHARGE DISPOSITION   Stable    DISCHARGE MEDICATIONS:     Discharge Medications        ASK your doctor about these medications        Instructions Start Date   atorvastatin 20 MG tablet  Commonly known as: LIPITOR   Take 1 tablet by mouth once daily      carvedilol 25 MG tablet  Commonly known as: COREG  Ask about: Which instructions should I use?   25 mg, Oral, 2 Times Daily With Meals      hydrALAZINE 100 MG tablet  Commonly known as: APRESOLINE   100 mg, Oral, 3 Times Daily      ketoconazole 2 % cream  Commonly known as: NIZORAL   1 Application, Topical, Daily PRN      ketoconazole 2 % shampoo  Commonly known as: NIZORAL   1 Application, Topical, 2 Times Weekly      omeprazole OTC 20 MG EC tablet  Commonly known as: PriLOSEC OTC   20 mg, Daily      oxybutynin XL 10 MG 24 hr tablet  Commonly known as: DITROPAN-XL   10 mg, Oral, Daily      potassium chloride 20 MEQ CR tablet  Commonly known as: KLOR-CON M20   20 mEq, Oral, Daily      PROBIOTIC COLON SUPPORT PO   1 tablet, Oral, Daily      spironolactone 25 MG tablet  Commonly known as: ALDACTONE   25 mg, Oral, Daily      STOOL SOFTENER PO   1 tablet, Daily      VITAMIN D PO   1,000 Units,  Daily                      TEST  RESULTS PENDING AT DISCHARGE       CODE STATUS  Code Status and Medical Interventions: CPR (Attempt to Resuscitate); Full Support   Ordered at: 06/03/25 3057     Code Status (Patient has no pulse and is not breathing):    CPR (Attempt to Resuscitate)     Medical Interventions (Patient has pulse or is breathing):    Full Support       The ASCVD Risk score (Renu LOPEZ, et al., 2019) failed to calculate for the following reasons:    The 2019 ASCVD risk score is only valid for ages 40 to 79     Rama Couch DO  [facility]  06/16/25  11:59 EDT    Please note that this discharge summary required more than 30 minutes to complete.        Electronically signed by Rama Couch DO at 06/16/25 1202       Discharge Order (From admission, onward)       Start     Ordered    06/16/25 1204  Discharge patient  Once        Expected Discharge Date: 06/16/25   Expected Discharge Time: Midday   Discharge Disposition: Home-Health Care [facility]   Physician of Record for Attribution - Please select from Treatment Team: RAMA COUCH [[id number]]   Review needed by CMO to determine Physician of Record: No      Question Answer Comment   Physician of Record for Attribution - Please select from Treatment Team RAMA COUCH    Review needed by CMO to determine Physician of Record No        06/16/25 1204

## 2025-06-16 NOTE — THERAPY DISCHARGE NOTE
Acute Care - Physical Therapy Treatment Note/Discharge  FLORIN Mims     Patient Name: Emma Ryan  : 1940  MRN: [id number]  Today's Date: 2025                Admit Date: 6/3/2025    Visit Dx:    ICD-10-CM ICD-9-CM   1. Physical deconditioning  R53.81 799.3     Patient Active Problem List   Diagnosis    Complete tear of right rotator cuff    Stage 3a chronic kidney disease    Cough    Gastroesophageal reflux disease    Mixed hyperlipidemia    Shoulder pain    Hypertension    Acromioclavicular joint arthritis    Impingement syndrome, shoulder    Complete tear of left rotator cuff    Urge incontinence    Atopic rhinitis    Upper respiratory tract infection    Bilateral lower extremity edema    Hypercalcemia    Right knee pain    DADA (stress urinary incontinence, female)    Obesity (BMI 35.0-39.9 without comorbidity)    Left knee injury    Acute UTI    COVID-19 virus infection    Skin lesion of scalp    Numbness in left leg    Multiple bruises    Vitamin D deficiency    Laceration of right lower leg    Chronic chest pain with low to moderate risk for CAD    Degenerative disc disease, lumbar    Valvular heart disease, mild aortic and mitral regurgitation    Diastolic dysfunction without heart failure    Hypomagnesemia    Physical deconditioning     Past Medical History:   Diagnosis Date    GERD (gastroesophageal reflux disease)     Hyperlipidemia     Hypertension     Left shoulder pain     Obesity     Osteoarthritis of knees, bilateral     Overactive bladder     Right shoulder pain      Past Surgical History:   Procedure Laterality Date    APPENDECTOMY      BUNIONECTOMY Right     CARDIOVASCULAR STRESS TEST  10/2012    CATARACT EXTRACTION  2017    CHOLECYSTECTOMY      COLONOSCOPY      ECHO - CONVERTED  10/2012    JOINT REPLACEMENT      bilateral knees     KNEE ARTHROPLASTY Left     KNEE ARTHROSCOPY      SHOULDER ARTHROSCOPY Left 2016    Procedure: LEFT SHOULDER ACROMIOPLASTY,MINI OPEN ROTATOR  CUFF REPAIR;  Surgeon: Carlos Eduardo Smith MD;  Location: [facility];  Service:     SHOULDER SURGERY  05/31/2016    OPEN ACROMICPLASTY RIGHT SHOULDER       PT Assessment (Last 12 Hours)       PT Evaluation and Treatment       Row Name             Wound 05/29/25 0411 medial sacral spine Pressure Injury    Wound - Properties Group Placement Date: 05/29/25 -SM Placement Time: 0411 -SM Present on Original Admission: N  -SM Orientation: medial  -SM Location: sacral spine  -SM Primary Wound Type: Pressure Inj  -TW    Retired Wound - Properties Group Placement Date: 05/29/25  -SM Placement Time: 0411 -SM Present on Original Admission: N  -SM Orientation: medial  -SM Location: sacral spine  -SM    Retired Wound - Properties Group Placement Date: 05/29/25 -SM Placement Time: 0411 -SM Present on Original Admission: N  -SM Orientation: medial  -SM Location: sacral spine  -SM    Retired Wound - Properties Group Date first assessed: 05/29/25  -SM Time first assessed: 0411 -SM Present on Original Admission: N  -SM Location: sacral spine  -SM      Row Name             Wound 05/26/25 1948 Right gluteal Pressure Injury    Wound - Properties Group Placement Date: 05/26/25  -PL Placement Time: 1948  -PL Present on Original Admission: Y  -TW Side: Right  -TW Location: gluteal  -PL Primary Wound Type: Pressure Inj  -TW    Retired Wound - Properties Group Placement Date: 05/26/25  -PL Placement Time: 1948 -PL Present on Original Admission: Y  -TW Side: Right  -TW Location: gluteal  -PL    Retired Wound - Properties Group Placement Date: 05/26/25  -PL Placement Time: 1948  -PL Present on Original Admission: Y  -TW Side: Right  -TW Location: gluteal  -PL    Retired Wound - Properties Group Date first assessed: 05/26/25  -PL Time first assessed: 1948  -PL Present on Original Admission: Y  -TW Side: Right  -TW Location: gluteal  -PL      Row Name 06/16/25 1248          Physical Therapy Goals    Bed Mobility Goal Selection (PT) bed  mobility, PT goal 1  -KM     Transfer Goal Selection (PT) transfer, PT goal 1  -KM     Gait Training Goal Selection (PT) gait training, PT goal 1  -KM       Row Name 06/16/25 1248          Bed Mobility Goal 1 (PT)    Activity/Assistive Device (Bed Mobility Goal 1, PT) bed mobility activities, all  -KM     Taylor Level/Cues Needed (Bed Mobility Goal 1, PT) modified independence  -KM     Time Frame (Bed Mobility Goal 1, PT) by discharge  -KM     Progress/Outcomes (Bed Mobility Goal 1, PT) goal met  -KM       Row Name 06/16/25 1248          Transfer Goal 1 (PT)    Activity/Assistive Device (Transfer Goal 1, PT) transfers, all;walker, rolling  -KM     Taylor Level/Cues Needed (Transfer Goal 1, PT) modified independence  -KM     Time Frame (Transfer Goal 1, PT) by discharge  -KM     Progress/Outcome (Transfer Goal 1, PT) goal met  -       Row Name 06/16/25 1248          Gait Training Goal 1 (PT)    Activity/Assistive Device (Gait Training Goal 1, PT) gait (walking locomotion);assistive device use;walker, rolling  -KM     Taylor Level (Gait Training Goal 1, PT) standby assist  -KM     Distance (Gait Training Goal 1, PT) 100'  -KM     Time Frame (Gait Training Goal 1, PT) by discharge  -KM     Progress/Outcome (Gait Training Goal 1, PT) goal partially met;discharged from facility  -       Row Name 06/16/25 1248          Discharge Summary (PT)    Additional Documentation Discharge Summary (PT) (Group)  -KM       Row Name 06/16/25 1248          Discharge Summary (PT)    Reason for Discharge (PT Discharge Summary) patient met all goals and outcomes;patient discharged from this facility  -     Outcomes Achieved Upon Discharge (PT Discharge Summary) all goals met within established timeframes;goals partially achieved prior to discharge  -KM     Transfer to Another Level of Care or Facility (PT Discharge Summary) home with assist recommended;home with home health recommended  -KM     Discharge Summary  Statement (PT) Pt. was able to meet or almost meet all goals. She was able to perform functional mobility skills w/ modified independence. She was able to ambulate w/ SBA and RW. She tolerated PT services well while in swing bed. Pt. to be discharged from facility on this date.  -               User Key  (r) = Recorded By, (t) = Taken By, (c) = Cosigned By      Initials Name Provider Type    Seble Ch, RN Registered Nurse    Steph Yin, RN Registered Nurse    Roni John, PT Physical Therapist    Raquel Ledesma, RN Registered Nurse                      Physical Therapy Education       Title: PT OT SLP Therapies (Resolved)       Topic: Physical Therapy (Resolved)       Point: Mobility training (Resolved)       Learning Progress Summary            Patient Acceptance, E,TB, VU by RG at 6/16/2025 0453    Acceptance, E,TB, VU by RG at 6/12/2025 0316    Acceptance, E,TB, VU,NR by RG at 6/8/2025 0008    Acceptance, E,TB, VU by KM at 6/3/2025 1415                      Point: Home exercise program (Resolved)       Learning Progress Summary            Patient Acceptance, E,TB, VU by RG at 6/16/2025 0453    Acceptance, E,TB, VU by RG at 6/12/2025 0316    Acceptance, E,TB, VU,NR by RG at 6/8/2025 0008    Acceptance, E,TB, VU by KM at 6/3/2025 1415                      Point: Body mechanics (Resolved)       Learning Progress Summary            Patient Acceptance, E,TB, VU by RG at 6/16/2025 0453    Acceptance, E,TB, VU by RG at 6/12/2025 0316    Acceptance, E,TB, VU,NR by RG at 6/8/2025 0008    Acceptance, E,TB, VU by KM at 6/3/2025 1415                      Point: Precautions (Resolved)       Learning Progress Summary            Patient Acceptance, E,TB, VU by RG at 6/16/2025 0453    Acceptance, E,TB, VU by RG at 6/12/2025 0316    Acceptance, E,TB, VU,NR by RG at 6/8/2025 0008    Acceptance, E,TB, VU by KM at 6/3/2025 1415                                      User Key       Initials Effective Dates  Name Provider Type Discipline     05/24/22 -  Roni Mar, PT Physical Therapist PT    RG 02/06/24 -  Jorge Alberto Savage, RN Registered Nurse Nurse                    PT Recommendation and Plan  Anticipated Discharge Disposition (PT): home with assist, home with home health  Planned Therapy Interventions (PT): balance training, bed mobility training, gait training, home exercise program, patient/family education, postural re-education, ROM (range of motion), strengthening, stretching, transfer training, wheelchair management/propulsion training  Therapy Frequency (PT): 5 times/wk  Progress Summary (PT)  Daily Progress Summary (PT): Pt. was able to demonstrate all mobility skills w/ SBA. She was able to ambulate increased distance w/ RW. She tolerates increeased activity. Pt. would continue to benefit from PT services.  Plan of Care Reviewed With: patient  Outcome Evaluation: Pt. evaluation completed during PT session. She was able to perform functional mobility skills w/ modA-maxA. She was able to ambulate minimal distance w/ RW and maxA. She tolerated session well. Pt. would benefit from skilled PT services to improve functional mobility skills prior to dischare.     Outcome Measures       Row Name 06/13/25 0409             How much help from another is currently needed...    Putting on and taking off regular lower body clothing? 3  -KR      Bathing (including washing, rinsing, and drying) 3  -KR      Toileting (which includes using toilet bed pan or urinal) 3  -KR      Putting on and taking off regular upper body clothing 3  -KR      Taking care of personal grooming (such as brushing teeth) 3  -KR      Eating meals 4  -KR      AM-PAC 6 Clicks Score (OT) 19  -KR                User Key  (r) = Recorded By, (t) = Taken By, (c) = Cosigned By      Initials Name Provider Type    Alan Garrido, OT Occupational Therapist                     Time Calculation:         PT G-Codes  Outcome Measure Options: AM-PAC 6 Clicks  Daily Activity (OT)  AM-PAC 6 Clicks Score (PT): 18  AM-PAC 6 Clicks Score (OT): 19    PT Discharge Summary  Anticipated Discharge Disposition (PT): home with assist, home with home health  Reason for Discharge: All goals achieved  Discharge Destination: Home with assist, Home with home health    Roni Mar, PT  6/16/2025

## 2025-06-16 NOTE — PLAN OF CARE
Goal Outcome Evaluation:  Plan of Care Reviewed With: patient        Progress: no change  Outcome Evaluation: Pt resting in bed during assessment, VSS. Pt has no complaints at this time. Wound care done per orders. Will cont POC.

## 2025-06-16 NOTE — PLAN OF CARE
Goal Outcome Evaluation:                    Pt being discharged home. Pt has no iv.

## 2025-06-16 NOTE — PAYOR COMM NOTE
"Nicki Carter RN CM/Swing Bed  [email or web address]  Phone: [phone] Fax: [phone]  -NPI [id number]  [id number]  Authorization No: [id number]     Patient was admitted to post acute swing bed for PT/OT strength, mobility, ADL and transfer training. Lives at home with spouse and plans to return home when she can safely do so. PLOF independent with all household ADLs and ambulates via rollator.      ICD 10  R53.81  E83.42    Emma Ryan (84 y.o. Female)       Date of Birth   1940    Social Security Number       Address   [address] [address]    Home Phone   [phone]    MRN   [id number]       Yazdanism   Voodoo    Marital Status                               Admission Date   6/3/2025    Admission Type   Urgent    Admitting Provider   Glenroy Lyn DO    Attending Provider   Glenroy Lyn DO    Department, Room/Bed   [address], [facility]/       Discharge Date       Discharge Disposition       Discharge Destination                                 Attending Provider: Glenroy Lyn DO    Allergies: Codeine, Sulfa Antibiotics    Isolation: None   Infection: None   Code Status: CPR    Ht: 149.9 cm (59\")   Wt: 65.9 kg (145 lb 3.2 oz)    Admission Cmt: None   Principal Problem: Physical deconditioning [R53.81]                   Active Insurance as of 6/3/2025       Primary Coverage       Payor Plan Insurance Group Employer/Plan Group    ANTH MEDICARE REPLACEMENT [facility] MEDICARE ADVANTAGE O [id number]       Payor Plan Address Payor Plan Phone Number Payor Plan Fax Number Effective Dates    [address] [phone]  1/1/2025 - None Entered    [facility] [id number]         Subscriber Name Subscriber Birth Date Member ID       EMMA RYAN 1940 [id number]                     Emergency Contacts        (Rel.) Home Phone Work Phone Mobile Phone    Bob Ryan (Spouse) [phone] -- --    " Lashawn Ryan (Relative) [phone] -- --    Inez Maddox (Daughter) [phone] -- --              Current Facility-Administered Medications   Medication Dose Route Frequency Provider Last Rate Last Admin    acetaminophen (TYLENOL) tablet 650 mg  650 mg Oral Q6H PRN Glenroy Lyn DO   650 mg at 06/15/25 2154    Acidophilus/Pectin capsule 1 capsule  1 capsule Oral Daily Glenroy Lyn DO   1 capsule at 06/15/25 0819    atorvastatin (LIPITOR) tablet 20 mg  20 mg Oral Daily Glenroy Lyn DO   20 mg at 06/15/25 0818    sennosides-docusate (PERICOLACE) 8.6-50 MG per tablet 2 tablet  2 tablet Oral BID PRN Glenroy Lyn DO        And    polyethylene glycol (MIRALAX) packet 17 g  17 g Oral Daily PRN Glenroy Lyn DO   17 g at 06/05/25 2018    And    bisacodyl (DULCOLAX) EC tablet 5 mg  5 mg Oral Daily PRN Glenroy Lyn DO        And    bisacodyl (DULCOLAX) suppository 10 mg  10 mg Rectal Daily PRN Glenroy Lyn DO   10 mg at 06/07/25 0826    calcium carbonate (TUMS) chewable tablet 500 mg (200 mg elemental)  2 tablet Oral TID PRN Glenroy Lyn DO   2 tablet at 06/13/25 1817    castor oil-balsam peru (VENELEX) ointment 1 Application  1 Application Topical Q12H Glenroy Lyn DO   1 Application at 06/15/25 2154    cholecalciferol (VITAMIN D3) tablet 1,000 Units  1,000 Units Oral Daily Glenroy Lyn DO   1,000 Units at 06/15/25 0818    docusate sodium (COLACE) capsule 100 mg  100 mg Oral Daily Glenroy Lyn DO   100 mg at 06/15/25 0818    heparin (porcine) 5000 UNIT/ML injection 5,000 Units  5,000 Units Subcutaneous Q12H Glenroy Lyn DO   5,000 Units at 06/15/25 2154    ketoconazole (NIZORAL) 2 % cream 1 Application  1 Application Topical Daily PRN Glenroy Lyn DO        Magnesium Standard Dose Replacement - Follow Nurse / BPA Driven Protocol   Not Applicable PRN Glenroy Lyn DO        melatonin tablet 5 mg  5 mg Oral Nightly PRN Glenroy Lyn DO   5 mg at 06/15/25 1189    metoprolol  succinate XL (TOPROL-XL) 24 hr tablet 12.5 mg  12.5 mg Oral Q24H Glenroy Lyn DO   12.5 mg at 06/15/25 0819    nitroglycerin (NITROSTAT) SL tablet 0.4 mg  0.4 mg Sublingual Q5 Min PRN Glenroy Lyn DO        nitroglycerin (NITROSTAT) SL tablet 0.4 mg  0.4 mg Sublingual Q5 Min PRN Glenroy Lyn DO        oxybutynin XL (DITROPAN-XL) 24 hr tablet 10 mg  10 mg Oral Daily Glenroy Lyn DO   10 mg at 06/15/25 0818    pantoprazole (PROTONIX) EC tablet 40 mg  40 mg Oral Q AM Glenroy Lyn DO   40 mg at 06/16/25 0615    Potassium Replacement - Follow Nurse / BPA Driven Protocol   Not Applicable PRN Glenroy Lyn DO        sodium chloride 0.9 % flush 10 mL  10 mL Intravenous Q12H Glenroy Lyn DO        sodium chloride 0.9 % flush 10 mL  10 mL Intravenous PRN Glenroy Lyn DO        sodium chloride 0.9 % infusion 40 mL  40 mL Intravenous PRN Glenroy Lyn DO        [START ON 6/19/2025] tuberculin injection 5 Units  5 Units Intradermal Once Glenroy Lyn DO         Orders (active)        Start     Ordered    06/19/25 0600  tuberculin injection 5 Units  Once         06/05/25 0726    06/19/25 0600  TB Skin Test (Reading)  Once        Comments: Use Enter / Edit Results to Document Results      06/05/25 0726    06/05/25 0800  Vital Signs  Every Morning         06/05/25 0726    06/05/25 0725  Initial Swing Bed Certification  Once         06/05/25 0726    06/05/25 0725  Weigh Patient Upon Admission and Every Sunday  Weekly         06/05/25 0726    06/05/25 0725  Intake & Output  Every Shift       06/05/25 0726    06/05/25 0725  TB Skin Test (Reading)  Once        Comments: Use Enter / Edit Results to Document Results Upon Patient Return to Facility      06/05/25 0726    06/05/25 0725  OT Consult: Eval & Treat ADL Performance Below Baseline  Once         06/05/25 0726    06/04/25 1356  Diet: Regular/House; Fluid Consistency: Thin (IDDSI 0)  Diet Effective Now         06/04/25 1356    06/04/25 0900  atorvastatin  (LIPITOR) tablet 20 mg  Daily         06/03/25 1356    06/04/25 0900  cholecalciferol (VITAMIN D3) tablet 1,000 Units  Daily         06/03/25 1356    06/04/25 0900  docusate sodium (COLACE) capsule 100 mg  Daily         06/03/25 1356    06/04/25 0900  oxybutynin XL (DITROPAN-XL) 24 hr tablet 10 mg  Daily         06/03/25 1356    06/04/25 0900  Acidophilus/Pectin capsule 1 capsule  Daily         06/03/25 1356    06/04/25 0900  metoprolol succinate XL (TOPROL-XL) 24 hr tablet 12.5 mg  Every 24 Hours Scheduled         06/03/25 1357    06/04/25 0600  Daily Weights  Daily       06/03/25 1356 06/04/25 0600  pantoprazole (PROTONIX) EC tablet 40 mg  Every Early Morning         06/03/25 1356    06/03/25 2100  sodium chloride 0.9 % flush 10 mL  Every 12 Hours Scheduled         06/03/25 1356 06/03/25 2100  heparin (porcine) 5000 UNIT/ML injection 5,000 Units  Every 12 Hours Scheduled         06/03/25 1356    06/03/25 2100  castor oil-balsam peru (VENELEX) ointment 1 Application  Every 12 Hours Scheduled         06/03/25 1357 06/03/25 2040  Ambulate Patient  Every Shift       06/03/25 1357 06/03/25 2000  Wound Care  Every 12 Hours         06/03/25 1357    06/03/25 1800  Oral Care  2 Times Daily       06/03/25 1356 06/03/25 1800  Strict Intake & Output  Every 6 Hours         06/03/25 1356    06/03/25 1800  Dietary Nutrition Supplements Boost Plus (Ensure Enlive, Ensure Plus)  Daily With Breakfast & Dinner       06/03/25 1356 06/03/25 1600  Vital Signs  Every 8 Hours        Comments: Per per hospital policy    06/03/25 1356 06/03/25 1400  Vital Signs Every Hour and Per Hospital Policy Based on Patient Condition  Every Hour       06/03/25 1357    06/03/25 1400  Vital Signs Every Hour and Per Hospital Policy Based on Patient Condition  Every Hour       06/03/25 1357 06/03/25 1357  Hospitalist (on-call MD unless specified)  Once        Specialty:  Hospitalist  Provider:  Glenroy Lyn DO    06/03/25 9432     06/03/25 1357  Notify Physician (with Parameters)  Until Discontinued         06/03/25 1356    06/03/25 1357  Code Status and Medical Interventions: CPR (Attempt to Resuscitate); Full Support  Continuous         06/03/25 1356    06/03/25 1357  Telemetry - Place Orders & Notify Provider of Results When Patient Experiences Acute Chest Pain, Dysrhythmia or Respiratory Distress  Continuous        Comments: Open Order Report to View Parameters Requiring Provider Notification    06/03/25 1356    06/03/25 1357  May Be Off Telemetry for Tests  Continuous         06/03/25 1356    06/03/25 1357  PT Consult: Eval & Treat Functional Mobility Below Baseline  Once         06/03/25 1356    06/03/25 1357  OT Consult: Eval & Treat ADL Performance Below Baseline  Once         06/03/25 1356    06/03/25 1357  Telemetry - Place Orders & Notify Provider of Results When Patient Experiences Acute Chest Pain, Dysrhythmia or Respiratory Distress  Continuous        Comments: Open Order Report to View Parameters Requiring Provider Notification    06/03/25 1357    06/03/25 1357  Wound Ostomy Eval & Treat  Once         06/03/25 1357    06/03/25 1357  Maintain IV Access  Continuous         06/03/25 1357    06/03/25 1357  Telemetry - Place Orders & Notify Provider of Results When Patient Experiences Acute Chest Pain, Dysrhythmia or Respiratory Distress  Continuous        Comments: Open Order Report to View Parameters Requiring Provider Notification    06/03/25 1357    06/03/25 1357  Continuous Pulse Oximetry  Continuous         06/03/25 1357    06/03/25 1357  Elevate Heels Off of Bed  Until Discontinued         06/03/25 1357    06/03/25 1357  Turn Patient  Now Then Every 2 Hours         06/03/25 1357    06/03/25 1357  Use Repositioning Wedge to Position Patient  Continuous         06/03/25 1357    06/03/25 1357  Use Seat Cushion When Up In Chair  Continuous         06/03/25 1357    06/03/25 1357  Do NOT Rub or Massage Any Bony Prominence   "Continuous         06/03/25 1357    06/03/25 1357  Wound Ostomy Eval & Treat  Once         06/03/25 1357    06/03/25 1356  Magnesium Standard Dose Replacement - Follow Nurse / BPA Driven Protocol  As Needed         06/03/25 1356    06/03/25 1356  Potassium Replacement - Follow Nurse / BPA Driven Protocol  As Needed         06/03/25 1356    06/03/25 1356  sodium chloride 0.9 % flush 10 mL  As Needed         06/03/25 1356    06/03/25 1356  sodium chloride 0.9 % infusion 40 mL  As Needed         06/03/25 1356    06/03/25 1356  nitroglycerin (NITROSTAT) SL tablet 0.4 mg  Every 5 Minutes PRN         06/03/25 1356    06/03/25 1356  sennosides-docusate (PERICOLACE) 8.6-50 MG per tablet 2 tablet  2 Times Daily PRN        Placed in \"And\" Linked Group    06/03/25 1356    06/03/25 1356  polyethylene glycol (MIRALAX) packet 17 g  Daily PRN        Placed in \"And\" Linked Group    06/03/25 1356    06/03/25 1356  bisacodyl (DULCOLAX) EC tablet 5 mg  Daily PRN        Placed in \"And\" Linked Group    06/03/25 1356    06/03/25 1356  bisacodyl (DULCOLAX) suppository 10 mg  Daily PRN        Placed in \"And\" Linked Group    06/03/25 1356    06/03/25 1356  ketoconazole (NIZORAL) 2 % cream 1 Application  Daily PRN         06/03/25 1356    06/03/25 1356  melatonin tablet 5 mg  Nightly PRN         06/03/25 1356    06/03/25 1356  nitroglycerin (NITROSTAT) SL tablet 0.4 mg  Every 5 Minutes PRN         06/03/25 1357    06/03/25 1356  acetaminophen (TYLENOL) tablet 650 mg  Every 6 Hours PRN         06/03/25 1357    06/03/25 1356  calcium carbonate (TUMS) chewable tablet 500 mg (200 mg elemental)  3 Times Daily PRN         06/03/25 1357    Unscheduled  Up With Assistance  As Needed       06/03/25 1356    Unscheduled  Wound Care  As Needed       06/03/25 1357                     Physician Progress Notes (most recent note)        Joseph Sanchez MD at 06/15/25 1006          MsKannan Ryan is our 85 yo F with hx of ypertension, CKD stage IIIa, HLD, " hemorrhoids, osteoarthritis, GERD, recent history of multiple UTIs who presented with weakness. Patient tells me it was more acute weakness and numbness of LLE. She notes she could not even move her toes. Patient found to have hypokalemia, hypomagnesemia with replacement. Of note she briefly became hyperkalemic after replacement. She was transferred for swing bed for continued PT/OT. MRI lumbar spine with multilevel DDD. Symptoms are not acute and MRI findings also look like more subacute/chronic degenerative changes. Will need outpatient neurosurgery evaluation but patient would like to defer any surgery currently. Labs/vitals/notes reviewed. Renal function has improved to baseline. Patient continues to work with PT/OT. Patient approved until  with clinical updates due at that time. Patient would like to get strong enough to return home to help care for her .    Electronically signed by Joseph Sanchez MD at 06/15/25 1014       Consult Notes (most recent note)    No notes of this type exist for this encounter.       Nutrition Notes (most recent note)    No notes exist for this encounter.          Physical Therapy Notes (most recent note)        Roni Mar, PT at 25 1205  Version 1 of 1         Acute Care - Physical Therapy Treatment Note  FLORIN Mims     Patient Name: Emma Ryan  : 1940  MRN: [id number]  Today's Date: 2025      Visit Dx:   No diagnosis found.  Patient Active Problem List   Diagnosis    Complete tear of right rotator cuff    Stage 3a chronic kidney disease    Cough    Gastroesophageal reflux disease    Mixed hyperlipidemia    Shoulder pain    Hypertension    Acromioclavicular joint arthritis    Impingement syndrome, shoulder    Complete tear of left rotator cuff    Urge incontinence    Atopic rhinitis    Upper respiratory tract infection    Bilateral lower extremity edema    Hypercalcemia    Right knee pain    DADA (stress urinary incontinence, female)    Obesity  (BMI 35.0-39.9 without comorbidity)    Left knee injury    Acute UTI    COVID-19 virus infection    Skin lesion of scalp    Numbness in left leg    Multiple bruises    Vitamin D deficiency    Laceration of right lower leg    Chronic chest pain with low to moderate risk for CAD    Degenerative disc disease, lumbar    Valvular heart disease, mild aortic and mitral regurgitation    Diastolic dysfunction without heart failure    Hypomagnesemia    Physical deconditioning     Past Medical History:   Diagnosis Date    GERD (gastroesophageal reflux disease)     Hyperlipidemia     Hypertension     Left shoulder pain     Obesity     Osteoarthritis of knees, bilateral     Overactive bladder     Right shoulder pain      Past Surgical History:   Procedure Laterality Date    APPENDECTOMY      BUNIONECTOMY Right     CARDIOVASCULAR STRESS TEST  10/2012    CATARACT EXTRACTION  2017    CHOLECYSTECTOMY      COLONOSCOPY  2011    ECHO - CONVERTED  10/2012    JOINT REPLACEMENT      bilateral knees     KNEE ARTHROPLASTY Left     KNEE ARTHROSCOPY      SHOULDER ARTHROSCOPY Left 7/28/2016    Procedure: LEFT SHOULDER ACROMIOPLASTY,MINI OPEN ROTATOR CUFF REPAIR;  Surgeon: Carlos Eduardo Smith MD;  Location: [facility];  Service:     SHOULDER SURGERY  05/31/2016    OPEN ACROMICPLASTY RIGHT SHOULDER     PT Assessment (Last 12 Hours)       PT Evaluation and Treatment       Row Name 06/14/25 1201          Physical Therapy Time and Intention    Subjective Information no complaints  -KM     Document Type therapy note (daily note)  -KM     Mode of Treatment individual therapy;physical therapy  -KM     Patient Effort good  -KM     Symptoms Noted During/After Treatment none  -KM       Row Name 06/14/25 1201          General Information    Patient Profile Reviewed yes  -KM     Patient Observations alert;cooperative;agree to therapy  -KM     Existing Precautions/Restrictions fall  -KM       Row Name 06/14/25 1201          Pain    Pretreatment Pain  Rating 0/10 - no pain  -KM     Posttreatment Pain Rating 0/10 - no pain  -KM       Row Name 06/14/25 1201          Cognition    Affect/Mental Status (Cognition) WFL  -KM     Orientation Status (Cognition) oriented x 3  -KM     Follows Commands (Cognition) WFL  -KM       Row Name 06/14/25 1201          Bed Mobility    Bed Mobility supine-sit  -KM     Supine-Sit [place] (Bed Mobility) standby assist  -KM     Assistive Device (Bed Mobility) bed rails;head of bed elevated  -KM       Row Name 06/14/25 1201          Transfers    Transfers sit-stand transfer;stand-sit transfer;bed-chair transfer  -KM       Row Name 06/14/25 1201          Bed-Chair Transfer    Bed-Chair [place] (Transfers) standby assist  -KM     Assistive Device (Bed-Chair Transfers) walker, front-wheeled  -KM       Row Name 06/14/25 1201          Sit-Stand Transfer    Sit-Stand [place] (Transfers) standby assist  -     Assistive Device (Sit-Stand Transfers) walker, front-wheeled  -KM       Row Name 06/14/25 1201          Stand-Sit Transfer    Stand-Sit [place] (Transfers) standby assist  -     Assistive Device (Stand-Sit Transfers) walker, front-wheeled  -KM       Row Name 06/14/25 1201          Gait/Stairs (Locomotion)    Gait/Stairs Locomotion gait/ambulation independence;gait/ambulation assistive device;distance ambulated  -KM     [place] Level (Gait) standby assist  -     Assistive Device (Gait) walker, front-wheeled  -KM     Patient was able to Ambulate yes  -KM     Distance in Feet (Gait) 40  x2  -KM     Pattern (Gait) step-to  -KM     Deviations/Abnormal Patterns (Gait) ataxic;base of support, narrow;gait speed decreased  -KM     Right Sided Gait Deviations foot drop/toe drag;knee buckling, right side  -KM       Row Name 06/14/25 1201          Safety Issues/Impairments Affecting Functional Mobility    Impairments Affecting Function (Mobility) balance;endurance/activity tolerance;strength  -KM       Row Name              Wound 05/29/25 0411 medial sacral spine Pressure Injury    Wound - Properties Group Placement Date: 05/29/25  -SM Placement Time: 0411  -SM Present on Original Admission: N  -SM Orientation: medial  -SM Location: sacral spine  -SM Primary Wound Type: Pressure Inj  -TW    Retired Wound - Properties Group Placement Date: 05/29/25  -SM Placement Time: 0411 -SM Present on Original Admission: N  -SM Orientation: medial  -SM Location: sacral spine  -SM    Retired Wound - Properties Group Placement Date: 05/29/25  -SM Placement Time: 0411  -SM Present on Original Admission: N  -SM Orientation: medial  -SM Location: sacral spine  -SM    Retired Wound - Properties Group Date first assessed: 05/29/25  -SM Time first assessed: 0411 -SM Present on Original Admission: N  -SM Location: sacral spine  -SM      Row Name             Wound 05/26/25 1948 Right gluteal Pressure Injury    Wound - Properties Group Placement Date: 05/26/25  -PL Placement Time: 1948 -PL Present on Original Admission: Y  -TW Side: Right  -TW Location: gluteal  -PL Primary Wound Type: Pressure Inj  -TW    Retired Wound - Properties Group Placement Date: 05/26/25  -PL Placement Time: 1948  -PL Present on Original Admission: Y  -TW Side: Right  -TW Location: gluteal  -PL    Retired Wound - Properties Group Placement Date: 05/26/25  -PL Placement Time: 1948  -PL Present on Original Admission: Y  -TW Side: Right  -TW Location: gluteal  -PL    Retired Wound - Properties Group Date first assessed: 05/26/25  -PL Time first assessed: 1948  -PL Present on Original Admission: Y  -TW Side: Right  -TW Location: gluteal  -PL      Row Name 06/14/25 1201          Progress Summary (PT)    Daily Progress Summary (PT) Pt. was able to demonstrate all mobility skills w/ SBA. She was able to ambulate increased distance w/ RW. She tolerates increeased activity. Pt. would continue to benefit from PT services.  -KM               User Key  (r) = Recorded By, (t) = Taken By, (c)  = Cosigned By      Initials Name Provider Type    TW Seble Sena, RN Registered Nurse    Steph Yin, RN Registered Nurse    Roni John, PT Physical Therapist    Raquel Ledesma, RN Registered Nurse                    Physical Therapy Education       Title: PT OT SLP Therapies (Done)       Topic: Physical Therapy (Done)       Point: Mobility training (Done)       Learning Progress Summary            Patient Acceptance, E,TB, VU by  at 6/12/2025 0316    Acceptance, E,TB, VU,NR by  at 6/8/2025 0008    Acceptance, E,TB, VU by  at 6/3/2025 1415                      Point: Home exercise program (Done)       Learning Progress Summary            Patient Acceptance, E,TB, VU by RG at 6/12/2025 0316    Acceptance, E,TB, VU,NR by  at 6/8/2025 0008    Acceptance, E,TB, VU by  at 6/3/2025 1415                      Point: Body mechanics (Done)       Learning Progress Summary            Patient Acceptance, E,TB, VU by  at 6/12/2025 0316    Acceptance, E,TB, VU,NR by  at 6/8/2025 0008    Acceptance, E,TB, VU by  at 6/3/2025 1415                      Point: Precautions (Done)       Learning Progress Summary            Patient Acceptance, E,TB, VU by RG at 6/12/2025 0316    Acceptance, E,TB, VU,NR by  at 6/8/2025 0008    Acceptance, E,TB, VU by  at 6/3/2025 1415                                      User Key       Initials Effective Dates Name Provider Type Discipline     05/24/22 -  Roni Mar, PILLO Physical Therapist PT     02/06/24 -  Jorge Alberto Savage, RN Registered Nurse Nurse                  PT Recommendation and Plan  Anticipated Discharge Disposition (PT):  (TBD)  Planned Therapy Interventions (PT): balance training, bed mobility training, gait training, home exercise program, patient/family education, postural re-education, ROM (range of motion), strengthening, stretching, transfer training, wheelchair management/propulsion training  Therapy Frequency (PT): 5 times/wk  Progress  Summary (PT)  Daily Progress Summary (PT): Pt. was able to demonstrate all mobility skills w/ SBA. She was able to ambulate increased distance w/ RW. She tolerates increeased activity. Pt. would continue to benefit from PT services.  Plan of Care Reviewed With: patient  Outcome Evaluation: Pt. evaluation completed during PT session. She was able to perform functional mobility skills w/ modA-maxA. She was able to ambulate minimal distance w/ RW and maxA. She tolerated session well. Pt. would benefit from skilled PT services to improve functional mobility skills prior to dischare.   Outcome Measures       Row Name 06/13/25 1625             How much help from another is currently needed...    Putting on and taking off regular lower body clothing? 3  -KR      Bathing (including washing, rinsing, and drying) 3  -KR      Toileting (which includes using toilet bed pan or urinal) 3  -KR      Putting on and taking off regular upper body clothing 3  -KR      Taking care of personal grooming (such as brushing teeth) 3  -KR      Eating meals 4  -KR      AM-PAC 6 Clicks Score (OT) 19  -KR                User Key  (r) = Recorded By, (t) = Taken By, (c) = Cosigned By      Initials Name Provider Type    Alan Garrido, OT Occupational Therapist                     Time Calculation:    PT Charges       Row Name 06/14/25 1156             Time Calculation    PT Received On 06/14/25  -KM         Time Calculation- PT    Total Timed Code Minutes- PT 23 minute(s)  -KM                User Key  (r) = Recorded By, (t) = Taken By, (c) = Cosigned By      Initials Name Provider Type    Roni John, PT Physical Therapist                  Therapy Charges for Today       Code Description Service Date Service Provider Modifiers Qty    54842065527 HC PT THERAPEUTIC ACT EA 15 MIN 6/14/2025 Roni Mar, PT GP 1    87011672593 HC GAIT TRAINING EA 15 MIN 6/14/2025 Roni Mar, PT GP 1            PT G-Codes  Outcome Measure Options: AM-PAC 6  Clicks Daily Activity (OT)  AM-PAC 6 Clicks Score (PT): 18  AM-PAC 6 Clicks Score (OT): 19    Roni Mar, PT  2025      Electronically signed by Roni Mar, PT at 25 1206          Occupational Therapy Notes (most recent note)        Carline Camacho, OT at 25 1630          Patient Name: Emma Ryan  : 1940    MRN: [id number]                              Today's Date: 2025       Admit Date: 6/3/2025    Visit Dx: No diagnosis found.  Patient Active Problem List   Diagnosis    Complete tear of right rotator cuff    Stage 3a chronic kidney disease    Cough    Gastroesophageal reflux disease    Mixed hyperlipidemia    Shoulder pain    Hypertension    Acromioclavicular joint arthritis    Impingement syndrome, shoulder    Complete tear of left rotator cuff    Urge incontinence    Atopic rhinitis    Upper respiratory tract infection    Bilateral lower extremity edema    Hypercalcemia    Right knee pain    DADA (stress urinary incontinence, female)    Obesity (BMI 35.0-39.9 without comorbidity)    Left knee injury    Acute UTI    COVID-19 virus infection    Skin lesion of scalp    Numbness in left leg    Multiple bruises    Vitamin D deficiency    Laceration of right lower leg    Chronic chest pain with low to moderate risk for CAD    Degenerative disc disease, lumbar    Valvular heart disease, mild aortic and mitral regurgitation    Diastolic dysfunction without heart failure    Hypomagnesemia    Physical deconditioning     Past Medical History:   Diagnosis Date    GERD (gastroesophageal reflux disease)     Hyperlipidemia     Hypertension     Left shoulder pain     Obesity     Osteoarthritis of knees, bilateral     Overactive bladder     Right shoulder pain      Past Surgical History:   Procedure Laterality Date    APPENDECTOMY      BUNIONECTOMY Right     CARDIOVASCULAR STRESS TEST  10/2012    CATARACT EXTRACTION  2017    CHOLECYSTECTOMY      COLONOSCOPY      ECHO - CONVERTED   10/2012    JOINT REPLACEMENT      bilateral knees     KNEE ARTHROPLASTY Left     KNEE ARTHROSCOPY      SHOULDER ARTHROSCOPY Left 7/28/2016    Procedure: LEFT SHOULDER ACROMIOPLASTY,MINI OPEN ROTATOR CUFF REPAIR;  Surgeon: Carlos Eduardo Smith MD;  Location: [facility];  Service:     SHOULDER SURGERY  05/31/2016    OPEN ACROMICPLASTY RIGHT SHOULDER      General Information       Row Name 06/14/25 1106          OT Time and Intention    Subjective Information no complaints  -     Document Type therapy note (daily note)  -     Mode of Treatment individual therapy;occupational therapy  -     Patient Effort good  -     Comment Patient agreeable to therapy with no complaints. She reports she hopes to go home next week and did well with PT earlier this morning.  -       Row Name 06/14/25 1106          General Information    Patient Profile Reviewed yes  -     Existing Precautions/Restrictions fall  -     Barriers to Rehab medically complex  -       Row Name 06/14/25 1106          Cognition    Orientation Status (Cognition) oriented x 3  -       Row Name 06/14/25 1106          Safety Issues/Impairments Affecting Functional Mobility    Impairments Affecting Function (Mobility) balance;endurance/activity tolerance;strength  -               User Key  (r) = Recorded By, (t) = Taken By, (c) = Cosigned By      Initials Name Provider Type    Carline Blackwell OT Occupational Therapist                     Mobility/ADL's       Row Name 06/14/25 1106          Bed Mobility    Comment, (Bed Mobility) up in chair upon arrival  -               User Key  (r) = Recorded By, (t) = Taken By, (c) = Cosigned By      Initials Name Provider Type    Carlien Blackwell OT Occupational Therapist                   Obj/Interventions       Row Name 06/14/25 1107          Motor Skills    Functional Endurance fair plus/good  -     Therapeutic Exercise --  BUE AROM exercises through functional movement patterns X20 reps; seated  up in chair  -               User Key  (r) = Recorded By, (t) = Taken By, (c) = Cosigned By      Initials Name Provider Type    Carline Blackwell OT Occupational Therapist                   Goals/Plan    No documentation.                  Clinical Impression       Row Name 06/14/25 1107          Pain Assessment    Pretreatment Pain Rating 0/10 - no pain  -     Posttreatment Pain Rating 0/10 - no pain  -KP       Row Name 06/14/25 1107          Plan of Care Review    Plan of Care Reviewed With patient  -     Progress improving  -     Outcome Evaluation Patient seen for OT completing seated UE AROM exercises. She tolerated well. Patient and therapist also discussed home ADL performance. She has a walk-in shower with seat, but feels she would benefit from home health to assist initially to prevent falls/risk of injury and ensure highest independence level. Continue plan of care.  -       Row Name 06/14/25 1107          Therapy Plan Review/Discharge Plan (OT)    Anticipated Discharge Disposition (OT) home with assist;home with home health  -[facility] Name 06/14/25 1107          Positioning and Restraints    Pre-Treatment Position sitting in chair/recliner  -     Post Treatment Position chair  -KP     In Chair sitting;call light within reach;encouraged to call for assist;exit alarm on  -               User Key  (r) = Recorded By, (t) = Taken By, (c) = Cosigned By      Initials Name Provider Type    Carline Blackwell OT Occupational Therapist                   Outcome Measures       Row Name 06/14/25 1108          How much help from another is currently needed...    Putting on and taking off regular lower body clothing? 3  -KP     Bathing (including washing, rinsing, and drying) 3  -KP     Toileting (which includes using toilet bed pan or urinal) 3  -KP     Putting on and taking off regular upper body clothing 3  -KP     Taking care of personal grooming (such as brushing teeth) 3  -KP     Eating meals 4   -     AM-PAC 6 Clicks Score (OT) 19  -       Row Name 06/14/25 1108          Functional Assessment    Outcome Measure Options AM-PAC 6 Clicks Daily Activity (OT)  -               User Key  (r) = Recorded By, (t) = Taken By, (c) = Cosigned By      Initials Name Provider Type    Carline Blackwell OT Occupational Therapist                      OT Recommendation and Plan  Planned Therapy Interventions (OT): activity tolerance training, BADL retraining, functional balance retraining, patient/caregiver education/training, occupation/activity based interventions, transfer/mobility retraining, strengthening exercise, ROM/therapeutic exercise  Therapy Frequency (OT): 5 times/wk  Plan of Care Review  Plan of Care Reviewed With: patient  Progress: improving  Outcome Evaluation: Patient seen for OT completing seated UE AROM exercises. She tolerated well. Patient and therapist also discussed home ADL performance. She has a walk-in shower with seat, but feels she would benefit from home health to assist initially to prevent falls/risk of injury and ensure highest independence level. Continue plan of care.     Time Calculation:         Time Calculation- OT       Row Name 06/14/25 1108             Time Calculation- OT    Total Timed Code Minutes- OT 16 minute(s)  -      OT Received On 06/14/25  -                User Key  (r) = Recorded By, (t) = Taken By, (c) = Cosigned By      Initials Name Provider Type    Carline Blackwell OT Occupational Therapist                  Therapy Charges for Today       Code Description Service Date Service Provider Modifiers Qty    32364655106  OT THER PROC EA 15 MIN 6/14/2025 Carline Camacho OT GO 1                 Carline Camacho OT  6/14/2025    Electronically signed by Carline Camacho OT at 06/14/25 1109       Alan Mortensen OT   Occupational Therapist  Occupational Therapy     Therapy Treatment Note      Signed     Date of Service: 06/13/25 1625  Creation Time: 06/13/25 1625      Signed       Expand All Collapse All[]Expand All by Default    Acute Care - Occupational Therapy Treatment Note  FLORIN Mims     Patient Name: Emma Ryan                 : 1940                    MRN: [id number]  Today's Date: 2025                                                                        Admit Date: 6/3/2025     Visit Diagnosis   No diagnosis found.     Problem List       Patient Active Problem List   Diagnosis    Complete tear of right rotator cuff    Stage 3a chronic kidney disease    Cough    Gastroesophageal reflux disease    Mixed hyperlipidemia    Shoulder pain    Hypertension    Acromioclavicular joint arthritis    Impingement syndrome, shoulder    Complete tear of left rotator cuff    Urge incontinence    Atopic rhinitis    Upper respiratory tract infection    Bilateral lower extremity edema    Hypercalcemia    Right knee pain    DADA (stress urinary incontinence, female)    Obesity (BMI 35.0-39.9 without comorbidity)    Left knee injury    Acute UTI    COVID-19 virus infection    Skin lesion of scalp    Numbness in left leg    Multiple bruises    Vitamin D deficiency    Laceration of right lower leg    Chronic chest pain with low to moderate risk for CAD    Degenerative disc disease, lumbar    Valvular heart disease, mild aortic and mitral regurgitation    Diastolic dysfunction without heart failure    Hypomagnesemia    Physical deconditioning         Medical History        Past Medical History:   Diagnosis Date    GERD (gastroesophageal reflux disease)      Hyperlipidemia      Hypertension      Left shoulder pain      Obesity      Osteoarthritis of knees, bilateral      Overactive bladder      Right shoulder pain           Surgical History         Past Surgical History:   Procedure Laterality Date    APPENDECTOMY        BUNIONECTOMY Right      CARDIOVASCULAR STRESS TEST   10/2012    CATARACT EXTRACTION   2017    CHOLECYSTECTOMY        COLONOSCOPY       ECHO - CONVERTED    10/2012    JOINT REPLACEMENT         bilateral knees     KNEE ARTHROPLASTY Left      KNEE ARTHROSCOPY        SHOULDER ARTHROSCOPY Left 7/28/2016     Procedure: LEFT SHOULDER ACROMIOPLASTY,MINI OPEN ROTATOR CUFF REPAIR;  Surgeon: Carlos Eduardo Smith MD;  Location: [facility];  Service:     SHOULDER SURGERY   05/31/2016     OPEN ACROMICPLASTY RIGHT SHOULDER                     OT ASSESSMENT FLOWSHEET (Last 12 Hours)         OT Evaluation and Treatment         Row Name 06/13/25 3352                                   OT Time and Intention     Document Type therapy note (daily note)  -KR             Mode of Treatment occupational therapy  -KR             Patient Effort good  -KR             Comment Pt seen on this date for AE/DME review for needs in home environment. BUE AROM from supported seated, to enhance ability to perform mobility/self care tasks.  -KR                       General Information     Prior Level of Function independent:  -KR             Equipment Currently Used at Home commode, bedside;walker, rolling  -KR                       Living Environment     Current Living Arrangements home  -KR             People in Home spouse  -KR             Primary Care Provided by self  -KR                       Shoulder (Therapeutic Exercise)     Shoulder (Therapeutic Exercise) AROM (active range of motion)  -KR             Shoulder AROM (Therapeutic Exercise) bilateral;flexion;extension;sitting  -KR                       Elbow/Forearm (Therapeutic Exercise)     Elbow/Forearm (Therapeutic Exercise) AROM (active range of motion)  -KR             Elbow/Forearm AROM (Therapeutic Exercise) bilateral;flexion;extension;sitting  -KR                       Hand (Therapeutic Exercise)     Hand (Therapeutic Exercise) AROM (active range of motion)  -KR             Hand AROM/AAROM (Therapeutic Exercise) bilateral;finger flexion;finger extension  -KR                       Wound 05/29/25 0411 medial sacral spine Pressure Injury      Wound - Properties Group Placement Date: 05/29/25  -SM Placement Time: 0411 -SM Present on Original Admission: N  -SM Orientation: medial  -SM Location: sacral spine  -SM Primary Wound Type: Pressure Inj  -TW     Retired Wound - Properties Group Placement Date: 05/29/25  -SM Placement Time: 0411 -SM Present on Original Admission: N  -SM Orientation: medial  -SM Location: sacral spine  -SM     Retired Wound - Properties Group Placement Date: 05/29/25  -SM Placement Time: 0411 -SM Present on Original Admission: N  -SM Orientation: medial  -SM Location: sacral spine  -SM     Retired Wound - Properties Group Date first assessed: 05/29/25  -SM Time first assessed: 0411 -SM Present on Original Admission: N  -SM Location: sacral spine  -SM               Wound 05/26/25 1948 Right gluteal Pressure Injury     Wound - Properties Group Placement Date: 05/26/25  -PL Placement Time: 1948 -PL Present on Original Admission: Y  -TW Side: Right  -TW Location: gluteal  -PL Primary Wound Type: Pressure Inj  -TW     Retired Wound - Properties Group Placement Date: 05/26/25  -PL Placement Time: 1948  -PL Present on Original Admission: Y  -TW Side: Right  -TW Location: gluteal  -PL     Retired Wound - Properties Group Placement Date: 05/26/25  -PL Placement Time: 1948  -PL Present on Original Admission: Y  -TW Side: Right  -TW Location: gluteal  -PL     Retired Wound - Properties Group Date first assessed: 05/26/25  -PL Time first assessed: 1948  -PL Present on Original Admission: Y  -TW Side: Right  -TW Location: gluteal  -PL               Plan of Care Review     Plan of Care Reviewed With patient  -KR             Progress improving  -KR                       Progress Summary (OT)     Progress Toward Functional Goals (OT) progress toward functional goals as expected  -KR                          User Key  (r) = Recorded By, (t) = Taken By, (c) = Cosigned By        Initials Name Effective Dates     Seble Ch RN 06/16/21  -      Alan Garrido OT 21 -      Steph Yin RN 22 - 06/10/25     Raquel Ledesma RN 24 -                                          OT Recommendation and Plan  Progress Toward Functional Goals (OT): progress toward functional goals as expected  Plan of Care Review  Plan of Care Reviewed With: patient  Progress: improving  Plan of Care Reviewed With: patient       Outcome Measures         Row Name 25 4735                       How much help from another is currently needed...     Putting on and taking off regular lower body clothing? 3  -KR         Bathing (including washing, rinsing, and drying) 3  -KR         Toileting (which includes using toilet bed pan or urinal) 3  -KR         Putting on and taking off regular upper body clothing 3  -KR         Taking care of personal grooming (such as brushing teeth) 3  -KR         Eating meals 4  -KR         AM-PAC 6 Clicks Score (OT) 19  -KR                         User Key  (r) = Recorded By, (t) = Taken By, (c) = Cosigned By        Initials Name Provider Type     Alan Garrido OT Occupational Therapist                             Time Calculation:      Therapy Charges for Today         Code Description Service Date Service Provider Modifiers Qty     [id number]  OT THERAPEUTIC ACT EA 15 MIN 2025 Alan Mortensen OT GO 1                   Alan Mortensen OT                     2025               Rubina Louis PTA   Physical Therapist Assistant  Physical Therapy     Therapy Treatment Note      Signed     Date of Service: 25  Creation Time: 25     Signed       Expand All Collapse All[]Expand All by Default    Acute Care - Physical Therapy Treatment Note  FLORIN Mims     Patient Name: Emma Ryan                 : 1940                    MRN: [id number]  Today's Date: 2025                                  Visit Dx:   Visit Diagnosis   No diagnosis found.     Problem List       Patient  Active Problem List   Diagnosis    Complete tear of right rotator cuff    Stage 3a chronic kidney disease    Cough    Gastroesophageal reflux disease    Mixed hyperlipidemia    Shoulder pain    Hypertension    Acromioclavicular joint arthritis    Impingement syndrome, shoulder    Complete tear of left rotator cuff    Urge incontinence    Atopic rhinitis    Upper respiratory tract infection    Bilateral lower extremity edema    Hypercalcemia    Right knee pain    DADA (stress urinary incontinence, female)    Obesity (BMI 35.0-39.9 without comorbidity)    Left knee injury    Acute UTI    COVID-19 virus infection    Skin lesion of scalp    Numbness in left leg    Multiple bruises    Vitamin D deficiency    Laceration of right lower leg    Chronic chest pain with low to moderate risk for CAD    Degenerative disc disease, lumbar    Valvular heart disease, mild aortic and mitral regurgitation    Diastolic dysfunction without heart failure    Hypomagnesemia    Physical deconditioning         Medical History        Past Medical History:   Diagnosis Date    GERD (gastroesophageal reflux disease)      Hyperlipidemia      Hypertension      Left shoulder pain      Obesity      Osteoarthritis of knees, bilateral      Overactive bladder      Right shoulder pain           Surgical History         Past Surgical History:   Procedure Laterality Date    APPENDECTOMY        BUNIONECTOMY Right      CARDIOVASCULAR STRESS TEST   10/2012    CATARACT EXTRACTION   2017    CHOLECYSTECTOMY        COLONOSCOPY   2011    ECHO - CONVERTED   10/2012    JOINT REPLACEMENT         bilateral knees     KNEE ARTHROPLASTY Left      KNEE ARTHROSCOPY        SHOULDER ARTHROSCOPY Left 7/28/2016     Procedure: LEFT SHOULDER ACROMIOPLASTY,MINI OPEN ROTATOR CUFF REPAIR;  Surgeon: Carlos Eduardo Smith MD;  Location: [facility];  Service:     SHOULDER SURGERY   05/31/2016     OPEN ACROMICPLASTY RIGHT SHOULDER         PT Assessment (Last 12 Hours)            PT  Evaluation and Treatment         Row Name 06/13/25 1454                 Physical Therapy Time and Intention     Subjective Information no complaints  -LL       Document Type therapy note (daily note)  -LL       Mode of Treatment physical therapy;individual therapy  -LL       Patient Effort good  -LL       Comment Patient & RN in agreement for participation with PT.  -LL          Row Name 06/13/25 1454                 Pain     Pretreatment Pain Rating 0/10 - no pain  -LL       Posttreatment Pain Rating 0/10 - no pain  -LL          Row Name 06/13/25 1454                 Cognition     Affect/Mental Status (Cognition) WFL  -LL       Orientation Status (Cognition) oriented x 3  -LL       Follows Commands (Cognition) WFL  -LL       Personal Safety Interventions fall prevention program maintained;gait belt;nonskid shoes/slippers when out of bed;supervised activity  -LL          Row Name 06/13/25 1454                 Bed Mobility     Bed Mobility supine-sit;sit-supine;scooting/bridging  -LL       Supine-Sit [place] (Bed Mobility) standby assist  -LL       Assistive Device (Bed Mobility) bed rails;head of bed elevated  -LL          Row Name 06/13/25 1454                 Transfers     Transfers toilet transfer  -LL          Row Name 06/13/25 1454                 Sit-Stand Transfer     Sit-Stand [place] (Transfers) contact guard  -LL       Assistive Device (Sit-Stand Transfers) walker, front-wheeled  -LL          Row Name 06/13/25 1454                 Stand-Sit Transfer     Stand-Sit [place] (Transfers) contact guard  -LL       Assistive Device (Stand-Sit Transfers) walker, front-wheeled  -LL          Row Name 06/13/25 1454                 Toilet Transfer     Type (Toilet Transfer) sit-stand;stand-sit  -LL       [place] Level (Toilet Transfer) contact guard  -LL       Assistive Device (Toilet Transfer) commode, bedside with drop arms  -          Row Name 06/13/25 1454                 Gait/Stairs  (Locomotion)     [place] Level (Gait) minimum assist (75% patient effort);contact guard;verbal cues;nonverbal cues (demo/gesture)  -LL       Assistive Device (Gait) walker, front-wheeled  -LL       Distance in Feet (Gait) --  35' x 2  -LL       Pattern (Gait) step-to  -LL       Deviations/Abnormal Patterns (Gait) ataxic;base of support, narrow;gait speed decreased  -LL       Right Sided Gait Deviations foot drop/toe drag;knee buckling, right side  -LL          Row Name 06/13/25 1454                 Motor Skills     Comments, Therapeutic Exercise --  Seated: LAQ, marching, hip abd/add, L AP  -LL          Row Name                      Wound 05/29/25 0411 medial sacral spine Pressure Injury     Wound - Properties Group Placement Date: 05/29/25 -SM Placement Time: 0411 -SM Present on Original Admission: N  -SM Orientation: medial  -SM Location: sacral spine  -SM Primary Wound Type: Pressure Inj  -TW     Retired Wound - Properties Group Placement Date: 05/29/25  -SM Placement Time: 0411 -SM Present on Original Admission: N  -SM Orientation: medial  -SM Location: sacral spine  -SM     Retired Wound - Properties Group Placement Date: 05/29/25  -SM Placement Time: 0411 -SM Present on Original Admission: N  -SM Orientation: medial  -SM Location: sacral spine  -SM     Retired Wound - Properties Group Date first assessed: 05/29/25  -SM Time first assessed: 0411 -SM Present on Original Admission: N  -SM Location: sacral spine  -SM        Row Name                      Wound 05/26/25 1948 Right gluteal Pressure Injury     Wound - Properties Group Placement Date: 05/26/25  -PL Placement Time: 1948  -PL Present on Original Admission: Y  -TW Side: Right  -TW Location: gluteal  -PL Primary Wound Type: Pressure Inj  -TW     Retired Wound - Properties Group Placement Date: 05/26/25  -PL Placement Time: 1948  -PL Present on Original Admission: Y  -TW Side: Right  -TW Location: gluteal  -PL     Retired Wound - Properties Group  Placement Date: 05/26/25  -PL Placement Time: 1948 -PL Present on Original Admission: Y  -TW Side: Right  -TW Location: gluteal  -PL     Retired Wound - Properties Group Date first assessed: 05/26/25  -PL Time first assessed: 1948  -PL Present on Original Admission: Y  -TW Side: Right  -TW Location: gluteal  -PL        Row Name 06/13/25 1454                 Positioning and Restraints     Pre-Treatment Position in bed  -LL       Post Treatment Position bsc  -LL       On BS commode notified nsg;sitting;call light within reach;encouraged to call for assist  -LL                    User Key  (r) = Recorded By, (t) = Taken By, (c) = Cosigned By        Initials Name Provider Type     TW Seble Sena, RN Registered Nurse     Rubina Goel PTA Physical Therapist Assistant     Steph Yin RN Registered Nurse     Raquel Ledesma RN Registered Nurse                             Physical Therapy Education            Title: PT OT SLP Therapies (Done)         Topic: Physical Therapy (Done)         Point: Mobility training (Done)         Learning Progress Summary             Patient Acceptance, E,TB, VU by RG at 6/12/2025 0316     Acceptance, E,TB, VU,NR by RG at 6/8/2025 0008     Acceptance, E,TB, VU by KM at 6/3/2025 1415                            Point: Home exercise program (Done)         Learning Progress Summary             Patient Acceptance, E,TB, VU by RG at 6/12/2025 0316     Acceptance, E,TB, VU,NR by RG at 6/8/2025 0008     Acceptance, E,TB, VU by KM at 6/3/2025 1415                            Point: Body mechanics (Done)         Learning Progress Summary             Patient Acceptance, E,TB, VU by RG at 6/12/2025 0316     Acceptance, E,TB, VU,NR by RG at 6/8/2025 0008     Acceptance, E,TB, VU by KM at 6/3/2025 1415                            Point: Precautions (Done)         Learning Progress Summary             Patient Acceptance, E,TB, VU by RG at 6/12/2025 0316     Acceptance, E,TB, VU,NR by  RG at 6/8/2025 0008     Acceptance, E,TB, VU by  at 6/3/2025 1415                                                User Key         Initials Effective Dates Name Provider Type Discipline     KM 05/24/22 -  Roni Mar, PT Physical Therapist PT     RG 02/06/24 -  Jorge Alberto Savage, RN Registered Nurse Nurse                          PT Recommendation and Plan               Time Calculation:     PT Charges         Row Name 06/13/25 1500                       Time Calculation     PT Received On 06/13/25  -LL                 Time Calculation- PT     Total Timed Code Minutes- PT 45 minute(s)  -LL                      User Key  (r) = Recorded By, (t) = Taken By, (c) = Cosigned By        Initials Name Provider Type     LL Louis, Rubina, PTA Physical Therapist Assistant                       Therapy Charges for Today         Code Description Service Date Service Provider Modifiers Qty     75695600309 HC PT THERAPEUTIC ACT EA 15 MIN 6/12/2025 Louis, Rubina, PTA GP, CQ 2     92015802971 HC PT THER PROC EA 15 MIN 6/12/2025 Louis, Rubina, PTA GP, CQ 1     79354207856 HC GAIT TRAINING EA 15 MIN 6/12/2025 Louis, Rubina, PTA GP, CQ 1     89610030521 HC GAIT TRAINING EA 15 MIN 6/13/2025 Louis, Rubina, PTA GP, CQ 1     35940730146 HC PT THERAPEUTIC ACT EA 15 MIN 6/13/2025 Louis, Rubina, PTA GP, CQ 1     39914591524 HC PT THER PROC EA 15 MIN 6/13/2025 Louis, Rubina, PTA GP, CQ 1                PT G-Codes  Outcome Measure Options: AM-PAC 6 Clicks Daily Activity (OT)  AM-PAC 6 Clicks Score (PT): 18  AM-PAC 6 Clicks Score (OT): 21     Rubina. Louis, PTA             6/13/2025                                   Sanjuana Yu, BSW     Case Management     Case Management/Social Work      Signed     Date of Service: 06/13/25 1626  Creation Time: 06/13/25 1626     Signed         Discharge Planning Assessment   [facility]     Patient Name: Emma Ryan                      MRN: [id number]  Today's Date: 6/13/2025                 Admit Date: 6/3/2025                 Discharge Plan         Row Name 06/13/25 1626           Plan     Plan Pt was admitted to swing bed on 6/3 for therapy services, swing bed days are approved clinical updates due on 6/16. Pt lives with spouse, Bob ([address] [place], [place]) and she plans to return home at discharge.Pt has a rollator and cane via unknown provider. Pt does not utilize home health services. SS to follow.                       REGINO BocanegraW

## 2025-06-16 NOTE — DISCHARGE INSTR - ACTIVITY
Activity as Tolerated    Wound Locations- bilateral gluteals and sacral spine pressure injury  Wound Care Instructions- assess every shift and cleanse with foam cleanser, pat dry and apply venelex BID to the sacral spine and bilateral gluteals; please cover the sacral spine with a small silicone bordered dressing BID; leave bilateral gluteals open to air  Cleanse- Cleansing Foam  Intervention- Venelex - Thin Layer

## 2025-06-16 NOTE — OUTREACH NOTE
Prep Survey      Flowsheet Row Responses   [facility] patient discharged from? [facility]   Is LACE score < 7 ? No   Eligibility Readm Mgmt   Discharge diagnosis Severe hypomagnesemia   Does the patient have one of the following disease processes/diagnoses(primary or secondary)? Other   Does the patient have Home health ordered? Yes   What is the Home health agency?  [facility]   Is there a DME ordered? No   Prep survey completed? Yes            LORELEI ADKINS - Registered Nurse

## 2025-06-16 NOTE — CASE MANAGEMENT/SOCIAL WORK
Discharge Planning Assessment   [facility]     Patient Name: Emma Ryan  MRN: [id number]  Today's Date: 6/16/2025    Admit Date: 6/3/2025       Discharge Plan       Row Name 06/16/25 1250       Plan    Final Discharge Disposition Code 06 - home with home health care    Final Note Pt is being discharged home with Provider ordered home health on this date. Pt is aware and agreeable to discharge. Pt does not have a prefernece of HH agency. SS faxed referral to [facility] via [facility]. SS to provide RN report number once agency has confirmed acceptance. Lead RN updated.                 Continued Care and Services - Admitted Since 6/3/2025       Home Medical Care       Service Provider Request Status Services Address Phone Fax Patient Preferred    [facility] Pending - Request Sent -- [address] [facility] [address] [phone] --                    ALVARO Bocanegra

## 2025-06-18 ENCOUNTER — NURSE TRIAGE (OUTPATIENT)
Dept: CALL CENTER | Facility: HOSPITAL | Age: 85
End: 2025-06-18
Payer: MEDICARE

## 2025-06-18 NOTE — TELEPHONE ENCOUNTER
"Reported she fell yesterday and has a large cut on her right leg, stated her neighbor bandaged it but she needs home health or a nurse or doctor to come look at it. Reported dressing in place, no bleeding thru bandage.  Advised to go to ER or UC and have evaluated. If does not have someone who can take her advised to call ambulance.   Stated she does not have anyone to take her and she is not calling an ambulance.   If refusing to go to ER or UC, advised to call PCP this morning.   Reason for Disposition   Sounds like a serious injury to the triager    Additional Information   Negative: Serious injury with multiple fractures (broken bones)   Negative: [1] Major bleeding (e.g., actively dripping or spurting) AND [2] can't be stopped   Negative: Bullet wound, stabbed by knife, or other serious penetrating wound   Negative: Looks like a dislocated joint (crooked or deformed)   Negative: Can't stand (bear weight) or walk   Negative: Sounds like a life-threatening emergency to the triager   Negative: Wound looks infected   Negative: Leg pain from overuse (e.g., sports, running, physical work)   Negative: Leg pain not from an injury   Negative: Injury mainly to the hip   Negative: Injury mainly to knee   Negative: Injury mainly to foot or ankle   Negative: Skin is split open or gaping (or length > 1/2 inch or 12 mm)   Negative: [1] Bleeding AND [2] won't stop after 10 minutes of direct pressure (using correct technique)   Negative: [1] Dirt in the wound AND [2] not removed with 15 minutes of scrubbing    Answer Assessment - Initial Assessment Questions  1. MECHANISM: \"How did the injury happen?\" (e.g., twisting injury, direct blow)       Reported had a fall  2. ONSET: \"When did the injury happen?\" (Minutes or hours ago)       yesterday  3. LOCATION: \"Where is the injury located?\"       Right leg  4. APPEARANCE of INJURY: \"What does the injury look like?\"  (e.g., deformity of leg)      Reported dressing is in place  5. " "SEVERITY: \"Can you put weight on that leg?\" \"Can you walk?\"       -  6. SIZE: For cuts, bruises, or swelling, ask: \"How large is it?\" (e.g., inches or centimeters)       Reported was large  7. PAIN: \"Is there pain?\" If Yes, ask: \"How bad is the pain?\"   \"What does it keep you from doing?\" (e.g., Scale 1-10; or mild, moderate, severe)    -  NONE: (0): no pain    -  MILD (1-3): doesn't interfere with normal activities     -  MODERATE (4-7): interferes with normal activities (e.g., work or school) or awakens from sleep, limping     -  SEVERE (8-10): excruciating pain, unable to do any normal activities, unable to walk      Did not report  8. TETANUS: For any breaks in the skin, ask: \"When was the last tetanus booster?\"      -  9. OTHER SYMPTOMS: \"Do you have any other symptoms?\"       -  10. PREGNANCY: \"Is there any chance you are pregnant?\" \"When was your last menstrual period?\"        n/a    Protocols used: Leg Injury-ADULT-AH    "

## 2025-06-20 ENCOUNTER — READMISSION MANAGEMENT (OUTPATIENT)
Dept: CALL CENTER | Facility: HOSPITAL | Age: 85
End: 2025-06-20
Payer: MEDICARE

## 2025-06-20 NOTE — OUTREACH NOTE
Medical Week 1 Survey      Flowsheet Row Responses   [facility] facility patient discharged from? [facility]   Does the patient have one of the following disease processes/diagnoses(primary or secondary)? Other   Week 1 attempt successful? No   Unsuccessful attempts Attempt 1            JUAN DAVID SAENZ - Registered Nurse

## 2025-06-25 ENCOUNTER — READMISSION MANAGEMENT (OUTPATIENT)
Dept: CALL CENTER | Facility: HOSPITAL | Age: 85
End: 2025-06-25
Payer: MEDICARE

## 2025-06-25 NOTE — OUTREACH NOTE
Medical Week 1 Survey      Flowsheet Row Responses   [facility] patient discharged from? [facility]   Does the patient have one of the following disease processes/diagnoses(primary or secondary)? Other   Week 1 attempt successful? Yes   Call start time 1523   Call end time 1528   Discharge diagnosis Severe hypomagnesemia   Meds reviewed with patient/caregiver? Yes   Is the patient having any side effects they believe may be caused by any medication additions or changes? No   Does the patient have all medications ordered at discharge? Yes   Is the patient taking all medications as directed (includes completed medication regime)? Yes   Does the patient have a primary care provider?  Yes   Does the patient have an appointment with their PCP within 7 days of discharge? Yes   Comments regarding PCP Hospital follow up appt with PCP on 7/9   Has the patient kept scheduled appointments due by today? Yes   What is the Home health agency?  [facility]   Has home health visited the patient within 72 hours of discharge? Yes   Psychosocial issues? No   Did the patient receive a copy of their discharge instructions? Yes   Nursing interventions Reviewed instructions with patient   What is the patient's perception of their health status since discharge? New symptoms unrelated to diagnosis  [states she fell and now has a bad cut on her leg, states her neighbor is helping her wrap the leg]   Is the patient/caregiver able to teach back signs and symptoms related to disease process for when to call PCP? Yes   Is the patient/caregiver able to teach back signs and symptoms related to disease process for when to call 911? Yes   Is the patient/caregiver able to teach back the hierarchy of who to call/visit for symptoms/problems? PCP, Specialist, Home health nurse, Urgent Care, ED, 911 Yes   If the patient is a current smoker, are they able to teach back resources for cessation? Not a smoker   Week 1 call completed?  Yes   Would this patient benefit from a Referral to [facility] Social Work? No   Is the patient interested in additional calls from an ambulatory ? No   Wrap up additional comments Not doing much better, states she fell the other day and now has a wound on her leg, her neighbor is coming over to assist with wrapping her leg and changing her bandage, states she will see PCP on 7/9.   Call end time 1528            Gloria NEWELL - Registered Nurse

## 2025-07-07 ENCOUNTER — LAB (OUTPATIENT)
Dept: LAB | Facility: HOSPITAL | Age: 85
End: 2025-07-07
Payer: MEDICARE

## 2025-07-07 ENCOUNTER — TRANSCRIBE ORDERS (OUTPATIENT)
Dept: ADMINISTRATIVE | Facility: HOSPITAL | Age: 85
End: 2025-07-07
Payer: MEDICARE

## 2025-07-07 DIAGNOSIS — N18.31 CHRONIC KIDNEY DISEASE, STAGE 3A: Primary | ICD-10-CM

## 2025-07-07 DIAGNOSIS — N18.31 CHRONIC KIDNEY DISEASE, STAGE 3A: ICD-10-CM

## 2025-07-07 LAB
ALBUMIN SERPL-MCNC: 3.8 G/DL (ref 3.5–5.2)
ALBUMIN UR-MCNC: <1.2 MG/DL
ALBUMIN/GLOB SERPL: 1.4 G/DL
ALP SERPL-CCNC: 94 U/L (ref 39–117)
ALT SERPL W P-5'-P-CCNC: 11 U/L (ref 1–33)
ANION GAP SERPL CALCULATED.3IONS-SCNC: 11.9 MMOL/L (ref 5–15)
AST SERPL-CCNC: 25 U/L (ref 1–32)
BILIRUB SERPL-MCNC: 0.3 MG/DL (ref 0–1.2)
BUN SERPL-MCNC: 14.9 MG/DL (ref 8–23)
BUN/CREAT SERPL: 17.1 (ref 7–25)
CALCIUM SPEC-SCNC: 9.6 MG/DL (ref 8.6–10.5)
CHLORIDE SERPL-SCNC: 107 MMOL/L (ref 98–107)
CO2 SERPL-SCNC: 24.1 MMOL/L (ref 22–29)
CREAT SERPL-MCNC: 0.87 MG/DL (ref 0.57–1)
CREAT UR-MCNC: 60.2 MG/DL
EGFRCR SERPLBLD CKD-EPI 2021: 66.2 ML/MIN/1.73
GLOBULIN UR ELPH-MCNC: 2.7 GM/DL
GLUCOSE SERPL-MCNC: 92 MG/DL (ref 65–99)
MICROALBUMIN/CREAT UR: NORMAL MG/G{CREAT}
POTASSIUM SERPL-SCNC: 4 MMOL/L (ref 3.5–5.2)
PROT SERPL-MCNC: 6.5 G/DL (ref 6–8.5)
SODIUM SERPL-SCNC: 143 MMOL/L (ref 136–145)

## 2025-07-07 PROCEDURE — 36415 COLL VENOUS BLD VENIPUNCTURE: CPT

## 2025-07-07 PROCEDURE — 82570 ASSAY OF URINE CREATININE: CPT

## 2025-07-07 PROCEDURE — 82043 UR ALBUMIN QUANTITATIVE: CPT

## 2025-07-07 PROCEDURE — 80053 COMPREHEN METABOLIC PANEL: CPT

## 2025-07-09 ENCOUNTER — LAB REQUISITION (OUTPATIENT)
Dept: LAB | Facility: HOSPITAL | Age: 85
End: 2025-07-09
Payer: MEDICARE

## 2025-07-09 DIAGNOSIS — M19.90 UNSPECIFIED OSTEOARTHRITIS, UNSPECIFIED SITE: ICD-10-CM

## 2025-07-09 LAB
ALBUMIN SERPL-MCNC: 4.1 G/DL (ref 3.5–5.2)
ALBUMIN/GLOB SERPL: 1.6 G/DL
ALP SERPL-CCNC: 100 U/L (ref 39–117)
ALT SERPL W P-5'-P-CCNC: 16 U/L (ref 1–33)
ANION GAP SERPL CALCULATED.3IONS-SCNC: 13.9 MMOL/L (ref 5–15)
AST SERPL-CCNC: 28 U/L (ref 1–32)
BILIRUB SERPL-MCNC: 0.3 MG/DL (ref 0–1.2)
BUN SERPL-MCNC: 13.5 MG/DL (ref 8–23)
BUN/CREAT SERPL: 16.7 (ref 7–25)
CALCIUM SPEC-SCNC: 10.3 MG/DL (ref 8.6–10.5)
CHLORIDE SERPL-SCNC: 103 MMOL/L (ref 98–107)
CO2 SERPL-SCNC: 26.1 MMOL/L (ref 22–29)
CREAT SERPL-MCNC: 0.81 MG/DL (ref 0.57–1)
EGFRCR SERPLBLD CKD-EPI 2021: 72.1 ML/MIN/1.73
GLOBULIN UR ELPH-MCNC: 2.5 GM/DL
GLUCOSE SERPL-MCNC: 74 MG/DL (ref 65–99)
POTASSIUM SERPL-SCNC: 4.3 MMOL/L (ref 3.5–5.2)
PROT SERPL-MCNC: 6.6 G/DL (ref 6–8.5)
SODIUM SERPL-SCNC: 143 MMOL/L (ref 136–145)

## 2025-07-09 PROCEDURE — 80053 COMPREHEN METABOLIC PANEL: CPT | Performed by: INTERNAL MEDICINE

## 2025-07-10 ENCOUNTER — TRANSCRIBE ORDERS (OUTPATIENT)
Dept: ADMINISTRATIVE | Facility: HOSPITAL | Age: 85
End: 2025-07-10
Payer: MEDICARE

## 2025-07-10 ENCOUNTER — LAB (OUTPATIENT)
Dept: LAB | Facility: HOSPITAL | Age: 85
End: 2025-07-10
Payer: MEDICARE

## 2025-07-10 DIAGNOSIS — N18.31 CKD STAGE G3A/A1, GFR 45-59 AND ALBUMIN CREATININE RATIO <30 MG/G: Primary | ICD-10-CM

## 2025-07-10 DIAGNOSIS — M19.90 SENILE ARTHRITIS: ICD-10-CM

## 2025-07-10 DIAGNOSIS — N18.31 CKD STAGE G3A/A1, GFR 45-59 AND ALBUMIN CREATININE RATIO <30 MG/G: ICD-10-CM

## 2025-07-10 DIAGNOSIS — M81.0 OSTEOPOROSIS, UNSPECIFIED OSTEOPOROSIS TYPE, UNSPECIFIED PATHOLOGICAL FRACTURE PRESENCE: ICD-10-CM

## 2025-07-10 LAB
ALBUMIN UR-MCNC: 1.4 MG/DL
CREAT UR-MCNC: 58.3 MG/DL
MAGNESIUM SERPL-MCNC: 1.8 MG/DL (ref 1.6–2.4)
MICROALBUMIN/CREAT UR: 24 MG/G (ref 0–29)

## 2025-07-10 PROCEDURE — 83735 ASSAY OF MAGNESIUM: CPT

## 2025-07-10 PROCEDURE — 84080 ASSAY ALKALINE PHOSPHATASES: CPT

## 2025-07-10 PROCEDURE — 82570 ASSAY OF URINE CREATININE: CPT

## 2025-07-10 PROCEDURE — 36415 COLL VENOUS BLD VENIPUNCTURE: CPT

## 2025-07-10 PROCEDURE — 82523 COLLAGEN CROSSLINKS: CPT

## 2025-07-10 PROCEDURE — 82043 UR ALBUMIN QUANTITATIVE: CPT

## 2025-07-11 ENCOUNTER — READMISSION MANAGEMENT (OUTPATIENT)
Dept: CALL CENTER | Facility: HOSPITAL | Age: 85
End: 2025-07-11
Payer: MEDICARE

## 2025-07-11 NOTE — OUTREACH NOTE
Medical Week 3 Survey      Flowsheet Row Responses   [facility] facility patient discharged from? [facility]   Does the patient have one of the following disease processes/diagnoses(primary or secondary)? Other   Week 3 attempt successful? No   Unsuccessful attempts Attempt 1   Quan CARROLL - Registered Nurse

## 2025-07-16 LAB — ALP BONE SERPL-MCNC: 9.4 UG/L

## 2025-07-18 LAB
COLLAGEN NTX UR-SCNC: 202 NMOL BCE
COLLAGEN NTX/CREAT UR-SRTO: 37 NM BCE/MM CR (ref 0–89)
CREAT UR-MCNC: 61 MG/DL
INTERPRETIVE GUIDE:: NORMAL

## 2025-08-21 ENCOUNTER — TRANSCRIBE ORDERS (OUTPATIENT)
Dept: ADMINISTRATIVE | Facility: HOSPITAL | Age: 85
End: 2025-08-21
Payer: MEDICARE

## 2025-08-21 ENCOUNTER — LAB (OUTPATIENT)
Dept: LAB | Facility: HOSPITAL | Age: 85
End: 2025-08-21
Payer: MEDICARE

## 2025-08-21 DIAGNOSIS — N18.31 CHRONIC KIDNEY DISEASE (CKD) STAGE G3A/A1, MODERATELY DECREASED GLOMERULAR FILTRATION RATE (GFR) BETWEEN 45-59 ML/MIN/1.73 SQUARE METER AND ALBUMINURIA CREATININE RATIO LESS THAN 30 MG/G (CMS/H*: Primary | ICD-10-CM
